# Patient Record
Sex: FEMALE | Race: WHITE | NOT HISPANIC OR LATINO | URBAN - METROPOLITAN AREA
[De-identification: names, ages, dates, MRNs, and addresses within clinical notes are randomized per-mention and may not be internally consistent; named-entity substitution may affect disease eponyms.]

---

## 2021-05-18 ENCOUNTER — OFFICE VISIT (OUTPATIENT)
Dept: FAMILY MEDICINE CLINIC | Facility: CLINIC | Age: 76
End: 2021-05-18

## 2021-05-18 ENCOUNTER — LAB (OUTPATIENT)
Dept: LAB | Facility: HOSPITAL | Age: 76
End: 2021-05-18

## 2021-05-18 VITALS
RESPIRATION RATE: 14 BRPM | SYSTOLIC BLOOD PRESSURE: 214 MMHG | HEIGHT: 63 IN | BODY MASS INDEX: 36.21 KG/M2 | WEIGHT: 204.4 LBS | DIASTOLIC BLOOD PRESSURE: 142 MMHG | OXYGEN SATURATION: 97 % | TEMPERATURE: 96.9 F | HEART RATE: 96 BPM

## 2021-05-18 DIAGNOSIS — I10 ESSENTIAL HYPERTENSION: ICD-10-CM

## 2021-05-18 DIAGNOSIS — Z00.00 MEDICARE ANNUAL WELLNESS VISIT, INITIAL: Primary | ICD-10-CM

## 2021-05-18 LAB
ALBUMIN SERPL-MCNC: 4.3 G/DL (ref 3.5–5.2)
ALBUMIN/GLOB SERPL: 1.4 G/DL
ALP SERPL-CCNC: 112 U/L (ref 39–117)
ALT SERPL W P-5'-P-CCNC: 33 U/L (ref 1–33)
ANION GAP SERPL CALCULATED.3IONS-SCNC: 13.2 MMOL/L (ref 5–15)
AST SERPL-CCNC: 39 U/L (ref 1–32)
BILIRUB SERPL-MCNC: 1.4 MG/DL (ref 0–1.2)
BUN SERPL-MCNC: 20 MG/DL (ref 8–23)
BUN/CREAT SERPL: 17.9 (ref 7–25)
CALCIUM SPEC-SCNC: 9.4 MG/DL (ref 8.6–10.5)
CHLORIDE SERPL-SCNC: 100 MMOL/L (ref 98–107)
CO2 SERPL-SCNC: 28.8 MMOL/L (ref 22–29)
CREAT SERPL-MCNC: 1.12 MG/DL (ref 0.57–1)
GFR SERPL CREATININE-BSD FRML MDRD: 47 ML/MIN/1.73
GLOBULIN UR ELPH-MCNC: 3 GM/DL
GLUCOSE SERPL-MCNC: 111 MG/DL (ref 65–99)
POTASSIUM SERPL-SCNC: 3.2 MMOL/L (ref 3.5–5.2)
PROT SERPL-MCNC: 7.3 G/DL (ref 6–8.5)
SODIUM SERPL-SCNC: 142 MMOL/L (ref 136–145)

## 2021-05-18 PROCEDURE — 80053 COMPREHEN METABOLIC PANEL: CPT

## 2021-05-18 PROCEDURE — G0438 PPPS, INITIAL VISIT: HCPCS | Performed by: PHYSICIAN ASSISTANT

## 2021-05-18 PROCEDURE — 36415 COLL VENOUS BLD VENIPUNCTURE: CPT

## 2021-05-18 RX ORDER — AMLODIPINE BESYLATE AND BENAZEPRIL HYDROCHLORIDE 5; 20 MG/1; MG/1
1 CAPSULE ORAL DAILY
Qty: 30 CAPSULE | Refills: 11 | Status: SHIPPED | OUTPATIENT
Start: 2021-05-18 | End: 2021-05-26

## 2021-05-18 NOTE — PROGRESS NOTES
"The ABCs of the Annual Wellness Visit  Initial Medicare Wellness Visit    Chief Complaint   Patient presents with   • Medicare Wellness-Initial Visit     Never had colonoscopy, [facility]. Hasn't had PCP in 20 years       Subjective   History of Present Illness:  Michael Cochran is a 75 y.o. female who presents for an Initial Medicare Wellness Visit.  Patient is 75-year-old white female who also presents for elevated blood pressure patient states she is not been to the doctor since 1991 has had no blood work performed she states that her blood pressures always been high but has not treated it she states she used [employer] in the past a long time ago and it caused her to have \"tic\".  She states she comes in wanting her blood pressure treated and start being followed regularly blood pressure today was 214/140.  Patient denies any symptoms of blurry vision headaches etc.  HEALTH RISK ASSESSMENT    Recent Hospitalizations:  No hospitalization(s) within the last year.    Current Medical Providers:  Patient Care Team:  Bo Rodriguez PA as PCP - General (Family Medicine)    Smoking Status:  Social History     Tobacco Use   Smoking Status Current Some Day Smoker   • Types: Cigarettes   Smokeless Tobacco Never Used   Tobacco Comment    Smokes rarely       Alcohol Consumption:  Social History     Substance and Sexual Activity   Alcohol Use Never       Depression Screen:   PHQ-2/PHQ-9 Depression Screening 5/18/2021   Little interest or pleasure in doing things 0   Feeling down, depressed, or hopeless 0   Total Score 0       Fall Risk Screen:  STEADI Fall Risk Assessment has not been completed.    Health Habits and Functional and Cognitive Screening:  Functional & Cognitive Status 5/18/2021   Do you have difficulty preparing food and eating? No   Do you have difficulty bathing yourself, getting dressed or grooming yourself? No   Do you have difficulty using the toilet? No   Do you have difficulty moving around from place to " place? No   Do you have trouble with steps or getting out of a bed or a chair? No   Current Diet Well Balanced Diet   Dental Exam Not up to date   Eye Exam Up to date   Exercise (times per week) 7 times per week   Current Exercise Activities Include Walking   Do you need help using the phone?  No   Are you deaf or do you have serious difficulty hearing?  No   Do you need help with transportation? No   Do you need help shopping? No   Do you need help preparing meals?  No   Do you need help with housework?  No   Do you need help with laundry? No   Do you need help taking your medications? No   Do you need help managing money? No   Do you ever drive or ride in a car without wearing a seat belt? No   Have you felt unusual stress, anger or loneliness in the last month? No   Who do you live with? Alone   If you need help, do you have trouble finding someone available to you? No   Have you been bothered in the last four weeks by sexual problems? No   Do you have difficulty concentrating, remembering or making decisions? No         Does the patient have evidence of cognitive impairment? No    Asprin use counseling:Start ASA 81 mg daily     Age-appropriate Screening Schedule:  Refer to the list below for future screening recommendations based on patient's age, sex and/or medical conditions. Orders for these recommended tests are listed in the plan section. The patient has been provided with a written plan.    Health Maintenance   Topic Date Due   • DXA SCAN  Never done   • TDAP/TD VACCINES (1 - Tdap) Never done   • ZOSTER VACCINE (1 of 2) Never done   • LIPID PANEL  Never done   • INFLUENZA VACCINE  08/01/2021          The following portions of the patient's history were reviewed and updated as appropriate: allergies, current medications, past family history, past medical history, past social history, past surgical history and problem list.    No outpatient medications prior to visit.     No facility-administered medications  "prior to visit.       There is no problem list on file for this patient.      Advanced Care Planning:  ACP discussion was held with the patient during this visit. Patient does not have an advance directive, information provided.    Review of Systems   Constitutional: Negative for fatigue and unexpected weight change.   Respiratory: Negative for cough, chest tightness and shortness of breath.    Cardiovascular: Negative for chest pain, palpitations and leg swelling.   Gastrointestinal: Negative for nausea.   Skin: Negative for color change and rash.   Neurological: Negative for dizziness, syncope, weakness and headaches.       Compared to one year ago, the patient feels her physical health is the same.  Compared to one year ago, the patient feels her mental health is the same.    Reviewed chart for potential of high risk medication in the elderly: yes  Reviewed chart for potential of harmful drug interactions in the elderly:yes    Objective         Vitals:    05/18/21 1118   BP: (!) 214/142   Pulse: 96   Resp: 14   Temp: 96.9 °F (36.1 °C)   TempSrc: Infrared   SpO2: 97%   Weight: 92.7 kg (204 lb 6.4 oz)   Height: 158.8 cm (62.5\")   PainSc: 0-No pain       Body mass index is 36.79 kg/m².  Discussed the patient's BMI with her. The BMI is above average; BMI management plan is completed.    Physical Exam  Vitals and nursing note reviewed.   Constitutional:       Appearance: She is well-developed.   Neck:      Vascular: No JVD.   Cardiovascular:      Rate and Rhythm: Normal rate and regular rhythm.      Pulses: Normal pulses.      Heart sounds: Normal heart sounds. No murmur heard.     Pulmonary:      Effort: Pulmonary effort is normal. No respiratory distress.      Breath sounds: Normal breath sounds.   Abdominal:      General: Bowel sounds are normal.      Palpations: Abdomen is soft.      Tenderness: There is no abdominal tenderness.   Skin:     General: Skin is warm and dry.               Assessment/Plan   Medicare " Risks and Personalized Health Plan  CMS Preventative Services Quick Reference  Cardiovascular risk    The above risks/problems have been discussed with the patient.  Pertinent information has been shared with the patient in the After Visit Summary.  Follow up plans and orders are seen below in the Assessment/Plan Section.    Diagnoses and all orders for this visit:    1. Medicare annual wellness visit, initial (Primary)    2. Essential hypertension  -     amLODIPine-benazepril (Lotrel) 5-20 MG per capsule; Take 1 capsule by mouth Daily.  Dispense: 30 capsule; Refill: 11  -     Comprehensive Metabolic Panel; Future  -     Lipid Panel; Future    Medicare wellness discussed, preventive medicine discussed, we will start by starting her on Lotrel 5/20 every day for blood pressure she is to return in 10 days we will check a CMP and lipid panel patient has not been a doctor many years I do not overwhelm her too much stuff to do so we will start by trying to lower her blood pressure get some basic blood work.  I will see her back in 10 days  Follow Up:  No follow-ups on file.     An After Visit Summary and PPPS were given to the patient.

## 2021-05-26 ENCOUNTER — OFFICE VISIT (OUTPATIENT)
Dept: FAMILY MEDICINE CLINIC | Facility: CLINIC | Age: 76
End: 2021-05-26

## 2021-05-26 VITALS
TEMPERATURE: 97.1 F | BODY MASS INDEX: 36.21 KG/M2 | HEIGHT: 63 IN | WEIGHT: 204.4 LBS | DIASTOLIC BLOOD PRESSURE: 122 MMHG | OXYGEN SATURATION: 98 % | RESPIRATION RATE: 16 BRPM | HEART RATE: 82 BPM | SYSTOLIC BLOOD PRESSURE: 172 MMHG

## 2021-05-26 DIAGNOSIS — I10 ESSENTIAL HYPERTENSION: Primary | ICD-10-CM

## 2021-05-26 PROCEDURE — 99213 OFFICE O/P EST LOW 20 MIN: CPT | Performed by: PHYSICIAN ASSISTANT

## 2021-05-26 RX ORDER — AMLODIPINE BESYLATE AND BENAZEPRIL HYDROCHLORIDE 10; 40 MG/1; MG/1
1 CAPSULE ORAL DAILY
Qty: 30 CAPSULE | Refills: 11 | Status: SHIPPED | OUTPATIENT
Start: 2021-05-26 | End: 2021-06-25

## 2021-05-26 NOTE — PROGRESS NOTES
Subjective   Michael Cochran is a 75 y.o. female  Hypertension (F/U since starting Lotrel May 18)      History of Present Illness  Patient is a pleasant 75-year-old white female who comes in for follow-up of hypertension blood pressure is better today but still elevated 172/122 but improved patient states she does overall feel better looking at her lab work had performed her CMP showed a slightly elevated creatinine at 1.12 potassium 3.2  The following portions of the patient's history were reviewed and updated as appropriate: allergies, current medications, past social history and problem list    Review of Systems   Constitutional: Negative for fatigue and unexpected weight change.   Respiratory: Negative for cough, chest tightness and shortness of breath.    Cardiovascular: Negative for chest pain, palpitations and leg swelling.   Gastrointestinal: Negative for nausea.   Skin: Negative for color change and rash.   Neurological: Negative for dizziness, syncope, weakness and headaches.       Objective     Vitals:    05/26/21 1134   BP: (!) 172/122   Pulse: 82   Resp: 16   Temp: 97.1 °F (36.2 °C)   SpO2: 98%       Physical Exam  Vitals and nursing note reviewed.   Constitutional:       Appearance: She is well-developed. She is not diaphoretic.   Neck:      Thyroid: No thyromegaly.      Vascular: No JVD.   Cardiovascular:      Rate and Rhythm: Normal rate and regular rhythm.      Pulses: Normal pulses.      Heart sounds: Normal heart sounds. No murmur heard.     Pulmonary:      Effort: Pulmonary effort is normal. No respiratory distress.      Breath sounds: Normal breath sounds.   Abdominal:      General: Bowel sounds are normal.      Palpations: Abdomen is soft.      Tenderness: There is no abdominal tenderness.   Musculoskeletal:      Cervical back: Neck supple.   Lymphadenopathy:      Cervical: No cervical adenopathy.   Skin:     General: Skin is warm and dry.   Neurological:      Sensory: No sensory deficit.          Assessment/Plan     Diagnoses and all orders for this visit:    1. Essential hypertension (Primary)  -     amLODIPine-benazepril (Lotrel) 10-40 MG per capsule; Take 1 capsule by mouth Daily.  Dispense: 30 capsule; Refill: 11    Increase Lotrel to 10/41 p.o. every day, recheck creatinine in 2 weeks, increase potassium rich food follow-up for blood pressure check in 2 weeks

## 2021-06-09 ENCOUNTER — OFFICE VISIT (OUTPATIENT)
Dept: FAMILY MEDICINE CLINIC | Facility: CLINIC | Age: 76
End: 2021-06-09

## 2021-06-09 ENCOUNTER — LAB (OUTPATIENT)
Dept: LAB | Facility: HOSPITAL | Age: 76
End: 2021-06-09

## 2021-06-09 VITALS
OXYGEN SATURATION: 97 % | TEMPERATURE: 96.9 F | BODY MASS INDEX: 36.36 KG/M2 | HEIGHT: 63 IN | RESPIRATION RATE: 16 BRPM | WEIGHT: 205.2 LBS | DIASTOLIC BLOOD PRESSURE: 112 MMHG | HEART RATE: 92 BPM | SYSTOLIC BLOOD PRESSURE: 172 MMHG

## 2021-06-09 DIAGNOSIS — R79.89 ELEVATED SERUM CREATININE: ICD-10-CM

## 2021-06-09 DIAGNOSIS — I10 ESSENTIAL HYPERTENSION: Primary | ICD-10-CM

## 2021-06-09 DIAGNOSIS — I10 ESSENTIAL HYPERTENSION: ICD-10-CM

## 2021-06-09 DIAGNOSIS — E87.6 LOW SERUM POTASSIUM: ICD-10-CM

## 2021-06-09 LAB
ANION GAP SERPL CALCULATED.3IONS-SCNC: 10 MMOL/L (ref 5–15)
BUN SERPL-MCNC: 23 MG/DL (ref 8–23)
BUN/CREAT SERPL: 26.1 (ref 7–25)
CALCIUM SPEC-SCNC: 9.7 MG/DL (ref 8.6–10.5)
CHLORIDE SERPL-SCNC: 104 MMOL/L (ref 98–107)
CHOLEST SERPL-MCNC: 314 MG/DL (ref 0–200)
CO2 SERPL-SCNC: 26 MMOL/L (ref 22–29)
CREAT SERPL-MCNC: 0.88 MG/DL (ref 0.57–1)
GFR SERPL CREATININE-BSD FRML MDRD: 62 ML/MIN/1.73
GLUCOSE SERPL-MCNC: 110 MG/DL (ref 65–99)
HDLC SERPL-MCNC: 43 MG/DL (ref 40–60)
LDLC SERPL CALC-MCNC: 216 MG/DL (ref 0–100)
LDLC/HDLC SERPL: 5.03 {RATIO}
POTASSIUM SERPL-SCNC: 4.4 MMOL/L (ref 3.5–5.2)
SODIUM SERPL-SCNC: 140 MMOL/L (ref 136–145)
TRIGL SERPL-MCNC: 273 MG/DL (ref 0–150)
VLDLC SERPL-MCNC: 55 MG/DL (ref 5–40)

## 2021-06-09 PROCEDURE — 99213 OFFICE O/P EST LOW 20 MIN: CPT | Performed by: PHYSICIAN ASSISTANT

## 2021-06-09 PROCEDURE — 36415 COLL VENOUS BLD VENIPUNCTURE: CPT

## 2021-06-09 PROCEDURE — 80061 LIPID PANEL: CPT

## 2021-06-09 PROCEDURE — 80048 BASIC METABOLIC PNL TOTAL CA: CPT

## 2021-06-09 NOTE — PROGRESS NOTES
Subjective   Michael Cochran is a 75 y.o. female  Hypertension (F/U )      History of Present Illness  Patient is a pleasant 75-year-old female who comes in for follow-up of hypertension on her last visit her Lotrel was increased to 10/40, her blood pressure did improve but still 172/112 during our conversation she states she has had renal ultrasound done in the past but it was a long time ago due to her uncontrolled hypertension.  On her last CMP showed slightly low potassium and elevated creatinine   The following portions of the patient's history were reviewed and updated as appropriate: allergies, current medications, past social history and problem list    Review of Systems   Constitutional: Negative for fatigue and unexpected weight change.   Respiratory: Negative for cough, chest tightness and shortness of breath.    Cardiovascular: Negative for chest pain, palpitations and leg swelling.   Gastrointestinal: Negative for nausea.   Skin: Negative for color change and rash.   Neurological: Negative for dizziness, syncope, weakness and headaches.       Objective     Vitals:    06/09/21 1102   BP: (!) 172/112   Pulse: 92   Resp: 16   Temp: 96.9 °F (36.1 °C)   SpO2: 97%       Physical Exam  Vitals and nursing note reviewed.   Constitutional:       Appearance: She is well-developed. She is not diaphoretic.   Neck:      Thyroid: No thyromegaly.      Vascular: No JVD.   Cardiovascular:      Rate and Rhythm: Normal rate and regular rhythm.      Pulses: Normal pulses.      Heart sounds: Normal heart sounds. No murmur heard.     Pulmonary:      Effort: Pulmonary effort is normal. No respiratory distress.      Breath sounds: Normal breath sounds.   Abdominal:      General: Bowel sounds are normal.      Palpations: Abdomen is soft.      Tenderness: There is no abdominal tenderness.   Musculoskeletal:      Cervical back: Neck supple.   Lymphadenopathy:      Cervical: No cervical adenopathy.   Skin:     General: Skin is warm  and dry.   Neurological:      Sensory: No sensory deficit.         Assessment/Plan     Diagnoses and all orders for this visit:    1. Essential hypertension (Primary)  -     Basic metabolic panel; Future    2. Elevated serum creatinine    3. Low serum potassium    #1 check BMP    Before I adjust medication I will get a BMP to check a potassium creatinine level she is to call for results tomorrow

## 2021-06-11 ENCOUNTER — TELEPHONE (OUTPATIENT)
Dept: FAMILY MEDICINE CLINIC | Facility: CLINIC | Age: 76
End: 2021-06-11

## 2021-06-11 NOTE — TELEPHONE ENCOUNTER
----- Message from Michael Cochran sent at 6/10/2021  7:15 PM EDT -----  Regarding: Non-Urgent Medical Question  Contact: [phone]  So when do you want me to return?  I didn't make a ret appointment. As sena Power I mnot pushing him he is a control freak you could handle him but he likes to go on and on and on just like his dad he is doctor,  and [place] chief. He wants everything now no patience just narcissistic attitude.  Thank you again for all you are doing to get me back on track.

## 2021-06-25 ENCOUNTER — OFFICE VISIT (OUTPATIENT)
Dept: FAMILY MEDICINE CLINIC | Facility: CLINIC | Age: 76
End: 2021-06-25

## 2021-06-25 VITALS
WEIGHT: 204 LBS | RESPIRATION RATE: 16 BRPM | TEMPERATURE: 96.9 F | BODY MASS INDEX: 36.14 KG/M2 | HEIGHT: 63 IN | OXYGEN SATURATION: 96 % | SYSTOLIC BLOOD PRESSURE: 170 MMHG | HEART RATE: 97 BPM | DIASTOLIC BLOOD PRESSURE: 110 MMHG

## 2021-06-25 DIAGNOSIS — I10 ESSENTIAL HYPERTENSION: Primary | ICD-10-CM

## 2021-06-25 PROCEDURE — 99213 OFFICE O/P EST LOW 20 MIN: CPT | Performed by: PHYSICIAN ASSISTANT

## 2021-06-25 RX ORDER — CLONIDINE HYDROCHLORIDE 0.1 MG/1
0.1 TABLET ORAL 2 TIMES DAILY
Qty: 60 TABLET | Refills: 6 | Status: SHIPPED | OUTPATIENT
Start: 2021-06-25 | End: 2022-01-03

## 2021-06-25 RX ORDER — AMLODIPINE BESYLATE 10 MG/1
10 TABLET ORAL DAILY
Qty: 30 TABLET | Refills: 11 | Status: SHIPPED | OUTPATIENT
Start: 2021-06-25

## 2021-06-25 NOTE — PROGRESS NOTES
Subjective   Michael Cochran is a 75 y.o. female  Hypertension (F/U) and Cough (Dry cough since starting Lotrel )      Hypertension  Pertinent negatives include no chest pain, headaches, palpitations, shortness of breath or sweats.   Cough  The current episode started 1 to 4 weeks ago. The problem has been gradually worsening. The problem occurs every few hours. The cough is non-productive. Associated symptoms include heartburn. Pertinent negatives include no chest pain, chills, ear congestion, ear pain, fever, headaches, hemoptysis, myalgias, postnasal drip, rash, rhinorrhea, sore throat, shortness of breath, sweats, weight loss or wheezing. The symptoms are aggravated by stress.     Patient states blood pressure still not improved cough is dry secondary to the blood pressure medication, patient states she has had a work-up before for uncontrolled hypertension for which he thinks included a renal ultrasound  The following portions of the patient's history were reviewed and updated as appropriate: allergies, current medications, past social history and problem list    Review of Systems   Constitutional: Negative for chills, fatigue, fever, unexpected weight change and weight loss.   HENT: Negative for ear pain, postnasal drip, rhinorrhea and sore throat.    Respiratory: Positive for cough. Negative for hemoptysis, chest tightness, shortness of breath and wheezing.    Cardiovascular: Negative for chest pain, palpitations and leg swelling.   Gastrointestinal: Positive for heartburn. Negative for nausea.   Musculoskeletal: Negative for myalgias.   Skin: Negative for color change and rash.   Neurological: Negative for dizziness, syncope, weakness and headaches.       Objective     Vitals:    06/25/21 1132   BP: (!) 170/110   Pulse: 97   Resp: 16   Temp: 96.9 °F (36.1 °C)   SpO2: 96%       Physical Exam  Vitals and nursing note reviewed.   Constitutional:       Appearance: She is well-developed.   Neck:      Vascular: No  JVD.   Cardiovascular:      Rate and Rhythm: Normal rate and regular rhythm.      Pulses: Normal pulses.      Heart sounds: Normal heart sounds. No murmur heard.     Pulmonary:      Effort: Pulmonary effort is normal. No respiratory distress.      Breath sounds: Normal breath sounds.   Abdominal:      General: Bowel sounds are normal.      Palpations: Abdomen is soft.      Tenderness: There is no abdominal tenderness.   Skin:     General: Skin is warm and dry.         Assessment/Plan     Diagnoses and all orders for this visit:    1. Essential hypertension (Primary)    Other orders  -     amLODIPine (Norvasc) 10 MG tablet; Take 1 tablet by mouth Daily.  Dispense: 30 tablet; Refill: 11  -     cloNIDine (Catapres) 0.1 MG tablet; Take 1 tablet by mouth 2 (Two) Times a Day.  Dispense: 60 tablet; Refill: 6    #1 DC Lotrel start amlodipine 10 mg 1 p.o. every day dispense 30 tablet refills    2.  Start clonidine 0.1 mg twice a day dispense 60x3 refills    Follow-up in 3 weeks to recheck blood pressure

## 2021-08-16 ENCOUNTER — OFFICE VISIT (OUTPATIENT)
Dept: FAMILY MEDICINE CLINIC | Facility: CLINIC | Age: 76
End: 2021-08-16

## 2021-08-16 VITALS
RESPIRATION RATE: 16 BRPM | SYSTOLIC BLOOD PRESSURE: 142 MMHG | OXYGEN SATURATION: 98 % | TEMPERATURE: 97.6 F | WEIGHT: 207 LBS | BODY MASS INDEX: 36.68 KG/M2 | DIASTOLIC BLOOD PRESSURE: 84 MMHG | HEART RATE: 97 BPM | HEIGHT: 63 IN

## 2021-08-16 DIAGNOSIS — I10 ESSENTIAL HYPERTENSION: Primary | ICD-10-CM

## 2021-08-16 PROCEDURE — 99213 OFFICE O/P EST LOW 20 MIN: CPT | Performed by: PHYSICIAN ASSISTANT

## 2021-08-16 RX ORDER — HYDROCHLOROTHIAZIDE 25 MG/1
25 TABLET ORAL DAILY
Qty: 90 TABLET | Refills: 3 | Status: SHIPPED | OUTPATIENT
Start: 2021-08-16

## 2021-08-16 NOTE — PROGRESS NOTES
Subjective   Michael Cochran is a 76 y.o. female  Hypertension      History of Present Illness  Patient is a pleasant 76-year-old white female who presents for hypertension meds working well no problems or complaints no shortness of breath no chest pain no SI/HI meds working well, no frontal headache concentration is good focus is good.  Blood pressure looks better on combination blood pressure is about 140/92 much improved does feel better more energy  The following portions of the patient's history were reviewed and updated as appropriate: allergies, current medications, past social history and problem list    Review of Systems   Constitutional: Negative for appetite change, diaphoresis, fatigue and unexpected weight change.   Eyes: Negative for visual disturbance.   Respiratory: Negative for cough, chest tightness and shortness of breath.    Cardiovascular: Negative for chest pain, palpitations and leg swelling.   Gastrointestinal: Negative for diarrhea, nausea and vomiting.   Endocrine: Negative for polydipsia, polyphagia and polyuria.   Skin: Negative for color change and rash.   Neurological: Negative for dizziness, syncope, weakness, light-headedness, numbness and headaches.       Objective     Vitals:    08/16/21 1310   BP: 142/84   Pulse: 97   Resp: 16   Temp: 97.6 °F (36.4 °C)   SpO2: 98%       Physical Exam  Vitals and nursing note reviewed.   Constitutional:       Appearance: She is well-developed. She is not diaphoretic.   Neck:      Thyroid: No thyromegaly.      Vascular: No JVD.   Cardiovascular:      Rate and Rhythm: Normal rate and regular rhythm.      Pulses: Normal pulses.      Heart sounds: Normal heart sounds. No murmur heard.     Pulmonary:      Effort: Pulmonary effort is normal. No respiratory distress.      Breath sounds: Normal breath sounds.   Abdominal:      General: Bowel sounds are normal.      Palpations: Abdomen is soft.      Tenderness: There is no abdominal tenderness.    Musculoskeletal:      Cervical back: Neck supple.   Lymphadenopathy:      Cervical: No cervical adenopathy.   Skin:     General: Skin is warm and dry.   Neurological:      Sensory: No sensory deficit.         Assessment/Plan     Diagnoses and all orders for this visit:    1. Essential hypertension (Primary)  -     hydroCHLOROthiazide (HYDRODIURIL) 25 MG tablet; Take 1 tablet by mouth Daily.  Dispense: 90 tablet; Refill: 3    Add HCTZ    I spent 15 minutes in patient care: Reviewing records prior to the visit, examining the patient, entering orders and documentation    Please note that portions of this document were completed with a voice recognition program. Efforts were made to edit the dictations, but occasionally words are mis-transcribed

## 2022-01-03 RX ORDER — CLONIDINE HYDROCHLORIDE 0.1 MG/1
TABLET ORAL
Qty: 60 TABLET | Refills: 1 | Status: SHIPPED | OUTPATIENT
Start: 2022-01-03

## 2023-01-01 ENCOUNTER — APPOINTMENT (OUTPATIENT)
Dept: GENERAL RADIOLOGY | Facility: HOSPITAL | Age: 78
DRG: 689 | End: 2023-01-01
Payer: MEDICARE

## 2023-01-01 ENCOUNTER — HOSPITAL ENCOUNTER (INPATIENT)
Facility: HOSPITAL | Age: 78
LOS: 1 days | End: 2023-12-01
Attending: INTERNAL MEDICINE | Admitting: INTERNAL MEDICINE
Payer: COMMERCIAL

## 2023-01-01 ENCOUNTER — HOSPITAL ENCOUNTER (INPATIENT)
Facility: HOSPITAL | Age: 78
LOS: 13 days | DRG: 689 | End: 2023-11-30
Attending: EMERGENCY MEDICINE | Admitting: INTERNAL MEDICINE
Payer: MEDICARE

## 2023-01-01 ENCOUNTER — APPOINTMENT (OUTPATIENT)
Dept: CT IMAGING | Facility: HOSPITAL | Age: 78
DRG: 689 | End: 2023-01-01
Payer: MEDICARE

## 2023-01-01 VITALS
SYSTOLIC BLOOD PRESSURE: 152 MMHG | OXYGEN SATURATION: 98 % | TEMPERATURE: 98.1 F | WEIGHT: 201.5 LBS | HEART RATE: 81 BPM | HEIGHT: 62 IN | DIASTOLIC BLOOD PRESSURE: 75 MMHG | BODY MASS INDEX: 37.08 KG/M2 | RESPIRATION RATE: 19 BRPM

## 2023-01-01 DIAGNOSIS — A41.9 SEPSIS WITH ACUTE HYPOXIC RESPIRATORY FAILURE WITHOUT SEPTIC SHOCK, DUE TO UNSPECIFIED ORGANISM: Primary | ICD-10-CM

## 2023-01-01 DIAGNOSIS — N39.0 ACUTE URINARY TRACT INFECTION: ICD-10-CM

## 2023-01-01 DIAGNOSIS — R79.89 ELEVATED TROPONIN: ICD-10-CM

## 2023-01-01 DIAGNOSIS — J96.01 SEPSIS WITH ACUTE HYPOXIC RESPIRATORY FAILURE WITHOUT SEPTIC SHOCK, DUE TO UNSPECIFIED ORGANISM: Primary | ICD-10-CM

## 2023-01-01 DIAGNOSIS — R09.02 HYPOXIA: ICD-10-CM

## 2023-01-01 DIAGNOSIS — R65.20 SEPSIS WITH ACUTE HYPOXIC RESPIRATORY FAILURE WITHOUT SEPTIC SHOCK, DUE TO UNSPECIFIED ORGANISM: Primary | ICD-10-CM

## 2023-01-01 LAB
ALBUMIN SERPL-MCNC: 2.6 G/DL (ref 3.5–5.2)
ALBUMIN SERPL-MCNC: 2.8 G/DL (ref 3.5–5.2)
ALBUMIN SERPL-MCNC: 2.8 G/DL (ref 3.5–5.2)
ALBUMIN SERPL-MCNC: 3.1 G/DL (ref 3.5–5.2)
ALBUMIN SERPL-MCNC: 3.2 G/DL (ref 3.5–5.2)
ALBUMIN SERPL-MCNC: 3.2 G/DL (ref 3.5–5.2)
ALBUMIN/GLOB SERPL: 0.8 G/DL
ALBUMIN/GLOB SERPL: 0.9 G/DL
ALBUMIN/GLOB SERPL: 1 G/DL
ALP SERPL-CCNC: 128 U/L (ref 39–117)
ALP SERPL-CCNC: 87 U/L (ref 39–117)
ALP SERPL-CCNC: 88 U/L (ref 39–117)
ALP SERPL-CCNC: 94 U/L (ref 39–117)
ALP SERPL-CCNC: 98 U/L (ref 39–117)
ALT SERPL W P-5'-P-CCNC: 15 U/L (ref 1–33)
ALT SERPL W P-5'-P-CCNC: 21 U/L (ref 1–33)
ALT SERPL W P-5'-P-CCNC: 28 U/L (ref 1–33)
ALT SERPL W P-5'-P-CCNC: 30 U/L (ref 1–33)
ALT SERPL W P-5'-P-CCNC: 33 U/L (ref 1–33)
ANION GAP SERPL CALCULATED.3IONS-SCNC: 10 MMOL/L (ref 5–15)
ANION GAP SERPL CALCULATED.3IONS-SCNC: 11 MMOL/L (ref 5–15)
ANION GAP SERPL CALCULATED.3IONS-SCNC: 8 MMOL/L (ref 5–15)
ANION GAP SERPL CALCULATED.3IONS-SCNC: 8 MMOL/L (ref 5–15)
ANION GAP SERPL CALCULATED.3IONS-SCNC: 9 MMOL/L (ref 5–15)
ANION GAP SERPL CALCULATED.3IONS-SCNC: 9 MMOL/L (ref 5–15)
AST SERPL-CCNC: 17 U/L (ref 1–32)
AST SERPL-CCNC: 18 U/L (ref 1–32)
AST SERPL-CCNC: 22 U/L (ref 1–32)
AST SERPL-CCNC: 35 U/L (ref 1–32)
AST SERPL-CCNC: 41 U/L (ref 1–32)
B PARAPERT DNA SPEC QL NAA+PROBE: NOT DETECTED
B PERT DNA SPEC QL NAA+PROBE: NOT DETECTED
BACTERIA BLD CULT: ABNORMAL
BACTERIA SPEC AEROBE CULT: ABNORMAL
BACTERIA SPEC AEROBE CULT: NORMAL
BACTERIA SPEC AEROBE CULT: NORMAL
BACTERIA UR QL AUTO: ABNORMAL /HPF
BACTERIA UR QL AUTO: ABNORMAL /HPF
BASOPHILS # BLD AUTO: 0.03 10*3/MM3 (ref 0–0.2)
BASOPHILS # BLD AUTO: 0.03 10*3/MM3 (ref 0–0.2)
BASOPHILS # BLD AUTO: 0.05 10*3/MM3 (ref 0–0.2)
BASOPHILS # BLD AUTO: 0.06 10*3/MM3 (ref 0–0.2)
BASOPHILS NFR BLD AUTO: 0.3 % (ref 0–1.5)
BASOPHILS NFR BLD AUTO: 0.3 % (ref 0–1.5)
BASOPHILS NFR BLD AUTO: 0.4 % (ref 0–1.5)
BASOPHILS NFR BLD AUTO: 0.5 % (ref 0–1.5)
BASOPHILS NFR BLD AUTO: 0.5 % (ref 0–1.5)
BASOPHILS NFR BLD AUTO: 0.6 % (ref 0–1.5)
BILIRUB SERPL-MCNC: 0.8 MG/DL (ref 0–1.2)
BILIRUB SERPL-MCNC: 0.9 MG/DL (ref 0–1.2)
BILIRUB SERPL-MCNC: 1 MG/DL (ref 0–1.2)
BILIRUB SERPL-MCNC: 1.1 MG/DL (ref 0–1.2)
BILIRUB SERPL-MCNC: 1.3 MG/DL (ref 0–1.2)
BILIRUB UR QL STRIP: NEGATIVE
BILIRUB UR QL STRIP: NEGATIVE
BOTTLE TYPE: ABNORMAL
BUN SERPL-MCNC: 21 MG/DL (ref 8–23)
BUN SERPL-MCNC: 24 MG/DL (ref 8–23)
BUN SERPL-MCNC: 26 MG/DL (ref 8–23)
BUN SERPL-MCNC: 29 MG/DL (ref 8–23)
BUN SERPL-MCNC: 30 MG/DL (ref 8–23)
BUN SERPL-MCNC: 32 MG/DL (ref 8–23)
BUN SERPL-MCNC: 37 MG/DL (ref 8–23)
BUN SERPL-MCNC: 42 MG/DL (ref 8–23)
BUN SERPL-MCNC: 51 MG/DL (ref 8–23)
BUN/CREAT SERPL: 33.8 (ref 7–25)
BUN/CREAT SERPL: 36.8 (ref 7–25)
BUN/CREAT SERPL: 41.2 (ref 7–25)
BUN/CREAT SERPL: 41.4 (ref 7–25)
BUN/CREAT SERPL: 42 (ref 7–25)
BUN/CREAT SERPL: 42.9 (ref 7–25)
BUN/CREAT SERPL: 44.1 (ref 7–25)
BUN/CREAT SERPL: 46.3 (ref 7–25)
BUN/CREAT SERPL: 49.4 (ref 7–25)
C PNEUM DNA NPH QL NAA+NON-PROBE: NOT DETECTED
CALCIUM SPEC-SCNC: 8.2 MG/DL (ref 8.6–10.5)
CALCIUM SPEC-SCNC: 8.4 MG/DL (ref 8.6–10.5)
CALCIUM SPEC-SCNC: 8.5 MG/DL (ref 8.6–10.5)
CALCIUM SPEC-SCNC: 8.7 MG/DL (ref 8.6–10.5)
CALCIUM SPEC-SCNC: 8.8 MG/DL (ref 8.6–10.5)
CALCIUM SPEC-SCNC: 8.8 MG/DL (ref 8.6–10.5)
CALCIUM SPEC-SCNC: 8.9 MG/DL (ref 8.6–10.5)
CALCIUM SPEC-SCNC: 9 MG/DL (ref 8.6–10.5)
CALCIUM SPEC-SCNC: 9.3 MG/DL (ref 8.6–10.5)
CHLORIDE SERPL-SCNC: 102 MMOL/L (ref 98–107)
CHLORIDE SERPL-SCNC: 106 MMOL/L (ref 98–107)
CHLORIDE SERPL-SCNC: 112 MMOL/L (ref 98–107)
CHLORIDE SERPL-SCNC: 112 MMOL/L (ref 98–107)
CHLORIDE SERPL-SCNC: 115 MMOL/L (ref 98–107)
CHLORIDE SERPL-SCNC: 119 MMOL/L (ref 98–107)
CHLORIDE SERPL-SCNC: 122 MMOL/L (ref 98–107)
CHLORIDE SERPL-SCNC: 123 MMOL/L (ref 98–107)
CHLORIDE SERPL-SCNC: 97 MMOL/L (ref 98–107)
CHOLEST SERPL-MCNC: 124 MG/DL (ref 0–200)
CHOLEST SERPL-MCNC: 125 MG/DL (ref 0–200)
CLARITY UR: ABNORMAL
CLARITY UR: ABNORMAL
CO2 SERPL-SCNC: 21 MMOL/L (ref 22–29)
CO2 SERPL-SCNC: 23 MMOL/L (ref 22–29)
CO2 SERPL-SCNC: 24 MMOL/L (ref 22–29)
CO2 SERPL-SCNC: 26 MMOL/L (ref 22–29)
CO2 SERPL-SCNC: 27 MMOL/L (ref 22–29)
CO2 SERPL-SCNC: 27 MMOL/L (ref 22–29)
CO2 SERPL-SCNC: 28 MMOL/L (ref 22–29)
COLOR UR: YELLOW
COLOR UR: YELLOW
CREAT SERPL-MCNC: 0.51 MG/DL (ref 0.57–1)
CREAT SERPL-MCNC: 0.58 MG/DL (ref 0.57–1)
CREAT SERPL-MCNC: 0.68 MG/DL (ref 0.57–1)
CREAT SERPL-MCNC: 0.69 MG/DL (ref 0.57–1)
CREAT SERPL-MCNC: 0.77 MG/DL (ref 0.57–1)
CREAT SERPL-MCNC: 0.8 MG/DL (ref 0.57–1)
CREAT SERPL-MCNC: 0.85 MG/DL (ref 0.57–1)
CREAT SERPL-MCNC: 0.87 MG/DL (ref 0.57–1)
CREAT SERPL-MCNC: 1.19 MG/DL (ref 0.57–1)
CRP SERPL-MCNC: 16.68 MG/DL (ref 0–0.5)
CRP SERPL-MCNC: 2.49 MG/DL (ref 0–0.5)
D-LACTATE SERPL-SCNC: 1.2 MMOL/L (ref 0.5–2)
D-LACTATE SERPL-SCNC: 1.5 MMOL/L (ref 0.5–2)
DEPRECATED RDW RBC AUTO: 50.3 FL (ref 37–54)
DEPRECATED RDW RBC AUTO: 50.4 FL (ref 37–54)
DEPRECATED RDW RBC AUTO: 51.8 FL (ref 37–54)
DEPRECATED RDW RBC AUTO: 52.6 FL (ref 37–54)
DEPRECATED RDW RBC AUTO: 53.7 FL (ref 37–54)
DEPRECATED RDW RBC AUTO: 55 FL (ref 37–54)
EGFRCR SERPLBLD CKD-EPI 2021: 46.6 ML/MIN/1.73
EGFRCR SERPLBLD CKD-EPI 2021: 67.9 ML/MIN/1.73
EGFRCR SERPLBLD CKD-EPI 2021: 69.8 ML/MIN/1.73
EGFRCR SERPLBLD CKD-EPI 2021: 75.1 ML/MIN/1.73
EGFRCR SERPLBLD CKD-EPI 2021: 78.6 ML/MIN/1.73
EGFRCR SERPLBLD CKD-EPI 2021: 88.4 ML/MIN/1.73
EGFRCR SERPLBLD CKD-EPI 2021: 88.7 ML/MIN/1.73
EGFRCR SERPLBLD CKD-EPI 2021: 92.2 ML/MIN/1.73
EGFRCR SERPLBLD CKD-EPI 2021: 95.1 ML/MIN/1.73
EOSINOPHIL # BLD AUTO: 0.09 10*3/MM3 (ref 0–0.4)
EOSINOPHIL # BLD AUTO: 0.28 10*3/MM3 (ref 0–0.4)
EOSINOPHIL # BLD AUTO: 0.36 10*3/MM3 (ref 0–0.4)
EOSINOPHIL # BLD AUTO: 0.39 10*3/MM3 (ref 0–0.4)
EOSINOPHIL # BLD AUTO: 0.41 10*3/MM3 (ref 0–0.4)
EOSINOPHIL # BLD AUTO: 0.44 10*3/MM3 (ref 0–0.4)
EOSINOPHIL NFR BLD AUTO: 0.7 % (ref 0.3–6.2)
EOSINOPHIL NFR BLD AUTO: 2.6 % (ref 0.3–6.2)
EOSINOPHIL NFR BLD AUTO: 4 % (ref 0.3–6.2)
EOSINOPHIL NFR BLD AUTO: 4 % (ref 0.3–6.2)
EOSINOPHIL NFR BLD AUTO: 4.4 % (ref 0.3–6.2)
EOSINOPHIL NFR BLD AUTO: 5 % (ref 0.3–6.2)
ERYTHROCYTE [DISTWIDTH] IN BLOOD BY AUTOMATED COUNT: 14.7 % (ref 12.3–15.4)
ERYTHROCYTE [DISTWIDTH] IN BLOOD BY AUTOMATED COUNT: 14.7 % (ref 12.3–15.4)
ERYTHROCYTE [DISTWIDTH] IN BLOOD BY AUTOMATED COUNT: 14.8 % (ref 12.3–15.4)
ERYTHROCYTE [DISTWIDTH] IN BLOOD BY AUTOMATED COUNT: 14.8 % (ref 12.3–15.4)
ERYTHROCYTE [DISTWIDTH] IN BLOOD BY AUTOMATED COUNT: 15.1 % (ref 12.3–15.4)
ERYTHROCYTE [DISTWIDTH] IN BLOOD BY AUTOMATED COUNT: 15.2 % (ref 12.3–15.4)
FLUAV SUBTYP SPEC NAA+PROBE: NOT DETECTED
FLUBV RNA ISLT QL NAA+PROBE: NOT DETECTED
GEN 5 2HR TROPONIN T REFLEX: 136 NG/L
GLOBULIN UR ELPH-MCNC: 2.8 GM/DL
GLOBULIN UR ELPH-MCNC: 2.9 GM/DL
GLOBULIN UR ELPH-MCNC: 3.2 GM/DL
GLOBULIN UR ELPH-MCNC: 3.3 GM/DL
GLOBULIN UR ELPH-MCNC: 3.3 GM/DL
GLUCOSE BLDC GLUCOMTR-MCNC: 138 MG/DL (ref 70–130)
GLUCOSE BLDC GLUCOMTR-MCNC: 140 MG/DL (ref 70–130)
GLUCOSE BLDC GLUCOMTR-MCNC: 150 MG/DL (ref 70–130)
GLUCOSE BLDC GLUCOMTR-MCNC: 154 MG/DL (ref 70–130)
GLUCOSE BLDC GLUCOMTR-MCNC: 162 MG/DL (ref 70–130)
GLUCOSE BLDC GLUCOMTR-MCNC: 163 MG/DL (ref 70–130)
GLUCOSE BLDC GLUCOMTR-MCNC: 165 MG/DL (ref 70–130)
GLUCOSE BLDC GLUCOMTR-MCNC: 168 MG/DL (ref 70–130)
GLUCOSE BLDC GLUCOMTR-MCNC: 171 MG/DL (ref 70–130)
GLUCOSE BLDC GLUCOMTR-MCNC: 171 MG/DL (ref 70–130)
GLUCOSE BLDC GLUCOMTR-MCNC: 175 MG/DL (ref 70–130)
GLUCOSE BLDC GLUCOMTR-MCNC: 179 MG/DL (ref 70–130)
GLUCOSE BLDC GLUCOMTR-MCNC: 180 MG/DL (ref 70–130)
GLUCOSE BLDC GLUCOMTR-MCNC: 184 MG/DL (ref 70–130)
GLUCOSE BLDC GLUCOMTR-MCNC: 184 MG/DL (ref 70–130)
GLUCOSE BLDC GLUCOMTR-MCNC: 187 MG/DL (ref 70–130)
GLUCOSE BLDC GLUCOMTR-MCNC: 188 MG/DL (ref 70–130)
GLUCOSE BLDC GLUCOMTR-MCNC: 190 MG/DL (ref 70–130)
GLUCOSE BLDC GLUCOMTR-MCNC: 191 MG/DL (ref 70–130)
GLUCOSE BLDC GLUCOMTR-MCNC: 192 MG/DL (ref 70–130)
GLUCOSE BLDC GLUCOMTR-MCNC: 194 MG/DL (ref 70–130)
GLUCOSE BLDC GLUCOMTR-MCNC: 197 MG/DL (ref 70–130)
GLUCOSE BLDC GLUCOMTR-MCNC: 199 MG/DL (ref 70–130)
GLUCOSE BLDC GLUCOMTR-MCNC: 203 MG/DL (ref 70–130)
GLUCOSE BLDC GLUCOMTR-MCNC: 204 MG/DL (ref 70–130)
GLUCOSE BLDC GLUCOMTR-MCNC: 206 MG/DL (ref 70–130)
GLUCOSE BLDC GLUCOMTR-MCNC: 209 MG/DL (ref 70–130)
GLUCOSE BLDC GLUCOMTR-MCNC: 209 MG/DL (ref 70–130)
GLUCOSE BLDC GLUCOMTR-MCNC: 211 MG/DL (ref 70–130)
GLUCOSE BLDC GLUCOMTR-MCNC: 212 MG/DL (ref 70–130)
GLUCOSE BLDC GLUCOMTR-MCNC: 214 MG/DL (ref 70–130)
GLUCOSE BLDC GLUCOMTR-MCNC: 216 MG/DL (ref 70–130)
GLUCOSE BLDC GLUCOMTR-MCNC: 220 MG/DL (ref 70–130)
GLUCOSE BLDC GLUCOMTR-MCNC: 221 MG/DL (ref 70–130)
GLUCOSE BLDC GLUCOMTR-MCNC: 221 MG/DL (ref 70–130)
GLUCOSE BLDC GLUCOMTR-MCNC: 222 MG/DL (ref 70–130)
GLUCOSE BLDC GLUCOMTR-MCNC: 222 MG/DL (ref 70–130)
GLUCOSE BLDC GLUCOMTR-MCNC: 223 MG/DL (ref 70–130)
GLUCOSE BLDC GLUCOMTR-MCNC: 227 MG/DL (ref 70–130)
GLUCOSE BLDC GLUCOMTR-MCNC: 229 MG/DL (ref 70–130)
GLUCOSE BLDC GLUCOMTR-MCNC: 236 MG/DL (ref 70–130)
GLUCOSE BLDC GLUCOMTR-MCNC: 240 MG/DL (ref 70–130)
GLUCOSE BLDC GLUCOMTR-MCNC: 242 MG/DL (ref 70–130)
GLUCOSE BLDC GLUCOMTR-MCNC: 244 MG/DL (ref 70–130)
GLUCOSE BLDC GLUCOMTR-MCNC: 245 MG/DL (ref 70–130)
GLUCOSE BLDC GLUCOMTR-MCNC: 246 MG/DL (ref 70–130)
GLUCOSE BLDC GLUCOMTR-MCNC: 248 MG/DL (ref 70–130)
GLUCOSE BLDC GLUCOMTR-MCNC: 251 MG/DL (ref 70–130)
GLUCOSE BLDC GLUCOMTR-MCNC: 254 MG/DL (ref 70–130)
GLUCOSE BLDC GLUCOMTR-MCNC: 278 MG/DL (ref 70–130)
GLUCOSE BLDC GLUCOMTR-MCNC: 288 MG/DL (ref 70–130)
GLUCOSE BLDC GLUCOMTR-MCNC: 292 MG/DL (ref 70–130)
GLUCOSE BLDC GLUCOMTR-MCNC: 300 MG/DL (ref 70–130)
GLUCOSE SERPL-MCNC: 139 MG/DL (ref 65–99)
GLUCOSE SERPL-MCNC: 150 MG/DL (ref 65–99)
GLUCOSE SERPL-MCNC: 193 MG/DL (ref 65–99)
GLUCOSE SERPL-MCNC: 202 MG/DL (ref 65–99)
GLUCOSE SERPL-MCNC: 210 MG/DL (ref 65–99)
GLUCOSE SERPL-MCNC: 221 MG/DL (ref 65–99)
GLUCOSE SERPL-MCNC: 223 MG/DL (ref 65–99)
GLUCOSE SERPL-MCNC: 229 MG/DL (ref 65–99)
GLUCOSE SERPL-MCNC: 307 MG/DL (ref 65–99)
GLUCOSE UR STRIP-MCNC: NEGATIVE MG/DL
GLUCOSE UR STRIP-MCNC: NEGATIVE MG/DL
GRAM STN SPEC: ABNORMAL
HADV DNA SPEC NAA+PROBE: NOT DETECTED
HCOV 229E RNA SPEC QL NAA+PROBE: NOT DETECTED
HCOV HKU1 RNA SPEC QL NAA+PROBE: NOT DETECTED
HCOV NL63 RNA SPEC QL NAA+PROBE: NOT DETECTED
HCOV OC43 RNA SPEC QL NAA+PROBE: NOT DETECTED
HCT VFR BLD AUTO: 29.2 % (ref 34–46.6)
HCT VFR BLD AUTO: 29.4 % (ref 34–46.6)
HCT VFR BLD AUTO: 29.5 % (ref 34–46.6)
HCT VFR BLD AUTO: 29.6 % (ref 34–46.6)
HCT VFR BLD AUTO: 30.5 % (ref 34–46.6)
HCT VFR BLD AUTO: 30.7 % (ref 34–46.6)
HGB BLD-MCNC: 8.3 G/DL (ref 12–15.9)
HGB BLD-MCNC: 8.4 G/DL (ref 12–15.9)
HGB BLD-MCNC: 8.7 G/DL (ref 12–15.9)
HGB BLD-MCNC: 8.9 G/DL (ref 12–15.9)
HGB BLD-MCNC: 9 G/DL (ref 12–15.9)
HGB BLD-MCNC: 9 G/DL (ref 12–15.9)
HGB UR QL STRIP.AUTO: ABNORMAL
HGB UR QL STRIP.AUTO: ABNORMAL
HMPV RNA NPH QL NAA+NON-PROBE: NOT DETECTED
HOLD SPECIMEN: NORMAL
HPIV1 RNA ISLT QL NAA+PROBE: NOT DETECTED
HPIV2 RNA SPEC QL NAA+PROBE: NOT DETECTED
HPIV3 RNA NPH QL NAA+PROBE: NOT DETECTED
HPIV4 P GENE NPH QL NAA+PROBE: NOT DETECTED
HYALINE CASTS UR QL AUTO: ABNORMAL /LPF
HYALINE CASTS UR QL AUTO: ABNORMAL /LPF
IMM GRANULOCYTES # BLD AUTO: 0.03 10*3/MM3 (ref 0–0.05)
IMM GRANULOCYTES # BLD AUTO: 0.04 10*3/MM3 (ref 0–0.05)
IMM GRANULOCYTES # BLD AUTO: 0.07 10*3/MM3 (ref 0–0.05)
IMM GRANULOCYTES # BLD AUTO: 0.13 10*3/MM3 (ref 0–0.05)
IMM GRANULOCYTES NFR BLD AUTO: 0.3 % (ref 0–0.5)
IMM GRANULOCYTES NFR BLD AUTO: 0.4 % (ref 0–0.5)
IMM GRANULOCYTES NFR BLD AUTO: 0.4 % (ref 0–0.5)
IMM GRANULOCYTES NFR BLD AUTO: 0.5 % (ref 0–0.5)
IMM GRANULOCYTES NFR BLD AUTO: 0.5 % (ref 0–0.5)
IMM GRANULOCYTES NFR BLD AUTO: 1.4 % (ref 0–0.5)
ISOLATED FROM: ABNORMAL
ISOLATED FROM: ABNORMAL
KETONES UR QL STRIP: NEGATIVE
KETONES UR QL STRIP: NEGATIVE
LEUKOCYTE ESTERASE UR QL STRIP.AUTO: ABNORMAL
LEUKOCYTE ESTERASE UR QL STRIP.AUTO: ABNORMAL
LYMPHOCYTES # BLD AUTO: 0.9 10*3/MM3 (ref 0.7–3.1)
LYMPHOCYTES # BLD AUTO: 0.92 10*3/MM3 (ref 0.7–3.1)
LYMPHOCYTES # BLD AUTO: 1.05 10*3/MM3 (ref 0.7–3.1)
LYMPHOCYTES # BLD AUTO: 1.07 10*3/MM3 (ref 0.7–3.1)
LYMPHOCYTES # BLD AUTO: 1.28 10*3/MM3 (ref 0.7–3.1)
LYMPHOCYTES # BLD AUTO: 1.36 10*3/MM3 (ref 0.7–3.1)
LYMPHOCYTES NFR BLD AUTO: 12.2 % (ref 19.6–45.3)
LYMPHOCYTES NFR BLD AUTO: 12.8 % (ref 19.6–45.3)
LYMPHOCYTES NFR BLD AUTO: 14.1 % (ref 19.6–45.3)
LYMPHOCYTES NFR BLD AUTO: 7.7 % (ref 19.6–45.3)
LYMPHOCYTES NFR BLD AUTO: 9.3 % (ref 19.6–45.3)
LYMPHOCYTES NFR BLD AUTO: 9.9 % (ref 19.6–45.3)
M PNEUMO IGG SER IA-ACNC: NOT DETECTED
MAGNESIUM SERPL-MCNC: 2 MG/DL (ref 1.6–2.4)
MAGNESIUM SERPL-MCNC: 2.2 MG/DL (ref 1.6–2.4)
MAGNESIUM SERPL-MCNC: 2.4 MG/DL (ref 1.6–2.4)
MCH RBC QN AUTO: 28.2 PG (ref 26.6–33)
MCH RBC QN AUTO: 28.4 PG (ref 26.6–33)
MCH RBC QN AUTO: 28.6 PG (ref 26.6–33)
MCH RBC QN AUTO: 28.6 PG (ref 26.6–33)
MCHC RBC AUTO-ENTMCNC: 28.4 G/DL (ref 31.5–35.7)
MCHC RBC AUTO-ENTMCNC: 28.4 G/DL (ref 31.5–35.7)
MCHC RBC AUTO-ENTMCNC: 29.3 G/DL (ref 31.5–35.7)
MCHC RBC AUTO-ENTMCNC: 29.5 G/DL (ref 31.5–35.7)
MCHC RBC AUTO-ENTMCNC: 29.5 G/DL (ref 31.5–35.7)
MCHC RBC AUTO-ENTMCNC: 30.3 G/DL (ref 31.5–35.7)
MCV RBC AUTO: 100.7 FL (ref 79–97)
MCV RBC AUTO: 94.5 FL (ref 79–97)
MCV RBC AUTO: 95.8 FL (ref 79–97)
MCV RBC AUTO: 96.2 FL (ref 79–97)
MCV RBC AUTO: 96.2 FL (ref 79–97)
MCV RBC AUTO: 99.3 FL (ref 79–97)
MONOCYTES # BLD AUTO: 0.42 10*3/MM3 (ref 0.1–0.9)
MONOCYTES # BLD AUTO: 0.43 10*3/MM3 (ref 0.1–0.9)
MONOCYTES # BLD AUTO: 0.47 10*3/MM3 (ref 0.1–0.9)
MONOCYTES # BLD AUTO: 0.52 10*3/MM3 (ref 0.1–0.9)
MONOCYTES # BLD AUTO: 0.73 10*3/MM3 (ref 0.1–0.9)
MONOCYTES # BLD AUTO: 0.93 10*3/MM3 (ref 0.1–0.9)
MONOCYTES NFR BLD AUTO: 4.4 % (ref 5–12)
MONOCYTES NFR BLD AUTO: 4.5 % (ref 5–12)
MONOCYTES NFR BLD AUTO: 5.4 % (ref 5–12)
MONOCYTES NFR BLD AUTO: 5.7 % (ref 5–12)
MONOCYTES NFR BLD AUTO: 6.8 % (ref 5–12)
MONOCYTES NFR BLD AUTO: 6.9 % (ref 5–12)
MRSA DNA SPEC QL NAA+PROBE: NEGATIVE
NEUTROPHILS NFR BLD AUTO: 11.44 10*3/MM3 (ref 1.7–7)
NEUTROPHILS NFR BLD AUTO: 6.72 10*3/MM3 (ref 1.7–7)
NEUTROPHILS NFR BLD AUTO: 6.77 10*3/MM3 (ref 1.7–7)
NEUTROPHILS NFR BLD AUTO: 7.49 10*3/MM3 (ref 1.7–7)
NEUTROPHILS NFR BLD AUTO: 7.88 10*3/MM3 (ref 1.7–7)
NEUTROPHILS NFR BLD AUTO: 74.3 % (ref 42.7–76)
NEUTROPHILS NFR BLD AUTO: 76.6 % (ref 42.7–76)
NEUTROPHILS NFR BLD AUTO: 76.7 % (ref 42.7–76)
NEUTROPHILS NFR BLD AUTO: 8.17 10*3/MM3 (ref 1.7–7)
NEUTROPHILS NFR BLD AUTO: 80.5 % (ref 42.7–76)
NEUTROPHILS NFR BLD AUTO: 81.5 % (ref 42.7–76)
NEUTROPHILS NFR BLD AUTO: 83.9 % (ref 42.7–76)
NITRITE UR QL STRIP: NEGATIVE
NITRITE UR QL STRIP: NEGATIVE
NRBC BLD AUTO-RTO: 0 /100 WBC (ref 0–0.2)
PH UR STRIP.AUTO: 5.5 [PH] (ref 5–8)
PH UR STRIP.AUTO: <=5 [PH] (ref 5–8)
PHOSPHATE SERPL-MCNC: 3.2 MG/DL (ref 2.5–4.5)
PHOSPHATE SERPL-MCNC: 3.9 MG/DL (ref 2.5–4.5)
PHOSPHATE SERPL-MCNC: 3.9 MG/DL (ref 2.5–4.5)
PHOSPHATE SERPL-MCNC: 4.7 MG/DL (ref 2.5–4.5)
PLATELET # BLD AUTO: 234 10*3/MM3 (ref 140–450)
PLATELET # BLD AUTO: 238 10*3/MM3 (ref 140–450)
PLATELET # BLD AUTO: 273 10*3/MM3 (ref 140–450)
PLATELET # BLD AUTO: 298 10*3/MM3 (ref 140–450)
PLATELET # BLD AUTO: 306 10*3/MM3 (ref 140–450)
PLATELET # BLD AUTO: 401 10*3/MM3 (ref 140–450)
PMV BLD AUTO: 11.5 FL (ref 6–12)
PMV BLD AUTO: 11.7 FL (ref 6–12)
PMV BLD AUTO: 11.9 FL (ref 6–12)
PMV BLD AUTO: 12.5 FL (ref 6–12)
PMV BLD AUTO: 12.6 FL (ref 6–12)
PMV BLD AUTO: 12.8 FL (ref 6–12)
POTASSIUM SERPL-SCNC: 3.6 MMOL/L (ref 3.5–5.2)
POTASSIUM SERPL-SCNC: 3.7 MMOL/L (ref 3.5–5.2)
POTASSIUM SERPL-SCNC: 3.7 MMOL/L (ref 3.5–5.2)
POTASSIUM SERPL-SCNC: 3.8 MMOL/L (ref 3.5–5.2)
POTASSIUM SERPL-SCNC: 3.8 MMOL/L (ref 3.5–5.2)
POTASSIUM SERPL-SCNC: 3.9 MMOL/L (ref 3.5–5.2)
POTASSIUM SERPL-SCNC: 4 MMOL/L (ref 3.5–5.2)
POTASSIUM SERPL-SCNC: 4.3 MMOL/L (ref 3.5–5.2)
POTASSIUM SERPL-SCNC: 5 MMOL/L (ref 3.5–5.2)
PREALB SERPL-MCNC: 12.6 MG/DL (ref 20–40)
PREALB SERPL-MCNC: 17 MG/DL (ref 20–40)
PROT SERPL-MCNC: 5.5 G/DL (ref 6–8.5)
PROT SERPL-MCNC: 5.6 G/DL (ref 6–8.5)
PROT SERPL-MCNC: 6.1 G/DL (ref 6–8.5)
PROT SERPL-MCNC: 6.3 G/DL (ref 6–8.5)
PROT SERPL-MCNC: 6.5 G/DL (ref 6–8.5)
PROT UR QL STRIP: ABNORMAL
PROT UR QL STRIP: NEGATIVE
QT INTERVAL: 324 MS
QTC INTERVAL: 426 MS
RBC # BLD AUTO: 2.9 10*6/MM3 (ref 3.77–5.28)
RBC # BLD AUTO: 2.98 10*6/MM3 (ref 3.77–5.28)
RBC # BLD AUTO: 3.08 10*6/MM3 (ref 3.77–5.28)
RBC # BLD AUTO: 3.11 10*6/MM3 (ref 3.77–5.28)
RBC # BLD AUTO: 3.17 10*6/MM3 (ref 3.77–5.28)
RBC # BLD AUTO: 3.19 10*6/MM3 (ref 3.77–5.28)
RBC # UR STRIP: ABNORMAL /HPF
RBC # UR STRIP: ABNORMAL /HPF
REF LAB TEST METHOD: ABNORMAL
REF LAB TEST METHOD: ABNORMAL
RHINOVIRUS RNA SPEC NAA+PROBE: NOT DETECTED
RSV RNA NPH QL NAA+NON-PROBE: NOT DETECTED
SARS-COV-2 RNA NPH QL NAA+NON-PROBE: NOT DETECTED
SODIUM SERPL-SCNC: 135 MMOL/L (ref 136–145)
SODIUM SERPL-SCNC: 140 MMOL/L (ref 136–145)
SODIUM SERPL-SCNC: 142 MMOL/L (ref 136–145)
SODIUM SERPL-SCNC: 146 MMOL/L (ref 136–145)
SODIUM SERPL-SCNC: 146 MMOL/L (ref 136–145)
SODIUM SERPL-SCNC: 150 MMOL/L (ref 136–145)
SODIUM SERPL-SCNC: 152 MMOL/L (ref 136–145)
SODIUM SERPL-SCNC: 153 MMOL/L (ref 136–145)
SODIUM SERPL-SCNC: 154 MMOL/L (ref 136–145)
SP GR UR STRIP: 1.01 (ref 1–1.03)
SP GR UR STRIP: 1.01 (ref 1–1.03)
SQUAMOUS #/AREA URNS HPF: ABNORMAL /HPF
SQUAMOUS #/AREA URNS HPF: ABNORMAL /HPF
TRIGL SERPL-MCNC: 114 MG/DL (ref 0–150)
TRIGL SERPL-MCNC: 148 MG/DL (ref 0–150)
TROPONIN T DELTA: -10 NG/L
TROPONIN T SERPL HS-MCNC: 113 NG/L
TROPONIN T SERPL HS-MCNC: 146 NG/L
UROBILINOGEN UR QL STRIP: ABNORMAL
UROBILINOGEN UR QL STRIP: ABNORMAL
WBC # UR STRIP: ABNORMAL /HPF
WBC # UR STRIP: ABNORMAL /HPF
WBC NRBC COR # BLD AUTO: 10.64 10*3/MM3 (ref 3.4–10.8)
WBC NRBC COR # BLD AUTO: 13.63 10*3/MM3 (ref 3.4–10.8)
WBC NRBC COR # BLD AUTO: 8.77 10*3/MM3 (ref 3.4–10.8)
WBC NRBC COR # BLD AUTO: 9.11 10*3/MM3 (ref 3.4–10.8)
WBC NRBC COR # BLD AUTO: 9.31 10*3/MM3 (ref 3.4–10.8)
WBC NRBC COR # BLD AUTO: 9.68 10*3/MM3 (ref 3.4–10.8)
WHOLE BLOOD HOLD COAG: NORMAL
WHOLE BLOOD HOLD SPECIMEN: NORMAL

## 2023-01-01 PROCEDURE — 63710000001 INSULIN REGULAR HUMAN PER 5 UNITS: Performed by: INTERNAL MEDICINE

## 2023-01-01 PROCEDURE — 36415 COLL VENOUS BLD VENIPUNCTURE: CPT

## 2023-01-01 PROCEDURE — 25010000002 HEPARIN (PORCINE) PER 1000 UNITS: Performed by: INTERNAL MEDICINE

## 2023-01-01 PROCEDURE — 83735 ASSAY OF MAGNESIUM: CPT

## 2023-01-01 PROCEDURE — 25010000002 HYDRALAZINE PER 20 MG: Performed by: INTERNAL MEDICINE

## 2023-01-01 PROCEDURE — 87186 SC STD MICRODIL/AGAR DIL: CPT | Performed by: INTERNAL MEDICINE

## 2023-01-01 PROCEDURE — 82948 REAGENT STRIP/BLOOD GLUCOSE: CPT

## 2023-01-01 PROCEDURE — 87147 CULTURE TYPE IMMUNOLOGIC: CPT | Performed by: INTERNAL MEDICINE

## 2023-01-01 PROCEDURE — 25010000002 MORPHINE PER 10 MG: Performed by: INTERNAL MEDICINE

## 2023-01-01 PROCEDURE — 82465 ASSAY BLD/SERUM CHOLESTEROL: CPT

## 2023-01-01 PROCEDURE — 80053 COMPREHEN METABOLIC PANEL: CPT | Performed by: INTERNAL MEDICINE

## 2023-01-01 PROCEDURE — 84478 ASSAY OF TRIGLYCERIDES: CPT

## 2023-01-01 PROCEDURE — 71045 X-RAY EXAM CHEST 1 VIEW: CPT

## 2023-01-01 PROCEDURE — 84484 ASSAY OF TROPONIN QUANT: CPT | Performed by: INTERNAL MEDICINE

## 2023-01-01 PROCEDURE — 87077 CULTURE AEROBIC IDENTIFY: CPT | Performed by: INTERNAL MEDICINE

## 2023-01-01 PROCEDURE — 25010000002 MORPHINE PER 10 MG: Performed by: PHYSICAL MEDICINE & REHABILITATION

## 2023-01-01 PROCEDURE — 87040 BLOOD CULTURE FOR BACTERIA: CPT | Performed by: EMERGENCY MEDICINE

## 2023-01-01 PROCEDURE — 84134 ASSAY OF PREALBUMIN: CPT

## 2023-01-01 PROCEDURE — 80053 COMPREHEN METABOLIC PANEL: CPT

## 2023-01-01 PROCEDURE — 81001 URINALYSIS AUTO W/SCOPE: CPT | Performed by: EMERGENCY MEDICINE

## 2023-01-01 PROCEDURE — 87641 MR-STAPH DNA AMP PROBE: CPT | Performed by: INTERNAL MEDICINE

## 2023-01-01 PROCEDURE — 25810000003 SODIUM CHLORIDE 0.9 % SOLUTION: Performed by: INTERNAL MEDICINE

## 2023-01-01 PROCEDURE — 25010000002 PIPERACILLIN SOD-TAZOBACTAM PER 1 G: Performed by: INTERNAL MEDICINE

## 2023-01-01 PROCEDURE — 85025 COMPLETE CBC W/AUTO DIFF WBC: CPT | Performed by: INTERNAL MEDICINE

## 2023-01-01 PROCEDURE — 99232 SBSQ HOSP IP/OBS MODERATE 35: CPT | Performed by: INTERNAL MEDICINE

## 2023-01-01 PROCEDURE — 25010000002 VANCOMYCIN 10 G RECONSTITUTED SOLUTION

## 2023-01-01 PROCEDURE — 25810000003 LACTATED RINGERS PER 1000 ML: Performed by: INTERNAL MEDICINE

## 2023-01-01 PROCEDURE — 83605 ASSAY OF LACTIC ACID: CPT | Performed by: EMERGENCY MEDICINE

## 2023-01-01 PROCEDURE — 86140 C-REACTIVE PROTEIN: CPT

## 2023-01-01 PROCEDURE — 99223 1ST HOSP IP/OBS HIGH 75: CPT | Performed by: INTERNAL MEDICINE

## 2023-01-01 PROCEDURE — 99233 SBSQ HOSP IP/OBS HIGH 50: CPT | Performed by: INTERNAL MEDICINE

## 2023-01-01 PROCEDURE — 99285 EMERGENCY DEPT VISIT HI MDM: CPT

## 2023-01-01 PROCEDURE — 25010000002 PIPERACILLIN SOD-TAZOBACTAM PER 1 G: Performed by: EMERGENCY MEDICINE

## 2023-01-01 PROCEDURE — 93005 ELECTROCARDIOGRAM TRACING: CPT | Performed by: EMERGENCY MEDICINE

## 2023-01-01 PROCEDURE — 85025 COMPLETE CBC W/AUTO DIFF WBC: CPT | Performed by: EMERGENCY MEDICINE

## 2023-01-01 PROCEDURE — 25810000003 SODIUM CHLORIDE 0.9 % SOLUTION: Performed by: EMERGENCY MEDICINE

## 2023-01-01 PROCEDURE — 80048 BASIC METABOLIC PNL TOTAL CA: CPT | Performed by: INTERNAL MEDICINE

## 2023-01-01 PROCEDURE — 25810000003 SEPSIS FLUID NS 0.9 % SOLUTION: Performed by: EMERGENCY MEDICINE

## 2023-01-01 PROCEDURE — 87086 URINE CULTURE/COLONY COUNT: CPT | Performed by: INTERNAL MEDICINE

## 2023-01-01 PROCEDURE — 25010000002 LORAZEPAM PER 2 MG: Performed by: INTERNAL MEDICINE

## 2023-01-01 PROCEDURE — 84100 ASSAY OF PHOSPHORUS: CPT

## 2023-01-01 PROCEDURE — 84484 ASSAY OF TROPONIN QUANT: CPT | Performed by: EMERGENCY MEDICINE

## 2023-01-01 PROCEDURE — 25010000002 VANCOMYCIN 10 G RECONSTITUTED SOLUTION: Performed by: INTERNAL MEDICINE

## 2023-01-01 PROCEDURE — 84100 ASSAY OF PHOSPHORUS: CPT | Performed by: INTERNAL MEDICINE

## 2023-01-01 PROCEDURE — 25810000003 SODIUM CHLORIDE 0.9 % SOLUTION

## 2023-01-01 PROCEDURE — 25010000002 VANCOMYCIN 10 G RECONSTITUTED SOLUTION: Performed by: EMERGENCY MEDICINE

## 2023-01-01 PROCEDURE — 25010000002 FUROSEMIDE PER 20 MG: Performed by: INTERNAL MEDICINE

## 2023-01-01 PROCEDURE — 80069 RENAL FUNCTION PANEL: CPT | Performed by: INTERNAL MEDICINE

## 2023-01-01 PROCEDURE — 80053 COMPREHEN METABOLIC PANEL: CPT | Performed by: EMERGENCY MEDICINE

## 2023-01-01 PROCEDURE — 83605 ASSAY OF LACTIC ACID: CPT | Performed by: INTERNAL MEDICINE

## 2023-01-01 PROCEDURE — 25010000002 DIAZEPAM PER 5 MG: Performed by: INTERNAL MEDICINE

## 2023-01-01 PROCEDURE — 25010000002 HYDRALAZINE PER 20 MG

## 2023-01-01 PROCEDURE — 70450 CT HEAD/BRAIN W/O DYE: CPT

## 2023-01-01 PROCEDURE — 0202U NFCT DS 22 TRGT SARS-COV-2: CPT | Performed by: EMERGENCY MEDICINE

## 2023-01-01 PROCEDURE — 87150 DNA/RNA AMPLIFIED PROBE: CPT | Performed by: INTERNAL MEDICINE

## 2023-01-01 PROCEDURE — 87040 BLOOD CULTURE FOR BACTERIA: CPT | Performed by: INTERNAL MEDICINE

## 2023-01-01 PROCEDURE — 83735 ASSAY OF MAGNESIUM: CPT | Performed by: EMERGENCY MEDICINE

## 2023-01-01 PROCEDURE — 81001 URINALYSIS AUTO W/SCOPE: CPT | Performed by: INTERNAL MEDICINE

## 2023-01-01 RX ORDER — NICOTINE POLACRILEX 4 MG
15 LOZENGE BUCCAL
Status: DISCONTINUED | OUTPATIENT
Start: 2023-01-01 | End: 2023-01-01

## 2023-01-01 RX ORDER — SODIUM CHLORIDE 0.9 % (FLUSH) 0.9 %
10 SYRINGE (ML) INJECTION AS NEEDED
Status: CANCELLED | OUTPATIENT
Start: 2023-01-01

## 2023-01-01 RX ORDER — ACETAMINOPHEN 160 MG/5ML
650 SOLUTION ORAL EVERY 4 HOURS PRN
Status: DISCONTINUED | OUTPATIENT
Start: 2023-01-01 | End: 2023-01-01 | Stop reason: HOSPADM

## 2023-01-01 RX ORDER — HEPARIN SODIUM 5000 [USP'U]/ML
5000 INJECTION, SOLUTION INTRAVENOUS; SUBCUTANEOUS EVERY 8 HOURS SCHEDULED
Status: DISCONTINUED | OUTPATIENT
Start: 2023-01-01 | End: 2023-01-01

## 2023-01-01 RX ORDER — MORPHINE SULFATE 2 MG/ML
2 INJECTION, SOLUTION INTRAMUSCULAR; INTRAVENOUS
Status: CANCELLED | OUTPATIENT
Start: 2023-01-01

## 2023-01-01 RX ORDER — ACETAMINOPHEN 160 MG/5ML
650 SOLUTION ORAL EVERY 4 HOURS PRN
Status: DISCONTINUED | OUTPATIENT
Start: 2023-01-01 | End: 2023-01-01

## 2023-01-01 RX ORDER — LORAZEPAM 2 MG/ML
1 INJECTION INTRAMUSCULAR EVERY 4 HOURS PRN
Status: CANCELLED | OUTPATIENT
Start: 2023-01-01 | End: 2023-12-05

## 2023-01-01 RX ORDER — POLYETHYLENE GLYCOL 3350 17 G/17G
17 POWDER, FOR SOLUTION ORAL DAILY PRN
Status: DISCONTINUED | OUTPATIENT
Start: 2023-01-01 | End: 2023-01-01

## 2023-01-01 RX ORDER — ACETAMINOPHEN 650 MG/1
650 SUPPOSITORY RECTAL EVERY 4 HOURS PRN
Status: DISCONTINUED | OUTPATIENT
Start: 2023-01-01 | End: 2023-01-01 | Stop reason: HOSPADM

## 2023-01-01 RX ORDER — ACETAMINOPHEN 325 MG/1
650 TABLET ORAL EVERY 4 HOURS PRN
Status: DISCONTINUED | OUTPATIENT
Start: 2023-01-01 | End: 2023-01-01 | Stop reason: HOSPADM

## 2023-01-01 RX ORDER — FUROSEMIDE 10 MG/ML
20 INJECTION INTRAMUSCULAR; INTRAVENOUS EVERY 6 HOURS PRN
Status: DISCONTINUED | OUTPATIENT
Start: 2023-01-01 | End: 2023-01-01 | Stop reason: HOSPADM

## 2023-01-01 RX ORDER — NALOXONE HCL 0.4 MG/ML
0.4 VIAL (ML) INJECTION
Status: DISCONTINUED | OUTPATIENT
Start: 2023-01-01 | End: 2023-01-01 | Stop reason: HOSPADM

## 2023-01-01 RX ORDER — ACETAMINOPHEN 325 MG/1
650 TABLET ORAL EVERY 4 HOURS PRN
Status: DISCONTINUED | OUTPATIENT
Start: 2023-01-01 | End: 2023-01-01

## 2023-01-01 RX ORDER — ONDANSETRON 4 MG/1
4 TABLET, FILM COATED ORAL EVERY 6 HOURS PRN
Status: DISCONTINUED | OUTPATIENT
Start: 2023-01-01 | End: 2023-01-01 | Stop reason: HOSPADM

## 2023-01-01 RX ORDER — CARVEDILOL 6.25 MG/1
6.25 TABLET ORAL 2 TIMES DAILY WITH MEALS
Status: DISCONTINUED | OUTPATIENT
Start: 2023-01-01 | End: 2023-01-01

## 2023-01-01 RX ORDER — LORAZEPAM 2 MG/ML
1 INJECTION INTRAMUSCULAR EVERY 4 HOURS PRN
Status: DISCONTINUED | OUTPATIENT
Start: 2023-01-01 | End: 2023-01-01 | Stop reason: HOSPADM

## 2023-01-01 RX ORDER — AMINO ACIDS/PROTEIN HYDROLYS 11G-40/45
30 LIQUID IN PACKET (ML) ORAL 3 TIMES DAILY
Status: DISCONTINUED | OUTPATIENT
Start: 2023-01-01 | End: 2023-01-01

## 2023-01-01 RX ORDER — HYDRALAZINE HYDROCHLORIDE 20 MG/ML
10 INJECTION INTRAMUSCULAR; INTRAVENOUS ONCE
Status: COMPLETED | OUTPATIENT
Start: 2023-01-01 | End: 2023-01-01

## 2023-01-01 RX ORDER — BISACODYL 5 MG/1
5 TABLET, DELAYED RELEASE ORAL DAILY PRN
Status: DISCONTINUED | OUTPATIENT
Start: 2023-01-01 | End: 2023-01-01

## 2023-01-01 RX ORDER — AMLODIPINE BESYLATE 10 MG/1
10 TABLET ORAL
Status: DISCONTINUED | OUTPATIENT
Start: 2023-01-01 | End: 2023-01-01

## 2023-01-01 RX ORDER — DIAZEPAM 5 MG/ML
5 INJECTION, SOLUTION INTRAMUSCULAR; INTRAVENOUS 3 TIMES DAILY
Qty: 42 ML | Refills: 0 | Status: DISCONTINUED | OUTPATIENT
Start: 2023-01-01 | End: 2023-01-01 | Stop reason: HOSPADM

## 2023-01-01 RX ORDER — ATORVASTATIN CALCIUM 40 MG/1
80 TABLET, FILM COATED ORAL NIGHTLY
Status: DISCONTINUED | OUTPATIENT
Start: 2023-01-01 | End: 2023-01-01

## 2023-01-01 RX ORDER — SCOLOPAMINE TRANSDERMAL SYSTEM 1 MG/1
1 PATCH, EXTENDED RELEASE TRANSDERMAL
Status: DISCONTINUED | OUTPATIENT
Start: 2023-01-01 | End: 2023-01-01 | Stop reason: HOSPADM

## 2023-01-01 RX ORDER — NICOTINE POLACRILEX 4 MG
15 LOZENGE BUCCAL
Status: DISCONTINUED | OUTPATIENT
Start: 2023-01-01 | End: 2023-01-01 | Stop reason: SDUPTHER

## 2023-01-01 RX ORDER — NALOXONE HCL 0.4 MG/ML
0.1 VIAL (ML) INJECTION
Status: DISCONTINUED | OUTPATIENT
Start: 2023-01-01 | End: 2023-01-01

## 2023-01-01 RX ORDER — ONDANSETRON 2 MG/ML
4 INJECTION INTRAMUSCULAR; INTRAVENOUS EVERY 6 HOURS PRN
Status: DISCONTINUED | OUTPATIENT
Start: 2023-01-01 | End: 2023-01-01 | Stop reason: HOSPADM

## 2023-01-01 RX ORDER — MORPHINE SULFATE 2 MG/ML
2 INJECTION, SOLUTION INTRAMUSCULAR; INTRAVENOUS
Status: DISCONTINUED | OUTPATIENT
Start: 2023-01-01 | End: 2023-01-01 | Stop reason: HOSPADM

## 2023-01-01 RX ORDER — AMOXICILLIN 250 MG
2 CAPSULE ORAL 2 TIMES DAILY
Status: DISCONTINUED | OUTPATIENT
Start: 2023-01-01 | End: 2023-01-01

## 2023-01-01 RX ORDER — ACETAMINOPHEN 325 MG/1
650 TABLET ORAL EVERY 4 HOURS PRN
Status: CANCELLED | OUTPATIENT
Start: 2023-01-01

## 2023-01-01 RX ORDER — POLYVINYL ALCOHOL 14 MG/ML
1 SOLUTION/ DROPS OPHTHALMIC
Status: DISCONTINUED | OUTPATIENT
Start: 2023-01-01 | End: 2023-01-01 | Stop reason: HOSPADM

## 2023-01-01 RX ORDER — INSULIN LISPRO 100 [IU]/ML
2-7 INJECTION, SOLUTION INTRAVENOUS; SUBCUTANEOUS
Status: DISCONTINUED | OUTPATIENT
Start: 2023-01-01 | End: 2023-01-01

## 2023-01-01 RX ORDER — BISACODYL 10 MG
10 SUPPOSITORY, RECTAL RECTAL DAILY PRN
Status: DISCONTINUED | OUTPATIENT
Start: 2023-01-01 | End: 2023-01-01

## 2023-01-01 RX ORDER — ACETAMINOPHEN 650 MG/1
650 SUPPOSITORY RECTAL ONCE
Status: COMPLETED | OUTPATIENT
Start: 2023-01-01 | End: 2023-01-01

## 2023-01-01 RX ORDER — AMLODIPINE BESYLATE 5 MG/1
5 TABLET ORAL
Status: DISCONTINUED | OUTPATIENT
Start: 2023-01-01 | End: 2023-01-01

## 2023-01-01 RX ORDER — DEXTROSE MONOHYDRATE 25 G/50ML
25 INJECTION, SOLUTION INTRAVENOUS
Status: DISCONTINUED | OUTPATIENT
Start: 2023-01-01 | End: 2023-01-01

## 2023-01-01 RX ORDER — MORPHINE SULFATE 1 MG/ML
INJECTION INTRAVENOUS CONTINUOUS
Status: DISCONTINUED | OUTPATIENT
Start: 2023-01-01 | End: 2023-01-01 | Stop reason: HOSPADM

## 2023-01-01 RX ORDER — GUAR GUM
1 PACKET (EA) ORAL 2 TIMES DAILY
Status: DISCONTINUED | OUTPATIENT
Start: 2023-01-01 | End: 2023-01-01

## 2023-01-01 RX ORDER — VANCOMYCIN 2 GRAM/500 ML IN 0.9 % SODIUM CHLORIDE INTRAVENOUS
2000 ONCE
Status: COMPLETED | OUTPATIENT
Start: 2023-01-01 | End: 2023-01-01

## 2023-01-01 RX ORDER — HALOPERIDOL 5 MG/ML
2 INJECTION INTRAMUSCULAR EVERY 4 HOURS PRN
Status: DISCONTINUED | OUTPATIENT
Start: 2023-01-01 | End: 2023-01-01 | Stop reason: HOSPADM

## 2023-01-01 RX ORDER — MORPHINE SULFATE 4 MG/ML
4 INJECTION, SOLUTION INTRAMUSCULAR; INTRAVENOUS
Status: DISCONTINUED | OUTPATIENT
Start: 2023-01-01 | End: 2023-01-01 | Stop reason: HOSPADM

## 2023-01-01 RX ORDER — DEXTROSE MONOHYDRATE 25 G/50ML
25 INJECTION, SOLUTION INTRAVENOUS
Status: DISCONTINUED | OUTPATIENT
Start: 2023-01-01 | End: 2023-01-01 | Stop reason: SDUPTHER

## 2023-01-01 RX ORDER — SODIUM CHLORIDE 9 MG/ML
40 INJECTION, SOLUTION INTRAVENOUS AS NEEDED
Status: CANCELLED | OUTPATIENT
Start: 2023-01-01

## 2023-01-01 RX ORDER — MORPHINE SULFATE 2 MG/ML
2 INJECTION, SOLUTION INTRAMUSCULAR; INTRAVENOUS EVERY 4 HOURS PRN
Status: DISCONTINUED | OUTPATIENT
Start: 2023-01-01 | End: 2023-01-01

## 2023-01-01 RX ORDER — FENTANYL 25 UG/1
1 PATCH TRANSDERMAL
Status: CANCELLED | OUTPATIENT
Start: 2023-12-03 | End: 2023-12-09

## 2023-01-01 RX ORDER — HYDRALAZINE HYDROCHLORIDE 20 MG/ML
20 INJECTION INTRAMUSCULAR; INTRAVENOUS EVERY 6 HOURS PRN
Status: DISCONTINUED | OUTPATIENT
Start: 2023-01-01 | End: 2023-01-01

## 2023-01-01 RX ORDER — ACETAMINOPHEN 650 MG/1
650 SUPPOSITORY RECTAL EVERY 4 HOURS PRN
Status: DISCONTINUED | OUTPATIENT
Start: 2023-01-01 | End: 2023-01-01

## 2023-01-01 RX ORDER — IBUPROFEN 600 MG/1
1 TABLET ORAL
Status: DISCONTINUED | OUTPATIENT
Start: 2023-01-01 | End: 2023-01-01

## 2023-01-01 RX ORDER — ACETAMINOPHEN 160 MG/5ML
650 SOLUTION ORAL EVERY 4 HOURS PRN
Status: CANCELLED | OUTPATIENT
Start: 2023-01-01

## 2023-01-01 RX ORDER — ONDANSETRON 4 MG/1
4 TABLET, FILM COATED ORAL EVERY 6 HOURS PRN
Status: CANCELLED | OUTPATIENT
Start: 2023-01-01

## 2023-01-01 RX ORDER — SODIUM CHLORIDE 0.9 % (FLUSH) 0.9 %
10 SYRINGE (ML) INJECTION AS NEEDED
Status: DISCONTINUED | OUTPATIENT
Start: 2023-01-01 | End: 2023-01-01 | Stop reason: HOSPADM

## 2023-01-01 RX ORDER — NALOXONE HCL 0.4 MG/ML
0.4 VIAL (ML) INJECTION
Status: CANCELLED | OUTPATIENT
Start: 2023-01-01

## 2023-01-01 RX ORDER — ACETAMINOPHEN 650 MG/1
650 SUPPOSITORY RECTAL EVERY 4 HOURS PRN
Status: CANCELLED | OUTPATIENT
Start: 2023-01-01

## 2023-01-01 RX ORDER — KETOROLAC TROMETHAMINE 15 MG/ML
15 INJECTION, SOLUTION INTRAMUSCULAR; INTRAVENOUS EVERY 6 HOURS PRN
Status: DISCONTINUED | OUTPATIENT
Start: 2023-01-01 | End: 2023-01-01 | Stop reason: HOSPADM

## 2023-01-01 RX ORDER — DIAZEPAM 5 MG/ML
5 INJECTION, SOLUTION INTRAMUSCULAR; INTRAVENOUS EVERY 4 HOURS PRN
Status: DISCONTINUED | OUTPATIENT
Start: 2023-01-01 | End: 2023-01-01 | Stop reason: HOSPADM

## 2023-01-01 RX ORDER — ONDANSETRON 2 MG/ML
4 INJECTION INTRAMUSCULAR; INTRAVENOUS EVERY 6 HOURS PRN
Status: CANCELLED | OUTPATIENT
Start: 2023-01-01

## 2023-01-01 RX ORDER — SODIUM CHLORIDE 9 MG/ML
10 INJECTION, SOLUTION INTRAVENOUS CONTINUOUS PRN
Status: DISCONTINUED | OUTPATIENT
Start: 2023-01-01 | End: 2023-01-01 | Stop reason: HOSPADM

## 2023-01-01 RX ORDER — GLYCOPYRROLATE 0.2 MG/ML
0.4 INJECTION INTRAMUSCULAR; INTRAVENOUS EVERY 4 HOURS PRN
Status: DISCONTINUED | OUTPATIENT
Start: 2023-01-01 | End: 2023-01-01 | Stop reason: HOSPADM

## 2023-01-01 RX ORDER — SODIUM CHLORIDE 9 MG/ML
100 INJECTION, SOLUTION INTRAVENOUS CONTINUOUS
Status: DISCONTINUED | OUTPATIENT
Start: 2023-01-01 | End: 2023-01-01

## 2023-01-01 RX ORDER — SODIUM CHLORIDE 0.9 % (FLUSH) 0.9 %
10 SYRINGE (ML) INJECTION EVERY 12 HOURS SCHEDULED
Status: DISCONTINUED | OUTPATIENT
Start: 2023-01-01 | End: 2023-01-01 | Stop reason: HOSPADM

## 2023-01-01 RX ORDER — GUAR GUM
1 PACKET (EA) ORAL 3 TIMES DAILY
Status: DISCONTINUED | OUTPATIENT
Start: 2023-01-01 | End: 2023-01-01

## 2023-01-01 RX ORDER — ONDANSETRON 2 MG/ML
4 INJECTION INTRAMUSCULAR; INTRAVENOUS EVERY 6 HOURS PRN
Status: DISCONTINUED | OUTPATIENT
Start: 2023-01-01 | End: 2023-01-01

## 2023-01-01 RX ORDER — BISACODYL 10 MG
10 SUPPOSITORY, RECTAL RECTAL DAILY PRN
Status: DISCONTINUED | OUTPATIENT
Start: 2023-01-01 | End: 2023-01-01 | Stop reason: HOSPADM

## 2023-01-01 RX ORDER — SODIUM CHLORIDE 0.9 % (FLUSH) 0.9 %
10 SYRINGE (ML) INJECTION EVERY 12 HOURS SCHEDULED
Status: CANCELLED | OUTPATIENT
Start: 2023-01-01

## 2023-01-01 RX ORDER — SODIUM CHLORIDE, SODIUM LACTATE, POTASSIUM CHLORIDE, CALCIUM CHLORIDE 600; 310; 30; 20 MG/100ML; MG/100ML; MG/100ML; MG/100ML
50 INJECTION, SOLUTION INTRAVENOUS CONTINUOUS
Status: DISCONTINUED | OUTPATIENT
Start: 2023-01-01 | End: 2023-01-01

## 2023-01-01 RX ORDER — ONDANSETRON 4 MG/1
4 TABLET, FILM COATED ORAL EVERY 6 HOURS PRN
Status: DISCONTINUED | OUTPATIENT
Start: 2023-01-01 | End: 2023-01-01

## 2023-01-01 RX ORDER — SODIUM CHLORIDE 9 MG/ML
40 INJECTION, SOLUTION INTRAVENOUS AS NEEDED
Status: DISCONTINUED | OUTPATIENT
Start: 2023-01-01 | End: 2023-01-01 | Stop reason: HOSPADM

## 2023-01-01 RX ORDER — LORAZEPAM 2 MG/ML
1 INJECTION INTRAMUSCULAR EVERY 4 HOURS PRN
Status: DISCONTINUED | OUTPATIENT
Start: 2023-01-01 | End: 2023-01-01

## 2023-01-01 RX ORDER — SODIUM CHLORIDE 9 MG/ML
50 INJECTION, SOLUTION INTRAVENOUS CONTINUOUS
Status: DISCONTINUED | OUTPATIENT
Start: 2023-01-01 | End: 2023-01-01

## 2023-01-01 RX ORDER — ASPIRIN 81 MG/1
81 TABLET, CHEWABLE ORAL DAILY
Status: DISCONTINUED | OUTPATIENT
Start: 2023-01-01 | End: 2023-01-01

## 2023-01-01 RX ORDER — FENTANYL 25 UG/1
1 PATCH TRANSDERMAL
Status: DISCONTINUED | OUTPATIENT
Start: 2023-01-01 | End: 2023-01-01 | Stop reason: HOSPADM

## 2023-01-01 RX ADMIN — ATORVASTATIN CALCIUM 80 MG: 40 TABLET, FILM COATED ORAL at 21:23

## 2023-01-01 RX ADMIN — CARVEDILOL 6.25 MG: 6.25 TABLET, FILM COATED ORAL at 18:08

## 2023-01-01 RX ADMIN — Medication 30 ML: at 15:30

## 2023-01-01 RX ADMIN — SODIUM CHLORIDE 100 ML/HR: 9 INJECTION, SOLUTION INTRAVENOUS at 22:14

## 2023-01-01 RX ADMIN — Medication 10 ML: at 08:22

## 2023-01-01 RX ADMIN — ASPIRIN 81 MG CHEWABLE TABLET 81 MG: 81 TABLET CHEWABLE at 09:55

## 2023-01-01 RX ADMIN — SODIUM CHLORIDE, POTASSIUM CHLORIDE, SODIUM LACTATE AND CALCIUM CHLORIDE 50 ML/HR: 600; 310; 30; 20 INJECTION, SOLUTION INTRAVENOUS at 13:14

## 2023-01-01 RX ADMIN — PIPERACILLIN SODIUM AND TAZOBACTAM SODIUM 3.38 G: 3; .375 INJECTION, SOLUTION INTRAVENOUS at 02:18

## 2023-01-01 RX ADMIN — PIPERACILLIN SODIUM AND TAZOBACTAM SODIUM 3.38 G: 3; .375 INJECTION, SOLUTION INTRAVENOUS at 12:48

## 2023-01-01 RX ADMIN — INSULIN HUMAN 3 UNITS: 100 INJECTION, SOLUTION PARENTERAL at 01:12

## 2023-01-01 RX ADMIN — INSULIN HUMAN 3 UNITS: 100 INJECTION, SOLUTION PARENTERAL at 06:27

## 2023-01-01 RX ADMIN — INSULIN HUMAN 4 UNITS: 100 INJECTION, SOLUTION PARENTERAL at 06:32

## 2023-01-01 RX ADMIN — INSULIN HUMAN 5 UNITS: 100 INJECTION, SOLUTION PARENTERAL at 06:07

## 2023-01-01 RX ADMIN — SODIUM CHLORIDE 2019 ML: 9 INJECTION, SOLUTION INTRAVENOUS at 10:30

## 2023-01-01 RX ADMIN — HEPARIN SODIUM 5000 UNITS: 5000 INJECTION INTRAVENOUS; SUBCUTANEOUS at 22:49

## 2023-01-01 RX ADMIN — HYDRALAZINE HYDROCHLORIDE 10 MG: 20 INJECTION INTRAMUSCULAR; INTRAVENOUS at 00:42

## 2023-01-01 RX ADMIN — Medication 30 ML: at 21:46

## 2023-01-01 RX ADMIN — INSULIN HUMAN 4 UNITS: 100 INJECTION, SOLUTION PARENTERAL at 00:16

## 2023-01-01 RX ADMIN — HEPARIN SODIUM 5000 UNITS: 5000 INJECTION INTRAVENOUS; SUBCUTANEOUS at 05:49

## 2023-01-01 RX ADMIN — Medication 1 PACKET: at 21:35

## 2023-01-01 RX ADMIN — HEPARIN SODIUM 5000 UNITS: 5000 INJECTION INTRAVENOUS; SUBCUTANEOUS at 05:26

## 2023-01-01 RX ADMIN — HEPARIN SODIUM 5000 UNITS: 5000 INJECTION INTRAVENOUS; SUBCUTANEOUS at 21:06

## 2023-01-01 RX ADMIN — PIPERACILLIN SODIUM AND TAZOBACTAM SODIUM 3.38 G: 3; .375 INJECTION, SOLUTION INTRAVENOUS at 03:15

## 2023-01-01 RX ADMIN — MINERAL OIL 5 ML: 1000 LIQUID ORAL at 02:22

## 2023-01-01 RX ADMIN — AMLODIPINE BESYLATE 5 MG: 5 TABLET ORAL at 08:31

## 2023-01-01 RX ADMIN — ATORVASTATIN CALCIUM 80 MG: 40 TABLET, FILM COATED ORAL at 20:56

## 2023-01-01 RX ADMIN — Medication 30 ML: at 16:49

## 2023-01-01 RX ADMIN — INSULIN HUMAN 2 UNITS: 100 INJECTION, SOLUTION PARENTERAL at 00:43

## 2023-01-01 RX ADMIN — HEPARIN SODIUM 5000 UNITS: 5000 INJECTION INTRAVENOUS; SUBCUTANEOUS at 13:27

## 2023-01-01 RX ADMIN — Medication 30 ML: at 09:15

## 2023-01-01 RX ADMIN — AMLODIPINE BESYLATE 5 MG: 5 TABLET ORAL at 09:09

## 2023-01-01 RX ADMIN — ASPIRIN 81 MG CHEWABLE TABLET 81 MG: 81 TABLET CHEWABLE at 08:31

## 2023-01-01 RX ADMIN — Medication 1 PACKET: at 21:19

## 2023-01-01 RX ADMIN — HEPARIN SODIUM 5000 UNITS: 5000 INJECTION INTRAVENOUS; SUBCUTANEOUS at 14:29

## 2023-01-01 RX ADMIN — CARVEDILOL 6.25 MG: 6.25 TABLET, FILM COATED ORAL at 09:55

## 2023-01-01 RX ADMIN — INSULIN HUMAN 2 UNITS: 100 INJECTION, SOLUTION PARENTERAL at 12:25

## 2023-01-01 RX ADMIN — HEPARIN SODIUM 5000 UNITS: 5000 INJECTION INTRAVENOUS; SUBCUTANEOUS at 15:45

## 2023-01-01 RX ADMIN — ACETAMINOPHEN 650 MG: 650 SOLUTION ORAL at 08:21

## 2023-01-01 RX ADMIN — INSULIN HUMAN 4 UNITS: 100 INJECTION, SOLUTION PARENTERAL at 18:04

## 2023-01-01 RX ADMIN — HEPARIN SODIUM 5000 UNITS: 5000 INJECTION INTRAVENOUS; SUBCUTANEOUS at 06:07

## 2023-01-01 RX ADMIN — MORPHINE SULFATE 4 MG: 4 INJECTION, SOLUTION INTRAMUSCULAR; INTRAVENOUS at 21:28

## 2023-01-01 RX ADMIN — MORPHINE SULFATE 4 MG: 4 INJECTION, SOLUTION INTRAMUSCULAR; INTRAVENOUS at 06:33

## 2023-01-01 RX ADMIN — INSULIN HUMAN 2 UNITS: 100 INJECTION, SOLUTION PARENTERAL at 00:41

## 2023-01-01 RX ADMIN — ATORVASTATIN CALCIUM 80 MG: 40 TABLET, FILM COATED ORAL at 21:22

## 2023-01-01 RX ADMIN — CARVEDILOL 6.25 MG: 6.25 TABLET, FILM COATED ORAL at 18:34

## 2023-01-01 RX ADMIN — MORPHINE SULFATE 2 MG: 2 INJECTION, SOLUTION INTRAMUSCULAR; INTRAVENOUS at 13:04

## 2023-01-01 RX ADMIN — INSULIN HUMAN 3 UNITS: 100 INJECTION, SOLUTION PARENTERAL at 13:08

## 2023-01-01 RX ADMIN — Medication 1 PACKET: at 09:51

## 2023-01-01 RX ADMIN — HEPARIN SODIUM 5000 UNITS: 5000 INJECTION INTRAVENOUS; SUBCUTANEOUS at 14:15

## 2023-01-01 RX ADMIN — INSULIN HUMAN 2 UNITS: 100 INJECTION, SOLUTION PARENTERAL at 12:00

## 2023-01-01 RX ADMIN — HEPARIN SODIUM 5000 UNITS: 5000 INJECTION INTRAVENOUS; SUBCUTANEOUS at 05:46

## 2023-01-01 RX ADMIN — Medication 30 ML: at 15:45

## 2023-01-01 RX ADMIN — CARVEDILOL 6.25 MG: 6.25 TABLET, FILM COATED ORAL at 08:59

## 2023-01-01 RX ADMIN — Medication 1 PACKET: at 09:10

## 2023-01-01 RX ADMIN — INSULIN HUMAN 2 UNITS: 100 INJECTION, SOLUTION PARENTERAL at 06:01

## 2023-01-01 RX ADMIN — INSULIN HUMAN 2 UNITS: 100 INJECTION, SOLUTION PARENTERAL at 17:19

## 2023-01-01 RX ADMIN — HEPARIN SODIUM 5000 UNITS: 5000 INJECTION INTRAVENOUS; SUBCUTANEOUS at 21:54

## 2023-01-01 RX ADMIN — INSULIN HUMAN 3 UNITS: 100 INJECTION, SOLUTION PARENTERAL at 17:39

## 2023-01-01 RX ADMIN — Medication 10 ML: at 09:10

## 2023-01-01 RX ADMIN — PIPERACILLIN SODIUM AND TAZOBACTAM SODIUM 3.38 G: 3; .375 INJECTION, SOLUTION INTRAVENOUS at 13:14

## 2023-01-01 RX ADMIN — DIAZEPAM 5 MG: 10 INJECTION, SOLUTION INTRAMUSCULAR; INTRAVENOUS at 18:08

## 2023-01-01 RX ADMIN — Medication 1 PACKET: at 09:21

## 2023-01-01 RX ADMIN — PIPERACILLIN SODIUM AND TAZOBACTAM SODIUM 3.38 G: 3; .375 INJECTION, SOLUTION INTRAVENOUS at 08:21

## 2023-01-01 RX ADMIN — HEPARIN SODIUM 5000 UNITS: 5000 INJECTION INTRAVENOUS; SUBCUTANEOUS at 17:00

## 2023-01-01 RX ADMIN — SENNOSIDES AND DOCUSATE SODIUM 2 TABLET: 8.6; 5 TABLET ORAL at 20:04

## 2023-01-01 RX ADMIN — INSULIN HUMAN 2 UNITS: 100 INJECTION, SOLUTION PARENTERAL at 18:34

## 2023-01-01 RX ADMIN — ASPIRIN 81 MG CHEWABLE TABLET 81 MG: 81 TABLET CHEWABLE at 09:10

## 2023-01-01 RX ADMIN — GLYCOPYRROLATE 0.4 MG: 0.2 INJECTION INTRAMUSCULAR; INTRAVENOUS at 23:52

## 2023-01-01 RX ADMIN — ASPIRIN 81 MG CHEWABLE TABLET 81 MG: 81 TABLET CHEWABLE at 08:21

## 2023-01-01 RX ADMIN — CARVEDILOL 6.25 MG: 6.25 TABLET, FILM COATED ORAL at 09:32

## 2023-01-01 RX ADMIN — LORAZEPAM 1 MG: 2 INJECTION, SOLUTION INTRAMUSCULAR; INTRAVENOUS at 03:04

## 2023-01-01 RX ADMIN — ASPIRIN 81 MG CHEWABLE TABLET 81 MG: 81 TABLET CHEWABLE at 07:57

## 2023-01-01 RX ADMIN — PIPERACILLIN SODIUM AND TAZOBACTAM SODIUM 3.38 G: 3; .375 INJECTION, SOLUTION INTRAVENOUS at 02:22

## 2023-01-01 RX ADMIN — Medication 1 PACKET: at 21:23

## 2023-01-01 RX ADMIN — ACETAMINOPHEN 650 MG: 325 TABLET ORAL at 13:28

## 2023-01-01 RX ADMIN — LORAZEPAM 1 MG: 2 INJECTION, SOLUTION INTRAMUSCULAR; INTRAVENOUS at 15:10

## 2023-01-01 RX ADMIN — AMLODIPINE BESYLATE 10 MG: 10 TABLET ORAL at 07:57

## 2023-01-01 RX ADMIN — HEPARIN SODIUM 5000 UNITS: 5000 INJECTION INTRAVENOUS; SUBCUTANEOUS at 05:53

## 2023-01-01 RX ADMIN — Medication 1 PACKET: at 07:58

## 2023-01-01 RX ADMIN — ASPIRIN 81 MG CHEWABLE TABLET 81 MG: 81 TABLET CHEWABLE at 09:20

## 2023-01-01 RX ADMIN — INSULIN HUMAN 2 UNITS: 100 INJECTION, SOLUTION PARENTERAL at 00:18

## 2023-01-01 RX ADMIN — CARVEDILOL 6.25 MG: 6.25 TABLET, FILM COATED ORAL at 10:07

## 2023-01-01 RX ADMIN — INSULIN HUMAN 3 UNITS: 100 INJECTION, SOLUTION PARENTERAL at 17:49

## 2023-01-01 RX ADMIN — Medication 10 ML: at 21:27

## 2023-01-01 RX ADMIN — PIPERACILLIN SODIUM AND TAZOBACTAM SODIUM 3.38 G: 3; .375 INJECTION, SOLUTION INTRAVENOUS at 03:05

## 2023-01-01 RX ADMIN — PIPERACILLIN SODIUM AND TAZOBACTAM SODIUM 3.38 G: 3; .375 INJECTION, SOLUTION INTRAVENOUS at 11:03

## 2023-01-01 RX ADMIN — HEPARIN SODIUM 5000 UNITS: 5000 INJECTION INTRAVENOUS; SUBCUTANEOUS at 06:18

## 2023-01-01 RX ADMIN — Medication 30 ML: at 21:55

## 2023-01-01 RX ADMIN — HEPARIN SODIUM 5000 UNITS: 5000 INJECTION INTRAVENOUS; SUBCUTANEOUS at 14:44

## 2023-01-01 RX ADMIN — Medication 10 ML: at 21:54

## 2023-01-01 RX ADMIN — HEPARIN SODIUM 5000 UNITS: 5000 INJECTION INTRAVENOUS; SUBCUTANEOUS at 05:31

## 2023-01-01 RX ADMIN — Medication 30 ML: at 21:19

## 2023-01-01 RX ADMIN — Medication 30 ML: at 08:59

## 2023-01-01 RX ADMIN — Medication 10 ML: at 08:32

## 2023-01-01 RX ADMIN — SODIUM CHLORIDE, POTASSIUM CHLORIDE, SODIUM LACTATE AND CALCIUM CHLORIDE 50 ML/HR: 600; 310; 30; 20 INJECTION, SOLUTION INTRAVENOUS at 04:13

## 2023-01-01 RX ADMIN — Medication 30 ML: at 09:34

## 2023-01-01 RX ADMIN — INSULIN HUMAN 4 UNITS: 100 INJECTION, SOLUTION PARENTERAL at 11:56

## 2023-01-01 RX ADMIN — SENNOSIDES AND DOCUSATE SODIUM 2 TABLET: 8.6; 5 TABLET ORAL at 21:34

## 2023-01-01 RX ADMIN — Medication 10 ML: at 09:01

## 2023-01-01 RX ADMIN — Medication 30 ML: at 09:10

## 2023-01-01 RX ADMIN — INSULIN HUMAN 3 UNITS: 100 INJECTION, SOLUTION PARENTERAL at 06:18

## 2023-01-01 RX ADMIN — INSULIN HUMAN 2 UNITS: 100 INJECTION, SOLUTION PARENTERAL at 00:00

## 2023-01-01 RX ADMIN — Medication 30 ML: at 20:58

## 2023-01-01 RX ADMIN — INSULIN HUMAN 3 UNITS: 100 INJECTION, SOLUTION PARENTERAL at 11:35

## 2023-01-01 RX ADMIN — AMLODIPINE BESYLATE 10 MG: 10 TABLET ORAL at 08:21

## 2023-01-01 RX ADMIN — PIPERACILLIN SODIUM AND TAZOBACTAM SODIUM 3.38 G: 3; .375 INJECTION, SOLUTION INTRAVENOUS at 11:21

## 2023-01-01 RX ADMIN — CARVEDILOL 6.25 MG: 6.25 TABLET, FILM COATED ORAL at 09:10

## 2023-01-01 RX ADMIN — PIPERACILLIN SODIUM AND TAZOBACTAM SODIUM 3.38 G: 3; .375 INJECTION, SOLUTION INTRAVENOUS at 03:25

## 2023-01-01 RX ADMIN — HEPARIN SODIUM 5000 UNITS: 5000 INJECTION INTRAVENOUS; SUBCUTANEOUS at 21:18

## 2023-01-01 RX ADMIN — VANCOMYCIN HYDROCHLORIDE 2000 MG: 10 INJECTION, POWDER, LYOPHILIZED, FOR SOLUTION INTRAVENOUS at 11:29

## 2023-01-01 RX ADMIN — MINERAL OIL: 1000 LIQUID ORAL at 06:33

## 2023-01-01 RX ADMIN — INSULIN HUMAN 3 UNITS: 100 INJECTION, SOLUTION PARENTERAL at 12:34

## 2023-01-01 RX ADMIN — INSULIN HUMAN 2 UNITS: 100 INJECTION, SOLUTION PARENTERAL at 18:35

## 2023-01-01 RX ADMIN — Medication 10 ML: at 10:07

## 2023-01-01 RX ADMIN — FUROSEMIDE 20 MG: 10 INJECTION, SOLUTION INTRAMUSCULAR; INTRAVENOUS at 20:18

## 2023-01-01 RX ADMIN — Medication 10 ML: at 07:58

## 2023-01-01 RX ADMIN — SENNOSIDES AND DOCUSATE SODIUM 2 TABLET: 8.6; 5 TABLET ORAL at 08:31

## 2023-01-01 RX ADMIN — Medication 10 ML: at 09:21

## 2023-01-01 RX ADMIN — GLYCOPYRROLATE 0.4 MG: 0.2 INJECTION INTRAMUSCULAR; INTRAVENOUS at 18:18

## 2023-01-01 RX ADMIN — Medication 30 ML: at 20:31

## 2023-01-01 RX ADMIN — INSULIN HUMAN 2 UNITS: 100 INJECTION, SOLUTION PARENTERAL at 23:56

## 2023-01-01 RX ADMIN — MINERAL OIL: 1000 LIQUID ORAL at 11:23

## 2023-01-01 RX ADMIN — PIPERACILLIN SODIUM AND TAZOBACTAM SODIUM 3.38 G: 3; .375 INJECTION, SOLUTION INTRAVENOUS at 17:29

## 2023-01-01 RX ADMIN — Medication 30 ML: at 15:47

## 2023-01-01 RX ADMIN — MORPHINE SULFATE 2 MG: 2 INJECTION, SOLUTION INTRAMUSCULAR; INTRAVENOUS at 05:55

## 2023-01-01 RX ADMIN — ACETAMINOPHEN 650 MG: 650 SOLUTION ORAL at 00:07

## 2023-01-01 RX ADMIN — INSULIN HUMAN 3 UNITS: 100 INJECTION, SOLUTION PARENTERAL at 13:28

## 2023-01-01 RX ADMIN — VANCOMYCIN HYDROCHLORIDE 1750 MG: 10 INJECTION, POWDER, LYOPHILIZED, FOR SOLUTION INTRAVENOUS at 11:09

## 2023-01-01 RX ADMIN — ASPIRIN 81 MG CHEWABLE TABLET 81 MG: 81 TABLET CHEWABLE at 09:32

## 2023-01-01 RX ADMIN — AMLODIPINE BESYLATE 5 MG: 5 TABLET ORAL at 17:00

## 2023-01-01 RX ADMIN — Medication 10 ML: at 20:18

## 2023-01-01 RX ADMIN — Medication 1 PACKET: at 10:04

## 2023-01-01 RX ADMIN — ATORVASTATIN CALCIUM 80 MG: 40 TABLET, FILM COATED ORAL at 20:23

## 2023-01-01 RX ADMIN — HYDRALAZINE HYDROCHLORIDE 20 MG: 20 INJECTION INTRAMUSCULAR; INTRAVENOUS at 21:19

## 2023-01-01 RX ADMIN — ACETAMINOPHEN 650 MG: 325 TABLET ORAL at 20:26

## 2023-01-01 RX ADMIN — Medication 1 PACKET: at 20:56

## 2023-01-01 RX ADMIN — INSULIN HUMAN 3 UNITS: 100 INJECTION, SOLUTION PARENTERAL at 00:57

## 2023-01-01 RX ADMIN — CARVEDILOL 6.25 MG: 6.25 TABLET, FILM COATED ORAL at 18:09

## 2023-01-01 RX ADMIN — PIPERACILLIN SODIUM AND TAZOBACTAM SODIUM 3.38 G: 3; .375 INJECTION, SOLUTION INTRAVENOUS at 20:18

## 2023-01-01 RX ADMIN — MINERAL OIL: 1000 LIQUID ORAL at 23:52

## 2023-01-01 RX ADMIN — Medication 30 ML: at 09:21

## 2023-01-01 RX ADMIN — INSULIN HUMAN 3 UNITS: 100 INJECTION, SOLUTION PARENTERAL at 18:36

## 2023-01-01 RX ADMIN — MORPHINE SULFATE 2 MG: 2 INJECTION, SOLUTION INTRAMUSCULAR; INTRAVENOUS at 01:34

## 2023-01-01 RX ADMIN — Medication 30 ML: at 17:29

## 2023-01-01 RX ADMIN — ATORVASTATIN CALCIUM 80 MG: 40 TABLET, FILM COATED ORAL at 21:34

## 2023-01-01 RX ADMIN — PIPERACILLIN SODIUM AND TAZOBACTAM SODIUM 3.38 G: 3; .375 INJECTION, SOLUTION INTRAVENOUS at 16:42

## 2023-01-01 RX ADMIN — Medication 30 ML: at 08:22

## 2023-01-01 RX ADMIN — HEPARIN SODIUM 5000 UNITS: 5000 INJECTION INTRAVENOUS; SUBCUTANEOUS at 15:30

## 2023-01-01 RX ADMIN — HEPARIN SODIUM 5000 UNITS: 5000 INJECTION INTRAVENOUS; SUBCUTANEOUS at 21:36

## 2023-01-01 RX ADMIN — INSULIN HUMAN 4 UNITS: 100 INJECTION, SOLUTION PARENTERAL at 12:17

## 2023-01-01 RX ADMIN — HEPARIN SODIUM 5000 UNITS: 5000 INJECTION INTRAVENOUS; SUBCUTANEOUS at 14:48

## 2023-01-01 RX ADMIN — AMLODIPINE BESYLATE 5 MG: 5 TABLET ORAL at 09:10

## 2023-01-01 RX ADMIN — Medication 10 ML: at 20:31

## 2023-01-01 RX ADMIN — SODIUM CHLORIDE 100 ML/HR: 9 INJECTION, SOLUTION INTRAVENOUS at 02:29

## 2023-01-01 RX ADMIN — MORPHINE SULFATE 2 MG: 2 INJECTION, SOLUTION INTRAMUSCULAR; INTRAVENOUS at 15:22

## 2023-01-01 RX ADMIN — Medication 1 PACKET: at 09:01

## 2023-01-01 RX ADMIN — MINERAL OIL: 1000 LIQUID ORAL at 20:20

## 2023-01-01 RX ADMIN — LORAZEPAM 1 MG: 2 INJECTION, SOLUTION INTRAMUSCULAR; INTRAVENOUS at 11:52

## 2023-01-01 RX ADMIN — ASPIRIN 81 MG CHEWABLE TABLET 81 MG: 81 TABLET CHEWABLE at 17:00

## 2023-01-01 RX ADMIN — MORPHINE SULFATE 2 MG: 2 INJECTION, SOLUTION INTRAMUSCULAR; INTRAVENOUS at 23:52

## 2023-01-01 RX ADMIN — MORPHINE SULFATE 4 MG: 4 INJECTION, SOLUTION INTRAMUSCULAR; INTRAVENOUS at 04:14

## 2023-01-01 RX ADMIN — INSULIN HUMAN 3 UNITS: 100 INJECTION, SOLUTION PARENTERAL at 05:49

## 2023-01-01 RX ADMIN — ACETAMINOPHEN 650 MG: 650 SUPPOSITORY RECTAL at 11:03

## 2023-01-01 RX ADMIN — Medication 10 ML: at 09:32

## 2023-01-01 RX ADMIN — ATORVASTATIN CALCIUM 80 MG: 40 TABLET, FILM COATED ORAL at 20:04

## 2023-01-01 RX ADMIN — PIPERACILLIN SODIUM AND TAZOBACTAM SODIUM 3.38 G: 3; .375 INJECTION, SOLUTION INTRAVENOUS at 20:25

## 2023-01-01 RX ADMIN — ASPIRIN 81 MG CHEWABLE TABLET 81 MG: 81 TABLET CHEWABLE at 08:59

## 2023-01-01 RX ADMIN — Medication 30 ML: at 18:36

## 2023-01-01 RX ADMIN — Medication 10 ML: at 09:22

## 2023-01-01 RX ADMIN — Medication 10 ML: at 21:22

## 2023-01-01 RX ADMIN — MORPHINE SULFATE 2 MG: 2 INJECTION, SOLUTION INTRAMUSCULAR; INTRAVENOUS at 18:02

## 2023-01-01 RX ADMIN — Medication 30 ML: at 16:42

## 2023-01-01 RX ADMIN — ATORVASTATIN CALCIUM 80 MG: 40 TABLET, FILM COATED ORAL at 20:26

## 2023-01-01 RX ADMIN — INSULIN HUMAN 2 UNITS: 100 INJECTION, SOLUTION PARENTERAL at 14:07

## 2023-01-01 RX ADMIN — INSULIN HUMAN 3 UNITS: 100 INJECTION, SOLUTION PARENTERAL at 13:00

## 2023-01-01 RX ADMIN — HEPARIN SODIUM 5000 UNITS: 5000 INJECTION INTRAVENOUS; SUBCUTANEOUS at 21:23

## 2023-01-01 RX ADMIN — AMLODIPINE BESYLATE 5 MG: 5 TABLET ORAL at 09:20

## 2023-01-01 RX ADMIN — Medication 1 PACKET: at 21:46

## 2023-01-01 RX ADMIN — Medication 30 ML: at 21:35

## 2023-01-01 RX ADMIN — HEPARIN SODIUM 5000 UNITS: 5000 INJECTION INTRAVENOUS; SUBCUTANEOUS at 14:07

## 2023-01-01 RX ADMIN — Medication 30 ML: at 20:23

## 2023-01-01 RX ADMIN — FENTANYL 1 PATCH: 25 PATCH TRANSDERMAL at 09:24

## 2023-01-01 RX ADMIN — PIPERACILLIN SODIUM AND TAZOBACTAM SODIUM 3.38 G: 3; .375 INJECTION, SOLUTION INTRAVENOUS at 20:04

## 2023-01-01 RX ADMIN — CARVEDILOL 6.25 MG: 6.25 TABLET, FILM COATED ORAL at 08:21

## 2023-01-01 RX ADMIN — ACETAMINOPHEN 650 MG: 325 TABLET ORAL at 20:22

## 2023-01-01 RX ADMIN — HEPARIN SODIUM 5000 UNITS: 5000 INJECTION INTRAVENOUS; SUBCUTANEOUS at 21:08

## 2023-01-01 RX ADMIN — AMLODIPINE BESYLATE 10 MG: 10 TABLET ORAL at 09:20

## 2023-01-01 RX ADMIN — ATORVASTATIN CALCIUM 80 MG: 40 TABLET, FILM COATED ORAL at 21:18

## 2023-01-01 RX ADMIN — CARVEDILOL 6.25 MG: 6.25 TABLET, FILM COATED ORAL at 09:20

## 2023-01-01 RX ADMIN — SODIUM CHLORIDE 100 ML/HR: 9 INJECTION, SOLUTION INTRAVENOUS at 00:47

## 2023-01-01 RX ADMIN — PIPERACILLIN SODIUM AND TAZOBACTAM SODIUM 3.38 G: 3; .375 INJECTION, SOLUTION INTRAVENOUS at 01:15

## 2023-01-01 RX ADMIN — Medication 10 ML: at 20:22

## 2023-01-01 RX ADMIN — MORPHINE SULFATE: 1 INJECTION INTRAVENOUS at 16:36

## 2023-01-01 RX ADMIN — SODIUM CHLORIDE 100 ML/HR: 9 INJECTION, SOLUTION INTRAVENOUS at 16:30

## 2023-01-01 RX ADMIN — GLYCOPYRROLATE 0.4 MG: 0.2 INJECTION INTRAMUSCULAR; INTRAVENOUS at 04:19

## 2023-01-01 RX ADMIN — CARVEDILOL 6.25 MG: 6.25 TABLET, FILM COATED ORAL at 09:09

## 2023-01-01 RX ADMIN — PIPERACILLIN SODIUM AND TAZOBACTAM SODIUM 3.38 G: 3; .375 INJECTION, SOLUTION INTRAVENOUS at 18:34

## 2023-01-01 RX ADMIN — AMLODIPINE BESYLATE 10 MG: 10 TABLET ORAL at 08:59

## 2023-01-01 RX ADMIN — HEPARIN SODIUM 5000 UNITS: 5000 INJECTION INTRAVENOUS; SUBCUTANEOUS at 14:11

## 2023-01-01 RX ADMIN — INSULIN HUMAN 3 UNITS: 100 INJECTION, SOLUTION PARENTERAL at 12:31

## 2023-01-01 RX ADMIN — Medication 30 ML: at 21:26

## 2023-01-01 RX ADMIN — CARVEDILOL 6.25 MG: 6.25 TABLET, FILM COATED ORAL at 18:49

## 2023-01-01 RX ADMIN — INSULIN HUMAN 3 UNITS: 100 INJECTION, SOLUTION PARENTERAL at 18:08

## 2023-01-01 RX ADMIN — ATORVASTATIN CALCIUM 80 MG: 40 TABLET, FILM COATED ORAL at 20:30

## 2023-01-01 RX ADMIN — MORPHINE SULFATE 4 MG: 4 INJECTION, SOLUTION INTRAMUSCULAR; INTRAVENOUS at 03:12

## 2023-01-01 RX ADMIN — CARVEDILOL 6.25 MG: 6.25 TABLET, FILM COATED ORAL at 17:19

## 2023-01-01 RX ADMIN — HEPARIN SODIUM 5000 UNITS: 5000 INJECTION INTRAVENOUS; SUBCUTANEOUS at 21:34

## 2023-01-01 RX ADMIN — INSULIN HUMAN 2 UNITS: 100 INJECTION, SOLUTION PARENTERAL at 05:30

## 2023-01-01 RX ADMIN — Medication 10 ML: at 21:23

## 2023-01-01 RX ADMIN — Medication 10 ML: at 09:55

## 2023-01-01 RX ADMIN — INSULIN HUMAN 3 UNITS: 100 INJECTION, SOLUTION PARENTERAL at 11:23

## 2023-01-01 RX ADMIN — INSULIN HUMAN 3 UNITS: 100 INJECTION, SOLUTION PARENTERAL at 06:03

## 2023-01-01 RX ADMIN — MINERAL OIL: 1000 LIQUID ORAL at 18:09

## 2023-01-01 RX ADMIN — PIPERACILLIN SODIUM AND TAZOBACTAM SODIUM 3.38 G: 3; .375 INJECTION, SOLUTION INTRAVENOUS at 20:50

## 2023-01-01 RX ADMIN — Medication 10 ML: at 20:51

## 2023-01-01 RX ADMIN — Medication 30 ML: at 09:51

## 2023-01-01 RX ADMIN — HEPARIN SODIUM 5000 UNITS: 5000 INJECTION INTRAVENOUS; SUBCUTANEOUS at 06:27

## 2023-01-01 RX ADMIN — Medication 1 PACKET: at 21:34

## 2023-01-01 RX ADMIN — INSULIN HUMAN 3 UNITS: 100 INJECTION, SOLUTION PARENTERAL at 18:06

## 2023-01-01 RX ADMIN — SENNOSIDES AND DOCUSATE SODIUM 2 TABLET: 8.6; 5 TABLET ORAL at 09:55

## 2023-01-01 RX ADMIN — AMLODIPINE BESYLATE 5 MG: 5 TABLET ORAL at 09:32

## 2023-01-01 RX ADMIN — Medication 1 PACKET: at 21:27

## 2023-01-01 RX ADMIN — AMLODIPINE BESYLATE 5 MG: 5 TABLET ORAL at 10:07

## 2023-01-01 RX ADMIN — MORPHINE SULFATE 2 MG: 2 INJECTION, SOLUTION INTRAMUSCULAR; INTRAVENOUS at 10:14

## 2023-01-01 RX ADMIN — INSULIN HUMAN 4 UNITS: 100 INJECTION, SOLUTION PARENTERAL at 01:15

## 2023-01-01 RX ADMIN — Medication 30 ML: at 21:34

## 2023-01-01 RX ADMIN — HEPARIN SODIUM 5000 UNITS: 5000 INJECTION INTRAVENOUS; SUBCUTANEOUS at 21:57

## 2023-01-01 RX ADMIN — Medication 30 ML: at 16:36

## 2023-01-01 RX ADMIN — MORPHINE SULFATE 2 MG: 2 INJECTION, SOLUTION INTRAMUSCULAR; INTRAVENOUS at 03:04

## 2023-01-01 RX ADMIN — AMLODIPINE BESYLATE 5 MG: 5 TABLET ORAL at 09:55

## 2023-01-01 RX ADMIN — MORPHINE SULFATE 2 MG: 2 INJECTION, SOLUTION INTRAMUSCULAR; INTRAVENOUS at 20:18

## 2023-01-01 RX ADMIN — INSULIN HUMAN 2 UNITS: 100 INJECTION, SOLUTION PARENTERAL at 18:08

## 2023-01-01 RX ADMIN — PIPERACILLIN SODIUM AND TAZOBACTAM SODIUM 3.38 G: 3; .375 INJECTION, SOLUTION INTRAVENOUS at 15:41

## 2023-01-01 RX ADMIN — CARVEDILOL 6.25 MG: 6.25 TABLET, FILM COATED ORAL at 17:49

## 2023-01-01 RX ADMIN — CARVEDILOL 6.25 MG: 6.25 TABLET, FILM COATED ORAL at 18:12

## 2023-01-01 RX ADMIN — PIPERACILLIN SODIUM AND TAZOBACTAM SODIUM 3.38 G: 3; .375 INJECTION, SOLUTION INTRAVENOUS at 00:57

## 2023-01-01 RX ADMIN — SCOPOLAMINE 1 PATCH: 1.5 PATCH, EXTENDED RELEASE TRANSDERMAL at 21:28

## 2023-01-01 RX ADMIN — ATORVASTATIN CALCIUM 80 MG: 40 TABLET, FILM COATED ORAL at 21:26

## 2023-01-01 RX ADMIN — HEPARIN SODIUM 5000 UNITS: 5000 INJECTION INTRAVENOUS; SUBCUTANEOUS at 05:42

## 2023-01-01 RX ADMIN — HEPARIN SODIUM 5000 UNITS: 5000 INJECTION INTRAVENOUS; SUBCUTANEOUS at 05:56

## 2023-01-01 RX ADMIN — Medication 10 ML: at 09:51

## 2023-01-01 RX ADMIN — MORPHINE SULFATE 2 MG: 2 INJECTION, SOLUTION INTRAMUSCULAR; INTRAVENOUS at 13:09

## 2023-01-01 RX ADMIN — INSULIN HUMAN 2 UNITS: 100 INJECTION, SOLUTION PARENTERAL at 18:49

## 2023-01-01 RX ADMIN — CARVEDILOL 6.25 MG: 6.25 TABLET, FILM COATED ORAL at 17:29

## 2023-01-01 RX ADMIN — ATORVASTATIN CALCIUM 80 MG: 40 TABLET, FILM COATED ORAL at 20:52

## 2023-01-01 RX ADMIN — CARVEDILOL 6.25 MG: 6.25 TABLET, FILM COATED ORAL at 18:05

## 2023-01-01 RX ADMIN — HEPARIN SODIUM 5000 UNITS: 5000 INJECTION INTRAVENOUS; SUBCUTANEOUS at 05:38

## 2023-01-01 RX ADMIN — MORPHINE SULFATE 2 MG: 2 INJECTION, SOLUTION INTRAMUSCULAR; INTRAVENOUS at 20:23

## 2023-01-01 RX ADMIN — FUROSEMIDE 20 MG: 10 INJECTION, SOLUTION INTRAMUSCULAR; INTRAVENOUS at 06:33

## 2023-01-01 RX ADMIN — Medication 1 PACKET: at 20:31

## 2023-01-01 RX ADMIN — CARVEDILOL 6.25 MG: 6.25 TABLET, FILM COATED ORAL at 17:00

## 2023-01-01 RX ADMIN — ASPIRIN 81 MG CHEWABLE TABLET 81 MG: 81 TABLET CHEWABLE at 10:07

## 2023-01-01 RX ADMIN — Medication 10 ML: at 20:56

## 2023-01-01 RX ADMIN — ACETAMINOPHEN 650 MG: 650 SOLUTION ORAL at 20:30

## 2023-01-01 RX ADMIN — CARVEDILOL 6.25 MG: 6.25 TABLET, FILM COATED ORAL at 08:31

## 2023-01-01 RX ADMIN — SODIUM CHLORIDE 50 ML/HR: 9 INJECTION, SOLUTION INTRAVENOUS at 15:23

## 2023-01-01 RX ADMIN — Medication 10 ML: at 09:24

## 2023-01-01 RX ADMIN — CARVEDILOL 6.25 MG: 6.25 TABLET, FILM COATED ORAL at 18:23

## 2023-01-01 RX ADMIN — Medication 30 ML: at 15:23

## 2023-01-01 RX ADMIN — SENNOSIDES AND DOCUSATE SODIUM 2 TABLET: 8.6; 5 TABLET ORAL at 09:20

## 2023-01-01 RX ADMIN — Medication 10 ML: at 21:36

## 2023-01-01 RX ADMIN — Medication 30 ML: at 07:59

## 2023-01-01 RX ADMIN — INSULIN HUMAN 3 UNITS: 100 INJECTION, SOLUTION PARENTERAL at 00:22

## 2023-01-01 RX ADMIN — INSULIN HUMAN 2 UNITS: 100 INJECTION, SOLUTION PARENTERAL at 05:56

## 2023-01-01 RX ADMIN — CARVEDILOL 6.25 MG: 6.25 TABLET, FILM COATED ORAL at 07:58

## 2023-01-01 RX ADMIN — MORPHINE SULFATE 2 MG: 2 INJECTION, SOLUTION INTRAMUSCULAR; INTRAVENOUS at 22:45

## 2023-01-01 RX ADMIN — HEPARIN SODIUM 5000 UNITS: 5000 INJECTION INTRAVENOUS; SUBCUTANEOUS at 15:22

## 2023-01-01 RX ADMIN — INSULIN HUMAN 3 UNITS: 100 INJECTION, SOLUTION PARENTERAL at 05:42

## 2023-01-01 RX ADMIN — INSULIN HUMAN 2 UNITS: 100 INJECTION, SOLUTION PARENTERAL at 00:58

## 2023-01-01 RX ADMIN — INSULIN HUMAN 3 UNITS: 100 INJECTION, SOLUTION PARENTERAL at 18:18

## 2023-01-01 RX ADMIN — Medication 10 ML: at 21:34

## 2023-01-01 RX ADMIN — Medication 10 ML: at 20:23

## 2023-01-01 RX ADMIN — HEPARIN SODIUM 5000 UNITS: 5000 INJECTION INTRAVENOUS; SUBCUTANEOUS at 21:26

## 2023-01-01 RX ADMIN — Medication 1 PACKET: at 08:22

## 2023-01-01 RX ADMIN — HEPARIN SODIUM 5000 UNITS: 5000 INJECTION INTRAVENOUS; SUBCUTANEOUS at 14:35

## 2023-01-01 RX ADMIN — SODIUM CHLORIDE, POTASSIUM CHLORIDE, SODIUM LACTATE AND CALCIUM CHLORIDE 50 ML/HR: 600; 310; 30; 20 INJECTION, SOLUTION INTRAVENOUS at 22:17

## 2023-01-01 RX ADMIN — VANCOMYCIN HYDROCHLORIDE 1250 MG: 10 INJECTION, POWDER, LYOPHILIZED, FOR SOLUTION INTRAVENOUS at 11:21

## 2023-01-01 RX ADMIN — DIAZEPAM 5 MG: 10 INJECTION, SOLUTION INTRAMUSCULAR; INTRAVENOUS at 20:18

## 2023-01-01 RX ADMIN — Medication 10 ML: at 09:15

## 2023-01-01 RX ADMIN — Medication 10 ML: at 21:20

## 2023-10-24 ENCOUNTER — APPOINTMENT (OUTPATIENT)
Dept: GENERAL RADIOLOGY | Facility: HOSPITAL | Age: 78
End: 2023-10-24
Payer: MEDICARE

## 2023-10-24 ENCOUNTER — HOSPITAL ENCOUNTER (INPATIENT)
Facility: HOSPITAL | Age: 78
LOS: 22 days | Discharge: NURSING, REHAB OR LONG-TERM CARE | End: 2023-11-15
Attending: EMERGENCY MEDICINE | Admitting: INTERNAL MEDICINE
Payer: MEDICARE

## 2023-10-24 ENCOUNTER — APPOINTMENT (OUTPATIENT)
Dept: CARDIOLOGY | Facility: HOSPITAL | Age: 78
End: 2023-10-24
Payer: MEDICARE

## 2023-10-24 DIAGNOSIS — I21.4 NON-STEMI (NON-ST ELEVATED MYOCARDIAL INFARCTION): ICD-10-CM

## 2023-10-24 DIAGNOSIS — R79.89 ELEVATED TROPONIN: Primary | ICD-10-CM

## 2023-10-24 DIAGNOSIS — I50.9 ACUTE ON CHRONIC CONGESTIVE HEART FAILURE, UNSPECIFIED HEART FAILURE TYPE: ICD-10-CM

## 2023-10-24 DIAGNOSIS — J90 PLEURAL EFFUSION, LEFT: ICD-10-CM

## 2023-10-24 DIAGNOSIS — I63.40 CEREBROVASCULAR ACCIDENT (CVA) DUE TO EMBOLISM OF CEREBRAL ARTERY: ICD-10-CM

## 2023-10-24 DIAGNOSIS — I16.1 HYPERTENSIVE EMERGENCY: ICD-10-CM

## 2023-10-24 DIAGNOSIS — I25.110 CORONARY ARTERY DISEASE INVOLVING NATIVE CORONARY ARTERY OF NATIVE HEART WITH UNSTABLE ANGINA PECTORIS: ICD-10-CM

## 2023-10-24 DIAGNOSIS — J96.01 ACUTE RESPIRATORY FAILURE WITH HYPOXIA: ICD-10-CM

## 2023-10-24 DIAGNOSIS — E87.6 HYPOKALEMIA: ICD-10-CM

## 2023-10-24 PROBLEM — I50.23 ACUTE ON CHRONIC SYSTOLIC CONGESTIVE HEART FAILURE: Status: ACTIVE | Noted: 2023-10-24

## 2023-10-24 PROBLEM — E78.5 HLD (HYPERLIPIDEMIA): Status: ACTIVE | Noted: 2023-10-24

## 2023-10-24 PROBLEM — I10 ESSENTIAL HYPERTENSION: Status: ACTIVE | Noted: 2023-10-24

## 2023-10-24 LAB
ALBUMIN SERPL-MCNC: 3.7 G/DL (ref 3.5–5.2)
ALBUMIN/GLOB SERPL: 1 G/DL
ALP SERPL-CCNC: 110 U/L (ref 39–117)
ALT SERPL W P-5'-P-CCNC: 19 U/L (ref 1–33)
ANION GAP SERPL CALCULATED.3IONS-SCNC: 16 MMOL/L (ref 5–15)
ASCENDING AORTA: 2.7 CM
AST SERPL-CCNC: 25 U/L (ref 1–32)
BASOPHILS # BLD AUTO: 0.04 10*3/MM3 (ref 0–0.2)
BASOPHILS NFR BLD AUTO: 0.3 % (ref 0–1.5)
BH CV ECHO MEAS - AO MAX PG: 6.2 MMHG
BH CV ECHO MEAS - AO MEAN PG: 3 MMHG
BH CV ECHO MEAS - AO ROOT DIAM: 2.8 CM
BH CV ECHO MEAS - AO V2 MAX: 124 CM/SEC
BH CV ECHO MEAS - AO V2 VTI: 20.8 CM
BH CV ECHO MEAS - AVA(I,D): 2.27 CM2
BH CV ECHO MEAS - EDV(CUBED): 156.6 ML
BH CV ECHO MEAS - EDV(MOD-SP2): 78.5 ML
BH CV ECHO MEAS - EDV(MOD-SP4): 93.1 ML
BH CV ECHO MEAS - EF(MOD-BP): 31.4 %
BH CV ECHO MEAS - EF(MOD-SP2): 24.1 %
BH CV ECHO MEAS - EF(MOD-SP4): 36.5 %
BH CV ECHO MEAS - ESV(CUBED): 55.8 ML
BH CV ECHO MEAS - ESV(MOD-SP2): 59.6 ML
BH CV ECHO MEAS - ESV(MOD-SP4): 59.1 ML
BH CV ECHO MEAS - FS: 29.1 %
BH CV ECHO MEAS - IVS/LVPW: 0.49 CM
BH CV ECHO MEAS - IVSD: 0.84 CM
BH CV ECHO MEAS - LA DIMENSION: 3.7 CM
BH CV ECHO MEAS - LAT PEAK E' VEL: 5.7 CM/SEC
BH CV ECHO MEAS - LV MASS(C)D: 287.3 GRAMS
BH CV ECHO MEAS - LV MAX PG: 3.7 MMHG
BH CV ECHO MEAS - LV MEAN PG: 2 MMHG
BH CV ECHO MEAS - LV V1 MAX: 95.6 CM/SEC
BH CV ECHO MEAS - LV V1 VTI: 15.5 CM
BH CV ECHO MEAS - LVIDD: 5.4 CM
BH CV ECHO MEAS - LVIDS: 3.8 CM
BH CV ECHO MEAS - LVOT AREA: 3 CM2
BH CV ECHO MEAS - LVOT DIAM: 1.97 CM
BH CV ECHO MEAS - LVPWD: 1.71 CM
BH CV ECHO MEAS - MED PEAK E' VEL: 4 CM/SEC
BH CV ECHO MEAS - MR MAX PG: 107.4 MMHG
BH CV ECHO MEAS - MR MAX VEL: 518.3 CM/SEC
BH CV ECHO MEAS - MR MEAN PG: 75.7 MMHG
BH CV ECHO MEAS - MR MEAN VEL: 410.7 CM/SEC
BH CV ECHO MEAS - MR VTI: 169.2 CM
BH CV ECHO MEAS - MV A MAX VEL: 109.6 CM/SEC
BH CV ECHO MEAS - MV DEC TIME: 0.13 SEC
BH CV ECHO MEAS - MV E MAX VEL: 115.7 CM/SEC
BH CV ECHO MEAS - MV E/A: 1.06
BH CV ECHO MEAS - MV MAX PG: 5.4 MMHG
BH CV ECHO MEAS - MV MEAN PG: 2.8 MMHG
BH CV ECHO MEAS - MV V2 VTI: 24.1 CM
BH CV ECHO MEAS - MVA(VTI): 1.96 CM2
BH CV ECHO MEAS - PA ACC TIME: 0.11 SEC
BH CV ECHO MEAS - PA V2 MAX: 54.9 CM/SEC
BH CV ECHO MEAS - RAP SYSTOLE: 8 MMHG
BH CV ECHO MEAS - RVSP: 20 MMHG
BH CV ECHO MEAS - SV(LVOT): 47.1 ML
BH CV ECHO MEAS - SV(MOD-SP2): 18.9 ML
BH CV ECHO MEAS - SV(MOD-SP4): 34 ML
BH CV ECHO MEAS - TAPSE (>1.6): 2.8 CM
BH CV ECHO MEAS - TR MAX PG: 12.2 MMHG
BH CV ECHO MEAS - TR MAX VEL: 158.1 CM/SEC
BH CV ECHO MEASUREMENTS AVERAGE E/E' RATIO: 23.86
BH CV XLRA - RV BASE: 3 CM
BH CV XLRA - RV LENGTH: 6 CM
BH CV XLRA - RV MID: 2.11 CM
BH CV XLRA - TDI S': 13.2 CM/SEC
BILIRUB SERPL-MCNC: 2.1 MG/DL (ref 0–1.2)
BUN SERPL-MCNC: 15 MG/DL (ref 8–23)
BUN/CREAT SERPL: 14.7 (ref 7–25)
CALCIUM SPEC-SCNC: 9.2 MG/DL (ref 8.6–10.5)
CHLORIDE SERPL-SCNC: 104 MMOL/L (ref 98–107)
CO2 SERPL-SCNC: 22 MMOL/L (ref 22–29)
CREAT SERPL-MCNC: 1.02 MG/DL (ref 0.57–1)
D DIMER PPP FEU-MCNC: 0.64 MCGFEU/ML (ref 0–0.79)
DEPRECATED RDW RBC AUTO: 44.8 FL (ref 37–54)
EGFRCR SERPLBLD CKD-EPI 2021: 56.1 ML/MIN/1.73
EOSINOPHIL # BLD AUTO: 0.04 10*3/MM3 (ref 0–0.4)
EOSINOPHIL NFR BLD AUTO: 0.3 % (ref 0.3–6.2)
ERYTHROCYTE [DISTWIDTH] IN BLOOD BY AUTOMATED COUNT: 13.3 % (ref 12.3–15.4)
FLUAV SUBTYP SPEC NAA+PROBE: NOT DETECTED
FLUBV RNA ISLT QL NAA+PROBE: NOT DETECTED
GEN 5 2HR TROPONIN T REFLEX: 499 NG/L
GLOBULIN UR ELPH-MCNC: 3.8 GM/DL
GLUCOSE SERPL-MCNC: 288 MG/DL (ref 65–99)
HCT VFR BLD AUTO: 46.5 % (ref 34–46.6)
HGB BLD-MCNC: 14.5 G/DL (ref 12–15.9)
HOLD SPECIMEN: NORMAL
IMM GRANULOCYTES # BLD AUTO: 0.05 10*3/MM3 (ref 0–0.05)
IMM GRANULOCYTES NFR BLD AUTO: 0.4 % (ref 0–0.5)
LEFT ATRIUM VOLUME INDEX: 15.5 ML/M2
LYMPHOCYTES # BLD AUTO: 1.11 10*3/MM3 (ref 0.7–3.1)
LYMPHOCYTES NFR BLD AUTO: 9.2 % (ref 19.6–45.3)
MAGNESIUM SERPL-MCNC: 1.9 MG/DL (ref 1.6–2.4)
MCH RBC QN AUTO: 28.5 PG (ref 26.6–33)
MCHC RBC AUTO-ENTMCNC: 31.2 G/DL (ref 31.5–35.7)
MCV RBC AUTO: 91.5 FL (ref 79–97)
MONOCYTES # BLD AUTO: 0.63 10*3/MM3 (ref 0.1–0.9)
MONOCYTES NFR BLD AUTO: 5.2 % (ref 5–12)
NEUTROPHILS NFR BLD AUTO: 10.21 10*3/MM3 (ref 1.7–7)
NEUTROPHILS NFR BLD AUTO: 84.6 % (ref 42.7–76)
NRBC BLD AUTO-RTO: 0 /100 WBC (ref 0–0.2)
NT-PROBNP SERPL-MCNC: ABNORMAL PG/ML (ref 0–1800)
PLATELET # BLD AUTO: 227 10*3/MM3 (ref 140–450)
PMV BLD AUTO: 12.5 FL (ref 6–12)
POTASSIUM SERPL-SCNC: 3.2 MMOL/L (ref 3.5–5.2)
POTASSIUM SERPL-SCNC: 3.5 MMOL/L (ref 3.5–5.2)
PROT SERPL-MCNC: 7.5 G/DL (ref 6–8.5)
QT INTERVAL: 388 MS
QTC INTERVAL: 510 MS
RBC # BLD AUTO: 5.08 10*6/MM3 (ref 3.77–5.28)
SARS-COV-2 RNA RESP QL NAA+PROBE: NOT DETECTED
SODIUM SERPL-SCNC: 142 MMOL/L (ref 136–145)
TROPONIN T DELTA: 143 NG/L
TROPONIN T SERPL HS-MCNC: 356 NG/L
WBC NRBC COR # BLD: 12.08 10*3/MM3 (ref 3.4–10.8)
WHOLE BLOOD HOLD COAG: NORMAL
WHOLE BLOOD HOLD SPECIMEN: NORMAL

## 2023-10-24 PROCEDURE — 93005 ELECTROCARDIOGRAM TRACING: CPT | Performed by: EMERGENCY MEDICINE

## 2023-10-24 PROCEDURE — 25010000002 ENOXAPARIN PER 10 MG: Performed by: INTERNAL MEDICINE

## 2023-10-24 PROCEDURE — 80053 COMPREHEN METABOLIC PANEL: CPT | Performed by: EMERGENCY MEDICINE

## 2023-10-24 PROCEDURE — 71045 X-RAY EXAM CHEST 1 VIEW: CPT

## 2023-10-24 PROCEDURE — 87636 SARSCOV2 & INF A&B AMP PRB: CPT | Performed by: EMERGENCY MEDICINE

## 2023-10-24 PROCEDURE — 93306 TTE W/DOPPLER COMPLETE: CPT

## 2023-10-24 PROCEDURE — 85025 COMPLETE CBC W/AUTO DIFF WBC: CPT | Performed by: EMERGENCY MEDICINE

## 2023-10-24 PROCEDURE — 84132 ASSAY OF SERUM POTASSIUM: CPT | Performed by: HOSPITALIST

## 2023-10-24 PROCEDURE — 99222 1ST HOSP IP/OBS MODERATE 55: CPT | Performed by: INTERNAL MEDICINE

## 2023-10-24 PROCEDURE — 84484 ASSAY OF TROPONIN QUANT: CPT | Performed by: EMERGENCY MEDICINE

## 2023-10-24 PROCEDURE — 93306 TTE W/DOPPLER COMPLETE: CPT | Performed by: INTERNAL MEDICINE

## 2023-10-24 PROCEDURE — 25010000002 SULFUR HEXAFLUORIDE MICROSPH 60.7-25 MG RECONSTITUTED SUSPENSION: Performed by: HOSPITALIST

## 2023-10-24 PROCEDURE — 25010000002 NITROGLYCERIN 200 MCG/ML SOLUTION: Performed by: EMERGENCY MEDICINE

## 2023-10-24 PROCEDURE — 99285 EMERGENCY DEPT VISIT HI MDM: CPT

## 2023-10-24 PROCEDURE — 83735 ASSAY OF MAGNESIUM: CPT | Performed by: EMERGENCY MEDICINE

## 2023-10-24 PROCEDURE — 85379 FIBRIN DEGRADATION QUANT: CPT | Performed by: EMERGENCY MEDICINE

## 2023-10-24 PROCEDURE — 83880 ASSAY OF NATRIURETIC PEPTIDE: CPT | Performed by: EMERGENCY MEDICINE

## 2023-10-24 PROCEDURE — 99223 1ST HOSP IP/OBS HIGH 75: CPT | Performed by: HOSPITALIST

## 2023-10-24 RX ORDER — BISACODYL 5 MG/1
5 TABLET, DELAYED RELEASE ORAL DAILY PRN
Status: DISCONTINUED | OUTPATIENT
Start: 2023-10-24 | End: 2023-11-01 | Stop reason: ALTCHOICE

## 2023-10-24 RX ORDER — BISACODYL 10 MG
10 SUPPOSITORY, RECTAL RECTAL DAILY PRN
Status: DISCONTINUED | OUTPATIENT
Start: 2023-10-24 | End: 2023-11-01 | Stop reason: ALTCHOICE

## 2023-10-24 RX ORDER — NITROGLYCERIN 20 MG/100ML
5-200 INJECTION INTRAVENOUS
Status: DISCONTINUED | OUTPATIENT
Start: 2023-10-24 | End: 2023-10-28

## 2023-10-24 RX ORDER — SODIUM CHLORIDE 0.9 % (FLUSH) 0.9 %
10 SYRINGE (ML) INJECTION AS NEEDED
Status: DISCONTINUED | OUTPATIENT
Start: 2023-10-24 | End: 2023-11-08

## 2023-10-24 RX ORDER — ACETAMINOPHEN 325 MG/1
650 TABLET ORAL EVERY 4 HOURS PRN
Status: DISCONTINUED | OUTPATIENT
Start: 2023-10-24 | End: 2023-10-26 | Stop reason: SDUPTHER

## 2023-10-24 RX ORDER — POLYETHYLENE GLYCOL 3350 17 G/17G
17 POWDER, FOR SOLUTION ORAL DAILY PRN
Status: DISCONTINUED | OUTPATIENT
Start: 2023-10-24 | End: 2023-11-01 | Stop reason: ALTCHOICE

## 2023-10-24 RX ORDER — AMOXICILLIN 250 MG
2 CAPSULE ORAL 2 TIMES DAILY
Status: DISCONTINUED | OUTPATIENT
Start: 2023-10-24 | End: 2023-11-01 | Stop reason: ALTCHOICE

## 2023-10-24 RX ORDER — HEPARIN SODIUM 5000 [USP'U]/ML
5000 INJECTION, SOLUTION INTRAVENOUS; SUBCUTANEOUS EVERY 8 HOURS SCHEDULED
Status: DISCONTINUED | OUTPATIENT
Start: 2023-10-24 | End: 2023-10-24

## 2023-10-24 RX ORDER — ENOXAPARIN SODIUM 100 MG/ML
1 INJECTION SUBCUTANEOUS EVERY 12 HOURS
Status: DISCONTINUED | OUTPATIENT
Start: 2023-10-24 | End: 2023-10-25

## 2023-10-24 RX ORDER — POTASSIUM CHLORIDE 20 MEQ/1
40 TABLET, EXTENDED RELEASE ORAL EVERY 4 HOURS
Status: COMPLETED | OUTPATIENT
Start: 2023-10-24 | End: 2023-10-24

## 2023-10-24 RX ORDER — BUMETANIDE 0.25 MG/ML
1 INJECTION INTRAMUSCULAR; INTRAVENOUS EVERY 12 HOURS
Status: DISCONTINUED | OUTPATIENT
Start: 2023-10-24 | End: 2023-10-25

## 2023-10-24 RX ORDER — ACETAMINOPHEN 160 MG/5ML
650 SOLUTION ORAL EVERY 4 HOURS PRN
Status: DISCONTINUED | OUTPATIENT
Start: 2023-10-24 | End: 2023-11-01

## 2023-10-24 RX ORDER — NITROGLYCERIN 0.4 MG/1
0.4 TABLET SUBLINGUAL
Status: DISCONTINUED | OUTPATIENT
Start: 2023-10-24 | End: 2023-10-26 | Stop reason: SDUPTHER

## 2023-10-24 RX ORDER — SODIUM CHLORIDE 0.9 % (FLUSH) 0.9 %
10 SYRINGE (ML) INJECTION AS NEEDED
Status: DISCONTINUED | OUTPATIENT
Start: 2023-10-24 | End: 2023-11-02

## 2023-10-24 RX ORDER — BUMETANIDE 0.25 MG/ML
2 INJECTION INTRAMUSCULAR; INTRAVENOUS ONCE
Status: COMPLETED | OUTPATIENT
Start: 2023-10-24 | End: 2023-10-24

## 2023-10-24 RX ORDER — SODIUM CHLORIDE 9 MG/ML
40 INJECTION, SOLUTION INTRAVENOUS AS NEEDED
Status: DISCONTINUED | OUTPATIENT
Start: 2023-10-24 | End: 2023-11-15 | Stop reason: HOSPADM

## 2023-10-24 RX ORDER — ONDANSETRON 2 MG/ML
4 INJECTION INTRAMUSCULAR; INTRAVENOUS EVERY 6 HOURS PRN
Status: DISCONTINUED | OUTPATIENT
Start: 2023-10-24 | End: 2023-11-01 | Stop reason: ALTCHOICE

## 2023-10-24 RX ORDER — AMLODIPINE BESYLATE 10 MG/1
10 TABLET ORAL DAILY
Status: DISCONTINUED | OUTPATIENT
Start: 2023-10-24 | End: 2023-10-25

## 2023-10-24 RX ORDER — ONDANSETRON 4 MG/1
4 TABLET, FILM COATED ORAL EVERY 6 HOURS PRN
Status: DISCONTINUED | OUTPATIENT
Start: 2023-10-24 | End: 2023-11-01 | Stop reason: ALTCHOICE

## 2023-10-24 RX ORDER — CARVEDILOL 12.5 MG/1
12.5 TABLET ORAL 2 TIMES DAILY WITH MEALS
Status: DISCONTINUED | OUTPATIENT
Start: 2023-10-24 | End: 2023-10-28

## 2023-10-24 RX ORDER — ATORVASTATIN CALCIUM 40 MG/1
40 TABLET, FILM COATED ORAL NIGHTLY
Status: DISCONTINUED | OUTPATIENT
Start: 2023-10-24 | End: 2023-10-25

## 2023-10-24 RX ORDER — SODIUM CHLORIDE 0.9 % (FLUSH) 0.9 %
10 SYRINGE (ML) INJECTION EVERY 12 HOURS SCHEDULED
Status: DISCONTINUED | OUTPATIENT
Start: 2023-10-24 | End: 2023-11-08

## 2023-10-24 RX ORDER — ACETAMINOPHEN 650 MG/1
650 SUPPOSITORY RECTAL EVERY 4 HOURS PRN
Status: DISCONTINUED | OUTPATIENT
Start: 2023-10-24 | End: 2023-11-01

## 2023-10-24 RX ORDER — HYDRALAZINE HYDROCHLORIDE 20 MG/ML
10 INJECTION INTRAMUSCULAR; INTRAVENOUS EVERY 6 HOURS PRN
Status: DISCONTINUED | OUTPATIENT
Start: 2023-10-24 | End: 2023-11-02

## 2023-10-24 RX ADMIN — POTASSIUM CHLORIDE 40 MEQ: 1500 TABLET, EXTENDED RELEASE ORAL at 15:51

## 2023-10-24 RX ADMIN — SULFUR HEXAFLUORIDE 5 ML: KIT at 15:30

## 2023-10-24 RX ADMIN — ATORVASTATIN CALCIUM 40 MG: 40 TABLET, FILM COATED ORAL at 20:02

## 2023-10-24 RX ADMIN — Medication 10 ML: at 20:02

## 2023-10-24 RX ADMIN — BUMETANIDE 1 MG: 0.25 INJECTION, SOLUTION INTRAMUSCULAR; INTRAVENOUS at 15:51

## 2023-10-24 RX ADMIN — CARVEDILOL 12.5 MG: 12.5 TABLET, FILM COATED ORAL at 17:43

## 2023-10-24 RX ADMIN — ENOXAPARIN SODIUM 90 MG: 100 INJECTION SUBCUTANEOUS at 17:43

## 2023-10-24 RX ADMIN — SENNOSIDES AND DOCUSATE SODIUM 2 TABLET: 8.6; 5 TABLET ORAL at 20:02

## 2023-10-24 RX ADMIN — POTASSIUM CHLORIDE 40 MEQ: 1500 TABLET, EXTENDED RELEASE ORAL at 20:02

## 2023-10-24 RX ADMIN — BUMETANIDE 2 MG: 0.25 INJECTION, SOLUTION INTRAMUSCULAR; INTRAVENOUS at 12:27

## 2023-10-24 RX ADMIN — NITROGLYCERIN 5 MCG/MIN: 20 INJECTION INTRAVENOUS at 12:29

## 2023-10-24 RX ADMIN — AMLODIPINE BESYLATE 10 MG: 10 TABLET ORAL at 15:51

## 2023-10-24 RX ADMIN — Medication 10 ML: at 15:52

## 2023-10-24 NOTE — CONSULTS
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    Chief Complaint:  SOB / CHEST PAIN    Problem List:  Moderate (type II) obesity (BMI 37.9)  Chronic hypertension-probably essential with hypertensive urgency, October 2023  Acute congestive heart failure of uncertain etiology with elevated initial troponin/abnormal electrocardiogram-possible ischemic heart disease, October 2023  4.  Intermittent tobacco use (approximately 1 pack/week)  5.  Severe dyslipidemia without current treatment  (LDL cholesterol 216 mg/DL June 2021)  6.  Remote breast fibroadenoma lumpectomy-data deficit  7.  Elevated serum glucose-probable type 2 diabetes mellitus, October 2023  8.  Noncompliance with no medication x 6 months, 2023    Allergies   Allergen Reactions    Dynacirc [Isradipine] Other (See Comments)     Causes tic    Codeine Rash     Medications Prior to Admission   Medication Sig Dispense Refill Last Dose    amLODIPine (Norvasc) 10 MG tablet Take 1 tablet by mouth Daily. 30 tablet 11 More than a month    cloNIDine (CATAPRES) 0.1 MG tablet TAKE 1 TABLET BY MOUTH TWICE A DAY 60 tablet 1 More than a month    hydroCHLOROthiazide (HYDRODIURIL) 25 MG tablet Take 1 tablet by mouth Daily. 90 tablet 3 More than a month     Scheduled Meds:amLODIPine, 10 mg, Oral, Daily  bumetanide, 1 mg, Intravenous, Q12H  heparin (porcine), 5,000 Units, Subcutaneous, Q8H  potassium chloride ER, 40 mEq, Oral, Q4H  senna-docusate sodium, 2 tablet, Oral, BID  sodium chloride, 10 mL, Intravenous, Q12H      Continuous Infusions:nitroglycerin, 5-200 mcg/min, Last Rate: 5 mcg/min (10/24/23 1229)      PRN Meds:.  acetaminophen **OR** acetaminophen **OR** acetaminophen    senna-docusate sodium **AND** polyethylene glycol **AND** bisacodyl **AND** bisacodyl    Calcium Replacement  "- Follow Nurse / BPA Driven Protocol    hydrALAZINE    Magnesium Standard Dose Replacement - Follow Nurse / BPA Driven Protocol    nitroglycerin    ondansetron **OR** ondansetron    Phosphorus Replacement - Follow Nurse / BPA Driven Protocol    Potassium Replacement - Follow Nurse / BPA Driven Protocol    sodium chloride    sodium chloride    sodium chloride       History of Present Illness:      This older middle-aged female denies prior knowledge of cardiopulmonary illness.  She has had hypertension for \"several years\" and had difficulty adjusting to medication for adequate control and subsequently discontinued her medication 6 months ago because of \"it was just too much to try to get my medicines filled.\"  She presents with a 6-week history of intermittent \"sharp severe hurting\" chest pain that occurs randomly and is rated \"grade 10/10\" in severity but generally relieved by Advil.  The patient otherwise cannot distinctly describe her chest pain syndrome but it normally is without diaphoresis, flushing, or pain radiation although on occasion she has noted \"jaw hurting.\"  She additionally has had increasing exertional dyspnea, orthopnea, and now peripheral edema and presented to [facility] ED and was evaluated and admitted.  Cardiology consultation is requested.  Currently the patient is hungry and denies anginal type chest discomfort and has had less tachypnea and dyspnea on supplemental oxygen therapy (oximetry 96% on 2 L/min nasal cannula).  She denies recent cough, sputum production, pleurisy, hemoptysis, weight loss, or GI/ difficulty.  No prior history of myocardial infarction, CHF, TIA, or claudication.  Her son has hypertension.    Cardiac risk factors: advanced age (older than 55 for men, 65 for women), diabetes mellitus, dyslipidemia, hypertension, obesity (BMI >= 30 kg/m2), sedentary lifestyle, and smoking/ tobacco exposure.    Social History     Socioeconomic History    Marital status:    Tobacco Use " "   Smoking status: Some Days     Types: Cigarettes    Smokeless tobacco: Never    Tobacco comments:     Smokes rarely   Substance and Sexual Activity    Alcohol use: Never    Drug use: Never     No family history on file.    Review of Systems  10 point review of systems was completed, positives outlined in the HPI, and otherwise all other systems are negative.      Objective:       Physical Exam  BP (!) 195/111   Pulse 93   Temp 98.3 °F (36.8 °C) (Oral)   Resp 18   Ht 157.5 cm (62\")   Wt 93.9 kg (207 lb)   SpO2 94%   BMI 37.86 kg/m²       10/24/23  1040   Weight: 93.9 kg (207 lb)     Body mass index is 37.86 kg/m².  No intake or output data in the 24 hours ending 10/24/23 1400    General Appearance:  Alert, cooperative, no distress, appears stated age   Head:  Normocephalic, without obvious abnormality, atraumatic   Eyes:  PERRL, conjunctivae/corneas clear, EOM's intact, fundi benign, both eyes   Throat: Lips, mucosa, and tongue normal; teeth and gums normal   Neck: Supple, symmetrical, trachea midline, no adenopathy, thyroid: not enlarged, symmetric, no tenderness/mass/nodules, no carotid bruit or JVD   Lungs:   Basilar crackles bilaterally, respirations unlabored   Heart:  Regular rate and rhythm, S1, S2 normal, grade 1/6 systolic murmur, no rub or gallop   Abdomen:   Soft, nontender, no masses, no organomegaly, bowel sounds audible x4   Extremities: 1+ bipedal edema, normal range of motion   Pulses: 1+ and symmetric   Skin: Skin color, texture, turgor normal, no rashes or lesions   Neurologic: Normal;  gait not tested     Cardiographics:    EKG:    Sinus tachycardia with occasional premature ventricular complexes  Possible Left atrial enlargement  Left axis deviation  Left ventricular hypertrophy with repolarization abnormality  Septal infarct , age undetermined  Possible Lateral infarct , age undetermined  Abnormal ECG  No previous ECGs available    Imaging:    Chest x-ray:    Findings:    No prior exams. " There is moderate cardiomegaly. There is a moderate sized right pleural effusion which obscures detail of the underlying right lung base. Suggestion of mild atelectasis of both lower lobes. Exam is somewhat limited by portable technique   and obesity. Pulmonary vessels appear within normal limits for technique.     IMPRESSION: Cardiomegaly. Moderate right effusion. Mild bibasilar atelectasis.    Lab Review   Results from last 7 days   Lab Units 10/24/23  1106   SODIUM mmol/L 142   POTASSIUM mmol/L 3.2*   CHLORIDE mmol/L 104   CO2 mmol/L 22.0   BUN mg/dL 15   CREATININE mg/dL 1.02*   GLUCOSE mg/dL 288*   CALCIUM mg/dL 9.2     Results from last 7 days   Lab Units 10/24/23  1106   WBC 10*3/mm3 12.08*   HEMOGLOBIN g/dL 14.5   HEMATOCRIT % 46.5   PLATELETS 10*3/mm3 227     Results from last 7 days   Lab Units 10/24/23  1106   HSTROP T ng/L 356*     NO URINALYSIS  proBNP: 13,276.0  ALBUMIN: 3.7  LFTs: WNL except total bilirubin 2.1  D-DIMER: 0.64  DRIP = 5 mcg/minute continuous infusion.     Assessment:      Older middle-aged female with multiple cardiovascular disease risk factors and noncompliance presenting with hypertensive urgency and biventricular congestive heart failure with abnormal electrocardiogram and elevated initial HS troponin.  Acute coronary artery syndrome cannot be excluded.  Addition the patient has untreated severe dyslipidemia.      Plan:     1.  Defer MPS/LHC at this time  2.  We will add carvedilol and continue IV nitroglycerin.  3.  Empiric statin drug therapy  4.  Aspirin 81 mg daily  5.  Discontinue subcu heparin initiate full dose Lovenox 1 mg/kilogram subcu every 12 hours until repeat serial troponins obtain  6.  Obtain and review echocardiogram  7.  Iron studies/FLP/urinalysis/hemoglobin A1c  8.  Pending hospital course and echocardiogram will probably need at some point LHC plus or minus intervention  9.  IV Diamox 500 mg x 1 if recurrent hypoxemia; potential eventual use of SGLT2 inhibitor  drug therapy as well as possible thoracentesis

## 2023-10-24 NOTE — ED PROVIDER NOTES
Subjective   History of Present Illness  78-year-old female presents for evaluation of shortness of breath.  Of note, the patient has a history of reported hypertension but tells me that she has not been compliant with her medications.  For the past 6 weeks or so she has been experiencing intermittent episodes of shortness of breath.  Her symptoms worsened acutely this morning after bending over and she came to the emergency department to be evaluated.  She tells me that she is not typically oxygen dependent.  She denies any known history of COPD or asthma.  She denies any cough or fever.  She endorses some mild chest discomfort intermittently over the past several weeks.  No episodes of diaphoresis.  No vomiting.      Review of Systems   Respiratory:  Positive for chest tightness and shortness of breath.    Cardiovascular:  Positive for chest pain.   All other systems reviewed and are negative.      Past Medical History:   Diagnosis Date    Hyperlipidemia     Hypertension        Allergies   Allergen Reactions    Dynacirc [Isradipine] Other (See Comments)     Causes tic    Codeine Rash       Past Surgical History:   Procedure Laterality Date    BREAST FIBROADENOMA SURGERY      9 y/o-was benign       History reviewed. No pertinent family history.    Social History     Socioeconomic History    Marital status:    Tobacco Use    Smoking status: Some Days     Types: Cigarettes    Smokeless tobacco: Never    Tobacco comments:     Smokes rarely   Vaping Use    Vaping Use: Never used   Substance and Sexual Activity    Alcohol use: Never    Drug use: Never           Objective   Physical Exam  Vitals and nursing note reviewed.   Constitutional:       Appearance: She is well-developed. She is not diaphoretic.      Comments: Nontoxic-appearing female   HENT:      Head: Normocephalic and atraumatic.   Cardiovascular:      Rate and Rhythm: Normal rate and regular rhythm.      Heart sounds: Normal heart sounds. No murmur  heard.     No friction rub. No gallop.   Pulmonary:      Breath sounds: Rales present. No wheezing.      Comments: Audible rales noted throughout posterior lung fields bilaterally  Abdominal:      General: Bowel sounds are normal. There is no distension.      Palpations: Abdomen is soft. There is no mass.      Tenderness: There is no abdominal tenderness. There is no guarding or rebound.   Musculoskeletal:         General: Normal range of motion.      Cervical back: Neck supple.      Comments: Trace symmetrical edema noted to both lower legs, symmetrical   Skin:     General: Skin is warm and dry.      Findings: No erythema or rash.   Neurological:      Mental Status: She is alert and oriented to person, place, and time.   Psychiatric:         Mood and Affect: Mood normal.         Thought Content: Thought content normal.         Judgment: Judgment normal.         Procedures           ED Course  ED Course as of 10/24/23 1522   Tue Oct 24, 2023   1232 78-year-old female presents for evaluation of shortness of breath.  Of note, the patient has a history of reported hypertension but tells me that she has not been compliant with her medications.  For the past 6 weeks or so she has been experiencing intermittent episodes of shortness of breath.  Her symptoms worsened acutely this morning so she came to the ED to be evaluated.  She is not typically oxygen dependent.  On arrival, the patient is markedly hypertensive and is hypoxic on room air with oxygen saturations of 89%.  Supplemental oxygen given via nasal cannula.  On exam, the patient has audible rales noted throughout posterior lung fields.  Labs remarkable for significantly elevated BNP and elevated troponin.  Low risk Well's and D-dimer is negative. [DD]   1233 No audible wheezes noted on exam. [DD]   1234 I personally and independently viewed the patient's x-ray images myself, and I am in agreement with the radiologist's reading for final  interpretation--particularly regarding cardiomegaly and pleural effusion. [DD]   1234 After reviewing the patient's labs, electrolyte replacement protocol initiated.  Bumex given for diuresis.  Nitroglycerin drip initiated.  Goal systolic blood pressure of 180-200.  The patient clearly warrants admission to the hospital at this point.  I discussed her case with our hospitalist, Dr. Suarez, and the patient will be admitted under her care for further evaluation and treatment.  The patient is hemodynamically stable at this time and is aware/agreeable with the plan. [DD]      ED Course User Index  [DD] Shankar Perez MD                                      Recent Results (from the past 24 hour(s))   ECG 12 Lead ED Triage Standing Order; SOA    Collection Time: 10/24/23 10:49 AM   Result Value Ref Range    QT Interval 388 ms    QTC Interval 510 ms   Comprehensive Metabolic Panel    Collection Time: 10/24/23 11:06 AM    Specimen: Blood   Result Value Ref Range    Glucose 288 (H) 65 - 99 mg/dL    BUN 15 8 - 23 mg/dL    Creatinine 1.02 (H) 0.57 - 1.00 mg/dL    Sodium 142 136 - 145 mmol/L    Potassium 3.2 (L) 3.5 - 5.2 mmol/L    Chloride 104 98 - 107 mmol/L    CO2 22.0 22.0 - 29.0 mmol/L    Calcium 9.2 8.6 - 10.5 mg/dL    Total Protein 7.5 6.0 - 8.5 g/dL    Albumin 3.7 3.5 - 5.2 g/dL    ALT (SGPT) 19 1 - 33 U/L    AST (SGOT) 25 1 - 32 U/L    Alkaline Phosphatase 110 39 - 117 U/L    Total Bilirubin 2.1 (H) 0.0 - 1.2 mg/dL    Globulin 3.8 gm/dL    A/G Ratio 1.0 g/dL    BUN/Creatinine Ratio 14.7 7.0 - 25.0    Anion Gap 16.0 (H) 5.0 - 15.0 mmol/L    eGFR 56.1 (L) >60.0 mL/min/1.73   BNP    Collection Time: 10/24/23 11:06 AM    Specimen: Blood   Result Value Ref Range    proBNP 13,276.0 (H) 0.0 - 1,800.0 pg/mL   Single High Sensitivity Troponin T    Collection Time: 10/24/23 11:06 AM    Specimen: Blood   Result Value Ref Range    HS Troponin T 356 (C) <10 ng/L   Green Top (Gel)    Collection Time: 10/24/23 11:06 AM   Result  Value Ref Range    Extra Tube Hold for add-ons.    Lavender Top    Collection Time: 10/24/23 11:06 AM   Result Value Ref Range    Extra Tube hold for add-on    Gold Top - SST    Collection Time: 10/24/23 11:06 AM   Result Value Ref Range    Extra Tube Hold for add-ons.    Gray Top    Collection Time: 10/24/23 11:06 AM   Result Value Ref Range    Extra Tube Hold for add-ons.    Light Blue Top    Collection Time: 10/24/23 11:06 AM   Result Value Ref Range    Extra Tube Hold for add-ons.    CBC Auto Differential    Collection Time: 10/24/23 11:06 AM    Specimen: Blood   Result Value Ref Range    WBC 12.08 (H) 3.40 - 10.80 10*3/mm3    RBC 5.08 3.77 - 5.28 10*6/mm3    Hemoglobin 14.5 12.0 - 15.9 g/dL    Hematocrit 46.5 34.0 - 46.6 %    MCV 91.5 79.0 - 97.0 fL    MCH 28.5 26.6 - 33.0 pg    MCHC 31.2 (L) 31.5 - 35.7 g/dL    RDW 13.3 12.3 - 15.4 %    RDW-SD 44.8 37.0 - 54.0 fl    MPV 12.5 (H) 6.0 - 12.0 fL    Platelets 227 140 - 450 10*3/mm3    Neutrophil % 84.6 (H) 42.7 - 76.0 %    Lymphocyte % 9.2 (L) 19.6 - 45.3 %    Monocyte % 5.2 5.0 - 12.0 %    Eosinophil % 0.3 0.3 - 6.2 %    Basophil % 0.3 0.0 - 1.5 %    Immature Grans % 0.4 0.0 - 0.5 %    Neutrophils, Absolute 10.21 (H) 1.70 - 7.00 10*3/mm3    Lymphocytes, Absolute 1.11 0.70 - 3.10 10*3/mm3    Monocytes, Absolute 0.63 0.10 - 0.90 10*3/mm3    Eosinophils, Absolute 0.04 0.00 - 0.40 10*3/mm3    Basophils, Absolute 0.04 0.00 - 0.20 10*3/mm3    Immature Grans, Absolute 0.05 0.00 - 0.05 10*3/mm3    nRBC 0.0 0.0 - 0.2 /100 WBC   D-dimer, Quantitative    Collection Time: 10/24/23 11:06 AM    Specimen: Blood   Result Value Ref Range    D-Dimer, Quantitative 0.64 0.00 - 0.79 MCGFEU/mL   Magnesium    Collection Time: 10/24/23 11:06 AM    Specimen: Blood   Result Value Ref Range    Magnesium 1.9 1.6 - 2.4 mg/dL   COVID-19 and FLU A/B PCR - Swab, Nasopharynx    Collection Time: 10/24/23 11:09 AM    Specimen: Nasopharynx; Swab   Result Value Ref Range    COVID19 Not Detected Not  Detected - Ref. Range    Influenza A PCR Not Detected Not Detected    Influenza B PCR Not Detected Not Detected   High Sensitivity Troponin T 2Hr    Collection Time: 10/24/23  2:35 PM    Specimen: Blood   Result Value Ref Range    HS Troponin T 499 (C) <10 ng/L    Troponin T Delta 143 (C) >=-4 - <+4 ng/L     Note: In addition to lab results from this visit, the labs listed above may include labs taken at another facility or during a different encounter within the last 24 hours. Please correlate lab times with ED admission and discharge times for further clarification of the services performed during this visit.    XR Chest 1 View   Final Result   Impression:   Cardiomegaly. Moderate right effusion. Mild bibasilar atelectasis.         Electronically Signed: Marielos Camilo MD     10/24/2023 11:21 AM EDT     Workstation ID: [id number]        Vitals:    10/24/23 1301 10/24/23 1331 10/24/23 1336 10/24/23 1400   BP: (!) 194/121 (!) 209/124 (!) 195/111 (!) 172/117   BP Location:  Left arm     Patient Position:  Sitting     Pulse: 85 93  80   Resp:  18     Temp:  98.3 °F (36.8 °C)     TempSrc:  Oral     SpO2: 96% 94%  96%   Weight:       Height:         Medications   sodium chloride 0.9 % flush 10 mL (has no administration in time range)   nitroglycerin (TRIDIL) 200 mcg/ml infusion (10 mcg/min Intravenous Rate/Dose Change 10/24/23 1400)   Potassium Replacement - Follow Nurse / BPA Driven Protocol (has no administration in time range)   Magnesium Standard Dose Replacement - Follow Nurse / BPA Driven Protocol (has no administration in time range)   Phosphorus Replacement - Follow Nurse / BPA Driven Protocol (has no administration in time range)   Calcium Replacement - Follow Nurse / BPA Driven Protocol (has no administration in time range)   potassium chloride (K-DUR,KLOR-CON) CR tablet 40 mEq (has no administration in time range)   amLODIPine (NORVASC) tablet 10 mg (has no administration in time range)   sodium chloride 0.9 %  flush 10 mL (has no administration in time range)   sodium chloride 0.9 % flush 10 mL (has no administration in time range)   sodium chloride 0.9 % infusion 40 mL (has no administration in time range)   sennosides-docusate (PERICOLACE) 8.6-50 MG per tablet 2 tablet (has no administration in time range)     And   polyethylene glycol (MIRALAX) packet 17 g (has no administration in time range)     And   bisacodyl (DULCOLAX) EC tablet 5 mg (has no administration in time range)     And   bisacodyl (DULCOLAX) suppository 10 mg (has no administration in time range)   nitroglycerin (NITROSTAT) SL tablet 0.4 mg (has no administration in time range)   acetaminophen (TYLENOL) tablet 650 mg (has no administration in time range)     Or   acetaminophen (TYLENOL) 160 MG/5ML oral solution 650 mg (has no administration in time range)     Or   acetaminophen (TYLENOL) suppository 650 mg (has no administration in time range)   ondansetron (ZOFRAN) tablet 4 mg (has no administration in time range)     Or   ondansetron (ZOFRAN) injection 4 mg (has no administration in time range)   bumetanide (BUMEX) injection 1 mg (has no administration in time range)   hydrALAZINE (APRESOLINE) injection 10 mg (has no administration in time range)   carvedilol (COREG) tablet 12.5 mg (has no administration in time range)   Enoxaparin Sodium (LOVENOX) syringe 90 mg (has no administration in time range)   atorvastatin (LIPITOR) tablet 40 mg (has no administration in time range)   bumetanide (BUMEX) injection 2 mg (2 mg Intravenous Given 10/24/23 1227)     ECG/EMG Results (last 24 hours)       Procedure Component Value Units Date/Time    ECG 12 Lead ED Triage Standing Order; SOA [[id number]] Collected: 10/24/23 1049     Updated: 10/24/23 1421     QT Interval 388 ms      QTC Interval 510 ms     Narrative:      Test Reason : SOA  Blood Pressure :   */*   mmHG  Vent. Rate : 104 BPM     Atrial Rate : 104 BPM     P-R Int : 148 ms          QRS Dur :  90 ms      QT  Int : 388 ms       P-R-T Axes :  69 -44 114 degrees     QTc Int : 510 ms    ** Poor data quality, interpretation may be adversely affected  Sinus tachycardia with occasional premature ventricular complexes  Possible Left atrial enlargement  Left axis deviation  Left ventricular hypertrophy with repolarization abnormality  Septal infarct , age undetermined  Possible Lateral infarct , age undetermined  Abnormal ECG  No previous ECGs available  Confirmed by MD Perez Michael ([id number]) on 10/24/2023 2:21:49 PM    Referred By: KRUNAL           Confirmed By: Ash Perez MD          ECG 12 Lead ED Triage Standing Order; SOA   Final Result   Test Reason : SOA   Blood Pressure :   */*   mmHG   Vent. Rate : 104 BPM     Atrial Rate : 104 BPM      P-R Int : 148 ms          QRS Dur :  90 ms       QT Int : 388 ms       P-R-T Axes :  69 -44 114 degrees      QTc Int : 510 ms      ** Poor data quality, interpretation may be adversely affected   Sinus tachycardia with occasional premature ventricular complexes   Possible Left atrial enlargement   Left axis deviation   Left ventricular hypertrophy with repolarization abnormality   Septal infarct , age undetermined   Possible Lateral infarct , age undetermined   Abnormal ECG   No previous ECGs available   Confirmed by MD Perez Michael ([id number]) on 10/24/2023 2:21:49 PM      Referred By: KRUNAL           Confirmed By: Ash Perez MD                 Medical Decision Making  Amount and/or Complexity of Data Reviewed  Labs: ordered.  Radiology: ordered.  ECG/medicine tests: ordered.    Risk  OTC drugs.  Prescription drug management.  Decision regarding hospitalization.        Final diagnoses:   Acute on chronic congestive heart failure, unspecified heart failure type   Hypertensive emergency   Acute respiratory failure with hypoxia   Pleural effusion, left   Elevated troponin   Hypokalemia       ED Disposition  ED Disposition       ED Disposition   Decision to Admit    Condition   --    Comment    Level of Care: Telemetry [5]   Diagnosis: Acute exacerbation of congestive heart failure [287348]   Admitting Physician: JANNA JACOBO [[id number]]   Certification: I Certify That Inpatient Hospital Services Are Medically Necessary For Greater Than 2 Midnights                 No follow-up provider specified.       Medication List      No changes were made to your prescriptions during this visit.            Shankar Perez MD  10/24/23 1529

## 2023-10-24 NOTE — Clinical Note
Level of Care: Telemetry [5]   Diagnosis: Acute exacerbation of congestive heart failure [928074]   Admitting Physician: JANNA JACOBO [[id number]]

## 2023-10-24 NOTE — LETTER
EMS Transport Request  For use at [facility], [place], [place], and [place] only   Patient Name: Michael Cochran : 1945   Weight:93.2 kg (205 lb 7.5 oz) Pick-up Location: [facility] BLS/ALS: BLS/ALS: BLS   Insurance: [employer] MEDICARE REPLACEMENT Auth End Date:    Pre-Cert #: D/C Summary complete:    Destination: Other [facility]   Contact Precautions: None   Equipment (O2, Fluids, etc.): None   Arrive By Date/Time:  Stretcher/WC: Stretcher   CM Requesting: Kalyan Garcia RN Ext: [phone]   Notes/Medical Necessity: Cabg post op stroke, has PEG tube.     ______________________________________________________________________    *Only 2 patient bags OR 1 carry-on size bag are permitted.  Wheelchairs and walkers CANNOT transported with the patient. Acknowledge: Yes

## 2023-10-24 NOTE — CASE MANAGEMENT/SOCIAL WORK
Discharge Planning Assessment  [facility]     Patient Name: Michael Cochran  MRN: [id number]  Today's Date: 10/24/2023    Admit Date: 10/24/2023    Plan: IDP   Discharge Needs Assessment       Row Name 10/24/23 1312       Living Environment    People in Home child(magaly), adult    Name(s) of People in Home Katharina Barragan    Current Living Arrangements home    Potentially Unsafe Housing Conditions unable to assess    Primary Care Provided by self    Provides Primary Care For no one    Family Caregiver if Needed child(magaly), adult    Family Caregiver Names Katharina Barragan    Quality of Family Relationships involved;helpful;supportive       Resource/Environmental Concerns    Transportation Concerns none       Transition Planning    Patient/Family Anticipates Transition to home with family    Transportation Anticipated family or friend will provide       Discharge Needs Assessment    Readmission Within the Last 30 Days no previous admission in last 30 days    Equipment Currently Used at Home none                   Discharge Plan       Row Name 10/24/23 1313       Plan    Plan IDP    Plan Comments MSW met with pt. and her daughter Katharina Barragan [phone] at bedside. Pt. lives with her daughter in [facility]. Pt.'s PCP is Bo Rodriguez. [facility] Pt[facility]s [facility] is [facility]. Pt. reports that she is independent at baseline. Pt. denies DME, O2, and HH currently. Pt. has transportation back home when she's medically ready to d/c. Pt. doesn't have an advanced directive or ACP documentation on file. CM will continue to follow pt. throughout her stay.    Final Discharge Disposition Code 30 - still a patient                  Continued Care and Services - Admitted Since 10/24/2023    Coordination has not been started for this encounter.          Demographic Summary       Row Name 10/24/23 1311       General Information    Admission Type inpatient    Arrived From home    Referral Source  admission list;emergency department    Reason for Consult discharge planning    Preferred Language English                   Functional Status       Row Name 10/24/23 1311       Functional Status, IADL    Medications independent    Meal Preparation independent    Housekeeping independent    Laundry independent    Shopping independent       Mental Status Summary    Recent Changes in Mental Status/Cognitive Functioning unable to assess                   Psychosocial    No documentation.                  Abuse/Neglect    No documentation.                  Legal    No documentation.                  Substance Abuse    No documentation.                  Patient Forms    No documentation.                     TUNG Hitchcock

## 2023-10-24 NOTE — PLAN OF CARE
Goal Outcome Evaluation:      -Pt BP remains elevated, titrating nitro gtt to keep -200.  -Weaning O2, currently on 3LNC  -Home meds resumed  -No s/s of chest pain, repeat troponin in AM  -Potassium replaced  -Echo done

## 2023-10-24 NOTE — H&P
[facility] Medicine Services  HISTORY AND PHYSICAL    Patient Name: Michael Cochran  : 1945  MRN: [id number]  Primary Care Physician: Bo Rodriguez PA  Date of admission: 10/24/2023      Subjective   Subjective     Chief Complaint:  Chest pain and shortness of air    HPI:  Michael Cochran is a 78 y.o. female with a past medical history of hypertension and HLD that presented to the ED complaining of shortness of air that's worse with exertion and chest pain. The patient also states she noticed some swelling in her legs. She states the shortness of air has been worsening over the last few days. She has no history of heart failure and states she takes medications for blood pressure and elevated cholesterol. The patient states she also noticed some chest pain/tightness. The patient is requiring 4L NC to keep her oxygen saturations greater than 90% in the ED. BNP is elevated and her chest xray shows cardiomegaly with a moderate pleural effusion. The patient will be admitted to the hospitalist service for further evaluation and treatment.      Personal History     Past Medical History:   Diagnosis Date    Hyperlipidemia     Hypertension              Past Surgical History:   Procedure Laterality Date    BREAST FIBROADENOMA SURGERY      9 y/o-was benign       Family History: her family history is not on file.     Social History:  reports that she has been smoking cigarettes. She has never used smokeless tobacco. She reports that she does not drink alcohol and does not use drugs.  Social History     Social History Narrative    Not on file       Medications:  Available home medication information reviewed.  Medications Prior to Admission   Medication Sig Dispense Refill Last Dose    amLODIPine (Norvasc) 10 MG tablet Take 1 tablet by mouth Daily. 30 tablet 11 More than a month    cloNIDine (CATAPRES) 0.1 MG tablet TAKE 1 TABLET BY MOUTH TWICE A DAY 60 tablet 1 More than a month     hydroCHLOROthiazide (HYDRODIURIL) 25 MG tablet Take 1 tablet by mouth Daily. 90 tablet 3 More than a month       Allergies   Allergen Reactions    Dynacirc [Isradipine] Other (See Comments)     Causes tic    Codeine Rash       Objective   Objective     Vital Signs:   Temp:  [97.6 °F (36.4 °C)-98.3 °F (36.8 °C)] 98.3 °F (36.8 °C)  Heart Rate:  [] 93  Resp:  [18-22] 18  BP: (194-250)/(111-158) 195/111  Flow (L/min):  [4] 4       Physical Exam   Constitutional: Awake, alert  Eyes: PERRLA, sclerae anicteric, no conjunctival injection  HENT: NCAT, mucous membranes moist  Neck: Supple, no thyromegaly, no lymphadenopathy, trachea midline  Respiratory: decreased and course with rhonchi to auscultation bilaterally, nonlabored respirations on 4L NC  Cardiovascular: RRR, no murmurs, rubs, or gallops, palpable pedal pulses bilaterally  Gastrointestinal: Positive bowel sounds, soft, nontender, nondistended  Musculoskeletal: 1+ bilateral ankle edema, no clubbing or cyanosis to extremities  Psychiatric: Appropriate affect, cooperative  Neurologic: Oriented x 3, strength symmetric in all extremities, Cranial Nerves grossly intact to confrontation, speech clear  Skin: No rashes    Result Review:  I have personally reviewed the results from the time of this admission to 10/24/2023 13:47 EDT and agree with these findings:  [x]  Laboratory list / accordion  []  Microbiology  [x]  Radiology  []  EKG/Telemetry   []  Cardiology/Vascular   []  Pathology  []  Old records  []  Other:    LAB RESULTS:      Lab 10/24/23  1106   WBC 12.08*   HEMOGLOBIN 14.5   HEMATOCRIT 46.5   PLATELETS 227   NEUTROS ABS 10.21*   IMMATURE GRANS (ABS) 0.05   LYMPHS ABS 1.11   MONOS ABS 0.63   EOS ABS 0.04   MCV 91.5   D DIMER QUANT 0.64         Lab 10/24/23  1106   SODIUM 142   POTASSIUM 3.2*   CHLORIDE 104   CO2 22.0   ANION GAP 16.0*   BUN 15   CREATININE 1.02*   EGFR 56.1*   GLUCOSE 288*   CALCIUM 9.2   MAGNESIUM 1.9         Lab 10/24/23  1106   TOTAL  PROTEIN 7.5   ALBUMIN 3.7   GLOBULIN 3.8   ALT (SGPT) 19   AST (SGOT) 25   BILIRUBIN 2.1*   ALK PHOS 110         Lab 10/24/23  1106   PROBNP 13,276.0*   HSTROP T 356*                     Microbiology Results (last 10 days)       Procedure Component Value - Date/Time    COVID-19 and FLU A/B PCR - Swab, Nasopharynx [[id number]]  (Normal) Collected: 10/24/23 1109    Lab Status: Final result Specimen: Swab from Nasopharynx Updated: 10/24/23 1145     COVID19 Not Detected     Influenza A PCR Not Detected     Influenza B PCR Not Detected    Narrative:      Fact sheet for providers: https://www.fda.gov/media/894508/download    Fact sheet for patients: https://www.fda.gov/media/787581/download    Test performed by PCR.            XR Chest 1 View    Result Date: 10/24/2023  XR CHEST 1 VW Date of Exam: 10/24/2023 11:08 AM EDT Indication: SOA triage protocol Comparison: None available. Findings: No prior exams. There is moderate cardiomegaly. There is a moderate sized right pleural effusion which obscures detail of the underlying right lung base. Suggestion of mild atelectasis of both lower lobes. Exam is somewhat limited by portable technique and obesity. Pulmonary vessels appear within normal limits for technique.     Impression: Impression: Cardiomegaly. Moderate right effusion. Mild bibasilar atelectasis. Electronically Signed: Marielos Camilo MD  10/24/2023 11:21 AM EDT  Workstation ID: [id number]         Assessment & Plan   Assessment & Plan     Active Hospital Problems    Diagnosis  POA    **Acute exacerbation of congestive heart failure [I50.9]  Yes    HLD (hyperlipidemia) [E78.5]  Yes    Essential hypertension [I10]  Yes    Acute on chronic systolic congestive heart failure [I50.23]  Yes    Acute respiratory failure with hypoxia [J96.01]  Yes    Elevated troponin [R79.89]  Yes     Michael Cochran is a 78 y.o. female with a past medical history of hypertension and HLD that presented to the ED complaining of shortness of air  that's worse with exertion and chest pain.    Acute exacerbation of congestive heart failure- new diagnosis  Respiratory failure secondary to above with hypoxemia  Pleural effusion related to above  - patient with BNP of 13,000 on admission  - CXR showing cardiomegaly and moderate pleural effusion  - patient requiring 4L NC (no O2 at baseline  - ECHO pending  - continue home Amlodipine; hold HCTZ  - hold Clonidine- likely stop  - Bumex 2mg IV given in ED- continue Bumex 1mg IV BID  - strict I/Os and daily weights  - telemetry with pulse oximetry  - fluid restriction- 1500ml/day  - consult cardiology    Elevated troponin  - likely type 2 troponin leak secondary to new heart failure  - trend  - EKG reviewed    Hypertensive Urgency  - continue home Amlodipine; hold HCTZ  - hold Clonidine- likely stop indefinitely and may be contributing to rebound hypertension  - Hydralazine 10mg IV Q6H prn for SBP> 175  - consult cardiology    HLD  - continue statin  - lipid panel    DVT prophylaxis:  Heparin SQ      CODE STATUS:  Full/CPR  Code Status and Medical Interventions:   Ordered at: 10/24/23 1339     Level Of Support Discussed With:    Patient     Code Status (Patient has no pulse and is not breathing):    CPR (Attempt to Resuscitate)     Medical Interventions (Patient has pulse or is breathing):    Full Support       Expected Discharge   Expected Discharge Date: 10/27/2023; Expected Discharge Time:      Kera Suarez DO  10/24/23

## 2023-10-25 LAB
ALBUMIN SERPL-MCNC: 3.3 G/DL (ref 3.5–5.2)
ALBUMIN/GLOB SERPL: 1 G/DL
ALP SERPL-CCNC: 94 U/L (ref 39–117)
ALT SERPL W P-5'-P-CCNC: 16 U/L (ref 1–33)
ANION GAP SERPL CALCULATED.3IONS-SCNC: 12 MMOL/L (ref 5–15)
AST SERPL-CCNC: 26 U/L (ref 1–32)
BILIRUB SERPL-MCNC: 1.7 MG/DL (ref 0–1.2)
BUN SERPL-MCNC: 20 MG/DL (ref 8–23)
BUN/CREAT SERPL: 14.8 (ref 7–25)
CALCIUM SPEC-SCNC: 9 MG/DL (ref 8.6–10.5)
CHLORIDE SERPL-SCNC: 105 MMOL/L (ref 98–107)
CHOLEST SERPL-MCNC: 255 MG/DL (ref 0–200)
CO2 SERPL-SCNC: 26 MMOL/L (ref 22–29)
CREAT SERPL-MCNC: 1.35 MG/DL (ref 0.57–1)
DEPRECATED RDW RBC AUTO: 43.4 FL (ref 37–54)
EGFRCR SERPLBLD CKD-EPI 2021: 40.1 ML/MIN/1.73
ERYTHROCYTE [DISTWIDTH] IN BLOOD BY AUTOMATED COUNT: 13.3 % (ref 12.3–15.4)
GLOBULIN UR ELPH-MCNC: 3.2 GM/DL
GLUCOSE BLDC GLUCOMTR-MCNC: 190 MG/DL (ref 70–130)
GLUCOSE BLDC GLUCOMTR-MCNC: 192 MG/DL (ref 70–130)
GLUCOSE BLDC GLUCOMTR-MCNC: 233 MG/DL (ref 70–130)
GLUCOSE SERPL-MCNC: 226 MG/DL (ref 65–99)
HBA1C MFR BLD: 9.8 % (ref 4.8–5.6)
HCT VFR BLD AUTO: 40.3 % (ref 34–46.6)
HDLC SERPL-MCNC: 42 MG/DL (ref 40–60)
HGB BLD-MCNC: 12.8 G/DL (ref 12–15.9)
IRON 24H UR-MRATE: 57 MCG/DL (ref 37–145)
IRON SATN MFR SERPL: 21 % (ref 20–50)
LDLC SERPL CALC-MCNC: 170 MG/DL (ref 0–100)
LDLC/HDLC SERPL: 3.98 {RATIO}
MAGNESIUM SERPL-MCNC: 1.9 MG/DL (ref 1.6–2.4)
MCH RBC QN AUTO: 28.6 PG (ref 26.6–33)
MCHC RBC AUTO-ENTMCNC: 31.8 G/DL (ref 31.5–35.7)
MCV RBC AUTO: 90 FL (ref 79–97)
PHOSPHATE SERPL-MCNC: 4.4 MG/DL (ref 2.5–4.5)
PLATELET # BLD AUTO: 225 10*3/MM3 (ref 140–450)
PMV BLD AUTO: 12.4 FL (ref 6–12)
POTASSIUM SERPL-SCNC: 3.8 MMOL/L (ref 3.5–5.2)
POTASSIUM SERPL-SCNC: 4.2 MMOL/L (ref 3.5–5.2)
PROT SERPL-MCNC: 6.5 G/DL (ref 6–8.5)
QT INTERVAL: 454 MS
QTC INTERVAL: 445 MS
RBC # BLD AUTO: 4.48 10*6/MM3 (ref 3.77–5.28)
SODIUM SERPL-SCNC: 143 MMOL/L (ref 136–145)
T4 FREE SERPL-MCNC: 0.81 NG/DL (ref 0.93–1.7)
TIBC SERPL-MCNC: 276 MCG/DL (ref 298–536)
TRANSFERRIN SERPL-MCNC: 185 MG/DL (ref 200–360)
TRIGL SERPL-MCNC: 230 MG/DL (ref 0–150)
TROPONIN T SERPL HS-MCNC: 631 NG/L
TSH SERPL DL<=0.05 MIU/L-ACNC: 8.79 UIU/ML (ref 0.27–4.2)
VLDLC SERPL-MCNC: 43 MG/DL (ref 5–40)
WBC NRBC COR # BLD: 10.54 10*3/MM3 (ref 3.4–10.8)

## 2023-10-25 PROCEDURE — 82948 REAGENT STRIP/BLOOD GLUCOSE: CPT

## 2023-10-25 PROCEDURE — 93005 ELECTROCARDIOGRAM TRACING: CPT | Performed by: INTERNAL MEDICINE

## 2023-10-25 PROCEDURE — 84132 ASSAY OF SERUM POTASSIUM: CPT | Performed by: HOSPITALIST

## 2023-10-25 PROCEDURE — 99232 SBSQ HOSP IP/OBS MODERATE 35: CPT | Performed by: INTERNAL MEDICINE

## 2023-10-25 PROCEDURE — 84466 ASSAY OF TRANSFERRIN: CPT | Performed by: INTERNAL MEDICINE

## 2023-10-25 PROCEDURE — 80053 COMPREHEN METABOLIC PANEL: CPT | Performed by: HOSPITALIST

## 2023-10-25 PROCEDURE — 83540 ASSAY OF IRON: CPT | Performed by: INTERNAL MEDICINE

## 2023-10-25 PROCEDURE — 97116 GAIT TRAINING THERAPY: CPT

## 2023-10-25 PROCEDURE — 99232 SBSQ HOSP IP/OBS MODERATE 35: CPT | Performed by: HOSPITALIST

## 2023-10-25 PROCEDURE — 84443 ASSAY THYROID STIM HORMONE: CPT | Performed by: HOSPITALIST

## 2023-10-25 PROCEDURE — 84439 ASSAY OF FREE THYROXINE: CPT | Performed by: HOSPITALIST

## 2023-10-25 PROCEDURE — 97161 PT EVAL LOW COMPLEX 20 MIN: CPT

## 2023-10-25 PROCEDURE — 83036 HEMOGLOBIN GLYCOSYLATED A1C: CPT | Performed by: HOSPITALIST

## 2023-10-25 PROCEDURE — 25010000002 ENOXAPARIN PER 10 MG: Performed by: INTERNAL MEDICINE

## 2023-10-25 PROCEDURE — 94761 N-INVAS EAR/PLS OXIMETRY MLT: CPT

## 2023-10-25 PROCEDURE — 84484 ASSAY OF TROPONIN QUANT: CPT | Performed by: INTERNAL MEDICINE

## 2023-10-25 PROCEDURE — 80061 LIPID PANEL: CPT | Performed by: HOSPITALIST

## 2023-10-25 PROCEDURE — 84100 ASSAY OF PHOSPHORUS: CPT | Performed by: INTERNAL MEDICINE

## 2023-10-25 PROCEDURE — 63710000001 INSULIN LISPRO (HUMAN) PER 5 UNITS: Performed by: HOSPITALIST

## 2023-10-25 PROCEDURE — 97165 OT EVAL LOW COMPLEX 30 MIN: CPT

## 2023-10-25 PROCEDURE — 83735 ASSAY OF MAGNESIUM: CPT | Performed by: INTERNAL MEDICINE

## 2023-10-25 PROCEDURE — 85027 COMPLETE CBC AUTOMATED: CPT | Performed by: HOSPITALIST

## 2023-10-25 RX ORDER — ENOXAPARIN SODIUM 100 MG/ML
0.7 INJECTION SUBCUTANEOUS EVERY 12 HOURS
Status: DISCONTINUED | OUTPATIENT
Start: 2023-10-25 | End: 2023-10-30

## 2023-10-25 RX ORDER — BUMETANIDE 0.25 MG/ML
0.5 INJECTION INTRAMUSCULAR; INTRAVENOUS EVERY 12 HOURS
Status: DISCONTINUED | OUTPATIENT
Start: 2023-10-25 | End: 2023-10-29

## 2023-10-25 RX ORDER — POTASSIUM CHLORIDE 20 MEQ/1
40 TABLET, EXTENDED RELEASE ORAL EVERY 4 HOURS
Status: DISPENSED | OUTPATIENT
Start: 2023-10-25 | End: 2023-10-25

## 2023-10-25 RX ORDER — DEXTROSE MONOHYDRATE 25 G/50ML
25 INJECTION, SOLUTION INTRAVENOUS
Status: DISCONTINUED | OUTPATIENT
Start: 2023-10-25 | End: 2023-11-01 | Stop reason: ALTCHOICE

## 2023-10-25 RX ORDER — IBUPROFEN 600 MG/1
1 TABLET ORAL
Status: DISCONTINUED | OUTPATIENT
Start: 2023-10-25 | End: 2023-11-01 | Stop reason: ALTCHOICE

## 2023-10-25 RX ORDER — INSULIN LISPRO 100 [IU]/ML
2-7 INJECTION, SOLUTION INTRAVENOUS; SUBCUTANEOUS
Status: DISCONTINUED | OUTPATIENT
Start: 2023-10-25 | End: 2023-11-01 | Stop reason: ALTCHOICE

## 2023-10-25 RX ORDER — NICOTINE POLACRILEX 4 MG
15 LOZENGE BUCCAL
Status: DISCONTINUED | OUTPATIENT
Start: 2023-10-25 | End: 2023-11-01 | Stop reason: ALTCHOICE

## 2023-10-25 RX ORDER — ATORVASTATIN CALCIUM 40 MG/1
80 TABLET, FILM COATED ORAL NIGHTLY
Status: DISCONTINUED | OUTPATIENT
Start: 2023-10-25 | End: 2023-11-04

## 2023-10-25 RX ORDER — AMLODIPINE BESYLATE 5 MG/1
5 TABLET ORAL DAILY
Status: DISCONTINUED | OUTPATIENT
Start: 2023-10-25 | End: 2023-10-30

## 2023-10-25 RX ADMIN — BUMETANIDE 0.5 MG: 0.25 INJECTION, SOLUTION INTRAMUSCULAR; INTRAVENOUS at 17:13

## 2023-10-25 RX ADMIN — ENOXAPARIN SODIUM 70 MG: 80 INJECTION SUBCUTANEOUS at 17:13

## 2023-10-25 RX ADMIN — CARVEDILOL 12.5 MG: 12.5 TABLET, FILM COATED ORAL at 08:22

## 2023-10-25 RX ADMIN — CARVEDILOL 12.5 MG: 12.5 TABLET, FILM COATED ORAL at 17:13

## 2023-10-25 RX ADMIN — BUMETANIDE 1 MG: 0.25 INJECTION, SOLUTION INTRAMUSCULAR; INTRAVENOUS at 03:36

## 2023-10-25 RX ADMIN — ATORVASTATIN CALCIUM 80 MG: 40 TABLET, FILM COATED ORAL at 21:21

## 2023-10-25 RX ADMIN — ENOXAPARIN SODIUM 90 MG: 100 INJECTION SUBCUTANEOUS at 05:20

## 2023-10-25 RX ADMIN — POTASSIUM CHLORIDE 40 MEQ: 1500 TABLET, EXTENDED RELEASE ORAL at 08:22

## 2023-10-25 RX ADMIN — AMLODIPINE BESYLATE 5 MG: 5 TABLET ORAL at 08:22

## 2023-10-25 RX ADMIN — INSULIN LISPRO 3 UNITS: 100 INJECTION, SOLUTION INTRAVENOUS; SUBCUTANEOUS at 17:13

## 2023-10-25 RX ADMIN — INSULIN LISPRO 2 UNITS: 100 INJECTION, SOLUTION INTRAVENOUS; SUBCUTANEOUS at 21:21

## 2023-10-25 RX ADMIN — INSULIN LISPRO 2 UNITS: 100 INJECTION, SOLUTION INTRAVENOUS; SUBCUTANEOUS at 13:34

## 2023-10-25 RX ADMIN — Medication 10 ML: at 08:23

## 2023-10-25 RX ADMIN — SENNOSIDES AND DOCUSATE SODIUM 2 TABLET: 8.6; 5 TABLET ORAL at 21:21

## 2023-10-25 NOTE — PROGRESS NOTES
[facility] Medicine Services  PROGRESS NOTE    Patient Name: Michael Cochran  : 1945  MRN: [id number]    Date of Admission: 10/24/2023  Primary Care Physician: Bo Rodriguez PA    Subjective   Subjective     CC: Dyspnea    HPI: Dyspnea and edema better. No f/c. Upset about her condition. No n/v.       Objective   Objective     Vital Signs:   Temp:  [97.6 °F (36.4 °C)-98.7 °F (37.1 °C)] 98.1 °F (36.7 °C)  Heart Rate:  [] 61  Resp:  [18-22] 18  BP: (111-250)/() 136/81  Flow (L/min):  [2-6] 3     Physical Exam:  NAD, alert and oriented  OP clear, dry MM  Neck supple  No LAD  RRR  CTAB  +BS, ND, NT, soft  Tr LE edema   No rashes    Results Reviewed:  LAB RESULTS:      Lab 10/25/23  0357 10/24/23  1106   WBC 10.54 12.08*   HEMOGLOBIN 12.8 14.5   HEMATOCRIT 40.3 46.5   PLATELETS 225 227   NEUTROS ABS  --  10.21*   IMMATURE GRANS (ABS)  --  0.05   LYMPHS ABS  --  1.11   MONOS ABS  --  0.63   EOS ABS  --  0.04   MCV 90.0 91.5   D DIMER QUANT  --  0.64         Lab 10/25/23  0357 10/24/23  2153 10/24/23  1106   SODIUM 143  --  142   POTASSIUM 3.8 3.5 3.2*   CHLORIDE 105  --  104   CO2 26.0  --  22.0   ANION GAP 12.0  --  16.0*   BUN 20  --  15   CREATININE 1.35*  --  1.02*   EGFR 40.1*  --  56.1*   GLUCOSE 226*  --  288*   CALCIUM 9.0  --  9.2   MAGNESIUM 1.9  --  1.9   PHOSPHORUS 4.4  --   --    HEMOGLOBIN A1C 9.80*  --   --    TSH 8.790*  --   --          Lab 10/25/23  0357 10/24/23  1106   TOTAL PROTEIN 6.5 7.5   ALBUMIN 3.3* 3.7   GLOBULIN 3.2 3.8   ALT (SGPT) 16 19   AST (SGOT) 26 25   BILIRUBIN 1.7* 2.1*   ALK PHOS 94 110         Lab 10/25/23  0357 10/24/23  1435 10/24/23  1106   PROBNP  --   --  13,276.0*   HSTROP T 631* 499* 356*         Lab 10/25/23  0357   CHOLESTEROL 255*   LDL CHOL 170*   HDL CHOL 42   TRIGLYCERIDES 230*         Lab 10/25/23  0357   IRON 57   IRON SATURATION (TSAT) 21   TIBC 276*   TRANSFERRIN 185*         Brief Urine Lab Results       None             Microbiology Results Abnormal       Procedure Component Value - Date/Time    COVID-19 and FLU A/B PCR - Swab, Nasopharynx [[id number]]  (Normal) Collected: 10/24/23 1109    Lab Status: Final result Specimen: Swab from Nasopharynx Updated: 10/24/23 1145     COVID19 Not Detected     Influenza A PCR Not Detected     Influenza B PCR Not Detected    Narrative:      Fact sheet for providers: https://www.fda.gov/media/290237/download    Fact sheet for patients: https://www.fda.gov/media/064499/download    Test performed by PCR.            XR Chest 1 View    Result Date: 10/25/2023  XR CHEST 1 VW Date of Exam: 10/24/2023 11:42 PM EDT Indication: increased o2 Comparison: Chest radiograph 10/24/2023 Findings: The heart is enlarged. There is bilateral basilar discoid atelectasis with small bilateral pleural effusions. Mild increase in the interstitial markings may indicate pulmonary edema.     Impression: Impression: Cardiomegaly with mild pulmonary edema with small bilateral pleural effusions and basilar atelectasis. Electronically Signed: Madhu Rees MD  10/25/2023 12:06 AM EDT  Workstation ID: [id number]    Adult Transthoracic Echo Complete W/ Cont if Necessary Per Protocol    Result Date: 10/24/2023    Left ventricular systolic function is moderate-severely decreased. Calculated left ventricular EF = 31.4%. The apex is akinetic with severe hypokinesis of the anterior, anterolateral, and septal walls.   The left ventricular cavity is mildly dilated.   Left ventricular diastolic function is consistent with (grade II w/high LAP) pseudonormalization.   The aortic valve is structurally normal with no stenosis present. No significant aortic valve regurgitation is present.   The mitral valve is structurally normal with no significant stenosis present. Mild mitral valve regurgitation is present.     XR Chest 1 View    Result Date: 10/24/2023  XR CHEST 1 VW Date of Exam: 10/24/2023 11:08 AM EDT Indication: SOA triage  protocol Comparison: None available. Findings: No prior exams. There is moderate cardiomegaly. There is a moderate sized right pleural effusion which obscures detail of the underlying right lung base. Suggestion of mild atelectasis of both lower lobes. Exam is somewhat limited by portable technique and obesity. Pulmonary vessels appear within normal limits for technique.     Impression: Impression: Cardiomegaly. Moderate right effusion. Mild bibasilar atelectasis. Electronically Signed: Marielos Camilo MD  10/24/2023 11:21 AM EDT  Workstation ID: [id number]     Results for orders placed during the hospital encounter of 10/24/23    Adult Transthoracic Echo Complete W/ Cont if Necessary Per Protocol    Interpretation Summary    Left ventricular systolic function is moderate-severely decreased. Calculated left ventricular EF = 31.4%. The apex is akinetic with severe hypokinesis of the anterior, anterolateral, and septal walls.    The left ventricular cavity is mildly dilated.    Left ventricular diastolic function is consistent with (grade II w/high LAP) pseudonormalization.    The aortic valve is structurally normal with no stenosis present. No significant aortic valve regurgitation is present.    The mitral valve is structurally normal with no significant stenosis present. Mild mitral valve regurgitation is present.      Current medications:  Scheduled Meds:amLODIPine, 5 mg, Oral, Daily  atorvastatin, 80 mg, Oral, Nightly  bumetanide, 0.5 mg, Intravenous, Q12H  carvedilol, 12.5 mg, Oral, BID With Meals  enoxaparin, 0.7 mg/kg, Subcutaneous, Q12H  potassium chloride ER, 40 mEq, Oral, Q4H  senna-docusate sodium, 2 tablet, Oral, BID  sodium chloride, 10 mL, Intravenous, Q12H      Continuous Infusions:nitroglycerin, 5-200 mcg/min, Last Rate: Stopped (10/24/23 1940)      PRN Meds:.  acetaminophen **OR** acetaminophen **OR** acetaminophen    senna-docusate sodium **AND** polyethylene glycol **AND** bisacodyl **AND**  bisacodyl    Calcium Replacement - Follow Nurse / BPA Driven Protocol    hydrALAZINE    Magnesium Standard Dose Replacement - Follow Nurse / BPA Driven Protocol    nitroglycerin    ondansetron **OR** ondansetron    Phosphorus Replacement - Follow Nurse / BPA Driven Protocol    Potassium Replacement - Follow Nurse / BPA Driven Protocol    sodium chloride    sodium chloride    sodium chloride    Assessment & Plan   Assessment & Plan     Active Hospital Problems    Diagnosis  POA    **Acute exacerbation of congestive heart failure [I50.9]  Yes    HLD (hyperlipidemia) [E78.5]  Yes    Essential hypertension [I10]  Yes    Acute on chronic systolic congestive heart failure [I50.23]  Yes    Acute respiratory failure with hypoxia [J96.01]  Yes    Elevated troponin [R79.89]  Yes      Resolved Hospital Problems   No resolved problems to display.        Brief Hospital Course to date:  Acute systolic heart fialure  Respiratory failure with hypoxia  NSTEMI  -diurese as tolerated as per cardiology  -on BB/statin/lovenox  -C 10/26 tentatively  -ECHO reviewed    HTN urgency  -trending better, adjustments per cardiology    Renal insufficiency  -monitor on diuretics    HL  -statin    Expected Discharge Location and Transportation: Home  Expected Discharge   Expected Discharge Date: 10/27/2023; Expected Discharge Time:      DVT prophylaxis:  Medical DVT prophylaxis orders are present.     AM-PAC 6 Clicks Score (PT): 22 (10/24/23 2000)    CODE STATUS:   Code Status and Medical Interventions:   Ordered at: 10/24/23 1334     Level Of Support Discussed With:    Patient     Code Status (Patient has no pulse and is not breathing):    CPR (Attempt to Resuscitate)     Medical Interventions (Patient has pulse or is breathing):    Full Support       Kory Rea MD  10/25/23

## 2023-10-25 NOTE — CASE MANAGEMENT/SOCIAL WORK
"Continued Stay Note  [facility]     Patient Name: Michael Cochran  MRN: [id number]  Today's Date: 10/25/2023    Admit Date: 10/24/2023    Plan: CM update   Discharge Plan       Row Name 10/25/23 1220       Plan    Plan CM update    Plan Comments Per Cardiology: \" We will tentatively plan [facility] plus or minus intervention in a.m.\"  PT eval is still pending and patient is currently on 3L of oxygen - Plan is to return home upon discharge - CM will continue to follow for discharge planning - corry [phone]    Final Discharge Disposition Code 01 - home or self-care                   Discharge Codes    No documentation.                 Expected Discharge Date and Time       Expected Discharge Date Expected Discharge Time    Oct 27, 2023               Corry Rose RN    "

## 2023-10-25 NOTE — PROGRESS NOTES
Michael Cochran  [id number]  1945   LOS: 1 day   Patient Care Team:  Bo Rodriguez PA as PCP - General (Family Medicine)    Chief Complaint:  NSTEMI / HTN / UNCONTROLLED T2 DM / OBESITY / AT RISK FOR GELY / HFrEF    Subjective     Comfortable sitting up in bed eating breakfast and in good spirits without cardiopulmonary complaints.  Acceptable oxygenation with oximetry 96% on 2 L/min nasal cannula.  No anginal type chest discomfort, cough, sputum production, pleurisy, or increased tachypnea/dyspnea.  No nausea or emesis.  The patient asked when she can be allowed home.    Objective     Vital Sign Min/Max for last 24 hours  Temp  Min: 97.6 °F (36.4 °C)  Max: 98.7 °F (37.1 °C)   BP  Min: 111/71  Max: 250/158   Pulse  Min: 52  Max: 108   Resp  Min: 18  Max: 22   SpO2  Min: 89 %  Max: 97 %      Weight  Min: 93.9 kg (207 lb)  Max: 95.3 kg (210 lb 3.2 oz)         10/24/23  1040 10/25/23  0515   Weight: 93.9 kg (207 lb) 95.3 kg (210 lb 3.2 oz)         Intake/Output Summary (Last 24 hours) at 10/25/2023 0713  Last data filed at 10/25/2023 0400  Gross per 24 hour   Intake 540 ml   Output 1000 ml   Net -460 ml       Physical Exam:     General Appearance:    Alert, cooperative, in no acute distress   Lungs:   Overall diminished breath sounds with bibasilar crackles,respirations regular, even and unlabored    Heart:    Regular and normal rate, normal S1 and S2, grade 1/6 systolic murmur, no gallop, no rub, no click   Abdomen:  Extremities:   Soft, nontender, bowel sounds audible x4  Trace bipedal edema, normal range of motion   Pulses:   Pulses palpable and equal bilaterally      Results Review:   Results from last 7 days   Lab Units 10/25/23  0357 10/24/23  2153 10/24/23  1106   SODIUM mmol/L 143  --  142   POTASSIUM mmol/L 3.8 3.5 3.2*   CHLORIDE mmol/L 105  --  104   CO2 mmol/L 26.0  --  22.0   BUN mg/dL 20  --  15   CREATININE mg/dL 1.35*  --  1.02*   GLUCOSE mg/dL 226*  --  288*   CALCIUM mg/dL 9.0  --  9.2      Results from last 7 days   Lab Units 10/25/23  0357 10/24/23  1106   WBC 10*3/mm3 10.54 12.08*   HEMOGLOBIN g/dL 12.8 14.5   HEMATOCRIT % 40.3 46.5   PLATELETS 10*3/mm3 225 227     Results from last 7 days   Lab Units 10/25/23  0357   CHOLESTEROL mg/dL 255*   TRIGLYCERIDES mg/dL 230*   HDL CHOL mg/dL 42   LDL CHOL mg/dL 170*     Results from last 7 days   Lab Units 10/25/23  0357   HEMOGLOBIN A1C % 9.80*     Results from last 7 days   Lab Units 10/25/23  0357 10/24/23  1435 10/24/23  1106   HSTROP T ng/L 631* 499* 356*     ALBUMIN: 3.3  LFTs: WNL  TSH: 8.790  IRON SATURATION: 21%  ECHO:      Left ventricular systolic function is moderate-severely decreased. Calculated left ventricular EF = 31.4%. The apex is akinetic with severe hypokinesis of the anterior, anterolateral, and septal walls.    The left ventricular cavity is mildly dilated.    Left ventricular diastolic function is consistent with (grade II w/high LAP) pseudonormalization.    The aortic valve is structurally normal with no stenosis present. No significant aortic valve regurgitation is present.    The mitral valve is structurally normal with no significant stenosis present. Mild mitral valve regurgitation is present.    EKG:    Sinus bradycardia  Left ventricular hypertrophy with repolarization abnormality  Cannot rule out Septal infarct (cited on or before 24-OCT-2023)  Possible Lateral infarct (cited on or before 24-OCT-2023)  Abnormal ECG  When compared with ECG of 24-OCT-2023 10:49,  premature ventricular complexes are no longer present  Vent. rate has decreased BY  46 BPM  Serial changes of Septal infarct present    CXR:    Findings:    The heart is enlarged. There is bilateral basilar discoid atelectasis with small bilateral pleural effusions. Mild increase in the interstitial markings may indicate pulmonary edema.     IMPRESSION:    Cardiomegaly with mild pulmonary edema with small bilateral pleural effusions and basilar atelectasis.        Medication Review: REVIEWED; NO DRIPS.    Assessment & Plan     Marked echocardiographic regional wall motion abnormalities.  I suspect in retrospect the patient had myocardial infarction 6 weeks ago when she had initial severe pain and has had continuing progressive myocardial ischemia/left ventricular remodeling and now CHF with uncontrolled hypertension and diabetes mellitus.  We will defer treatment of diabetes mellitus to hospitalist service and increase atorvastatin to 80 mg daily.  Eventually the patient will need to consider SGLT2 inhibitor drug therapy as well as Entresto if renal function stabilizes.  We will tentatively plan LHC plus or minus intervention in a.m. and hold Lovenox in a.m.  Procedure, risks, and potential complications discussed with patient and she is in agreement to proceed.  We will reduce dose of IV Bumex as well as amlodipine at this time and adjust Lovenox for renal dysfunction.      Acute exacerbation of congestive heart failure    HLD (hyperlipidemia)    Essential hypertension    Acute on chronic systolic congestive heart failure    Acute respiratory failure with hypoxia    Elevated troponin    10/25/23  07:13 EDT

## 2023-10-25 NOTE — PLAN OF CARE
Goal Outcome Evaluation:  Plan of Care Reviewed With: patient        Progress: improving  Outcome Evaluation: Patient ambulated 200 feet with CGA, forward flexed posture, limited by fatigue and SOA. Occasional mild unsteadiness, but patient able to self correct. O2 sats 94% on 4 L O2/NC after ambulation. Patient currently below functional baseline, demonstrating decreased functional mobility status, decreased cardiovascular endurance, impaired balance, and decreased strength. Will address these deficits to promote return to PLOF. Recommend d/c home with assist from family.      Anticipated Discharge Disposition (PT): home with assist

## 2023-10-25 NOTE — PLAN OF CARE
Goal Outcome Evaluation:  Plan of Care Reviewed With: patient        Progress: no change  Outcome Evaluation: Pt presents near her functional baseline with all ADLs and mobility at this time. No skilled OT services warranted. OT rec home at [place].      Anticipated Discharge Disposition (OT): home

## 2023-10-25 NOTE — PLAN OF CARE
Goal Outcome Evaluation:  Plan of Care Reviewed With: patient        Progress: no change  Outcome Evaluation: Nitro gtt stopped at 1940. BP dropped siginificantly. Cardiology paged. Pt was slightly lethargic. Tirated o2 from 2-6L NC throughout shift. FRACISCO Ramirez paged. Repeat x-ray completed. Patient currently on 3L NC. Patient voiding adequately. 1500mL fluid restriction in place. No other concerns at this time. Will continue with the plan of care.

## 2023-10-25 NOTE — PAYOR COMM NOTE
"Felipa Campbell RN  Utilization Management  P:[phone]  F:[phone]    Ref # [id number]     Critical access hospital ID# [id number]     Jony Murphy (78 y.o. Female)       Date of Birth   1945    Social Security Number       Address   [address] [place] [address]    Home Phone   [phone]    MRN   [id number]       Taoist   None    Marital Status                               Admission Date   10/24/23    Admission Type   Emergency    Admitting Provider   Kory Rea MD    Attending Provider   Kory Rea MD    Department, Room/Bed   [address], [address]/       Discharge Date       Discharge Disposition       Discharge Destination                                 Attending Provider: Kory Rea MD    Allergies: Dynacirc [Isradipine], Codeine    Isolation: None   Infection: None   Code Status: CPR    Ht: 157.5 cm (62\")   Wt: 95.3 kg (210 lb 3.2 oz)    Admission Cmt: None   Principal Problem: Acute exacerbation of congestive heart failure [I50.9]                   Active Insurance as of 10/24/2023       Primary Coverage       Payor Plan Insurance Group Employer/Plan Group    ANTHEM MEDICARE REPLACEMENT ANTHEM MEDICARE ADVANTAGE [id number]       Payor Plan Address Payor Plan Phone Number Payor Plan Fax Number Effective Dates    [address] [phone]  2023 - None Entered    [facility] [address]         Subscriber Name Subscriber Birth Date Member ID       JONY MURPHY 1945 [id number]                     Emergency Contacts        (Rel.) Home Phone Work Phone Mobile Phone    MICHELLE GAVIRIA (Daughter) [phone] -- [phone]                 History & Physical        Kera Suarez DO at 10/24/23 [facility]9              [facility] Medicine Services  HISTORY AND PHYSICAL    Patient Name: Jony Murphy  : 1945  MRN: [id number]  Primary Care Physician: Bo Rodriguez PA  Date of admission: " 10/24/2023      Subjective  Subjective     Chief Complaint:  Chest pain and shortness of air    HPI:  Michael Cochran is a 78 y.o. female with a past medical history of hypertension and HLD that presented to the ED complaining of shortness of air that's worse with exertion and chest pain. The patient also states she noticed some swelling in her legs. She states the shortness of air has been worsening over the last few days. She has no history of heart failure and states she takes medications for blood pressure and elevated cholesterol. The patient states she also noticed some chest pain/tightness. The patient is requiring 4L NC to keep her oxygen saturations greater than 90% in the ED. BNP is elevated and her chest xray shows cardiomegaly with a moderate pleural effusion. The patient will be admitted to the hospitalist service for further evaluation and treatment.      Personal History     Past Medical History:   Diagnosis Date    Hyperlipidemia     Hypertension              Past Surgical History:   Procedure Laterality Date    BREAST FIBROADENOMA SURGERY      9 y/o-was benign       Family History: her family history is not on file.     Social History:  reports that she has been smoking cigarettes. She has never used smokeless tobacco. She reports that she does not drink alcohol and does not use drugs.  Social History     Social History Narrative    Not on file       Medications:  Available home medication information reviewed.  Medications Prior to Admission   Medication Sig Dispense Refill Last Dose    amLODIPine (Norvasc) 10 MG tablet Take 1 tablet by mouth Daily. 30 tablet 11 More than a month    cloNIDine (CATAPRES) 0.1 MG tablet TAKE 1 TABLET BY MOUTH TWICE A DAY 60 tablet 1 More than a month    hydroCHLOROthiazide (HYDRODIURIL) 25 MG tablet Take 1 tablet by mouth Daily. 90 tablet 3 More than a month       Allergies   Allergen Reactions    Dynacirc [Isradipine] Other (See Comments)     Causes tic    Codeine  Rash       Objective  Objective     Vital Signs:   Temp:  [97.6 °F (36.4 °C)-98.3 °F (36.8 °C)] 98.3 °F (36.8 °C)  Heart Rate:  [] 93  Resp:  [18-22] 18  BP: (194-250)/(111-158) 195/111  Flow (L/min):  [4] 4       Physical Exam   Constitutional: Awake, alert  Eyes: PERRLA, sclerae anicteric, no conjunctival injection  HENT: NCAT, mucous membranes moist  Neck: Supple, no thyromegaly, no lymphadenopathy, trachea midline  Respiratory: decreased and course with rhonchi to auscultation bilaterally, nonlabored respirations on 4L NC  Cardiovascular: RRR, no murmurs, rubs, or gallops, palpable pedal pulses bilaterally  Gastrointestinal: Positive bowel sounds, soft, nontender, nondistended  Musculoskeletal: 1+ bilateral ankle edema, no clubbing or cyanosis to extremities  Psychiatric: Appropriate affect, cooperative  Neurologic: Oriented x 3, strength symmetric in all extremities, Cranial Nerves grossly intact to confrontation, speech clear  Skin: No rashes    Result Review:  I have personally reviewed the results from the time of this admission to 10/24/2023 13:47 EDT and agree with these findings:  [x]  Laboratory list / accordion  []  Microbiology  [x]  Radiology  []  EKG/Telemetry   []  Cardiology/Vascular   []  Pathology  []  Old records  []  Other:    LAB RESULTS:      Lab 10/24/23  1106   WBC 12.08*   HEMOGLOBIN 14.5   HEMATOCRIT 46.5   PLATELETS 227   NEUTROS ABS 10.21*   IMMATURE GRANS (ABS) 0.05   LYMPHS ABS 1.11   MONOS ABS 0.63   EOS ABS 0.04   MCV 91.5   D DIMER QUANT 0.64         Lab 10/24/23  1106   SODIUM 142   POTASSIUM 3.2*   CHLORIDE 104   CO2 22.0   ANION GAP 16.0*   BUN 15   CREATININE 1.02*   EGFR 56.1*   GLUCOSE 288*   CALCIUM 9.2   MAGNESIUM 1.9         Lab 10/24/23  1106   TOTAL PROTEIN 7.5   ALBUMIN 3.7   GLOBULIN 3.8   ALT (SGPT) 19   AST (SGOT) 25   BILIRUBIN 2.1*   ALK PHOS 110         Lab 10/24/23  1106   PROBNP 13,276.0*   HSTROP T 356*                     Microbiology Results (last 10  days)       Procedure Component Value - Date/Time    COVID-19 and FLU A/B PCR - Swab, Nasopharynx [[id number]]  (Normal) Collected: 10/24/23 1109    Lab Status: Final result Specimen: Swab from Nasopharynx Updated: 10/24/23 1145     COVID19 Not Detected     Influenza A PCR Not Detected     Influenza B PCR Not Detected    Narrative:      Fact sheet for providers: https://www.fda.gov/media/672433/download    Fact sheet for patients: https://www.fda.gov/media/908211/download    Test performed by PCR.            XR Chest 1 View    Result Date: 10/24/2023  XR CHEST 1 VW Date of Exam: 10/24/2023 11:08 AM EDT Indication: SOA triage protocol Comparison: None available. Findings: No prior exams. There is moderate cardiomegaly. There is a moderate sized right pleural effusion which obscures detail of the underlying right lung base. Suggestion of mild atelectasis of both lower lobes. Exam is somewhat limited by portable technique and obesity. Pulmonary vessels appear within normal limits for technique.     Impression: Impression: Cardiomegaly. Moderate right effusion. Mild bibasilar atelectasis. Electronically Signed: Marielos Camilo MD  10/24/2023 11:21 AM EDT  Workstation ID: [id number]         Assessment & Plan  Assessment & Plan     Active Hospital Problems    Diagnosis  POA    **Acute exacerbation of congestive heart failure [I50.9]  Yes    HLD (hyperlipidemia) [E78.5]  Yes    Essential hypertension [I10]  Yes    Acute on chronic systolic congestive heart failure [I50.23]  Yes    Acute respiratory failure with hypoxia [J96.01]  Yes    Elevated troponin [R79.89]  Yes     Michael Cochran is a 78 y.o. female with a past medical history of hypertension and HLD that presented to the ED complaining of shortness of air that's worse with exertion and chest pain.    Acute exacerbation of congestive heart failure- new diagnosis  Respiratory failure secondary to above with hypoxemia  Pleural effusion related to above  - patient with BNP  of 13,000 on admission  - CXR showing cardiomegaly and moderate pleural effusion  - patient requiring 4L NC (no O2 at baseline  - ECHO pending  - continue home Amlodipine; hold HCTZ  - hold Clonidine- likely stop  - Bumex 2mg IV given in ED- continue Bumex 1mg IV BID  - strict I/Os and daily weights  - telemetry with pulse oximetry  - fluid restriction- 1500ml/day  - consult cardiology    Elevated troponin  - likely type 2 troponin leak secondary to new heart failure  - trend  - EKG reviewed    Hypertensive Urgency  - continue home Amlodipine; hold HCTZ  - hold Clonidine- likely stop indefinitely and may be contributing to rebound hypertension  - Hydralazine 10mg IV Q6H prn for SBP> 175  - consult cardiology    HLD  - continue statin  - lipid panel    DVT prophylaxis:  Heparin SQ      CODE STATUS:  Full/CPR  Code Status and Medical Interventions:   Ordered at: 10/24/23 1339     Level Of Support Discussed With:    Patient     Code Status (Patient has no pulse and is not breathing):    CPR (Attempt to Resuscitate)     Medical Interventions (Patient has pulse or is breathing):    Full Support       Expected Discharge   Expected Discharge Date: 10/27/2023; Expected Discharge Time:      Kera Suarez DO  10/24/23      Electronically signed by Kera Suarez DO at 10/24/23 1347          Emergency Department Notes        Shankar Perez MD at 10/24/23 1522          Subjective   History of Present Illness  78-year-old female presents for evaluation of shortness of breath.  Of note, the patient has a history of reported hypertension but tells me that she has not been compliant with her medications.  For the past 6 weeks or so she has been experiencing intermittent episodes of shortness of breath.  Her symptoms worsened acutely this morning after bending over and she came to the emergency department to be evaluated.  She tells me that she is not typically oxygen dependent.  She denies any known history of COPD or  asthma.  She denies any cough or fever.  She endorses some mild chest discomfort intermittently over the past several weeks.  No episodes of diaphoresis.  No vomiting.      Review of Systems   Respiratory:  Positive for chest tightness and shortness of breath.    Cardiovascular:  Positive for chest pain.   All other systems reviewed and are negative.      Past Medical History:   Diagnosis Date    Hyperlipidemia     Hypertension        Allergies   Allergen Reactions    Dynacirc [Isradipine] Other (See Comments)     Causes tic    Codeine Rash       Past Surgical History:   Procedure Laterality Date    BREAST FIBROADENOMA SURGERY      9 y/o-was benign       History reviewed. No pertinent family history.    Social History     Socioeconomic History    Marital status:    Tobacco Use    Smoking status: Some Days     Types: Cigarettes    Smokeless tobacco: Never    Tobacco comments:     Smokes rarely   Vaping Use    Vaping Use: Never used   Substance and Sexual Activity    Alcohol use: Never    Drug use: Never           Objective   Physical Exam  Vitals and nursing note reviewed.   Constitutional:       Appearance: She is well-developed. She is not diaphoretic.      Comments: Nontoxic-appearing female   HENT:      Head: Normocephalic and atraumatic.   Cardiovascular:      Rate and Rhythm: Normal rate and regular rhythm.      Heart sounds: Normal heart sounds. No murmur heard.     No friction rub. No gallop.   Pulmonary:      Breath sounds: Rales present. No wheezing.      Comments: Audible rales noted throughout posterior lung fields bilaterally  Abdominal:      General: Bowel sounds are normal. There is no distension.      Palpations: Abdomen is soft. There is no mass.      Tenderness: There is no abdominal tenderness. There is no guarding or rebound.   Musculoskeletal:         General: Normal range of motion.      Cervical back: Neck supple.      Comments: Trace symmetrical edema noted to both lower legs,  symmetrical   Skin:     General: Skin is warm and dry.      Findings: No erythema or rash.   Neurological:      Mental Status: She is alert and oriented to person, place, and time.   Psychiatric:         Mood and Affect: Mood normal.         Thought Content: Thought content normal.         Judgment: Judgment normal.         Procedures          ED Course  ED Course as of 10/24/23 1522   Tue Oct 24, 2023   1232 78-year-old female presents for evaluation of shortness of breath.  Of note, the patient has a history of reported hypertension but tells me that she has not been compliant with her medications.  For the past 6 weeks or so she has been experiencing intermittent episodes of shortness of breath.  Her symptoms worsened acutely this morning so she came to the ED to be evaluated.  She is not typically oxygen dependent.  On arrival, the patient is markedly hypertensive and is hypoxic on room air with oxygen saturations of 89%.  Supplemental oxygen given via nasal cannula.  On exam, the patient has audible rales noted throughout posterior lung fields.  Labs remarkable for significantly elevated BNP and elevated troponin.  Low risk Well's and D-dimer is negative. [DD]   1233 No audible wheezes noted on exam. [DD]   1234 I personally and independently viewed the patient's x-ray images myself, and I am in agreement with the radiologist's reading for final interpretation--particularly regarding cardiomegaly and pleural effusion. [DD]   1234 After reviewing the patient's labs, electrolyte replacement protocol initiated.  Bumex given for diuresis.  Nitroglycerin drip initiated.  Goal systolic blood pressure of 180-200.  The patient clearly warrants admission to the hospital at this point.  I discussed her case with our hospitalist, Dr. Suarez, and the patient will be admitted under her care for further evaluation and treatment.  The patient is hemodynamically stable at this time and is aware/agreeable with the plan. [DD]       ED Course User Index  [DD] Shankar Perez MD                                      Recent Results (from the past 24 hour(s))   ECG 12 Lead ED Triage Standing Order; SOA    Collection Time: 10/24/23 10:49 AM   Result Value Ref Range    QT Interval 388 ms    QTC Interval 510 ms   Comprehensive Metabolic Panel    Collection Time: 10/24/23 11:06 AM    Specimen: Blood   Result Value Ref Range    Glucose 288 (H) 65 - 99 mg/dL    BUN 15 8 - 23 mg/dL    Creatinine 1.02 (H) 0.57 - 1.00 mg/dL    Sodium 142 136 - 145 mmol/L    Potassium 3.2 (L) 3.5 - 5.2 mmol/L    Chloride 104 98 - 107 mmol/L    CO2 22.0 22.0 - 29.0 mmol/L    Calcium 9.2 8.6 - 10.5 mg/dL    Total Protein 7.5 6.0 - 8.5 g/dL    Albumin 3.7 3.5 - 5.2 g/dL    ALT (SGPT) 19 1 - 33 U/L    AST (SGOT) 25 1 - 32 U/L    Alkaline Phosphatase 110 39 - 117 U/L    Total Bilirubin 2.1 (H) 0.0 - 1.2 mg/dL    Globulin 3.8 gm/dL    A/G Ratio 1.0 g/dL    BUN/Creatinine Ratio 14.7 7.0 - 25.0    Anion Gap 16.0 (H) 5.0 - 15.0 mmol/L    eGFR 56.1 (L) >60.0 mL/min/1.73   BNP    Collection Time: 10/24/23 11:06 AM    Specimen: Blood   Result Value Ref Range    proBNP 13,276.0 (H) 0.0 - 1,800.0 pg/mL   Single High Sensitivity Troponin T    Collection Time: 10/24/23 11:06 AM    Specimen: Blood   Result Value Ref Range    HS Troponin T 356 (C) <10 ng/L   Green Top (Gel)    Collection Time: 10/24/23 11:06 AM   Result Value Ref Range    Extra Tube Hold for add-ons.    Lavender Top    Collection Time: 10/24/23 11:06 AM   Result Value Ref Range    Extra Tube hold for add-on    Gold Top - SST    Collection Time: 10/24/23 11:06 AM   Result Value Ref Range    Extra Tube Hold for add-ons.    Gray Top    Collection Time: 10/24/23 11:06 AM   Result Value Ref Range    Extra Tube Hold for add-ons.    Light Blue Top    Collection Time: 10/24/23 11:06 AM   Result Value Ref Range    Extra Tube Hold for add-ons.    CBC Auto Differential    Collection Time: 10/24/23 11:06 AM    Specimen: Blood    Result Value Ref Range    WBC 12.08 (H) 3.40 - 10.80 10*3/mm3    RBC 5.08 3.77 - 5.28 10*6/mm3    Hemoglobin 14.5 12.0 - 15.9 g/dL    Hematocrit 46.5 34.0 - 46.6 %    MCV 91.5 79.0 - 97.0 fL    MCH 28.5 26.6 - 33.0 pg    MCHC 31.2 (L) 31.5 - 35.7 g/dL    RDW 13.3 12.3 - 15.4 %    RDW-SD 44.8 37.0 - 54.0 fl    MPV 12.5 (H) 6.0 - 12.0 fL    Platelets 227 140 - 450 10*3/mm3    Neutrophil % 84.6 (H) 42.7 - 76.0 %    Lymphocyte % 9.2 (L) 19.6 - 45.3 %    Monocyte % 5.2 5.0 - 12.0 %    Eosinophil % 0.3 0.3 - 6.2 %    Basophil % 0.3 0.0 - 1.5 %    Immature Grans % 0.4 0.0 - 0.5 %    Neutrophils, Absolute 10.21 (H) 1.70 - 7.00 10*3/mm3    Lymphocytes, Absolute 1.11 0.70 - 3.10 10*3/mm3    Monocytes, Absolute 0.63 0.10 - 0.90 10*3/mm3    Eosinophils, Absolute 0.04 0.00 - 0.40 10*3/mm3    Basophils, Absolute 0.04 0.00 - 0.20 10*3/mm3    Immature Grans, Absolute 0.05 0.00 - 0.05 10*3/mm3    nRBC 0.0 0.0 - 0.2 /100 WBC   D-dimer, Quantitative    Collection Time: 10/24/23 11:06 AM    Specimen: Blood   Result Value Ref Range    D-Dimer, Quantitative 0.64 0.00 - 0.79 MCGFEU/mL   Magnesium    Collection Time: 10/24/23 11:06 AM    Specimen: Blood   Result Value Ref Range    Magnesium 1.9 1.6 - 2.4 mg/dL   COVID-19 and FLU A/B PCR - Swab, Nasopharynx    Collection Time: 10/24/23 11:09 AM    Specimen: Nasopharynx; Swab   Result Value Ref Range    COVID19 Not Detected Not Detected - Ref. Range    Influenza A PCR Not Detected Not Detected    Influenza B PCR Not Detected Not Detected   High Sensitivity Troponin T 2Hr    Collection Time: 10/24/23  2:35 PM    Specimen: Blood   Result Value Ref Range    HS Troponin T 499 (C) <10 ng/L    Troponin T Delta 143 (C) >=-4 - <+4 ng/L     Note: In addition to lab results from this visit, the labs listed above may include labs taken at another facility or during a different encounter within the last 24 hours. Please correlate lab times with ED admission and discharge times for further  clarification of the services performed during this visit.    XR Chest 1 View   Final Result   Impression:   Cardiomegaly. Moderate right effusion. Mild bibasilar atelectasis.         Electronically Signed: Marielos Camilo MD     10/24/2023 11:21 AM EDT     Workstation ID: [id number]        Vitals:    10/24/23 1301 10/24/23 1331 10/24/23 1336 10/24/23 1400   BP: (!) 194/121 (!) 209/124 (!) 195/111 (!) 172/117   BP Location:  Left arm     Patient Position:  Sitting     Pulse: 85 93  80   Resp:  18     Temp:  98.3 °F (36.8 °C)     TempSrc:  Oral     SpO2: 96% 94%  96%   Weight:       Height:         Medications   sodium chloride 0.9 % flush 10 mL (has no administration in time range)   nitroglycerin (TRIDIL) 200 mcg/ml infusion (10 mcg/min Intravenous Rate/Dose Change 10/24/23 1400)   Potassium Replacement - Follow Nurse / BPA Driven Protocol (has no administration in time range)   Magnesium Standard Dose Replacement - Follow Nurse / BPA Driven Protocol (has no administration in time range)   Phosphorus Replacement - Follow Nurse / BPA Driven Protocol (has no administration in time range)   Calcium Replacement - Follow Nurse / BPA Driven Protocol (has no administration in time range)   potassium chloride (K-DUR,KLOR-CON) CR tablet 40 mEq (has no administration in time range)   amLODIPine (NORVASC) tablet 10 mg (has no administration in time range)   sodium chloride 0.9 % flush 10 mL (has no administration in time range)   sodium chloride 0.9 % flush 10 mL (has no administration in time range)   sodium chloride 0.9 % infusion 40 mL (has no administration in time range)   sennosides-docusate (PERICOLACE) 8.6-50 MG per tablet 2 tablet (has no administration in time range)     And   polyethylene glycol (MIRALAX) packet 17 g (has no administration in time range)     And   bisacodyl (DULCOLAX) EC tablet 5 mg (has no administration in time range)     And   bisacodyl (DULCOLAX) suppository 10 mg (has no administration in time  range)   nitroglycerin (NITROSTAT) SL tablet 0.4 mg (has no administration in time range)   acetaminophen (TYLENOL) tablet 650 mg (has no administration in time range)     Or   acetaminophen (TYLENOL) 160 MG/5ML oral solution 650 mg (has no administration in time range)     Or   acetaminophen (TYLENOL) suppository 650 mg (has no administration in time range)   ondansetron (ZOFRAN) tablet 4 mg (has no administration in time range)     Or   ondansetron (ZOFRAN) injection 4 mg (has no administration in time range)   bumetanide (BUMEX) injection 1 mg (has no administration in time range)   hydrALAZINE (APRESOLINE) injection 10 mg (has no administration in time range)   carvedilol (COREG) tablet 12.5 mg (has no administration in time range)   Enoxaparin Sodium (LOVENOX) syringe 90 mg (has no administration in time range)   atorvastatin (LIPITOR) tablet 40 mg (has no administration in time range)   bumetanide (BUMEX) injection 2 mg (2 mg Intravenous Given 10/24/23 1227)     ECG/EMG Results (last 24 hours)       Procedure Component Value Units Date/Time    ECG 12 Lead ED Triage Standing Order; SOA [[id number]] Collected: 10/24/23 1049     Updated: 10/24/23 1421     QT Interval 388 ms      QTC Interval 510 ms     Narrative:      Test Reason : SOA  Blood Pressure :   */*   mmHG  Vent. Rate : 104 BPM     Atrial Rate : 104 BPM     P-R Int : 148 ms          QRS Dur :  90 ms      QT Int : 388 ms       P-R-T Axes :  69 -44 114 degrees     QTc Int : 510 ms    ** Poor data quality, interpretation may be adversely affected  Sinus tachycardia with occasional premature ventricular complexes  Possible Left atrial enlargement  Left axis deviation  Left ventricular hypertrophy with repolarization abnormality  Septal infarct , age undetermined  Possible Lateral infarct , age undetermined  Abnormal ECG  No previous ECGs available  Confirmed by MD Perez Michael ([id number]) on 10/24/2023 2:21:49 PM    Referred By: KRUNAL           Confirmed By:  Ash Perez MD          ECG 12 Lead ED Triage Standing Order; SOA   Final Result   Test Reason : SOA   Blood Pressure :   */*   mmHG   Vent. Rate : 104 BPM     Atrial Rate : 104 BPM      P-R Int : 148 ms          QRS Dur :  90 ms       QT Int : 388 ms       P-R-T Axes :  69 -44 114 degrees      QTc Int : 510 ms      ** Poor data quality, interpretation may be adversely affected   Sinus tachycardia with occasional premature ventricular complexes   Possible Left atrial enlargement   Left axis deviation   Left ventricular hypertrophy with repolarization abnormality   Septal infarct , age undetermined   Possible Lateral infarct , age undetermined   Abnormal ECG   No previous ECGs available   Confirmed by MD Perez Michael ([id number]) on 10/24/2023 2:21:49 PM      Referred By: EDMD           Confirmed By: Ash Perez MD                 Medical Decision Making  Amount and/or Complexity of Data Reviewed  Labs: ordered.  Radiology: ordered.  ECG/medicine tests: ordered.    Risk  OTC drugs.  Prescription drug management.  Decision regarding hospitalization.        Final diagnoses:   Acute on chronic congestive heart failure, unspecified heart failure type   Hypertensive emergency   Acute respiratory failure with hypoxia   Pleural effusion, left   Elevated troponin   Hypokalemia       ED Disposition  ED Disposition       ED Disposition   Decision to Admit    Condition   --    Comment   Level of Care: Telemetry [5]   Diagnosis: Acute exacerbation of congestive heart failure [396172]   Admitting Physician: JANNA JACOBO [[id number]]   Certification: I Certify That Inpatient Hospital Services Are Medically Necessary For Greater Than 2 Midnights                 No follow-up provider specified.       Medication List      No changes were made to your prescriptions during this visit.            Shankra Perez MD  10/24/23 1528      Electronically signed by Shankar Perez MD at 10/24/23 8618       Current  Facility-Administered Medications   Medication Dose Route Frequency Provider Last Rate Last Admin    acetaminophen (TYLENOL) tablet 650 mg  650 mg Oral Q4H PRN Kera Suarez DO        Or    acetaminophen (TYLENOL) 160 MG/5ML oral solution 650 mg  650 mg Oral Q4H PRN Kera Suarez DO        Or    acetaminophen (TYLENOL) suppository 650 mg  650 mg Rectal Q4H PRN Kera Suarez DO        amLODIPine (NORVASC) tablet 5 mg  5 mg Oral Daily Madhu Sánchez MD   5 mg at 10/25/23 0822    atorvastatin (LIPITOR) tablet 80 mg  80 mg Oral Nightly Madhu Sánchez MD        sennosides-docusate (PERICOLACE) 8.6-50 MG per tablet 2 tablet  2 tablet Oral BID Kera Suarez DO   2 tablet at 10/24/23 2002    And    polyethylene glycol (MIRALAX) packet 17 g  17 g Oral Daily PRN Kera Suarez DO        And    bisacodyl (DULCOLAX) EC tablet 5 mg  5 mg Oral Daily PRN Kera Suarez DO        And    bisacodyl (DULCOLAX) suppository 10 mg  10 mg Rectal Daily PRN Kera Suarez DO        bumetanide (BUMEX) injection 0.5 mg  0.5 mg Intravenous Q12H Madhu Sánchez MD        Calcium Replacement - Follow Nurse / BPA Driven Protocol   Does not apply PRN Shankar Perez MD        carvedilol (COREG) tablet 12.5 mg  12.5 mg Oral BID With Meals Madhu Sánchez MD   12.5 mg at 10/25/23 0822    Enoxaparin Sodium (LOVENOX) syringe 70 mg  0.7 mg/kg Subcutaneous Q12H Madhu Sánchez MD        hydrALAZINE (APRESOLINE) injection 10 mg  10 mg Intravenous Q6H PRN Kera Suarez DO        Magnesium Standard Dose Replacement - Follow Nurse / BPA Driven Protocol   Does not apply PRN Shankar Perez MD        nitroglycerin (NITROSTAT) SL tablet 0.4 mg  0.4 mg Sublingual Q5 Min PRN Kera Suarez DO        nitroglycerin (TRIDIL) 200 mcg/ml infusion  5-200 mcg/min Intravenous Titrated Shankar Perez MD   Stopped at 10/24/23 1940    ondansetron (ZOFRAN) tablet 4 mg  4 mg Oral Q6H PRN Kera Suarez DO        Or     ondansetron (ZOFRAN) injection 4 mg  4 mg Intravenous Q6H PRN Mina Suareztlin, DO        Phosphorus Replacement - Follow Nurse / BPA Driven Protocol   Does not apply PRN Shankar Perez MD        potassium chloride (K-DUR,KLOR-CON) CR tablet 40 mEq  40 mEq Oral Q4H Kera Suarez, DO   40 mEq at 10/25/23 0822    Potassium Replacement - Follow Nurse / BPA Driven Protocol   Does not apply PRN Shankar Perez MD        sodium chloride 0.9 % flush 10 mL  10 mL Intravenous PRN Shankar Perez MD        sodium chloride 0.9 % flush 10 mL  10 mL Intravenous Q12H Kera Suarez, DO   10 mL at 10/25/23 0823    sodium chloride 0.9 % flush 10 mL  10 mL Intravenous PRN Kera Suarez, DO        sodium chloride 0.9 % infusion 40 mL  40 mL Intravenous PRN Kera Suarez, DO         Lab Results (all)       Procedure Component Value Units Date/Time    Hemoglobin A1c [[id number]]  (Abnormal) Collected: 10/25/23 0357    Specimen: Blood Updated: 10/25/23 0550     Hemoglobin A1C 9.80 %     Narrative:      Hemoglobin A1C Ranges:    Increased Risk for Diabetes  5.7% to 6.4%  Diabetes                     >= 6.5%  Diabetic Goal                < 7.0%    T4, Free [[id number]]  (Abnormal) Collected: 10/25/23 0357    Specimen: Blood Updated: 10/25/23 0521     Free T4 0.81 ng/dL     Narrative:      Results may be falsely increased if patient taking Biotin.      High Sensitivity Troponin T [[id number]]  (Abnormal) Collected: 10/25/23 0357    Specimen: Blood Updated: 10/25/23 0502     HS Troponin T 631 ng/L     Narrative:      High Sensitive Troponin T Reference Range:  <10.0 ng/L- Negative Female for AMI  <15.0 ng/L- Negative Male for AMI  >=10 - Abnormal Female indicating possible myocardial injury.  >=15 - Abnormal Male indicating possible myocardial injury.   Clinicians would have to utilize clinical acumen, EKG, Troponin, and serial changes to determine if it is an Acute Myocardial Infarction or myocardial injury due to an  underlying chronic condition.         TSH Rfx On Abnormal To Free T4 [[id number]]  (Abnormal) Collected: 10/25/23 0357    Specimen: Blood Updated: 10/25/23 0457     TSH 8.790 uIU/mL     Comprehensive Metabolic Panel [[id number]]  (Abnormal) Collected: 10/25/23 0357    Specimen: Blood Updated: 10/25/23 0457     Glucose 226 mg/dL      BUN 20 mg/dL      Creatinine 1.35 mg/dL      Sodium 143 mmol/L      Potassium 3.8 mmol/L      Comment: Slight hemolysis detected by analyzer. Results may be affected.        Chloride 105 mmol/L      CO2 26.0 mmol/L      Calcium 9.0 mg/dL      Total Protein 6.5 g/dL      Albumin 3.3 g/dL      ALT (SGPT) 16 U/L      AST (SGOT) 26 U/L      Alkaline Phosphatase 94 U/L      Total Bilirubin 1.7 mg/dL      Globulin 3.2 gm/dL      Comment: Calculated Result        A/G Ratio 1.0 g/dL      BUN/Creatinine Ratio 14.8     Anion Gap 12.0 mmol/L      eGFR 40.1 mL/min/1.73     Narrative:      GFR Normal >60  Chronic Kidney Disease <60  Kidney Failure <15    The GFR formula is only valid for adults with stable renal function between ages 18 and 70.    Lipid Panel [[id number]]  (Abnormal) Collected: 10/25/23 0357    Specimen: Blood Updated: 10/25/23 0457     Total Cholesterol 255 mg/dL      Triglycerides 230 mg/dL      HDL Cholesterol 42 mg/dL      LDL Cholesterol  170 mg/dL      VLDL Cholesterol 43 mg/dL      LDL/HDL Ratio 3.98    Narrative:      Cholesterol Reference Ranges  (U.S. Department of Health and Human Services ATP III Classifications)    Desirable          <200 mg/dL  Borderline High    200-239 mg/dL  High Risk          >240 mg/dL      Triglyceride Reference Ranges  (U.S. Department of Health and Human Services ATP III Classifications)    Normal           <150 mg/dL  Borderline High  150-199 mg/dL  High             200-499 mg/dL  Very High        >500 mg/dL    HDL Reference Ranges  (U.S. Department of Health and Human Services ATP III Classifications)    Low     <40 mg/dl (major risk factor for  CHD)  High    >60 mg/dl ('negative' risk factor for CHD)        LDL Reference Ranges  (U.S. Department of Health and Human Services ATP III Classifications)    Optimal          <100 mg/dL  Near Optimal     100-129 mg/dL  Borderline High  130-159 mg/dL  High             160-189 mg/dL  Very High        >189 mg/dL    Magnesium [[id number]]  (Normal) Collected: 10/25/23 0357    Specimen: Blood Updated: 10/25/23 0457     Magnesium 1.9 mg/dL     Iron Profile [[id number]]  (Abnormal) Collected: 10/25/23 0357    Specimen: Blood Updated: 10/25/23 0457     Iron 57 mcg/dL      Iron Saturation (TSAT) 21 %      Transferrin 185 mg/dL      TIBC 276 mcg/dL     Phosphorus [[id number]]  (Normal) Collected: 10/25/23 0357    Specimen: Blood Updated: 10/25/23 0457     Phosphorus 4.4 mg/dL     CBC (No Diff) [[id number]]  (Abnormal) Collected: 10/25/23 0357    Specimen: Blood Updated: 10/25/23 0436     WBC 10.54 10*3/mm3      RBC 4.48 10*6/mm3      Hemoglobin 12.8 g/dL      Hematocrit 40.3 %      MCV 90.0 fL      MCH 28.6 pg      MCHC 31.8 g/dL      RDW 13.3 %      RDW-SD 43.4 fl      MPV 12.4 fL      Platelets 225 10*3/mm3     Potassium [[id number]]  (Normal) Collected: 10/24/23 2153    Specimen: Blood Updated: 10/24/23 2209     Potassium 3.5 mmol/L      Comment: Slight hemolysis detected by analyzer. Results may be affected.       [place] Draw [[id number]] Collected: 10/24/23 1106    Specimen: Blood Updated: 10/24/23 1515    Narrative:      The following orders were created for panel order [place] Draw.  Procedure                               Abnormality         Status                     ---------                               -----------         ------                     Green Top (Gel)[[id number]]                                  Final result               Lavender Top[[id number]]                                     Final result               Gold Top - SST[[id number]]                                   Final result               Jurado  Top[[id number]]                                         Final result               Light Blue Top[[id number]]                                   Final result                 Please view results for these tests on the individual orders.    Jurado Top [[id number]] Collected: 10/24/23 1106    Specimen: Blood Updated: 10/24/23 1515     Extra Tube Hold for add-ons.     Comment: Auto resulted.       High Sensitivity Troponin T 2Hr [[id number]]  (Abnormal) Collected: 10/24/23 1435    Specimen: Blood Updated: 10/24/23 1512     HS Troponin T 499 ng/L      Troponin T Delta 143 ng/L     Narrative:      High Sensitive Troponin T Reference Range:  <10.0 ng/L- Negative Female for AMI  <15.0 ng/L- Negative Male for AMI  >=10 - Abnormal Female indicating possible myocardial injury.  >=15 - Abnormal Male indicating possible myocardial injury.   Clinicians would have to utilize clinical acumen, EKG, Troponin, and serial changes to determine if it is an Acute Myocardial Infarction or myocardial injury due to an underlying chronic condition.         Magnesium [[id number]]  (Normal) Collected: 10/24/23 1106    Specimen: Blood Updated: 10/24/23 1241     Magnesium 1.9 mg/dL     Green Top (Gel) [[id number]] Collected: 10/24/23 1106    Specimen: Blood Updated: 10/24/23 1217     Extra Tube Hold for add-ons.     Comment: Auto resulted.       Lavender Top [[id number]] Collected: 10/24/23 1106    Specimen: Blood Updated: 10/24/23 1217     Extra Tube hold for add-on     Comment: Auto resulted       Gold Top - SST [[id number]] Collected: 10/24/23 1106    Specimen: Blood Updated: 10/24/23 1217     Extra Tube Hold for add-ons.     Comment: Auto resulted.       Light Blue Top [[id number]] Collected: 10/24/23 1106    Specimen: Blood Updated: 10/24/23 1217     Extra Tube Hold for add-ons.     Comment: Auto resulted       Comprehensive Metabolic Panel [[id number]]  (Abnormal) Collected: 10/24/23 1106    Specimen: Blood Updated: 10/24/23 1208     Glucose 288 mg/dL   "    BUN 15 mg/dL      Creatinine 1.02 mg/dL      Sodium 142 mmol/L      Potassium 3.2 mmol/L      Chloride 104 mmol/L      CO2 22.0 mmol/L      Calcium 9.2 mg/dL      Total Protein 7.5 g/dL      Albumin 3.7 g/dL      ALT (SGPT) 19 U/L      AST (SGOT) 25 U/L      Alkaline Phosphatase 110 U/L      Total Bilirubin 2.1 mg/dL      Globulin 3.8 gm/dL      Comment: Calculated Result        A/G Ratio 1.0 g/dL      BUN/Creatinine Ratio 14.7     Anion Gap 16.0 mmol/L      eGFR 56.1 mL/min/1.73     Narrative:      GFR Normal >60  Chronic Kidney Disease <60  Kidney Failure <15    The GFR formula is only valid for adults with stable renal function between ages 18 and 70.    D-dimer, Quantitative [[id number]]  (Normal) Collected: 10/24/23 1106    Specimen: Blood Updated: 10/24/23 1201     D-Dimer, Quantitative 0.64 MCGFEU/mL     Narrative:      According to the assay 's published package insert, a normal (<0.50 MCGFEU/mL) D-dimer result in conjunction with a non-high clinical probability assessment, excludes deep vein thrombosis (DVT) and pulmonary embolism (PE) with high sensitivity.    D-dimer values increase with age and this can make VTE exclusion of an older population difficult. To address this, the [employer], based on best available evidence and recent guidelines, recommends that clinicians use age-adjusted D-dimer thresholds in patients greater than 50 years of age with: a) a low probability of PE who do not meet all Pulmonary Embolism Rule Out Criteria, or b) in those with intermediate probability of PE.   The formula for an age-adjusted D-dimer cut-off is \"age/100\".  For example, a 60 year old patient would have an age-adjusted cut-off of 0.60 MCGFEU/mL and an 80 year old 0.80 MCGFEU/mL.    COVID-19 and FLU A/B PCR - Swab, Nasopharynx [[id number]]  (Normal) Collected: 10/24/23 1109    Specimen: Swab from Nasopharynx Updated: 10/24/23 1145     COVID19 Not Detected     Influenza A PCR Not " Detected     Influenza B PCR Not Detected    Narrative:      Fact sheet for providers: https://www.fda.gov/media/390625/download    Fact sheet for patients: https://www.fda.gov/media/179886/download    Test performed by PCR.    BNP [[id number]]  (Abnormal) Collected: 10/24/23 1106    Specimen: Blood Updated: 10/24/23 1144     proBNP 13,276.0 pg/mL     Narrative:      This assay is used as an aid in the diagnosis of individuals suspected of having heart failure. It can be used as an aid in the diagnosis of acute decompensated heart failure (ADHF) in patients presenting with signs and symptoms of ADHF to the emergency department (ED). In addition, NT-proBNP of <300 pg/mL indicates ADHF is not likely.    Age Range Result Interpretation  NT-proBNP Concentration (pg/mL:      <50             Positive            >450                   Gray                 300-450                    Negative             <300    50-75           Positive            >900                  Gray                300-900                  Negative            <300      >75             Positive            >1800                  Gray                300-1800                  Negative            <300    Single High Sensitivity Troponin T [[id number]]  (Abnormal) Collected: 10/24/23 1106    Specimen: Blood Updated: 10/24/23 1144     HS Troponin T 356 ng/L     Narrative:      High Sensitive Troponin T Reference Range:  <10.0 ng/L- Negative Female for AMI  <15.0 ng/L- Negative Male for AMI  >=10 - Abnormal Female indicating possible myocardial injury.  >=15 - Abnormal Male indicating possible myocardial injury.   Clinicians would have to utilize clinical acumen, EKG, Troponin, and serial changes to determine if it is an Acute Myocardial Infarction or myocardial injury due to an underlying chronic condition.         CBC & Differential [[id number]]  (Abnormal) Collected: 10/24/23 1106    Specimen: Blood Updated: 10/24/23 1135    Narrative:      The following  orders were created for panel order CBC & Differential.  Procedure                               Abnormality         Status                     ---------                               -----------         ------                     CBC Auto Differential[[id number]]        Abnormal            Final result                 Please view results for these tests on the individual orders.    CBC Auto Differential [[id number]]  (Abnormal) Collected: 10/24/23 1106    Specimen: Blood Updated: 10/24/23 1135     WBC 12.08 10*3/mm3      RBC 5.08 10*6/mm3      Hemoglobin 14.5 g/dL      Hematocrit 46.5 %      MCV 91.5 fL      MCH 28.5 pg      MCHC 31.2 g/dL      RDW 13.3 %      RDW-SD 44.8 fl      MPV 12.5 fL      Platelets 227 10*3/mm3      Neutrophil % 84.6 %      Lymphocyte % 9.2 %      Monocyte % 5.2 %      Eosinophil % 0.3 %      Basophil % 0.3 %      Immature Grans % 0.4 %      Neutrophils, Absolute 10.21 10*3/mm3      Lymphocytes, Absolute 1.11 10*3/mm3      Monocytes, Absolute 0.63 10*3/mm3      Eosinophils, Absolute 0.04 10*3/mm3      Basophils, Absolute 0.04 10*3/mm3      Immature Grans, Absolute 0.05 10*3/mm3      nRBC 0.0 /100 WBC           Imaging Results (All)       Procedure Component Value Units Date/Time    XR Chest 1 View [[id number]] Collected: 10/25/23 0006     Updated: 10/25/23 0009    Narrative:      XR CHEST 1 VW    Date of Exam: 10/24/2023 11:42 PM EDT    Indication: increased o2    Comparison: Chest radiograph 10/24/2023    Findings:  The heart is enlarged. There is bilateral basilar discoid atelectasis with small bilateral pleural effusions. Mild increase in the interstitial markings may indicate pulmonary edema.      Impression:      Impression:  Cardiomegaly with mild pulmonary edema with small bilateral pleural effusions and basilar atelectasis.      Electronically Signed: Madhu Rees MD    10/25/2023 12:06 AM EDT    Workstation ID: [id number]    XR Chest 1 View [[id number]] Collected: 10/24/23 1120      Updated: 10/24/23 1124    Narrative:      XR CHEST 1 VW    Date of Exam: 10/24/2023 11:08 AM EDT    Indication: SOA triage protocol    Comparison: None available.    Findings:  No prior exams. There is moderate cardiomegaly. There is a moderate sized right pleural effusion which obscures detail of the underlying right lung base. Suggestion of mild atelectasis of both lower lobes. Exam is somewhat limited by portable technique   and obesity. Pulmonary vessels appear within normal limits for technique.      Impression:      Impression:  Cardiomegaly. Moderate right effusion. Mild bibasilar atelectasis.      Electronically Signed: Marielos Camilo MD    10/24/2023 11:21 AM EDT    Workstation ID: [id number]          ECG/EMG Results (all)       Procedure Component Value Units Date/Time    ECG 12 Lead ED Triage Standing Order; SOA [[id number]] Collected: 10/24/23 1049     Updated: 10/24/23 1421     QT Interval 388 ms      QTC Interval 510 ms     Narrative:      Test Reason : SOA  Blood Pressure :   */*   mmHG  Vent. Rate : 104 BPM     Atrial Rate : 104 BPM     P-R Int : 148 ms          QRS Dur :  90 ms      QT Int : 388 ms       P-R-T Axes :  69 -44 114 degrees     QTc Int : 510 ms    ** Poor data quality, interpretation may be adversely affected  Sinus tachycardia with occasional premature ventricular complexes  Possible Left atrial enlargement  Left axis deviation  Left ventricular hypertrophy with repolarization abnormality  Septal infarct , age undetermined  Possible Lateral infarct , age undetermined  Abnormal ECG  No previous ECGs available  Confirmed by MD Perez Michael ([id number]) on 10/24/2023 2:21:49 PM    Referred By: EDMD           Confirmed By: Ash Perez MD    Adult Transthoracic Echo Complete W/ Cont if Necessary Per Protocol [[id number]] Resulted: 10/24/23 1630     Updated: 10/24/23 1703     EF(MOD-bp) 31.4 %      LVIDd 5.4 cm      LVIDs 3.8 cm      IVSd 0.84 cm      LVPWd 1.71 cm      FS 29.1 %      IVS/LVPW  0.49 cm      ESV(cubed) 55.8 ml      EDV(cubed) 156.6 ml      LV mass(C)d 287.3 grams      LVOT area 3.0 cm2      LVOT diam 1.97 cm      EDV(MOD-sp2) 78.5 ml      EDV(MOD-sp4) 93.1 ml      ESV(MOD-sp2) 59.6 ml      ESV(MOD-sp4) 59.1 ml      SV(MOD-sp2) 18.9 ml      SV(MOD-sp4) 34.0 ml      EF(MOD-sp2) 24.1 %      EF(MOD-sp4) 36.5 %      MV E max davion 115.7 cm/sec      MV A max davion 109.6 cm/sec      MV dec time 0.13 sec      MV E/A 1.06     LA ESV Index (BP) 15.5 ml/m2      Med Peak E' Davion 4.0 cm/sec      Lat Peak E' Davion 5.7 cm/sec      Avg E/e' ratio 23.86     SV(LVOT) 47.1 ml      RV Base 3.0 cm      RV Mid 2.11 cm      RV Length 6.0 cm      TAPSE (>1.6) 2.8 cm      RV S' 13.2 cm/sec      LA dimension (2D)  3.7 cm      LV V1 max 95.6 cm/sec      LV V1 max PG 3.7 mmHg      LV V1 mean PG 2.00 mmHg      LV V1 VTI 15.5 cm      Ao pk davion 124.0 cm/sec      Ao max PG 6.2 mmHg      Ao mean PG 3.0 mmHg      Ao V2 VTI 20.8 cm      KWESI(I,D) 2.27 cm2      MV max PG 5.4 mmHg      MV mean PG 2.8 mmHg      MV V2 VTI 24.1 cm      MVA(VTI) 1.96 cm2      MR max davion 518.3 cm/sec      MR max .4 mmHg      MR mean davion 410.7 cm/sec      MR mean PG 75.7 mmHg      MR .2 cm      TR max davion 158.1 cm/sec      TR max PG 12.2 mmHg      PA V2 max 54.9 cm/sec      PA acc time 0.11 sec      Ao root diam 2.8 cm      RVSP(TR) 20 mmHg      RAP systole 8 mmHg      Ascending aorta 2.7 cm     Narrative:        Left ventricular systolic function is moderate-severely decreased.   Calculated left ventricular EF = 31.4%. The apex is akinetic with severe   hypokinesis of the anterior, anterolateral, and septal walls.    The left ventricular cavity is mildly dilated.    Left ventricular diastolic function is consistent with (grade II w/high   LAP) pseudonormalization.    The aortic valve is structurally normal with no stenosis present. No   significant aortic valve regurgitation is present.    The mitral valve is structurally normal with no  significant stenosis   present. Mild mitral valve regurgitation is present.      SCANNED - TELEMETRY   [[id number]] Resulted: 10/24/23     Updated: 10/25/23 0938    ECG 12 Lead Chest Pain [[id number]] Collected: 10/25/23 0454     Updated: 10/25/23 0943     QT Interval 454 ms      QTC Interval 445 ms     Narrative:      Test Reason : Chest Pain  Blood Pressure :   */*   mmHG  Vent. Rate :  58 BPM     Atrial Rate :  58 BPM     P-R Int : 146 ms          QRS Dur :  94 ms      QT Int : 454 ms       P-R-T Axes :   1 -27 153 degrees     QTc Int : 445 ms    Sinus bradycardia  Left ventricular hypertrophy with repolarization abnormality  Cannot rule out Septal infarct (cited on or before 24-OCT-2023)  Possible Lateral infarct (cited on or before 24-OCT-2023)  Abnormal ECG  When compared with ECG of 24-OCT-2023 10:49,  premature ventricular complexes are no longer present  Vent. rate has decreased BY  46 BPM  Serial changes of Septal infarct present  Confirmed by TIM ZAMBRANO MD ([id number]) on 10/25/2023 9:43:46 AM    Referred By:            Confirmed By: TIM ZAMBRANO MD            Physician Progress Notes (all)        Kory Rea MD at 10/25/23 0835              [facility] Medicine Services  PROGRESS NOTE    Patient Name: Michael Cochran  : 1945  MRN: [id number]    Date of Admission: 10/24/2023  Primary Care Physician: Bo Rodriguez PA    Subjective   Subjective     CC: Dyspnea    HPI: Dyspnea and edema better. No f/c. Upset about her condition. No n/v.       Objective   Objective     Vital Signs:   Temp:  [97.6 °F (36.4 °C)-98.7 °F (37.1 °C)] 98.1 °F (36.7 °C)  Heart Rate:  [] 61  Resp:  [18-22] 18  BP: (111-250)/() 136/81  Flow (L/min):  [2-6] 3     Physical Exam:  NAD, alert and oriented  OP clear, dry MM  Neck supple  No LAD  RRR  CTAB  +BS, ND, NT, soft  Tr LE edema   No rashes    Results Reviewed:  LAB RESULTS:      Lab 10/25/23  0357 10/24/23  1106   WBC 10.54 12.08*    HEMOGLOBIN 12.8 14.5   HEMATOCRIT 40.3 46.5   PLATELETS 225 227   NEUTROS ABS  --  10.21*   IMMATURE GRANS (ABS)  --  0.05   LYMPHS ABS  --  1.11   MONOS ABS  --  0.63   EOS ABS  --  0.04   MCV 90.0 91.5   D DIMER QUANT  --  0.64         Lab 10/25/23  0357 10/24/23  2153 10/24/23  1106   SODIUM 143  --  142   POTASSIUM 3.8 3.5 3.2*   CHLORIDE 105  --  104   CO2 26.0  --  22.0   ANION GAP 12.0  --  16.0*   BUN 20  --  15   CREATININE 1.35*  --  1.02*   EGFR 40.1*  --  56.1*   GLUCOSE 226*  --  288*   CALCIUM 9.0  --  9.2   MAGNESIUM 1.9  --  1.9   PHOSPHORUS 4.4  --   --    HEMOGLOBIN A1C 9.80*  --   --    TSH 8.790*  --   --          Lab 10/25/23  0357 10/24/23  1106   TOTAL PROTEIN 6.5 7.5   ALBUMIN 3.3* 3.7   GLOBULIN 3.2 3.8   ALT (SGPT) 16 19   AST (SGOT) 26 25   BILIRUBIN 1.7* 2.1*   ALK PHOS 94 110         Lab 10/25/23  0357 10/24/23  1435 10/24/23  1106   PROBNP  --   --  13,276.0*   HSTROP T 631* 499* 356*         Lab 10/25/23  0357   CHOLESTEROL 255*   LDL CHOL 170*   HDL CHOL 42   TRIGLYCERIDES 230*         Lab 10/25/23  0357   IRON 57   IRON SATURATION (TSAT) 21   TIBC 276*   TRANSFERRIN 185*         Brief Urine Lab Results       None            Microbiology Results Abnormal       Procedure Component Value - Date/Time    COVID-19 and FLU A/B PCR - Swab, Nasopharynx [[id number]]  (Normal) Collected: 10/24/23 1109    Lab Status: Final result Specimen: Swab from Nasopharynx Updated: 10/24/23 1145     COVID19 Not Detected     Influenza A PCR Not Detected     Influenza B PCR Not Detected    Narrative:      Fact sheet for providers: https://www.fda.gov/media/830450/download    Fact sheet for patients: https://www.fda.gov/media/533268/download    Test performed by PCR.            XR Chest 1 View    Result Date: 10/25/2023  XR CHEST 1 VW Date of Exam: 10/24/2023 11:42 PM EDT Indication: increased o2 Comparison: Chest radiograph 10/24/2023 Findings: The heart is enlarged. There is bilateral basilar discoid  atelectasis with small bilateral pleural effusions. Mild increase in the interstitial markings may indicate pulmonary edema.     Impression: Impression: Cardiomegaly with mild pulmonary edema with small bilateral pleural effusions and basilar atelectasis. Electronically Signed: Madhu Rees MD  10/25/2023 12:06 AM EDT  Workstation ID: [id number]    Adult Transthoracic Echo Complete W/ Cont if Necessary Per Protocol    Result Date: 10/24/2023    Left ventricular systolic function is moderate-severely decreased. Calculated left ventricular EF = 31.4%. The apex is akinetic with severe hypokinesis of the anterior, anterolateral, and septal walls.   The left ventricular cavity is mildly dilated.   Left ventricular diastolic function is consistent with (grade II w/high LAP) pseudonormalization.   The aortic valve is structurally normal with no stenosis present. No significant aortic valve regurgitation is present.   The mitral valve is structurally normal with no significant stenosis present. Mild mitral valve regurgitation is present.     XR Chest 1 View    Result Date: 10/24/2023  XR CHEST 1 VW Date of Exam: 10/24/2023 11:08 AM EDT Indication: SOA triage protocol Comparison: None available. Findings: No prior exams. There is moderate cardiomegaly. There is a moderate sized right pleural effusion which obscures detail of the underlying right lung base. Suggestion of mild atelectasis of both lower lobes. Exam is somewhat limited by portable technique and obesity. Pulmonary vessels appear within normal limits for technique.     Impression: Impression: Cardiomegaly. Moderate right effusion. Mild bibasilar atelectasis. Electronically Signed: Marielos Camilo MD  10/24/2023 11:21 AM EDT  Workstation ID: [id number]     Results for orders placed during the hospital encounter of 10/24/23    Adult Transthoracic Echo Complete W/ Cont if Necessary Per Protocol    Interpretation Summary    Left ventricular systolic function is  moderate-severely decreased. Calculated left ventricular EF = 31.4%. The apex is akinetic with severe hypokinesis of the anterior, anterolateral, and septal walls.    The left ventricular cavity is mildly dilated.    Left ventricular diastolic function is consistent with (grade II w/high LAP) pseudonormalization.    The aortic valve is structurally normal with no stenosis present. No significant aortic valve regurgitation is present.    The mitral valve is structurally normal with no significant stenosis present. Mild mitral valve regurgitation is present.      Current medications:  Scheduled Meds:amLODIPine, 5 mg, Oral, Daily  atorvastatin, 80 mg, Oral, Nightly  bumetanide, 0.5 mg, Intravenous, Q12H  carvedilol, 12.5 mg, Oral, BID With Meals  enoxaparin, 0.7 mg/kg, Subcutaneous, Q12H  potassium chloride ER, 40 mEq, Oral, Q4H  senna-docusate sodium, 2 tablet, Oral, BID  sodium chloride, 10 mL, Intravenous, Q12H      Continuous Infusions:nitroglycerin, 5-200 mcg/min, Last Rate: Stopped (10/24/23 1940)      PRN Meds:.  acetaminophen **OR** acetaminophen **OR** acetaminophen    senna-docusate sodium **AND** polyethylene glycol **AND** bisacodyl **AND** bisacodyl    Calcium Replacement - Follow Nurse / BPA Driven Protocol    hydrALAZINE    Magnesium Standard Dose Replacement - Follow Nurse / BPA Driven Protocol    nitroglycerin    ondansetron **OR** ondansetron    Phosphorus Replacement - Follow Nurse / BPA Driven Protocol    Potassium Replacement - Follow Nurse / BPA Driven Protocol    sodium chloride    sodium chloride    sodium chloride    Assessment & Plan   Assessment & Plan     Active Hospital Problems    Diagnosis  POA    **Acute exacerbation of congestive heart failure [I50.9]  Yes    HLD (hyperlipidemia) [E78.5]  Yes    Essential hypertension [I10]  Yes    Acute on chronic systolic congestive heart failure [I50.23]  Yes    Acute respiratory failure with hypoxia [J96.01]  Yes    Elevated troponin [R79.89]  Yes       Resolved Hospital Problems   No resolved problems to display.        Brief Hospital Course to date:  Acute systolic heart fialure  Respiratory failure with hypoxia  NSTEMI  -diurese as tolerated as per cardiology  -on BB/statin/lovenox  -[facility] 10/26 tentatively  -ECHO reviewed    HTN urgency  -trending better, adjustments per cardiology    Renal insufficiency  -monitor on diuretics    HL  -statin    Expected Discharge Location and Transportation: Home  Expected Discharge   Expected Discharge Date: 10/27/2023; Expected Discharge Time:      DVT prophylaxis:  Medical DVT prophylaxis orders are present.     AM-PAC 6 Clicks Score (PT): 22 (10/24/23 2000)    CODE STATUS:   Code Status and Medical Interventions:   Ordered at: 10/24/23 1339     Level Of Support Discussed With:    Patient     Code Status (Patient has no pulse and is not breathing):    CPR (Attempt to Resuscitate)     Medical Interventions (Patient has pulse or is breathing):    Full Support       Kory Rea MD  10/25/23        Electronically signed by Kory Rea MD at 10/25/23 0838       Madhu Sánchez MD at 10/25/23 0713          Michael Cochran  [id number]  1945   LOS: 1 day   Patient Care Team:  Bo Rodriguez PA as PCP - General (Family Medicine)    Chief Complaint:  NSTEMI / HTN / UNCONTROLLED T2 DM / OBESITY / AT RISK FOR GELY / HFrEF    Subjective     Comfortable sitting up in bed eating breakfast and in good spirits without cardiopulmonary complaints.  Acceptable oxygenation with oximetry 96% on 2 L/min nasal cannula.  No anginal type chest discomfort, cough, sputum production, pleurisy, or increased tachypnea/dyspnea.  No nausea or emesis.  The patient asked when she can be allowed home.    Objective     Vital Sign Min/Max for last 24 hours  Temp  Min: 97.6 °F (36.4 °C)  Max: 98.7 °F (37.1 °C)   BP  Min: 111/71  Max: 250/158   Pulse  Min: 52  Max: 108   Resp  Min: 18  Max: 22   SpO2  Min: 89 %  Max: 97 %      Weight   Min: 93.9 kg (207 lb)  Max: 95.3 kg (210 lb 3.2 oz)         10/24/23  1040 10/25/23  0515   Weight: 93.9 kg (207 lb) 95.3 kg (210 lb 3.2 oz)         Intake/Output Summary (Last 24 hours) at 10/25/2023 0713  Last data filed at 10/25/2023 0400  Gross per 24 hour   Intake 540 ml   Output 1000 ml   Net -460 ml       Physical Exam:     General Appearance:    Alert, cooperative, in no acute distress   Lungs:   Overall diminished breath sounds with bibasilar crackles,respirations regular, even and unlabored    Heart:    Regular and normal rate, normal S1 and S2, grade 1/6 systolic murmur, no gallop, no rub, no click   Abdomen:  Extremities:   Soft, nontender, bowel sounds audible x4  Trace bipedal edema, normal range of motion   Pulses:   Pulses palpable and equal bilaterally      Results Review:   Results from last 7 days   Lab Units 10/25/23  0357 10/24/23  2153 10/24/23  1106   SODIUM mmol/L 143  --  142   POTASSIUM mmol/L 3.8 3.5 3.2*   CHLORIDE mmol/L 105  --  104   CO2 mmol/L 26.0  --  22.0   BUN mg/dL 20  --  15   CREATININE mg/dL 1.35*  --  1.02*   GLUCOSE mg/dL 226*  --  288*   CALCIUM mg/dL 9.0  --  9.2     Results from last 7 days   Lab Units 10/25/23  0357 10/24/23  1106   WBC 10*3/mm3 10.54 12.08*   HEMOGLOBIN g/dL 12.8 14.5   HEMATOCRIT % 40.3 46.5   PLATELETS 10*3/mm3 225 227     Results from last 7 days   Lab Units 10/25/23  0357   CHOLESTEROL mg/dL 255*   TRIGLYCERIDES mg/dL 230*   HDL CHOL mg/dL 42   LDL CHOL mg/dL 170*     Results from last 7 days   Lab Units 10/25/23  0357   HEMOGLOBIN A1C % 9.80*     Results from last 7 days   Lab Units 10/25/23  0357 10/24/23  1435 10/24/23  1106   HSTROP T ng/L 631* 499* 356*     ALBUMIN: 3.3  LFTs: WNL  TSH: 8.790  IRON SATURATION: 21%  ECHO:      Left ventricular systolic function is moderate-severely decreased. Calculated left ventricular EF = 31.4%. The apex is akinetic with severe hypokinesis of the anterior, anterolateral, and septal walls.    The left  ventricular cavity is mildly dilated.    Left ventricular diastolic function is consistent with (grade II w/high LAP) pseudonormalization.    The aortic valve is structurally normal with no stenosis present. No significant aortic valve regurgitation is present.    The mitral valve is structurally normal with no significant stenosis present. Mild mitral valve regurgitation is present.    EKG:    Sinus bradycardia  Left ventricular hypertrophy with repolarization abnormality  Cannot rule out Septal infarct (cited on or before 24-OCT-2023)  Possible Lateral infarct (cited on or before 24-OCT-2023)  Abnormal ECG  When compared with ECG of 24-OCT-2023 10:49,  premature ventricular complexes are no longer present  Vent. rate has decreased BY  46 BPM  Serial changes of Septal infarct present    CXR:    Findings:    The heart is enlarged. There is bilateral basilar discoid atelectasis with small bilateral pleural effusions. Mild increase in the interstitial markings may indicate pulmonary edema.     IMPRESSION:    Cardiomegaly with mild pulmonary edema with small bilateral pleural effusions and basilar atelectasis.       Medication Review: REVIEWED; NO DRIPS.    Assessment & Plan     Marked echocardiographic regional wall motion abnormalities.  I suspect in retrospect the patient had myocardial infarction 6 weeks ago when she had initial severe pain and has had continuing progressive myocardial ischemia/left ventricular remodeling and now CHF with uncontrolled hypertension and diabetes mellitus.  We will defer treatment of diabetes mellitus to hospitalist service and increase atorvastatin to 80 mg daily.  Eventually the patient will need to consider SGLT2 inhibitor drug therapy as well as Entresto if renal function stabilizes.  We will tentatively plan LHC plus or minus intervention in a.m. and hold Lovenox in a.m.  Procedure, risks, and potential complications discussed with patient and she is in agreement to proceed.  We  will reduce dose of IV Bumex as well as amlodipine at this time and adjust Lovenox for renal dysfunction.      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    Chief Complaint:  SOB / CHEST PAIN    Problem List:  Moderate (type II) obesity (BMI 37.9)  Chronic hypertension-probably essential with hypertensive urgency, October 2023  Acute congestive heart failure of uncertain etiology with elevated initial troponin/abnormal electrocardiogram-possible ischemic heart disease, October 2023  4.  Intermittent tobacco use (approximately 1 pack/week)  5.  Severe dyslipidemia without current treatment  (LDL cholesterol 216 mg/DL June 2021)  6.  Remote breast fibroadenoma lumpectomy-data deficit  7.  Elevated serum glucose-probable type 2 diabetes mellitus, October 2023  8.  Noncompliance with no medication x 6 months, 2023    Allergies   Allergen Reactions    Dynacirc [Isradipine] Other (See Comments)     Causes tic    Codeine Rash     Medications Prior to Admission   Medication Sig Dispense Refill Last Dose    amLODIPine (Norvasc) 10 MG tablet Take 1 tablet by mouth Daily. 30 tablet 11 More than a month    cloNIDine (CATAPRES) 0.1 MG tablet TAKE 1 TABLET BY MOUTH TWICE A DAY 60 tablet 1 More than a month    hydroCHLOROthiazide (HYDRODIURIL) 25 MG tablet Take 1 tablet by mouth Daily. 90 tablet 3 More than a month     Scheduled Meds:amLODIPine, 10 mg, Oral,  "Daily  bumetanide, 1 mg, Intravenous, Q12H  heparin (porcine), 5,000 Units, Subcutaneous, Q8H  potassium chloride ER, 40 mEq, Oral, Q4H  senna-docusate sodium, 2 tablet, Oral, BID  sodium chloride, 10 mL, Intravenous, Q12H      Continuous Infusions:nitroglycerin, 5-200 mcg/min, Last Rate: 5 mcg/min (10/24/23 1229)      PRN Meds:.  acetaminophen **OR** acetaminophen **OR** acetaminophen    senna-docusate sodium **AND** polyethylene glycol **AND** bisacodyl **AND** bisacodyl    Calcium Replacement - Follow Nurse / BPA Driven Protocol    hydrALAZINE    Magnesium Standard Dose Replacement - Follow Nurse / BPA Driven Protocol    nitroglycerin    ondansetron **OR** ondansetron    Phosphorus Replacement - Follow Nurse / BPA Driven Protocol    Potassium Replacement - Follow Nurse / BPA Driven Protocol    sodium chloride    sodium chloride    sodium chloride       History of Present Illness:      This older middle-aged female denies prior knowledge of cardiopulmonary illness.  She has had hypertension for \"several years\" and had difficulty adjusting to medication for adequate control and subsequently discontinued her medication 6 months ago because of \"it was just too much to try to get my medicines filled.\"  She presents with a 6-week history of intermittent \"sharp severe hurting\" chest pain that occurs randomly and is rated \"grade 10/10\" in severity but generally relieved by Advil.  The patient otherwise cannot distinctly describe her chest pain syndrome but it normally is without diaphoresis, flushing, or pain radiation although on occasion she has noted \"jaw hurting.\"  She additionally has had increasing exertional dyspnea, orthopnea, and now peripheral edema and presented to [facility] ED and was evaluated and admitted.  Cardiology consultation is requested.  Currently the patient is hungry and denies anginal type chest discomfort and has had less tachypnea and dyspnea on supplemental oxygen therapy (oximetry 96% on 2 L/min " "nasal cannula).  She denies recent cough, sputum production, pleurisy, hemoptysis, weight loss, or GI/ difficulty.  No prior history of myocardial infarction, CHF, TIA, or claudication.  Her son has hypertension.    Cardiac risk factors: advanced age (older than 55 for men, 65 for women), diabetes mellitus, dyslipidemia, hypertension, obesity (BMI >= 30 kg/m2), sedentary lifestyle, and smoking/ tobacco exposure.    Social History     Socioeconomic History    Marital status:    Tobacco Use    Smoking status: Some Days     Types: Cigarettes    Smokeless tobacco: Never    Tobacco comments:     Smokes rarely   Substance and Sexual Activity    Alcohol use: Never    Drug use: Never     No family history on file.    Review of Systems  10 point review of systems was completed, positives outlined in the HPI, and otherwise all other systems are negative.      Objective:      Objective Physical Exam  BP (!) 195/111   Pulse 93   Temp 98.3 °F (36.8 °C) (Oral)   Resp 18   Ht 157.5 cm (62\")   Wt 93.9 kg (207 lb)   SpO2 94%   BMI 37.86 kg/m²       10/24/23  1040   Weight: 93.9 kg (207 lb)     Body mass index is 37.86 kg/m².  No intake or output data in the 24 hours ending 10/24/23 1400    General Appearance:  Alert, cooperative, no distress, appears stated age   Head:  Normocephalic, without obvious abnormality, atraumatic   Eyes:  PERRL, conjunctivae/corneas clear, EOM's intact, fundi benign, both eyes   Throat: Lips, mucosa, and tongue normal; teeth and gums normal   Neck: Supple, symmetrical, trachea midline, no adenopathy, thyroid: not enlarged, symmetric, no tenderness/mass/nodules, no carotid bruit or JVD   Lungs:   Basilar crackles bilaterally, respirations unlabored   Heart:  Regular rate and rhythm, S1, S2 normal, grade 1/6 systolic murmur, no rub or gallop   Abdomen:   Soft, nontender, no masses, no organomegaly, bowel sounds audible x4   Extremities: 1+ bipedal edema, normal range of motion   Pulses: 1+ " and symmetric   Skin: Skin color, texture, turgor normal, no rashes or lesions   Neurologic: Normal;  gait not tested     Cardiographics:    EKG:    Sinus tachycardia with occasional premature ventricular complexes  Possible Left atrial enlargement  Left axis deviation  Left ventricular hypertrophy with repolarization abnormality  Septal infarct , age undetermined  Possible Lateral infarct , age undetermined  Abnormal ECG  No previous ECGs available    Imaging:    Chest x-ray:    Findings:    No prior exams. There is moderate cardiomegaly. There is a moderate sized right pleural effusion which obscures detail of the underlying right lung base. Suggestion of mild atelectasis of both lower lobes. Exam is somewhat limited by portable technique   and obesity. Pulmonary vessels appear within normal limits for technique.     IMPRESSION: Cardiomegaly. Moderate right effusion. Mild bibasilar atelectasis.    Lab Review   Results from last 7 days   Lab Units 10/24/23  1106   SODIUM mmol/L 142   POTASSIUM mmol/L 3.2*   CHLORIDE mmol/L 104   CO2 mmol/L 22.0   BUN mg/dL 15   CREATININE mg/dL 1.02*   GLUCOSE mg/dL 288*   CALCIUM mg/dL 9.2     Results from last 7 days   Lab Units 10/24/23  1106   WBC 10*3/mm3 12.08*   HEMOGLOBIN g/dL 14.5   HEMATOCRIT % 46.5   PLATELETS 10*3/mm3 227     Results from last 7 days   Lab Units 10/24/23  1106   HSTROP T ng/L 356*     NO URINALYSIS  proBNP: 13,276.0  ALBUMIN: 3.7  LFTs: WNL except total bilirubin 2.1  D-DIMER: 0.64  DRIP = 5 mcg/minute continuous infusion.     Assessment:      Older middle-aged female with multiple cardiovascular disease risk factors and noncompliance presenting with hypertensive urgency and biventricular congestive heart failure with abnormal electrocardiogram and elevated initial HS troponin.  Acute coronary artery syndrome cannot be excluded.  Addition the patient has untreated severe dyslipidemia.      Plan:     1.  Defer MPS/LHC at this time  2.  We will add  carvedilol and continue IV nitroglycerin.  3.  Empiric statin drug therapy  4.  Aspirin 81 mg daily  5.  Discontinue subcu heparin initiate full dose Lovenox 1 mg/kilogram subcu every 12 hours until repeat serial troponins obtain  6.  Obtain and review echocardiogram  7.  Iron studies/FLP/urinalysis/hemoglobin A1c  8.  Pending hospital course and echocardiogram will probably need at some point [facility] plus or minus intervention  9.  IV Diamox 500 mg x 1 if recurrent hypoxemia; potential eventual use of SGLT2 inhibitor drug therapy as well as possible thoracentesis    Electronically signed by Madhu Sánchez MD at 10/24/23 3143

## 2023-10-25 NOTE — THERAPY EVALUATION
Patient Name: Michael Cochran  : 1945    MRN: [id number]                              Today's Date: 10/25/2023       Admit Date: 10/24/2023    Visit Dx:     ICD-10-CM ICD-9-CM   1. Elevated troponin  R79.89 790.6   2. Acute on chronic congestive heart failure, unspecified heart failure type  I50.9 428.0   3. Hypertensive emergency  I16.1 401.9   4. Acute respiratory failure with hypoxia  J96.01 518.81   5. Pleural effusion, left  J90 511.9   6. Hypokalemia  E87.6 276.8   7. Non-STEMI (non-ST elevated myocardial infarction)  I21.4 410.70     Patient Active Problem List   Diagnosis    HLD (hyperlipidemia)    Essential hypertension    Acute on chronic systolic congestive heart failure    Acute respiratory failure with hypoxia    Elevated troponin    Acute exacerbation of congestive heart failure     Past Medical History:   Diagnosis Date    Hyperlipidemia     Hypertension      Past Surgical History:   Procedure Laterality Date    BREAST FIBROADENOMA SURGERY      9 y/o-was benign      General Information       Row Name 10/25/23 1327          Physical Therapy Time and Intention    Document Type evaluation  -LR     Mode of Treatment physical therapy;individual therapy  -LR       Row Name 10/25/23 1327          General Information    Patient Profile Reviewed yes  -LR     Prior Level of Function independent:;all household mobility;community mobility;gait;transfer;bed mobility;ADL's  used SPC for long distances  -LR     Existing Precautions/Restrictions oxygen therapy device and L/min;fall  -LR     Barriers to Rehab medically complex  -LR       Row Name 10/25/23 1327          Living Environment    People in Home child(magaly), adult;grandchild(magaly)  -LR       Row Name 10/25/23 1327          Home Main Entrance    Number of Stairs, Main Entrance five  -LR     Stair Railings, Main Entrance railing on right side (ascending)  -LR       Row Name 10/25/23 1327          Stairs Within Home, Primary    Number of Stairs, Within  Home, Primary none  -LR       Row Name 10/25/23 1327          Cognition    Orientation Status (Cognition) oriented x 4  -LR       Row Name 10/25/23 1327          Safety Issues, Functional Mobility    Safety Issues Affecting Function (Mobility) safety precautions follow-through/compliance;safety precaution awareness  -LR     Impairments Affecting Function (Mobility) balance;endurance/activity tolerance;strength;shortness of breath  -LR               User Key  (r) = Recorded By, (t) = Taken By, (c) = Cosigned By      Initials Name Provider Type    LR Alissa Pierre, PT Physical Therapist                   Mobility       Row Name 10/25/23 1327          Bed Mobility    Supine-Sit-Supine [place] (Bed Mobility) not tested  -LR     Comment, (Bed Mobility) Sitting EOB on arrival and UIC at end of eval.  -LR       Row Name 10/25/23 1327          Transfers    Comment, (Transfers) Verbal cues for correct hand placement with t/f. Stood x3, SBA for safety.  -LR       Row Name 10/25/23 1327          Bed-Chair Transfer    Bed-Chair [place] (Transfers) not tested  -LR       Row Name 10/25/23 132          Sit-Stand Transfer    Sit-Stand [place] (Transfers) verbal cues;standby assist  -LR     Assistive Device (Sit-Stand Transfers) other (see comments)  no AD  -LR       Row Name 10/25/23 1327          Gait/Stairs (Locomotion)    [place] Level (Gait) verbal cues;contact guard  -LR     Assistive Device (Gait) walker, front-wheeled  -LR     Distance in Feet (Gait) 200  -LR     Deviations/Abnormal Patterns (Gait) bilateral deviations;peng decreased;gait speed decreased;stride length decreased  -LR     Bilateral Gait Deviations forward flexed posture;heel strike decreased  -LR     [place] Level (Stairs) not tested  -LR     Comment, (Gait/Stairs) Patient ambulated with step through gait pattern at slow pace with forward flexed posture. Verbal cues for upright posture, to shift hips forward and shoulders  back. Slight improvement with cues for correction. Occasional mild LOB, patient able to self correct. Gait limited by fatigue.  -LR               User Key  (r) = Recorded By, (t) = Taken By, (c) = Cosigned By      Initials Name Provider Type    Alissa Hill, PT Physical Therapist                   Obj/Interventions       Row Name 10/25/23 1327          Range of Motion Comprehensive    General Range of Motion bilateral lower extremity ROM WFL  -LR       Row Name 10/25/23 1327          Strength Comprehensive (MMT)    General Manual Muscle Testing (MMT) Assessment lower extremity strength deficits identified  -LR       Row Name 10/25/23 1327          Balance    Balance Assessment sitting static balance;sitting dynamic balance;standing static balance;standing dynamic balance  -LR     Static Sitting Balance independent  -LR     Dynamic Sitting Balance independent  -LR     Position, Sitting Balance unsupported;sitting edge of bed  -LR     Static Standing Balance standby assist  -LR     Dynamic Standing Balance standby assist  -LR     Position/Device Used, Standing Balance unsupported  -LR       Row Name 10/25/23 1327          Sensory Assessment (Somatosensory)    Sensory Assessment (Somatosensory) LE sensation intact;other (see comments)  denies numbness/tingling; reports light touch equal and intact upon assessment  -LR       Row Name 10/25/23 1327          Lower Extremity (Manual Muscle Testing)    Comment, MMT: Lower Extremity B hip flexors: 4/5, B knee flexors/extensors: 5/5, B ankle DFs: 5/5  -LR               User Key  (r) = Recorded By, (t) = Taken By, (c) = Cosigned By      Initials Name Provider Type    Alissa Hill, PT Physical Therapist                   Goals/Plan       Row Name 10/25/23 1327          Bed Mobility Goal 1 (PT)    Activity/Assistive Device (Bed Mobility Goal 1, PT) sit to supine/supine to sit  -LR     [place] Level/Cues Needed (Bed Mobility Goal 1, PT) independent   -LR     Time Frame (Bed Mobility Goal 1, PT) long term goal (LTG);5 days  -LR     Progress/Outcomes (Bed Mobility Goal 1, PT) goal ongoing  -LR       Row Name 10/25/23 1327          Transfer Goal 1 (PT)    Activity/Assistive Device (Transfer Goal 1, PT) sit-to-stand/stand-to-sit;walker, rolling  -LR     [place] Level/Cues Needed (Transfer Goal 1, PT) independent  -LR     Time Frame (Transfer Goal 1, PT) long term goal (LTG);5 days  -LR     Progress/Outcome (Transfer Goal 1, PT) goal ongoing  -LR       Row Name 10/25/23 1327          Gait Training Goal 1 (PT)    Activity/Assistive Device (Gait Training Goal 1, PT) gait (walking locomotion);cane, straight  -LR     [place] Level (Gait Training Goal 1, PT) modified independence  -LR     Distance (Gait Training Goal 1, PT) 500 feet  -LR     Time Frame (Gait Training Goal 1, PT) long term goal (LTG);5 days  -LR     Progress/Outcome (Gait Training Goal 1, PT) goal ongoing  -LR       Row Name 10/25/23 1327          Stairs Goal 1 (PT)    Activity/Assistive Device (Stairs Goal 1, PT) ascending stairs;descending stairs;step-to-step;using handrail, right  -LR     [place] Level/Cues Needed (Stairs Goal 1, PT) contact guard required  -LR     Number of Stairs (Stairs Goal 1, PT) 5  -LR     Time Frame (Stairs Goal 1, PT) long term goal (LTG);5 days  -LR     Progress/Outcome (Stairs Goal 1, PT) goal ongoing  -LR       Row Name 10/25/23 1327          Therapy Assessment/Plan (PT)    Planned Therapy Interventions (PT) balance training;bed mobility training;gait training;home exercise program;patient/family education;ROM (range of motion);stair training;strengthening;transfer training  -LR               User Key  (r) = Recorded By, (t) = Taken By, (c) = Cosigned By      Initials Name Provider Type    LR Alissa Pierre, PT Physical Therapist                   Clinical Impression       Row Name 10/25/23 1327          Pain    Pretreatment Pain Rating 0/10 - no pain   -LR     Posttreatment Pain Rating 0/10 - no pain  -LR     Pain Intervention(s) Ambulation/increased activity;Repositioned  -LR       Row Name 10/25/23 1327          Plan of Care Review    Plan of Care Reviewed With patient  -LR     Progress improving  -LR     Outcome Evaluation Patient ambulated 200 feet with CGA, forward flexed posture, limited by fatigue and SOA. Occasional mild unsteadiness, but patient able to self correct. O2 sats 94% on 4 L O2/NC after ambulation. Patient currently below functional baseline, demonstrating decreased functional mobility status, decreased cardiovascular endurance, impaired balance, and decreased strength. Will address these deficits to promote return to PLOF. Recommend d/c home with assist from family.  -LR       Row Name 10/25/23 1327          Therapy Assessment/Plan (PT)    Patient/Family Therapy Goals Statement (PT) get better, go home  -LR     Rehab Potential (PT) good, to achieve stated therapy goals  -LR     Criteria for Skilled Interventions Met (PT) yes;meets criteria;skilled treatment is necessary  -LR     Therapy Frequency (PT) daily  -LR       Row Name 10/25/23 1327          Vital Signs    Pre SpO2 (%) 95  -LR     O2 Delivery Pre Treatment supplemental O2  -LR     Intra SpO2 (%) 94  -LR     O2 Delivery Intra Treatment supplemental O2  -LR     Pre Patient Position Sitting  -LR     Intra Patient Position Sitting  -LR       Row Name 10/25/23 1327          Positioning and Restraints    Pre-Treatment Position in bed  -LR     Post Treatment Position chair  -LR     In Chair notified nsg;reclined;sitting;call light within reach;encouraged to call for assist;exit alarm on;legs elevated  -LR               User Key  (r) = Recorded By, (t) = Taken By, (c) = Cosigned By      Initials Name Provider Type    LR Alissa Pierre, PT Physical Therapist                   Outcome Measures       Row Name 10/25/23 1327 10/25/23 0800       How much help from another person do you  currently need...    Turning from your back to your side while in flat bed without using bedrails? 4  -LR 4  -MS    Moving from lying on back to sitting on the side of a flat bed without bedrails? 4  -LR 4  -MS    Moving to and from a bed to a chair (including a wheelchair)? 3  -LR 4  -MS    Standing up from a chair using your arms (e.g., wheelchair, bedside chair)? 3  -LR 4  -MS    Climbing 3-5 steps with a railing? 3  -LR 3  -MS    To walk in hospital room? 3  -LR 3  -MS    AM-PAC 6 Clicks Score (PT) 20  -LR 22  -MS    Highest level of mobility 6 --> Walked 10 steps or more  -LR 7 --> Walked 25 feet or more  -MS      Row Name 10/25/23 1327 10/25/23 1034       Functional Assessment    Outcome Measure Options AM-PAC 6 Clicks Basic Mobility (PT)  -LR AM-PAC 6 Clicks Daily Activity (OT)  -AN              User Key  (r) = Recorded By, (t) = Taken By, (c) = Cosigned By      Initials Name Provider Type    Alissa Hill, PT Physical Therapist    Ruba Hoyos, RN Registered Nurse    Aruna Muñiz, OT Occupational Therapist                                 Physical Therapy Education       Title: PT OT SLP Therapies (In Progress)       Topic: Physical Therapy (In Progress)       Point: Mobility training (Done)       Learning Progress Summary             Patient Acceptance, E,D, VU,NR by LR at 10/25/2023 1327    Comment: Educated on benefits of mobility, correct sit<->stand t/f technique, correct gait mechanics, PLB, energy conservation, and progression of POC.                         Point: Home exercise program (Not Started)       Learner Progress:  Not documented in this visit.              Point: Body mechanics (Done)       Learning Progress Summary             Patient Acceptance, E,D, VU,NR by LR at 10/25/2023 1327    Comment: Educated on benefits of mobility, correct sit<->stand t/f technique, correct gait mechanics, PLB, energy conservation, and progression of POC.                          Point: Precautions (Done)       Learning Progress Summary             Patient Acceptance, E,D, VU,NR by LR at 10/25/2023 1327    Comment: Educated on benefits of mobility, correct sit<->stand t/f technique, correct gait mechanics, PLB, energy conservation, and progression of POC.                                         User Key       Initials Effective Dates Name Provider Type Discipline    LR 02/03/23 -  Alissa Pierre, PT Physical Therapist PT                  PT Recommendation and Plan  Planned Therapy Interventions (PT): balance training, bed mobility training, gait training, home exercise program, patient/family education, ROM (range of motion), stair training, strengthening, transfer training  Plan of Care Reviewed With: patient  Progress: improving  Outcome Evaluation: Patient ambulated 200 feet with CGA, forward flexed posture, limited by fatigue and SOA. Occasional mild unsteadiness, but patient able to self correct. O2 sats 94% on 4 L O2/NC after ambulation. Patient currently below functional baseline, demonstrating decreased functional mobility status, decreased cardiovascular endurance, impaired balance, and decreased strength. Will address these deficits to promote return to PLOF. Recommend d/c home with assist from family.     Time Calculation:   PT Evaluation Complexity  History, PT Evaluation Complexity: 1-2 personal factors and/or comorbidities  Examination of Body Systems (PT Eval Complexity): 1-2 elements  Clinical Presentation (PT Evaluation Complexity): stable  Clinical Decision Making (PT Evaluation Complexity): low complexity  Overall Complexity (PT Evaluation Complexity): low complexity     PT Charges       Row Name 10/25/23 1327             Time Calculation    Start Time 1327  -LR      PT Received On 10/25/23  -LR      PT Goal Re-Cert Due Date 11/04/23  -LR         Timed Charges    81884 - Gait Training Minutes  15  -LR         Untimed Charges    PT Eval/Re-eval Minutes 35  -LR          Total Minutes    Timed Charges Total Minutes 15  -LR      Untimed Charges Total Minutes 35  -LR       Total Minutes 50  -LR                User Key  (r) = Recorded By, (t) = Taken By, (c) = Cosigned By      Initials Name Provider Type    LR Alissa Pierre, PT Physical Therapist                  Therapy Charges for Today       Code Description Service Date Service Provider Modifiers Qty    [id number]  GAIT TRAINING EA 15 MIN 10/25/2023 Alissa Pierre, PT GP 1    58995002147 HC PT EVAL LOW COMPLEXITY 3 10/25/2023 Alissa Pierre, PT GP 1            PT G-Codes  Outcome Measure Options: AM-PAC 6 Clicks Basic Mobility (PT)  AM-PAC 6 Clicks Score (PT): 20  AM-PAC 6 Clicks Score (OT): 21  PT Discharge Summary  Anticipated Discharge Disposition (PT): home with assist    Alissa Pierre, PT  10/25/2023

## 2023-10-25 NOTE — THERAPY DISCHARGE NOTE
Acute Care - Occupational Therapy Discharge  [facility]    Patient Name: Michael Cochran  : 1945    MRN: [id number]                              Today's Date: 10/25/2023       Admit Date: 10/24/2023    Visit Dx:     ICD-10-CM ICD-9-CM   1. Elevated troponin  R79.89 790.6   2. Acute on chronic congestive heart failure, unspecified heart failure type  I50.9 428.0   3. Hypertensive emergency  I16.1 401.9   4. Acute respiratory failure with hypoxia  J96.01 518.81   5. Pleural effusion, left  J90 511.9   6. Hypokalemia  E87.6 276.8   7. Non-STEMI (non-ST elevated myocardial infarction)  I21.4 410.70     Patient Active Problem List   Diagnosis    HLD (hyperlipidemia)    Essential hypertension    Acute on chronic systolic congestive heart failure    Acute respiratory failure with hypoxia    Elevated troponin    Acute exacerbation of congestive heart failure     Past Medical History:   Diagnosis Date    Hyperlipidemia     Hypertension      Past Surgical History:   Procedure Laterality Date    BREAST FIBROADENOMA SURGERY      9 y/o-was benign      General Information       Row Name 10/25/23 1026          OT Time and Intention    Document Type discharge evaluation/summary  -AN     Mode of Treatment occupational therapy  -AN       Row Name 10/25/23 1026          General Information    Patient Profile Reviewed yes  -AN     Prior Level of Function independent:;all household mobility;community mobility;gait;ADL's  Pt reports being very indepenent at baseline, but recently having trouble with walking long distances due to SOA. No recent falls  -AN     Existing Precautions/Restrictions oxygen therapy device and L/min  -AN     Barriers to Rehab medically complex  -AN       Row Name 10/25/23 1026          Occupational Profile    Environmental Supports and Barriers (Occupational Profile) tub shower  -AN       Row Name 10/25/23 1026          Living Environment    People in Home child(magaly), adult;grandchild(magaly)  -AN       Row  Name 10/25/23 1026          Home Main Entrance    Number of Stairs, Main Entrance five  -AN     Stair Railings, Main Entrance none  -AN       Row Name 10/25/23 1026          Stairs Within Home, Primary    Number of Stairs, Within Home, Primary none  -AN       Row Name 10/25/23 1026          Cognition    Orientation Status (Cognition) oriented x 3  -AN       Row Name 10/25/23 1026          Safety Issues, Functional Mobility    Safety Issues Affecting Function (Mobility) safety precaution awareness;safety precautions follow-through/compliance;awareness of need for assistance;insight into deficits/self-awareness  -AN     Impairments Affecting Function (Mobility) endurance/activity tolerance;strength  -AN               User Key  (r) = Recorded By, (t) = Taken By, (c) = Cosigned By      Initials Name Provider Type    Aruna Muñiz OT Occupational Therapist                   Mobility/ADL's       Row Name 10/25/23 1029          Bed Mobility    Bed Mobility supine-sit-supine  -AN     Supine-Sit-Supine Dennis (Bed Mobility) supervision  -AN     Assistive Device (Bed Mobility) head of bed elevated  -AN       Row Name 10/25/23 1029          Transfers    Transfers sit-stand transfer;stand-sit transfer  -AN       Row Name 10/25/23 1029          Sit-Stand Transfer    Sit-Stand Dennis (Transfers) standby assist  -AN       Row Name 10/25/23 1029          Stand-Sit Transfer    Stand-Sit Dennis (Transfers) standby assist  -AN       Row Name 10/25/23 1029          Functional Mobility    Functional Mobility- Ind. Level standby assist  -AN     Functional Mobility-Distance (Feet) --  household distances  -AN     Functional Mobility- Safety Issues supplemental O2;step length decreased  -AN     Functional Mobility- Comment Pt ambulated household distances with stand by assist for safety. No LOB noted. SpO2 maintained >90% on 4L.  -AN       Row Name 10/25/23 1029          Activities of Daily Living    BADL  Assessment/Intervention lower body dressing  -AN       Row Name 10/25/23 1029          Lower Body Dressing Assessment/Training    [place] Level (Lower Body Dressing) don;doff;socks;supervision  -AN     Position (Lower Body Dressing) edge of bed sitting  -AN               User Key  (r) = Recorded By, (t) = Taken By, (c) = Cosigned By      Initials Name Provider Type    Aruna Muñiz OT Occupational Therapist                   Obj/Interventions       Row Name 10/25/23 1031          Sensory Assessment (Somatosensory)    Sensory Assessment (Somatosensory) sensation intact  -AN       Row Name 10/25/23 1031          Vision Assessment/Intervention    Visual Impairment/Limitations WFL  -AN       Row Name 10/25/23 1031          Range of Motion Comprehensive    General Range of Motion no range of motion deficits identified  -AN       Row Name 10/25/23 1031          Strength Comprehensive (MMT)    General Manual Muscle Testing (MMT) Assessment upper extremity strength deficits identified;lower extremity strength deficits identified  -AN     Comment, General Manual Muscle Testing (MMT) Assessment BUE grossly 4/5, BLE grossly 4/5  -AN       Row Name 10/25/23 1031          Balance    Balance Assessment sitting static balance;sitting dynamic balance;sit to stand dynamic balance;standing static balance;standing dynamic balance  -AN     Static Sitting Balance independent  -AN     Dynamic Sitting Balance independent  -AN     Position, Sitting Balance unsupported;sitting edge of bed  -AN     Sit to Stand Dynamic Balance standby assist  -AN     Static Standing Balance standby assist  -AN     Dynamic Standing Balance standby assist  -AN     Position/Device Used, Standing Balance unsupported  -AN     Comment, Balance no LOB  -AN               User Key  (r) = Recorded By, (t) = Taken By, (c) = Cosigned By      Initials Name Provider Type    Aruna Muñiz OT Occupational Therapist                   Goals/Plan    No  documentation.                  Clinical Impression       Row Name 10/25/23 1032          Pain Assessment    Pretreatment Pain Rating 0/10 - no pain  -AN     Posttreatment Pain Rating 0/10 - no pain  -AN     Pre/Posttreatment Pain Comment asymptomatic  -AN     Pain Intervention(s) Repositioned;Ambulation/increased activity  -AN       Row Name 10/25/23 1032          Plan of Care Review    Plan of Care Reviewed With patient  -AN     Progress no change  -AN     Outcome Evaluation Pt presents near her functional baseline with all ADLs and mobility at this time. No skilled OT services warranted. OT rec home at [place].  -AN       Row Name 10/25/23 1032          Therapy Assessment/Plan (OT)    Criteria for Skilled Therapeutic Interventions Met (OT) no problems identified which require skilled intervention  -AN     Therapy Frequency (OT) evaluation only  -AN       Row Name 10/25/23 1032          Therapy Plan Review/Discharge Plan (OT)    Anticipated Discharge Disposition (OT) home  -AN       Row Name 10/25/23 1032          Vital Signs    Pre SpO2 (%) 91  -AN     O2 Delivery Pre Treatment nasal cannula  -AN     Intra SpO2 (%) 90  -AN     O2 Delivery Intra Treatment nasal cannula  -AN     Post SpO2 (%) 92  -AN     O2 Delivery Post Treatment nasal cannula  -AN     Pre Patient Position Supine  -AN     Intra Patient Position Standing  -AN     Post Patient Position Supine  -AN       Row Name 10/25/23 1032          Positioning and Restraints    Pre-Treatment Position in bed  -AN     Post Treatment Position bed  -AN     In Bed notified nsg;supine;call light within reach;encouraged to call for assist;side rails up x2;exit alarm on  -AN               User Key  (r) = Recorded By, (t) = Taken By, (c) = Cosigned By      Initials Name Provider Type    Aruna Muñiz OT Occupational Therapist                   Outcome Measures       Row Name 10/25/23 1034          How much help from another is currently needed...    Putting on and  taking off regular lower body clothing? 3  -AN     Bathing (including washing, rinsing, and drying) 3  -AN     Toileting (which includes using toilet bed pan or urinal) 3  -AN     Putting on and taking off regular upper body clothing 4  -AN     Taking care of personal grooming (such as brushing teeth) 4  -AN     Eating meals 4  -AN     AM-PAC 6 Clicks Score (OT) 21  -AN       Row Name 10/25/23 0800          How much help from another person do you currently need...    Turning from your back to your side while in flat bed without using bedrails? 4  -MS     Moving from lying on back to sitting on the side of a flat bed without bedrails? 4  -MS     Moving to and from a bed to a chair (including a wheelchair)? 4  -MS     Standing up from a chair using your arms (e.g., wheelchair, bedside chair)? 4  -MS     Climbing 3-5 steps with a railing? 3  -MS     To walk in hospital room? 3  -MS     AM-PAC 6 Clicks Score (PT) 22  -MS     Highest level of mobility 7 --> Walked 25 feet or more  -MS       Row Name 10/25/23 1034          Functional Assessment    Outcome Measure Options AM-PAC 6 Clicks Daily Activity (OT)  -AN               User Key  (r) = Recorded By, (t) = Taken By, (c) = Cosigned By      Initials Name Provider Type    Ruba Hoyos, RN Registered Nurse    Aruna Muñiz OT Occupational Therapist                  Occupational Therapy Education       Title: PT OT SLP Therapies (In Progress)       Topic: Occupational Therapy (In Progress)       Point: ADL training (Done)       Description:   Instruct learner(s) on proper safety adaptation and remediation techniques during self care or transfers.   Instruct in proper use of assistive devices.                  Learning Progress Summary             Patient Acceptance, E, VU by DAPHNIE at 10/25/2023 1034                         Point: Home exercise program (Not Started)       Description:   Instruct learner(s) on appropriate technique for monitoring, assisting  and/or progressing therapeutic exercises/activities.                  Learner Progress:  Not documented in this visit.              Point: Precautions (Done)       Description:   Instruct learner(s) on prescribed precautions during self-care and functional transfers.                  Learning Progress Summary             Patient Acceptance, E, VU by AN at 10/25/2023 1034                         Point: Body mechanics (Done)       Description:   Instruct learner(s) on proper positioning and spine alignment during self-care, functional mobility activities and/or exercises.                  Learning Progress Summary             Patient Acceptance, E, VU by AN at 10/25/2023 1034                                         User Key       Initials Effective Dates Name Provider Type Discipline    AN 09/21/21 -  Aruna Sarah OT Occupational Therapist OT                  OT Recommendation and Plan  Therapy Frequency (OT): evaluation only  Plan of Care Review  Plan of Care Reviewed With: patient  Progress: no change  Outcome Evaluation: Pt presents near her functional baseline with all ADLs and mobility at this time. No skilled OT services warranted. OT rec home at [place].  Plan of Care Reviewed With: patient  Outcome Evaluation: Pt presents near her functional baseline with all ADLs and mobility at this time. No skilled OT services warranted. OT rec home at [place].     Time Calculation:   Evaluation Complexity (OT)  Review Occupational Profile/Medical/Therapy History Complexity: brief/low complexity  Assessment, Occupational Performance/Identification of Deficit Complexity: 1-3 performance deficits  Clinical Decision Making Complexity (OT): problem focused assessment/low complexity  Overall Complexity of Evaluation (OT): low complexity     Time Calculation- OT       Row Name 10/25/23 1034             Time Calculation- OT    OT Start Time 1010  -AN      OT Received On 10/25/23  -AN      OT Goal Re-Cert Due Date 11/04/23  -AN          Untimed Charges    OT Eval/Re-eval Minutes 46  -AN         Total Minutes    Untimed Charges Total Minutes 46  -AN       Total Minutes 46  -AN                User Key  (r) = Recorded By, (t) = Taken By, (c) = Cosigned By      Initials Name Provider Type    Aruna Muñiz OT Occupational Therapist                  Therapy Charges for Today       Code Description Service Date Service Provider Modifiers Qty    [id number]  OT EVAL LOW COMPLEXITY 4 10/25/2023 Aruna Sarah OT GO 1               OT Discharge Summary  Anticipated Discharge Disposition (OT): home  Reason for Discharge: At baseline function  Discharge Destination: Home    Aruna Sarah OT  10/25/2023

## 2023-10-26 ENCOUNTER — APPOINTMENT (OUTPATIENT)
Dept: GENERAL RADIOLOGY | Facility: HOSPITAL | Age: 78
End: 2023-10-26
Payer: MEDICARE

## 2023-10-26 PROBLEM — I21.4 NON-STEMI (NON-ST ELEVATED MYOCARDIAL INFARCTION): Status: ACTIVE | Noted: 2023-10-24

## 2023-10-26 LAB
ALBUMIN SERPL-MCNC: 3.6 G/DL (ref 3.5–5.2)
ANION GAP SERPL CALCULATED.3IONS-SCNC: 16 MMOL/L (ref 5–15)
BUN SERPL-MCNC: 34 MG/DL (ref 8–23)
BUN/CREAT SERPL: 23.4 (ref 7–25)
CALCIUM SPEC-SCNC: 9.3 MG/DL (ref 8.6–10.5)
CATH EF ESTIMATED: 45 %
CHLORIDE SERPL-SCNC: 105 MMOL/L (ref 98–107)
CO2 SERPL-SCNC: 23 MMOL/L (ref 22–29)
CREAT SERPL-MCNC: 1.45 MG/DL (ref 0.57–1)
DEPRECATED RDW RBC AUTO: 46.2 FL (ref 37–54)
EGFRCR SERPLBLD CKD-EPI 2021: 36.8 ML/MIN/1.73
ERYTHROCYTE [DISTWIDTH] IN BLOOD BY AUTOMATED COUNT: 13.7 % (ref 12.3–15.4)
GLUCOSE BLDC GLUCOMTR-MCNC: 143 MG/DL (ref 70–130)
GLUCOSE BLDC GLUCOMTR-MCNC: 169 MG/DL (ref 70–130)
GLUCOSE BLDC GLUCOMTR-MCNC: 177 MG/DL (ref 70–130)
GLUCOSE SERPL-MCNC: 177 MG/DL (ref 65–99)
HCT VFR BLD AUTO: 42.9 % (ref 34–46.6)
HGB BLD-MCNC: 13.3 G/DL (ref 12–15.9)
MAGNESIUM SERPL-MCNC: 1.8 MG/DL (ref 1.6–2.4)
MCH RBC QN AUTO: 28.7 PG (ref 26.6–33)
MCHC RBC AUTO-ENTMCNC: 31 G/DL (ref 31.5–35.7)
MCV RBC AUTO: 92.7 FL (ref 79–97)
PHOSPHATE SERPL-MCNC: 5.1 MG/DL (ref 2.5–4.5)
PLATELET # BLD AUTO: 181 10*3/MM3 (ref 140–450)
PMV BLD AUTO: 12.8 FL (ref 6–12)
POTASSIUM SERPL-SCNC: 4 MMOL/L (ref 3.5–5.2)
RBC # BLD AUTO: 4.63 10*6/MM3 (ref 3.77–5.28)
SODIUM SERPL-SCNC: 144 MMOL/L (ref 136–145)
WBC NRBC COR # BLD: 9.26 10*3/MM3 (ref 3.4–10.8)

## 2023-10-26 PROCEDURE — C1894 INTRO/SHEATH, NON-LASER: HCPCS | Performed by: INTERNAL MEDICINE

## 2023-10-26 PROCEDURE — 4A023N7 MEASUREMENT OF CARDIAC SAMPLING AND PRESSURE, LEFT HEART, PERCUTANEOUS APPROACH: ICD-10-PCS | Performed by: INTERNAL MEDICINE

## 2023-10-26 PROCEDURE — 25010000002 FENTANYL CITRATE (PF) 50 MCG/ML SOLUTION: Performed by: INTERNAL MEDICINE

## 2023-10-26 PROCEDURE — 25010000002 MIDAZOLAM PER 1 MG: Performed by: INTERNAL MEDICINE

## 2023-10-26 PROCEDURE — 99232 SBSQ HOSP IP/OBS MODERATE 35: CPT | Performed by: INTERNAL MEDICINE

## 2023-10-26 PROCEDURE — 63710000001 INSULIN LISPRO (HUMAN) PER 5 UNITS: Performed by: HOSPITALIST

## 2023-10-26 PROCEDURE — 85027 COMPLETE CBC AUTOMATED: CPT | Performed by: HOSPITALIST

## 2023-10-26 PROCEDURE — 71045 X-RAY EXAM CHEST 1 VIEW: CPT

## 2023-10-26 PROCEDURE — 93458 L HRT ARTERY/VENTRICLE ANGIO: CPT | Performed by: INTERNAL MEDICINE

## 2023-10-26 PROCEDURE — 99222 1ST HOSP IP/OBS MODERATE 55: CPT | Performed by: THORACIC SURGERY (CARDIOTHORACIC VASCULAR SURGERY)

## 2023-10-26 PROCEDURE — 25010000002 HEPARIN (PORCINE) PER 1000 UNITS: Performed by: INTERNAL MEDICINE

## 2023-10-26 PROCEDURE — 83735 ASSAY OF MAGNESIUM: CPT | Performed by: INTERNAL MEDICINE

## 2023-10-26 PROCEDURE — C1769 GUIDE WIRE: HCPCS | Performed by: INTERNAL MEDICINE

## 2023-10-26 PROCEDURE — 82948 REAGENT STRIP/BLOOD GLUCOSE: CPT

## 2023-10-26 PROCEDURE — 25510000001 IOPAMIDOL PER 1 ML: Performed by: INTERNAL MEDICINE

## 2023-10-26 PROCEDURE — 25810000003 SODIUM CHLORIDE 0.9 % SOLUTION: Performed by: INTERNAL MEDICINE

## 2023-10-26 PROCEDURE — 80069 RENAL FUNCTION PANEL: CPT | Performed by: INTERNAL MEDICINE

## 2023-10-26 PROCEDURE — B2151ZZ FLUOROSCOPY OF LEFT HEART USING LOW OSMOLAR CONTRAST: ICD-10-PCS | Performed by: INTERNAL MEDICINE

## 2023-10-26 PROCEDURE — 25010000002 ENOXAPARIN PER 10 MG: Performed by: INTERNAL MEDICINE

## 2023-10-26 PROCEDURE — B2111ZZ FLUOROSCOPY OF MULTIPLE CORONARY ARTERIES USING LOW OSMOLAR CONTRAST: ICD-10-PCS | Performed by: INTERNAL MEDICINE

## 2023-10-26 RX ORDER — MIDAZOLAM HYDROCHLORIDE 1 MG/ML
INJECTION INTRAMUSCULAR; INTRAVENOUS
Status: DISCONTINUED | OUTPATIENT
Start: 2023-10-26 | End: 2023-10-26 | Stop reason: HOSPADM

## 2023-10-26 RX ORDER — ALPRAZOLAM 0.25 MG/1
0.25 TABLET ORAL 3 TIMES DAILY PRN
Status: DISCONTINUED | OUTPATIENT
Start: 2023-10-26 | End: 2023-11-04

## 2023-10-26 RX ORDER — NICARDIPINE HCL-0.9% SOD CHLOR 1 MG/10 ML
SYRINGE (ML) INTRAVENOUS
Status: DISCONTINUED | OUTPATIENT
Start: 2023-10-26 | End: 2023-10-26 | Stop reason: HOSPADM

## 2023-10-26 RX ORDER — HEPARIN SODIUM 1000 [USP'U]/ML
INJECTION, SOLUTION INTRAVENOUS; SUBCUTANEOUS
Status: DISCONTINUED | OUTPATIENT
Start: 2023-10-26 | End: 2023-10-26 | Stop reason: HOSPADM

## 2023-10-26 RX ORDER — ACETAMINOPHEN 325 MG/1
650 TABLET ORAL EVERY 4 HOURS PRN
Status: DISCONTINUED | OUTPATIENT
Start: 2023-10-26 | End: 2023-11-01

## 2023-10-26 RX ORDER — DIPHENHYDRAMINE HYDROCHLORIDE 50 MG/ML
25 INJECTION INTRAMUSCULAR; INTRAVENOUS EVERY 6 HOURS PRN
Status: DISCONTINUED | OUTPATIENT
Start: 2023-10-26 | End: 2023-11-02

## 2023-10-26 RX ORDER — NITROGLYCERIN 0.4 MG/1
0.4 TABLET SUBLINGUAL
Status: DISCONTINUED | OUTPATIENT
Start: 2023-10-26 | End: 2023-11-01 | Stop reason: SDUPTHER

## 2023-10-26 RX ORDER — LIDOCAINE HYDROCHLORIDE 10 MG/ML
INJECTION, SOLUTION EPIDURAL; INFILTRATION; INTRACAUDAL; PERINEURAL
Status: DISCONTINUED | OUTPATIENT
Start: 2023-10-26 | End: 2023-10-26 | Stop reason: HOSPADM

## 2023-10-26 RX ORDER — FENTANYL CITRATE 50 UG/ML
INJECTION, SOLUTION INTRAMUSCULAR; INTRAVENOUS
Status: DISCONTINUED | OUTPATIENT
Start: 2023-10-26 | End: 2023-10-26 | Stop reason: HOSPADM

## 2023-10-26 RX ADMIN — INSULIN LISPRO 2 UNITS: 100 INJECTION, SOLUTION INTRAVENOUS; SUBCUTANEOUS at 09:03

## 2023-10-26 RX ADMIN — ATORVASTATIN CALCIUM 80 MG: 40 TABLET, FILM COATED ORAL at 20:23

## 2023-10-26 RX ADMIN — CARVEDILOL 12.5 MG: 12.5 TABLET, FILM COATED ORAL at 09:02

## 2023-10-26 RX ADMIN — BUMETANIDE 0.5 MG: 0.25 INJECTION, SOLUTION INTRAMUSCULAR; INTRAVENOUS at 18:30

## 2023-10-26 RX ADMIN — INSULIN LISPRO 2 UNITS: 100 INJECTION, SOLUTION INTRAVENOUS; SUBCUTANEOUS at 13:18

## 2023-10-26 RX ADMIN — Medication 10 ML: at 09:03

## 2023-10-26 RX ADMIN — CARVEDILOL 12.5 MG: 12.5 TABLET, FILM COATED ORAL at 18:30

## 2023-10-26 RX ADMIN — Medication 10 ML: at 20:24

## 2023-10-26 RX ADMIN — ENOXAPARIN SODIUM 70 MG: 80 INJECTION SUBCUTANEOUS at 18:30

## 2023-10-26 RX ADMIN — AMLODIPINE BESYLATE 5 MG: 5 TABLET ORAL at 09:02

## 2023-10-26 RX ADMIN — BUMETANIDE 0.5 MG: 0.25 INJECTION, SOLUTION INTRAMUSCULAR; INTRAVENOUS at 05:10

## 2023-10-26 NOTE — PLAN OF CARE
Goal Outcome Evaluation:  Plan of Care Reviewed With: patient        Progress: no change  Outcome Evaluation: NPO since midnight. VSS on 4L NC. A&Ox4. No complaints of pain or nausea. No other concerns at this time. Will continue with the plan of care.

## 2023-10-26 NOTE — CASE MANAGEMENT/SOCIAL WORK
Continued Stay Note   [facility]     Patient Name: Michael Cochran  MRN: [id number]  Today's Date: 10/26/2023    Admit Date: 10/24/2023    Plan: CM update   Discharge Plan       Row Name 10/26/23 1429       Plan    Plan CM update    Plan Comments Patient is not yet ready for discharge - Plans for [facility] today as well as plans to continue diuresis with Bumex. She remains on 02, currently at 4L. [facility] with [facility] is following for possible home 02 needs. Patient ambulated 200 ft with PT and goal remains to return home with family assistance. CM will follow for possible home 02 needs- corry [phone]    Final Discharge Disposition Code 01 - home or self-care                   Discharge Codes    No documentation.                 Expected Discharge Date and Time       Expected Discharge Date Expected Discharge Time    Oct 27, 2023               Corry Rose RN

## 2023-10-26 NOTE — CONSULTS
Cardiothoracic Surgery History & Physical           Chief complaint: Chest pain    HPI: Birgit Cochran is a 78-year-old female with past medical history of hypertension (although she reports that she has not been taking any home medications), hyperlipidemia who presented to the emergency department on 10/24 with complaint of shortness of breath and chest pain.  This has been apparently going on for the past 6 weeks intermittently but worsened acutely on 10/24 which prompted her visit to the emergency department.  In the emergency department, she was hypoxic requiring 4L supplemental O2.  She also reports new onset lower extremity edema on the day she came to the emergency department.  Her BNP was also elevated, chest x-ray showed concern for cardiomegaly and pleural effusion.  Cardiology was consulted.  An echo revealed wall motion abnormalities and an EF of roughly 31%.  She then underwent cardiac cath which showed LAD, circumflex, RCA, and PDA disease.  Our service was consulted for possible CABG.  She is currently chest pain-free.  However, she is on 4 L nasal cannula.  Patient denies any history of taking any blood thinners or lower extremity venous stripping.      Past Medical History:   Diagnosis Date    Hyperlipidemia     Hypertension      Past Surgical History:   Procedure Laterality Date    BREAST FIBROADENOMA SURGERY      9 y/o-was benign     History reviewed. No pertinent family history.  Social History     Tobacco Use    Smoking status: Some Days     Types: Cigarettes    Smokeless tobacco: Never    Tobacco comments:     Smokes rarely   Vaping Use    Vaping Use: Never used   Substance Use Topics    Alcohol use: Never    Drug use: Never       No medications prior to admission.     Allergies:  Dynacirc [isradipine] and Codeine    Review of Systems:  A comprehensive review of systems was negative except for:     Respiratory: Shortness of breath as described in HPI  Cardiovascular: Chest pain as described in  "HPI, lower extremity edema    All other systems were reviewed and were negative.    Vital Signs:  Temp:  [97.7 °F (36.5 °C)-98.7 °F (37.1 °C)] 98.7 °F (37.1 °C)  Heart Rate:  [50-62] 57  Resp:  [16-20] 20  BP: (111-181)/() 111/71    Physical Exam:  Physical Exam  Constitutional:       Appearance: Normal appearance.   HENT:      Head: Normocephalic and atraumatic.   Eyes:      Extraocular Movements: Extraocular movements intact.   Cardiovascular:      Rate and Rhythm: Normal rate and regular rhythm.      Pulses: Normal pulses.           Dorsalis pedis pulses are 2+ on the right side and 2+ on the left side.        Posterior tibial pulses are 2+ on the right side and 2+ on the left side.      Heart sounds: Normal heart sounds. No murmur heard.  Pulmonary:      Effort: Pulmonary effort is normal. No respiratory distress.      Breath sounds: No wheezing or rhonchi.   Abdominal:      General: There is no distension.      Palpations: Abdomen is soft.      Tenderness: There is no abdominal tenderness.   Musculoskeletal:      Right lower le+ Pitting Edema present.      Left lower le+ Pitting Edema present.   Neurological:      Mental Status: She is alert and oriented to person, place, and time.   Psychiatric:         Behavior: Behavior normal.         Labs:  Results from last 7 days   Lab Units 10/26/23  0406   WBC 10*3/mm3 9.26   HEMOGLOBIN g/dL 13.3   HEMATOCRIT % 42.9   PLATELETS 10*3/mm3 181     Results from last 7 days   Lab Units 10/26/23  0406   SODIUM mmol/L 144   POTASSIUM mmol/L 4.0   CHLORIDE mmol/L 105   CO2 mmol/L 23.0   BUN mg/dL 34*   CREATININE mg/dL 1.45*   GLUCOSE mg/dL 177*   CALCIUM mg/dL 9.3         Coagulation: No results found for: \"INR\", \"APTT\"  Cardiac markers:   Results from last 7 days   Lab Units 10/25/23  0357   HSTROP T ng/L 631*     Imaging:   Cardiac cath 10/26:  Left Main   Distal 20-30% plaque involving the origin of the LAD as well.      Left Anterior Descending   Large " vessel, has a high early large first diagonal branch. Small second diagonal branch before terminating wrapping around the LV apex. The ostial LAD has a 90% stenosis. This is part of the same plaque in the distal left main. Involves the origin of the first diagonal as well. Distal vessel is free of disease. PETROS grade II flow in this vessel.      Left Circumflex   Moderate size nondominant vessel consisting of a single branching obtuse marginal. There is diffuse 70% plaque in the mid portion of this vessel.      Right Coronary Artery   Large dominant vessel. Tortuous distal segment that has diffuse 50% plaque. There is a focal 95% stenosis just at the bifurcation into a large trifurcating posterolateral system and a moderate size right PDA. There is diffuse 70% plaque seen in the proximal PDA as well.        Echo 10/24:    Left ventricular systolic function is moderate-severely decreased. Calculated left ventricular EF = 31.4%. The apex is akinetic with severe hypokinesis of the anterior, anterolateral, and septal walls.    The left ventricular cavity is mildly dilated.    Left ventricular diastolic function is consistent with (grade II w/high LAP) pseudonormalization.    The aortic valve is structurally normal with no stenosis present. No significant aortic valve regurgitation is present.    The mitral valve is structurally normal with no significant stenosis present. Mild mitral valve regurgitation is present.    Assessment:     Acute exacerbation of congestive heart failure    HLD (hyperlipidemia)    Essential hypertension    Acute on chronic systolic congestive heart failure    Acute respiratory failure with hypoxia    Elevated troponin    Non-STEMI (non-ST elevated myocardial infarction)         Plan:   Continue preoperative work-up  Scheduling of CABG to be determined based on preoperative work-up and or schedule availability        Robyn Newton PA-C  10/26/23  17:04 EDT

## 2023-10-26 NOTE — PROGRESS NOTES
[facility] Medicine Services  PROGRESS NOTE    Patient Name: Michael Cochran  : 1945  MRN: [id number]    Date of Admission: 10/24/2023  Primary Care Physician: Bo Rodriguez PA    Subjective   Subjective     CC: Follow-up SOA    HPI: No acute events overnight, patient rested okay, feels better, anxious about procedure today      Objective   Objective     Vital Signs:   Temp:  [98.1 °F (36.7 °C)-98.7 °F (37.1 °C)] 98.2 °F (36.8 °C)  Heart Rate:  [50-62] 54  Resp:  [18] 18  BP: (131-153)/(78-91) 153/90  Flow (L/min):  [3-4] 4     Physical Exam:  Constitutional: No acute distress, awake, alert  HENT: NCAT, mucous membranes moist  Respiratory: Moderately labored respirations  Cardiovascular: RRR, no murmurs, rubs, or gallops  Gastrointestinal: Positive bowel sounds, soft, nontender, nondistended  Musculoskeletal: No bilateral ankle edema  Psychiatric: Appropriate affect, cooperative  Neurologic: Focal  Skin: No rashes      Results Reviewed:  LAB RESULTS:      Lab 10/26/23  0406 10/25/23  0357 10/24/23  1106   WBC 9.26 10.54 12.08*   HEMOGLOBIN 13.3 12.8 14.5   HEMATOCRIT 42.9 40.3 46.5   PLATELETS 181 225 227   NEUTROS ABS  --   --  10.21*   IMMATURE GRANS (ABS)  --   --  0.05   LYMPHS ABS  --   --  1.11   MONOS ABS  --   --  0.63   EOS ABS  --   --  0.04   MCV 92.7 90.0 91.5   D DIMER QUANT  --   --  0.64         Lab 10/26/23  0406 10/25/23  1312 10/25/23  0357 10/24/23  2153 10/24/23  1106   SODIUM 144  --  143  --  142   POTASSIUM 4.0 4.2 3.8 3.5 3.2*   CHLORIDE 105  --  105  --  104   CO2 23.0  --  26.0  --  22.0   ANION GAP 16.0*  --  12.0  --  16.0*   BUN 34*  --  20  --  15   CREATININE 1.45*  --  1.35*  --  1.02*   EGFR 36.8*  --  40.1*  --  56.1*   GLUCOSE 177*  --  226*  --  288*   CALCIUM 9.3  --  9.0  --  9.2   MAGNESIUM 1.8  --  1.9  --  1.9   PHOSPHORUS 5.1*  --  4.4  --   --    HEMOGLOBIN A1C  --   --  9.80*  --   --    TSH  --   --  8.790*  --   --          Lab  10/26/23  0406 10/25/23  0357 10/24/23  1106   TOTAL PROTEIN  --  6.5 7.5   ALBUMIN 3.6 3.3* 3.7   GLOBULIN  --  3.2 3.8   ALT (SGPT)  --  16 19   AST (SGOT)  --  26 25   BILIRUBIN  --  1.7* 2.1*   ALK PHOS  --  94 110         Lab 10/25/23  0357 10/24/23  1435 10/24/23  1106   PROBNP  --   --  13,276.0*   HSTROP T 631* 499* 356*         Lab 10/25/23  0357   CHOLESTEROL 255*   LDL CHOL 170*   HDL CHOL 42   TRIGLYCERIDES 230*         Lab 10/25/23  0357   IRON 57   IRON SATURATION (TSAT) 21   TIBC 276*   TRANSFERRIN 185*         Brief Urine Lab Results       None            Microbiology Results Abnormal       Procedure Component Value - Date/Time    COVID-19 and FLU A/B PCR - Swab, Nasopharynx [[id number]]  (Normal) Collected: 10/24/23 1109    Lab Status: Final result Specimen: Swab from Nasopharynx Updated: 10/24/23 1145     COVID19 Not Detected     Influenza A PCR Not Detected     Influenza B PCR Not Detected    Narrative:      Fact sheet for providers: https://www.fda.gov/media/512759/download    Fact sheet for patients: https://www.fda.gov/media/683722/download    Test performed by PCR.            XR Chest 1 View    Result Date: 10/25/2023  XR CHEST 1 VW Date of Exam: 10/24/2023 11:42 PM EDT Indication: increased o2 Comparison: Chest radiograph 10/24/2023 Findings: The heart is enlarged. There is bilateral basilar discoid atelectasis with small bilateral pleural effusions. Mild increase in the interstitial markings may indicate pulmonary edema.     Impression: Impression: Cardiomegaly with mild pulmonary edema with small bilateral pleural effusions and basilar atelectasis. Electronically Signed: Madhu Rees MD  10/25/2023 12:06 AM EDT  Workstation ID: [id number]    Adult Transthoracic Echo Complete W/ Cont if Necessary Per Protocol    Result Date: 10/24/2023    Left ventricular systolic function is moderate-severely decreased. Calculated left ventricular EF = 31.4%. The apex is akinetic with severe hypokinesis of the  anterior, anterolateral, and septal walls.   The left ventricular cavity is mildly dilated.   Left ventricular diastolic function is consistent with (grade II w/high LAP) pseudonormalization.   The aortic valve is structurally normal with no stenosis present. No significant aortic valve regurgitation is present.   The mitral valve is structurally normal with no significant stenosis present. Mild mitral valve regurgitation is present.     XR Chest 1 View    Result Date: 10/24/2023  XR CHEST 1 VW Date of Exam: 10/24/2023 11:08 AM EDT Indication: SOA triage protocol Comparison: None available. Findings: No prior exams. There is moderate cardiomegaly. There is a moderate sized right pleural effusion which obscures detail of the underlying right lung base. Suggestion of mild atelectasis of both lower lobes. Exam is somewhat limited by portable technique and obesity. Pulmonary vessels appear within normal limits for technique.     Impression: Impression: Cardiomegaly. Moderate right effusion. Mild bibasilar atelectasis. Electronically Signed: Marielos Camilo MD  10/24/2023 11:21 AM EDT  Workstation ID: [id number]     Results for orders placed during the hospital encounter of 10/24/23    Adult Transthoracic Echo Complete W/ Cont if Necessary Per Protocol    Interpretation Summary    Left ventricular systolic function is moderate-severely decreased. Calculated left ventricular EF = 31.4%. The apex is akinetic with severe hypokinesis of the anterior, anterolateral, and septal walls.    The left ventricular cavity is mildly dilated.    Left ventricular diastolic function is consistent with (grade II w/high LAP) pseudonormalization.    The aortic valve is structurally normal with no stenosis present. No significant aortic valve regurgitation is present.    The mitral valve is structurally normal with no significant stenosis present. Mild mitral valve regurgitation is present.      Current medications:  Scheduled Meds:amLODIPine,  5 mg, Oral, Daily  atorvastatin, 80 mg, Oral, Nightly  bumetanide, 0.5 mg, Intravenous, Q12H  carvedilol, 12.5 mg, Oral, BID With Meals  enoxaparin, 0.7 mg/kg, Subcutaneous, Q12H  insulin lispro, 2-7 Units, Subcutaneous, 4x Daily AC & at Bedtime  pharmacy consult - [place], , Does not apply, Daily  senna-docusate sodium, 2 tablet, Oral, BID  sodium chloride, 10 mL, Intravenous, Q12H      Continuous Infusions:nitroglycerin, 5-200 mcg/min, Last Rate: Stopped (10/24/23 1940)      PRN Meds:.  acetaminophen **OR** acetaminophen **OR** acetaminophen    senna-docusate sodium **AND** polyethylene glycol **AND** bisacodyl **AND** bisacodyl    Calcium Replacement - Follow Nurse / BPA Driven Protocol    dextrose    dextrose    glucagon (human recombinant)    hydrALAZINE    Magnesium Standard Dose Replacement - Follow Nurse / BPA Driven Protocol    nitroglycerin    ondansetron **OR** ondansetron    Phosphorus Replacement - Follow Nurse / BPA Driven Protocol    Potassium Replacement - Follow Nurse / BPA Driven Protocol    sodium chloride    sodium chloride    sodium chloride    Assessment & Plan   Assessment & Plan     Active Hospital Problems    Diagnosis  POA    **Acute exacerbation of congestive heart failure [I50.9]  Yes    HLD (hyperlipidemia) [E78.5]  Yes    Essential hypertension [I10]  Yes    Acute on chronic systolic congestive heart failure [I50.23]  Yes    Acute respiratory failure with hypoxia [J96.01]  Yes    Elevated troponin [R79.89]  Yes      Resolved Hospital Problems   No resolved problems to display.        Brief Hospital Course to date:  Michael Cochran is a 78 y.o. female with history of hypertension and hyperlipidemia who presented to the ED with SOA and chest pain, admitted with NSTEMI and new onset acute systolic failure    Acute systolic heart failure (new onset)  Acute respiratory failure with hypoxia  NSTEMI  -Cardiology following, echo shows EF of 31.4%, plan for [facility] today  -Continue diuresis with Bumex,  beta-blocker, statin, Lovenox    Hypertensive urgency   History of hypertension  -BP currently better controlled  -Continue Coreg, amlodipine    Poorly controlled type 2 diabetes A1c 9.8% (new diagnosis)  -FSBG's reviewed and currently appropriate  -Continue SSI  -Diabetes educator consulted    Hyperlipidemia    Expected Discharge Location and Transportation: home  Expected Discharge   Expected Discharge Date: 10/27/2023; Expected Discharge Time:      DVT prophylaxis:  Medical DVT prophylaxis orders are present.     AM-PAC 6 Clicks Score (PT): 20 (10/25/23 2000)    CODE STATUS:   Code Status and Medical Interventions:   Ordered at: 10/24/23 1339     Level Of Support Discussed With:    Patient     Code Status (Patient has no pulse and is not breathing):    CPR (Attempt to Resuscitate)     Medical Interventions (Patient has pulse or is breathing):    Full Support       Francesca Antonio MD  10/26/23

## 2023-10-27 ENCOUNTER — APPOINTMENT (OUTPATIENT)
Dept: CARDIOLOGY | Facility: HOSPITAL | Age: 78
End: 2023-10-27
Payer: MEDICARE

## 2023-10-27 PROBLEM — I25.110 CORONARY ARTERY DISEASE INVOLVING NATIVE CORONARY ARTERY OF NATIVE HEART WITH UNSTABLE ANGINA PECTORIS: Status: ACTIVE | Noted: 2023-10-27

## 2023-10-27 LAB
ANION GAP SERPL CALCULATED.3IONS-SCNC: 12 MMOL/L (ref 5–15)
BH CV XLRA MEAS LEFT DIST CCA EDV: 14.7 CM/SEC
BH CV XLRA MEAS LEFT DIST CCA PSV: 136 CM/SEC
BH CV XLRA MEAS LEFT DIST ICA EDV: 26.9 CM/SEC
BH CV XLRA MEAS LEFT DIST ICA PSV: 74.5 CM/SEC
BH CV XLRA MEAS LEFT ICA/CCA RATIO: 0.5
BH CV XLRA MEAS LEFT MID CCA EDV: 24.1 CM/SEC
BH CV XLRA MEAS LEFT MID CCA PSV: 164 CM/SEC
BH CV XLRA MEAS LEFT MID ICA EDV: 19.6 CM/SEC
BH CV XLRA MEAS LEFT MID ICA PSV: 76.8 CM/SEC
BH CV XLRA MEAS LEFT PROX CCA EDV: 29.6 CM/SEC
BH CV XLRA MEAS LEFT PROX CCA PSV: 135 CM/SEC
BH CV XLRA MEAS LEFT PROX ECA EDV: 18.3 CM/SEC
BH CV XLRA MEAS LEFT PROX ECA PSV: 134 CM/SEC
BH CV XLRA MEAS LEFT PROX ICA EDV: 26.7 CM/SEC
BH CV XLRA MEAS LEFT PROX ICA PSV: 90.2 CM/SEC
BH CV XLRA MEAS LEFT PROX SCLA PSV: 211 CM/SEC
BH CV XLRA MEAS LEFT VERTEBRAL A EDV: 14.9 CM/SEC
BH CV XLRA MEAS LEFT VERTEBRAL A PSV: 62.1 CM/SEC
BH CV XLRA MEAS RIGHT DIST CCA EDV: 18.4 CM/SEC
BH CV XLRA MEAS RIGHT DIST CCA PSV: 101 CM/SEC
BH CV XLRA MEAS RIGHT DIST ICA EDV: 22.4 CM/SEC
BH CV XLRA MEAS RIGHT DIST ICA PSV: 65.9 CM/SEC
BH CV XLRA MEAS RIGHT ICA/CCA RATIO: 0.9
BH CV XLRA MEAS RIGHT MID CCA EDV: 23.2 CM/SEC
BH CV XLRA MEAS RIGHT MID CCA PSV: 130 CM/SEC
BH CV XLRA MEAS RIGHT MID ICA EDV: 20.3 CM/SEC
BH CV XLRA MEAS RIGHT MID ICA PSV: 54.6 CM/SEC
BH CV XLRA MEAS RIGHT PROX CCA EDV: 16.5 CM/SEC
BH CV XLRA MEAS RIGHT PROX CCA PSV: 115 CM/SEC
BH CV XLRA MEAS RIGHT PROX ECA PSV: 212 CM/SEC
BH CV XLRA MEAS RIGHT PROX ICA EDV: 37.8 CM/SEC
BH CV XLRA MEAS RIGHT PROX ICA PSV: 113 CM/SEC
BH CV XLRA MEAS RIGHT PROX SCLA PSV: 201 CM/SEC
BH CV XLRA MEAS RIGHT VERTEBRAL A EDV: 29.5 CM/SEC
BH CV XLRA MEAS RIGHT VERTEBRAL A PSV: 82.3 CM/SEC
BUN SERPL-MCNC: 37 MG/DL (ref 8–23)
BUN/CREAT SERPL: 27.8 (ref 7–25)
CALCIUM SPEC-SCNC: 9 MG/DL (ref 8.6–10.5)
CHLORIDE SERPL-SCNC: 104 MMOL/L (ref 98–107)
CHOLEST SERPL-MCNC: 242 MG/DL (ref 0–200)
CO2 SERPL-SCNC: 27 MMOL/L (ref 22–29)
CREAT SERPL-MCNC: 1.33 MG/DL (ref 0.57–1)
DEPRECATED RDW RBC AUTO: 46.3 FL (ref 37–54)
EGFRCR SERPLBLD CKD-EPI 2021: 40.8 ML/MIN/1.73
ERYTHROCYTE [DISTWIDTH] IN BLOOD BY AUTOMATED COUNT: 13.7 % (ref 12.3–15.4)
GLUCOSE BLDC GLUCOMTR-MCNC: 170 MG/DL (ref 70–130)
GLUCOSE BLDC GLUCOMTR-MCNC: 178 MG/DL (ref 70–130)
GLUCOSE BLDC GLUCOMTR-MCNC: 279 MG/DL (ref 70–130)
GLUCOSE BLDC GLUCOMTR-MCNC: 288 MG/DL (ref 70–130)
GLUCOSE SERPL-MCNC: 162 MG/DL (ref 65–99)
HCT VFR BLD AUTO: 38.9 % (ref 34–46.6)
HDLC SERPL-MCNC: 34 MG/DL (ref 40–60)
HGB BLD-MCNC: 12.1 G/DL (ref 12–15.9)
LDLC SERPL CALC-MCNC: 151 MG/DL (ref 0–100)
LDLC/HDLC SERPL: 4.32 {RATIO}
LEFT ARM BP: NORMAL MMHG
MCH RBC QN AUTO: 28.7 PG (ref 26.6–33)
MCHC RBC AUTO-ENTMCNC: 31.1 G/DL (ref 31.5–35.7)
MCV RBC AUTO: 92.2 FL (ref 79–97)
PA ADP PRP-ACNC: 204 PRU
PLATELET # BLD AUTO: 222 10*3/MM3 (ref 140–450)
PMV BLD AUTO: 12.5 FL (ref 6–12)
POTASSIUM SERPL-SCNC: 3.4 MMOL/L (ref 3.5–5.2)
RBC # BLD AUTO: 4.22 10*6/MM3 (ref 3.77–5.28)
RIGHT ARM BP: NORMAL MMHG
SODIUM SERPL-SCNC: 143 MMOL/L (ref 136–145)
TRIGL SERPL-MCNC: 305 MG/DL (ref 0–150)
VLDLC SERPL-MCNC: 57 MG/DL (ref 5–40)
WBC NRBC COR # BLD: 9.82 10*3/MM3 (ref 3.4–10.8)

## 2023-10-27 PROCEDURE — 93880 EXTRACRANIAL BILAT STUDY: CPT

## 2023-10-27 PROCEDURE — 85027 COMPLETE CBC AUTOMATED: CPT | Performed by: INTERNAL MEDICINE

## 2023-10-27 PROCEDURE — 80061 LIPID PANEL: CPT | Performed by: INTERNAL MEDICINE

## 2023-10-27 PROCEDURE — 97530 THERAPEUTIC ACTIVITIES: CPT

## 2023-10-27 PROCEDURE — 85576 BLOOD PLATELET AGGREGATION: CPT | Performed by: STUDENT IN AN ORGANIZED HEALTH CARE EDUCATION/TRAINING PROGRAM

## 2023-10-27 PROCEDURE — 99232 SBSQ HOSP IP/OBS MODERATE 35: CPT | Performed by: INTERNAL MEDICINE

## 2023-10-27 PROCEDURE — 99232 SBSQ HOSP IP/OBS MODERATE 35: CPT

## 2023-10-27 PROCEDURE — 82948 REAGENT STRIP/BLOOD GLUCOSE: CPT

## 2023-10-27 PROCEDURE — 25010000002 ENOXAPARIN PER 10 MG: Performed by: INTERNAL MEDICINE

## 2023-10-27 PROCEDURE — 93880 EXTRACRANIAL BILAT STUDY: CPT | Performed by: INTERNAL MEDICINE

## 2023-10-27 PROCEDURE — 80048 BASIC METABOLIC PNL TOTAL CA: CPT | Performed by: INTERNAL MEDICINE

## 2023-10-27 PROCEDURE — 63710000001 INSULIN LISPRO (HUMAN) PER 5 UNITS: Performed by: INTERNAL MEDICINE

## 2023-10-27 RX ORDER — POTASSIUM CHLORIDE 20 MEQ/1
40 TABLET, EXTENDED RELEASE ORAL EVERY 4 HOURS
Status: COMPLETED | OUTPATIENT
Start: 2023-10-27 | End: 2023-10-27

## 2023-10-27 RX ADMIN — Medication 10 ML: at 20:10

## 2023-10-27 RX ADMIN — INSULIN LISPRO 4 UNITS: 100 INJECTION, SOLUTION INTRAVENOUS; SUBCUTANEOUS at 20:13

## 2023-10-27 RX ADMIN — BUMETANIDE 0.5 MG: 0.25 INJECTION, SOLUTION INTRAMUSCULAR; INTRAVENOUS at 18:22

## 2023-10-27 RX ADMIN — CARVEDILOL 12.5 MG: 12.5 TABLET, FILM COATED ORAL at 08:41

## 2023-10-27 RX ADMIN — POTASSIUM CHLORIDE 40 MEQ: 1500 TABLET, EXTENDED RELEASE ORAL at 11:35

## 2023-10-27 RX ADMIN — ENOXAPARIN SODIUM 70 MG: 80 INJECTION SUBCUTANEOUS at 06:56

## 2023-10-27 RX ADMIN — INSULIN LISPRO 2 UNITS: 100 INJECTION, SOLUTION INTRAVENOUS; SUBCUTANEOUS at 08:41

## 2023-10-27 RX ADMIN — INSULIN LISPRO 2 UNITS: 100 INJECTION, SOLUTION INTRAVENOUS; SUBCUTANEOUS at 11:36

## 2023-10-27 RX ADMIN — Medication 10 ML: at 08:44

## 2023-10-27 RX ADMIN — ATORVASTATIN CALCIUM 80 MG: 40 TABLET, FILM COATED ORAL at 20:12

## 2023-10-27 RX ADMIN — CARVEDILOL 12.5 MG: 12.5 TABLET, FILM COATED ORAL at 18:22

## 2023-10-27 RX ADMIN — POTASSIUM CHLORIDE 40 MEQ: 1500 TABLET, EXTENDED RELEASE ORAL at 06:56

## 2023-10-27 RX ADMIN — BUMETANIDE 0.5 MG: 0.25 INJECTION, SOLUTION INTRAMUSCULAR; INTRAVENOUS at 06:56

## 2023-10-27 RX ADMIN — AMLODIPINE BESYLATE 5 MG: 5 TABLET ORAL at 08:41

## 2023-10-27 RX ADMIN — ENOXAPARIN SODIUM 70 MG: 80 INJECTION SUBCUTANEOUS at 18:21

## 2023-10-27 NOTE — THERAPY TREATMENT NOTE
Patient Name: Michael Cochran  : 1945    MRN: [id number]                              Today's Date: 10/27/2023       Admit Date: 10/24/2023    Visit Dx:     ICD-10-CM ICD-9-CM   1. Elevated troponin  R79.89 790.6   2. Acute on chronic congestive heart failure, unspecified heart failure type  I50.9 428.0   3. Hypertensive emergency  I16.1 401.9   4. Acute respiratory failure with hypoxia  J96.01 518.81   5. Pleural effusion, left  J90 511.9   6. Hypokalemia  E87.6 276.8   7. Non-STEMI (non-ST elevated myocardial infarction)  I21.4 410.70   8. Coronary artery disease involving native coronary artery of native heart with unstable angina pectoris  I25.110 414.01     411.1     Patient Active Problem List   Diagnosis    HLD (hyperlipidemia)    Essential hypertension    Acute on chronic systolic congestive heart failure    Acute respiratory failure with hypoxia    Elevated troponin    Acute exacerbation of congestive heart failure    Non-STEMI (non-ST elevated myocardial infarction)    Coronary artery disease involving native coronary artery of native heart with unstable angina pectoris     Past Medical History:   Diagnosis Date    Hyperlipidemia     Hypertension      Past Surgical History:   Procedure Laterality Date    BREAST FIBROADENOMA SURGERY      9 y/o-was benign    CARDIAC CATHETERIZATION N/A 10/26/2023    Procedure: Left Heart Cath;  Surgeon: Jordi Hodge MD;  Location: [facility] CATH INVASIVE LOC[facility];  Service: Cardiovascular;  Laterality: N/A;      General Information       Row Name 10/27/23 1538          Physical Therapy Time and Intention    Document Type therapy note (daily note)  -ML     Mode of Treatment physical therapy  -ML       Row Name 10/27/23 1538          General Information    Patient Profile Reviewed yes  -ML     Existing Precautions/Restrictions oxygen therapy device and L/min;fall  -ML     Barriers to Rehab medically complex  -ML       Row Name 10/27/23 1538          Cognition     Orientation Status (Cognition) oriented x 4  -ML       Row Name 10/27/23 1538          Safety Issues, Functional Mobility    Safety Issues Affecting Function (Mobility) safety precaution awareness;insight into deficits/self-awareness  -ML     Impairments Affecting Function (Mobility) balance;endurance/activity tolerance;strength  -ML               User Key  (r) = Recorded By, (t) = Taken By, (c) = Cosigned By      Initials Name Provider Type    Alisa Joe Physical Therapist                   Mobility       Row Name 10/27/23 1538          Bed Mobility    Bed Mobility supine-sit  -ML     Supine-Sit Tyler (Bed Mobility) verbal cues;minimum assist (75% patient effort);1 person assist  -ML     Assistive Device (Bed Mobility) head of bed elevated  -ML       Row Name 10/27/23 1538          Sit-Stand Transfer    Sit-Stand Tyler (Transfers) verbal cues;standby assist  -ML     Assistive Device (Sit-Stand Transfers) other (see comments)  no AD  -ML       Row Name 10/27/23 1538          Gait/Stairs (Locomotion)    Tyler Level (Gait) verbal cues;supervision  -ML     Assistive Device (Gait) other (see comments)  no AD  -ML     Distance in Feet (Gait) 180  -ML     Deviations/Abnormal Patterns (Gait) bilateral deviations;peng decreased;gait speed decreased  -ML     Bilateral Gait Deviations forward flexed posture  -ML     Comment, (Gait/Stairs) Patient ambulates with step through gait pattern and slight forward flexed posture. Patient ambulates without rest breaks and denies dyspnea. The patient remained on 2 LPM during ambulation.  -ML               User Key  (r) = Recorded By, (t) = Taken By, (c) = Cosigned By      Initials Name Provider Type    Alisa Joe Physical Therapist                   Obj/Interventions       Row Name 10/27/23 1541          Balance    Balance Assessment sitting static balance;sitting dynamic balance;sit to stand dynamic balance;standing static balance;standing dynamic  balance  -ML     Static Sitting Balance independent  -ML     Dynamic Sitting Balance independent  -ML     Position, Sitting Balance unsupported;sitting edge of bed  -ML     Sit to Stand Dynamic Balance standby assist  -ML     Static Standing Balance standby assist  -ML     Dynamic Standing Balance supervision  -ML     Position/Device Used, Standing Balance unsupported  -ML     Balance Interventions sitting;standing;sit to stand;occupation based/functional task  -ML               User Key  (r) = Recorded By, (t) = Taken By, (c) = Cosigned By      Initials Name Provider Type    ML Alisa Salguero Physical Therapist                   Goals/Plan    No documentation.                  Clinical Impression       Row Name 10/27/23 1541          Pain    Pretreatment Pain Rating 0/10 - no pain  -ML     Posttreatment Pain Rating 0/10 - no pain  -ML       Row Name 10/27/23 1541          Plan of Care Review    Plan of Care Reviewed With patient  -ML     Progress no change  -ML     Outcome Evaluation Physical therapy treatment complete. The patient ambulated similar distance to last treatment session, without increased work of breathing and patient denies dyspnea. The patient encouraged to ambulate with nursing staff while awaiting CABG. The patient would continue to benefit from skilled PT to address strength, balance and activity tolerance deficits. Continue current PT POC.  -ML       Row Name 10/27/23 1541          Vital Signs    Pre Systolic BP Rehab 138  -ML     Pre Treatment Diastolic BP 73  -ML     Pretreatment Heart Rate (beats/min) 63  -ML     Posttreatment Heart Rate (beats/min) 61  -ML     Pre SpO2 (%) 91  -ML     O2 Delivery Pre Treatment nasal cannula  -ML     Post SpO2 (%) 92  -ML     O2 Delivery Post Treatment nasal cannula  -ML     Pre Patient Position Supine  -ML     Intra Patient Position Standing  -ML     Post Patient Position Sitting  -ML       Row Name 10/27/23 1541          Positioning and Restraints     Pre-Treatment Position in bed  -ML     Post Treatment Position chair  -ML     In Chair notified nsg;reclined;call light within reach;encouraged to call for assist;legs elevated  -ML               User Key  (r) = Recorded By, (t) = Taken By, (c) = Cosigned By      Initials Name Provider Type    Alisa Joe Physical Therapist                   Outcome Measures       Row Name 10/27/23 1543 10/27/23 1400       How much help from another person do you currently need...    Turning from your back to your side while in flat bed without using bedrails? 4  -ML 4 (P)   -LL    Moving from lying on back to sitting on the side of a flat bed without bedrails? 3  -ML 4 (P)   -LL    Moving to and from a bed to a chair (including a wheelchair)? 4  -ML 3 (P)   -LL    Standing up from a chair using your arms (e.g., wheelchair, bedside chair)? 4  -ML 4 (P)   -LL    Climbing 3-5 steps with a railing? 3  -ML 3 (P)   -LL    To walk in hospital room? 3  -ML 3 (P)   -LL    AM-PAC 6 Clicks Score (PT) 21  -ML 21 (P)   -LL    Highest level of mobility 6 --> Walked 10 steps or more  -ML 6 --> Walked 10 steps or more (P)   -LL      Row Name 10/27/23 1100 10/27/23 0800       How much help from another person do you currently need...    Turning from your back to your side while in flat bed without using bedrails? 4 (P)   -LL 4 (P)   -LL    Moving from lying on back to sitting on the side of a flat bed without bedrails? 4 (P)   -LL 4 (P)   -LL    Moving to and from a bed to a chair (including a wheelchair)? 3 (P)   -LL 3 (P)   -LL    Standing up from a chair using your arms (e.g., wheelchair, bedside chair)? 4 (P)   -LL 4 (P)   -LL    Climbing 3-5 steps with a railing? 3 (P)   -LL 3 (P)   -LL    To walk in hospital room? 3 (P)   -LL 3 (P)   -LL    AM-PAC 6 Clicks Score (PT) 21 (P)   -LL 21 (P)   -LL    Highest level of mobility 6 --> Walked 10 steps or more (P)   -LL 6 --> Walked 10 steps or more (P)   -LL      Row Name 10/27/23 1549           Functional Assessment    Outcome Measure Options AM-PAC 6 Clicks Basic Mobility (PT)  -ML               User Key  (r) = Recorded By, (t) = Taken By, (c) = Cosigned By      Initials Name Provider Type    LL Jacki Demarco, Nursing Student Nursing Student    Alisa Joe Physical Therapist                                 Physical Therapy Education       Title: PT OT SLP Therapies (In Progress)       Topic: Physical Therapy (In Progress)       Point: Mobility training (Done)       Learning Progress Summary             Patient Acceptance, E, VU,NR by  at 10/27/2023 1544    Comment: continued mobility while awaiting CABG, sternal precautions following CABG    Acceptance, E,D, VU,NR by LR at 10/25/2023 1327    Comment: Educated on benefits of mobility, correct sit<->stand t/f technique, correct gait mechanics, PLB, energy conservation, and progression of POC.                         Point: Home exercise program (Not Started)       Learner Progress:  Not documented in this visit.              Point: Body mechanics (Done)       Learning Progress Summary             Patient Acceptance, E,D, VU,NR by LR at 10/25/2023 1327    Comment: Educated on benefits of mobility, correct sit<->stand t/f technique, correct gait mechanics, PLB, energy conservation, and progression of POC.                         Point: Precautions (Done)       Learning Progress Summary             Patient Acceptance, E, VU,NR by  at 10/27/2023 1544    Comment: continued mobility while awaiting CABG, sternal precautions following CABG    Acceptance, E,D, VU,NR by LR at 10/25/2023 1327    Comment: Educated on benefits of mobility, correct sit<->stand t/f technique, correct gait mechanics, PLB, energy conservation, and progression of POC.                                         User Key       Initials Effective Dates Name Provider Type Discipline    LR 02/03/23 -  Alissa Pierre, PT Physical Therapist PT     04/22/21 -  Alisa Salguero Physical  Therapist PT                  PT Recommendation and Plan     Plan of Care Reviewed With: patient  Progress: no change  Outcome Evaluation: Physical therapy treatment complete. The patient ambulated similar distance to last treatment session, without increased work of breathing and patient denies dyspnea. The patient encouraged to ambulate with nursing staff while awaiting CABG. The patient would continue to benefit from skilled PT to address strength, balance and activity tolerance deficits. Continue current PT POC.     Time Calculation:         PT Charges       Row Name 10/27/23 1545             Time Calculation    Start Time 1455  -ML      PT Received On 10/27/23  -ML         Timed Charges    49184 - PT Therapeutic Activity Minutes 25  -ML         Total Minutes    Timed Charges Total Minutes 25  -ML       Total Minutes 25  -ML                User Key  (r) = Recorded By, (t) = Taken By, (c) = Cosigned By      Initials Name Provider Type    Alisa Joe Physical Therapist                  Therapy Charges for Today       Code Description Service Date Service Provider Modifiers Qty    [id number] HC PT THERAPEUTIC ACT EA 15 MIN 10/27/2023 Alisa Salguero GP 2            PT G-Codes  Outcome Measure Options: AM-PAC 6 Clicks Basic Mobility (PT)  AM-PAC 6 Clicks Score (PT): 21  AM-PAC 6 Clicks Score (OT): 21  PT Discharge Summary  Anticipated Discharge Disposition (PT): home with assist    Alisa Salguero  10/27/2023

## 2023-10-27 NOTE — CASE MANAGEMENT/SOCIAL WORK
Continued Stay Note  [facility]     Patient Name: Michael Cochran  MRN: [id number]  Today's Date: 10/27/2023    Admit Date: 10/24/2023    Plan: discharge plan   Discharge Plan       Row Name 10/27/23 1545       Plan    Plan discharge plan    Plan Comments I met with pt at bedside regarding discharge plan. Discharge plan, pending hospital course. Marcin with [employer] is following for possible O2 needs. Pt does want a rollator at discharge.  Per provider note, pt having a CABG on 11/4.CM will cont to follow    Final Discharge Disposition Code 01 - home or self-care                   Discharge Codes    No documentation.                 Expected Discharge Date and Time       Expected Discharge Date Expected Discharge Time    Nov 4, 2023               Clari Camacho RN

## 2023-10-27 NOTE — PROGRESS NOTES
Pt. Qualifies for Phase II Cardiac Rehab. Staff discussed benefits of exercise, program protocol, and educational material provided. Teach back verified.  Due to patients future CABG, staff did not schedule her orientation date. Staff will follow up with patient after the procedure. Patient was left with [facility] CR brochure.

## 2023-10-27 NOTE — PLAN OF CARE
Goal Outcome Evaluation:  Plan of Care Reviewed With: patient        Progress: no change  Outcome Evaluation: Physical therapy treatment complete. The patient ambulated similar distance to last treatment session, without increased work of breathing and patient denies dyspnea. The patient encouraged to ambulate with nursing staff while awaiting CABG. The patient would continue to benefit from skilled PT to address strength, balance and activity tolerance deficits. Continue current PT POC.      Anticipated Discharge Disposition (PT): home with assist

## 2023-10-27 NOTE — PROGRESS NOTES
[facility] Cardiology  Progress       Reason for visit:    NSTEMI      Active Hospital Problems    Diagnosis  POA    **Acute exacerbation of congestive heart failure [I50.9]  Yes    HLD (hyperlipidemia) [E78.5]  Yes    Essential hypertension [I10]  Yes    Acute on chronic systolic congestive heart failure [I50.23]  Yes    Acute respiratory failure with hypoxia [J96.01]  Yes    Elevated troponin [R79.89]  Yes    Non-STEMI (non-ST elevated myocardial infarction) [I21.4]  Unknown   Problem List:  Moderate (type II) obesity (BMI 37.9)  Chronic hypertension-probably essential with hypertensive urgency, October 2023  Acute congestive heart failure of uncertain etiology with elevated initial troponin/abnormal electrocardiogram-possible ischemic heart disease, October 2023  4.  Intermittent tobacco use (approximately 1 pack/week)  5.  Severe dyslipidemia without current treatment  (LDL cholesterol 216 mg/DL June 2021)  6.  Remote breast fibroadenoma lumpectomy-data deficit  7.  Elevated serum glucose-probable type 2 diabetes mellitus, October 2023  8.  Noncompliance with no medication x 6 months, 2023            Interval history:   Patient laying in bed.  Reports she is tired but feeling well.  Denies any chest pain or shortness of breath.  She is frustrated because she thought that she was going home today.           Vital Sign Min/Max for last 24 hours  Temp  Min: 96.4 °F (35.8 °C)  Max: 98.9 °F (37.2 °C)   BP  Min: 111/71  Max: 196/87   Pulse  Min: 45  Max: 65   Resp  Min: 16  Max: 20   SpO2  Min: 90 %  Max: 95 %   Flow (L/min)  Min: 4  Max: 5      Intake/Output Summary (Last 24 hours) at 10/27/2023 0907  Last data filed at 10/27/2023 0835  Gross per 24 hour   Intake 223 ml   Output --   Net 223 ml           Physical Exam  Vitals and nursing note reviewed.   Constitutional:       General: She is awake.   Cardiovascular:      Rate and Rhythm: Bradycardia present.      Pulses: Normal pulses.       Heart sounds:      S3 sounds present.   Pulmonary:      Effort: Pulmonary effort is normal.      Breath sounds: Normal breath sounds.   Musculoskeletal:      Right lower le+ Pitting Edema present.      Left lower le+ Pitting Edema present.   Skin:     General: Skin is warm and dry.      Capillary Refill: Capillary refill takes less than 2 seconds.   Neurological:      Mental Status: She is alert.         Tele: Sinus bradycardia    Results Review (reviewed the patient's recent labs in the electronic medical record):     Left heart catheterization (10/26/2023):    Ostial 95% LAD stenosis involve the origin of a large first diagonal as well as some retrograde mild to moderate plaque in the left main.    Bifurcation right coronary artery 95% stenosis.    70% LCx stenosis    LVEF 40-45% with anterior apical hypokinesis       Results from last 7 days   Lab Units 10/27/23  0429 10/26/23  0406 10/25/23  1312 10/25/23  0357 10/24/23  2153 10/24/23  1106   SODIUM mmol/L 143 144  --  143  --  142   POTASSIUM mmol/L 3.4* 4.0 4.2 3.8   < > 3.2*   CHLORIDE mmol/L 104 105  --  105  --  104   BUN mg/dL 37* 34*  --  20  --  15   CREATININE mg/dL 1.33* 1.45*  --  1.35*  --  1.02*   MAGNESIUM mg/dL  --  1.8  --  1.9  --  1.9    < > = values in this interval not displayed.     Results from last 7 days   Lab Units 10/25/23  0357 10/24/23  1435 10/24/23  1106   HSTROP T ng/L 631* 499* 356*     Results from last 7 days   Lab Units 10/27/23  0433 10/26/23  0406 10/25/23  0357   WBC 10*3/mm3 9.82 9.26 10.54   HEMOGLOBIN g/dL 12.1 13.3 12.8   HEMATOCRIT % 38.9 42.9 40.3   PLATELETS 10*3/mm3 222 181 225       Lab Results   Component Value Date    HGBA1C 9.80 (H) 10/25/2023       Lab Results   Component Value Date    CHOL 242 (H) 10/27/2023    TRIG 305 (H) 10/27/2023    HDL 34 (L) 10/27/2023     (H) 10/27/2023              NSTEMI  Marked RWMA's on echocardiogram.  [facility] 10/26/2023: Multivessel disease in the LAD, circumflex,  RCA, and PDA.   CT surgery consulted for consideration of possible CABG.  Patient currently undergoing preoperative work-up.  Essential hypertension/hypertensive urgency  Overall, better controlled on Coreg and amlodipine  Acute on chronic HFrEF  Echocardiogram 10/24/2023: LVEF 31% with marked RWMA's.  Continue Bumex.  Uncontrolled type 2 diabetes mellitus  Hemoglobin A1c 9.8  Hyperlipidemia,   Obesity  Risk for GELY  ANURADHA, improving             CT surgery evaluating for CABG.  Patient is currently undergoing preoperative work-up.  Continue current cardiac medications.  Consider addition of SGLT2 inhibitor once renal function stabilizes.  Continue full dose Lovenox.  Potassium replacement per protocol  We will continue to follow.      Electronically signed by FRACISCO John, 10/27/23, 9:07 AM EDT.    FRACISCO John

## 2023-10-27 NOTE — PROGRESS NOTES
[facility] Medicine Services  PROGRESS NOTE    Patient Name: Michael Cochran  : 1945  MRN: [id number]    Date of Admission: 10/24/2023  Primary Care Physician: Bo Rodriguez PA    Subjective   Subjective     CC: Follow-up SOA    HPI:   Resting in bed in no acute distress and does not have any specific complaints.  No fever or chills.  No chest pain or palpitation or shortness of breath.  No nausea vomiting or diarrhea or abdominal pain.      Objective   Objective     Vital Signs:   Temp:  [96.4 °F (35.8 °C)-98.9 °F (37.2 °C)] 98.6 °F (37 °C)  Heart Rate:  [45-65] 47  Resp:  [18-20] 18  BP: (111-196)/(61-98) 138/73  Flow (L/min):  [2-5] (P) 2     Physical Exam:  Constitutional: No acute distress, awake, alert  HENT: NCAT, mucous membranes moist  Respiratory: Clear to auscultation bilaterally, breathing with no labor  Cardiovascular: RRR, no murmurs, rubs, or gallops  Gastrointestinal: Abdomen is obese.  Positive bowel sounds, soft, nontender, nondistended  Musculoskeletal: bilateral ankle edema, severe obesity  Psychiatric: Appropriate affect, cooperative  Neurologic: Awake, alert, oriented x3, no focality appreciated  Skin: No rashes      Results Reviewed:  LAB RESULTS:      Lab 10/27/23  0433 10/26/23  0406 10/25/23  0357 10/24/23  1106   WBC 9.82 9.26 10.54 12.08*   HEMOGLOBIN 12.1 13.3 12.8 14.5   HEMATOCRIT 38.9 42.9 40.3 46.5   PLATELETS 222 181 225 227   NEUTROS ABS  --   --   --  10.21*   IMMATURE GRANS (ABS)  --   --   --  0.05   LYMPHS ABS  --   --   --  1.11   MONOS ABS  --   --   --  0.63   EOS ABS  --   --   --  0.04   MCV 92.2 92.7 90.0 91.5   D DIMER QUANT  --   --   --  0.64         Lab 10/27/23  0429 10/26/23  0406 10/25/23  1312 10/25/23  0357 10/24/23  2153 10/24/23  1106   SODIUM 143 144  --  143  --  142   POTASSIUM 3.4* 4.0 4.2 3.8 3.5 3.2*   CHLORIDE 104 105  --  105  --  104   CO2 27.0 23.0  --  26.0  --  22.0   ANION GAP 12.0 16.0*  --  12.0  --   16.0*   BUN 37* 34*  --  20  --  15   CREATININE 1.33* 1.45*  --  1.35*  --  1.02*   EGFR 40.8* 36.8*  --  40.1*  --  56.1*   GLUCOSE 162* 177*  --  226*  --  288*   CALCIUM 9.0 9.3  --  9.0  --  9.2   MAGNESIUM  --  1.8  --  1.9  --  1.9   PHOSPHORUS  --  5.1*  --  4.4  --   --    HEMOGLOBIN A1C  --   --   --  9.80*  --   --    TSH  --   --   --  8.790*  --   --          Lab 10/26/23  0406 10/25/23  0357 10/24/23  1106   TOTAL PROTEIN  --  6.5 7.5   ALBUMIN 3.6 3.3* 3.7   GLOBULIN  --  3.2 3.8   ALT (SGPT)  --  16 19   AST (SGOT)  --  26 25   BILIRUBIN  --  1.7* 2.1*   ALK PHOS  --  94 110         Lab 10/25/23  0357 10/24/23  1435 10/24/23  1106   PROBNP  --   --  13,276.0*   HSTROP T 631* 499* 356*         Lab 10/27/23  0429 10/25/23  0357   CHOLESTEROL 242* 255*   LDL CHOL 151* 170*   HDL CHOL 34* 42   TRIGLYCERIDES 305* 230*         Lab 10/25/23  0357   IRON 57   IRON SATURATION (TSAT) 21   TIBC 276*   TRANSFERRIN 185*         Brief Urine Lab Results       None            Microbiology Results Abnormal       Procedure Component Value - Date/Time    COVID-19 and FLU A/B PCR - Swab, Nasopharynx [[id number]]  (Normal) Collected: 10/24/23 1109    Lab Status: Final result Specimen: Swab from Nasopharynx Updated: 10/24/23 1145     COVID19 Not Detected     Influenza A PCR Not Detected     Influenza B PCR Not Detected    Narrative:      Fact sheet for providers: https://www.fda.gov/media/313126/download    Fact sheet for patients: https://www.fda.gov/media/704122/download    Test performed by PCR.            Duplex Carotid Ultrasound CAR    Result Date: 10/27/2023    Right internal carotid artery demonstrates a less than 50% stenosis.   Left internal carotid artery demonstrates a less than 50% stenosis.     Cardiac Catheterization/Vascular Study    Result Date: 10/26/2023    Ostial 95% LAD stenosis involve the origin of a large first diagonal as well as some retrograde mild to moderate plaque in the left main.    Bifurcation right coronary artery 95% stenosis.   70% LCx stenosis   LVEF 40-45% with anterior apical hypokinesis     XR Chest 1 View    Result Date: 10/26/2023  XR CHEST 1 VW Date of Exam: 10/26/2023 2:35 AM EDT Indication: DYSPNEA, ON EXERTION dyspnea Comparison: 10/24/2023. Findings: Exam is limited by portable technique and obesity. There is stable moderate cardiomegaly. Suggestion of small effusions with mild bibasilar atelectasis. No definite congestive failure identified.     Impression: Impression: Limited study. Findings suggest mild bibasilar atelectasis with possible small effusions. Electronically Signed: Marielos Camilo MD  10/26/2023 7:49 AM EDT  Workstation ID: [id number]     Results for orders placed during the hospital encounter of 10/24/23    Adult Transthoracic Echo Complete W/ Cont if Necessary Per Protocol    Interpretation Summary    Left ventricular systolic function is moderate-severely decreased. Calculated left ventricular EF = 31.4%. The apex is akinetic with severe hypokinesis of the anterior, anterolateral, and septal walls.    The left ventricular cavity is mildly dilated.    Left ventricular diastolic function is consistent with (grade II w/high LAP) pseudonormalization.    The aortic valve is structurally normal with no stenosis present. No significant aortic valve regurgitation is present.    The mitral valve is structurally normal with no significant stenosis present. Mild mitral valve regurgitation is present.      Current medications:  Scheduled Meds:amLODIPine, 5 mg, Oral, Daily  atorvastatin, 80 mg, Oral, Nightly  bumetanide, 0.5 mg, Intravenous, Q12H  carvedilol, 12.5 mg, Oral, BID With Meals  enoxaparin, 0.7 mg/kg, Subcutaneous, Q12H  insulin lispro, 2-7 Units, Subcutaneous, 4x Daily AC & at Bedtime  pharmacy consult - MTM, , Does not apply, Daily  senna-docusate sodium, 2 tablet, Oral, BID  sodium chloride, 10 mL, Intravenous, Q12H      Continuous Infusions:nitroglycerin, 5-200  mcg/min, Last Rate: Stopped (10/24/23 1940)      PRN Meds:.  [DISCONTINUED] acetaminophen **OR** acetaminophen **OR** acetaminophen    acetaminophen    ALPRAZolam    senna-docusate sodium **AND** polyethylene glycol **AND** bisacodyl **AND** bisacodyl    Calcium Replacement - Follow Nurse / BPA Driven Protocol    dextrose    dextrose    diphenhydrAMINE    glucagon (human recombinant)    hydrALAZINE    Magnesium Standard Dose Replacement - Follow Nurse / BPA Driven Protocol    nitroglycerin    ondansetron **OR** ondansetron    Phosphorus Replacement - Follow Nurse / BPA Driven Protocol    Potassium Replacement - Follow Nurse / BPA Driven Protocol    sodium chloride    sodium chloride    sodium chloride    Assessment & Plan   Assessment & Plan     Active Hospital Problems    Diagnosis  POA    **Acute exacerbation of congestive heart failure [I50.9]  Yes    Coronary artery disease involving native coronary artery of native heart with unstable angina pectoris [I25.110]  Unknown    HLD (hyperlipidemia) [E78.5]  Yes    Essential hypertension [I10]  Yes    Acute on chronic systolic congestive heart failure [I50.23]  Yes    Acute respiratory failure with hypoxia [J96.01]  Yes    Elevated troponin [R79.89]  Yes    Non-STEMI (non-ST elevated myocardial infarction) [I21.4]  Unknown      Resolved Hospital Problems   No resolved problems to display.        Brief Hospital Course to date:  Michael Cochran is a 78 y.o. female with history of hypertension and hyperlipidemia who presented to the ED with SOA and chest pain, admitted with NSTEMI and new onset acute systolic failure    Acute systolic heart failure (new onset)  Acute respiratory failure with hypoxia  NSTEMI  -Cardiology following, echo shows EF of 31.4%  -Cardiology did cardiac catheterization yesterday which showed multivessel disease in LAD, circumflex and RCA and PDA.  Cardiology recommended CABG and cardiothoracic surgery has seen and evaluated patient.  Tentatively  surgery is for next week on Wednesday.    Hypertensive urgency   History of hypertension  -BP currently better controlled  -Continue Coreg, amlodipine    Poorly controlled type 2 diabetes A1c 9.8% (new diagnosis)  -FSBG's reviewed and currently appropriate  -Continue SSI  -Diabetes educator consulted    Hyperlipidemia    Expected Discharge Location and Transportation: home  Expected Discharge   Expected Discharge Date: 11/4/2023; Expected Discharge Time:      DVT prophylaxis:  Medical DVT prophylaxis orders are present.     AM-PAC 6 Clicks Score (PT): (P) 21 (10/27/23 1400)    CODE STATUS:   Code Status and Medical Interventions:   Ordered at: 10/24/23 1339     Level Of Support Discussed With:    Patient     Code Status (Patient has no pulse and is not breathing):    CPR (Attempt to Resuscitate)     Medical Interventions (Patient has pulse or is breathing):    Full Support       Madhav Martinez MD  10/27/23

## 2023-10-28 ENCOUNTER — APPOINTMENT (OUTPATIENT)
Dept: PULMONOLOGY | Facility: HOSPITAL | Age: 78
End: 2023-10-28
Payer: MEDICARE

## 2023-10-28 LAB
ANION GAP SERPL CALCULATED.3IONS-SCNC: 12 MMOL/L (ref 5–15)
BUN SERPL-MCNC: 37 MG/DL (ref 8–23)
BUN/CREAT SERPL: 31.1 (ref 7–25)
CALCIUM SPEC-SCNC: 9.2 MG/DL (ref 8.6–10.5)
CHLORIDE SERPL-SCNC: 105 MMOL/L (ref 98–107)
CO2 SERPL-SCNC: 25 MMOL/L (ref 22–29)
CREAT SERPL-MCNC: 1.19 MG/DL (ref 0.57–1)
EGFRCR SERPLBLD CKD-EPI 2021: 46.6 ML/MIN/1.73
GLUCOSE BLDC GLUCOMTR-MCNC: 143 MG/DL (ref 70–130)
GLUCOSE BLDC GLUCOMTR-MCNC: 158 MG/DL (ref 70–130)
GLUCOSE BLDC GLUCOMTR-MCNC: 170 MG/DL (ref 70–130)
GLUCOSE BLDC GLUCOMTR-MCNC: 97 MG/DL (ref 70–130)
GLUCOSE SERPL-MCNC: 164 MG/DL (ref 65–99)
MAGNESIUM SERPL-MCNC: 1.7 MG/DL (ref 1.6–2.4)
PHOSPHATE SERPL-MCNC: 3.8 MG/DL (ref 2.5–4.5)
POTASSIUM SERPL-SCNC: 4 MMOL/L (ref 3.5–5.2)
SODIUM SERPL-SCNC: 142 MMOL/L (ref 136–145)

## 2023-10-28 PROCEDURE — 94729 DIFFUSING CAPACITY: CPT

## 2023-10-28 PROCEDURE — 25010000002 ENOXAPARIN PER 10 MG: Performed by: INTERNAL MEDICINE

## 2023-10-28 PROCEDURE — 94010 BREATHING CAPACITY TEST: CPT

## 2023-10-28 PROCEDURE — 82948 REAGENT STRIP/BLOOD GLUCOSE: CPT

## 2023-10-28 PROCEDURE — 99231 SBSQ HOSP IP/OBS SF/LOW 25: CPT | Performed by: PHYSICIAN ASSISTANT

## 2023-10-28 PROCEDURE — 80048 BASIC METABOLIC PNL TOTAL CA: CPT | Performed by: INTERNAL MEDICINE

## 2023-10-28 PROCEDURE — 63710000001 INSULIN LISPRO (HUMAN) PER 5 UNITS: Performed by: INTERNAL MEDICINE

## 2023-10-28 PROCEDURE — 84100 ASSAY OF PHOSPHORUS: CPT | Performed by: INTERNAL MEDICINE

## 2023-10-28 PROCEDURE — 94729 DIFFUSING CAPACITY: CPT | Performed by: INTERNAL MEDICINE

## 2023-10-28 PROCEDURE — 99232 SBSQ HOSP IP/OBS MODERATE 35: CPT | Performed by: INTERNAL MEDICINE

## 2023-10-28 PROCEDURE — 83735 ASSAY OF MAGNESIUM: CPT | Performed by: INTERNAL MEDICINE

## 2023-10-28 PROCEDURE — 94010 BREATHING CAPACITY TEST: CPT | Performed by: INTERNAL MEDICINE

## 2023-10-28 RX ORDER — CARVEDILOL 6.25 MG/1
6.25 TABLET ORAL 2 TIMES DAILY WITH MEALS
Status: DISCONTINUED | OUTPATIENT
Start: 2023-10-28 | End: 2023-11-01

## 2023-10-28 RX ORDER — GLIPIZIDE 5 MG/1
2.5 TABLET ORAL
Status: DISCONTINUED | OUTPATIENT
Start: 2023-10-28 | End: 2023-11-01

## 2023-10-28 RX ADMIN — GLIPIZIDE 2.5 MG: 5 TABLET ORAL at 08:34

## 2023-10-28 RX ADMIN — Medication 10 ML: at 20:00

## 2023-10-28 RX ADMIN — INSULIN LISPRO 2 UNITS: 100 INJECTION, SOLUTION INTRAVENOUS; SUBCUTANEOUS at 20:00

## 2023-10-28 RX ADMIN — GLIPIZIDE 2.5 MG: 5 TABLET ORAL at 17:01

## 2023-10-28 RX ADMIN — EMPAGLIFLOZIN 10 MG: 10 TABLET, FILM COATED ORAL at 08:35

## 2023-10-28 RX ADMIN — BUMETANIDE 0.5 MG: 0.25 INJECTION, SOLUTION INTRAMUSCULAR; INTRAVENOUS at 17:01

## 2023-10-28 RX ADMIN — BUMETANIDE 0.5 MG: 0.25 INJECTION, SOLUTION INTRAMUSCULAR; INTRAVENOUS at 05:36

## 2023-10-28 RX ADMIN — ENOXAPARIN SODIUM 70 MG: 80 INJECTION SUBCUTANEOUS at 17:02

## 2023-10-28 RX ADMIN — Medication 10 ML: at 08:36

## 2023-10-28 RX ADMIN — AMLODIPINE BESYLATE 5 MG: 5 TABLET ORAL at 08:34

## 2023-10-28 RX ADMIN — ATORVASTATIN CALCIUM 80 MG: 40 TABLET, FILM COATED ORAL at 20:00

## 2023-10-28 RX ADMIN — ENOXAPARIN SODIUM 70 MG: 80 INJECTION SUBCUTANEOUS at 05:35

## 2023-10-28 RX ADMIN — INSULIN LISPRO 2 UNITS: 100 INJECTION, SOLUTION INTRAVENOUS; SUBCUTANEOUS at 11:32

## 2023-10-28 RX ADMIN — CARVEDILOL 6.25 MG: 6.25 TABLET, FILM COATED ORAL at 17:02

## 2023-10-28 NOTE — CONSULTS
Reviewed chart for diabetes education consult.  Ms. Cochran has a new diagnosis of diabetes this hospital admission.  She states that she was short of breath, so she came into hospital and now she has to have heart surgery.  I was contact on-call this morning to come to teach Ms. Cochran diabetes basics and how to check her blood sugar because she was going home and then planned to come back for her surgery next Wednesday.  When I arrived, they had decided not to discharge today.  She was very upset about not getting to go home, but she agreed to talk with me for a bit.  I reviewed the What is Diabetes handout.  Reviewed physiology of diabetes briefly and the difference between type 1 and type 2 diabetes.  Reviewed hemoglobin A1c and how it used to diagnose diabetes.  We reviewed the American Diabetes Association goal for A1c of less than 7%.  We reviewed importance of monitoring blood sugar.  She was prescribed an Acu-Check Guide ME meter and it was delivered to her room.  We discussed importance of checking blood sugar at least 2 times a day fasting, before breakfast, and 2 hours after the largest meal of the day.  Recommended ranges for blood sugar of  before meals and  2 hours after meals were given to her.  Demonstrated meter use to her, and she returned demonstration.  She checked her blood sugar and it was 118 at about 12:15 pm.  She was getting ready to eat her lunch.  Explained that the reading was good.  Reviewed low blood sugar less than 70 and how to treat with rule of 15 .  Reviewed high blood sugar symptoms to watch for and importance of drinking plenty of water and getting regular exercise to help lower blood sugar.  She was given ADA Where Do I Begin handout, Nutrition Basics by [facility], and our A1c handout.  Reviewed importance of good nutrition and plate method for meal management.  Explained consistent carbohydrate.  She states she eats 3 meals daily usually, and she  states that she drinks about 28 oz of water a day on average.  She states she typically drinks tea unsweetened or lemonade, minute maid with meals or water.  Encouraged her to watch some of the educational videos on the TV in her room and encouraged her that we would follow up with her throughout her stay to be sure she did not have any other questions.  Thank you for this referral.

## 2023-10-28 NOTE — PROGRESS NOTES
[facility] Medicine Services  PROGRESS NOTE    Patient Name: Michael Cochran  : 1945  MRN: [id number]    Date of Admission: 10/24/2023  Primary Care Physician: Bo Rodriguez PA    Subjective   Subjective     CC: Follow-up SOA    HPI:   Resting in bed in no acute distress and does not have any specific complaints.  No fever or chills.  No chest pain or palpitation or shortness of breath.  No nausea vomiting or diarrhea or abdominal pain.  Patient is frustrated that she is not being discharged.      Objective   Objective     Vital Signs:   Temp:  [97.6 °F (36.4 °C)-98.4 °F (36.9 °C)] 98.4 °F (36.9 °C)  Heart Rate:  [57-64] 64  Resp:  [18] 18  BP: (141-186)/(71-92) 161/85  Flow (L/min):  [0-2] 2     Physical Exam:  Constitutional: No acute distress, awake, alert  HENT: NCAT, mucous membranes moist  Respiratory: Clear to auscultation bilaterally, breathing with no labor  Cardiovascular: RRR, no murmurs, rubs, or gallops  Gastrointestinal: Abdomen is obese.  Positive bowel sounds, soft, nontender, nondistended  Musculoskeletal: bilateral ankle edema, severe obesity  Psychiatric: Appropriate affect, cooperative  Neurologic: Awake, alert, oriented x3, no focality appreciated  Skin: No rashes      Results Reviewed:  LAB RESULTS:      Lab 10/27/23  0433 10/26/23  0406 10/25/23  0357 10/24/23  1106   WBC 9.82 9.26 10.54 12.08*   HEMOGLOBIN 12.1 13.3 12.8 14.5   HEMATOCRIT 38.9 42.9 40.3 46.5   PLATELETS 222 181 225 227   NEUTROS ABS  --   --   --  10.21*   IMMATURE GRANS (ABS)  --   --   --  0.05   LYMPHS ABS  --   --   --  1.11   MONOS ABS  --   --   --  0.63   EOS ABS  --   --   --  0.04   MCV 92.2 92.7 90.0 91.5   D DIMER QUANT  --   --   --  0.64         Lab 10/28/23  0816 10/27/23  0429 10/26/23  0406 10/25/23  1312 10/25/23  0357 10/24/23  2153 10/24/23  1106   SODIUM 142 143 144  --  143  --  142   POTASSIUM 4.0 3.4* 4.0 4.2 3.8   < > 3.2*   CHLORIDE 105 104 105  --  105  --   104   CO2 25.0 27.0 23.0  --  26.0  --  22.0   ANION GAP 12.0 12.0 16.0*  --  12.0  --  16.0*   BUN 37* 37* 34*  --  20  --  15   CREATININE 1.19* 1.33* 1.45*  --  1.35*  --  1.02*   EGFR 46.6* 40.8* 36.8*  --  40.1*  --  56.1*   GLUCOSE 164* 162* 177*  --  226*  --  288*   CALCIUM 9.2 9.0 9.3  --  9.0  --  9.2   MAGNESIUM 1.7  --  1.8  --  1.9  --  1.9   PHOSPHORUS 3.8  --  5.1*  --  4.4  --   --    HEMOGLOBIN A1C  --   --   --   --  9.80*  --   --    TSH  --   --   --   --  8.790*  --   --     < > = values in this interval not displayed.         Lab 10/26/23  0406 10/25/23  0357 10/24/23  1106   TOTAL PROTEIN  --  6.5 7.5   ALBUMIN 3.6 3.3* 3.7   GLOBULIN  --  3.2 3.8   ALT (SGPT)  --  16 19   AST (SGOT)  --  26 25   BILIRUBIN  --  1.7* 2.1*   ALK PHOS  --  94 110         Lab 10/25/23  0357 10/24/23  1435 10/24/23  1106   PROBNP  --   --  13,276.0*   HSTROP T 631* 499* 356*         Lab 10/27/23  0429 10/25/23  0357   CHOLESTEROL 242* 255*   LDL CHOL 151* 170*   HDL CHOL 34* 42   TRIGLYCERIDES 305* 230*         Lab 10/25/23  0357   IRON 57   IRON SATURATION (TSAT) 21   TIBC 276*   TRANSFERRIN 185*         Brief Urine Lab Results       None            Microbiology Results Abnormal       Procedure Component Value - Date/Time    COVID-19 and FLU A/B PCR - Swab, Nasopharynx [[id number]]  (Normal) Collected: 10/24/23 1109    Lab Status: Final result Specimen: Swab from Nasopharynx Updated: 10/24/23 1145     COVID19 Not Detected     Influenza A PCR Not Detected     Influenza B PCR Not Detected    Narrative:      Fact sheet for providers: https://www.fda.gov/media/280665/download    Fact sheet for patients: https://www.fda.gov/media/004044/download    Test performed by PCR.            Duplex Carotid Ultrasound CAR    Result Date: 10/27/2023    Right internal carotid artery demonstrates a less than 50% stenosis.   Left internal carotid artery demonstrates a less than 50% stenosis.     Cardiac Catheterization/Vascular  Study    Result Date: 10/26/2023    Ostial 95% LAD stenosis involve the origin of a large first diagonal as well as some retrograde mild to moderate plaque in the left main.   Bifurcation right coronary artery 95% stenosis.   70% LCx stenosis   LVEF 40-45% with anterior apical hypokinesis      Results for orders placed during the hospital encounter of 10/24/23    Adult Transthoracic Echo Complete W/ Cont if Necessary Per Protocol    Interpretation Summary    Left ventricular systolic function is moderate-severely decreased. Calculated left ventricular EF = 31.4%. The apex is akinetic with severe hypokinesis of the anterior, anterolateral, and septal walls.    The left ventricular cavity is mildly dilated.    Left ventricular diastolic function is consistent with (grade II w/high LAP) pseudonormalization.    The aortic valve is structurally normal with no stenosis present. No significant aortic valve regurgitation is present.    The mitral valve is structurally normal with no significant stenosis present. Mild mitral valve regurgitation is present.      Current medications:  Scheduled Meds:amLODIPine, 5 mg, Oral, Daily  atorvastatin, 80 mg, Oral, Nightly  bumetanide, 0.5 mg, Intravenous, Q12H  carvedilol, 6.25 mg, Oral, BID With Meals  empagliflozin, 10 mg, Oral, Daily  enoxaparin, 0.7 mg/kg, Subcutaneous, Q12H  glipizide, 2.5 mg, Oral, BID AC  insulin lispro, 2-7 Units, Subcutaneous, 4x Daily AC & at Bedtime  pharmacy consult - [place], , Does not apply, Daily  senna-docusate sodium, 2 tablet, Oral, BID  sodium chloride, 10 mL, Intravenous, Q12H      Continuous Infusions:     PRN Meds:.  [DISCONTINUED] acetaminophen **OR** acetaminophen **OR** acetaminophen    acetaminophen    ALPRAZolam    senna-docusate sodium **AND** polyethylene glycol **AND** bisacodyl **AND** bisacodyl    Calcium Replacement - Follow Nurse / BPA Driven Protocol    dextrose    dextrose    diphenhydrAMINE    glucagon (human recombinant)     hydrALAZINE    Magnesium Standard Dose Replacement - Follow Nurse / BPA Driven Protocol    nitroglycerin    ondansetron **OR** ondansetron    Phosphorus Replacement - Follow Nurse / BPA Driven Protocol    Potassium Replacement - Follow Nurse / BPA Driven Protocol    sodium chloride    sodium chloride    sodium chloride    Assessment & Plan   Assessment & Plan     Active Hospital Problems    Diagnosis  POA    **Acute exacerbation of congestive heart failure [I50.9]  Yes    Coronary artery disease involving native coronary artery of native heart with unstable angina pectoris [I25.110]  Unknown    HLD (hyperlipidemia) [E78.5]  Yes    Essential hypertension [I10]  Yes    Acute on chronic systolic congestive heart failure [I50.23]  Yes    Acute respiratory failure with hypoxia [J96.01]  Yes    Elevated troponin [R79.89]  Yes    Non-STEMI (non-ST elevated myocardial infarction) [I21.4]  Unknown      Resolved Hospital Problems   No resolved problems to display.        Brief Hospital Course to date:  Michael Cochran is a 78 y.o. female with history of hypertension and hyperlipidemia who presented to the ED with SOA and chest pain, admitted with NSTEMI and new onset acute systolic failure    Acute systolic heart failure (new onset)  Acute respiratory failure with hypoxia  NSTEMI  -Cardiology following, echo shows EF of 31.4%  -Cardiology did cardiac catheterization yesterday which showed multivessel disease in LAD, circumflex and RCA and PDA.  Cardiology recommended CABG and cardiothoracic surgery has seen and evaluated patient.  Tentatively surgery is for next week on Wednesday.    Hypertensive urgency   History of hypertension  -BP currently better controlled  -Continue Coreg, amlodipine    Poorly controlled type 2 diabetes A1c 9.8% (new diagnosis)  -Continue SSI  -We will start the patient on Glucotrol  -Diabetes educator has seen the patient.    Hyperlipidemia    Expected Discharge Location and Transportation:  home  Expected Discharge   Expected Discharge Date: 11/4/2023; Expected Discharge Time:      DVT prophylaxis:  Medical DVT prophylaxis orders are present.     AM-PAC 6 Clicks Score (PT): 21 (10/27/23 2010)    CODE STATUS:   Code Status and Medical Interventions:   Ordered at: 10/24/23 1332     Level Of Support Discussed With:    Patient     Code Status (Patient has no pulse and is not breathing):    CPR (Attempt to Resuscitate)     Medical Interventions (Patient has pulse or is breathing):    Full Support       Madhav Martinez MD  10/28/23

## 2023-10-28 NOTE — PLAN OF CARE
Goal Outcome Evaluation:  Plan of Care Reviewed With: patient, daughter, family        Progress: no change  Outcome Evaluation: VSS, SB via the monitor.  Patient A+Ox4, denies pain or SOA on room air.  Patient seen by Diabetic educator, see note.  Patient upset with not going home today, pt and family educated on the reasons why she is not being discharge.  Family supportive of the pt staying here until her CABG.  Patient up with stand-by assisstance.  Will continue with current POC.

## 2023-10-28 NOTE — PROGRESS NOTES
2 Days Post-Op       LOS: 4 days   Patient Care Team:  Bo Rodriguez PA as PCP - General (Family Medicine)    Chief complaint: Coronary artery disease    Subjective   Denies chest pain, denies shortness of breath    Objective    Vital Signs  Temp:  [97.6 °F (36.4 °C)-98.6 °F (37 °C)] 98.4 °F (36.9 °C)  Heart Rate:  [57-64] 64  Resp:  [18] 18  BP: (138-186)/(71-92) 186/92    Physical Exam:   General Appearance: alert, appears stated age and cooperative   Lungs: clear bilaterally   Heart: Regular rate and rhythm        Results     Results from last 7 days   Lab Units 10/27/23  0433   WBC 10*3/mm3 9.82   HEMOGLOBIN g/dL 12.1   HEMATOCRIT % 38.9   PLATELETS 10*3/mm3 222     Results from last 7 days   Lab Units 10/27/23  0429   SODIUM mmol/L 143   POTASSIUM mmol/L 3.4*   CHLORIDE mmol/L 104   CO2 mmol/L 27.0   BUN mg/dL 37*   CREATININE mg/dL 1.33*   GLUCOSE mg/dL 162*   CALCIUM mg/dL 9.0               Assessment      Acute exacerbation of congestive heart failure    HLD (hyperlipidemia)    Essential hypertension    Acute on chronic systolic congestive heart failure    Acute respiratory failure with hypoxia    Elevated troponin    Non-STEMI (non-ST elevated myocardial infarction)    Coronary artery disease involving native coronary artery of native heart with unstable angina pectoris        Plan   Work-up in progress  Cardiology following and adjusting hypertensive medications      Bo Garber PA-C  10/28/23  08:40 EDT

## 2023-10-28 NOTE — PROGRESS NOTES
[facility] at [facility]  IP Progress Note      Chief Complaint/Reason for service: #1 status post non-STEMI #2 ischemic cardiomyopathy with multivessel CAD #3 hypertension    Subjective   Subjective: Patient is awake and alert.  She denies tightness in her chest and no jaw pain.  She has no shortness of breath.  She states she is scheduled for surgery on Wednesday    Past medical, surgical, social and family history reviewed in the patient's electronic medical record.    Objective     Vital Sign Min/Max for last 24 hours  Temp  Min: 97.6 °F (36.4 °C)  Max: 98.6 °F (37 °C)   BP  Min: 138/73  Max: 186/92   Pulse  Min: 57  Max: 64   Resp  Min: 18  Max: 18   SpO2  Min: 91 %  Max: 92 %   Flow (L/min)  Min: 0  Max: 2      Intake/Output Summary (Last 24 hours) at 10/28/2023 0802  Last data filed at 10/27/2023 2010  Gross per 24 hour   Intake 686 ml   Output --   Net 686 ml             Current Facility-Administered Medications:     [DISCONTINUED] acetaminophen (TYLENOL) tablet 650 mg, 650 mg, Oral, Q4H PRN **OR** acetaminophen (TYLENOL) 160 MG/5ML oral solution 650 mg, 650 mg, Oral, Q4H PRN **OR** acetaminophen (TYLENOL) suppository 650 mg, 650 mg, Rectal, Q4H PRN, Jordi Hodge MD    acetaminophen (TYLENOL) tablet 650 mg, 650 mg, Oral, Q4H PRN, Jordi Hodge MD    ALPRAZolam (XANAX) tablet 0.25 mg, 0.25 mg, Oral, TID PRN, Jordi Hodge MD    amLODIPine (NORVASC) tablet 5 mg, 5 mg, Oral, Daily, Jordi Hodge MD, 5 mg at 10/27/23 0841    atorvastatin (LIPITOR) tablet 80 mg, 80 mg, Oral, Nightly, Jordi Hodge MD, 80 mg at 10/27/23 2012    sennosides-docusate (PERICOLACE) 8.6-50 MG per tablet 2 tablet, 2 tablet, Oral, BID, 2 tablet at 10/25/23 2121 **AND** polyethylene glycol (MIRALAX) packet 17 g, 17 g, Oral, Daily PRN **AND** bisacodyl (DULCOLAX) EC tablet 5 mg, 5 mg, Oral, Daily PRN **AND** bisacodyl (DULCOLAX) suppository 10 mg, 10 mg, Rectal, Daily PRN, Jordi Hodge MD     bumetanide (BUMEX) injection 0.5 mg, 0.5 mg, Intravenous, Q12H, Jordi Hodge MD, 0.5 mg at 10/28/23 0536    Calcium Replacement - Follow Nurse / BPA Driven Protocol, , Does not apply, PRNNaveen Anthony, MD    carvedilol (COREG) tablet 12.5 mg, 12.5 mg, Oral, BID With Meals, Jordi Hodge MD, 12.5 mg at 10/27/23 1822    dextrose (D50W) (25 g/50 mL) IV injection 25 g, 25 g, Intravenous, Q15 Min PRN, Jordi Hodge MD    dextrose (GLUTOSE) oral gel 15 g, 15 g, Oral, Q15 Min PRN, Jordi Hodge MD    diphenhydrAMINE (BENADRYL) injection 25 mg, 25 mg, Intravenous, Q6H PRN, Jordi Hodge MD    Enoxaparin Sodium (LOVENOX) syringe 70 mg, 0.7 mg/kg, Subcutaneous, Q12H, Jordi Hodge MD, 70 mg at 10/28/23 0535    glipizide (GLUCOTROL) tablet 2.5 mg, 2.5 mg, Oral, BID AC, Madhav Martinez MD    glucagon (GLUCAGEN) injection 1 mg, 1 mg, Intramuscular, Q15 Min PRN, Jordi Hodge MD    hydrALAZINE (APRESOLINE) injection 10 mg, 10 mg, Intravenous, Q6H PRN, Jordi Hodge MD    Insulin Lispro (humaLOG) injection 2-7 Units, 2-7 Units, Subcutaneous, 4x Daily AC & at Bedtime, Jordi Hodge MD, 4 Units at 10/27/23 2013    Magnesium Standard Dose Replacement - Follow Nurse / BPA Driven Protocol, , Does not apply, PRNaveen UGALDE Anthony, MD    nitroglycerin (NITROSTAT) SL tablet 0.4 mg, 0.4 mg, Sublingual, Q5 Min PRN, Jordi Hodge MD    nitroglycerin (TRIDIL) 200 mcg/ml infusion, 5-200 mcg/min, Intravenous, Titrated, Jordi Hodge MD, Stopped at 10/24/23 1940    ondansetron (ZOFRAN) tablet 4 mg, 4 mg, Oral, Q6H PRN **OR** ondansetron (ZOFRAN) injection 4 mg, 4 mg, Intravenous, Q6H PRN, Jordi Hodge MD    Pharmacy Consult - [place], , Does not apply, Daily, Jordi Hodge MD, Given at 10/27/23 1025    Phosphorus Replacement - Follow Nurse / BPA Driven Protocol, , Does not apply, PRTRAM, Jordi Hodge MD    Potassium Replacement - Follow Nurse / BPA Driven Protocol, , Does not apply, PRN, Naveen,  MD Jordi    sodium chloride 0.9 % flush 10 mL, 10 mL, Intravenous, PRN, Jordi Hodge MD    sodium chloride 0.9 % flush 10 mL, 10 mL, Intravenous, Q12H, Jordi Hodge MD, 10 mL at 10/27/23 2010    sodium chloride 0.9 % flush 10 mL, 10 mL, Intravenous, PRN, Jordi Hodge MD    sodium chloride 0.9 % infusion 40 mL, 40 mL, Intravenous, PRN, Jordi Hodge MD    Physical Exam: General pleasant overweight female in bed 75 degree angle watching TV not dyspneic not tachypneic current heart rate 62        HEENT: Minimal JVP is present.  No icterus       Respiratory: Equal bilateral symmetrical expansion with bilateral crackles       Cardiovascular: Regular rate and rhythm no murmur or gallop.  There is some edema       GI: Obese and soft       Lower Extremities: No lesions       Neuro: Facial expressions are symmetrical moves all 4 extremities       Skin: Warm and dry with trace edema palpation       Psych: Pleasant affect    Results Review: Take and output are essentially even since admission.  GFR is 40 heart rate 47-64 blood pressure 1 38-1 86    Radiology Results:  Imaging Results (Last 72 Hours)       Procedure Component Value Units Date/Time    XR Chest 1 View [[id number]] Collected: 10/26/23 0747     Updated: 10/26/23 0752    Narrative:      XR CHEST 1 VW    Date of Exam: 10/26/2023 2:35 AM EDT    Indication: DYSPNEA, ON EXERTION  dyspnea    Comparison: 10/24/2023.    Findings:  Exam is limited by portable technique and obesity. There is stable moderate cardiomegaly. Suggestion of small effusions with mild bibasilar atelectasis. No definite congestive failure identified.      Impression:      Impression:  Limited study. Findings suggest mild bibasilar atelectasis with possible small effusions.      Electronically Signed: Marielos Camilo MD    10/26/2023 7:49 AM EDT    Workstation ID: [id number]            EKG: Sinus rhythm poor R progression anteriorly nonspecific ST abnormalities    ECHO: EF 31% with wall  motion abnormalities    Tele: Sinus rhythm    Assessment   Assessment/Plan: Acute heart failure with reduced EF secondary to a non-STEMI.  Would continue IV diuresis.  I will lower her dose of Coreg because she is having heart rates in the high 40s.  We will start Jardiance  2 non-STEMI with multivessel disease-has been seen by CT surgery for CABG  3 severe hyperlipidemia-high intensity statin    Gurmeet Colón MD  10/28/23  08:02 EDT

## 2023-10-29 LAB
ALBUMIN SERPL-MCNC: 3.6 G/DL (ref 3.5–5.2)
ALBUMIN/GLOB SERPL: 1.2 G/DL
ALP SERPL-CCNC: 91 U/L (ref 39–117)
ALT SERPL W P-5'-P-CCNC: 24 U/L (ref 1–33)
ANION GAP SERPL CALCULATED.3IONS-SCNC: 10 MMOL/L (ref 5–15)
AST SERPL-CCNC: 28 U/L (ref 1–32)
BILIRUB SERPL-MCNC: 1.3 MG/DL (ref 0–1.2)
BUN SERPL-MCNC: 32 MG/DL (ref 8–23)
BUN/CREAT SERPL: 28.3 (ref 7–25)
CALCIUM SPEC-SCNC: 9.2 MG/DL (ref 8.6–10.5)
CHLORIDE SERPL-SCNC: 104 MMOL/L (ref 98–107)
CO2 SERPL-SCNC: 28 MMOL/L (ref 22–29)
CREAT SERPL-MCNC: 1.13 MG/DL (ref 0.57–1)
EGFRCR SERPLBLD CKD-EPI 2021: 49.6 ML/MIN/1.73
FOLATE SERPL-MCNC: 11.8 NG/ML (ref 4.78–24.2)
GLOBULIN UR ELPH-MCNC: 3 GM/DL
GLUCOSE BLDC GLUCOMTR-MCNC: 106 MG/DL (ref 70–130)
GLUCOSE BLDC GLUCOMTR-MCNC: 106 MG/DL (ref 70–130)
GLUCOSE BLDC GLUCOMTR-MCNC: 145 MG/DL (ref 70–130)
GLUCOSE BLDC GLUCOMTR-MCNC: 154 MG/DL (ref 70–130)
GLUCOSE SERPL-MCNC: 169 MG/DL (ref 65–99)
NT-PROBNP SERPL-MCNC: 4242 PG/ML (ref 0–1800)
POTASSIUM SERPL-SCNC: 3.7 MMOL/L (ref 3.5–5.2)
PROT SERPL-MCNC: 6.6 G/DL (ref 6–8.5)
SODIUM SERPL-SCNC: 142 MMOL/L (ref 136–145)
VIT B12 BLD-MCNC: 437 PG/ML (ref 211–946)

## 2023-10-29 PROCEDURE — 25010000002 ENOXAPARIN PER 10 MG: Performed by: INTERNAL MEDICINE

## 2023-10-29 PROCEDURE — 99232 SBSQ HOSP IP/OBS MODERATE 35: CPT | Performed by: INTERNAL MEDICINE

## 2023-10-29 PROCEDURE — 83880 ASSAY OF NATRIURETIC PEPTIDE: CPT | Performed by: INTERNAL MEDICINE

## 2023-10-29 PROCEDURE — 99231 SBSQ HOSP IP/OBS SF/LOW 25: CPT | Performed by: PHYSICIAN ASSISTANT

## 2023-10-29 PROCEDURE — 82948 REAGENT STRIP/BLOOD GLUCOSE: CPT

## 2023-10-29 PROCEDURE — 82607 VITAMIN B-12: CPT | Performed by: INTERNAL MEDICINE

## 2023-10-29 PROCEDURE — 82746 ASSAY OF FOLIC ACID SERUM: CPT | Performed by: INTERNAL MEDICINE

## 2023-10-29 PROCEDURE — 80053 COMPREHEN METABOLIC PANEL: CPT | Performed by: INTERNAL MEDICINE

## 2023-10-29 PROCEDURE — 63710000001 INSULIN LISPRO (HUMAN) PER 5 UNITS: Performed by: INTERNAL MEDICINE

## 2023-10-29 RX ORDER — TORSEMIDE 20 MG/1
20 TABLET ORAL DAILY
Status: DISCONTINUED | OUTPATIENT
Start: 2023-10-29 | End: 2023-11-01

## 2023-10-29 RX ORDER — HYDRALAZINE HYDROCHLORIDE 25 MG/1
25 TABLET, FILM COATED ORAL EVERY 8 HOURS SCHEDULED
Status: DISCONTINUED | OUTPATIENT
Start: 2023-10-29 | End: 2023-11-01

## 2023-10-29 RX ADMIN — BUMETANIDE 0.5 MG: 0.25 INJECTION, SOLUTION INTRAMUSCULAR; INTRAVENOUS at 06:09

## 2023-10-29 RX ADMIN — ATORVASTATIN CALCIUM 80 MG: 40 TABLET, FILM COATED ORAL at 21:16

## 2023-10-29 RX ADMIN — HYDRALAZINE HYDROCHLORIDE 25 MG: 25 TABLET, FILM COATED ORAL at 21:15

## 2023-10-29 RX ADMIN — CARVEDILOL 6.25 MG: 6.25 TABLET, FILM COATED ORAL at 17:38

## 2023-10-29 RX ADMIN — Medication 10 ML: at 21:12

## 2023-10-29 RX ADMIN — HYDRALAZINE HYDROCHLORIDE 25 MG: 25 TABLET, FILM COATED ORAL at 15:44

## 2023-10-29 RX ADMIN — CARVEDILOL 6.25 MG: 6.25 TABLET, FILM COATED ORAL at 08:28

## 2023-10-29 RX ADMIN — GLIPIZIDE 2.5 MG: 5 TABLET ORAL at 08:27

## 2023-10-29 RX ADMIN — ENOXAPARIN SODIUM 70 MG: 80 INJECTION SUBCUTANEOUS at 17:39

## 2023-10-29 RX ADMIN — INSULIN LISPRO 2 UNITS: 100 INJECTION, SOLUTION INTRAVENOUS; SUBCUTANEOUS at 12:26

## 2023-10-29 RX ADMIN — GLIPIZIDE 2.5 MG: 5 TABLET ORAL at 17:42

## 2023-10-29 RX ADMIN — AMLODIPINE BESYLATE 5 MG: 5 TABLET ORAL at 08:27

## 2023-10-29 RX ADMIN — TORSEMIDE 20 MG: 20 TABLET ORAL at 12:26

## 2023-10-29 RX ADMIN — ENOXAPARIN SODIUM 70 MG: 80 INJECTION SUBCUTANEOUS at 06:08

## 2023-10-29 RX ADMIN — EMPAGLIFLOZIN 10 MG: 10 TABLET, FILM COATED ORAL at 08:27

## 2023-10-29 RX ADMIN — Medication 10 ML: at 08:29

## 2023-10-29 NOTE — PROGRESS NOTES
[facility] at [facility]  IP Progress Note      Chief Complaint/Reason for service: Non-STEMI secondary to severe multivessel CAD #2 acute heart failure with reduced EF #3 hypertension    Subjective   Subjective: The patient states she is breathing fine.  She is having no anginal type pain.  She would like to ambulate up and down the hallways.    Past medical, surgical, social and family history reviewed in the patient's electronic medical record.    Objective     Vital Sign Min/Max for last 24 hours  Temp  Min: 97.5 °F (36.4 °C)  Max: 98.6 °F (37 °C)   BP  Min: 161/85  Max: 171/86   Pulse  Min: 51  Max: 61   Resp  Min: 12  Max: 18   SpO2  Min: 90 %  Max: 93 %   Flow (L/min)  Min: 2  Max: 3      Intake/Output Summary (Last 24 hours) at 10/29/2023 0849  Last data filed at 10/29/2023 0827  Gross per 24 hour   Intake 360 ml   Output --   Net 360 ml             Current Facility-Administered Medications:     [DISCONTINUED] acetaminophen (TYLENOL) tablet 650 mg, 650 mg, Oral, Q4H PRN **OR** acetaminophen (TYLENOL) 160 MG/5ML oral solution 650 mg, 650 mg, Oral, Q4H PRN **OR** acetaminophen (TYLENOL) suppository 650 mg, 650 mg, Rectal, Q4H PRN, Jordi Hodge MD    acetaminophen (TYLENOL) tablet 650 mg, 650 mg, Oral, Q4H PRN, Jordi Hodge MD    ALPRAZolam (XANAX) tablet 0.25 mg, 0.25 mg, Oral, TID PRN, Jordi Hodge MD    amLODIPine (NORVASC) tablet 5 mg, 5 mg, Oral, Daily, Jordi Hodge MD, 5 mg at 10/29/23 0827    atorvastatin (LIPITOR) tablet 80 mg, 80 mg, Oral, Nightly, Jordi Hodge MD, 80 mg at 10/28/23 2000    sennosides-docusate (PERICOLACE) 8.6-50 MG per tablet 2 tablet, 2 tablet, Oral, BID, 2 tablet at 10/25/23 2121 **AND** polyethylene glycol (MIRALAX) packet 17 g, 17 g, Oral, Daily PRN **AND** bisacodyl (DULCOLAX) EC tablet 5 mg, 5 mg, Oral, Daily PRN **AND** bisacodyl (DULCOLAX) suppository 10 mg, 10 mg, Rectal, Daily PRN, Jordi Hodge MD    bumetanide (BUMEX)  injection 0.5 mg, 0.5 mg, Intravenous, Q12H, Jordi Hodge MD, 0.5 mg at 10/29/23 0609    Calcium Replacement - Follow Nurse / BPA Driven Protocol, , Does not apply, PRNNaveen Anthony, MD    carvedilol (COREG) tablet 6.25 mg, 6.25 mg, Oral, BID With Meals, Gurmeet Colón MD, 6.25 mg at 10/29/23 0828    dextrose (D50W) (25 g/50 mL) IV injection 25 g, 25 g, Intravenous, Q15 Min PRN, Jordi Hodge MD    dextrose (GLUTOSE) oral gel 15 g, 15 g, Oral, Q15 Min PRN, Jordi Hodge MD    diphenhydrAMINE (BENADRYL) injection 25 mg, 25 mg, Intravenous, Q6H PRN, Jordi Hodge MD    empagliflozin (JARDIANCE) tablet 10 mg, 10 mg, Oral, Daily, Gurmeet Colón MD, 10 mg at 10/29/23 0827    Enoxaparin Sodium (LOVENOX) syringe 70 mg, 0.7 mg/kg, Subcutaneous, Q12H, Jordi Hodge MD, 70 mg at 10/29/23 0608    glipizide (GLUCOTROL) tablet 2.5 mg, 2.5 mg, Oral, BID AC, Madhav Martinez MD, 2.5 mg at 10/29/23 0827    glucagon (GLUCAGEN) injection 1 mg, 1 mg, Intramuscular, Q15 Min PRN, Jordi Hodge MD    hydrALAZINE (APRESOLINE) injection 10 mg, 10 mg, Intravenous, Q6H PRN, Jordi Hodge MD    Insulin Lispro (humaLOG) injection 2-7 Units, 2-7 Units, Subcutaneous, 4x Daily AC & at Bedtime, Jordi Hodge MD, 2 Units at 10/28/23 2000    Magnesium Standard Dose Replacement - Follow Nurse / BPA Driven Protocol, , Does not apply, Naveen BLACK Anthony, MD    nitroglycerin (NITROSTAT) SL tablet 0.4 mg, 0.4 mg, Sublingual, Q5 Min PRN, Jordi Hodge MD    ondansetron (ZOFRAN) tablet 4 mg, 4 mg, Oral, Q6H PRN **OR** ondansetron (ZOFRAN) injection 4 mg, 4 mg, Intravenous, Q6H PRN, Jordi Hodge MD    Pharmacy Consult - [facility], , Does not apply, Daily, Jordi Hodge MD, Given at 10/27/23 1025    Phosphorus Replacement - Follow Nurse / BPA Driven Protocol, , Does not apply, PRNaveen UGALDE Anthony, MD    Potassium Replacement - Follow Nurse / BPA Driven Protocol, , Does not apply, Naveen BLACK Anthony, MD     sodium chloride 0.9 % flush 10 mL, 10 mL, Intravenous, PRN, Jordi Hodge MD    sodium chloride 0.9 % flush 10 mL, 10 mL, Intravenous, Q12H, Jordi Hodge MD, 10 mL at 10/29/23 0829    sodium chloride 0.9 % flush 10 mL, 10 mL, Intravenous, PRN, Jordi Hodge MD    sodium chloride 0.9 % infusion 40 mL, 40 mL, Intravenous, Naveen BLACK Anthony, MD    Physical Exam: General pleasant overweight female sitting on the side of the bed not dyspneic not tachypneic current systolic blood pressure elevated in the 160s        HEENT: No JVP no icterus nasal O2 present       Respiratory: Equal bilateral symmetrical expansion with crackles at the bases       Cardiovascular: Regular rate and rhythm without murmur gallop or click and no edema palpation       GI: Soft and flat       Lower Extremities: No lesions       Neuro: Facial expressions are symmetrical moves all 4 extremities       Skin: Warm and dry       Psych: Pleasant affect    Results Review: Intake and output are essentially even.  Heart rates 47 to the low 60s.  Blood pressures 1 38-1 86.  GFR was 40.1 yesterday    Radiology Results:  Imaging Results (Last 72 Hours)       ** No results found for the last 72 hours. **            EKG: Sinus rhythm    ECHO: EF 30%    Tele: Sinus bradycardia heart rate in the 50s.  No pauses no ventricular arrhythmia    Assessment   Assessment/Plan: Status post non-STEMI with severe multivessel disease.  The patient is awaiting CABG which is scheduled on Wednesday.  Continue aspirin statin beta-blocker  2 ischemic cardiomyopathy with heart failure-patient still has a few basilar crackles.  We will recheck BNP  3 hypertension we will titrate medications today    Gurmeet Colón MD  10/29/23  08:49 EDT

## 2023-10-29 NOTE — PROGRESS NOTES
3 Days Post-Op cardiac catheterization       LOS: 5 days   Patient Care Team:  Bo Rodriguez PA as PCP - General (Family Medicine)    Chief complaint: Coronary disease    Subjective   Denies chest pain, denies shortness of breath    Objective    Vital Signs  Temp:  [97.5 °F (36.4 °C)-98.6 °F (37 °C)] 97.7 °F (36.5 °C)  Heart Rate:  [51-61] 56  Resp:  [12-18] 16  BP: (159-171)/(83-97) 159/86    Physical Exam:   General Appearance: alert, appears stated age and cooperative   Lungs: clear bilaterally   Heart: Regular rate and rhythm       Results     Results from last 7 days   Lab Units 10/27/23  0433   WBC 10*3/mm3 9.82   HEMOGLOBIN g/dL 12.1   HEMATOCRIT % 38.9   PLATELETS 10*3/mm3 222     Results from last 7 days   Lab Units 10/29/23  1028   SODIUM mmol/L 142   POTASSIUM mmol/L 3.7   CHLORIDE mmol/L 104   CO2 mmol/L 28.0   BUN mg/dL 32*   CREATININE mg/dL 1.13*   GLUCOSE mg/dL 169*   CALCIUM mg/dL 9.2               Assessment      Acute exacerbation of congestive heart failure    HLD (hyperlipidemia)    Essential hypertension    Acute on chronic systolic congestive heart failure    Acute respiratory failure with hypoxia    Elevated troponin    Non-STEMI (non-ST elevated myocardial infarction)    Coronary artery disease involving native coronary artery of native heart with unstable angina pectoris        Plan   Plan surgery this week with Dr. Megha Garber PA-C  10/29/23  11:33 EDT

## 2023-10-29 NOTE — PROGRESS NOTES
[facility] Medicine Services  PROGRESS NOTE    Patient Name: Michael Cochran  : 1945  MRN: [id number]    Date of Admission: 10/24/2023  Primary Care Physician: Bo Rodriguez PA    Subjective   Subjective     CC: Follow-up SOA    HPI:   Resting in bed in no acute distress and does not have any specific complaints.  No fever or chills.  No chest pain or palpitation or shortness of breath.  No nausea vomiting or diarrhea or abdominal pain.      Objective   Objective     Vital Signs:   Temp:  [97.5 °F (36.4 °C)-98.6 °F (37 °C)] 97.7 °F (36.5 °C)  Heart Rate:  [51-61] 56  Resp:  [12-18] 16  BP: (159-171)/(83-97) 159/86  Flow (L/min):  [2-3] 2     Physical Exam:  Constitutional: No acute distress, awake, alert  HENT: NCAT, mucous membranes moist  Respiratory: Clear to auscultation bilaterally, breathing with no labor  Cardiovascular: RRR, no murmurs, rubs, or gallops  Gastrointestinal: Abdomen is obese.  Positive bowel sounds, soft, nontender, nondistended  Musculoskeletal: bilateral ankle edema, severe obesity  Psychiatric: Appropriate affect, cooperative  Neurologic: Awake, alert, oriented x3, no focality appreciated  Skin: No rashes      Results Reviewed:  LAB RESULTS:      Lab 10/27/23  0433 10/26/23  0406 10/25/23  0357 10/24/23  1106   WBC 9.82 9.26 10.54 12.08*   HEMOGLOBIN 12.1 13.3 12.8 14.5   HEMATOCRIT 38.9 42.9 40.3 46.5   PLATELETS 222 181 225 227   NEUTROS ABS  --   --   --  10.21*   IMMATURE GRANS (ABS)  --   --   --  0.05   LYMPHS ABS  --   --   --  1.11   MONOS ABS  --   --   --  0.63   EOS ABS  --   --   --  0.04   MCV 92.2 92.7 90.0 91.5   D DIMER QUANT  --   --   --  0.64         Lab 10/29/23  1028 10/28/23  0816 10/27/23  0429 10/26/23  0406 10/25/23  1312 10/25/23  0357 10/24/23  2153 10/24/23  1106   SODIUM 142 142 143 144  --  143  --  142   POTASSIUM 3.7 4.0 3.4* 4.0 4.2 3.8   < > 3.2*   CHLORIDE 104 105 104 105  --  105  --  104   CO2 28.0 25.0 27.0  23.0  --  26.0  --  22.0   ANION GAP 10.0 12.0 12.0 16.0*  --  12.0  --  16.0*   BUN 32* 37* 37* 34*  --  20  --  15   CREATININE 1.13* 1.19* 1.33* 1.45*  --  1.35*  --  1.02*   EGFR 49.6* 46.6* 40.8* 36.8*  --  40.1*  --  56.1*   GLUCOSE 169* 164* 162* 177*  --  226*  --  288*   CALCIUM 9.2 9.2 9.0 9.3  --  9.0  --  9.2   MAGNESIUM  --  1.7  --  1.8  --  1.9  --  1.9   PHOSPHORUS  --  3.8  --  5.1*  --  4.4  --   --    HEMOGLOBIN A1C  --   --   --   --   --  9.80*  --   --    TSH  --   --   --   --   --  8.790*  --   --     < > = values in this interval not displayed.         Lab 10/29/23  1028 10/26/23  0406 10/25/23  0357 10/24/23  1106   TOTAL PROTEIN 6.6  --  6.5 7.5   ALBUMIN 3.6 3.6 3.3* 3.7   GLOBULIN 3.0  --  3.2 3.8   ALT (SGPT) 24  --  16 19   AST (SGOT) 28  --  26 25   BILIRUBIN 1.3*  --  1.7* 2.1*   ALK PHOS 91  --  94 110         Lab 10/29/23  1028 10/25/23  0357 10/24/23  1435 10/24/23  1106   PROBNP 4,242.0*  --   --  13,276.0*   HSTROP T  --  631* 499* 356*         Lab 10/27/23  0429 10/25/23  0357   CHOLESTEROL 242* 255*   LDL CHOL 151* 170*   HDL CHOL 34* 42   TRIGLYCERIDES 305* 230*         Lab 10/25/23  0357   IRON 57   IRON SATURATION (TSAT) 21   TIBC 276*   TRANSFERRIN 185*         Brief Urine Lab Results       None            Microbiology Results Abnormal       Procedure Component Value - Date/Time    COVID-19 and FLU A/B PCR - Swab, Nasopharynx [[id number]]  (Normal) Collected: 10/24/23 1109    Lab Status: Final result Specimen: Swab from Nasopharynx Updated: 10/24/23 1145     COVID19 Not Detected     Influenza A PCR Not Detected     Influenza B PCR Not Detected    Narrative:      Fact sheet for providers: https://www.fda.gov/media/508162/download    Fact sheet for patients: https://www.fda.gov/media/776325/download    Test performed by PCR.            No radiology results from the last 24 hrs    Results for orders placed during the hospital encounter of 10/24/23    Adult Transthoracic Echo  Complete W/ Cont if Necessary Per Protocol    Interpretation Summary    Left ventricular systolic function is moderate-severely decreased. Calculated left ventricular EF = 31.4%. The apex is akinetic with severe hypokinesis of the anterior, anterolateral, and septal walls.    The left ventricular cavity is mildly dilated.    Left ventricular diastolic function is consistent with (grade II w/high LAP) pseudonormalization.    The aortic valve is structurally normal with no stenosis present. No significant aortic valve regurgitation is present.    The mitral valve is structurally normal with no significant stenosis present. Mild mitral valve regurgitation is present.      Current medications:  Scheduled Meds:amLODIPine, 5 mg, Oral, Daily  atorvastatin, 80 mg, Oral, Nightly  carvedilol, 6.25 mg, Oral, BID With Meals  empagliflozin, 10 mg, Oral, Daily  enoxaparin, 0.7 mg/kg, Subcutaneous, Q12H  glipizide, 2.5 mg, Oral, BID AC  hydrALAZINE, 25 mg, Oral, Q8H  insulin lispro, 2-7 Units, Subcutaneous, 4x Daily AC & at Bedtime  pharmacy consult - [facility], , Does not apply, Daily  senna-docusate sodium, 2 tablet, Oral, BID  sodium chloride, 10 mL, Intravenous, Q12H  torsemide, 20 mg, Oral, Daily      Continuous Infusions:     PRN Meds:.  [DISCONTINUED] acetaminophen **OR** acetaminophen **OR** acetaminophen    acetaminophen    ALPRAZolam    senna-docusate sodium **AND** polyethylene glycol **AND** bisacodyl **AND** bisacodyl    Calcium Replacement - Follow Nurse / BPA Driven Protocol    dextrose    dextrose    diphenhydrAMINE    glucagon (human recombinant)    hydrALAZINE    Magnesium Standard Dose Replacement - Follow Nurse / BPA Driven Protocol    nitroglycerin    ondansetron **OR** ondansetron    Phosphorus Replacement - Follow Nurse / BPA Driven Protocol    Potassium Replacement - Follow Nurse / BPA Driven Protocol    sodium chloride    sodium chloride    sodium chloride    Assessment & Plan   Assessment & Plan     Active  Hospital Problems    Diagnosis  POA    **Acute exacerbation of congestive heart failure [I50.9]  Yes    Coronary artery disease involving native coronary artery of native heart with unstable angina pectoris [I25.110]  Unknown    HLD (hyperlipidemia) [E78.5]  Yes    Essential hypertension [I10]  Yes    Acute on chronic systolic congestive heart failure [I50.23]  Yes    Acute respiratory failure with hypoxia [J96.01]  Yes    Elevated troponin [R79.89]  Yes    Non-STEMI (non-ST elevated myocardial infarction) [I21.4]  Unknown      Resolved Hospital Problems   No resolved problems to display.        Brief Hospital Course to date:  Michael Cochran is a 78 y.o. female with history of hypertension and hyperlipidemia who presented to the ED with SOA and chest pain, admitted with NSTEMI and new onset acute systolic failure    Acute systolic heart failure (new onset)  Acute respiratory failure with hypoxia  NSTEMI  -Cardiology following, echo shows EF of 31.4%  -Cardiology did cardiac catheterization 0n 10/ 27 which showed multivessel disease in LAD, circumflex and RCA and PDA.  Cardiology recommended CABG and cardiothoracic surgery has seen and evaluated patient.  Tentatively surgery is for next week on Wednesday.  -Currently patient is on Jardiance and also on torsemide.  We will monitor renal function.    Hypertensive urgency   History of hypertension  -BP currently better controlled  -Continue Coreg, amlodipine    Poorly controlled type 2 diabetes A1c 9.8% (new diagnosis)  -Continue SSI  -Was started on Glucotrol and serum glucose is much better controlled.  -Diabetes educator has seen the patient.    Hyperlipidemia    Morbid obesity which complicates everything.    Expected Discharge Location and Transportation: home  Expected Discharge   Expected Discharge Date: 11/4/2023; Expected Discharge Time:      DVT prophylaxis:  Medical DVT prophylaxis orders are present.     AM-PAC 6 Clicks Score (PT): 20 (10/28/23 6695)    CODE  STATUS:   Code Status and Medical Interventions:   Ordered at: 10/24/23 1339     Level Of Support Discussed With:    Patient     Code Status (Patient has no pulse and is not breathing):    CPR (Attempt to Resuscitate)     Medical Interventions (Patient has pulse or is breathing):    Full Support       Madhav Martinez MD  10/29/23

## 2023-10-30 LAB
GLUCOSE BLDC GLUCOMTR-MCNC: 122 MG/DL (ref 70–130)
GLUCOSE BLDC GLUCOMTR-MCNC: 130 MG/DL (ref 70–130)
GLUCOSE BLDC GLUCOMTR-MCNC: 149 MG/DL (ref 70–130)
GLUCOSE BLDC GLUCOMTR-MCNC: 99 MG/DL (ref 70–130)

## 2023-10-30 PROCEDURE — 99232 SBSQ HOSP IP/OBS MODERATE 35: CPT | Performed by: INTERNAL MEDICINE

## 2023-10-30 PROCEDURE — 25010000002 HYDRALAZINE PER 20 MG: Performed by: INTERNAL MEDICINE

## 2023-10-30 PROCEDURE — 99231 SBSQ HOSP IP/OBS SF/LOW 25: CPT | Performed by: PHYSICIAN ASSISTANT

## 2023-10-30 PROCEDURE — 82948 REAGENT STRIP/BLOOD GLUCOSE: CPT

## 2023-10-30 PROCEDURE — 25010000002 ENOXAPARIN PER 10 MG: Performed by: INTERNAL MEDICINE

## 2023-10-30 RX ORDER — ENOXAPARIN SODIUM 100 MG/ML
1 INJECTION SUBCUTANEOUS EVERY 12 HOURS
Status: DISCONTINUED | OUTPATIENT
Start: 2023-10-30 | End: 2023-10-31

## 2023-10-30 RX ORDER — AMLODIPINE BESYLATE 10 MG/1
10 TABLET ORAL DAILY
Status: DISCONTINUED | OUTPATIENT
Start: 2023-10-30 | End: 2023-11-01

## 2023-10-30 RX ADMIN — ENOXAPARIN SODIUM 90 MG: 100 INJECTION SUBCUTANEOUS at 17:14

## 2023-10-30 RX ADMIN — CARVEDILOL 6.25 MG: 6.25 TABLET, FILM COATED ORAL at 08:14

## 2023-10-30 RX ADMIN — ENOXAPARIN SODIUM 70 MG: 80 INJECTION SUBCUTANEOUS at 05:25

## 2023-10-30 RX ADMIN — ATORVASTATIN CALCIUM 80 MG: 40 TABLET, FILM COATED ORAL at 20:15

## 2023-10-30 RX ADMIN — HYDRALAZINE HYDROCHLORIDE 25 MG: 25 TABLET, FILM COATED ORAL at 20:15

## 2023-10-30 RX ADMIN — Medication 10 ML: at 20:19

## 2023-10-30 RX ADMIN — HYDRALAZINE HYDROCHLORIDE 25 MG: 25 TABLET, FILM COATED ORAL at 14:26

## 2023-10-30 RX ADMIN — HYDRALAZINE HYDROCHLORIDE 25 MG: 25 TABLET, FILM COATED ORAL at 05:25

## 2023-10-30 RX ADMIN — CARVEDILOL 6.25 MG: 6.25 TABLET, FILM COATED ORAL at 17:14

## 2023-10-30 RX ADMIN — AMLODIPINE BESYLATE 10 MG: 10 TABLET ORAL at 08:12

## 2023-10-30 RX ADMIN — GLIPIZIDE 2.5 MG: 5 TABLET ORAL at 17:18

## 2023-10-30 RX ADMIN — ACETAMINOPHEN 650 MG: 325 TABLET ORAL at 23:45

## 2023-10-30 RX ADMIN — TORSEMIDE 20 MG: 20 TABLET ORAL at 08:13

## 2023-10-30 RX ADMIN — GLIPIZIDE 2.5 MG: 5 TABLET ORAL at 08:13

## 2023-10-30 RX ADMIN — HYDRALAZINE HYDROCHLORIDE 10 MG: 20 INJECTION INTRAMUSCULAR; INTRAVENOUS at 00:11

## 2023-10-30 RX ADMIN — Medication 10 ML: at 08:14

## 2023-10-30 RX ADMIN — EMPAGLIFLOZIN 10 MG: 10 TABLET, FILM COATED ORAL at 08:14

## 2023-10-30 NOTE — PROGRESS NOTES
Michael Cochran  [id number]  1945   LOS: 6 days   Patient Care Team:  Bo Rodriguez PA as PCP - General (Family Medicine)    Chief Complaint:  NSTEMI / HTN / UNCONTROLLED T2 DM / OBESITY / AT RISK FOR GELY / HFrEF     Subjective     Comfortable and in good spirits on 2 L/min nasal cannula (oximetry 96 %) without cardiopulmonary complaints.  Acceptable appetite.  Limited activity in room.  No cough, sputum production, pleurisy, mopped assist, fever, or chills.    Objective     Vital Sign Min/Max for last 24 hours  Temp  Min: 97.4 °F (36.3 °C)  Max: 98.6 °F (37 °C)   BP  Min: 159/86  Max: 189/100   Pulse  Min: 51  Max: 66   Resp  Min: 16  Max: 18   SpO2  Min: 91 %  Max: 95 %      Weight  Min: 93.8 kg (206 lb 14.4 oz)  Max: 93.8 kg (206 lb 14.4 oz)         10/28/23  0300 10/30/23  0400   Weight: 93.4 kg (206 lb) 93.8 kg (206 lb 14.4 oz)         Intake/Output Summary (Last 24 hours) at 10/30/2023 0718  Last data filed at 10/29/2023 1300  Gross per 24 hour   Intake 600 ml   Output --   Net 600 ml       Physical Exam:     General Appearance:    Alert, cooperative, in no acute distress   Lungs:     Clear to auscultation,respirations regular, even and                unlabored    Heart:    Regular and normal rate, normal S1 and S2, grade 1/6 systolic murmur, no gallop, no rub, no click   Abdomen:  Extremities:   Soft, nontender, bowel sounds audible x4    No edema, normal range of motion   Pulses:   Pulses palpable and equal bilaterally      Results Review:   Results from last 7 days   Lab Units 10/29/23  1028 10/28/23  0816 10/27/23  0429   SODIUM mmol/L 142 142 143   POTASSIUM mmol/L 3.7 4.0 3.4*   CHLORIDE mmol/L 104 105 104   CO2 mmol/L 28.0 25.0 27.0   BUN mg/dL 32* 37* 37*   CREATININE mg/dL 1.13* 1.19* 1.33*   GLUCOSE mg/dL 169* 164* 162*   CALCIUM mg/dL 9.2 9.2 9.0     Results from last 7 days   Lab Units 10/27/23  0433 10/26/23  0406 10/25/23  0357   WBC 10*3/mm3 9.82 9.26 10.54   HEMOGLOBIN g/dL 12.1  13.3 12.8   HEMATOCRIT % 38.9 42.9 40.3   PLATELETS 10*3/mm3 222 181 225     Results from last 7 days   Lab Units 10/27/23  0429   CHOLESTEROL mg/dL 242*   TRIGLYCERIDES mg/dL 305*   HDL CHOL mg/dL 34*   LDL CHOL mg/dL 151*     Results from last 7 days   Lab Units 10/25/23  0357   HEMOGLOBIN A1C % 9.80*     Results from last 7 days   Lab Units 10/25/23  0357 10/24/23  1435 10/24/23  1106   HSTROP T ng/L 631* 499* 356*     LHC:      Ostial 95% LAD stenosis involve the origin of a large first diagonal as well as some retrograde mild to moderate plaque in the left main.    Bifurcation right coronary artery 95% stenosis.    70% LCx stenosis    LVEF 40-45% with anterior apical hypokinesis    PFT: PENDING (probable mild obstruction and mild reduction in DLCO with no lung volumes)    ACCU-CHEKS: 106 - 169 - 154 - 106 -145 MG/DL.    NO NEW CXR / EKG / LAB RESULTS.    Medication Review: REVIEWED; NO DRIPS.    Assessment & Plan     Labile hypertension and will increase amlodipine to 10 mg daily.  We will continue additional cardiac medication and defer introduction of Entresto at this time.  Await CABG.      Acute exacerbation of congestive heart failure    Non-STEMI (non-ST elevated myocardial infarction)    HLD (hyperlipidemia)    Essential hypertension    Acute on chronic systolic congestive heart failure    Acute respiratory failure with hypoxia    Elevated troponin    Coronary artery disease involving native coronary artery of native heart with unstable angina pectoris    10/30/23  07:18 EDT

## 2023-10-30 NOTE — PROGRESS NOTES
[facility] Medicine Services  PROGRESS NOTE    Patient Name: Michael Cochran  : 1945  MRN: [id number]    Date of Admission: 10/24/2023  Primary Care Physician: Bo Rodriguez PA    Subjective   Subjective     CC: Follow-up SOA    HPI:   Absent the patient multiple times today and talk to patient and her daughter at the bedside.  Resting in bed in no acute distress and does not have any specific complaints.  No fever or chills.  No chest pain or palpitation or shortness of breath.  No nausea vomiting or diarrhea or abdominal pain.      Objective   Objective     Vital Signs:   Temp:  [97.3 °F (36.3 °C)-98.6 °F (37 °C)] 97.7 °F (36.5 °C)  Heart Rate:  [51-66] 59  Resp:  [16-18] 16  BP: (159-189)/() 159/98  Flow (L/min):  [2] 2     Physical Exam:  Constitutional: No acute distress, awake, alert  HENT: NCAT, mucous membranes moist  Respiratory: Clear to auscultation bilaterally, breathing with no labor  Cardiovascular: RRR, no murmurs, rubs, or gallops  Gastrointestinal: Abdomen is obese.  Positive bowel sounds, soft, nontender, nondistended  Musculoskeletal: bilateral ankle edema, severe obesity  Psychiatric: Appropriate affect, cooperative  Neurologic: Awake, alert, oriented x3, no focality appreciated  Skin: No rashes      Results Reviewed:  LAB RESULTS:      Lab 10/27/23  0433 10/26/23  0406 10/25/23  0357 10/24/23  1106   WBC 9.82 9.26 10.54 12.08*   HEMOGLOBIN 12.1 13.3 12.8 14.5   HEMATOCRIT 38.9 42.9 40.3 46.5   PLATELETS 222 181 225 227   NEUTROS ABS  --   --   --  10.21*   IMMATURE GRANS (ABS)  --   --   --  0.05   LYMPHS ABS  --   --   --  1.11   MONOS ABS  --   --   --  0.63   EOS ABS  --   --   --  0.04   MCV 92.2 92.7 90.0 91.5   D DIMER QUANT  --   --   --  0.64         Lab 10/29/23  1028 10/28/23  0816 10/27/23  0429 10/26/23  0406 10/25/23  1312 10/25/23  0357 10/24/23  2153 10/24/23  1106   SODIUM 142 142 143 144  --  143  --  142   POTASSIUM 3.7 4.0 3.4*  4.0 4.2 3.8   < > 3.2*   CHLORIDE 104 105 104 105  --  105  --  104   CO2 28.0 25.0 27.0 23.0  --  26.0  --  22.0   ANION GAP 10.0 12.0 12.0 16.0*  --  12.0  --  16.0*   BUN 32* 37* 37* 34*  --  20  --  15   CREATININE 1.13* 1.19* 1.33* 1.45*  --  1.35*  --  1.02*   EGFR 49.6* 46.6* 40.8* 36.8*  --  40.1*  --  56.1*   GLUCOSE 169* 164* 162* 177*  --  226*  --  288*   CALCIUM 9.2 9.2 9.0 9.3  --  9.0  --  9.2   MAGNESIUM  --  1.7  --  1.8  --  1.9  --  1.9   PHOSPHORUS  --  3.8  --  5.1*  --  4.4  --   --    HEMOGLOBIN A1C  --   --   --   --   --  9.80*  --   --    TSH  --   --   --   --   --  8.790*  --   --     < > = values in this interval not displayed.         Lab 10/29/23  1028 10/26/23  0406 10/25/23  0357 10/24/23  1106   TOTAL PROTEIN 6.6  --  6.5 7.5   ALBUMIN 3.6 3.6 3.3* 3.7   GLOBULIN 3.0  --  3.2 3.8   ALT (SGPT) 24  --  16 19   AST (SGOT) 28  --  26 25   BILIRUBIN 1.3*  --  1.7* 2.1*   ALK PHOS 91  --  94 110         Lab 10/29/23  1028 10/25/23  0357 10/24/23  1435 10/24/23  1106   PROBNP 4,242.0*  --   --  13,276.0*   HSTROP T  --  631* 499* 356*         Lab 10/27/23  0429 10/25/23  0357   CHOLESTEROL 242* 255*   LDL CHOL 151* 170*   HDL CHOL 34* 42   TRIGLYCERIDES 305* 230*         Lab 10/29/23  1027 10/25/23  0357   IRON  --  57   IRON SATURATION (TSAT)  --  21   TIBC  --  276*   TRANSFERRIN  --  185*   FOLATE 11.80  --    VITAMIN B 12 437  --          Brief Urine Lab Results       None            Microbiology Results Abnormal       Procedure Component Value - Date/Time    COVID-19 and FLU A/B PCR - Swab, Nasopharynx [[id number]]  (Normal) Collected: 10/24/23 1109    Lab Status: Final result Specimen: Swab from Nasopharynx Updated: 10/24/23 1145     COVID19 Not Detected     Influenza A PCR Not Detected     Influenza B PCR Not Detected    Narrative:      Fact sheet for providers: https://www.fda.gov/media/104215/download    Fact sheet for patients: https://www.fda.gov/media/899256/download    Test  performed by [facility].            No radiology results from the last 24 hrs    Results for orders placed during the hospital encounter of 10/24/23    Adult Transthoracic Echo Complete W/ Cont if Necessary Per Protocol    Interpretation Summary    Left ventricular systolic function is moderate-severely decreased. Calculated left ventricular EF = 31.4%. The apex is akinetic with severe hypokinesis of the anterior, anterolateral, and septal walls.    The left ventricular cavity is mildly dilated.    Left ventricular diastolic function is consistent with (grade II w/high LAP) pseudonormalization.    The aortic valve is structurally normal with no stenosis present. No significant aortic valve regurgitation is present.    The mitral valve is structurally normal with no significant stenosis present. Mild mitral valve regurgitation is present.      Current medications:  Scheduled Meds:amLODIPine, 10 mg, Oral, Daily  atorvastatin, 80 mg, Oral, Nightly  carvedilol, 6.25 mg, Oral, BID With Meals  empagliflozin, 10 mg, Oral, Daily  enoxaparin, 1 mg/kg, Subcutaneous, Q12H  glipizide, 2.5 mg, Oral, BID AC  hydrALAZINE, 25 mg, Oral, Q8H  insulin lispro, 2-7 Units, Subcutaneous, 4x Daily AC & at Bedtime  pharmacy consult - [place], , Does not apply, Daily  senna-docusate sodium, 2 tablet, Oral, BID  sodium chloride, 10 mL, Intravenous, Q12H  torsemide, 20 mg, Oral, Daily      Continuous Infusions:     PRN Meds:.  [DISCONTINUED] acetaminophen **OR** acetaminophen **OR** acetaminophen    acetaminophen    ALPRAZolam    senna-docusate sodium **AND** polyethylene glycol **AND** bisacodyl **AND** bisacodyl    Calcium Replacement - Follow Nurse / BPA Driven Protocol    dextrose    dextrose    diphenhydrAMINE    glucagon (human recombinant)    hydrALAZINE    Magnesium Standard Dose Replacement - Follow Nurse / BPA Driven Protocol    nitroglycerin    ondansetron **OR** ondansetron    Phosphorus Replacement - Follow Nurse / BPA Driven Protocol     Potassium Replacement - Follow Nurse / BPA Driven Protocol    sodium chloride    sodium chloride    sodium chloride    Assessment & Plan   Assessment & Plan     Active Hospital Problems    Diagnosis  POA    **Acute exacerbation of congestive heart failure [I50.9]  Yes    Coronary artery disease involving native coronary artery of native heart with unstable angina pectoris [I25.110]  Unknown    HLD (hyperlipidemia) [E78.5]  Yes    Essential hypertension [I10]  Yes    Acute on chronic systolic congestive heart failure [I50.23]  Yes    Acute respiratory failure with hypoxia [J96.01]  Yes    Elevated troponin [R79.89]  Yes    Non-STEMI (non-ST elevated myocardial infarction) [I21.4]  Unknown      Resolved Hospital Problems   No resolved problems to display.        Brief Hospital Course to date:  Michael Cochran is a 78 y.o. female with history of hypertension and hyperlipidemia who presented to the ED with SOA and chest pain, admitted with NSTEMI and new onset acute systolic failure    Acute systolic heart failure (new onset)  Acute respiratory failure with hypoxia  NSTEMI  -Cardiology following, echo shows EF of 31.4%  -Cardiology did cardiac catheterization 0n 10/ 27 which showed multivessel disease in LAD, circumflex and RCA and PDA.  Cardiology recommended CABG and cardiothoracic surgery has seen and evaluated patient.  Tentatively surgery is for next week on Wednesday.  -Currently patient is on Jardiance and also on torsemide.  We will monitor renal function.    Hypertensive urgency   History of hypertension  -BP currently better controlled  -Continue Coreg, amlodipine and still not well controlled    Poorly controlled type 2 diabetes A1c 9.8% (new diagnosis)  -Continue SSI  -Was started on Glucotrol and serum glucose is much better controlled.  -Diabetes educator has seen the patient.    Hyperlipidemia    Morbid obesity which complicates everything.    Total time spent was 45 minutes  Expected Discharge Location  and Transportation: home or rehab after CABG  Expected Discharge   Expected Discharge Date: 11/4/2023; Expected Discharge Time:      DVT prophylaxis:  Medical DVT prophylaxis orders are present.     AM-PAC 6 Clicks Score (PT): 20 (10/28/23 9830)    CODE STATUS:   Code Status and Medical Interventions:   Ordered at: 10/24/23 1339     Level Of Support Discussed With:    Patient     Code Status (Patient has no pulse and is not breathing):    CPR (Attempt to Resuscitate)     Medical Interventions (Patient has pulse or is breathing):    Full Support       Madhav Martinez MD  10/30/23

## 2023-10-30 NOTE — CASE MANAGEMENT/SOCIAL WORK
"Continued Stay Note  [facility]     Patient Name: Michael Cochran  MRN: [id number]  Today's Date: 10/30/2023    Admit Date: 10/24/2023    Plan: Home with Family   Discharge Plan       Row Name 10/30/23 1354       Plan    Plan Home with Family    Plan Comments Per MDR, Ms. Cochran is not medically ready for discharge at this time. Per Cardiology note, \"Labile hypertension and will increase amlodipine to 10 mg daily.  We will continue additional cardiac medication and defer introduction of Entresto at this time.  Await CABG.\"  CT Surgery cont to plan of or CABG on 11/4.  PT/OT has previously recommended home with assist.  CM will cont to follow the evolving plan of care and assist with discharge planning as recommendations become available.    Final Discharge Disposition Code 01 - home or self-care                   Discharge Codes    No documentation.                 Expected Discharge Date and Time       Expected Discharge Date Expected Discharge Time    Nov 6, 2023               Clari Burns RN    "

## 2023-10-30 NOTE — PROGRESS NOTES
CTS Progress Note    Preop      Chief Complaint: Severe multivessel coronary artery disease    Subjective  No acute events overnight.  Denies complaints of chest pain or dyspnea.      Objective    Physical Exam:   Vital Signs   Temp:  [97.4 °F (36.3 °C)-98.6 °F (37 °C)] 97.4 °F (36.3 °C)  Heart Rate:  [51-66] 57  Resp:  [16-18] 16  BP: (159-189)/() 163/91   GEN: NAD   RESP: Respirations even and unlabored and clear to auscultation bilaterally no wheezes, rales or rhonchi    CV: Regular rate and rhythm no murmurs, rubs or gallops   ABD: Soft, nontender/nondistended with normoactive bowel sounds    EXT: Warm with good color perfused with no bilateral lower extremity edema   INT: No clubbing or cyanosis with no lesions or rashes appreciated      Intake/Output Summary (Last 24 hours) at 10/30/2023 0726  Last data filed at 10/29/2023 1300  Gross per 24 hour   Intake 600 ml   Output --   Net 600 ml     Results     Results from last 7 days   Lab Units 10/27/23  0433   WBC 10*3/mm3 9.82   HEMOGLOBIN g/dL 12.1   HEMATOCRIT % 38.9   PLATELETS 10*3/mm3 222     Results from last 7 days   Lab Units 10/29/23  1028   SODIUM mmol/L 142   POTASSIUM mmol/L 3.7   CHLORIDE mmol/L 104   CO2 mmol/L 28.0   BUN mg/dL 32*   CREATININE mg/dL 1.13*   GLUCOSE mg/dL 169*   CALCIUM mg/dL 9.2             Assessment & Plan       Acute exacerbation of congestive heart failure    HLD (hyperlipidemia)    Essential hypertension    Acute on chronic systolic congestive heart failure    Acute respiratory failure with hypoxia    Elevated troponin    Non-STEMI (non-ST elevated myocardial infarction)    Coronary artery disease involving native coronary artery of native heart with unstable angina pectoris    Severe multivessel coronary artery disease    Plan   Continue with preoperative work-up and medical optimization  Continue to monitor platelet function with P2Y12 inhibition assay  Patient is cleared for discharge from a surgical perspective, per  primary service, with plans to return for coronary artery bypass grafting on Wednesday, 11/1/2023.    VIKKI Montoya  10/30/23  07:26 EDT

## 2023-10-31 ENCOUNTER — ANESTHESIA EVENT (OUTPATIENT)
Dept: PERIOP | Facility: HOSPITAL | Age: 78
End: 2023-10-31
Payer: MEDICARE

## 2023-10-31 LAB
ABO GROUP BLD: NORMAL
ABO GROUP BLD: NORMAL
ANION GAP SERPL CALCULATED.3IONS-SCNC: 13 MMOL/L (ref 5–15)
BACTERIA UR QL AUTO: ABNORMAL /HPF
BILIRUB UR QL STRIP: NEGATIVE
BLD GP AB SCN SERPL QL: NEGATIVE
BUN SERPL-MCNC: 34 MG/DL (ref 8–23)
BUN/CREAT SERPL: 29.6 (ref 7–25)
CALCIUM SPEC-SCNC: 9 MG/DL (ref 8.6–10.5)
CHLORIDE SERPL-SCNC: 100 MMOL/L (ref 98–107)
CLARITY UR: ABNORMAL
CO2 SERPL-SCNC: 29 MMOL/L (ref 22–29)
COLOR UR: YELLOW
CREAT SERPL-MCNC: 1.15 MG/DL (ref 0.57–1)
EGFRCR SERPLBLD CKD-EPI 2021: 48.6 ML/MIN/1.73
GLUCOSE BLDC GLUCOMTR-MCNC: 107 MG/DL (ref 70–130)
GLUCOSE BLDC GLUCOMTR-MCNC: 115 MG/DL (ref 70–130)
GLUCOSE BLDC GLUCOMTR-MCNC: 177 MG/DL (ref 70–130)
GLUCOSE BLDC GLUCOMTR-MCNC: 97 MG/DL (ref 70–130)
GLUCOSE SERPL-MCNC: 108 MG/DL (ref 65–99)
GLUCOSE UR STRIP-MCNC: ABNORMAL MG/DL
HGB UR QL STRIP.AUTO: ABNORMAL
HYALINE CASTS UR QL AUTO: ABNORMAL /LPF
KETONES UR QL STRIP: NEGATIVE
LEUKOCYTE ESTERASE UR QL STRIP.AUTO: ABNORMAL
MAGNESIUM SERPL-MCNC: 1.9 MG/DL (ref 1.6–2.4)
NITRITE UR QL STRIP: NEGATIVE
PA ADP PRP-ACNC: 198 PRU
PH UR STRIP.AUTO: <=5 [PH] (ref 5–8)
POTASSIUM SERPL-SCNC: 3.5 MMOL/L (ref 3.5–5.2)
PROT UR QL STRIP: NEGATIVE
RBC # UR STRIP: ABNORMAL /HPF
REF LAB TEST METHOD: ABNORMAL
RH BLD: POSITIVE
RH BLD: POSITIVE
SODIUM SERPL-SCNC: 142 MMOL/L (ref 136–145)
SP GR UR STRIP: 1.01 (ref 1–1.03)
SQUAMOUS #/AREA URNS HPF: ABNORMAL /HPF
T&S EXPIRATION DATE: NORMAL
UROBILINOGEN UR QL STRIP: ABNORMAL
WBC # UR STRIP: ABNORMAL /HPF

## 2023-10-31 PROCEDURE — 82948 REAGENT STRIP/BLOOD GLUCOSE: CPT

## 2023-10-31 PROCEDURE — 63710000001 INSULIN LISPRO (HUMAN) PER 5 UNITS: Performed by: INTERNAL MEDICINE

## 2023-10-31 PROCEDURE — 99024 POSTOP FOLLOW-UP VISIT: CPT

## 2023-10-31 PROCEDURE — 86901 BLOOD TYPING SEROLOGIC RH(D): CPT

## 2023-10-31 PROCEDURE — 86850 RBC ANTIBODY SCREEN: CPT

## 2023-10-31 PROCEDURE — 86900 BLOOD TYPING SEROLOGIC ABO: CPT

## 2023-10-31 PROCEDURE — 83735 ASSAY OF MAGNESIUM: CPT | Performed by: INTERNAL MEDICINE

## 2023-10-31 PROCEDURE — 25010000002 ENOXAPARIN PER 10 MG: Performed by: INTERNAL MEDICINE

## 2023-10-31 PROCEDURE — 86923 COMPATIBILITY TEST ELECTRIC: CPT

## 2023-10-31 PROCEDURE — 99232 SBSQ HOSP IP/OBS MODERATE 35: CPT | Performed by: INTERNAL MEDICINE

## 2023-10-31 PROCEDURE — 85576 BLOOD PLATELET AGGREGATION: CPT

## 2023-10-31 PROCEDURE — 80048 BASIC METABOLIC PNL TOTAL CA: CPT | Performed by: INTERNAL MEDICINE

## 2023-10-31 PROCEDURE — 25010000002 HYDRALAZINE PER 20 MG: Performed by: INTERNAL MEDICINE

## 2023-10-31 PROCEDURE — 81001 URINALYSIS AUTO W/SCOPE: CPT

## 2023-10-31 PROCEDURE — 99231 SBSQ HOSP IP/OBS SF/LOW 25: CPT | Performed by: INTERNAL MEDICINE

## 2023-10-31 RX ORDER — VALSARTAN 80 MG/1
80 TABLET ORAL
Status: DISCONTINUED | OUTPATIENT
Start: 2023-10-31 | End: 2023-11-02

## 2023-10-31 RX ORDER — CHLORHEXIDINE GLUCONATE 500 MG/1
CLOTH TOPICAL EVERY 12 HOURS PRN
Status: DISCONTINUED | OUTPATIENT
Start: 2023-10-31 | End: 2023-11-02

## 2023-10-31 RX ORDER — CHLORHEXIDINE GLUCONATE ORAL RINSE 1.2 MG/ML
15 SOLUTION DENTAL EVERY 12 HOURS
Status: COMPLETED | OUTPATIENT
Start: 2023-10-31 | End: 2023-11-01

## 2023-10-31 RX ORDER — FAMOTIDINE 10 MG/ML
20 INJECTION, SOLUTION INTRAVENOUS ONCE
Status: CANCELLED | OUTPATIENT
Start: 2023-10-31 | End: 2023-10-31

## 2023-10-31 RX ORDER — LIDOCAINE HYDROCHLORIDE 10 MG/ML
0.5 INJECTION, SOLUTION EPIDURAL; INFILTRATION; INTRACAUDAL; PERINEURAL ONCE AS NEEDED
Status: CANCELLED | OUTPATIENT
Start: 2023-10-31

## 2023-10-31 RX ORDER — IPRATROPIUM BROMIDE AND ALBUTEROL SULFATE 2.5; .5 MG/3ML; MG/3ML
3 SOLUTION RESPIRATORY (INHALATION) EVERY 4 HOURS PRN
Status: DISCONTINUED | OUTPATIENT
Start: 2023-10-31 | End: 2023-11-15 | Stop reason: HOSPADM

## 2023-10-31 RX ORDER — ASPIRIN 81 MG/1
81 TABLET ORAL ONCE
Status: COMPLETED | OUTPATIENT
Start: 2023-10-31 | End: 2023-10-31

## 2023-10-31 RX ADMIN — GLIPIZIDE 2.5 MG: 5 TABLET ORAL at 11:20

## 2023-10-31 RX ADMIN — ATORVASTATIN CALCIUM 80 MG: 40 TABLET, FILM COATED ORAL at 20:08

## 2023-10-31 RX ADMIN — Medication 10 ML: at 08:16

## 2023-10-31 RX ADMIN — HYDRALAZINE HYDROCHLORIDE 10 MG: 20 INJECTION INTRAMUSCULAR; INTRAVENOUS at 17:37

## 2023-10-31 RX ADMIN — CHLORHEXIDINE GLUCONATE 15 ML: 1.2 SOLUTION ORAL at 17:16

## 2023-10-31 RX ADMIN — HYDRALAZINE HYDROCHLORIDE 25 MG: 25 TABLET, FILM COATED ORAL at 23:41

## 2023-10-31 RX ADMIN — CARVEDILOL 6.25 MG: 6.25 TABLET, FILM COATED ORAL at 08:16

## 2023-10-31 RX ADMIN — INSULIN LISPRO 2 UNITS: 100 INJECTION, SOLUTION INTRAVENOUS; SUBCUTANEOUS at 12:35

## 2023-10-31 RX ADMIN — ENOXAPARIN SODIUM 90 MG: 100 INJECTION SUBCUTANEOUS at 05:41

## 2023-10-31 RX ADMIN — VALSARTAN 80 MG: 80 TABLET, FILM COATED ORAL at 08:16

## 2023-10-31 RX ADMIN — AMLODIPINE BESYLATE 10 MG: 10 TABLET ORAL at 08:16

## 2023-10-31 RX ADMIN — EMPAGLIFLOZIN 10 MG: 10 TABLET, FILM COATED ORAL at 08:16

## 2023-10-31 RX ADMIN — MUPIROCIN 1 APPLICATION: 20 OINTMENT TOPICAL at 17:17

## 2023-10-31 RX ADMIN — CARVEDILOL 6.25 MG: 6.25 TABLET, FILM COATED ORAL at 17:16

## 2023-10-31 RX ADMIN — HYDRALAZINE HYDROCHLORIDE 25 MG: 25 TABLET, FILM COATED ORAL at 05:41

## 2023-10-31 RX ADMIN — GLIPIZIDE 2.5 MG: 5 TABLET ORAL at 16:30

## 2023-10-31 RX ADMIN — HYDRALAZINE HYDROCHLORIDE 25 MG: 25 TABLET, FILM COATED ORAL at 16:26

## 2023-10-31 RX ADMIN — ASPIRIN 81 MG: 81 TABLET, COATED ORAL at 11:20

## 2023-10-31 RX ADMIN — TORSEMIDE 20 MG: 20 TABLET ORAL at 08:16

## 2023-10-31 RX ADMIN — Medication 10 ML: at 20:11

## 2023-10-31 NOTE — STS RISK SCORE
Procedure Type: Isolated CABG  Perioperative Outcome Estimate %  Operative Mortality 5.9%  Morbidity & Mortality 18.6%  Stroke 1.95%  Renal Failure 4.03%  Reoperation 2.81%  Prolonged Ventilation 13.3%  Deep Sternal Wound Infection 0.525%  Long Hospital Stay (>14 days) 15.9%  Short Hospital Stay (<6 days)* 13.8%

## 2023-10-31 NOTE — PROGRESS NOTES
Michael Cochran  [id number]  1945   LOS: 7 days   Patient Care Team:  Bo Rodriguez PA as PCP - General (Family Medicine)    Chief Complaint:  NSTEMI / HTN / UNCONTROLLED T2 DM / OBESITY / AT RISK FOR GELY / HFrEF      Subjective     Sleeping soundly and did not awaken to mild verbal as well as tactile stimuli.  No difficulties through the night.  Await CABG in AM.  Oximetry 94% on 2 L/min nasal cannula.    Objective     Vital Sign Min/Max for last 24 hours  Temp  Min: 97.6 °F (36.4 °C)  Max: 98.5 °F (36.9 °C)   BP  Min: 150/88  Max: 179/105   Pulse  Min: 53  Max: 68   Resp  Min: 12  Max: 16   SpO2  Min: 90 %  Max: 96 %      Weight  Min: 93.6 kg (206 lb 4.8 oz)  Max: 93.6 kg (206 lb 4.8 oz)         10/30/23  0400 10/31/23  0400   Weight: 93.8 kg (206 lb 14.4 oz) 93.6 kg (206 lb 4.8 oz)         Intake/Output Summary (Last 24 hours) at 10/31/2023 0718  Last data filed at 10/30/2023 1200  Gross per 24 hour   Intake 450 ml   Output --   Net 450 ml       Physical Exam:     General Appearance:  Asleep cooperative, in no acute distress   Lungs:     Clear to auscultation,respirations regular, even and                unlabored    Heart:    Regular and normal rate, normal S1 and S2, grade 1/6 systolic murmur, no gallop, no rub, no click   Abdomen:  Extremities:   Soft, nontender, bowel sounds audible x4    No edema, normal range of motion   Pulses:   Pulses palpable and equal bilaterally      Results Review:   Results from last 7 days   Lab Units 10/29/23  1028 10/28/23  0816 10/27/23  0429   SODIUM mmol/L 142 142 143   POTASSIUM mmol/L 3.7 4.0 3.4*   CHLORIDE mmol/L 104 105 104   CO2 mmol/L 28.0 25.0 27.0   BUN mg/dL 32* 37* 37*   CREATININE mg/dL 1.13* 1.19* 1.33*   GLUCOSE mg/dL 169* 164* 162*   CALCIUM mg/dL 9.2 9.2 9.0     Results from last 7 days   Lab Units 10/27/23  8623 10/26/23  0406 10/25/23  0357   WBC 10*3/mm3 9.82 9.26 10.54   HEMOGLOBIN g/dL 12.1 13.3 12.8   HEMATOCRIT % 38.9 42.9 40.3   PLATELETS  10*3/mm3 222 181 225     Results from last 7 days   Lab Units 10/27/23  0429   CHOLESTEROL mg/dL 242*   TRIGLYCERIDES mg/dL 305*   HDL CHOL mg/dL 34*   LDL CHOL mg/dL 151*     Results from last 7 days   Lab Units 10/25/23  0357   HEMOGLOBIN A1C % 9.80*     Results from last 7 days   Lab Units 10/25/23  0357 10/24/23  1435 10/24/23  1106   HSTROP T ng/L 631* 499* 356*     NO NEW CXR / EKG / LAB RESULTS.    ACCU-CKEKS: 169 - 154 - 106 - 145 - 122 - 149 - 99 - 130 MG/DL.    Medication Review: REVIEWED; NO DRIPS.    Assessment & Plan     Labile hypertension but overall compensated.  Await CABG in AM.  Stable GFR assessment yesterday.      Acute exacerbation of congestive heart failure    Non-STEMI (non-ST elevated myocardial infarction)    HLD (hyperlipidemia)    Essential hypertension    Acute on chronic systolic congestive heart failure    Acute respiratory failure with hypoxia    Elevated troponin    Coronary artery disease involving native coronary artery of native heart with unstable angina pectoris    10/31/23  07:18 EDT

## 2023-10-31 NOTE — PROGRESS NOTES
[facility] Medicine Services  PROGRESS NOTE    Patient Name: Michael Cochran  : 1945  MRN: [id number]    Date of Admission: 10/24/2023  Primary Care Physician: Bo Rodriguez PA    Subjective   Subjective     CC: Follow-up SOA    HPI:     Resting in bed in no acute distress and does not have any specific complaints.  No fever or chills.  No chest pain or palpitation or shortness of breath.  No nausea vomiting or diarrhea or abdominal pain.      Objective   Objective     Vital Signs:   Temp:  [97.6 °F (36.4 °C)-98.5 °F (36.9 °C)] 98.2 °F (36.8 °C)  Heart Rate:  [53-68] 55  Resp:  [12-18] 18  BP: (145-179)/() 145/82  Flow (L/min):  [2] 2     Physical Exam:  Constitutional: No acute distress, awake, alert  HENT: NCAT, mucous membranes moist  Respiratory: Clear to auscultation bilaterally, breathing with no labor  Cardiovascular: RRR, no murmurs, rubs, or gallops  Gastrointestinal: Abdomen is obese.  Positive bowel sounds, soft, nontender, nondistended  Musculoskeletal: bilateral ankle edema, severe obesity  Psychiatric: Appropriate affect, cooperative  Neurologic: Awake, alert, oriented x3, no focality appreciated  Skin: No rashes      Results Reviewed:  LAB RESULTS:      Lab 10/27/23  0433 10/26/23  0406 10/25/23  0357   WBC 9.82 9.26 10.54   HEMOGLOBIN 12.1 13.3 12.8   HEMATOCRIT 38.9 42.9 40.3   PLATELETS 222 181 225   MCV 92.2 92.7 90.0         Lab 10/31/23  0822 10/29/23  1028 10/28/23  0816 10/27/23  0429 10/26/23  0406 10/25/23  1312 10/25/23  0357   SODIUM 142 142 142 143 144  --  143   POTASSIUM 3.5 3.7 4.0 3.4* 4.0   < > 3.8   CHLORIDE 100 104 105 104 105  --  105   CO2 29.0 28.0 25.0 27.0 23.0  --  26.0   ANION GAP 13.0 10.0 12.0 12.0 16.0*  --  12.0   BUN 34* 32* 37* 37* 34*  --  20   CREATININE 1.15* 1.13* 1.19* 1.33* 1.45*  --  1.35*   EGFR 48.6* 49.6* 46.6* 40.8* 36.8*  --  40.1*   GLUCOSE 108* 169* 164* 162* 177*  --  226*   CALCIUM 9.0 9.2 9.2 9.0 9.3   --  9.0   MAGNESIUM 1.9  --  1.7  --  1.8  --  1.9   PHOSPHORUS  --   --  3.8  --  5.1*  --  4.4   HEMOGLOBIN A1C  --   --   --   --   --   --  9.80*   TSH  --   --   --   --   --   --  8.790*    < > = values in this interval not displayed.         Lab 10/29/23  1028 10/26/23  0406 10/25/23  0357   TOTAL PROTEIN 6.6  --  6.5   ALBUMIN 3.6 3.6 3.3*   GLOBULIN 3.0  --  3.2   ALT (SGPT) 24  --  16   AST (SGOT) 28  --  26   BILIRUBIN 1.3*  --  1.7*   ALK PHOS 91  --  94         Lab 10/29/23  1028 10/25/23  0357 10/24/23  1435   PROBNP 4,242.0*  --   --    HSTROP T  --  631* 499*         Lab 10/27/23  0429 10/25/23  0357   CHOLESTEROL 242* 255*   LDL CHOL 151* 170*   HDL CHOL 34* 42   TRIGLYCERIDES 305* 230*         Lab 10/31/23  1022 10/31/23  0859 10/31/23  0859 10/29/23  1027 10/25/23  0357   IRON  --   --   --   --  57   IRON SATURATION (TSAT)  --   --   --   --  21   TIBC  --   --   --   --  276*   TRANSFERRIN  --   --   --   --  185*   FOLATE  --   --   --  11.80  --    VITAMIN B 12  --   --   --  437  --    ABO TYPING A   < > A  --   --    RH TYPING Positive   < > Positive  --   --    ANTIBODY SCREEN  --   --  Negative  --   --     < > = values in this interval not displayed.         Brief Urine Lab Results       None            Microbiology Results Abnormal       Procedure Component Value - Date/Time    COVID-19 and FLU A/B PCR - Swab, Nasopharynx [[id number]]  (Normal) Collected: 10/24/23 1109    Lab Status: Final result Specimen: Swab from Nasopharynx Updated: 10/24/23 1145     COVID19 Not Detected     Influenza A PCR Not Detected     Influenza B PCR Not Detected    Narrative:      Fact sheet for providers: https://www.fda.gov/media/414676/download    Fact sheet for patients: https://www.fda.gov/media/368791/download    Test performed by PCR.            No radiology results from the last 24 hrs    Results for orders placed during the hospital encounter of 10/24/23    Adult Transthoracic Echo Complete W/ Cont if  Necessary Per Protocol    Interpretation Summary    Left ventricular systolic function is moderate-severely decreased. Calculated left ventricular EF = 31.4%. The apex is akinetic with severe hypokinesis of the anterior, anterolateral, and septal walls.    The left ventricular cavity is mildly dilated.    Left ventricular diastolic function is consistent with (grade II w/high LAP) pseudonormalization.    The aortic valve is structurally normal with no stenosis present. No significant aortic valve regurgitation is present.    The mitral valve is structurally normal with no significant stenosis present. Mild mitral valve regurgitation is present.      Current medications:  Scheduled Meds:amLODIPine, 10 mg, Oral, Daily  atorvastatin, 80 mg, Oral, Nightly  carvedilol, 6.25 mg, Oral, BID With Meals  [START ON 11/1/2023] ceFAZolin, 2,000 mg, Intravenous, Once  chlorhexidine, 15 mL, Mouth/Throat, Q12H  empagliflozin, 10 mg, Oral, Daily  enoxaparin, 1 mg/kg, Subcutaneous, Q12H  glipizide, 2.5 mg, Oral, BID AC  hydrALAZINE, 25 mg, Oral, Q8H  insulin lispro, 2-7 Units, Subcutaneous, 4x Daily AC & at Bedtime  mupirocin, 1 application , Each Nare, Q12H  pharmacy consult - MTM, , Does not apply, Daily  senna-docusate sodium, 2 tablet, Oral, BID  sodium chloride, 10 mL, Intravenous, Q12H  torsemide, 20 mg, Oral, Daily  valsartan, 80 mg, Oral, Q24H      Continuous Infusions:     PRN Meds:.  [DISCONTINUED] acetaminophen **OR** acetaminophen **OR** acetaminophen    acetaminophen    ALPRAZolam    senna-docusate sodium **AND** polyethylene glycol **AND** bisacodyl **AND** bisacodyl    Calcium Replacement - Follow Nurse / BPA Driven Protocol    Chlorhexidine Gluconate Cloth    dextrose    dextrose    diphenhydrAMINE    glucagon (human recombinant)    hydrALAZINE    ipratropium-albuterol    Magnesium Standard Dose Replacement - Follow Nurse / BPA Driven Protocol    nitroglycerin    ondansetron **OR** ondansetron    Phosphorus  Replacement - Follow Nurse / BPA Driven Protocol    Potassium Replacement - Follow Nurse / BPA Driven Protocol    sodium chloride    sodium chloride    sodium chloride    Assessment & Plan   Assessment & Plan     Active Hospital Problems    Diagnosis  POA    **Acute exacerbation of congestive heart failure [I50.9]  Yes    Coronary artery disease involving native coronary artery of native heart with unstable angina pectoris [I25.110]  Unknown    HLD (hyperlipidemia) [E78.5]  Yes    Essential hypertension [I10]  Yes    Acute on chronic systolic congestive heart failure [I50.23]  Yes    Acute respiratory failure with hypoxia [J96.01]  Yes    Elevated troponin [R79.89]  Yes    Non-STEMI (non-ST elevated myocardial infarction) [I21.4]  Unknown      Resolved Hospital Problems   No resolved problems to display.        Brief Hospital Course to date:  Michael Cochran is a 78 y.o. female with history of hypertension and hyperlipidemia who presented to the ED with SOA and chest pain, admitted with NSTEMI and new onset acute systolic failure    Acute systolic heart failure (new onset)  Acute respiratory failure with hypoxia  NSTEMI  -Cardiology following, echo shows EF of 31.4%  -Cardiology did cardiac catheterization 0n 10/ 27 which showed multivessel disease in LAD, circumflex and RCA and PDA.  Cardiology recommended CABG and cardiothoracic surgery has seen and evaluated patient.  Tentatively surgery is for tomorrow.  -Currently patient is on Jardiance and also on torsemide.  We will monitor renal function.    Hypertensive urgency   History of hypertension  -BP currently better controlled  -Continue Coreg, amlodipine and still not well controlled    Poorly controlled type 2 diabetes A1c 9.8% (new diagnosis)  -Continue SSI  -Was started on Glucotrol and serum glucose is much better controlled.  -Diabetes educator has seen the patient.    Hyperlipidemia    Morbid obesity which complicates everything.      Expected Discharge  Location and Transportation: home or rehab after CABG  Expected Discharge   Expected Discharge Date: 11/7/2023; Expected Discharge Time:      DVT prophylaxis:  Medical DVT prophylaxis orders are present.     AM-PAC 6 Clicks Score (PT): 20 (10/28/23 3630)    CODE STATUS:   Code Status and Medical Interventions:   Ordered at: 10/24/23 1339     Level Of Support Discussed With:    Patient     Code Status (Patient has no pulse and is not breathing):    CPR (Attempt to Resuscitate)     Medical Interventions (Patient has pulse or is breathing):    Full Support       Madhav Martinez MD  10/31/23

## 2023-10-31 NOTE — PROGRESS NOTES
CTS Progress Note      Chief Complaint: Severe multivessel coronary artery disease    Subjective  Denies chest pain or shortness of breath.    Objective    Physical Exam:   Vital Signs   Temp:  [97.6 °F (36.4 °C)-98.5 °F (36.9 °C)] 98.2 °F (36.8 °C)  Heart Rate:  [53-68] 55  Resp:  [12-18] 18  BP: (150-179)/() 172/101   GEN: NAD   RESP: Respirations even and unlabored   CV: Regular rate and rhythm no murmurs, rubs or gallops   ABD: Soft, nontender/nondistended with normoactive bowel sounds    EXT: Warm with good color perfused with no bilateral lower extremity edema   INT: No clubbing or cyanosis with no lesions or rashes appreciated      Intake/Output Summary (Last 24 hours) at 10/31/2023 0830  Last data filed at 10/30/2023 1200  Gross per 24 hour   Intake 250 ml   Output --   Net 250 ml     Results     Results from last 7 days   Lab Units 10/27/23  0433   WBC 10*3/mm3 9.82   HEMOGLOBIN g/dL 12.1   HEMATOCRIT % 38.9   PLATELETS 10*3/mm3 222     Results from last 7 days   Lab Units 10/29/23  1028   SODIUM mmol/L 142   POTASSIUM mmol/L 3.7   CHLORIDE mmol/L 104   CO2 mmol/L 28.0   BUN mg/dL 32*   CREATININE mg/dL 1.13*   GLUCOSE mg/dL 169*   CALCIUM mg/dL 9.2             Assessment & Plan       Acute exacerbation of congestive heart failure    HLD (hyperlipidemia)    Essential hypertension    Acute on chronic systolic congestive heart failure    Acute respiratory failure with hypoxia    Elevated troponin    Non-STEMI (non-ST elevated myocardial infarction)    Coronary artery disease involving native coronary artery of native heart with unstable angina pectoris    Severe multivessel coronary artery disease    Plan   Continue with preoperative work-up   P2Y12 pending  N.p.o. after midnight     FRACISCO Mesa  10/31/23  08:30 EDT

## 2023-10-31 NOTE — ANESTHESIA PREPROCEDURE EVALUATION
Anesthesia Evaluation     Patient summary reviewed and Nursing notes reviewed   no history of anesthetic complications:   NPO Solid Status: > 8 hours  NPO Liquid Status: > 2 hours           Airway   Mallampati: III  TM distance: >3 FB  Neck ROM: full  Possible difficult intubation  Dental - normal exam     Pulmonary - normal exam   (+) a smoker,  Cardiovascular - normal exam    ECG reviewed    (+) hypertension, past MI , CAD, angina, CHF , hyperlipidemia    ROS comment:  Cath       ·  Ostial 95% LAD stenosis involve the origin of a large first diagonal as well as some retrograde mild to moderate plaque in the left main.  ·  Bifurcation right coronary artery 95% stenosis.  ·  70% LCx stenosis  ·  LVEF 40-45% with anterior apical hypokinesis       Echo  Interpretation Summary       ·  Left ventricular systolic function is moderate-severely decreased. Calculated left ventricular EF = 31.4%. The apex is akinetic with severe hypokinesis of the anterior, anterolateral, and septal walls.  ·  The left ventricular cavity is mildly dilated.  ·  Left ventricular diastolic function is consistent with (grade II w/high LAP) pseudonormalization.  ·  The aortic valve is structurally normal with no stenosis present. No significant aortic valve regurgitation is present.  ·  The mitral valve is structurally normal with no significant stenosis present. Mild mitral valve regurgitation is present.         Neuro/Psych- negative ROS  GI/Hepatic/Renal/Endo    (+) diabetes mellitus  (-)  obesity    Musculoskeletal     Abdominal  - normal exam    Bowel sounds: normal.   Substance History      OB/GYN          Other                    Anesthesia Plan    ASA 4     general, [place], CVL and ERAS Protocol     (Margarita, CVC, PRIMITIVO post op ICU vent)  intravenous induction   Postoperative Plan: Expected vent after surgery  Anesthetic plan, risks, benefits, and alternatives have been provided, discussed and informed consent has been obtained with:  patient.    Use of blood products discussed with patient .      CODE STATUS:    Level Of Support Discussed With: Patient  Code Status (Patient has no pulse and is not breathing): CPR (Attempt to Resuscitate)  Medical Interventions (Patient has pulse or is breathing): Full Support

## 2023-11-01 ENCOUNTER — APPOINTMENT (OUTPATIENT)
Dept: GENERAL RADIOLOGY | Facility: HOSPITAL | Age: 78
End: 2023-11-01
Payer: MEDICARE

## 2023-11-01 ENCOUNTER — ANCILLARY PROCEDURE (OUTPATIENT)
Dept: PERIOP | Facility: HOSPITAL | Age: 78
End: 2023-11-01
Payer: MEDICARE

## 2023-11-01 ENCOUNTER — ANESTHESIA EVENT CONVERTED (OUTPATIENT)
Dept: ANESTHESIOLOGY | Facility: HOSPITAL | Age: 78
End: 2023-11-01
Payer: MEDICARE

## 2023-11-01 ENCOUNTER — ANESTHESIA (OUTPATIENT)
Dept: PERIOP | Facility: HOSPITAL | Age: 78
End: 2023-11-01
Payer: MEDICARE

## 2023-11-01 PROBLEM — I25.10 CAD (CORONARY ARTERY DISEASE): Status: ACTIVE | Noted: 2023-11-01

## 2023-11-01 LAB
ACT BLD: 143 SECONDS (ref 82–152)
ACT BLD: 413 SECONDS (ref 82–152)
ACT BLD: 438 SECONDS (ref 82–152)
ACT BLD: 456 SECONDS (ref 82–152)
ACT BLD: 467 SECONDS (ref 82–152)
ACT BLD: 492 SECONDS (ref 82–152)
ACT BLD: 660 SECONDS (ref 82–152)
ALBUMIN SERPL-MCNC: 3.2 G/DL (ref 3.5–5.2)
ALBUMIN SERPL-MCNC: 3.8 G/DL (ref 3.5–5.2)
AMPHET+METHAMPHET UR QL: NEGATIVE
AMPHETAMINES UR QL: NEGATIVE
ANION GAP SERPL CALCULATED.3IONS-SCNC: 11 MMOL/L (ref 5–15)
ANION GAP SERPL CALCULATED.3IONS-SCNC: 13 MMOL/L (ref 5–15)
ANION GAP SERPL CALCULATED.3IONS-SCNC: 14 MMOL/L (ref 5–15)
APTT PPP: 36.6 SECONDS (ref 22–39)
ARTERIAL PATENCY WRIST A: ABNORMAL
ATMOSPHERIC PRESS: ABNORMAL MM[HG]
BARBITURATES UR QL SCN: NEGATIVE
BASE EXCESS BLDA CALC-SCNC: -2 MMOL/L (ref 0–2)
BASE EXCESS BLDA CALC-SCNC: -4.6 MMOL/L (ref 0–2)
BASE EXCESS BLDA CALC-SCNC: 0 MMOL/L (ref 0–2)
BDY SITE: ABNORMAL
BENZODIAZ UR QL SCN: NEGATIVE
BODY TEMPERATURE: 37 C
BUN SERPL-MCNC: 31 MG/DL (ref 8–23)
BUN SERPL-MCNC: 34 MG/DL (ref 8–23)
BUN SERPL-MCNC: 38 MG/DL (ref 8–23)
BUN/CREAT SERPL: 26.2 (ref 7–25)
BUN/CREAT SERPL: 29 (ref 7–25)
BUN/CREAT SERPL: 36.2 (ref 7–25)
BUPRENORPHINE SERPL-MCNC: NEGATIVE NG/ML
CA-I SERPL ISE-MCNC: 1.28 MMOL/L (ref 1.12–1.32)
CALCIUM SPEC-SCNC: 8.8 MG/DL (ref 8.6–10.5)
CALCIUM SPEC-SCNC: 9 MG/DL (ref 8.6–10.5)
CALCIUM SPEC-SCNC: 9 MG/DL (ref 8.6–10.5)
CANNABINOIDS SERPL QL: NEGATIVE
CHLORIDE SERPL-SCNC: 100 MMOL/L (ref 98–107)
CHLORIDE SERPL-SCNC: 106 MMOL/L (ref 98–107)
CHLORIDE SERPL-SCNC: 107 MMOL/L (ref 98–107)
CO2 BLDA-SCNC: 23 MMOL/L (ref 22–33)
CO2 BLDA-SCNC: 24.7 MMOL/L (ref 22–33)
CO2 BLDA-SCNC: 26.4 MMOL/L (ref 22–33)
CO2 SERPL-SCNC: 21 MMOL/L (ref 22–29)
CO2 SERPL-SCNC: 23 MMOL/L (ref 22–29)
CO2 SERPL-SCNC: 27 MMOL/L (ref 22–29)
COCAINE UR QL: NEGATIVE
COHGB MFR BLD: 1.5 % (ref 0–2)
COHGB MFR BLD: 1.8 % (ref 0–2)
COHGB MFR BLD: 1.9 % (ref 0–2)
CREAT SERPL-MCNC: 1.05 MG/DL (ref 0.57–1)
CREAT SERPL-MCNC: 1.07 MG/DL (ref 0.57–1)
CREAT SERPL-MCNC: 1.3 MG/DL (ref 0.57–1)
DEPRECATED RDW RBC AUTO: 44 FL (ref 37–54)
DEPRECATED RDW RBC AUTO: 45.1 FL (ref 37–54)
DEPRECATED RDW RBC AUTO: 46.5 FL (ref 37–54)
EGFRCR SERPLBLD CKD-EPI 2021: 41.9 ML/MIN/1.73
EGFRCR SERPLBLD CKD-EPI 2021: 52.9 ML/MIN/1.73
EGFRCR SERPLBLD CKD-EPI 2021: 54.2 ML/MIN/1.73
EPAP: 0
ERYTHROCYTE [DISTWIDTH] IN BLOOD BY AUTOMATED COUNT: 13.3 % (ref 12.3–15.4)
ERYTHROCYTE [DISTWIDTH] IN BLOOD BY AUTOMATED COUNT: 13.3 % (ref 12.3–15.4)
ERYTHROCYTE [DISTWIDTH] IN BLOOD BY AUTOMATED COUNT: 13.6 % (ref 12.3–15.4)
FENTANYL UR-MCNC: NEGATIVE NG/ML
GLUCOSE BLDC GLUCOMTR-MCNC: 100 MG/DL (ref 70–130)
GLUCOSE BLDC GLUCOMTR-MCNC: 103 MG/DL (ref 70–130)
GLUCOSE BLDC GLUCOMTR-MCNC: 119 MG/DL (ref 70–130)
GLUCOSE BLDC GLUCOMTR-MCNC: 132 MG/DL (ref 70–130)
GLUCOSE BLDC GLUCOMTR-MCNC: 163 MG/DL (ref 70–130)
GLUCOSE BLDC GLUCOMTR-MCNC: 171 MG/DL (ref 70–130)
GLUCOSE BLDC GLUCOMTR-MCNC: 216 MG/DL (ref 70–130)
GLUCOSE BLDC GLUCOMTR-MCNC: 234 MG/DL (ref 70–130)
GLUCOSE BLDC GLUCOMTR-MCNC: 260 MG/DL (ref 70–130)
GLUCOSE BLDC GLUCOMTR-MCNC: 265 MG/DL (ref 70–130)
GLUCOSE SERPL-MCNC: 100 MG/DL (ref 65–99)
GLUCOSE SERPL-MCNC: 104 MG/DL (ref 65–99)
GLUCOSE SERPL-MCNC: 220 MG/DL (ref 65–99)
HCO3 BLDA-SCNC: 21.6 MMOL/L (ref 20–26)
HCO3 BLDA-SCNC: 23.4 MMOL/L (ref 20–26)
HCO3 BLDA-SCNC: 25.1 MMOL/L (ref 20–26)
HCT VFR BLD AUTO: 32.2 % (ref 34–46.6)
HCT VFR BLD AUTO: 34.6 % (ref 34–46.6)
HCT VFR BLD AUTO: 40.9 % (ref 34–46.6)
HCT VFR BLD CALC: 27 % (ref 38–51)
HCT VFR BLD CALC: 28.7 % (ref 38–51)
HCT VFR BLD CALC: 34.3 % (ref 38–51)
HGB BLD-MCNC: 10.7 G/DL (ref 12–15.9)
HGB BLD-MCNC: 12.7 G/DL (ref 12–15.9)
HGB BLD-MCNC: 9.9 G/DL (ref 12–15.9)
HGB BLDA-MCNC: 11.2 G/DL (ref 14–18)
HGB BLDA-MCNC: 8.8 G/DL (ref 14–18)
HGB BLDA-MCNC: 9.4 G/DL (ref 14–18)
INHALED O2 CONCENTRATION: 100 %
INHALED O2 CONCENTRATION: 40 %
INHALED O2 CONCENTRATION: 40 %
INR PPP: 1.49 (ref 0.89–1.12)
IPAP: 0
MAGNESIUM SERPL-MCNC: 2.1 MG/DL (ref 1.6–2.4)
MAGNESIUM SERPL-MCNC: 2.1 MG/DL (ref 1.6–2.4)
MCH RBC QN AUTO: 28 PG (ref 26.6–33)
MCH RBC QN AUTO: 28.5 PG (ref 26.6–33)
MCH RBC QN AUTO: 28.9 PG (ref 26.6–33)
MCHC RBC AUTO-ENTMCNC: 30.7 G/DL (ref 31.5–35.7)
MCHC RBC AUTO-ENTMCNC: 30.9 G/DL (ref 31.5–35.7)
MCHC RBC AUTO-ENTMCNC: 31.1 G/DL (ref 31.5–35.7)
MCV RBC AUTO: 90.3 FL (ref 79–97)
MCV RBC AUTO: 92.3 FL (ref 79–97)
MCV RBC AUTO: 94.2 FL (ref 79–97)
METHADONE UR QL SCN: NEGATIVE
METHGB BLD QL: 0.4 % (ref 0–1.5)
METHGB BLD QL: 0.4 % (ref 0–1.5)
METHGB BLD QL: 0.5 % (ref 0–1.5)
MODALITY: ABNORMAL
OPIATES UR QL: NEGATIVE
OXYCODONE UR QL SCN: NEGATIVE
OXYHGB MFR BLDV: 92.3 % (ref 94–99)
OXYHGB MFR BLDV: 92.7 % (ref 94–99)
OXYHGB MFR BLDV: 96.2 % (ref 94–99)
PA ADP PRP-ACNC: 223 PRU
PAW @ PEAK INSP FLOW SETTING VENT: 0 CMH2O
PCO2 BLDA: 41.9 MM HG (ref 35–45)
PCO2 BLDA: 42.2 MM HG (ref 35–45)
PCO2 BLDA: 44.3 MM HG (ref 35–45)
PCO2 TEMP ADJ BLD: 41.9 MM HG (ref 35–45)
PCO2 TEMP ADJ BLD: 42.2 MM HG (ref 35–45)
PCO2 TEMP ADJ BLD: 44.3 MM HG (ref 35–45)
PCP UR QL SCN: NEGATIVE
PEEP RESPIRATORY: 5 CM[H2O]
PH BLDA: 7.3 PH UNITS (ref 7.35–7.45)
PH BLDA: 7.36 PH UNITS (ref 7.35–7.45)
PH BLDA: 7.38 PH UNITS (ref 7.35–7.45)
PH, TEMP CORRECTED: 7.3 PH UNITS
PH, TEMP CORRECTED: 7.36 PH UNITS
PH, TEMP CORRECTED: 7.38 PH UNITS
PHOSPHATE SERPL-MCNC: 4.5 MG/DL (ref 2.5–4.5)
PHOSPHATE SERPL-MCNC: 5.1 MG/DL (ref 2.5–4.5)
PLATELET # BLD AUTO: 147 10*3/MM3 (ref 140–450)
PLATELET # BLD AUTO: 172 10*3/MM3 (ref 140–450)
PLATELET # BLD AUTO: 216 10*3/MM3 (ref 140–450)
PMV BLD AUTO: 12.7 FL (ref 6–12)
PMV BLD AUTO: 12.8 FL (ref 6–12)
PMV BLD AUTO: 13 FL (ref 6–12)
PO2 BLDA: 71.5 MM HG (ref 83–108)
PO2 BLDA: 78.5 MM HG (ref 83–108)
PO2 BLDA: 96 MM HG (ref 83–108)
PO2 TEMP ADJ BLD: 71.5 MM HG (ref 83–108)
PO2 TEMP ADJ BLD: 78.5 MM HG (ref 83–108)
PO2 TEMP ADJ BLD: 96 MM HG (ref 83–108)
POTASSIUM SERPL-SCNC: 3.3 MMOL/L (ref 3.5–5.2)
POTASSIUM SERPL-SCNC: 4.1 MMOL/L (ref 3.5–5.2)
POTASSIUM SERPL-SCNC: 4.3 MMOL/L (ref 3.5–5.2)
PROPOXYPH UR QL: NEGATIVE
PROTHROMBIN TIME: 18.2 SECONDS (ref 12.2–14.5)
PSV: 10 CMH2O
PSV: 10 CMH2O
QT INTERVAL: 532 MS
QTC INTERVAL: 508 MS
RBC # BLD AUTO: 3.42 10*6/MM3 (ref 3.77–5.28)
RBC # BLD AUTO: 3.75 10*6/MM3 (ref 3.77–5.28)
RBC # BLD AUTO: 4.53 10*6/MM3 (ref 3.77–5.28)
SODIUM SERPL-SCNC: 138 MMOL/L (ref 136–145)
SODIUM SERPL-SCNC: 141 MMOL/L (ref 136–145)
SODIUM SERPL-SCNC: 143 MMOL/L (ref 136–145)
TOTAL RATE: 0 BREATHS/MINUTE
TOTAL RATE: 23 BREATHS/MINUTE
TOTAL RATE: 26 BREATHS/MINUTE
TRICYCLICS UR QL SCN: NEGATIVE
VENTILATOR MODE: ABNORMAL
VENTILATOR MODE: ABNORMAL
VT ON VENT VENT: 0.4 ML
VT ON VENT VENT: 0.47 ML
WBC NRBC COR # BLD: 10 10*3/MM3 (ref 3.4–10.8)
WBC NRBC COR # BLD: 16.25 10*3/MM3 (ref 3.4–10.8)
WBC NRBC COR # BLD: 20.2 10*3/MM3 (ref 3.4–10.8)

## 2023-11-01 PROCEDURE — C1751 CATH, INF, PER/CENT/MIDLINE: HCPCS | Performed by: ANESTHESIOLOGY

## 2023-11-01 PROCEDURE — 25010000002 DOBUTAMINE PER 250 MG: Performed by: STUDENT IN AN ORGANIZED HEALTH CARE EDUCATION/TRAINING PROGRAM

## 2023-11-01 PROCEDURE — 93010 ELECTROCARDIOGRAM REPORT: CPT | Performed by: INTERNAL MEDICINE

## 2023-11-01 PROCEDURE — P9041 ALBUMIN (HUMAN),5%, 50ML: HCPCS | Performed by: STUDENT IN AN ORGANIZED HEALTH CARE EDUCATION/TRAINING PROGRAM

## 2023-11-01 PROCEDURE — 25010000002 PROPOFOL 10 MG/ML EMULSION: Performed by: ANESTHESIOLOGY

## 2023-11-01 PROCEDURE — 25010000002 ACETAMINOPHEN 10 MG/ML SOLUTION: Performed by: ANESTHESIOLOGY

## 2023-11-01 PROCEDURE — 25010000002 HEPARIN (PORCINE) PER 1000 UNITS: Performed by: THORACIC SURGERY (CARDIOTHORACIC VASCULAR SURGERY)

## 2023-11-01 PROCEDURE — 82947 ASSAY GLUCOSE BLOOD QUANT: CPT

## 2023-11-01 PROCEDURE — 82375 ASSAY CARBOXYHB QUANT: CPT

## 2023-11-01 PROCEDURE — 33508 ENDOSCOPIC VEIN HARVEST: CPT | Performed by: THORACIC SURGERY (CARDIOTHORACIC VASCULAR SURGERY)

## 2023-11-01 PROCEDURE — 85347 COAGULATION TIME ACTIVATED: CPT

## 2023-11-01 PROCEDURE — 80069 RENAL FUNCTION PANEL: CPT | Performed by: STUDENT IN AN ORGANIZED HEALTH CARE EDUCATION/TRAINING PROGRAM

## 2023-11-01 PROCEDURE — 93005 ELECTROCARDIOGRAM TRACING: CPT | Performed by: STUDENT IN AN ORGANIZED HEALTH CARE EDUCATION/TRAINING PROGRAM

## 2023-11-01 PROCEDURE — 25810000003 SODIUM CHLORIDE 0.9 % SOLUTION: Performed by: ANESTHESIOLOGY

## 2023-11-01 PROCEDURE — 06BQ4ZZ EXCISION OF LEFT SAPHENOUS VEIN, PERCUTANEOUS ENDOSCOPIC APPROACH: ICD-10-PCS | Performed by: THORACIC SURGERY (CARDIOTHORACIC VASCULAR SURGERY)

## 2023-11-01 PROCEDURE — 25010000002 PHENYLEPHRINE 10 MG/ML SOLUTION: Performed by: ANESTHESIOLOGY

## 2023-11-01 PROCEDURE — 25010000002 MIDAZOLAM PER 1 MG: Performed by: ANESTHESIOLOGY

## 2023-11-01 PROCEDURE — 83735 ASSAY OF MAGNESIUM: CPT | Performed by: STUDENT IN AN ORGANIZED HEALTH CARE EDUCATION/TRAINING PROGRAM

## 2023-11-01 PROCEDURE — 82948 REAGENT STRIP/BLOOD GLUCOSE: CPT

## 2023-11-01 PROCEDURE — 25010000002 ESMOLOL 100 MG/10ML SOLUTION: Performed by: ANESTHESIOLOGY

## 2023-11-01 PROCEDURE — 25810000003 DEXTROSE 5 % WITH KCL 20 MEQ 20 MEQ/L SOLUTION: Performed by: STUDENT IN AN ORGANIZED HEALTH CARE EDUCATION/TRAINING PROGRAM

## 2023-11-01 PROCEDURE — 25010000002 PAPAVERINE PER 60 MG: Performed by: THORACIC SURGERY (CARDIOTHORACIC VASCULAR SURGERY)

## 2023-11-01 PROCEDURE — 5A1221Z PERFORMANCE OF CARDIAC OUTPUT, CONTINUOUS: ICD-10-PCS | Performed by: THORACIC SURGERY (CARDIOTHORACIC VASCULAR SURGERY)

## 2023-11-01 PROCEDURE — 25810000003 SODIUM CHLORIDE 0.9 % SOLUTION 250 ML FLEX CONT: Performed by: ANESTHESIOLOGY

## 2023-11-01 PROCEDURE — 99232 SBSQ HOSP IP/OBS MODERATE 35: CPT | Performed by: INTERNAL MEDICINE

## 2023-11-01 PROCEDURE — 82805 BLOOD GASES W/O2 SATURATION: CPT

## 2023-11-01 PROCEDURE — 94799 UNLISTED PULMONARY SVC/PX: CPT

## 2023-11-01 PROCEDURE — 80048 BASIC METABOLIC PNL TOTAL CA: CPT

## 2023-11-01 PROCEDURE — 25010000002 ACETAMINOPHEN 10 MG/ML SOLUTION: Performed by: STUDENT IN AN ORGANIZED HEALTH CARE EDUCATION/TRAINING PROGRAM

## 2023-11-01 PROCEDURE — 85576 BLOOD PLATELET AGGREGATION: CPT

## 2023-11-01 PROCEDURE — 85730 THROMBOPLASTIN TIME PARTIAL: CPT | Performed by: STUDENT IN AN ORGANIZED HEALTH CARE EDUCATION/TRAINING PROGRAM

## 2023-11-01 PROCEDURE — 02100Z9 BYPASS CORONARY ARTERY, ONE ARTERY FROM LEFT INTERNAL MAMMARY, OPEN APPROACH: ICD-10-PCS | Performed by: THORACIC SURGERY (CARDIOTHORACIC VASCULAR SURGERY)

## 2023-11-01 PROCEDURE — 25010000002 CEFAZOLIN PER 500 MG

## 2023-11-01 PROCEDURE — 25010000002 HEPARIN (PORCINE) PER 1000 UNITS: Performed by: ANESTHESIOLOGY

## 2023-11-01 PROCEDURE — 82330 ASSAY OF CALCIUM: CPT | Performed by: STUDENT IN AN ORGANIZED HEALTH CARE EDUCATION/TRAINING PROGRAM

## 2023-11-01 PROCEDURE — 25010000002 ALBUMIN HUMAN 5% PER 50 ML: Performed by: STUDENT IN AN ORGANIZED HEALTH CARE EDUCATION/TRAINING PROGRAM

## 2023-11-01 PROCEDURE — 25010000002 CEFAZOLIN PER 500 MG: Performed by: STUDENT IN AN ORGANIZED HEALTH CARE EDUCATION/TRAINING PROGRAM

## 2023-11-01 PROCEDURE — 99222 1ST HOSP IP/OBS MODERATE 55: CPT | Performed by: INTERNAL MEDICINE

## 2023-11-01 PROCEDURE — 25010000002 EPINEPHRINE PER 0.1 MG: Performed by: ANESTHESIOLOGY

## 2023-11-01 PROCEDURE — 33519 CABG ARTERY-VEIN THREE: CPT | Performed by: THORACIC SURGERY (CARDIOTHORACIC VASCULAR SURGERY)

## 2023-11-01 PROCEDURE — 25010000002 POTASSIUM CHLORIDE PER 2 MEQ: Performed by: ANESTHESIOLOGY

## 2023-11-01 PROCEDURE — 85027 COMPLETE CBC AUTOMATED: CPT

## 2023-11-01 PROCEDURE — 80307 DRUG TEST PRSMV CHEM ANLYZR: CPT

## 2023-11-01 PROCEDURE — 25810000003 LACTATED RINGERS PER 1000 ML: Performed by: ANESTHESIOLOGY

## 2023-11-01 PROCEDURE — B24BZZ4 ULTRASONOGRAPHY OF HEART WITH AORTA, TRANSESOPHAGEAL: ICD-10-PCS | Performed by: THORACIC SURGERY (CARDIOTHORACIC VASCULAR SURGERY)

## 2023-11-01 PROCEDURE — A4648 IMPLANTABLE TISSUE MARKER: HCPCS | Performed by: THORACIC SURGERY (CARDIOTHORACIC VASCULAR SURGERY)

## 2023-11-01 PROCEDURE — 82330 ASSAY OF CALCIUM: CPT

## 2023-11-01 PROCEDURE — 85027 COMPLETE CBC AUTOMATED: CPT | Performed by: STUDENT IN AN ORGANIZED HEALTH CARE EDUCATION/TRAINING PROGRAM

## 2023-11-01 PROCEDURE — 25010000002 PROTAMINE SULFATE PER 10 MG: Performed by: ANESTHESIOLOGY

## 2023-11-01 PROCEDURE — 84132 ASSAY OF SERUM POTASSIUM: CPT

## 2023-11-01 PROCEDURE — P9041 ALBUMIN (HUMAN),5%, 50ML: HCPCS

## 2023-11-01 PROCEDURE — 93005 ELECTROCARDIOGRAM TRACING: CPT | Performed by: INTERNAL MEDICINE

## 2023-11-01 PROCEDURE — 33533 CABG ARTERIAL SINGLE: CPT | Performed by: THORACIC SURGERY (CARDIOTHORACIC VASCULAR SURGERY)

## 2023-11-01 PROCEDURE — 84295 ASSAY OF SERUM SODIUM: CPT

## 2023-11-01 PROCEDURE — 85014 HEMATOCRIT: CPT

## 2023-11-01 PROCEDURE — 82803 BLOOD GASES ANY COMBINATION: CPT

## 2023-11-01 PROCEDURE — 0212093 BYPASS CORONARY ARTERY, THREE ARTERIES FROM CORONARY ARTERY WITH AUTOLOGOUS VENOUS TISSUE, OPEN APPROACH: ICD-10-PCS | Performed by: THORACIC SURGERY (CARDIOTHORACIC VASCULAR SURGERY)

## 2023-11-01 PROCEDURE — 25810000003 SODIUM CHLORIDE PER 500 ML: Performed by: THORACIC SURGERY (CARDIOTHORACIC VASCULAR SURGERY)

## 2023-11-01 PROCEDURE — 25010000002 FENTANYL CITRATE (PF) 1000 MCG/20ML SOLUTION: Performed by: ANESTHESIOLOGY

## 2023-11-01 PROCEDURE — 93318 ECHO TRANSESOPHAGEAL INTRAOP: CPT | Performed by: ANESTHESIOLOGY

## 2023-11-01 PROCEDURE — 85610 PROTHROMBIN TIME: CPT | Performed by: STUDENT IN AN ORGANIZED HEALTH CARE EDUCATION/TRAINING PROGRAM

## 2023-11-01 PROCEDURE — 83050 HGB METHEMOGLOBIN QUAN: CPT

## 2023-11-01 PROCEDURE — 71045 X-RAY EXAM CHEST 1 VIEW: CPT

## 2023-11-01 PROCEDURE — 25010000002 ALBUMIN HUMAN 5% PER 50 ML

## 2023-11-01 PROCEDURE — 94002 VENT MGMT INPAT INIT DAY: CPT

## 2023-11-01 DEVICE — PLEDGET INCISIONLINE REINF TFE SFT PTFE 1.5X4.8X9.5MM WHT: Type: IMPLANTABLE DEVICE | Site: CHEST | Status: FUNCTIONAL

## 2023-11-01 DEVICE — SUT STL 2/0 CV SH 24IN TPW32: Type: IMPLANTABLE DEVICE | Site: CHEST | Status: FUNCTIONAL

## 2023-11-01 DEVICE — DISK-SHAPED STYLE, SILICONE (1 PER STERILE PKG)
Type: IMPLANTABLE DEVICE | Site: CHEST | Status: FUNCTIONAL
Brand: SCANLAN® RADIOMARK® GRAFT MARKERS

## 2023-11-01 RX ORDER — ALBUMIN, HUMAN INJ 5% 5 %
250 SOLUTION INTRAVENOUS AS NEEDED
Status: DISPENSED | OUTPATIENT
Start: 2023-11-01 | End: 2023-11-03

## 2023-11-01 RX ORDER — MIDAZOLAM HYDROCHLORIDE 1 MG/ML
INJECTION INTRAMUSCULAR; INTRAVENOUS AS NEEDED
Status: DISCONTINUED | OUTPATIENT
Start: 2023-11-01 | End: 2023-11-01 | Stop reason: SURG

## 2023-11-01 RX ORDER — POTASSIUM CHLORIDE 29.8 MG/ML
20 INJECTION INTRAVENOUS ONCE
Qty: 50 ML | Refills: 0 | Status: COMPLETED | OUTPATIENT
Start: 2023-11-01 | End: 2023-11-01

## 2023-11-01 RX ORDER — IBUPROFEN 600 MG/1
1 TABLET ORAL
Status: DISCONTINUED | OUTPATIENT
Start: 2023-11-01 | End: 2023-11-03

## 2023-11-01 RX ORDER — ASPIRIN 325 MG
325 TABLET, DELAYED RELEASE (ENTERIC COATED) ORAL DAILY
Status: DISCONTINUED | OUTPATIENT
Start: 2023-11-02 | End: 2023-11-02

## 2023-11-01 RX ORDER — ACETAMINOPHEN 10 MG/ML
INJECTION, SOLUTION INTRAVENOUS AS NEEDED
Status: DISCONTINUED | OUTPATIENT
Start: 2023-11-01 | End: 2023-11-01 | Stop reason: SURG

## 2023-11-01 RX ORDER — PROTAMINE SULFATE 10 MG/ML
50 INJECTION, SOLUTION INTRAVENOUS ONCE
Status: DISCONTINUED | OUTPATIENT
Start: 2023-11-01 | End: 2023-11-02

## 2023-11-01 RX ORDER — GLYCOPYRROLATE 0.2 MG/ML
INJECTION INTRAMUSCULAR; INTRAVENOUS AS NEEDED
Status: DISCONTINUED | OUTPATIENT
Start: 2023-11-01 | End: 2023-11-01 | Stop reason: SURG

## 2023-11-01 RX ORDER — DEXMEDETOMIDINE HYDROCHLORIDE 4 UG/ML
.2-1.5 INJECTION, SOLUTION INTRAVENOUS CONTINUOUS PRN
Status: DISCONTINUED | OUTPATIENT
Start: 2023-11-01 | End: 2023-11-02

## 2023-11-01 RX ORDER — ALBUMIN, HUMAN INJ 5% 5 %
SOLUTION INTRAVENOUS
Status: COMPLETED
Start: 2023-11-01 | End: 2023-11-01

## 2023-11-01 RX ORDER — DEXMEDETOMIDINE HYDROCHLORIDE 4 UG/ML
INJECTION, SOLUTION INTRAVENOUS CONTINUOUS PRN
Status: DISCONTINUED | OUTPATIENT
Start: 2023-11-01 | End: 2023-11-01 | Stop reason: SURG

## 2023-11-01 RX ORDER — NOREPINEPHRINE BITARTRATE 0.03 MG/ML
.02-.3 INJECTION, SOLUTION INTRAVENOUS
Status: DISCONTINUED | OUTPATIENT
Start: 2023-11-01 | End: 2023-11-01

## 2023-11-01 RX ORDER — METOPROLOL TARTRATE 5 MG/5ML
2.5 INJECTION INTRAVENOUS EVERY 6 HOURS SCHEDULED
Status: DISCONTINUED | OUTPATIENT
Start: 2023-11-01 | End: 2023-11-02

## 2023-11-01 RX ORDER — MAGNESIUM HYDROXIDE 1200 MG/15ML
LIQUID ORAL AS NEEDED
Status: DISCONTINUED | OUTPATIENT
Start: 2023-11-01 | End: 2023-11-01 | Stop reason: HOSPADM

## 2023-11-01 RX ORDER — PROPOFOL 10 MG/ML
VIAL (ML) INTRAVENOUS AS NEEDED
Status: DISCONTINUED | OUTPATIENT
Start: 2023-11-01 | End: 2023-11-01 | Stop reason: SURG

## 2023-11-01 RX ORDER — ACETAMINOPHEN 325 MG/1
650 TABLET ORAL EVERY 8 HOURS
Qty: 42 TABLET | Refills: 0 | Status: DISCONTINUED | OUTPATIENT
Start: 2023-11-02 | End: 2023-11-02

## 2023-11-01 RX ORDER — ASPIRIN 325 MG
325 TABLET ORAL ONCE
Status: COMPLETED | OUTPATIENT
Start: 2023-11-01 | End: 2023-11-01

## 2023-11-01 RX ORDER — NITROGLYCERIN 0.4 MG/1
0.4 TABLET SUBLINGUAL
Status: DISCONTINUED | OUTPATIENT
Start: 2023-11-01 | End: 2023-11-15 | Stop reason: HOSPADM

## 2023-11-01 RX ORDER — PROTAMINE SULFATE 10 MG/ML
INJECTION, SOLUTION INTRAVENOUS AS NEEDED
Status: DISCONTINUED | OUTPATIENT
Start: 2023-11-01 | End: 2023-11-01 | Stop reason: SURG

## 2023-11-01 RX ORDER — MORPHINE SULFATE 2 MG/ML
2 INJECTION, SOLUTION INTRAMUSCULAR; INTRAVENOUS
Status: DISCONTINUED | OUTPATIENT
Start: 2023-11-01 | End: 2023-11-04

## 2023-11-01 RX ORDER — SODIUM CHLORIDE 0.9 % (FLUSH) 0.9 %
10 SYRINGE (ML) INJECTION AS NEEDED
Status: DISCONTINUED | OUTPATIENT
Start: 2023-11-01 | End: 2023-11-01 | Stop reason: HOSPADM

## 2023-11-01 RX ORDER — HYDROCODONE BITARTRATE AND ACETAMINOPHEN 7.5; 325 MG/1; MG/1
1 TABLET ORAL EVERY 4 HOURS PRN
Status: DISCONTINUED | OUTPATIENT
Start: 2023-11-01 | End: 2023-11-04

## 2023-11-01 RX ORDER — SODIUM CHLORIDE, SODIUM LACTATE, POTASSIUM CHLORIDE, CALCIUM CHLORIDE 600; 310; 30; 20 MG/100ML; MG/100ML; MG/100ML; MG/100ML
9 INJECTION, SOLUTION INTRAVENOUS CONTINUOUS
Status: DISCONTINUED | OUTPATIENT
Start: 2023-11-01 | End: 2023-11-02

## 2023-11-01 RX ORDER — OXYCODONE HYDROCHLORIDE 10 MG/1
10 TABLET ORAL EVERY 4 HOURS PRN
Status: DISCONTINUED | OUTPATIENT
Start: 2023-11-01 | End: 2023-11-04

## 2023-11-01 RX ORDER — ETOMIDATE 2 MG/ML
INJECTION INTRAVENOUS AS NEEDED
Status: DISCONTINUED | OUTPATIENT
Start: 2023-11-01 | End: 2023-11-01 | Stop reason: SURG

## 2023-11-01 RX ORDER — ALBUTEROL SULFATE 2.5 MG/3ML
2.5 SOLUTION RESPIRATORY (INHALATION) EVERY 4 HOURS PRN
Status: ACTIVE | OUTPATIENT
Start: 2023-11-01 | End: 2023-11-02

## 2023-11-01 RX ORDER — DEXTROSE MONOHYDRATE 25 G/50ML
10-50 INJECTION, SOLUTION INTRAVENOUS
Status: DISCONTINUED | OUTPATIENT
Start: 2023-11-01 | End: 2023-11-03

## 2023-11-01 RX ORDER — MIDAZOLAM HYDROCHLORIDE 1 MG/ML
0.5 INJECTION INTRAMUSCULAR; INTRAVENOUS
Status: DISCONTINUED | OUTPATIENT
Start: 2023-11-01 | End: 2023-11-01 | Stop reason: HOSPADM

## 2023-11-01 RX ORDER — CHLORHEXIDINE GLUCONATE ORAL RINSE 1.2 MG/ML
15 SOLUTION DENTAL EVERY 12 HOURS SCHEDULED
Status: DISCONTINUED | OUTPATIENT
Start: 2023-11-01 | End: 2023-11-02

## 2023-11-01 RX ORDER — AMOXICILLIN 250 MG
2 CAPSULE ORAL 2 TIMES DAILY
Status: DISCONTINUED | OUTPATIENT
Start: 2023-11-01 | End: 2023-11-04

## 2023-11-01 RX ORDER — ACETAMINOPHEN 10 MG/ML
1000 INJECTION, SOLUTION INTRAVENOUS ONCE
Qty: 100 ML | Refills: 0 | Status: COMPLETED | OUTPATIENT
Start: 2023-11-01 | End: 2023-11-01

## 2023-11-01 RX ORDER — CALCIUM CHLORIDE 100 MG/ML
INJECTION INTRAVENOUS; INTRAVENTRICULAR AS NEEDED
Status: DISCONTINUED | OUTPATIENT
Start: 2023-11-01 | End: 2023-11-01 | Stop reason: SURG

## 2023-11-01 RX ORDER — ROCURONIUM BROMIDE 10 MG/ML
INJECTION, SOLUTION INTRAVENOUS AS NEEDED
Status: DISCONTINUED | OUTPATIENT
Start: 2023-11-01 | End: 2023-11-01 | Stop reason: SURG

## 2023-11-01 RX ORDER — SODIUM CHLORIDE 0.9 % (FLUSH) 0.9 %
10 SYRINGE (ML) INJECTION EVERY 12 HOURS SCHEDULED
Status: DISCONTINUED | OUTPATIENT
Start: 2023-11-01 | End: 2023-11-01 | Stop reason: HOSPADM

## 2023-11-01 RX ORDER — EPHEDRINE SULFATE 50 MG/ML
INJECTION INTRAVENOUS AS NEEDED
Status: DISCONTINUED | OUTPATIENT
Start: 2023-11-01 | End: 2023-11-01 | Stop reason: SURG

## 2023-11-01 RX ORDER — FENTANYL CITRATE 0.05 MG/ML
INJECTION, SOLUTION INTRAMUSCULAR; INTRAVENOUS AS NEEDED
Status: DISCONTINUED | OUTPATIENT
Start: 2023-11-01 | End: 2023-11-01 | Stop reason: SURG

## 2023-11-01 RX ORDER — ESMOLOL HYDROCHLORIDE 10 MG/ML
INJECTION INTRAVENOUS AS NEEDED
Status: DISCONTINUED | OUTPATIENT
Start: 2023-11-01 | End: 2023-11-01 | Stop reason: SURG

## 2023-11-01 RX ORDER — NICOTINE POLACRILEX 4 MG
15 LOZENGE BUCCAL
Status: DISCONTINUED | OUTPATIENT
Start: 2023-11-01 | End: 2023-11-03

## 2023-11-01 RX ORDER — ATORVASTATIN CALCIUM 40 MG/1
40 TABLET, FILM COATED ORAL NIGHTLY
Status: DISCONTINUED | OUTPATIENT
Start: 2023-11-01 | End: 2023-11-01

## 2023-11-01 RX ORDER — AMINOCAPROIC ACID 250 MG/ML
INJECTION, SOLUTION INTRAVENOUS AS NEEDED
Status: DISCONTINUED | OUTPATIENT
Start: 2023-11-01 | End: 2023-11-01 | Stop reason: SURG

## 2023-11-01 RX ORDER — POTASSIUM CHLORIDE, DEXTROSE MONOHYDRATE 150; 5 MG/100ML; G/100ML
30 INJECTION, SOLUTION INTRAVENOUS CONTINUOUS
Status: DISCONTINUED | OUTPATIENT
Start: 2023-11-01 | End: 2023-11-02

## 2023-11-01 RX ORDER — SODIUM CHLORIDE 9 MG/ML
INJECTION, SOLUTION INTRAVENOUS AS NEEDED
Status: DISCONTINUED | OUTPATIENT
Start: 2023-11-01 | End: 2023-11-01 | Stop reason: HOSPADM

## 2023-11-01 RX ORDER — PROTAMINE SULFATE 10 MG/ML
INJECTION, SOLUTION INTRAVENOUS
Status: ACTIVE
Start: 2023-11-01 | End: 2023-11-02

## 2023-11-01 RX ORDER — NOREPINEPHRINE BITARTRATE 0.03 MG/ML
.02-.3 INJECTION, SOLUTION INTRAVENOUS CONTINUOUS PRN
Status: DISCONTINUED | OUTPATIENT
Start: 2023-11-01 | End: 2023-11-03

## 2023-11-01 RX ORDER — BISACODYL 10 MG
10 SUPPOSITORY, RECTAL RECTAL DAILY PRN
Status: DISCONTINUED | OUTPATIENT
Start: 2023-11-01 | End: 2023-11-15 | Stop reason: HOSPADM

## 2023-11-01 RX ORDER — DOPAMINE HYDROCHLORIDE 160 MG/100ML
2-20 INJECTION, SOLUTION INTRAVENOUS CONTINUOUS PRN
Status: DISCONTINUED | OUTPATIENT
Start: 2023-11-01 | End: 2023-11-03

## 2023-11-01 RX ORDER — SODIUM CHLORIDE 9 MG/ML
40 INJECTION, SOLUTION INTRAVENOUS AS NEEDED
Status: DISCONTINUED | OUTPATIENT
Start: 2023-11-01 | End: 2023-11-01 | Stop reason: HOSPADM

## 2023-11-01 RX ORDER — DOBUTAMINE HYDROCHLORIDE 100 MG/100ML
2-20 INJECTION INTRAVENOUS CONTINUOUS PRN
Status: DISCONTINUED | OUTPATIENT
Start: 2023-11-01 | End: 2023-11-04

## 2023-11-01 RX ORDER — ONDANSETRON 2 MG/ML
4 INJECTION INTRAMUSCULAR; INTRAVENOUS EVERY 6 HOURS PRN
Status: DISCONTINUED | OUTPATIENT
Start: 2023-11-01 | End: 2023-11-15 | Stop reason: HOSPADM

## 2023-11-01 RX ORDER — MEPERIDINE HYDROCHLORIDE 25 MG/ML
25 INJECTION INTRAMUSCULAR; INTRAVENOUS; SUBCUTANEOUS EVERY 4 HOURS PRN
Status: DISCONTINUED | OUTPATIENT
Start: 2023-11-01 | End: 2023-11-02

## 2023-11-01 RX ORDER — FAMOTIDINE 20 MG/1
20 TABLET, FILM COATED ORAL ONCE
Status: COMPLETED | OUTPATIENT
Start: 2023-11-01 | End: 2023-11-01

## 2023-11-01 RX ORDER — PHENYLEPHRINE HYDROCHLORIDE 10 MG/ML
INJECTION INTRAVENOUS AS NEEDED
Status: DISCONTINUED | OUTPATIENT
Start: 2023-11-01 | End: 2023-11-01 | Stop reason: SURG

## 2023-11-01 RX ORDER — SODIUM CHLORIDE 9 MG/ML
INJECTION, SOLUTION INTRAVENOUS CONTINUOUS PRN
Status: DISCONTINUED | OUTPATIENT
Start: 2023-11-01 | End: 2023-11-01 | Stop reason: SURG

## 2023-11-01 RX ORDER — PAPAVERINE HYDROCHLORIDE 30 MG/ML
INJECTION INTRAMUSCULAR; INTRAVENOUS AS NEEDED
Status: DISCONTINUED | OUTPATIENT
Start: 2023-11-01 | End: 2023-11-01 | Stop reason: HOSPADM

## 2023-11-01 RX ORDER — FENTANYL CITRATE 50 UG/ML
25 INJECTION, SOLUTION INTRAMUSCULAR; INTRAVENOUS
Status: DISCONTINUED | OUTPATIENT
Start: 2023-11-01 | End: 2023-11-02

## 2023-11-01 RX ORDER — HEPARIN SODIUM 1000 [USP'U]/ML
INJECTION, SOLUTION INTRAVENOUS; SUBCUTANEOUS AS NEEDED
Status: DISCONTINUED | OUTPATIENT
Start: 2023-11-01 | End: 2023-11-01 | Stop reason: SURG

## 2023-11-01 RX ADMIN — FENTANYL CITRATE 250 MCG: 0.05 INJECTION, SOLUTION INTRAMUSCULAR; INTRAVENOUS at 14:36

## 2023-11-01 RX ADMIN — PROPOFOL 50 MG: 10 INJECTION, EMULSION INTRAVENOUS at 10:01

## 2023-11-01 RX ADMIN — ACETAMINOPHEN 1000 MG: 10 INJECTION INTRAVENOUS at 23:08

## 2023-11-01 RX ADMIN — ASPIRIN 325 MG: 325 TABLET ORAL at 18:28

## 2023-11-01 RX ADMIN — ROCURONIUM BROMIDE 100 MG: 10 SOLUTION INTRAVENOUS at 10:01

## 2023-11-01 RX ADMIN — DEXMEDETOMIDINE HYDROCHLORIDE 0.2 MCG/KG/HR: 4 INJECTION, SOLUTION INTRAVENOUS at 11:13

## 2023-11-01 RX ADMIN — FAMOTIDINE 20 MG: 20 TABLET, FILM COATED ORAL at 08:19

## 2023-11-01 RX ADMIN — ETOMIDATE 20 MG: 20 INJECTION, SOLUTION INTRAVENOUS at 10:01

## 2023-11-01 RX ADMIN — HYDRALAZINE HYDROCHLORIDE 25 MG: 25 TABLET, FILM COATED ORAL at 05:08

## 2023-11-01 RX ADMIN — MIDAZOLAM 1 MG: 1 INJECTION INTRAMUSCULAR; INTRAVENOUS at 14:37

## 2023-11-01 RX ADMIN — SODIUM CHLORIDE 2 G: 900 INJECTION INTRAVENOUS at 21:58

## 2023-11-01 RX ADMIN — ALBUMIN (HUMAN) 250 ML: 12.5 INJECTION, SOLUTION INTRAVENOUS at 18:27

## 2023-11-01 RX ADMIN — PROTAMINE SULFATE 450 MG: 10 INJECTION, SOLUTION INTRAVENOUS at 14:40

## 2023-11-01 RX ADMIN — SENNOSIDES AND DOCUSATE SODIUM 2 TABLET: 8.6; 5 TABLET ORAL at 21:58

## 2023-11-01 RX ADMIN — ALBUMIN (HUMAN) 250 ML: 12.5 INJECTION, SOLUTION INTRAVENOUS at 17:16

## 2023-11-01 RX ADMIN — MIDAZOLAM 2 MG: 1 INJECTION INTRAMUSCULAR; INTRAVENOUS at 09:58

## 2023-11-01 RX ADMIN — Medication 10 ML: at 08:19

## 2023-11-01 RX ADMIN — SODIUM CHLORIDE: 9 INJECTION, SOLUTION INTRAVENOUS at 10:16

## 2023-11-01 RX ADMIN — SODIUM BICARBONATE 50 MEQ: 84 INJECTION INTRAVENOUS at 22:41

## 2023-11-01 RX ADMIN — MUPIROCIN 1 APPLICATION: 20 OINTMENT TOPICAL at 05:08

## 2023-11-01 RX ADMIN — ROCURONIUM BROMIDE 50 MG: 10 SOLUTION INTRAVENOUS at 11:52

## 2023-11-01 RX ADMIN — ATORVASTATIN CALCIUM 80 MG: 40 TABLET, FILM COATED ORAL at 21:58

## 2023-11-01 RX ADMIN — CALCIUM CHLORIDE 0.5 G: 100 INJECTION INTRAVENOUS; INTRAVENTRICULAR at 14:44

## 2023-11-01 RX ADMIN — PHENYLEPHRINE HYDROCHLORIDE 160 MCG: 10 INJECTION INTRAVENOUS at 14:47

## 2023-11-01 RX ADMIN — PHENYLEPHRINE HYDROCHLORIDE 160 MCG: 10 INJECTION INTRAVENOUS at 14:52

## 2023-11-01 RX ADMIN — FENTANYL CITRATE 250 MCG: 0.05 INJECTION, SOLUTION INTRAMUSCULAR; INTRAVENOUS at 10:56

## 2023-11-01 RX ADMIN — AMINOCAPROIC ACID 10 G: 250 INJECTION, SOLUTION INTRAVENOUS at 11:11

## 2023-11-01 RX ADMIN — ALBUMIN (HUMAN) 250 ML: 12.5 INJECTION, SOLUTION INTRAVENOUS at 19:41

## 2023-11-01 RX ADMIN — HEPARIN SODIUM 36000 UNITS: 1000 INJECTION INTRAVENOUS; SUBCUTANEOUS at 11:49

## 2023-11-01 RX ADMIN — OXYCODONE HYDROCHLORIDE 10 MG: 10 TABLET ORAL at 18:28

## 2023-11-01 RX ADMIN — SODIUM CHLORIDE 2000 MG: 900 INJECTION INTRAVENOUS at 14:38

## 2023-11-01 RX ADMIN — EPHEDRINE SULFATE 5 MG: 50 INJECTION INTRAVENOUS at 14:28

## 2023-11-01 RX ADMIN — AMINOCAPROIC ACID 10 G: 250 INJECTION, SOLUTION INTRAVENOUS at 15:04

## 2023-11-01 RX ADMIN — POTASSIUM CHLORIDE AND DEXTROSE MONOHYDRATE 30 ML/HR: 150; 5 INJECTION, SOLUTION INTRAVENOUS at 16:11

## 2023-11-01 RX ADMIN — ROCURONIUM BROMIDE 30 MG: 10 SOLUTION INTRAVENOUS at 13:28

## 2023-11-01 RX ADMIN — ESMOLOL HYDROCHLORIDE 10 MG: 100 INJECTION, SOLUTION INTRAVENOUS at 10:01

## 2023-11-01 RX ADMIN — SODIUM CHLORIDE, POTASSIUM CHLORIDE, SODIUM LACTATE AND CALCIUM CHLORIDE 9 ML/HR: 600; 310; 30; 20 INJECTION, SOLUTION INTRAVENOUS at 08:19

## 2023-11-01 RX ADMIN — POTASSIUM CHLORIDE: 29.8 INJECTION INTRAVENOUS at 14:23

## 2023-11-01 RX ADMIN — EPINEPHRINE 0.03 MCG/KG/MIN: 1 INJECTION, SOLUTION, CONCENTRATE INTRAVENOUS at 14:32

## 2023-11-01 RX ADMIN — ACETAMINOPHEN 1000 MG: 10 INJECTION INTRAVENOUS at 15:13

## 2023-11-01 RX ADMIN — FENTANYL CITRATE 250 MCG: 0.05 INJECTION, SOLUTION INTRAMUSCULAR; INTRAVENOUS at 10:01

## 2023-11-01 RX ADMIN — INSULIN HUMAN 1.3 UNITS/HR: 1 INJECTION, SOLUTION INTRAVENOUS at 10:35

## 2023-11-01 RX ADMIN — DEXMEDETOMIDINE HYDROCHLORIDE 1.2 MCG/KG/HR: 4 INJECTION, SOLUTION INTRAVENOUS at 17:16

## 2023-11-01 RX ADMIN — LIDOCAINE HYDROCHLORIDE 80 MG: 20 INJECTION INTRAVENOUS at 10:01

## 2023-11-01 RX ADMIN — CHLORHEXIDINE GLUCONATE 15 ML: 1.2 SOLUTION ORAL at 05:08

## 2023-11-01 RX ADMIN — GLYCOPYRROLATE 0.2 MG: 0.4 INJECTION INTRAMUSCULAR; INTRAVENOUS at 14:25

## 2023-11-01 RX ADMIN — SODIUM CHLORIDE 5 MG/HR: 9 INJECTION, SOLUTION INTRAVENOUS at 11:45

## 2023-11-01 RX ADMIN — PHENYLEPHRINE HYDROCHLORIDE 80 MCG: 10 INJECTION INTRAVENOUS at 12:03

## 2023-11-01 RX ADMIN — GLYCOPYRROLATE 0.2 MG: 0.4 INJECTION INTRAMUSCULAR; INTRAVENOUS at 14:28

## 2023-11-01 RX ADMIN — HEPARIN SODIUM 10000 UNITS: 1000 INJECTION INTRAVENOUS; SUBCUTANEOUS at 12:14

## 2023-11-01 RX ADMIN — SODIUM CHLORIDE 2000 MG: 900 INJECTION INTRAVENOUS at 10:18

## 2023-11-01 RX ADMIN — MIDAZOLAM 2 MG: 1 INJECTION INTRAMUSCULAR; INTRAVENOUS at 11:52

## 2023-11-01 RX ADMIN — CHLORHEXIDINE GLUCONATE 15 ML: 1.2 SOLUTION ORAL at 21:59

## 2023-11-01 RX ADMIN — EPHEDRINE SULFATE 10 MG: 50 INJECTION INTRAVENOUS at 14:19

## 2023-11-01 RX ADMIN — DOBUTAMINE IN DEXTROSE 2 MCG/KG/MIN: 100 INJECTION, SOLUTION INTRAVENOUS at 20:00

## 2023-11-01 RX ADMIN — FENTANYL CITRATE 250 MCG: 0.05 INJECTION, SOLUTION INTRAMUSCULAR; INTRAVENOUS at 11:52

## 2023-11-01 NOTE — CASE MANAGEMENT/SOCIAL WORK
Continued Stay Note  [facility]     Patient Name: Michael Cochran  MRN: [id number]  Today's Date: 11/1/2023    Admit Date: 10/24/2023    Plan: discharge plan   Discharge Plan       Row Name 11/01/23 0827       Plan    Plan discharge plan    Plan Comments Pt is off the floor for CABG. Pt's goal is home with daughter, pending hospitl course. Pt may need O2 arranged at discharge as she is on 2 L and does not wear home O2. Marcin with [employer] is following for O2 needs. CM will cont to follow    Final Discharge Disposition Code 30 - still a patient                   Discharge Codes    No documentation.                 Expected Discharge Date and Time       Expected Discharge Date Expected Discharge Time    Nov 7, 2023               Clari Camacho RN

## 2023-11-01 NOTE — ANESTHESIA PROCEDURE NOTES
Arterial Line      Patient reassessed immediately prior to procedure    Patient location during procedure: pre-op   Line placed for hemodynamic monitoring.  Preanesthetic Checklist  Completed: patient identified, IV checked, site marked, risks and benefits discussed, surgical consent, monitors and equipment checked, pre-op evaluation and timeout performed  Arterial Line Prep    Sterile Tech: cap, gloves and sterile barriers  Prep: ChloraPrep  Patient monitoring: blood pressure monitoring, continuous pulse oximetry and EKG  Arterial Line Procedure   Laterality:right  Location:  radial artery  Catheter size: 20 G   Guidance: ultrasound guided  Number of attempts: 1  Successful placement: yes   Post Assessment   Dressing Type: line sutured, occlusive dressing applied, secured with tape and wrist guard applied.   Complications no  Circ/Move/Sens Assessment: normal and unchanged.   Patient Tolerance: patient tolerated the procedure well with no apparent complications

## 2023-11-01 NOTE — PLAN OF CARE
Neuro:    Pt intubated and sedated. Weaning sedation.     Respiratory:    Ventilator settings continued as ordered.     CT output totals: 1/2 - 200 ml.  3 - 80 ml.      Cardiac:  S1,S2.   Cardiac Index 1.4-2.2.  500ml Albumin given.   Epi continued and titrated as ordered.     Palpable pedal pulses.      GI/:  No BS, no BM.  UOP - 320 ml.   Creatinine 1.07.     Vaginal bleeding noted. Provider notified. Gynecology consult for tomorrow.     Insulin gtt continued.     Other:  Family updated at bedside. Pain managed w/ PRN medication.    Goal Outcome Evaluation:  Plan of Care Reviewed With: patient

## 2023-11-01 NOTE — OP NOTE
DATE OF PROCEDURE: 11/1/2023     PREOPERATIVE DIAGNOSES:  1. Multivessel coronary artery disease  2. NSTEMI  3. Hypertension  4. Hyperlipidemia  5. Diabetes mellitus  6. Previous tobacco abuse     POSTOPERATIVE DIAGNOSES:    1. Multivessel coronary artery disease  2. NSTEMI  3. Hypertension  4. Hyperlipidemia  5. Diabetes mellitus  6. Previous tobacco abuse     PROCEDURES PERFORMED:    1. Coronary artery bypass graft x 4  2. Endoscopic vein harvesting (Left greater saphenous vein).    3. Exploration of right greater saphenous vein    SURGEON: Dirk Cleveland MD       ASSISTANTS:    Tony Mendoza MD was responsible for performing the following activities: Retraction and Suction and their skilled assistance was necessary for the success of this case.  Jose Manuel Henry PA was responsible for performing the following activities: Harvesting of Vessels and their skilled assistance was necessary for the success of this case.  Robyn Newton PA-C was responsible for performing the following activities: Retraction, Suction, Closing, and Placing Dressing and their skilled assistance was necessary for the success of this case.    Circulator: Haase, Sherri L, RN; Ermelinda Bhatt RN; Willa Cesar RN; Anette Robins RN  Perfusionist: Carito Mishra  Scrub Person: Jessica Ibrahim; Liban Elizabeth  Nursing Assistant: Fanny Mtz PCT; Aisha Aguilera; Jordi Hanks PCT  Assistant: Jose Manuel Henry PA; Robyn Newton PA-C     ANESTHESIA: General endotracheal anesthesia with Dr. Marlyn Mims MD     ESTIMATED BLOOD LOSS: 600 mL     CROSSCLAMP TIME: 113 minutes       TOTAL CARDIOPULMONARY BYPASS TIME: 138 minutes      DISTAL BYPASS TARGETS:    1. LIMA to LAD  2. Greater saphenous vein to PDA  3. Greater saphenous vein to OM2  4. Greater saphenous vein to D1    INDICATIONS:  78-year-old  female with a history of hypertension, hyperlipidemia, diabetes mellitus and previous tobacco abuse  who presented with shortness of breath.  She was found to have multivessel coronary artery disease and was felt to warrant surgical revascularization. The risks and benefits of surgery were discussed with the patient including pain, bleeding, infection, renal failure, stroke and death along with the STS risk score. We specifically discussed her risk of mortality at 5.9% according to the STS database.  The patient understood these risks and wished to proceed with surgery.      DESCRIPTION OF PROCEDURE: The patient was taken to the operating room and placed under general endotracheal anesthesia. A central line, [place]-Gume catheter, radial arterial line, and Nolasco catheter were placed. The patient was prepped and draped in the usual sterile fashion and a timeout was performed, including the patient's name, procedure, consent, beta blockade administration and antibiotics were verified.    The right leg was explored through an incision below the knee and no acceptable vein was identified.  The left greater saphenous vein was harvested from the groin to the ankle using EVH technique. Subcutaneous tissues were closed with a running 3-0 Vicryl suture and 4-0 Monocryl subcuticular stitch. Simultaneously, a median sternotomy incision was made and electrocautery was utilized to gain access to the sternum. A midline sternotomy was performed after lung desufflation and hemostasis was achieved with electrocautery.    Attention was turned to the left internal mammary artery, which was taken down using electrocautery and small clips. Dissection was performed proximally to the subclavian vein and distally to the bifurcation. The bifurcation was ligated with 2 large clips to each branch and sharply divided. This revealed excellent flow within the mammary artery which was ligated distally using small clips and irrigated with papaverine solution and placed in a soaked Ray-Mala sponge in the left pleural space. The pericardium was opened  and stay sutures were placed to create a pericardial well. Next, 3-0 Prolene sutures were placed in the ascending aorta and systemic heparin was administered. Additional cannulation sutures were placed in the right atrial appendage, ascending aorta, and right atrium. After verification of satisfactory activated clotting time, the arterial cannula was placed and connected to the cardiopulmonary bypass circuit after being de-aired. The line was tested and a wrap was performed. The venous cannula was inserted followed by antegrade and retrograde cardioplegia lines. The vein was inspected and found to be of appropriate caliber and quality for bypass grafting. A slit within the pericardial well along the cephalad portion was created for appropriate transition of the mammary pedicle into the mediastinum. Cardiopulmonary bypass was initiated and the patient was allowed to drift in temperature and distal bypass targets were verified.  It should be noted that diffuse coronary artery disease was present in all distributions on palpation that was more extensive than appreciated on cardiac catheterization.  The epicardial surface had multiple calcific white plaques present that obscured her coronary anatomy in the right and LAD distributions.  Bypass flow was dropped and the aortic crossclamp was applied. Cardioplegia was administered in an antegrade fashion with immediate cessation of cardiac activity. There was an acceptable septal temperature response. The root vent suction was turned on high and additional cardioplegia was given via retrograde.  The right coronary artery distribution was thoroughly inspected.  The PDA was deep in the epicardial adipose tissue and could not be palpation.  The two posterolateral branches were smaller than 1 mm and were felt to be too small for grafting.  The distal RCA was identified deep in the epicardial adipose tissue with palpation of the firm vessel.  This was tracked distally to the  bifurcation and the PDA traced further distally where it was free of disease.  The proximal posterolateral branch was diseased on gross inspection.    The mid PDA was 1.5 mm in diameter and an arteriotomy was made on the mid portion of this vessel and extended proximally and distally. A posterior plaque was present in the vessel.  The vessel was probed distally and was patent.  An end-to-side anastomosis was performed with running 7-0 Prolene suture and tied down. Cardioplegia was given down the anastomosis via hand injection with no evidence of leak and good blood flow. Verification of vein length was obtained by filling the heart and the vein graft was trimmed to the appropriate length. The second obtuse marginal branch was 1.5 mm in diameter and an arteriotomy was made on the mid portion of this vessel beyond palpable proximal disease and extended proximally and distally. An end-to-side anastomosis was performed with running 7-0 Prolene suture and tied down. Cardioplegia was given down the anastomosis via hand injection with no evidence of leak and good blood flow. Verification of vein length was obtained by filling the heart and the vein graft was trimmed to the appropriate length. The first obtuse marginal branch had diffuse disease on palpation.  The first diagonal branch was 1 mm in diameter and an arteriotomy was made on the distal portion of this vessel beyond palpable proximal to mid vessel disease and before the distal bifurcation.  An end-to-side anastomosis was performed with running 7-0 Prolene suture and tied down. Cardioplegia was given down the anastomosis via hand injection with no evidence of leak and good blood flow. Verification of vein length was obtained by filling the heart and the vein graft was trimmed to the appropriate length. The LAD was inspected and found to have diffuse disease on palpation and visual inspection. An arteriotomy was made on the mid to distal LAD and extended proximally  and distally on this 1.75 mm diameter vessel. The internal mammary artery was then prepared for grafting by ligating the 2 venous branches along the pedicle using small clips. The mammary artery was trimmed proximal to the bifurcation and beveled for grafting. An end-to-side anastomosis with an 8-0 Prolene suture was constructed and tied down. The bulldog clamp was removed and there was excellent flow within the vessel and adequate filling of the LAD. The underlying mammary fascia was secured to the epicardium using two 6-0 Prolene sutures. Three aortotomies were then made on the proximal ascending aorta and end-to-side proximal anastomoses were carried out using 6-0 Prolene suture and labeled with a radiopaque washers. A hot shot was given down the ascending aorta after bulldog clamps were applied to the vein grafts. The veins were de-aired and the patient was placed in steep Trendelenburg position. The root vent was turned on high suction and cardiopulmonary bypass flow was turned down with crossclamp subsequently removed. Bypass flows were returned to normal and the bulldog clamps were removed. The heart returned to spontaneous sinus rhythm without fibrillation. The proximal and distal anastomoses were then inspected and found to be hemostatic.   The patient was subsequently weaned from cardiopulmonary bypass and decannulation was successfully carried out. All cannulation sites were reinforced with additional 4-0 Prolene suture and inspected for hemostasis. Doppler examination of bypass grafts revealed excellent flow within the mammary and vein grafts.  Ventricular pacing wires were placed and secured using 0 silk suture. Three chest tubes were then placed within the left and right pleural spaces and mediastinum. These were secured using a 0 Ethibond suture. The sternum was reapproximated with #7 stainless steel wire and the linea alba was closed with a running 0 Vicryl suture. Subcutaneous tissues were closed  with a 2-0 Vicryl suture and the inferior aspect of the incision was closed with additional interrupted 2-0 Vicryl sutures in the dermal layer. The skin was reapproximated with a 4-0 Monocryl subcuticular stitch and overlying skin staples were applied to the incision. Gauze and tape were applied to the chest tube sites and the patient was subsequently transported to the cardiac ICU in stable condition, intubated.

## 2023-11-01 NOTE — BRIEF OP NOTE
CORONARY ARTERY BYPASS GRAFTING  Progress Note    Michael Cochran  11/1/2023    Pre-op Diagnosis:   Coronary artery disease involving native coronary artery of native heart with unstable angina pectoris [I25.110]       Post-Op Diagnosis Codes:     * Coronary artery disease involving native coronary artery of native heart with unstable angina pectoris [I25.110]    Procedure/CPT® Codes:    Procedure(s):  MEDIAN STERNTOMY, CORONARY ARTERY BYPASS GRAFTING X 4, UTILIZING THE LEFT INTERANL MAMMARY ARTERY, EVH OF THE LEFT GREATER SAPHENOUS VEIN, EXPLORATION OF THE RIGHT LEG, PRIMITIVO PER ANESTHESIA    Surgeon(s):  Dirk Cleveland MD    Anesthesia: General    Staff:   Circulator: Haase, Sherri L, RN; Ermelinda Bhatt RN; Willa Cesar RN; Anette Robins RN  Perfusionist: Carito Mishra  Scrub Person: Jessica Ibrahim; Liban Elizabeth  Nursing Assistant: Fanny Mtz PCT; Aisha Aguilera; Jordi Hanks PCT  Assistant: Jose Manuel Henry PA; Robyn Newton PA-C  Assistant: Jose Manuel Henry PA; Robyn Newton PA-C      Estimated Blood Loss:  600 mL    Urine Voided: 650 mL    Specimens:                None          Drains:   Chest Tube 3 Mediastinal (Active)   Function -20 cm H2O 11/01/23 1700   Air Leak/Fluctuation air leak not present;connections tightened;dependent drainage cleared;fluctuation present 11/01/23 1700   Patency Intervention Tip/tilt 11/01/23 1700   Drainage Description Sanguineous 11/01/23 1700   Dressing Type Gauze;Border Dressing 11/01/23 1700   Dressing Status Clean;Dry;Intact 11/01/23 1700   Site Assessment Clean;Dry;Intact;Soft 11/01/23 1700   Surrounding Skin Dry;Intact 11/01/23 1700   Securement tubing anchored to body distal to insertion site with sutures 11/01/23 1700   Left Subcutaneous Emphysema none present 11/01/23 1700   Right Subcutaneous Emphysema none present 11/01/23 1700   Safety all connections secured;suction checked 11/01/23 1700   Output (mL) 15 mL 11/01/23  1700       NG/OG Tube Nasogastric 16 Fr Left nostril (Active)   Placement Verification X-ray 11/01/23 1700   Site Assessment Clean;Dry;Intact 11/01/23 1700   Securement anchored to nostril center with adhesive device 11/01/23 1700   Secured at (cm) 64 11/01/23 1700   NG/OG Site Interventions Pressure offloaded;Site assessed 11/01/23 1700   Dressing Intervention New dressing 11/01/23 1611   Status Clamped 11/01/23 1700   Surrounding Skin Dry;Intact 11/01/23 1700       Urethral Catheter Temperature probe;Silicone 16 Fr. (Active)   Daily Indications Hourly Output in Critical Unstable Patient requiring Frequent Intervention (hemodynamics, titration or life supportive therapy) 11/01/23 1700   Site Assessment Clean;Skin intact 11/01/23 1700   Collection Container Standard drainage bag 11/01/23 1700   Securement Method Securing device 11/01/23 1700   Catheter care complete Yes 11/01/23 1611   Output (mL) 125 mL 11/01/23 1611       Y Chest Tube 1 and 2 Mediastinal 28 Fr. Mediastinal 28 Fr. (Active)   Function -20 cm H2O 11/01/23 1700   Patency Intervention Tip/tilt 11/01/23 1700   Left Subcutaneous Emphysema none present 11/01/23 1700   Right Subcutaneous Emphysema none present 11/01/23 1700   Safety all connections secured;suction checked 11/01/23 1700   Drainage Description 1 Sanguineous 11/01/23 1700   Air Leak/Fluctuation 1 air leak not present;connections tightened;dependent drainage cleared;fluctuation not present 11/01/23 1700   Dressing Type 1 Gauze;Border Dressing 11/01/23 1700   Dressing Status 1 Clean;Dry;Intact 11/01/23 1700   Site Assessment 1 Dry;Intact 11/01/23 1700   Surrounding Skin 1 Dry;Intact 11/01/23 1700   Securement 1 tubing anchored to body distal to insertion site with sutures 11/01/23 1700   Drainage Description 2 Sanguineous 11/01/23 1700   Air Leak/Fluctuation 2 air leak not present;connections tightened;dependent drainage cleared;fluctuation not present 11/01/23 1700   Dressing Type 2  Gauze;Border Dressing 11/01/23 1700   Dressing Status 2 Clean;Dry;Intact 11/01/23 1700   Site Assessment 2 Dry;Intact 11/01/23 1700   Surrounding Skin 2 Dry;Intact 11/01/23 1700   Securement 2 tubing anchored to body distal to insertion site with sutures 11/01/23 1700   Output (mL) 20 mL 11/01/23 1700       [REMOVED] Y Chest Tube 1, 2, and 3 Mediastinal 28 Fr. Mediastinal 28 Fr. Mediastinal 28 Fr. (Removed)       Findings: LIMA--LAD, GSV-->PDA, OM2, D1        Complications: None    Assistant: Jose Manuel Henry PA; Robyn Newton PA-C  was responsible for performing the following activities: Retraction, Suction, Closing, Placing Dressing, and Harvesting of Vessels and their skilled assistance was necessary for the success of this case.    Dirk Cleveland MD     Date: 11/1/2023  Time: 18:01 EDT

## 2023-11-01 NOTE — PROGRESS NOTES
"[facility] Cardiology Daily Note  11/01/2023     LOS: 8 days   Patient Care Team:  Bo Rodriguez PA as PCP - General (Family Medicine)    Chief Complaint:  Follow up hypertension, systolic heart failure, NSTEMI    Subjective     Subjective: Intubated postop.  95% on 40% FiO2.  Blood pressures have been labile.  Heart rates in the 60s.    Drips: Epinephrine 0.2 mcg/kg/min    Review of Systems:   N/A    Medications:  acetaminophen, 1,000 mg, Intravenous, Once  [START ON 11/2/2023] acetaminophen, 650 mg, Oral, Q8H  albumin human, , ,   [START ON 11/2/2023] aspirin, 325 mg, Oral, Daily  aspirin, 325 mg, Oral, Once  atorvastatin, 80 mg, Oral, Nightly  ceFAZolin, 2 g, Intravenous, Q8H  chlorhexidine, 15 mL, Mouth/Throat, Q12H  [START ON 11/2/2023] metoprolol tartrate, 12.5 mg, Oral, Q12H  metoprolol tartrate, 2.5 mg, Intravenous, Q6H  pharmacy consult - MTM, , Does not apply, Daily  protamine, , ,   protamine, 50 mg, Intravenous, Once  senna-docusate sodium, 2 tablet, Oral, BID  sodium chloride, 10 mL, Intravenous, Q12H  valsartan, 80 mg, Oral, Q24H        Objective     Vital Sign Min/Max for last 24 hours  Temp  Min: 97.5 °F (36.4 °C)  Max: 98.5 °F (36.9 °C)   BP  Min: 134/86  Max: 175/106   Pulse  Min: 52  Max: 69   Resp  Min: 18  Max: 20   SpO2  Min: 93 %  Max: 98 %   Flow (L/min)  Min: 2  Max: 2   Weight  Min: 92.4 kg (203 lb 12.8 oz)  Max: 92.4 kg (203 lb 12.8 oz)      Intake/Output Summary (Last 24 hours) at 11/1/2023 1653  Last data filed at 11/1/2023 1554  Gross per 24 hour   Intake 1550 ml   Output 650 ml   Net 900 ml        Flowsheet Rows      Flowsheet Row First Filed Value   Admission Height 157.5 cm (62\") Documented at 10/24/2023 1040   Admission Weight 93.9 kg (207 lb) Documented at 10/24/2023 1040          Physical Exam:    General: Intubated and sedated  Cardiovascular: Heart has a nondisplaced focal PMI. Regular rate and rhythm without murmur, gallop or rub.  Lungs: Clear " without rales or wheezes. Equal expansion is noted on vent.  Abdomen: Soft, quiet  Extremities: Show no edema.  Skin: warm and dry.  Neurologic: Intubated and sedated       Results Review:    I reviewed the patient's new clinical results.    EKG:NSR, Q waves in V2 and V3, LAD, T wave inversions anterolaterally rate of 66    Tele: NSR @ 65 bpm    Labs:    Results from last 7 days   Lab Units 11/01/23  0520 10/31/23  0822 10/29/23  1028   SODIUM mmol/L 138 142 142   POTASSIUM mmol/L 3.3* 3.5 3.7   CHLORIDE mmol/L 100 100 104   CO2 mmol/L 27.0 29.0 28.0   BUN mg/dL 38* 34* 32*   CREATININE mg/dL 1.05* 1.15* 1.13*   CALCIUM mg/dL 9.0 9.0 9.2   BILIRUBIN mg/dL  --   --  1.3*   ALK PHOS U/L  --   --  91   ALT (SGPT) U/L  --   --  24   AST (SGOT) U/L  --   --  28   GLUCOSE mg/dL 104* 108* 169*     Results from last 7 days   Lab Units 11/01/23  0520 10/27/23  0433 10/26/23  0406   WBC 10*3/mm3 10.00 9.82 9.26   HEMOGLOBIN g/dL 12.7 12.1 13.3   HEMATOCRIT % 40.9 38.9 42.9   PLATELETS 10*3/mm3 216 222 181     Lab Results   Component Value Date    TROPONINT 631 (C) 10/25/2023    TROPONINT 499 (C) 10/24/2023    TROPONINT 356 (C) 10/24/2023     Lab Results   Component Value Date    CHOL 242 (H) 10/27/2023    CHOL 255 (H) 10/25/2023    CHOL 314 (H) 06/09/2021     Lab Results   Component Value Date    TRIG 305 (H) 10/27/2023    TRIG 230 (H) 10/25/2023    TRIG 273 (H) 06/09/2021     Lab Results   Component Value Date    HDL 34 (L) 10/27/2023    HDL 42 10/25/2023    HDL 43 06/09/2021       Ejection Fraction:pre-op Echo 31.4 %    Assessment   Assessment:  Systolic heart failure, LVEF 31 % pre-op with apical AK, and severe HK of the anterior, anterolateral, and septal walls.  NSTEMI 10/24 (troponin 631)  Hypertension- metoprolol and Losartan ordered.  Hyperlipidemia- Atorvastatin 80 daily.  Diabetes Mellitus Type 2- A1C 9.8  DOS - CABG X4  Morbid Obesity  Medical Non-Compliance  Tobacco Abuse  Vaginal bleeding in the OR- GYN  consulted.      Plan:  -Continue aspirin and statin therapy for CAD and recent MI  Resume beta-blocker and ARB when heart rate and blood pressure allow  Continue to monitor for arrhythmias  Wean epi as tolerated  Ventilator management per intensivist.  Continue ASA and statin therapy    Winnie Bahena, RN, BSN  11/01/23  16:53 EDT    I have seen and examined the patient, case was discussed with the physician extender, reviewed the above note, necessary changes were made and I agree with the final note.  Koki Barber MD, FACC

## 2023-11-01 NOTE — ANESTHESIA PROCEDURE NOTES
Central Line      Patient reassessed immediately prior to procedure    Patient location during procedure: OR  Start time: 11/1/2023 10:15 AM  Indications: vascular access, central pressure monitoring and MD/Surgeon request  Staff  Anesthesiologist: Marlyn Mims MD  Preanesthetic Checklist  Completed: patient identified, IV checked, site marked, risks and benefits discussed, surgical consent, monitors and equipment checked, pre-op evaluation and timeout performed  Central Line Prep  Sterile Tech:cap, gloves, gown, mask and sterile barriers  Prep: chloraprep  Patient monitoring: blood pressure monitoring, continuous pulse oximetry and EKG  Central Line Procedure  Laterality:right  Location:internal jugular  Catheter Type:MAC and [place]-Gume  Catheter Size:9 Fr  Guidance:ultrasound guided  PROCEDURE NOTE/ULTRASOUND INTERPRETATION.  Using ultrasound guidance the potential vascular sites for insertion of the catheter were visualized to determine the patency of the vessel to be used for vascular access.  After selecting the appropriate site for insertion, the needle was visualized under ultrasound being inserted into the internal jugular vein, followed by ultrasound confirmation of wire and catheter placement. There were no abnormalities seen on ultrasound; an image was taken; and the patient tolerated the procedure with no complications. Images: still images not obtained (printer failure)  Assessment  Post procedure:biopatch applied, line sutured, occlusive dressing applied and secured with tape  Assessement:blood return through all ports, free fluid flow, chest x-ray ordered and Xavier Test  Complications:no  Patient Tolerance:patient tolerated the procedure well with no apparent complications

## 2023-11-01 NOTE — ANESTHESIA PROCEDURE NOTES
Airway  Date/Time: 11/1/2023 10:03 AM  Airway not difficult    General Information and Staff    Patient location during procedure: OR  Anesthesiologist: Marlyn Mims MD    Indications and Patient Condition  Indications for airway management: airway protection    Preoxygenated: yes  Mask difficulty assessment: 1 - vent by mask    Final Airway Details  Final airway type: endotracheal airway      Successful airway: ETT  Cuffed: yes   Successful intubation technique: video laryngoscopy  Facilitating devices/methods: intubating stylet  Endotracheal tube insertion site: oral  Blade: Beasley  Blade size: 3  ETT size (mm): 7.0  Cormack-Lehane Classification: grade I - full view of glottis  Placement verified by: chest auscultation and capnometry   Cuff volume (mL): 8  Measured from: teeth  ETT/EBT  to teeth (cm): 22  Number of attempts at approach: 1  Assessment: lips, teeth, and gum same as pre-op and atraumatic intubation

## 2023-11-01 NOTE — ANESTHESIA PROCEDURE NOTES
Intra-Op Anesthesia PRIMITIVO    Procedure Performed: Intra-Op Anesthesia PRIMITIVO       Start Time:  11/1/2023 10:19 AM       End Time:      Preanesthesia Checklist:  Patient identified, IV assessed, risks and benefits discussed, monitors and equipment assessed, procedure being performed at surgeon's request and anesthesia consent obtained.    General Procedure Information  Physician Requesting Echo: Dirk Cleveland MD  Location performed:  OR  Intubated  Bite block placed  Heart visualized  Probe Insertion:  Easy  Probe Type:  Multiplane  Modalities:  2D only, color flow mapping, continuous wave Doppler and pulse wave Doppler    Echocardiographic and Doppler Measurements    Ventricles    Right Ventricle:  Cavity size dilated.  Hypertrophy not present.  Thrombus not present.  Global function normal.    Left Ventricle:  Cavity size dilated.  Thrombus not present.  Global Function normal.  Ejection Fraction 40%.          Valves    Aortic Valve:  Annulus normal.  Stenosis not present.  Regurgitation absent.  Leaflets normal.  Leaflet motions normal.      Mitral Valve:  Annulus normal.  Stenosis not present.  Regurgitation trace.  Leaflets normal.  Leaflet motions normal.      Tricuspid Valve:  Annulus normal.  Stenosis not present.  Regurgitation mild.  Leaflets normal.  Leaflet motions normal.    Pulmonic Valve:  Annulus normal.  Stenosis not present.  Regurgitation trace.        Aorta    Ascending Aorta:  Size normal.  Dissection not present.  Plaque thickness less than 3 mm.  Mobile plaque not present.    Aortic Arch:  Size normal.  Dissection not present.  Plaque thickness less than 3 mm.  Mobile plaque not present.    Descending Aorta:  Size normal.  Dissection not present.  Plaque thickness less than 3 mm.  Mobile plaque not present.        Atria    Right Atrium:  Size normal.  Spontaneous echo contrast not present.  Thrombus not present.  Tumor not present.  Device not present.      Left Atrium:  Size normal.  Spontaneous  echo contrast not present.  Thrombus not present.  Tumor not present.  Device not present.        Septa        Ventricular Septum:  Intra-ventricular septum morphology normal.          Other Findings  Pericardium:  normal  Pleural Effusion:  left      Anesthesia Information  Performed Personally  Anesthesiologist:  Marlyn Mims MD      Echocardiogram Comments:       Findings discussed with Dr. Cleveland     Prebypass  Normal RV fxn, slightly decreased LV fxn, EF 40-45%, mild TR, mild MR, no AI, no AS, small left pleural effusion.      Post CABG x 5  RV and LV systolic fxn preserved.  No changes in valve fxn. Aorta intact

## 2023-11-01 NOTE — PROGRESS NOTES
"INTENSIVIST NOTE     Hospital Day: 8    Ms. Michael Cochran, 78 y.o. female is followed for:   Perioperative management of comorbid medical conditions including hypertension diabetes mellitus       SUBJECTIVE     Interval history:    Seen in ICU immediately postoperatively from CABG x4.  Remains on mechanical ventilation.  Current settings are a rate of 14, tidal volume 500, FiO2 60%, and PEEP 5.  Drips currently consist of insulin and Precedex.  Mediastinal tube output acceptable.  Postoperative chest x-ray without significant pneumothorax and tubes appear well-positioned.    The patient's relevant past medical, surgical and social history were reviewed and updated in Epic as appropriate.       OBJECTIVE     Vital Sign Min/Max for last 24 hours  Temp  Min: 97.5 °F (36.4 °C)  Max: 98.5 °F (36.9 °C)   BP  Min: 134/86  Max: 175/106   Pulse  Min: 52  Max: 69   Resp  Min: 18  Max: 20   SpO2  Min: 93 %  Max: 98 %   No data recorded   Weight  Min: 92.4 kg (203 lb 12.8 oz)  Max: 92.4 kg (203 lb 12.8 oz)      Intake/Output Summary (Last 24 hours) at 11/1/2023 1713  Last data filed at 11/1/2023 1611  Gross per 24 hour   Intake 1550 ml   Output 945 ml   Net 605 ml      Flowsheet Rows      Flowsheet Row First Filed Value   Admission Height 157.5 cm (62\") Documented at 10/24/2023 1040   Admission Weight 93.9 kg (207 lb) Documented at 10/24/2023 1040               10/30/23  0400 10/31/23  0400 11/01/23  0438   Weight: 93.8 kg (206 lb 14.4 oz) 93.6 kg (206 lb 4.8 oz) 92.4 kg (203 lb 12.8 oz)            Objective:  General Appearance:  In no acute distress.    Vital signs: (most recent): Blood pressure 123/85, pulse 67, temperature (P) 96.7 °F (35.9 °C), temperature source (P) Axillary, resp. rate (P) 14, height 157.5 cm (62\"), weight 92.4 kg (203 lb 12.8 oz), SpO2 94%.    HEENT: (Oral ET tube  Right internal jugular [place]-Gume catheter)    Lungs:  Normal effort and normal respiratory rate.  Breath sounds clear to auscultation.  She " is not in respiratory distress.  No rales, wheezes or rhonchi.    Heart: Normal rate.  Regular rhythm.  S1 normal and S2 normal.  No murmur or gallop.   Chest: Symmetric chest wall expansion. (Median sternotomy.  Mediastinal tubes in place)  Abdomen: Abdomen is soft and non-distended.  Hypoactive bowel sounds.   There is no mass. There is no splenomegaly. There is no hepatomegaly.   Extremities: There is no deformity or dependent edema.  (Left radial arterial line)  Neurological: (Sedated).    Pupils:  Pupils are equal, round, and reactive to light.    Skin:  Warm and dry.                I reviewed the patient's new clinical results.  I reviewed the patient's new imaging results/reports including actual images and agree with reports.    XR Chest 1 View    Result Date: 11/1/2023  Impression: Support devices appear in standard position. No pneumothorax or convincing active airspace process. Electronically Signed: Gonzalo Zamora MD  11/1/2023 4:45 PM EDT  Workstation ID: [id number]      INFUSIONS  dexmedetomidine, 0.2-1.5 mcg/kg/hr  dextrose 5 % with KCl 20 mEq, 30 mL/hr  DOBUTamine, 2-20 mcg/kg/min  DOPamine, 2-20 mcg/kg/min  EPINEPHrine, 0.02-0.3 mcg/kg/min  EPINEPHrine, 0.02-0.3 mcg/kg/min  insulin, 0-100 Units/hr  lactated ringers, 9 mL/hr, Last Rate: 9 mL/hr (11/01/23 0819)  niCARdipine, 5-15 mg/hr  niCARdipine, 5-15 mg/hr  norepinephrine, 0.02-0.3 mcg/kg/min  phenylephrine, 0.5-3 mcg/kg/min  propofol, 5-50 mcg/kg/min        Results from last 7 days   Lab Units 11/01/23  1634 11/01/23  0520 10/27/23  0433   WBC 10*3/mm3 16.25* 10.00 9.82   HEMOGLOBIN g/dL 10.7* 12.7 12.1   HEMATOCRIT % 34.6 40.9 38.9   PLATELETS 10*3/mm3 147 216 222     Results from last 7 days   Lab Units 11/01/23  1634 11/01/23  0520 10/31/23  0822 10/29/23  1028 10/28/23  0816 10/27/23  0429 10/26/23  0406   SODIUM mmol/L 143 138 142 142 142   < > 144   POTASSIUM mmol/L 4.1 3.3* 3.5 3.7 4.0   < > 4.0   CHLORIDE mmol/L 107 100 100 104 105   < >  105   CO2 mmol/L 23.0 27.0 29.0 28.0 25.0   < > 23.0   BUN mg/dL 31* 38* 34* 32* 37*   < > 34*   GLUCOSE mg/dL 100* 104* 108* 169* 164*   < > 177*   CREATININE mg/dL 1.07* 1.05* 1.15* 1.13* 1.19*   < > 1.45*   MAGNESIUM mg/dL 2.1  --  1.9  --  1.7  --  1.8   CALCIUM mg/dL 9.0 9.0 9.0 9.2 9.2   < > 9.3   ALBUMIN g/dL 3.2*  --   --  3.6  --   --  3.6    < > = values in this interval not displayed.         Results from last 7 days   Lab Units 11/01/23 1615   PH, ARTERIAL pH units 7.384   PCO2, ARTERIAL mm Hg 42.2   PO2 ART mm Hg 96.0   FIO2 % 100       Patient isn't on Tube Feeding   /h  Patient doesn't have any tube feeding modular orders    Mechanical Ventilator:   Settings: Observed:   Mode: SIMV/VC+ (11/01/23 1642)    Vt (Set, mL): 500 mL (11/01/23 1642) Vt Mandatory Ins (observed, mL): 491 mL (11/01/23 1642)   Resp Rate (Set): 14 (11/01/23 1642) Resp Rate (Observed) Vent: 14 (11/01/23 1642)   Pressure Support (cm H2O): 10 cm H20 (11/01/23 1642) Minute Ventilation (L/min) (Obs): 7.15 L/min (11/01/23 1642)       FiO2 (%): (S) 60 % (11/01/23 1642)     PEEP/CPAP (cm H2O): 5 cm H20 (11/01/23 1642) I:E Ratio (Obs): 1:3.8 (11/01/23 1642)         I reviewed the patient's medications.    Assessment & Plan   ASSESSMENT/PLAN     Active Hospital Problems    Diagnosis     **Acute exacerbation of congestive heart failure     Coronary artery disease involving native coronary artery of native heart with unstable angina pectoris     Non-STEMI (non-ST elevated myocardial infarction)     HLD (hyperlipidemia)     Essential hypertension     Acute on chronic systolic congestive heart failure     Acute respiratory failure with hypoxia     Elevated troponin        78-year-old female with a past medical history significant for hypertension and dyslipidemia and no prior cardiac history who presented to the hospital with a hypertensive urgency, elevation cardiac enzymes, and biventricular heart failure.  A new diagnosis of type 2 diabetes  mellitus was made.  Cardiac catheterization revealed multivessel disease and echocardiogram revealed an EF of 31%.    Today she underwent CABG x4 performed by Dr. Cleveland.  Seen in ICU postoperatively.  Remains on mechanical ventilation with FiO2 60%.  Drips currently are insulin and Precedex.  Off vasoactive agents currently.  Mediastinal tube output acceptable.    Plan:    Adjust drips to bedside hemodynamics   Ventilator wean per post heart protocol   Aspirin/statin/beta blocker when appropriate  Monitor mediastinal tube output  Initial analgesia with IV narcotics  Insulin drip for now as needed for strict blood sugar control   Will assist with postoperative ICU management      I discussed the patient's findings and my recommendations with nursing staff     Plan of care and goals reviewed with multidisciplinary team at daily rounds.    High level of risk due to:  parenteral controlled substances.    Ranjan Cheema MD  Pulmonary and Critical Care Medicine  11/01/23 17:13 EDT

## 2023-11-01 NOTE — ANESTHESIA POSTPROCEDURE EVALUATION
Patient: Michael Cochran    Procedure Summary       Date: 11/01/23 Room / Location:  [facility]  [facility]    Anesthesia Start: 0955 Anesthesia Stop: 1609    Procedure: MEDIAN STERNTOMY, CORONARY ARTERY BYPASS GRAFTING X 4, UTILIZING THE LEFT INTERANL MAMMARY ARTERY, EVH OF THE LEFT GREATER SAPHENOUS VEIN, EXPLORATION OF THE RIGHT LEG, PRIMITIVO PER ANETHESIA (Chest) Diagnosis:       Coronary artery disease involving native coronary artery of native heart with unstable angina pectoris      (Coronary artery disease involving native coronary artery of native heart with unstable angina pectoris [I25.110])    Surgeons: Dirk Cleveland MD Provider: Marlyn Mims MD    Anesthesia Type: general, [place], CVL, ERAS Protocol ASA Status: 4            Anesthesia Type: general, [place], CVL, ERAS Protocol    Vitals  Vitals Value Taken Time   BP     Temp     Pulse 65 11/01/23 1611   Resp     SpO2 97 % 11/01/23 1611   Vitals shown include unfiled device data.        Post Anesthesia Care and Evaluation    Patient location during evaluation: ICU  Patient participation: complete - patient cannot participate (intubated and sedated)  Post-procedure mental status: sedated.  Pain score: 0  Pain management: adequate    Airway patency: patent  Anesthetic complications: No anesthetic complications  PONV Status: none  Cardiovascular status: acceptable and hemodynamically stable  Respiratory status: acceptable, ETT, intubated and ventilator  Hydration status: acceptable    Comments: No apparent anesthesia complications noted

## 2023-11-01 NOTE — PROGRESS NOTES
"Enter Query Response Below      Query Response: Type I non-STEMI secondary to coronary atherosclerosis in setting of uncontrolled hypertension, dyslipidemia, and diabetes mellitus.             If applicable, pl coronary atherosclerosis in setting of uncontrolled hypertension, dyslipidemia, diabetes mellitusease update the problem list.   Patient: Michael Cochran        : 1945  Account: [id number]           Admit Date:         How to Respond to this query:       a. Click New Note     b. Answer query within the yellow box.                c. Update the Problem List, if applicable.      If you have any questions about this query contact me at: [email or web address]     :    Patient presented to ED on 10/24 with SOB and chest discomfort.  Treated for acute systolic heart failure with diuresis.  HS Troponin T 356, 499, 631, Troponin T Delta 143.  10/24 EKG \"Poor data quality, interpretation may be adversely affected, Sinus tachycardia with occasional, premature ventricular complexes, Possible Left atrial enlargement, Left axis deviation, Left ventricular hypertrophy with repolarization abnormality, Septal infarct, age undetermined, Possible Lateral infarct , age undetermined, Abnormal ECG, No previous ECGs available\".  10/26 Per heart cath report \"Ostial 95% LAD stenosis involve the origin of a large first diagonal as well as some retrograde mild to moderate plaque in the left main, Bifurcation right coronary artery 95% stenosis, 70% LCx stenosis\".  \"NSTEMI\" documented in Hospitalist progress notes 10/25 thru .   Patient for Cabg today.    After study, can the patient's condition be further specified as;    - Type 1 MI due to ______(please specify- plaque erosion, rupture, fissure or thrombus).  - Type 2 MI due to fixed CAD and acute systolic heart failure.  - Other________________.  - Unable to determine.    By submitting this query, we are merely seeking further clarification of documentation to " accurately reflect all conditions that you are monitoring, evaluating, treating or that extend the hospitalization or utilize additional resources of care. Please utilize your independent clinical judgment when addressing the question(s) above.     This query and your response, once completed, will be entered into the legal medical record.    Sincerely,  Cheryl Stone RN  Clinical Documentation Integrity Program

## 2023-11-02 ENCOUNTER — APPOINTMENT (OUTPATIENT)
Dept: GENERAL RADIOLOGY | Facility: HOSPITAL | Age: 78
End: 2023-11-02
Payer: MEDICARE

## 2023-11-02 ENCOUNTER — TELEPHONE (OUTPATIENT)
Dept: OBSTETRICS AND GYNECOLOGY | Facility: CLINIC | Age: 78
End: 2023-11-02
Payer: MEDICARE

## 2023-11-02 LAB
ALBUMIN SERPL-MCNC: 4.3 G/DL (ref 3.5–5.2)
ALBUMIN/GLOB SERPL: 4.8 G/DL
ALP SERPL-CCNC: 54 U/L (ref 39–117)
ALT SERPL W P-5'-P-CCNC: 12 U/L (ref 1–33)
ANION GAP SERPL CALCULATED.3IONS-SCNC: 15 MMOL/L (ref 5–15)
ARTERIAL PATENCY WRIST A: ABNORMAL
AST SERPL-CCNC: 38 U/L (ref 1–32)
ATMOSPHERIC PRESS: ABNORMAL MM[HG]
BASE EXCESS BLDA CALC-SCNC: -1.3 MMOL/L (ref 0–2)
BASE EXCESS BLDA CALC-SCNC: -1.6 MMOL/L (ref 0–2)
BASE EXCESS BLDA CALC-SCNC: -1.7 MMOL/L (ref 0–2)
BASE EXCESS BLDV CALC-SCNC: -0.2 MMOL/L (ref -2–2)
BASOPHILS # BLD AUTO: 0.04 10*3/MM3 (ref 0–0.2)
BASOPHILS NFR BLD AUTO: 0.3 % (ref 0–1.5)
BDY SITE: ABNORMAL
BILIRUB SERPL-MCNC: 1.7 MG/DL (ref 0–1.2)
BODY TEMPERATURE: 37 C
BUN SERPL-MCNC: 32 MG/DL (ref 8–23)
BUN/CREAT SERPL: 20.6 (ref 7–25)
CALCIUM SPEC-SCNC: 8.5 MG/DL (ref 8.6–10.5)
CHLORIDE SERPL-SCNC: 104 MMOL/L (ref 98–107)
CO2 BLDA-SCNC: 24.7 MMOL/L (ref 22–33)
CO2 BLDA-SCNC: 25.1 MMOL/L (ref 22–33)
CO2 BLDA-SCNC: 25.6 MMOL/L (ref 22–33)
CO2 BLDA-SCNC: 25.7 MMOL/L (ref 22–33)
CO2 BLDA-SCNC: 25.9 MMOL/L (ref 22–33)
CO2 BLDA-SCNC: 27.7 MMOL/L (ref 22–33)
CO2 SERPL-SCNC: 23 MMOL/L (ref 22–29)
COHGB MFR BLD: 1.3 % (ref 0–2)
COHGB MFR BLD: 1.3 % (ref 0–2)
COHGB MFR BLD: 1.6 % (ref 0–2)
COHGB MFR BLD: 1.7 %
COHGB MFR BLD: 1.8 % (ref 0–2)
COHGB MFR BLD: 1.8 % (ref 0–2)
CREAT SERPL-MCNC: 1.55 MG/DL (ref 0.57–1)
DEPRECATED RDW RBC AUTO: 46.5 FL (ref 37–54)
EGFRCR SERPLBLD CKD-EPI 2021: 33.9 ML/MIN/1.73
EOSINOPHIL # BLD AUTO: 0 10*3/MM3 (ref 0–0.4)
EOSINOPHIL NFR BLD AUTO: 0 % (ref 0.3–6.2)
EPAP: 0
EPAP: 0
EPAP: 5
EPAP: 6
ERYTHROCYTE [DISTWIDTH] IN BLOOD BY AUTOMATED COUNT: 13.6 % (ref 12.3–15.4)
GLOBULIN UR ELPH-MCNC: 0.9 GM/DL
GLUCOSE BLDC GLUCOMTR-MCNC: 104 MG/DL (ref 70–130)
GLUCOSE BLDC GLUCOMTR-MCNC: 120 MG/DL (ref 70–130)
GLUCOSE BLDC GLUCOMTR-MCNC: 121 MG/DL (ref 70–130)
GLUCOSE BLDC GLUCOMTR-MCNC: 122 MG/DL (ref 70–130)
GLUCOSE BLDC GLUCOMTR-MCNC: 122 MG/DL (ref 70–130)
GLUCOSE BLDC GLUCOMTR-MCNC: 124 MG/DL (ref 70–130)
GLUCOSE BLDC GLUCOMTR-MCNC: 130 MG/DL (ref 70–130)
GLUCOSE BLDC GLUCOMTR-MCNC: 140 MG/DL (ref 70–130)
GLUCOSE BLDC GLUCOMTR-MCNC: 142 MG/DL (ref 70–130)
GLUCOSE BLDC GLUCOMTR-MCNC: 143 MG/DL (ref 70–130)
GLUCOSE BLDC GLUCOMTR-MCNC: 150 MG/DL (ref 70–130)
GLUCOSE BLDC GLUCOMTR-MCNC: 151 MG/DL (ref 70–130)
GLUCOSE BLDC GLUCOMTR-MCNC: 152 MG/DL (ref 70–130)
GLUCOSE BLDC GLUCOMTR-MCNC: 160 MG/DL (ref 70–130)
GLUCOSE BLDC GLUCOMTR-MCNC: 168 MG/DL (ref 70–130)
GLUCOSE BLDC GLUCOMTR-MCNC: 189 MG/DL (ref 70–130)
GLUCOSE BLDC GLUCOMTR-MCNC: 210 MG/DL (ref 70–130)
GLUCOSE BLDC GLUCOMTR-MCNC: 223 MG/DL (ref 70–130)
GLUCOSE BLDC GLUCOMTR-MCNC: 246 MG/DL (ref 70–130)
GLUCOSE BLDC GLUCOMTR-MCNC: 249 MG/DL (ref 70–130)
GLUCOSE BLDC GLUCOMTR-MCNC: 254 MG/DL (ref 70–130)
GLUCOSE BLDC GLUCOMTR-MCNC: 256 MG/DL (ref 70–130)
GLUCOSE BLDC GLUCOMTR-MCNC: 257 MG/DL (ref 70–130)
GLUCOSE BLDC GLUCOMTR-MCNC: 267 MG/DL (ref 70–130)
GLUCOSE SERPL-MCNC: 262 MG/DL (ref 65–99)
HCO3 BLDA-SCNC: 23.5 MMOL/L (ref 20–26)
HCO3 BLDA-SCNC: 23.8 MMOL/L (ref 20–26)
HCO3 BLDA-SCNC: 24.3 MMOL/L (ref 20–26)
HCO3 BLDA-SCNC: 24.3 MMOL/L (ref 20–26)
HCO3 BLDA-SCNC: 24.4 MMOL/L (ref 20–26)
HCO3 BLDV-SCNC: 26.1 MMOL/L (ref 22–28)
HCT VFR BLD AUTO: 27.5 % (ref 34–46.6)
HCT VFR BLD CALC: 25.2 % (ref 38–51)
HCT VFR BLD CALC: 26.1 % (ref 38–51)
HCT VFR BLD CALC: 26.7 % (ref 38–51)
HCT VFR BLD CALC: 27.2 % (ref 38–51)
HCT VFR BLD CALC: 27.9 % (ref 38–51)
HGB BLD-MCNC: 8.6 G/DL (ref 12–15.9)
HGB BLDA-MCNC: 8.2 G/DL (ref 14–18)
HGB BLDA-MCNC: 8.5 G/DL (ref 14–18)
HGB BLDA-MCNC: 8.7 G/DL (ref 14–18)
HGB BLDA-MCNC: 8.9 G/DL (ref 14–18)
HGB BLDA-MCNC: 8.9 G/DL (ref 14–18)
HGB BLDA-MCNC: 9.1 G/DL (ref 14–18)
IMM GRANULOCYTES # BLD AUTO: 0.07 10*3/MM3 (ref 0–0.05)
IMM GRANULOCYTES NFR BLD AUTO: 0.4 % (ref 0–0.5)
INHALED O2 CONCENTRATION: 36 %
INHALED O2 CONCENTRATION: 40 %
INHALED O2 CONCENTRATION: 50 %
INR PPP: 1.33 (ref 0.89–1.12)
IPAP: 0
IPAP: 0
IPAP: 16
IPAP: 16
IPAP: 18
IPAP: 18
LYMPHOCYTES # BLD AUTO: 0.6 10*3/MM3 (ref 0.7–3.1)
LYMPHOCYTES NFR BLD AUTO: 3.8 % (ref 19.6–45.3)
MCH RBC QN AUTO: 29.5 PG (ref 26.6–33)
MCHC RBC AUTO-ENTMCNC: 31.3 G/DL (ref 31.5–35.7)
MCV RBC AUTO: 94.2 FL (ref 79–97)
METHGB BLD QL: 0.3 % (ref 0–1.5)
METHGB BLD QL: 0.4 % (ref 0–1.5)
METHGB BLD QL: 0.4 % (ref 0–1.5)
METHGB BLD QL: 0.5 % (ref 0–1.5)
METHGB BLD QL: 0.6 %
METHGB BLD QL: 0.7 % (ref 0–1.5)
MODALITY: ABNORMAL
MONOCYTES # BLD AUTO: 0.55 10*3/MM3 (ref 0.1–0.9)
MONOCYTES NFR BLD AUTO: 3.5 % (ref 5–12)
NEUTROPHILS NFR BLD AUTO: 14.5 10*3/MM3 (ref 1.7–7)
NEUTROPHILS NFR BLD AUTO: 92 % (ref 42.7–76)
NRBC BLD AUTO-RTO: 0 /100 WBC (ref 0–0.2)
OXYHGB MFR BLDV: 51 %
OXYHGB MFR BLDV: 91.6 % (ref 94–99)
OXYHGB MFR BLDV: 91.9 % (ref 94–99)
OXYHGB MFR BLDV: 94.4 % (ref 94–99)
OXYHGB MFR BLDV: 95.3 % (ref 94–99)
OXYHGB MFR BLDV: 95.5 % (ref 94–99)
PAW @ PEAK INSP FLOW SETTING VENT: 0 CMH2O
PCO2 BLDA: 39 MM HG (ref 35–45)
PCO2 BLDA: 40.7 MM HG (ref 35–45)
PCO2 BLDA: 43.6 MM HG (ref 35–45)
PCO2 BLDA: 46.3 MM HG (ref 35–45)
PCO2 BLDA: 46.5 MM HG (ref 35–45)
PCO2 BLDV: 50.5 MM HG (ref 41–51)
PCO2 TEMP ADJ BLD: 39 MM HG (ref 35–45)
PCO2 TEMP ADJ BLD: 40.7 MM HG (ref 35–45)
PCO2 TEMP ADJ BLD: 43.6 MM HG (ref 35–45)
PCO2 TEMP ADJ BLD: 46.3 MM HG (ref 35–45)
PCO2 TEMP ADJ BLD: 46.5 MM HG (ref 35–45)
PH BLDA: 7.33 PH UNITS (ref 7.35–7.45)
PH BLDA: 7.33 PH UNITS (ref 7.35–7.45)
PH BLDA: 7.35 PH UNITS (ref 7.35–7.45)
PH BLDA: 7.38 PH UNITS (ref 7.35–7.45)
PH BLDA: 7.39 PH UNITS (ref 7.35–7.45)
PH BLDV: 7.32 PH UNITS (ref 7.31–7.41)
PH, TEMP CORRECTED: 7.33 PH UNITS
PH, TEMP CORRECTED: 7.33 PH UNITS
PH, TEMP CORRECTED: 7.35 PH UNITS
PH, TEMP CORRECTED: 7.38 PH UNITS
PH, TEMP CORRECTED: 7.39 PH UNITS
PLATELET # BLD AUTO: 161 10*3/MM3 (ref 140–450)
PMV BLD AUTO: 13.6 FL (ref 6–12)
PO2 BLDA: 69.9 MM HG (ref 83–108)
PO2 BLDA: 70.5 MM HG (ref 83–108)
PO2 BLDA: 77.7 MM HG (ref 83–108)
PO2 BLDA: 86 MM HG (ref 83–108)
PO2 BLDA: 88.2 MM HG (ref 83–108)
PO2 BLDV: 30.6 MM HG (ref 27–53)
PO2 TEMP ADJ BLD: 69.9 MM HG (ref 83–108)
PO2 TEMP ADJ BLD: 70.5 MM HG (ref 83–108)
PO2 TEMP ADJ BLD: 77.7 MM HG (ref 83–108)
PO2 TEMP ADJ BLD: 86 MM HG (ref 83–108)
PO2 TEMP ADJ BLD: 88.2 MM HG (ref 83–108)
POTASSIUM SERPL-SCNC: 3.7 MMOL/L (ref 3.5–5.2)
PROT SERPL-MCNC: 5.2 G/DL (ref 6–8.5)
PROTHROMBIN TIME: 16.6 SECONDS (ref 12.2–14.5)
PSV: 12 CMH2O
QT INTERVAL: 364 MS
QT INTERVAL: 494 MS
QTC INTERVAL: 404 MS
QTC INTERVAL: 517 MS
RBC # BLD AUTO: 2.92 10*6/MM3 (ref 3.77–5.28)
SODIUM SERPL-SCNC: 142 MMOL/L (ref 136–145)
TOTAL RATE: 0 BREATHS/MINUTE
TOTAL RATE: 18 BREATHS/MINUTE
TOTAL RATE: 18 BREATHS/MINUTE
VENTILATOR MODE: ABNORMAL
VT ON VENT VENT: 0.34 ML
VT ON VENT VENT: 0.34 ML
VT ON VENT VENT: 0.51 ML
WBC NRBC COR # BLD: 15.76 10*3/MM3 (ref 3.4–10.8)

## 2023-11-02 PROCEDURE — 83050 HGB METHEMOGLOBIN QUAN: CPT

## 2023-11-02 PROCEDURE — 82948 REAGENT STRIP/BLOOD GLUCOSE: CPT

## 2023-11-02 PROCEDURE — 71045 X-RAY EXAM CHEST 1 VIEW: CPT

## 2023-11-02 PROCEDURE — 99232 SBSQ HOSP IP/OBS MODERATE 35: CPT | Performed by: INTERNAL MEDICINE

## 2023-11-02 PROCEDURE — 85025 COMPLETE CBC W/AUTO DIFF WBC: CPT | Performed by: STUDENT IN AN ORGANIZED HEALTH CARE EDUCATION/TRAINING PROGRAM

## 2023-11-02 PROCEDURE — 94660 CPAP INITIATION&MGMT: CPT

## 2023-11-02 PROCEDURE — 94799 UNLISTED PULMONARY SVC/PX: CPT

## 2023-11-02 PROCEDURE — 93005 ELECTROCARDIOGRAM TRACING: CPT | Performed by: STUDENT IN AN ORGANIZED HEALTH CARE EDUCATION/TRAINING PROGRAM

## 2023-11-02 PROCEDURE — 93010 ELECTROCARDIOGRAM REPORT: CPT | Performed by: INTERNAL MEDICINE

## 2023-11-02 PROCEDURE — 25010000002 CEFAZOLIN PER 500 MG: Performed by: STUDENT IN AN ORGANIZED HEALTH CARE EDUCATION/TRAINING PROGRAM

## 2023-11-02 PROCEDURE — 25010000002 NALOXONE PER 1 MG

## 2023-11-02 PROCEDURE — 82805 BLOOD GASES W/O2 SATURATION: CPT

## 2023-11-02 PROCEDURE — 82375 ASSAY CARBOXYHB QUANT: CPT

## 2023-11-02 PROCEDURE — 94761 N-INVAS EAR/PLS OXIMETRY MLT: CPT

## 2023-11-02 PROCEDURE — 80053 COMPREHEN METABOLIC PANEL: CPT | Performed by: STUDENT IN AN ORGANIZED HEALTH CARE EDUCATION/TRAINING PROGRAM

## 2023-11-02 PROCEDURE — 99233 SBSQ HOSP IP/OBS HIGH 50: CPT | Performed by: INTERNAL MEDICINE

## 2023-11-02 PROCEDURE — 85610 PROTHROMBIN TIME: CPT | Performed by: STUDENT IN AN ORGANIZED HEALTH CARE EDUCATION/TRAINING PROGRAM

## 2023-11-02 PROCEDURE — 25010000002 POTASSIUM CHLORIDE PER 2 MEQ: Performed by: THORACIC SURGERY (CARDIOTHORACIC VASCULAR SURGERY)

## 2023-11-02 RX ORDER — POTASSIUM CHLORIDE 29.8 MG/ML
20 INJECTION INTRAVENOUS ONCE
Status: COMPLETED | OUTPATIENT
Start: 2023-11-02 | End: 2023-11-02

## 2023-11-02 RX ORDER — NALOXONE HYDROCHLORIDE 0.4 MG/ML
INJECTION, SOLUTION INTRAMUSCULAR; INTRAVENOUS; SUBCUTANEOUS
Status: COMPLETED
Start: 2023-11-02 | End: 2023-11-02

## 2023-11-02 RX ORDER — ASPIRIN 325 MG
325 TABLET ORAL DAILY
Status: DISCONTINUED | OUTPATIENT
Start: 2023-11-02 | End: 2023-11-04

## 2023-11-02 RX ORDER — ACETAMINOPHEN 325 MG/1
650 TABLET ORAL EVERY 8 HOURS
Status: DISCONTINUED | OUTPATIENT
Start: 2023-11-02 | End: 2023-11-04

## 2023-11-02 RX ADMIN — Medication 10 ML: at 09:49

## 2023-11-02 RX ADMIN — CHLORHEXIDINE GLUCONATE 15 ML: 1.2 SOLUTION ORAL at 09:48

## 2023-11-02 RX ADMIN — SODIUM CHLORIDE 2 G: 900 INJECTION INTRAVENOUS at 23:29

## 2023-11-02 RX ADMIN — SODIUM CHLORIDE 2 G: 900 INJECTION INTRAVENOUS at 13:55

## 2023-11-02 RX ADMIN — NALXONE HYDROCHLORIDE 0.4 MG: 0.4 INJECTION INTRAMUSCULAR; INTRAVENOUS; SUBCUTANEOUS at 05:05

## 2023-11-02 RX ADMIN — ACETAMINOPHEN 650 MG: 325 TABLET, FILM COATED ORAL at 09:48

## 2023-11-02 RX ADMIN — ATORVASTATIN CALCIUM 80 MG: 40 TABLET, FILM COATED ORAL at 20:20

## 2023-11-02 RX ADMIN — ASPIRIN 325 MG: 325 TABLET ORAL at 09:48

## 2023-11-02 RX ADMIN — Medication 10 ML: at 20:21

## 2023-11-02 RX ADMIN — ACETAMINOPHEN 650 MG: 325 TABLET, FILM COATED ORAL at 17:19

## 2023-11-02 RX ADMIN — POTASSIUM CHLORIDE 20 MEQ: 29.8 INJECTION, SOLUTION INTRAVENOUS at 04:56

## 2023-11-02 RX ADMIN — SODIUM CHLORIDE 2 G: 900 INJECTION INTRAVENOUS at 06:12

## 2023-11-02 RX ADMIN — SENNOSIDES AND DOCUSATE SODIUM 2 TABLET: 8.6; 5 TABLET ORAL at 20:20

## 2023-11-02 RX ADMIN — INSULIN HUMAN 6.9 UNITS/HR: 1 INJECTION, SOLUTION INTRAVENOUS at 10:35

## 2023-11-02 NOTE — PROGRESS NOTES
[facility]  IP Progress Note      Chief Complaint/Reason for service: #1 ischemic cardiomyopathy status post four-vessel CABG  #2 hyperlipidemia    Subjective   Subjective: Patient was extubated last evening but due to sedation she had evidence of elevated PCO2.  She was placed on BiPAP therapy.  Nurse reports she is making urine.    Past medical, surgical, social and family history reviewed in the patient's electronic medical record.    Objective     Vital Sign Min/Max for last 24 hours  Temp  Min: 96.3 °F (35.7 °C)  Max: 98.1 °F (36.7 °C)   BP  Min: 90/64  Max: 175/106   Pulse  Min: 58  Max: 75   Resp  Min: 13  Max: 40   SpO2  Min: 91 %  Max: 98 %   Flow (L/min)  Min: 4  Max: 4      Intake/Output Summary (Last 24 hours) at 11/2/2023 0701  Last data filed at 11/2/2023 0612  Gross per 24 hour   Intake 4269 ml   Output 2275 ml   Net 1994 ml             Current Facility-Administered Medications:     acetaminophen (TYLENOL) tablet 650 mg, 650 mg, Oral, Q8H, Dirk Cleveland MD    albumin human 5 % solution 250 mL, 250 mL, Intravenous, PRN, Robyn Newton PA-CROW, 250 mL at 11/01/23 1941    albuterol (PROVENTIL) nebulizer solution 0.083% 2.5 mg/3mL, 2.5 mg, Nebulization, Q4H PRN, Robyn Newton PA-C    ALPRAZolam (XANAX) tablet 0.25 mg, 0.25 mg, Oral, TID PRN, Robyn Newton PA-C    aspirin EC tablet 325 mg, 325 mg, Oral, Daily, Robyn Newton PA-C    atorvastatin (LIPITOR) tablet 80 mg, 80 mg, Oral, Nightly, Robyn Newton PA-C, 80 mg at 11/01/23 6208    bisacodyl (DULCOLAX) suppository 10 mg, 10 mg, Rectal, Daily PRN, Dirk Cleveland MD    Calcium Replacement - Follow Nurse / BPA Driven Protocol, , Does not apply, PRN, Robyn Newton PA-C    ceFAZolin 2000 mg IVPB in 100 mL NS (MBP), 2 g, Intravenous, Q8H, Orange, Robyn, PA-C, 2 g at 11/02/23 0612    chlorhexidine (PERIDEX) 0.12 % solution 15 mL, 15 mL, Mouth/Throat, Q12H, Orange, Robyn, PA-C,  15 mL at 11/01/23 2159    Chlorhexidine Gluconate Cloth 2 % pads, , Topical, Q12H PRN, Robyn Newton PA-C    dexmedetomidine (PRECEDEX) 400 mcg in 100 mL NS infusion, 0.2-1.5 mcg/kg/hr, Intravenous, Continuous PRN, Robyn Newton PA-C, Stopped at 11/01/23 1830    dextrose (D50W) (25 g/50 mL) IV injection 10-50 mL, 10-50 mL, Intravenous, Q15 Min PRN, Robyn Newton PA-C    dextrose (GLUTOSE) oral gel 15 g, 15 g, Oral, Q15 Min PRN, Robyn Newton PA-C    dextrose 5 % with KCl 20 mEq/L infusion, 30 mL/hr, Intravenous, Continuous, Robyn Newton PA-C, Last Rate: 30 mL/hr at 11/01/23 1611, 30 mL/hr at 11/01/23 1611    diphenhydrAMINE (BENADRYL) injection 25 mg, 25 mg, Intravenous, Q6H PRN, Robyn Newton PA-C    DOBUTamine (DOBUTREX) 1 mg/mL infusion, 2-20 mcg/kg/min, Intravenous, Continuous PRN, Robyn Newton PA-C, Stopped at 11/02/23 0457    DOPamine 400 mg in 250 mL D5W infusion, 2-20 mcg/kg/min, Intravenous, Continuous PRN, Robyn Newton PA-C    EPINEPHrine 10 mg in 250 mL infusion, 0.02-0.3 mcg/kg/min, Intravenous, Titrated, Robyn Newton PA-C, Last Rate: 19.4 mL/hr at 11/02/23 0457, 0.14 mcg/kg/min at 11/02/23 0457    EPINEPHrine 10 mg in 250 mL infusion, 0.02-0.3 mcg/kg/min, Intravenous, Continuous PRN, Robyn Newton PA-C, Last Rate: 13.86 mL/hr at 11/02/23 0131, 0.1 mcg/kg/min at 11/02/23 0131    fentaNYL citrate (PF) (SUBLIMAZE) injection 25 mcg, 25 mcg, Intravenous, Q1H PRN, Dirk Cleveland MD    glucagon (GLUCAGEN) injection 1 mg, 1 mg, Intramuscular, Q15 Min PRN, Robyn Newton PA-C    hydrALAZINE (APRESOLINE) injection 10 mg, 10 mg, Intravenous, Q6H PRN, Robyn Newton PA-C, 10 mg at 10/31/23 1737    HYDROcodone-acetaminophen (NORCO) 7.5-325 MG per tablet 1 tablet, 1 tablet, Oral, Q4H PRN, Dirk Cleveland MD    insulin regular 1 unit/mL in 0.9% sodium chloride (Glucommander), 0-100 Units/hr, Intravenous, Titrated, Robyn Newton PA-C, Last  Rate: 8 mL/hr at 11/02/23 0610, 8 Units/hr at 11/02/23 0610    ipratropium-albuterol (DUO-NEB) nebulizer solution 3 mL, 3 mL, Nebulization, Q4H PRN, Robyn Newton PA-C    lactated ringers infusion, 9 mL/hr, Intravenous, Continuous, Robyn Newton PA-C, Last Rate: 9 mL/hr at 11/01/23 0819, Restarted at 11/01/23 0955    Magnesium Cardiology Dose Replacement - Follow Nurse / BPA Driven Protocol, , Does not apply, PRN, Robyn Newton PA-C    metoprolol tartrate (LOPRESSOR) half tablet 12.5 mg, 12.5 mg, Oral, Q12H, Robyn Newton PA-C    metoprolol tartrate (LOPRESSOR) injection 2.5 mg, 2.5 mg, Intravenous, Q6H, Robyn Newton PA-C    morphine injection 2 mg, 2 mg, Intravenous, Q30 Min PRN, Dirk Cleveland MD    niCARdipine (CARDENE) 25mg in 250mL NS infusion, 5-15 mg/hr, Intravenous, Continuous PRN, Robyn Newton PA-C    niCARdipine (CARDENE) 25mg in 250mL NS infusion, 5-15 mg/hr, Intravenous, Titrated, Robyn Newton PA-C, Stopped at 11/1944    nitroglycerin (NITROSTAT) SL tablet 0.4 mg, 0.4 mg, Sublingual, Q5 Min PRN, Robyn Newton PA-C    norepinephrine (LEVOPHED) 8 mg in 250 mL NS infusion (premix), 0.02-0.3 mcg/kg/min, Intravenous, Continuous PRN, Robyn Newton PA-C    ondansetron (ZOFRAN) injection 4 mg, 4 mg, Intravenous, Q6H PRN, Robyn Newton PA-C    oxyCODONE (ROXICODONE) immediate release tablet 10 mg, 10 mg, Oral, Q4H PRN, Dirk Cleveland MD, 10 mg at 11/01/23 1828    Pharmacy Consult - MTM, , Does not apply, Daily, Robyn Newton PA-C, Given at 10/27/23 1025    phenylephrine (MJ-SYNEPHRINE) 50 mg in sodium chloride 0.9 % 250 mL infusion, 0.5-3 mcg/kg/min, Intravenous, Continuous PRN, Robyn Newton PA-C    Phosphorus Replacement - Follow Nurse / BPA Driven Protocol, , Does not apply, PRN, Robyn Newton PA-C    Potassium Replacement - Follow Nurse / BPA Driven Protocol, , Does not apply, PRN, Robyn Newton PA-C    propofol  (DIPRIVAN) infusion 10 mg/mL 100 mL, 5-50 mcg/kg/min, Intravenous, Continuous PRN, Robyn Newton PA-C    protamine injection 50 mg, 50 mg, Intravenous, Once, Robyn Newton PA-C    racemic epinephrine (RACEPINEPHRINE) nebulizer solution 0.5 mL, 0.5 mL, Nebulization, Once PRN, Robyn Newton PA-C    sennosides-docusate (PERICOLACE) 8.6-50 MG per tablet 2 tablet, 2 tablet, Oral, BID, Robyn Newton PA-C, 2 tablet at 11/01/23 2158    sodium chloride 0.9 % flush 10 mL, 10 mL, Intravenous, PRN, Vaishali Newtonrea, PA-C    sodium chloride 0.9 % flush 10 mL, 10 mL, Intravenous, Q12H, Vaishali Newtonrea, PA-C, 10 mL at 10/31/23 2011    sodium chloride 0.9 % flush 10 mL, 10 mL, Intravenous, PRN, Robyn Newton, PA-C    sodium chloride 0.9 % infusion 40 mL, 40 mL, Intravenous, PRN, Vaishali Newtonrea, PA-C    valsartan (DIOVAN) tablet 80 mg, 80 mg, Oral, Q24H, Robyn Newton, PA-C, 80 mg at 10/31/23 0816    Physical Exam: General elderly female in bed with BiPAP in place [place]-Gume present        HEENT: Central line present BiPAP present.  Feeding tube present       Respiratory: Equal bilateral symmetrical expansion with reduced breath sounds and basilar crackles       Cardiovascular: Distant but regular no obvious murmur and no edema palpation       GI: Obese and soft       Lower Extremities: Venous harvest site appears clean       Neuro: Cannot assess due to patient lethargy from sedation       Skin: Warm and dry with no edema palpation       Psych: Cannot assess due to sedation and on BiPAP therapy    Results Review: Preop EKG showed sinus rhythm diffuse ST abnormalities with T wave inversion.  Postop EKG shows sinus rhythm poor R progression no ischemia  GFR is down to 33.9.  Hemoglobin 10.6.  [place]-Gume shows a cardiac output 4.7 cardiac index 2.5.  Radiology Results:  Imaging Results (Last 72 Hours)       Procedure Component Value Units Date/Time    XR Chest 1 View [[id number]] Resulted:  11/02/23 0254     Updated: 11/02/23 0256    XR Chest 1 View [[id number]] Collected: 11/01/23 1643     Updated: 11/01/23 1648    Narrative:      XR CHEST 1 VW    Date of Exam: 11/1/2023 4:19 PM EDT    Indication: Post-Op Check Line & Tube Placement    Comparison: Chest radiograph 10/26/2023.    Findings:  Median sternotomy and CABG. ET tube within the mid thoracic trachea terminates 5.2 cm above marcelo. Right IJ approach [place]-Gume catheter terminates over the main pulmonary artery. Gastric tube oriented antegrade below diaphragm. Thoracotomy tubes noted.   Cardiomediastinal silhouette enlarged similar to preprocedural imaging. Lung volumes are low with streaky retrocardiac opacities favoring atelectasis. No overt edema, lobar infiltrate, large effusion or pneumothorax.      Impression:      Impression:  Support devices appear in standard position. No pneumothorax or convincing active airspace process.      Electronically Signed: Gonzalo Zamora MD    11/1/2023 4:45 PM EDT    Workstation ID: [id number]            EKG: As above    ECHO: EF 31%    Tele: Stable rhythm    Assessment   Assessment/Plan: #1 ischemic cardiomyopathy status post four-vessel CABG-patient is hemodynamically stable.  Her right heart filling pressures are 27/17..  Continue aspirin beta-blocker and statin  2 hyperlipidemia-continue statin    Guremet Colón MD  11/02/23  07:01 EDT

## 2023-11-02 NOTE — TELEPHONE ENCOUNTER
Called and spoke to the nursing units.  Patient is currently intubated.  Bleeding apparently was found prior to her recent coronary artery bypass graft.  The nurses can a check to see if provider thinks this would be appropriate to work this up as an outpatient.  She is also going to find if the patient has a gynecologist already prior to her admission.  Should this be the case it would be my suggestion that follow-up be scheduled with the gynecologist in the outpatient setting.

## 2023-11-02 NOTE — TELEPHONE ENCOUNTER
MIMI (on-call provider)    Received a call from Za at [facility] ([facility]), whom stated patient needs a consult. Per Za, patient has unexplained vaginal bleeding and is post-menopausal. Requesting on-call provider as soon as possible.     Thank you kindly!

## 2023-11-02 NOTE — PLAN OF CARE
Goal Outcome Evaluation:    POD 1 CABGx4:    Patient extubated at approx 2334. Patient transitioned to bipap around 0030 after repeat ABG showed slight respiratory acidosis. Patient drowsy, but able to woken up by voice. Follows commands equally. Epi and insulin drip continues.    CT Output: 20mL/hr AM H/H 8.6/27.5    Urine Output: 60mL/hr AM Cr 1.55    Labs appear acceptable this AM POD 1 CABGx4. Will keep CI and BP up per CT surgery for increased kidney perfusion.

## 2023-11-02 NOTE — PROGRESS NOTES
"INTENSIVIST NOTE     Hospital Day: 9    Ms. Michael Cochran, 78 y.o. female is followed for:   Perioperative management of comorbid medical conditions including hypertension diabetes mellitus       SUBJECTIVE     Interval history:    Extubated yesterday evening approximately 2330.  Mental status remains depressed.  Will respond to noxious stimuli and will occasionally voice some answers to questions per nursing.  Moves all 4 extremities equally.  On a low-dose of epinephrine and insulin.  On BiPAP at 40% FiO2.  Creatinine increased slightly.  Chest x-ray just shows some basilar atelectasis.    The patient's relevant past medical, surgical and social history were reviewed and updated in Epic as appropriate.       OBJECTIVE     Vital Sign Min/Max for last 24 hours  Temp  Min: 96.3 °F (35.7 °C)  Max: 98.2 °F (36.8 °C)   BP  Min: 90/64  Max: 164/96   Pulse  Min: 57  Max: 75   Resp  Min: 13  Max: 40   SpO2  Min: 90 %  Max: 98 %   No data recorded   Weight  Min: 93.2 kg (205 lb 7.5 oz)  Max: 93.2 kg (205 lb 7.5 oz)      Intake/Output Summary (Last 24 hours) at 11/2/2023 1259  Last data filed at 11/2/2023 1200  Gross per 24 hour   Intake 4269 ml   Output 2380 ml   Net 1889 ml      Flowsheet Rows      Flowsheet Row First Filed Value   Admission Height 157.5 cm (62\") Documented at 10/24/2023 1040   Admission Weight 93.9 kg (207 lb) Documented at 10/24/2023 1040               10/31/23  0400 11/01/23  0438 11/02/23  0500   Weight: 93.6 kg (206 lb 4.8 oz) 92.4 kg (203 lb 12.8 oz) 93.2 kg (205 lb 7.5 oz)            Objective:  General Appearance:  In no acute distress and ill-appearing.    Vital signs: (most recent): Blood pressure 144/75, pulse 58, temperature 98.2 °F (36.8 °C), temperature source Core, resp. rate 28, height 157.5 cm (62\"), weight 93.2 kg (205 lb 7.5 oz), SpO2 92%.    HEENT: (BiPAP mask in place  Right internal jugular Elkins-Gume catheter)    Lungs:  She is not in respiratory distress.  There are decreased breath " sounds and rhonchi.  No rales or wheezes.    Heart: Normal rate.  Regular rhythm.  S1 normal and S2 normal.  No murmur or gallop.   Chest: Symmetric chest wall expansion. (Median sternotomy.  Mediastinal tubes in place)  Abdomen: Abdomen is soft and non-distended.  Hypoactive bowel sounds.   There is no mass. There is no splenomegaly. There is no hepatomegaly.   Extremities: There is no deformity or dependent edema.  (Left radial arterial line)  Neurological: (Difficult to arouse but moves all 4 extremities readily to noxious stimuli.).    Pupils:  Pupils are equal, round, and reactive to light.    Skin:  Warm and dry.                I reviewed the patient's new clinical results.  I reviewed the patient's new imaging results/reports including actual images and agree with reports.    XR Chest 1 View    Result Date: 11/2/2023  Impression: Interval removal of endotracheal tube Increased left lower lobe opacity compatible atelectasis and small effusion Electronically Signed: John العراقي MD  11/2/2023 7:12 AM EDT  Workstation ID: [id number]    XR Chest 1 View    Result Date: 11/1/2023  Impression: Support devices appear in standard position. No pneumothorax or convincing active airspace process. Electronically Signed: Gonzalo Zamora MD  11/1/2023 4:45 PM EDT  Workstation ID: [id number]      INFUSIONS  DOBUTamine, 2-20 mcg/kg/min, Last Rate: Stopped (11/02/23 0457)  DOPamine, 2-20 mcg/kg/min  EPINEPHrine, 0.02-0.3 mcg/kg/min, Last Rate: 0.1 mcg/kg/min (11/02/23 0800)  EPINEPHrine, 0.02-0.3 mcg/kg/min, Last Rate: 0.08 mcg/kg/min (11/02/23 1200)  insulin, 0-100 Units/hr, Last Rate: 2.9 Units/hr (11/02/23 1200)  niCARdipine, 5-15 mg/hr  norepinephrine, 0.02-0.3 mcg/kg/min  phenylephrine, 0.5-3 mcg/kg/min        Results from last 7 days   Lab Units 11/02/23  0321 11/01/23 2012 11/01/23  1634   WBC 10*3/mm3 15.76* 20.20* 16.25*   HEMOGLOBIN g/dL 8.6* 9.9* 10.7*   HEMATOCRIT % 27.5* 32.2* 34.6   PLATELETS 10*3/mm3 161 172 147      Results from last 7 days   Lab Units 11/02/23  0321 11/01/23  2012 11/01/23  1634 11/01/23  0520 10/31/23  0822   SODIUM mmol/L 142 141 143   < > 142   POTASSIUM mmol/L 3.7 4.3 4.1   < > 3.5   CHLORIDE mmol/L 104 106 107   < > 100   CO2 mmol/L 23.0 21.0* 23.0   < > 29.0   BUN mg/dL 32* 34* 31*   < > 34*   GLUCOSE mg/dL 262* 220* 100*   < > 108*   CREATININE mg/dL 1.55* 1.30* 1.07*   < > 1.15*   MAGNESIUM mg/dL  --  2.1 2.1  --  1.9   CALCIUM mg/dL 8.5* 8.8 9.0   < > 9.0   ALBUMIN g/dL 4.3 3.8 3.2*  --   --     < > = values in this interval not displayed.         Results from last 7 days   Lab Units 11/02/23  1142 11/02/23  0457 11/02/23  0137 11/02/23  0132 11/02/23  0014 11/01/23 2330   PH, ARTERIAL pH units 7.376 7.328*  --  7.354 7.329* 7.357   PCO2, ARTERIAL mm Hg 40.7 46.3*  --  43.6 46.5* 41.9   PO2 ART mm Hg 86.0 88.2  --  69.9* 70.5* 71.5*   FIO2 % 40 50 40 40 36 40       Patient isn't on Tube Feeding   /h  Patient doesn't have any tube feeding modular orders    Mechanical Ventilator:   Settings: Observed:   Mode: (S) PS (11/01/23 2158)    Vt (Set, mL): 500 mL (11/01/23 2158) Vt Mandatory Ins (observed, mL): 161 mL (11/01/23 2336)   Resp Rate (Set): 14 (11/01/23 2158) Resp Rate (Observed) Vent: 36 (11/01/23 2336)   Pressure Support (cm H2O): 10 cm H20 (11/01/23 2336) Minute Ventilation (L/min) (Obs): 10.3 L/min (11/01/23 2336)       FiO2 (%): 40 % (11/01/23 2336) Plateau Pressure (cm H2O): (S) 19 cm H2O (11/01/23 1845)   PEEP/CPAP (cm H2O): 5 cm H20 (11/01/23 2336) I:E Ratio (Obs): 1:4.4 (11/01/23 2336)         I reviewed the patient's medications.    Assessment & Plan   ASSESSMENT/PLAN     Active Hospital Problems    Diagnosis     **Acute exacerbation of congestive heart failure     Coronary artery disease involving native coronary artery of native heart with unstable angina pectoris     Non-STEMI (non-ST elevated myocardial infarction)     HLD (hyperlipidemia)     Essential hypertension     Acute on  chronic systolic congestive heart failure     Acute respiratory failure with hypoxia     Elevated troponin        78-year-old female with a past medical history significant for hypertension and dyslipidemia and no prior cardiac history who presented to the hospital with a hypertensive urgency, elevation cardiac enzymes, and biventricular heart failure.  A new diagnosis of type 2 diabetes mellitus was made.  Cardiac catheterization revealed multivessel disease and echocardiogram revealed an EF of 31%.    She underwent CABG x4 performed by Dr. Cleveland on 11/2.  Extubated on the first night.    She has had persistent depression of her mental status since extubation.  She received oxycodone at 6 PM prior to extubation but has not received any sedatives since then for well over 12 hours.  ABG acceptable.  Slight increase in creatinine.    Plan:    Avoid sedatives  Monitor mental status and if no improvement or any focality emerges will perform CT imaging  Wean pressors  Continue insulin drip for now  Wean FiO2 and BiPAP as tolerated  Push pulmonary toilet though that would be an issue as long as her mental status is depressed  Aspirin/statin.  Beta-blocker when tolerates.     I discussed the patient's findings and my recommendations with nursing staff     Plan of care and goals reviewed with multidisciplinary team at daily rounds.    High level of risk due to:  drug(s) requiring intensive monitoring for toxicity.    Ranjan Cheema MD  Pulmonary and Critical Care Medicine  11/02/23 12:59 EDT

## 2023-11-02 NOTE — PROGRESS NOTES
Cardiothoracic Surgery Progress Note      POD # 1 s/p CABG x 4     LOS: 9 days      Subjective:  Extubated overnight.  On bipap for mild respiratory acidosis, hypoxia.      Objective:  Vital Signs  Temp:  [96.3 °F (35.7 °C)-98.1 °F (36.7 °C)] 97.6 °F (36.4 °C)  Heart Rate:  [58-75] 67  Resp:  [13-40] 35  BP: ()/() 125/65  FiO2 (%):  [40 %-100 %] 40 %    Physical Exam:   General Appearance: asleep in bed on BIPAP, awakes for nursing and follows commands   Lungs: clear to auscultation, respirations regular, respirations even, and respirations unlabored   Heart: regular rhythm & normal rate, normal S1, S2, and no murmur, no gallop, no rub   Skin: Incision c/d/i     Results:    Results from last 7 days   Lab Units 11/02/23  0321   WBC 10*3/mm3 15.76*   HEMOGLOBIN g/dL 8.6*   HEMATOCRIT % 27.5*   PLATELETS 10*3/mm3 161     Results from last 7 days   Lab Units 11/02/23  0321   SODIUM mmol/L 142   POTASSIUM mmol/L 3.7   CHLORIDE mmol/L 104   CO2 mmol/L 23.0   BUN mg/dL 32*   CREATININE mg/dL 1.55*   GLUCOSE mg/dL 262*   CALCIUM mg/dL 8.5*       Assessment:  POD # 1 s/p CABG x 4    Plan:  Minimize sedation  Continue epi gtt to optimize perfusion and renal protection  BIPAP as needed  Pulmonary toilet  Continue ghazal duong today  Continue chest tubes, wires    Dirk Cleveland MD  11/02/23  06:48 EDT

## 2023-11-02 NOTE — CASE MANAGEMENT/SOCIAL WORK
Continued Stay Note  [facility]     Patient Name: Michael Cochran  MRN: [id number]  Today's Date: 11/2/2023    Admit Date: 10/24/2023    Plan: Home   Discharge Plan       Row Name 11/02/23 1206       Plan    Plan Home    Plan Comments Discussed pt in MDR. Pt currently using BiPAP and minimally responsive. Not medically ready for dischare. No discharge needs at this time. CM will cont to follow.    Final Discharge Disposition Code 01 - home or self-care                   Discharge Codes    No documentation.                 Expected Discharge Date and Time       Expected Discharge Date Expected Discharge Time    Nov 7, 2023               Gricel Flowers RN

## 2023-11-02 NOTE — PLAN OF CARE
Goal Outcome Evaluation:        Patient is minimally responsive. Withdraws all four extremities to painful stimuli, moves spontaneously at times. Occasional moaning. Bipap.  NSR. Low dose epi to maintain SBP around 140 per Dr. Cleveland. Insulin gtt continues. Mt output 160ml.       GYN consult for vaginal bleeding on hold pending clarification from Dr. Cleveland as to following Dr. Ruby recommendation to have patient seen as an outpatient.  Vaginal bleeding continues.

## 2023-11-02 NOTE — PROGRESS NOTES
Pt. Referred for Phase II Cardiac Rehab. Upon arrival, the bedside nurse stated that this was not the best time to consult patient.  Staff will follow up

## 2023-11-03 ENCOUNTER — APPOINTMENT (OUTPATIENT)
Dept: GENERAL RADIOLOGY | Facility: HOSPITAL | Age: 78
End: 2023-11-03
Payer: MEDICARE

## 2023-11-03 LAB
ACT BLD: 185 SECONDS (ref 82–152)
ANION GAP SERPL CALCULATED.3IONS-SCNC: 11 MMOL/L (ref 5–15)
ANION GAP SERPL CALCULATED.3IONS-SCNC: 15 MMOL/L (ref 5–15)
ARTERIAL PATENCY WRIST A: ABNORMAL
ATMOSPHERIC PRESS: ABNORMAL MM[HG]
BASE EXCESS BLDA CALC-SCNC: -0.5 MMOL/L (ref 0–2)
BDY SITE: ABNORMAL
BH BB BLOOD EXPIRATION DATE: NORMAL
BH BB BLOOD EXPIRATION DATE: NORMAL
BH BB BLOOD TYPE BARCODE: 6200
BH BB BLOOD TYPE BARCODE: 6200
BH BB DISPENSE STATUS: NORMAL
BH BB DISPENSE STATUS: NORMAL
BH BB PRODUCT CODE: NORMAL
BH BB PRODUCT CODE: NORMAL
BH BB UNIT NUMBER: NORMAL
BH BB UNIT NUMBER: NORMAL
BODY TEMPERATURE: 37 C
BUN SERPL-MCNC: 35 MG/DL (ref 8–23)
BUN SERPL-MCNC: 35 MG/DL (ref 8–23)
BUN/CREAT SERPL: 21.6 (ref 7–25)
BUN/CREAT SERPL: 26.5 (ref 7–25)
CALCIUM SPEC-SCNC: 8.7 MG/DL (ref 8.6–10.5)
CALCIUM SPEC-SCNC: 8.8 MG/DL (ref 8.6–10.5)
CHLORIDE SERPL-SCNC: 109 MMOL/L (ref 98–107)
CHLORIDE SERPL-SCNC: 110 MMOL/L (ref 98–107)
CO2 BLDA-SCNC: 25.1 MMOL/L (ref 22–33)
CO2 SERPL-SCNC: 21 MMOL/L (ref 22–29)
CO2 SERPL-SCNC: 24 MMOL/L (ref 22–29)
COHGB MFR BLD: 1.2 % (ref 0–2)
CREAT SERPL-MCNC: 1.32 MG/DL (ref 0.57–1)
CREAT SERPL-MCNC: 1.62 MG/DL (ref 0.57–1)
CROSSMATCH INTERPRETATION: NORMAL
CROSSMATCH INTERPRETATION: NORMAL
DEPRECATED RDW RBC AUTO: 48.1 FL (ref 37–54)
DEPRECATED RDW RBC AUTO: 49.8 FL (ref 37–54)
EGFRCR SERPLBLD CKD-EPI 2021: 32.2 ML/MIN/1.73
EGFRCR SERPLBLD CKD-EPI 2021: 41.2 ML/MIN/1.73
EPAP: 0
ERYTHROCYTE [DISTWIDTH] IN BLOOD BY AUTOMATED COUNT: 14 % (ref 12.3–15.4)
ERYTHROCYTE [DISTWIDTH] IN BLOOD BY AUTOMATED COUNT: 14.1 % (ref 12.3–15.4)
GLUCOSE BLDC GLUCOMTR-MCNC: 125 MG/DL (ref 70–130)
GLUCOSE BLDC GLUCOMTR-MCNC: 128 MG/DL (ref 70–130)
GLUCOSE BLDC GLUCOMTR-MCNC: 131 MG/DL (ref 70–130)
GLUCOSE BLDC GLUCOMTR-MCNC: 135 MG/DL (ref 70–130)
GLUCOSE BLDC GLUCOMTR-MCNC: 140 MG/DL (ref 70–130)
GLUCOSE BLDC GLUCOMTR-MCNC: 141 MG/DL (ref 70–130)
GLUCOSE BLDC GLUCOMTR-MCNC: 142 MG/DL (ref 70–130)
GLUCOSE BLDC GLUCOMTR-MCNC: 143 MG/DL (ref 70–130)
GLUCOSE BLDC GLUCOMTR-MCNC: 146 MG/DL (ref 70–130)
GLUCOSE BLDC GLUCOMTR-MCNC: 148 MG/DL (ref 70–130)
GLUCOSE BLDC GLUCOMTR-MCNC: 155 MG/DL (ref 70–130)
GLUCOSE BLDC GLUCOMTR-MCNC: 165 MG/DL (ref 70–130)
GLUCOSE BLDC GLUCOMTR-MCNC: 168 MG/DL (ref 70–130)
GLUCOSE BLDC GLUCOMTR-MCNC: 188 MG/DL (ref 70–130)
GLUCOSE SERPL-MCNC: 133 MG/DL (ref 65–99)
GLUCOSE SERPL-MCNC: 139 MG/DL (ref 65–99)
HCO3 BLDA-SCNC: 24 MMOL/L (ref 20–26)
HCT VFR BLD AUTO: 25.1 % (ref 34–46.6)
HCT VFR BLD AUTO: 26 % (ref 34–46.6)
HCT VFR BLD CALC: 24.7 % (ref 38–51)
HGB BLD-MCNC: 7.6 G/DL (ref 12–15.9)
HGB BLD-MCNC: 7.7 G/DL (ref 12–15.9)
HGB BLDA-MCNC: 8.1 G/DL (ref 14–18)
INHALED O2 CONCENTRATION: 36 %
IPAP: 0
MCH RBC QN AUTO: 28.4 PG (ref 26.6–33)
MCH RBC QN AUTO: 28.8 PG (ref 26.6–33)
MCHC RBC AUTO-ENTMCNC: 29.6 G/DL (ref 31.5–35.7)
MCHC RBC AUTO-ENTMCNC: 30.3 G/DL (ref 31.5–35.7)
MCV RBC AUTO: 95.1 FL (ref 79–97)
MCV RBC AUTO: 95.9 FL (ref 79–97)
METHGB BLD QL: 0.2 % (ref 0–1.5)
MODALITY: ABNORMAL
OXYHGB MFR BLDV: 93.7 % (ref 94–99)
PAW @ PEAK INSP FLOW SETTING VENT: 0 CMH2O
PCO2 BLDA: 37.5 MM HG (ref 35–45)
PCO2 TEMP ADJ BLD: 37.5 MM HG (ref 35–45)
PH BLDA: 7.41 PH UNITS (ref 7.35–7.45)
PH, TEMP CORRECTED: 7.41 PH UNITS
PLATELET # BLD AUTO: 107 10*3/MM3 (ref 140–450)
PLATELET # BLD AUTO: 118 10*3/MM3 (ref 140–450)
PMV BLD AUTO: 13.4 FL (ref 6–12)
PMV BLD AUTO: 13.7 FL (ref 6–12)
PO2 BLDA: 67.7 MM HG (ref 83–108)
PO2 TEMP ADJ BLD: 67.7 MM HG (ref 83–108)
POTASSIUM SERPL-SCNC: 3.3 MMOL/L (ref 3.5–5.2)
POTASSIUM SERPL-SCNC: 3.3 MMOL/L (ref 3.5–5.2)
POTASSIUM SERPL-SCNC: 3.9 MMOL/L (ref 3.5–5.2)
QT INTERVAL: 400 MS
QTC INTERVAL: 456 MS
RBC # BLD AUTO: 2.64 10*6/MM3 (ref 3.77–5.28)
RBC # BLD AUTO: 2.71 10*6/MM3 (ref 3.77–5.28)
SODIUM SERPL-SCNC: 145 MMOL/L (ref 136–145)
SODIUM SERPL-SCNC: 145 MMOL/L (ref 136–145)
TOTAL RATE: 0 BREATHS/MINUTE
UNIT  ABO: NORMAL
UNIT  ABO: NORMAL
UNIT  RH: NORMAL
UNIT  RH: NORMAL
WBC NRBC COR # BLD: 11.77 10*3/MM3 (ref 3.4–10.8)
WBC NRBC COR # BLD: 14.51 10*3/MM3 (ref 3.4–10.8)

## 2023-11-03 PROCEDURE — 85027 COMPLETE CBC AUTOMATED: CPT | Performed by: INTERNAL MEDICINE

## 2023-11-03 PROCEDURE — 25810000003 SODIUM CHLORIDE 0.9 % SOLUTION 250 ML FLEX CONT: Performed by: THORACIC SURGERY (CARDIOTHORACIC VASCULAR SURGERY)

## 2023-11-03 PROCEDURE — 71045 X-RAY EXAM CHEST 1 VIEW: CPT

## 2023-11-03 PROCEDURE — 25010000002 POTASSIUM CHLORIDE PER 2 MEQ: Performed by: INTERNAL MEDICINE

## 2023-11-03 PROCEDURE — 80048 BASIC METABOLIC PNL TOTAL CA: CPT | Performed by: THORACIC SURGERY (CARDIOTHORACIC VASCULAR SURGERY)

## 2023-11-03 PROCEDURE — 82948 REAGENT STRIP/BLOOD GLUCOSE: CPT

## 2023-11-03 PROCEDURE — 25010000002 CEFAZOLIN PER 500 MG: Performed by: STUDENT IN AN ORGANIZED HEALTH CARE EDUCATION/TRAINING PROGRAM

## 2023-11-03 PROCEDURE — 85027 COMPLETE CBC AUTOMATED: CPT | Performed by: STUDENT IN AN ORGANIZED HEALTH CARE EDUCATION/TRAINING PROGRAM

## 2023-11-03 PROCEDURE — 74018 RADEX ABDOMEN 1 VIEW: CPT

## 2023-11-03 PROCEDURE — 93005 ELECTROCARDIOGRAM TRACING: CPT | Performed by: STUDENT IN AN ORGANIZED HEALTH CARE EDUCATION/TRAINING PROGRAM

## 2023-11-03 PROCEDURE — 99233 SBSQ HOSP IP/OBS HIGH 50: CPT | Performed by: INTERNAL MEDICINE

## 2023-11-03 PROCEDURE — 93010 ELECTROCARDIOGRAM REPORT: CPT | Performed by: INTERNAL MEDICINE

## 2023-11-03 PROCEDURE — 83050 HGB METHEMOGLOBIN QUAN: CPT

## 2023-11-03 PROCEDURE — 82375 ASSAY CARBOXYHB QUANT: CPT

## 2023-11-03 PROCEDURE — 82805 BLOOD GASES W/O2 SATURATION: CPT

## 2023-11-03 PROCEDURE — 63710000001 INSULIN REGULAR HUMAN PER 5 UNITS: Performed by: INTERNAL MEDICINE

## 2023-11-03 PROCEDURE — 25010000002 POTASSIUM CHLORIDE PER 2 MEQ: Performed by: THORACIC SURGERY (CARDIOTHORACIC VASCULAR SURGERY)

## 2023-11-03 PROCEDURE — 94799 UNLISTED PULMONARY SVC/PX: CPT

## 2023-11-03 PROCEDURE — 80048 BASIC METABOLIC PNL TOTAL CA: CPT | Performed by: STUDENT IN AN ORGANIZED HEALTH CARE EDUCATION/TRAINING PROGRAM

## 2023-11-03 PROCEDURE — 25010000002 MORPHINE PER 10 MG: Performed by: THORACIC SURGERY (CARDIOTHORACIC VASCULAR SURGERY)

## 2023-11-03 PROCEDURE — 99232 SBSQ HOSP IP/OBS MODERATE 35: CPT

## 2023-11-03 PROCEDURE — 84132 ASSAY OF SERUM POTASSIUM: CPT | Performed by: THORACIC SURGERY (CARDIOTHORACIC VASCULAR SURGERY)

## 2023-11-03 RX ORDER — POTASSIUM CHLORIDE 29.8 MG/ML
20 INJECTION INTRAVENOUS ONCE
Status: COMPLETED | OUTPATIENT
Start: 2023-11-03 | End: 2023-11-03

## 2023-11-03 RX ORDER — POTASSIUM CHLORIDE 1.5 G/1.58G
20 POWDER, FOR SOLUTION ORAL ONCE
Status: COMPLETED | OUTPATIENT
Start: 2023-11-03 | End: 2023-11-03

## 2023-11-03 RX ORDER — DEXTROSE MONOHYDRATE 25 G/50ML
25 INJECTION, SOLUTION INTRAVENOUS
Status: DISCONTINUED | OUTPATIENT
Start: 2023-11-03 | End: 2023-11-04

## 2023-11-03 RX ORDER — NICOTINE POLACRILEX 4 MG
15 LOZENGE BUCCAL
Status: DISCONTINUED | OUTPATIENT
Start: 2023-11-03 | End: 2023-11-04

## 2023-11-03 RX ORDER — AMINO ACIDS/PROTEIN HYDROLYS 11G-40/45
30 LIQUID IN PACKET (ML) ORAL 3 TIMES DAILY
Status: DISCONTINUED | OUTPATIENT
Start: 2023-11-03 | End: 2023-11-06

## 2023-11-03 RX ORDER — IBUPROFEN 600 MG/1
1 TABLET ORAL
Status: DISCONTINUED | OUTPATIENT
Start: 2023-11-03 | End: 2023-11-04

## 2023-11-03 RX ADMIN — NICARDIPINE HYDROCHLORIDE 1 MG/HR: 25 INJECTION, SOLUTION INTRAVENOUS at 09:05

## 2023-11-03 RX ADMIN — ACETAMINOPHEN 650 MG: 325 TABLET, FILM COATED ORAL at 21:58

## 2023-11-03 RX ADMIN — Medication 30 ML: at 13:41

## 2023-11-03 RX ADMIN — NICARDIPINE HYDROCHLORIDE 10 MG/HR: 25 INJECTION, SOLUTION INTRAVENOUS at 23:23

## 2023-11-03 RX ADMIN — Medication 30 ML: at 21:59

## 2023-11-03 RX ADMIN — Medication 12.5 MG: at 21:58

## 2023-11-03 RX ADMIN — POTASSIUM CHLORIDE 20 MEQ: 29.8 INJECTION, SOLUTION INTRAVENOUS at 08:35

## 2023-11-03 RX ADMIN — INSULIN HUMAN 2 UNITS: 100 INJECTION, SOLUTION PARENTERAL at 17:25

## 2023-11-03 RX ADMIN — POTASSIUM CHLORIDE 20 MEQ: 29.8 INJECTION, SOLUTION INTRAVENOUS at 11:18

## 2023-11-03 RX ADMIN — ACETAMINOPHEN 650 MG: 325 TABLET, FILM COATED ORAL at 00:00

## 2023-11-03 RX ADMIN — SENNOSIDES AND DOCUSATE SODIUM 2 TABLET: 8.6; 5 TABLET ORAL at 08:35

## 2023-11-03 RX ADMIN — MORPHINE SULFATE 2 MG: 2 INJECTION, SOLUTION INTRAMUSCULAR; INTRAVENOUS at 03:23

## 2023-11-03 RX ADMIN — NICARDIPINE HYDROCHLORIDE 7 MG/HR: 25 INJECTION, SOLUTION INTRAVENOUS at 20:35

## 2023-11-03 RX ADMIN — ACETAMINOPHEN 650 MG: 325 TABLET, FILM COATED ORAL at 17:11

## 2023-11-03 RX ADMIN — SENNOSIDES AND DOCUSATE SODIUM 2 TABLET: 8.6; 5 TABLET ORAL at 21:59

## 2023-11-03 RX ADMIN — ASPIRIN 325 MG: 325 TABLET ORAL at 08:36

## 2023-11-03 RX ADMIN — ATORVASTATIN CALCIUM 80 MG: 40 TABLET, FILM COATED ORAL at 21:59

## 2023-11-03 RX ADMIN — Medication 10 ML: at 20:36

## 2023-11-03 RX ADMIN — ACETAMINOPHEN 650 MG: 325 TABLET, FILM COATED ORAL at 08:36

## 2023-11-03 RX ADMIN — POTASSIUM CHLORIDE 20 MEQ: 1.5 FOR SOLUTION ORAL at 17:11

## 2023-11-03 RX ADMIN — Medication 12.5 MG: at 08:47

## 2023-11-03 RX ADMIN — Medication 10 ML: at 08:36

## 2023-11-03 RX ADMIN — SODIUM CHLORIDE 2 G: 900 INJECTION INTRAVENOUS at 06:45

## 2023-11-03 NOTE — PROGRESS NOTES
Gynecologic consultation    Have been asked to see and evaluate this patient with new onset vaginal spotting  Have spoken to nursing staff and reported vaginal bleeding is only staining  Given patient's condition I think this would be most appropriate to defer work-up until such time as she has been discharged from the hospital and appropriate could be worked up in the clinic  We will plan on having my office call patient's family to arrange follow-up including pelvic ultrasound after discharge  We will sign off case at current time but are immediately available in the event that more brisk bleeding ensues.      Aleksandar Ruby M.D.  November 3, 2023  15:56 EDT

## 2023-11-03 NOTE — CONSULTS
Clinical Nutrition     Nutrition Support Assessment  Reason for Visit: Nurse practioner/physician assistant consult, EN      Patient Name: Michael Cochran  YOB: 1945  MRN: [id number]  Date of Encounter: 11/03/23 08:48 EDT  Admission date: 10/24/2023    Comments:    Recommend placing small bore post-pyloric feeding tube.      Ordered the following EN regimen:  Initiate Novasource Renal @ 15 ml/hr and advance by 10 ml/hr Q 8 hours to goal rate 35 ml/hr. Water flush @ 30 ml Q 2 hours. Prosource No Carb packet 3x daily.    At goal rate this regimen provides 700 ml formula, 1580 calories (99% est needs), 109 g protein (99% est needs), 0 g fiber, 504 ml free water from formula, 804 ml total water from formula/flushes, 18 mEq potassium, 720 mg phosphorus.      Recommend adding daily MVI as EN goal volume will not meet RDI.      Nutrition Assessment   Admission Diagnosis:  Acute exacerbation of congestive heart failure [I50.9]  CAD (coronary artery disease) [I25.10]      Problem List:    Acute exacerbation of congestive heart failure    HLD (hyperlipidemia)    Essential hypertension    Acute on chronic systolic congestive heart failure    Acute respiratory failure with hypoxia    Elevated troponin    Non-STEMI (non-ST elevated myocardial infarction)    Coronary artery disease involving native coronary artery of native heart with unstable angina pectoris    Vaginal bleeding    T2DM (new dx)    PMH:   She  has a past medical history of Hyperlipidemia and Hypertension.    PSH:  She  has a past surgical history that includes Breast fibroadenoma surgery; Cardiac catheterization (N/A, 10/26/2023); and Coronary artery bypass graft (N/A, 11/1/2023).      Applicable Nutrition Concerns:       Applicable Interval History:   (11/1) s/p CABG, intubated, large bore NG tube;  extubated      Reported/Observed/Food/Nutrition Related History:   Patient remains extubated and not requiring any pressor support;  "however, she remains minimally responsive with intermittent need for Bipap. CTS team would like to begin EN today. RD spoke with RN and recommended placing a small bore post-pyloric feeding tube due to intermittent use of Bipap (EN would need to be held when Bipap on if using NG tube). RN to discuss with MD during MDR. NKFA.    Labs    Labs Reviewed: Yes     Renal function trending down    Results from last 7 days   Lab Units 11/03/23  0447 11/02/23  0321 11/01/23 2012 11/01/23  1634 11/01/23  0520 10/31/23  0822 10/29/23  1028 10/28/23  0816   GLUCOSE mg/dL 139* 262* 220* 100*   < > 108* 169* 164*   BUN mg/dL 35* 32* 34* 31*   < > 34* 32* 37*   CREATININE mg/dL 1.62* 1.55* 1.30* 1.07*   < > 1.15* 1.13* 1.19*   SODIUM mmol/L 145 142 141 143   < > 142 142 142   CHLORIDE mmol/L 109* 104 106 107   < > 100 104 105   POTASSIUM mmol/L 3.3*  3.3* 3.7 4.3 4.1   < > 3.5 3.7 4.0   PHOSPHORUS mg/dL  --   --  5.1* 4.5  --   --   --  3.8   MAGNESIUM mg/dL  --   --  2.1 2.1  --  1.9  --  1.7   ALT (SGPT) U/L  --  12  --   --   --   --  24  --     < > = values in this interval not displayed.       Results from last 7 days   Lab Units 11/02/23  0321 11/01/23 2012 11/01/23  1634   ALBUMIN g/dL 4.3 3.8 3.2*   IONIZED CALCIUM mmol/L  --   --  1.28       Results from last 7 days   Lab Units 11/03/23  0801 11/03/23  0704 11/03/23  0620 11/03/23  0516 11/03/23  0316 11/03/23  0235   GLUCOSE mg/dL 146* 155* 165* 135* 143* 128     Lab Results   Lab Value Date/Time    HGBA1C 9.80 (H) 10/25/2023 0357         Results from last 7 days   Lab Units 10/29/23  1028   PROBNP pg/mL 4,242.0*       Medications    Medications Reviewed: Yes  Pertinent  Scheduled: Kcl, pericolace  Infusion: insulin  PRN:     Intake/Ouptut 24 hrs (0701 - 0700)   I&O's Reviewed: Yes     Last BM (10/31)    Anthropometrics     Flowsheet Rows      Flowsheet Row First Filed Value   Admission Height 157.5 cm (62\") Documented at 10/24/2023 1040   Admission Weight 93.9 kg (207 " "lb) Documented at 10/24/2023 1040       Height: Height: 157.5 cm (62\")  Last Filed Weight: Weight: 93.2 kg (205 lb 7.5 oz) (11/02/23 0500)  Method: Weight Method: Standing scale  BMI: BMI (Calculated): 37.6  BMI classification: Obese Class II: 35-39.9kg/m2  IBW:  110 lbs    UBW:   Weight       Weight (kg) Weight (lbs) Weight Method Visit Report   5/18/2021 92.715 kg  204 lb 6.4 oz   --    5/26/2021 92.715 kg  204 lb 6.4 oz   --    6/9/2021 93.078 kg  205 lb 3.2 oz   --    6/25/2021 92.534 kg  204 lb   --    8/16/2021 93.895 kg  207 lb   --    10/24/2023 93.895 kg  207 lb  Stated     10/25/2023 95.346 kg  210 lb 3.2 oz      10/26/2023 95.255 kg  210 lb      10/27/2023 92.987 kg  205 lb  Standing scale     10/28/2023 93.441 kg  206 lb  Standing scale     10/30/2023 93.849 kg  206 lb 14.4 oz      10/31/2023 93.577 kg  206 lb 4.8 oz  Standing scale     11/1/2023 92.443 kg  203 lb 12.8 oz  Standing scale     11/2/2023 93.2 kg  205 lb 7.5 oz          Nutrition Focused Physical Exam     Date: 11/3  Unable to perform exam due to: Defer pending indication    Needs Assessment   Date: 11/3    Height used:Height: 157.5 cm (62\")  Weights used: 203 lbs/92.3 kg (actual wt)     110 lbs/50 kg (IBW)      Estimated Calorie needs: ~1600 calories daily  Method: 16-18  Kcals/KG actual wt = 0895-1606    Estimated Protein needs: ~110 g protein daily  Method: 1.2 g/Kg actual wt = 111  Method: 2.0 g/kg IBW = 100    Estimated Fluid needs: Per clinical status      Current Nutrition Prescription     PO: NPO Diet NPO Type: Strict NPO  Oral Nutrition Supplement: N/A  Intake: 70% x 10 meals prior to surgery (11/1)      Nutrition Diagnosis     Date:  Updated:   Problem Inadequate oral intake   Etiology AMS   Signs/Symptoms NPO   Status:     Goal:   General: Nutrition support treatment  PO: Advace diet as medically feasible/appropriate  EN/PN: Initiate EN, Establish EN tolerance, Tolerate EN at goal    Nutrition Intervention      Follow treatment " progress, Care plan reviewed, Await begin PO, Nutrition support order placed    Recommend placing small bore post-pyloric feeding tube.      Ordered the following EN regimen:  Initiate Novasource Renal @ 15 ml/hr and advance by 10 ml/hr Q 8 hours to goal rate 35 ml/hr. Water flush @ 30 ml Q 2 hours. Prosource No Carb packet 3x daily.    At goal rate this regimen provides 700 ml formula, 1580 calories (99% est needs), 109 g protein (99% est needs), 0 g fiber, 504 ml free water from formula, 804 ml total water from formula/flushes, 18 mEq potassium, 720 mg phosphorus.      Recommend adding daily MVI as EN goal volume will not meet RDI.      Monitoring/Evaluation:   Per protocol, I&O, Pertinent labs, EN delivery/tolerance, Weight, Skin status, GI status, Symptoms, POC/GOC, Swallow function, Hemodynamic stability      Ioana Zuniga, RD  Time Spent: 45 min

## 2023-11-03 NOTE — CASE MANAGEMENT/SOCIAL WORK
Continued Stay Note  [facility]     Patient Name: Michael Cochran  MRN: [id number]  Today's Date: 11/3/2023    Admit Date: 10/24/2023    Plan: tbd   Discharge Plan       Row Name 11/03/23 1413       Plan    Plan tbd    Plan Comments Discussed in ICU MDR. Pt remiains on 4L NC not home use. Not verbally responding to CM at this time. Keofeed to be placed to initiate TF. No discharge needs at this time. CM will cont to follow.    Final Discharge Disposition Code 30 - still a patient                   Discharge Codes    No documentation.                 Expected Discharge Date and Time       Expected Discharge Date Expected Discharge Time    Nov 8, 2023               Gricel Flowers RN

## 2023-11-03 NOTE — PROGRESS NOTES
[facility]  PROGRESS NOTE    Michael Cochran   [id number]   1945    LOS: 10 days .  Date of Admission: 10/24/2023  Date of Service: 11/03/23    Primary Care Physician: Bo Rodriguez PA    Chief Complaint: f/u ICM, hypertension, hyperlipidemia, and other cardiac risk factors    Subjective      Patient lying in bed.  Follows commands intermittently.  Patient currently hypertensive metoprolol about to be given.  No family at bedside.     ROS  All systems have been reviewed and are negative with the exception of those mentioned in the HPI and problem list above.     Objective   Vital Sign Min/Max for last 24 hours  Temp  Min: 97.3 °F (36.3 °C)  Max: 98.4 °F (36.9 °C)   BP  Min: 115/51  Max: 154/86   Pulse  Min: 56  Max: 80   Resp  Min: 20  Max: 32   SpO2  Min: 88 %  Max: 99 %   No data recorded   No data recorded     Physical Exam:  GENERAL: will not follow commands, in no acute distress.   HEENT: Normocephalic, no jugular venous distention  HEART: Regular rhythm, normal rate, and no murmurs, gallops, or rubs.   LUNGS: Clear to auscultation bilaterally. No wheezing, rales or rhonchi. On 4 L NC  NEUROLOGIC: No focal abnormalities involving strength or sensation are noted.   EXTREMITIES: No clubbing, cyanosis. mild edema.     Results:  Results from last 7 days   Lab Units 11/03/23 0447 11/02/23 0321 11/01/23 2012   WBC 10*3/mm3 11.77* 15.76* 20.20*   HEMOGLOBIN g/dL 7.6* 8.6* 9.9*   HEMATOCRIT % 25.1* 27.5* 32.2*   PLATELETS 10*3/mm3 107* 161 172     Results from last 7 days   Lab Units 11/03/23 0447 11/02/23 0321 11/01/23 2012   SODIUM mmol/L 145 142 141   POTASSIUM mmol/L 3.3*  3.3* 3.7 4.3   CHLORIDE mmol/L 109* 104 106   CO2 mmol/L 21.0* 23.0 21.0*   BUN mg/dL 35* 32* 34*   CREATININE mg/dL 1.62* 1.55* 1.30*   GLUCOSE mg/dL 139* 262* 220*      Lab Results   Component Value Date    CHOL 242 (H) 10/27/2023    TRIG 305 (H) 10/27/2023    HDL 34 (L) 10/27/2023      (H) 10/27/2023    AST 38 (H) 11/02/2023    ALT 12 11/02/2023                     Results from last 7 days   Lab Units 11/02/23  0321 11/01/23  1634   PROTIME Seconds 16.6* 18.2*   INR  1.33* 1.49*   APTT seconds  --  36.6         Results from last 7 days   Lab Units 10/29/23  1028   PROBNP pg/mL 4,242.0*       Intake/Output Summary (Last 24 hours) at 11/3/2023 0823  Last data filed at 11/3/2023 0600  Gross per 24 hour   Intake 1444 ml   Output 1275 ml   Net 169 ml       EKG/TELE: NSR    Radiology Data:   XR Chest 1 View    Result Date: 11/3/2023  Impression: No significant change Electronically Signed: Marc Castillo MD  11/3/2023 6:20 AM CDT  Workstation ID: [id number]    XR Chest 1 View    Result Date: 11/2/2023  Impression: Interval removal of endotracheal tube Increased left lower lobe opacity compatible atelectasis and small effusion Electronically Signed: John العراقي MD  11/2/2023 7:12 AM EDT  Workstation ID: [id number]    XR Chest 1 View    Result Date: 11/1/2023  Impression: Support devices appear in standard position. No pneumothorax or convincing active airspace process. Electronically Signed: Gonzalo Zamora MD  11/1/2023 4:45 PM EDT  Workstation ID: [id number]    Results for orders placed during the hospital encounter of 10/24/23    Adult Transthoracic Echo Complete W/ Cont if Necessary Per Protocol    Interpretation Summary    Left ventricular systolic function is moderate-severely decreased. Calculated left ventricular EF = 31.4%. The apex is akinetic with severe hypokinesis of the anterior, anterolateral, and septal walls.    The left ventricular cavity is mildly dilated.    Left ventricular diastolic function is consistent with (grade II w/high LAP) pseudonormalization.    The aortic valve is structurally normal with no stenosis present. No significant aortic valve regurgitation is present.    The mitral valve is structurally normal with no significant stenosis present. Mild mitral valve  regurgitation is present.     Current Medications:  acetaminophen, 650 mg, Oral, Q8H  aspirin, 325 mg, Oral, Daily  atorvastatin, 80 mg, Oral, Nightly  metoprolol tartrate, 12.5 mg, Oral, Q12H  pharmacy consult - [place], , Does not apply, Daily  potassium chloride, 20 mEq, Intravenous, Once  senna-docusate sodium, 2 tablet, Oral, BID  sodium chloride, 10 mL, Intravenous, Q12H      DOBUTamine, 2-20 mcg/kg/min, Last Rate: Stopped (11/02/23 4027)  DOPamine, 2-20 mcg/kg/min  EPINEPHrine, 0.02-0.3 mcg/kg/min, Last Rate: 0.02 mcg/kg/min (11/02/23 5305)  EPINEPHrine, 0.02-0.3 mcg/kg/min, Last Rate: 0.02 mcg/kg/min (11/02/23 1500)  insulin, 0-100 Units/hr, Last Rate: 1 Units/hr (11/03/23 2345)  niCARdipine, 5-15 mg/hr  norepinephrine, 0.02-0.3 mcg/kg/min  phenylephrine, 0.5-3 mcg/kg/min      Assessment and Plan:   Ischemic cardiomyopathy s/p CABG x4  NSTEMI  Hypertension  Hyperlipidemia-atorvastatin 80  [id number] 9.8  Morbid obesity-obligates all aspects of care  Medical noncompliance  Tobacco abuse  Hypokalemia    -Continue aspirin and statin  -Metoprolol 12.5 ordered by CTS  -Consider addition of GDMT when blood pressure and renal function allows  -Replace potassium per protocol  -We will continue to follow    Electronically signed by FRACISCO Richard, 11/03/23, 8:23 AM EDT.     Please note that portions of this note were dictated utilizing Dragon dictation.

## 2023-11-03 NOTE — PROGRESS NOTES
Cardiothoracic Surgery Progress Note      POD # 2 s/p CABG x 4     LOS: 10 days      Subjective:  Opens eyes to voice, still not answering questions  Off vasoactive gtts      Objective:  Vital Signs  Temp:  [97.3 °F (36.3 °C)-98.4 °F (36.9 °C)] 98.3 °F (36.8 °C)  Heart Rate:  [56-80] 70  Resp:  [18-32] 28  BP: (114-154)/(51-99) 134/68    Physical Exam:   General Appearance:  awakes for nursing, grabbing their shirt.  Minimal activity on my exam other than eye opening to voice   Lungs: clear to auscultation, respirations regular, respirations even, and respirations unlabored   Heart: regular rhythm & normal rate, normal S1, S2, and no murmur, no gallop, no rub   Skin: Incision c/d/i     Results:    Results from last 7 days   Lab Units 11/03/23  0447   WBC 10*3/mm3 11.77*   HEMOGLOBIN g/dL 7.6*   HEMATOCRIT % 25.1*   PLATELETS 10*3/mm3 107*     Results from last 7 days   Lab Units 11/03/23  0447   SODIUM mmol/L 145   POTASSIUM mmol/L 3.3*  3.3*   CHLORIDE mmol/L 109*   CO2 mmol/L 21.0*   BUN mg/dL 35*   CREATININE mg/dL 1.62*   GLUCOSE mg/dL 139*   CALCIUM mg/dL 8.7       Assessment:  POD # 2 s/p CABG x 4    Plan:  Epi gtt as needed for target blood pressure around 140   Minimize sedation/narcotics  D/C swan  BIPAP as needed  Pulmonary toilet  Continue chest tubes, wires  Initiate tube feeds, nutrition consult  Will continue to follow neurologic exam, may need imaging studies    Dirk Cleveland MD  11/03/23  07:18 EDT

## 2023-11-04 ENCOUNTER — APPOINTMENT (OUTPATIENT)
Dept: CT IMAGING | Facility: HOSPITAL | Age: 78
End: 2023-11-04
Payer: MEDICARE

## 2023-11-04 ENCOUNTER — APPOINTMENT (OUTPATIENT)
Dept: GENERAL RADIOLOGY | Facility: HOSPITAL | Age: 78
End: 2023-11-04
Payer: MEDICARE

## 2023-11-04 LAB
ALBUMIN SERPL-MCNC: 3.5 G/DL (ref 3.5–5.2)
ALBUMIN/GLOB SERPL: 1.7 G/DL
ALP SERPL-CCNC: 93 U/L (ref 39–117)
ALT SERPL W P-5'-P-CCNC: <5 U/L (ref 1–33)
ANION GAP SERPL CALCULATED.3IONS-SCNC: 12 MMOL/L (ref 5–15)
AST SERPL-CCNC: 25 U/L (ref 1–32)
BACTERIA UR QL AUTO: ABNORMAL /HPF
BASOPHILS # BLD AUTO: 0.02 10*3/MM3 (ref 0–0.2)
BASOPHILS NFR BLD AUTO: 0.2 % (ref 0–1.5)
BILIRUB SERPL-MCNC: 0.6 MG/DL (ref 0–1.2)
BILIRUB UR QL STRIP: NEGATIVE
BUN SERPL-MCNC: 37 MG/DL (ref 8–23)
BUN/CREAT SERPL: 32.2 (ref 7–25)
CALCIUM SPEC-SCNC: 8.4 MG/DL (ref 8.6–10.5)
CHLORIDE SERPL-SCNC: 113 MMOL/L (ref 98–107)
CHOLEST SERPL-MCNC: 98 MG/DL (ref 0–200)
CLARITY UR: ABNORMAL
CO2 SERPL-SCNC: 21 MMOL/L (ref 22–29)
COLOR UR: YELLOW
CREAT SERPL-MCNC: 1.15 MG/DL (ref 0.57–1)
DEPRECATED RDW RBC AUTO: 49.9 FL (ref 37–54)
DEPRECATED RDW RBC AUTO: 51.3 FL (ref 37–54)
EGFRCR SERPLBLD CKD-EPI 2021: 48.6 ML/MIN/1.73
EOSINOPHIL # BLD AUTO: 0.09 10*3/MM3 (ref 0–0.4)
EOSINOPHIL NFR BLD AUTO: 0.7 % (ref 0.3–6.2)
ERYTHROCYTE [DISTWIDTH] IN BLOOD BY AUTOMATED COUNT: 14.2 % (ref 12.3–15.4)
ERYTHROCYTE [DISTWIDTH] IN BLOOD BY AUTOMATED COUNT: 14.5 % (ref 12.3–15.4)
GLOBULIN UR ELPH-MCNC: 2.1 GM/DL
GLUCOSE BLDC GLUCOMTR-MCNC: 202 MG/DL (ref 70–130)
GLUCOSE BLDC GLUCOMTR-MCNC: 206 MG/DL (ref 70–130)
GLUCOSE BLDC GLUCOMTR-MCNC: 213 MG/DL (ref 70–130)
GLUCOSE BLDC GLUCOMTR-MCNC: 224 MG/DL (ref 70–130)
GLUCOSE SERPL-MCNC: 222 MG/DL (ref 65–99)
GLUCOSE UR STRIP-MCNC: ABNORMAL MG/DL
HCT VFR BLD AUTO: 25 % (ref 34–46.6)
HCT VFR BLD AUTO: 25.6 % (ref 34–46.6)
HGB BLD-MCNC: 7.4 G/DL (ref 12–15.9)
HGB BLD-MCNC: 7.6 G/DL (ref 12–15.9)
HGB UR QL STRIP.AUTO: ABNORMAL
HYALINE CASTS UR QL AUTO: ABNORMAL /LPF
IMM GRANULOCYTES # BLD AUTO: 0.07 10*3/MM3 (ref 0–0.05)
IMM GRANULOCYTES NFR BLD AUTO: 0.6 % (ref 0–0.5)
KETONES UR QL STRIP: NEGATIVE
LEUKOCYTE ESTERASE UR QL STRIP.AUTO: ABNORMAL
LYMPHOCYTES # BLD AUTO: 0.95 10*3/MM3 (ref 0.7–3.1)
LYMPHOCYTES NFR BLD AUTO: 7.5 % (ref 19.6–45.3)
MAGNESIUM SERPL-MCNC: 2.2 MG/DL (ref 1.6–2.4)
MCH RBC QN AUTO: 28.7 PG (ref 26.6–33)
MCH RBC QN AUTO: 28.8 PG (ref 26.6–33)
MCHC RBC AUTO-ENTMCNC: 29.6 G/DL (ref 31.5–35.7)
MCHC RBC AUTO-ENTMCNC: 29.7 G/DL (ref 31.5–35.7)
MCV RBC AUTO: 96.6 FL (ref 79–97)
MCV RBC AUTO: 97.3 FL (ref 79–97)
MONOCYTES # BLD AUTO: 0.84 10*3/MM3 (ref 0.1–0.9)
MONOCYTES NFR BLD AUTO: 6.6 % (ref 5–12)
NEUTROPHILS NFR BLD AUTO: 10.73 10*3/MM3 (ref 1.7–7)
NEUTROPHILS NFR BLD AUTO: 84.4 % (ref 42.7–76)
NITRITE UR QL STRIP: NEGATIVE
NRBC BLD AUTO-RTO: 0 /100 WBC (ref 0–0.2)
PH UR STRIP.AUTO: 5.5 [PH] (ref 5–8)
PHOSPHATE SERPL-MCNC: 2.5 MG/DL (ref 2.5–4.5)
PLATELET # BLD AUTO: 132 10*3/MM3 (ref 140–450)
PLATELET # BLD AUTO: 145 10*3/MM3 (ref 140–450)
PMV BLD AUTO: 13.2 FL (ref 6–12)
PMV BLD AUTO: 13.9 FL (ref 6–12)
POTASSIUM SERPL-SCNC: 3.6 MMOL/L (ref 3.5–5.2)
POTASSIUM SERPL-SCNC: 4.5 MMOL/L (ref 3.5–5.2)
PROT SERPL-MCNC: 5.6 G/DL (ref 6–8.5)
PROT UR QL STRIP: ABNORMAL
RBC # BLD AUTO: 2.57 10*6/MM3 (ref 3.77–5.28)
RBC # BLD AUTO: 2.65 10*6/MM3 (ref 3.77–5.28)
RBC # UR STRIP: ABNORMAL /HPF
REF LAB TEST METHOD: ABNORMAL
RENAL EPI CELLS #/AREA URNS HPF: ABNORMAL /HPF
SODIUM SERPL-SCNC: 146 MMOL/L (ref 136–145)
SP GR UR STRIP: 1.02 (ref 1–1.03)
SQUAMOUS #/AREA URNS HPF: ABNORMAL /HPF
TRANS CELLS #/AREA URNS HPF: ABNORMAL /HPF
TRIGL SERPL-MCNC: 123 MG/DL (ref 0–150)
UROBILINOGEN UR QL STRIP: ABNORMAL
WBC # UR STRIP: ABNORMAL /HPF
WBC NRBC COR # BLD: 12.7 10*3/MM3 (ref 3.4–10.8)
WBC NRBC COR # BLD: 12.74 10*3/MM3 (ref 3.4–10.8)

## 2023-11-04 PROCEDURE — 70450 CT HEAD/BRAIN W/O DYE: CPT

## 2023-11-04 PROCEDURE — 84100 ASSAY OF PHOSPHORUS: CPT

## 2023-11-04 PROCEDURE — 99233 SBSQ HOSP IP/OBS HIGH 50: CPT | Performed by: INTERNAL MEDICINE

## 2023-11-04 PROCEDURE — 80053 COMPREHEN METABOLIC PANEL: CPT

## 2023-11-04 PROCEDURE — 25810000003 SODIUM CHLORIDE 0.9 % SOLUTION 250 ML FLEX CONT: Performed by: THORACIC SURGERY (CARDIOTHORACIC VASCULAR SURGERY)

## 2023-11-04 PROCEDURE — 81001 URINALYSIS AUTO W/SCOPE: CPT | Performed by: PSYCHIATRY & NEUROLOGY

## 2023-11-04 PROCEDURE — 84478 ASSAY OF TRIGLYCERIDES: CPT

## 2023-11-04 PROCEDURE — 82465 ASSAY BLD/SERUM CHOLESTEROL: CPT

## 2023-11-04 PROCEDURE — 63710000001 INSULIN REGULAR HUMAN PER 5 UNITS: Performed by: INTERNAL MEDICINE

## 2023-11-04 PROCEDURE — 71045 X-RAY EXAM CHEST 1 VIEW: CPT

## 2023-11-04 PROCEDURE — 74176 CT ABD & PELVIS W/O CONTRAST: CPT

## 2023-11-04 PROCEDURE — 82948 REAGENT STRIP/BLOOD GLUCOSE: CPT

## 2023-11-04 PROCEDURE — 85025 COMPLETE CBC W/AUTO DIFF WBC: CPT | Performed by: INTERNAL MEDICINE

## 2023-11-04 PROCEDURE — 87086 URINE CULTURE/COLONY COUNT: CPT | Performed by: PSYCHIATRY & NEUROLOGY

## 2023-11-04 PROCEDURE — 99232 SBSQ HOSP IP/OBS MODERATE 35: CPT | Performed by: INTERNAL MEDICINE

## 2023-11-04 PROCEDURE — 94660 CPAP INITIATION&MGMT: CPT

## 2023-11-04 PROCEDURE — 84132 ASSAY OF SERUM POTASSIUM: CPT | Performed by: THORACIC SURGERY (CARDIOTHORACIC VASCULAR SURGERY)

## 2023-11-04 PROCEDURE — 85027 COMPLETE CBC AUTOMATED: CPT | Performed by: THORACIC SURGERY (CARDIOTHORACIC VASCULAR SURGERY)

## 2023-11-04 PROCEDURE — 94799 UNLISTED PULMONARY SVC/PX: CPT

## 2023-11-04 PROCEDURE — 63710000001 INSULIN DETEMIR PER 5 UNITS: Performed by: INTERNAL MEDICINE

## 2023-11-04 PROCEDURE — 99222 1ST HOSP IP/OBS MODERATE 55: CPT | Performed by: PSYCHIATRY & NEUROLOGY

## 2023-11-04 PROCEDURE — 83735 ASSAY OF MAGNESIUM: CPT

## 2023-11-04 RX ORDER — AMLODIPINE BESYLATE 5 MG/1
5 TABLET ORAL
Status: DISCONTINUED | OUTPATIENT
Start: 2023-11-05 | End: 2023-11-05

## 2023-11-04 RX ORDER — POTASSIUM CHLORIDE 1.5 G/1.58G
40 POWDER, FOR SOLUTION ORAL EVERY 4 HOURS
Status: COMPLETED | OUTPATIENT
Start: 2023-11-04 | End: 2023-11-04

## 2023-11-04 RX ORDER — OXYCODONE HYDROCHLORIDE 10 MG/1
10 TABLET ORAL EVERY 4 HOURS PRN
Status: DISCONTINUED | OUTPATIENT
Start: 2023-11-04 | End: 2023-11-05

## 2023-11-04 RX ORDER — ACETAMINOPHEN 325 MG/1
650 TABLET ORAL EVERY 8 HOURS
Status: COMPLETED | OUTPATIENT
Start: 2023-11-04 | End: 2023-11-09

## 2023-11-04 RX ORDER — NICOTINE POLACRILEX 4 MG
15 LOZENGE BUCCAL
Status: DISCONTINUED | OUTPATIENT
Start: 2023-11-04 | End: 2023-11-08

## 2023-11-04 RX ORDER — ASPIRIN 325 MG
325 TABLET ORAL DAILY
Status: DISCONTINUED | OUTPATIENT
Start: 2023-11-05 | End: 2023-11-08

## 2023-11-04 RX ORDER — MORPHINE SULFATE 2 MG/ML
2 INJECTION, SOLUTION INTRAMUSCULAR; INTRAVENOUS
Status: DISCONTINUED | OUTPATIENT
Start: 2023-11-04 | End: 2023-11-05

## 2023-11-04 RX ORDER — CARVEDILOL 12.5 MG/1
12.5 TABLET ORAL 2 TIMES DAILY WITH MEALS
Status: DISCONTINUED | OUTPATIENT
Start: 2023-11-04 | End: 2023-11-04

## 2023-11-04 RX ORDER — IBUPROFEN 600 MG/1
1 TABLET ORAL
Status: DISCONTINUED | OUTPATIENT
Start: 2023-11-04 | End: 2023-11-15 | Stop reason: HOSPADM

## 2023-11-04 RX ORDER — DEXTROSE MONOHYDRATE 25 G/50ML
25 INJECTION, SOLUTION INTRAVENOUS
Status: DISCONTINUED | OUTPATIENT
Start: 2023-11-04 | End: 2023-11-15 | Stop reason: HOSPADM

## 2023-11-04 RX ORDER — DOCUSATE SODIUM 50 MG/5 ML
100 LIQUID (ML) ORAL 2 TIMES DAILY
Status: DISCONTINUED | OUTPATIENT
Start: 2023-11-04 | End: 2023-11-13

## 2023-11-04 RX ORDER — ALPRAZOLAM 0.25 MG/1
0.25 TABLET ORAL 3 TIMES DAILY PRN
Status: ACTIVE | OUTPATIENT
Start: 2023-11-04 | End: 2023-11-05

## 2023-11-04 RX ORDER — ATORVASTATIN CALCIUM 40 MG/1
80 TABLET, FILM COATED ORAL NIGHTLY
Status: DISCONTINUED | OUTPATIENT
Start: 2023-11-04 | End: 2023-11-15 | Stop reason: HOSPADM

## 2023-11-04 RX ORDER — CARVEDILOL 12.5 MG/1
12.5 TABLET ORAL 2 TIMES DAILY WITH MEALS
Status: DISCONTINUED | OUTPATIENT
Start: 2023-11-04 | End: 2023-11-06

## 2023-11-04 RX ORDER — AMLODIPINE BESYLATE 5 MG/1
5 TABLET ORAL
Status: DISCONTINUED | OUTPATIENT
Start: 2023-11-04 | End: 2023-11-04

## 2023-11-04 RX ORDER — HYDROCODONE BITARTRATE AND ACETAMINOPHEN 7.5; 325 MG/1; MG/1
1 TABLET ORAL EVERY 4 HOURS PRN
Status: DISCONTINUED | OUTPATIENT
Start: 2023-11-04 | End: 2023-11-05

## 2023-11-04 RX ADMIN — INSULIN HUMAN 2 UNITS: 100 INJECTION, SOLUTION PARENTERAL at 03:25

## 2023-11-04 RX ADMIN — ATORVASTATIN CALCIUM 80 MG: 40 TABLET, FILM COATED ORAL at 22:12

## 2023-11-04 RX ADMIN — POTASSIUM CHLORIDE 40 MEQ: 1.5 FOR SOLUTION ORAL at 08:30

## 2023-11-04 RX ADMIN — INSULIN DETEMIR 8 UNITS: 100 INJECTION, SOLUTION SUBCUTANEOUS at 22:12

## 2023-11-04 RX ADMIN — Medication 12.5 MG: at 08:20

## 2023-11-04 RX ADMIN — INSULIN HUMAN 2 UNITS: 100 INJECTION, SOLUTION PARENTERAL at 23:59

## 2023-11-04 RX ADMIN — Medication 30 ML: at 22:13

## 2023-11-04 RX ADMIN — AMLODIPINE BESYLATE 5 MG: 5 TABLET ORAL at 10:19

## 2023-11-04 RX ADMIN — Medication 30 ML: at 17:00

## 2023-11-04 RX ADMIN — INSULIN HUMAN 2 UNITS: 100 INJECTION, SOLUTION PARENTERAL at 17:10

## 2023-11-04 RX ADMIN — ACETAMINOPHEN 650 MG: 325 TABLET ORAL at 22:11

## 2023-11-04 RX ADMIN — INSULIN HUMAN 2 UNITS: 100 INJECTION, SOLUTION PARENTERAL at 12:11

## 2023-11-04 RX ADMIN — SENNOSIDES AND DOCUSATE SODIUM 2 TABLET: 8.6; 5 TABLET ORAL at 08:20

## 2023-11-04 RX ADMIN — CARVEDILOL 12.5 MG: 12.5 TABLET, FILM COATED ORAL at 17:03

## 2023-11-04 RX ADMIN — Medication 10 ML: at 08:24

## 2023-11-04 RX ADMIN — POTASSIUM CHLORIDE 40 MEQ: 1.5 FOR SOLUTION ORAL at 13:47

## 2023-11-04 RX ADMIN — INSULIN HUMAN 4 UNITS: 100 INJECTION, SOLUTION PARENTERAL at 12:11

## 2023-11-04 RX ADMIN — ACETAMINOPHEN 650 MG: 325 TABLET ORAL at 15:19

## 2023-11-04 RX ADMIN — NICARDIPINE HYDROCHLORIDE 15 MG/HR: 25 INJECTION, SOLUTION INTRAVENOUS at 08:20

## 2023-11-04 RX ADMIN — INSULIN HUMAN 4 UNITS: 100 INJECTION, SOLUTION PARENTERAL at 08:20

## 2023-11-04 RX ADMIN — INSULIN DETEMIR 8 UNITS: 100 INJECTION, SOLUTION SUBCUTANEOUS at 12:10

## 2023-11-04 RX ADMIN — NICARDIPINE HYDROCHLORIDE 5 MG/HR: 25 INJECTION, SOLUTION INTRAVENOUS at 23:58

## 2023-11-04 RX ADMIN — ACETAMINOPHEN 650 MG: 325 TABLET, FILM COATED ORAL at 06:21

## 2023-11-04 RX ADMIN — Medication 30 ML: at 08:23

## 2023-11-04 RX ADMIN — Medication 10 ML: at 22:15

## 2023-11-04 RX ADMIN — NICARDIPINE HYDROCHLORIDE 10 MG/HR: 25 INJECTION, SOLUTION INTRAVENOUS at 06:21

## 2023-11-04 RX ADMIN — NICARDIPINE HYDROCHLORIDE 10 MG/HR: 25 INJECTION, SOLUTION INTRAVENOUS at 10:19

## 2023-11-04 RX ADMIN — NICARDIPINE HYDROCHLORIDE 8 MG/HR: 25 INJECTION, SOLUTION INTRAVENOUS at 03:20

## 2023-11-04 RX ADMIN — CARVEDILOL 12.5 MG: 12.5 TABLET, FILM COATED ORAL at 10:19

## 2023-11-04 RX ADMIN — ASPIRIN 325 MG: 325 TABLET ORAL at 08:20

## 2023-11-04 RX ADMIN — INSULIN HUMAN 4 UNITS: 100 INJECTION, SOLUTION PARENTERAL at 17:10

## 2023-11-04 NOTE — CONSULTS
Neurology Note    Patient:  Michael Cochran    YOB: 1945    REFERRING PHYSICIAN:  Dr. Cleveland    CHIEF COMPLAINT:    AMS    HISTORY OF PRESENT ILLNESS:   The patient is a 78 y.o. female with HTN, HLP, admitted on 10/24 with CHF, NSEMI, found to have severe mulivessel disease, s/p CABG on 11/22. She has been poorly responsive postop, moving spontaneously but not following verbal commands, weaker on the left side, no seizures reported.    Past Medical History:  Past Medical History:   Diagnosis Date    Hyperlipidemia     Hypertension        Past Surgical History:  Past Surgical History:   Procedure Laterality Date    BREAST FIBROADENOMA SURGERY      9 y/o-was benign    CARDIAC CATHETERIZATION N/A 10/26/2023    Procedure: Left Heart Cath;  Surgeon: Jordi Hodge MD;  Location:  [facility];  Service: Cardiovascular;  Laterality: N/A;    CORONARY ARTERY BYPASS GRAFT N/A 11/1/2023    Procedure: MEDIAN STERNOTOMY, CORONARY ARTERY BYPASS GRAFTING X4, UTILIZING THE LEFT INTERANL MAMMARY ARTERY, EVH OF THE LEFT GREATER SAPHENOUS VEIN, EXPLORATION OF THE RIGHT LEG, PRIMITIVO PER ANETHESIA;  Surgeon: Dirk Cleveland MD;  Location:  [facility];  Service: Cardiothoracic;  Laterality: N/A;       Social History:   Social History     Socioeconomic History    Marital status:    Tobacco Use    Smoking status: Some Days     Types: Cigarettes    Smokeless tobacco: Never    Tobacco comments:     Smokes rarely   Vaping Use    Vaping Use: Never used   Substance and Sexual Activity    Alcohol use: Never    Drug use: Never        Family History:   History reviewed. No pertinent family history.    Medications Prior to Admission:    Prior to Admission medications    Medication Sig Start Date End Date Taking? Authorizing Provider   Accu-Chek Softclix Lancets lancets Use to test blood glucose twice daily 10/28/23   Madhav Martinez MD   Blood Glucose Monitoring Suppl (Blood Glucose Monitor System) w/Device  kit Use to test blood glucose twice daily 10/28/23   Madhav Martinez MD   glucose blood test strip Use one strip to test blood glucose twice daily 10/28/23   Madhav Martinez MD       Allergies:  Dynacirc [isradipine] and Codeine      Review of system  Review of Systems   Unable to perform ROS: Mental status change       Vitals:    11/04/23 1300   BP: 127/76   Pulse: 75   Resp:    Temp:    SpO2: 92%       Physical exam  Physical Exam  Eyes:      Pupils: Pupils are equal, round, and reactive to light.   Cardiovascular:      Rate and Rhythm: Normal rate and regular rhythm.   Pulmonary:      Effort: Pulmonary effort is normal.   Musculoskeletal:      Cervical back: Neck supple.   Neurological:      Comments: Eyes closed, no response to voice, slight tonic rightward gaze deviation, no definite facial droop, moves RUE spontaneously, withdraws all limbs briskly, question of bilateral extensor plantar responses.           Lab Results   Component Value Date    WBC 12.70 (H) 11/04/2023    HGB 7.6 (L) 11/04/2023    HCT 25.6 (L) 11/04/2023    MCV 96.6 11/04/2023     (L) 11/04/2023     Lab Results   Component Value Date    GLUCOSE 222 (H) 11/04/2023    BUN 37 (H) 11/04/2023    CREATININE 1.15 (H) 11/04/2023    EGFRIFNONA 62 06/09/2021    BCR 32.2 (H) 11/04/2023    CO2 21.0 (L) 11/04/2023    CALCIUM 8.4 (L) 11/04/2023    ALBUMIN 3.5 11/04/2023    AST 25 11/04/2023    ALT <5 11/04/2023     Vitamin B-12  211 - 946 pg/mL 437     Free T4  0.93 - 1.70 ng/dL 0.81 Low      Folate  4.78 - 24.20 ng/mL 11.80     TSH  0.270 - 4.200 uIU/mL 8.790 High      Duplex scan of extracranial arteries using B-mode, color flow, and/or spectral Doppler; bilateral    Accession Number: [id number]   Date of Study: 10/27/23   Ordering Provider: Robyn Newton PA-C   Clinical Indications: preop surgery        Interpreting Physicians  Performing Staff   Jordi Hodge MD Tech: Neisha Dean RVS        Clinical Indication    preop surgery      Admission Information    Admission Date/Time Discharge Date/Time Room/Bed   10/24/23  1044  S260/1     Interpretation Summary         Right internal carotid artery demonstrates a less than 50% stenosis.    Left internal carotid artery demonstrates a less than 50% stenosis.       Radiological Studies:   XR Chest 1 View    Result Date: 11/4/2023  XR CHEST 1 VW Date of Exam: 11/4/2023 3:25 AM EDT Indication: Hypoxia Comparison: 1 day prior. Findings: Interval removal of pulmonary artery catheter with right IJ sheath in place. Enteric tube noted with nasogastric tube removed. Remaining support hardware projects unchanged. Lung volumes are mildly diminished with some scattered atelectasis. There is no enlarging pleural effusion or distinct pneumothorax. Unchanged heart and mediastinal contours.     Impression: Interval removal of pulmonary artery catheter with right IJ sheath in place. Enteric tube noted with nasogastric tube removed. Remaining support hardware projects unchanged. Lung volumes are mildly diminished with some scattered atelectasis. There is no enlarging pleural effusion or distinct pneumothorax. Unchanged heart and mediastinal contours. Electronically Signed: Dm Stanley MD  11/4/2023 8:52 AM EDT  Workstation ID: [id number]    XR Abdomen KUB    Result Date: 11/3/2023  XR ABDOMEN KUB Date of Exam: 11/3/2023 9:20 AM CDT Indication: Keofeed placement Comparison: None available. Findings: There is an enteric tube with tip in the peripyloric region.     Impression: Enteric tube tip in the peripyloric region. Electronically Signed: Marc Castillo MD  11/3/2023 9:31 AM CDT  Workstation ID: [id number]    XR Chest 1 View    Result Date: 11/3/2023  XR CHEST 1 VW Date of Exam: 11/3/2023 2:00 AM CDT Indication: Post-Op Heart Surgery Comparison: 11/2/2023 Findings: Cardiomediastinal silhouette and support lines and tubes appear unchanged. There is persistent pulmonary vascular congestion with minimal left basilar  airspace disease and small left effusion. No acute osseous abnormality identified.     Impression: No significant change Electronically Signed: Marc Castillo MD  11/3/2023 6:20 AM CDT  Workstation ID: [id number]    Spirometry with Diffusion Capacity    Result Date: 11/3/2023  Spirometry with DLCO was done on 10/28/2023.  Please see scanned PFT report for details. Interpretation: No obstruction.  Decrease in both FEV1 and FVC which may suggest restriction.  Suggest full pulmonary function testing if clinically indicated.  Low DLCO which corrects when adjusted for alveolar ventilated volumes.  No prior study available for immediate comparison.     XR Chest 1 View    Result Date: 11/2/2023  XR CHEST 1 VW Date of Exam: 11/2/2023 2:25 AM EDT Indication: Post-Op Heart Surgery Comparison: 11/1/2023 Findings: Interval removal of endotracheal tube. No change in position of an NG tube with tip in the stomach. Stable cardiomegaly and surgical change post CABG. There is left lower lobe opacity likely atelectasis and a small pleural effusion. The right lung is grossly clear.     Impression: Interval removal of endotracheal tube Increased left lower lobe opacity compatible atelectasis and small effusion Electronically Signed: John العراقي MD  11/2/2023 7:12 AM EDT  Workstation ID: [id number]    XR Chest 1 View    Result Date: 11/1/2023  XR CHEST 1 VW Date of Exam: 11/1/2023 4:19 PM EDT Indication: Post-Op Check Line & Tube Placement Comparison: Chest radiograph 10/26/2023. Findings: Median sternotomy and CABG. ET tube within the mid thoracic trachea terminates 5.2 cm above marcelo. Right IJ approach [place]-Gume catheter terminates over the main pulmonary artery. Gastric tube oriented antegrade below diaphragm. Thoracotomy tubes noted. Cardiomediastinal silhouette enlarged similar to preprocedural imaging. Lung volumes are low with streaky retrocardiac opacities favoring atelectasis. No overt edema, lobar infiltrate, large effusion or  pneumothorax.     Impression: Support devices appear in standard position. No pneumothorax or convincing active airspace process. Electronically Signed: Gonzalo Zamora MD  11/1/2023 4:45 PM EDT  Workstation ID: [id number]    Central Line    Result Date: 11/1/2023  Marlyn Mims MD     11/1/2023 11:44 AM Central Line Patient reassessed immediately prior to procedure Patient location during procedure: OR Start time: 11/1/2023 10:15 AM Indications: vascular access, central pressure monitoring and MD/Surgeon request Staff Anesthesiologist: Marlyn Mims MD Preanesthetic Checklist Completed: patient identified, IV checked, site marked, risks and benefits discussed, surgical consent, monitors and equipment checked, pre-op evaluation and timeout performed Central Line Prep Sterile Tech:cap, gloves, gown, mask and sterile barriers Prep: chloraprep Patient monitoring: blood pressure monitoring, continuous pulse oximetry and EKG Central Line Procedure Laterality:right Location:internal jugular Catheter Type:MAC and [place]-Gume Catheter Size:9 Fr Guidance:ultrasound guided PROCEDURE NOTE/ULTRASOUND INTERPRETATION.  Using ultrasound guidance the potential vascular sites for insertion of the catheter were visualized to determine the patency of the vessel to be used for vascular access.  After selecting the appropriate site for insertion, the needle was visualized under ultrasound being inserted into the internal jugular vein, followed by ultrasound confirmation of wire and catheter placement. There were no abnormalities seen on ultrasound; an image was taken; and the patient tolerated the procedure with no complications. Images: still images not obtained (printer failure) Assessment Post procedure:biopatch applied, line sutured, occlusive dressing applied and secured with tape Assessement:blood return through all ports, free fluid flow, chest x-ray ordered and Xavier Test Complications:no Patient Tolerance:patient  tolerated the procedure well with no apparent complications     Intra-Op Anesthesia PRIMITIVO    Result Date: 11/1/2023  Marlyn Mims MD     11/1/2023  3:17 PM Intra-Op Anesthesia PRIMITIVO Procedure Performed: Intra-Op Anesthesia PRIMITIVO      Start Time:  11/1/2023 10:19 AM      End Time:  Preanesthesia Checklist: Patient identified, IV assessed, risks and benefits discussed, monitors and equipment assessed, procedure being performed at surgeon's request and anesthesia consent obtained. General Procedure Information Physician Requesting Echo: Dirk Cleveland MD Location performed:  OR Intubated Bite block placed Heart visualized Probe Insertion:  Easy Probe Type:  Multiplane Modalities:  2D only, color flow mapping, continuous wave Doppler and pulse wave Doppler Echocardiographic and Doppler Measurements Ventricles Right Ventricle: Cavity size dilated.  Hypertrophy not present.  Thrombus not present.  Global function normal.  Left Ventricle: Cavity size dilated.  Thrombus not present.  Global Function normal.  Ejection Fraction 40%.  Valves Aortic Valve: Annulus normal.  Stenosis not present.  Regurgitation absent.  Leaflets normal.  Leaflet motions normal.  Mitral Valve: Annulus normal.  Stenosis not present.  Regurgitation trace.  Leaflets normal.  Leaflet motions normal.  Tricuspid Valve: Annulus normal.  Stenosis not present.  Regurgitation mild.  Leaflets normal.  Leaflet motions normal.  Pulmonic Valve: Annulus normal.  Stenosis not present.  Regurgitation trace.  Aorta Ascending Aorta: Size normal.  Dissection not present.  Plaque thickness less than 3 mm.  Mobile plaque not present.  Aortic Arch: Size normal.  Dissection not present.  Plaque thickness less than 3 mm.  Mobile plaque not present.  Descending Aorta: Size normal.  Dissection not present.  Plaque thickness less than 3 mm.  Mobile plaque not present.  Atria Right Atrium: Size normal.  Spontaneous echo contrast not present.  Thrombus not present.  Tumor  not present.  Device not present.  Left Atrium: Size normal.  Spontaneous echo contrast not present.  Thrombus not present.  Tumor not present.  Device not present.  Septa Ventricular Septum: Intra-ventricular septum morphology normal.  Other Findings Pericardium:  normal Pleural Effusion:  left Anesthesia Information Performed Personally Anesthesiologist:  Marlyn Mims MD Echocardiogram Comments:      Findings discussed with Dr. Cleveland  Prebypass Normal RV fxn, slightly decreased LV fxn, EF 40-45%, mild TR, mild MR, no AI, no AS, small left pleural effusion.  Post CABG x 5 RV and LV systolic fxn preserved.  No changes in valve fxn. Aorta intact    Arterial Line    Result Date: 11/1/2023  Rich Chambers MD     11/1/2023  8:37 AM Arterial Line Patient reassessed immediately prior to procedure Patient location during procedure: pre-op Line placed for hemodynamic monitoring. Preanesthetic Checklist Completed: patient identified, IV checked, site marked, risks and benefits discussed, surgical consent, monitors and equipment checked, pre-op evaluation and timeout performed Arterial Line Prep  Sterile Tech: cap, gloves and sterile barriers Prep: ChloraPrep Patient monitoring: blood pressure monitoring, continuous pulse oximetry and EKG Arterial Line Procedure Laterality:right Location:  radial artery Catheter size: 20 G Guidance: ultrasound guided Number of attempts: 1 Successful placement: yes Post Assessment Dressing Type: line sutured, occlusive dressing applied, secured with tape and wrist guard applied. Complications no Circ/Move/Sens Assessment: normal and unchanged. Patient Tolerance: patient tolerated the procedure well with no apparent complications     Duplex Carotid Ultrasound CAR    Result Date: 10/27/2023    Right internal carotid artery demonstrates a less than 50% stenosis.   Left internal carotid artery demonstrates a less than 50% stenosis.     Cardiac Catheterization/Vascular Study    Result  Date: 10/26/2023    Ostial 95% LAD stenosis involve the origin of a large first diagonal as well as some retrograde mild to moderate plaque in the left main.   Bifurcation right coronary artery 95% stenosis.   70% LCx stenosis   LVEF 40-45% with anterior apical hypokinesis     XR Chest 1 View    Result Date: 10/26/2023  XR CHEST 1 VW Date of Exam: 10/26/2023 2:35 AM EDT Indication: DYSPNEA, ON EXERTION dyspnea Comparison: 10/24/2023. Findings: Exam is limited by portable technique and obesity. There is stable moderate cardiomegaly. Suggestion of small effusions with mild bibasilar atelectasis. No definite congestive failure identified.     Impression: Limited study. Findings suggest mild bibasilar atelectasis with possible small effusions. Electronically Signed: Marielos Camilo MD  10/26/2023 7:49 AM EDT  Workstation ID: [id number]    XR Chest 1 View    Result Date: 10/25/2023  XR CHEST 1 VW Date of Exam: 10/24/2023 11:42 PM EDT Indication: increased o2 Comparison: Chest radiograph 10/24/2023 Findings: The heart is enlarged. There is bilateral basilar discoid atelectasis with small bilateral pleural effusions. Mild increase in the interstitial markings may indicate pulmonary edema.     Impression: Cardiomegaly with mild pulmonary edema with small bilateral pleural effusions and basilar atelectasis. Electronically Signed: Madhu Rees MD  10/25/2023 12:06 AM EDT  Workstation ID: [id number]    Adult Transthoracic Echo Complete W/ Cont if Necessary Per Protocol    Result Date: 10/24/2023    Left ventricular systolic function is moderate-severely decreased. Calculated left ventricular EF = 31.4%. The apex is akinetic with severe hypokinesis of the anterior, anterolateral, and septal walls.   The left ventricular cavity is mildly dilated.   Left ventricular diastolic function is consistent with (grade II w/high LAP) pseudonormalization.   The aortic valve is structurally normal with no stenosis present. No significant aortic  valve regurgitation is present.   The mitral valve is structurally normal with no significant stenosis present. Mild mitral valve regurgitation is present.     XR Chest 1 View    Result Date: 10/24/2023  XR CHEST 1 VW Date of Exam: 10/24/2023 11:08 AM EDT Indication: SOA triage protocol Comparison: None available. Findings: No prior exams. There is moderate cardiomegaly. There is a moderate sized right pleural effusion which obscures detail of the underlying right lung base. Suggestion of mild atelectasis of both lower lobes. Exam is somewhat limited by portable technique and obesity. Pulmonary vessels appear within normal limits for technique.     Impression: Cardiomegaly. Moderate right effusion. Mild bibasilar atelectasis. Electronically Signed: Marielos Camilo MD  10/24/2023 11:21 AM EDT  Workstation ID: [id number]       During this visit the following were done:  Labs Reviewed [x]    Labs Ordered [x]    Radiology Reports Reviewed [x]    Radiology Ordered [x]    EKG, echo, and/or stress test reviewed []    EEG results reviewed  []    EEG reviewed and interpreted per myself   []    Discussed case with neurointerventionalist or neuroradiologist []    Referring Provider Records Reviewed []    ER Records Reviewed []    Hospital Records Reviewed []    History Obtained From Family []    Radiological images view and Interpreted per myself []    Case Discussed with referring provider []     Decision to obtain and request outside records  []        Assessment and Plan     Post-op encephalopathy, question of left-sided weakness, cause unclear. S/P CABG 11/2.   - CT head.   - EEG.   - UA c/s.   - ST, PT, OT as tolerated.    Thanks.              Electronically signed by Ranjan Woods MD on 11/4/2023 at 14:02 EDT

## 2023-11-04 NOTE — PROGRESS NOTES
"INTENSIVIST   PROGRESS NOTE     Hospital:  LOS: 11 days     Chief Complaint: Perioperative management, encephalopathy    Subjective   S     Interval History: No acute issues over the last 24 hours.  Ongoing concern from staff that overall mentation not improving.  She moves spontaneously and moans but does not follow commands or interact.  Hemodynamically stable.  Afebrile.    The patient's relevant past medical, surgical and social history were reviewed and updated in Epic as appropriate.      ROS: Unable to obtain secondary to altered mentation    Objective   O     Intake/Ouptut 24 hrs (7:00AM - 6:59 AM)  Intake & Output (last 3 days)         11/01 0701 11/02 0700 11/02 0701 11/03 0700 11/03 0701 11/04 0700 11/04 0701 11/05 0700    I.V. (mL/kg) 2819 (30.2) 1344 (14.4) 1925.2 (20.7) 528.3 (5.7)    Other   186     NG/  260     IV Piggyback 1300 100 50     Total Intake(mL/kg) 4269 (45.8) 1444 (15.5) 2421.2 (26) 528.3 (5.7)    Urine (mL/kg/hr) 1715 (0.8) 1055 (0.5) 1750 (0.8) 175 (0.3)    Emesis/NG output 100       Chest Tube 460 330 270 20    Total Output 2275 1385 2020 195    Net +1994 +59 +401.2 +333.3                  Medications (drips):  niCARdipine, Last Rate: Stopped (11/04/23 1100)    Respiratory Support: Low flow nasal cannula    Physical Examination:  Vital Signs: Blood pressure 127/76, pulse 75, temperature 99.3 °F (37.4 °C), temperature source Bladder, resp. rate 22, height 157.5 cm (62\"), weight 93.2 kg (205 lb 7.5 oz), SpO2 92%.    General: The patient appears in no acute distress. Alert, cooperative and interactive.  Chest: Diminished air movement but overall clear to auscultation bilaterally, No wheezing, rhonchi, or rales. Normal work of breathing. Equal chest rise.  On supplemental oxygen with appropriate SPO2  Cardiac: Regular rhythm, normal rate, S1S2 auscultated. No murmurs, rubs or gallops.   Extremities: Nonpitting lower extremity edema. No clubbing or cyanosis.  Skin: No rashes, open " wounds, or bruising. Warm, dry, well-perfused.  Neuro: Grimaces to noxious stimuli.  Does not localize to voice.  Not alert or oriented.  Protecting airway.    Lines, Drains & Airways       Active LDAs       Name Placement date Placement time Site Days    Chest Tube 3 Mediastinal 11/01/23  --  Mediastinal  3    NG/OG Tube Nasoduodenal 10 Fr Left nostril 11/03/23  1010  Left nostril  1    Urethral Catheter Temperature probe;Silicone 16 Fr. 11/01/23  --  -- 3    Y Chest Tube 1 and 2 Mediastinal 28 Fr. Mediastinal 28 Fr. 11/01/23  --  -- 3    Arterial Line 11/01/23 Right Radial 11/01/23  0837  created via procedure documentation  Radial  3    Introducer- Double Lumen 11/01/23 Internal jugular Right 11/01/23  1015  created via procedure documentation  Internal jugular  3    Pacer Wires 11/01/23  --  Ventricular  3             Results from last 7 days   Lab Units 11/04/23  0441 11/03/23  1341 11/03/23 0447   WBC 10*3/mm3 12.70* 14.51* 11.77*   HEMOGLOBIN g/dL 7.6* 7.7* 7.6*   MCV fL 96.6 95.9 95.1   PLATELETS 10*3/mm3 132* 118* 107*     Results from last 7 days   Lab Units 11/04/23  0441 11/03/23  1341 11/03/23  0447 11/02/23  0321 11/01/23 2012 11/01/23  1634   SODIUM mmol/L 146* 145 145   < > 141 143   POTASSIUM mmol/L 3.6 3.9 3.3*  3.3*   < > 4.3 4.1   CO2 mmol/L 21.0* 24.0 21.0*   < > 21.0* 23.0   CREATININE mg/dL 1.15* 1.32* 1.62*   < > 1.30* 1.07*   GLUCOSE mg/dL 222* 133* 139*   < > 220* 100*   MAGNESIUM mg/dL 2.2  --   --   --  2.1 2.1   PHOSPHORUS mg/dL 2.5  --   --   --  5.1* 4.5    < > = values in this interval not displayed.     Estimated Creatinine Clearance: 42.1 mL/min (A) (by C-G formula based on SCr of 1.15 mg/dL (H)).  Results from last 7 days   Lab Units 11/04/23  0441 11/02/23  0321 10/29/23  1028   ALK PHOS U/L 93 54 91   BILIRUBIN mg/dL 0.6 1.7* 1.3*   ALT (SGPT) U/L <5 12 24   AST (SGOT) U/L 25 38* 28       Results from last 7 days   Lab Units 11/03/23  1348 11/02/23  1853 11/02/23  1142  11/02/23  0457 11/02/23  0137 11/02/23  0132   PH, ARTERIAL pH units 7.413 7.388 7.376 7.328*  --  7.354   PCO2, ARTERIAL mm Hg 37.5 39.0 40.7 46.3*  --  43.6   PO2 ART mm Hg 67.7* 77.7* 86.0 88.2  --  69.9*   FIO2 % 36 40 40 50 40 40     CXR (11/04/2023):   Radiology Impression:   Interval removal of pulmonary artery catheter with right IJ sheath in place. Enteric tube noted with nasogastric tube removed. Remaining support hardware projects unchanged. Lung volumes are mildly diminished with some scattered atelectasis. There is no   enlarging pleural effusion or distinct pneumothorax. Unchanged heart and mediastinal contours.     Results: Reviewed.  - I reviewed the patient's new laboratory and imaging results.  - I independently reviewed the patient's new images.    Medications: Reviewed.    Assessment & Plan    A / P     Active Hospital Problems    Diagnosis     **Acute exacerbation of congestive heart failure     Coronary artery disease involving native coronary artery of native heart with unstable angina pectoris     HLD (hyperlipidemia)     Essential hypertension     Acute on chronic systolic congestive heart failure     Acute respiratory failure with hypoxia     Elevated troponin     Non-STEMI (non-ST elevated myocardial infarction)      David Cochran is a 78-year-old female with a past medical history significant for hypertension and dyslipidemia and no prior cardiac history who presented to the hospital with a hypertensive urgency, elevation cardiac enzymes, and biventricular heart failure.  A new diagnosis of type 2 diabetes mellitus was made.  Cardiac catheterization revealed multivessel disease and echocardiogram revealed an EF of 31%.     She underwent CABG x4 performed by Dr. Cleveland on 11/2.  Extubated on the first night.     Postoperative course complicated by encephalopathy, anemia and ANURADHA.      Plan:  - Ongoing concern for encephalopathy. Continue to avoid sedatives. Continuing to monitor mental  status. Consulted neurology on 11/04 they have ordered UA and CT head which are pending   - Maintain blood pressure within target range. Cardene if needed and may start oral medications  - Steady hemoglobin drop though patient has not required transfusion.  Large bowel movement on 11/04 without melena or BRBPR.  Mild, vaginal bleeding was reviewed by OB/GYN on 11/03.  Going for CT head on 11/04 to work-up ongoing encephalopathy.  While there we will obtain CT abdomen/pelvis to look for any etiology of  bleed or evidence of RPA  - Transitioned off insulin gtt  - BiPAP or NT suctioning as needed  - Aspirin/statin/beta-blocker    Advance Directives:   Code Status and Medical Interventions:   Ordered at: 10/24/23 1339     Level Of Support Discussed With:    Patient     Code Status (Patient has no pulse and is not breathing):    CPR (Attempt to Resuscitate)     Medical Interventions (Patient has pulse or is breathing):    Full Support     High level of risk due to severe exacerbation of chronic illness and illness with threat to life or bodily function.    I conducted multidisciplinary rounds in the plan of care was discussed with the multidisciplinary team at that time. In attendance at multidisciplinary rounds was clinical pharmacist, dietitian, nursing staff and case management.    I discussed the patient's findings and my recommendations with patient, nursing staff, and consulting provider    -- Jorden Salomon MD  Pulmonary/Critical Care

## 2023-11-04 NOTE — PROGRESS NOTES
Michael Cochran  [id number]  1945   LOS: 11 days   Patient Care Team:  Bo Rodriguez PA as PCP - General (Family Medicine)    Chief Complaint:  NSTEMI / HTN / UNCONTROLLED T2 DM / OBESITY / AT RISK FOR GELY / HFrEF / CABG x 4 (1 November 2023) / HFrEF    Subjective     Moaning softly without response to verbal or tactile stimuli.  No discernible posturing or overt seizure activity.  Nutritional support with NG feedings via KO feed [facility]    Objective     Vital Sign Min/Max for last 24 hours  Temp  Min: 99 °F (37.2 °C)  Max: 99.9 °F (37.7 °C)   BP  Min: 112/64  Max: 173/74   Pulse  Min: 55  Max: 90   Resp  Min: 22  Max: 28   SpO2  Min: 86 %  Max: 99 %               11/01/23  0438 11/02/23  0500   Weight: 92.4 kg (203 lb 12.8 oz) 93.2 kg (205 lb 7.5 oz)         Intake/Output Summary (Last 24 hours) at 11/4/2023 0917  Last data filed at 11/4/2023 0630  Gross per 24 hour   Intake 2421.16 ml   Output 1800 ml   Net 621.16 ml       Physical Exam:     General Appearance:  Unresponsive, in no acute distress   Lungs:   Left basilar crackles,respirations regular, even and                unlabored    Heart:    Regular and normal rate, normal S1 and S2, grade 1/6 systolic murmur, no gallop, no rub, no click   Abdomen:  Extremities:   Soft, nontender, bowel sounds audible x4  1+ generalized edema, normal range of motion   Pulses:   Pulses palpable and equal bilaterally      Results Review:   Results from last 7 days   Lab Units 11/04/23  0441 11/03/23  1341 11/03/23  0447   SODIUM mmol/L 146* 145 145   POTASSIUM mmol/L 3.6 3.9 3.3*  3.3*   CHLORIDE mmol/L 113* 110* 109*   CO2 mmol/L 21.0* 24.0 21.0*   BUN mg/dL 37* 35* 35*   CREATININE mg/dL 1.15* 1.32* 1.62*   GLUCOSE mg/dL 222* 133* 139*   CALCIUM mg/dL 8.4* 8.8 8.7     Results from last 7 days   Lab Units 11/04/23  0441 11/03/23  1341 11/03/23  0447   WBC 10*3/mm3 12.70* 14.51* 11.77*   HEMOGLOBIN g/dL 7.6* 7.7* 7.6*   HEMATOCRIT % 25.6*  26.0* 25.1*   PLATELETS 10*3/mm3 132* 118* 107*     Results from last 7 days   Lab Units 11/04/23  0441   CHOLESTEROL mg/dL 98   TRIGLYCERIDES mg/dL 123     OP NOTE (1 November 2023):    MEDIAN STERNOTOMY, CORONARY ARTERY BYPASS GRAFTING X4, UTILIZING THE LEFT INTERANL MAMMARY ARTERY, EVH OF THE LEFT GREATER SAPHENOUS VEIN, EXPLORATION OF THE RIGHT LEG, PRIMITIVO PER ANETHESIA     ALBUMIN: 3.5  LFTs: WNL  CXR:    Findings:    Interval removal of pulmonary artery catheter with right IJ sheath in place. Enteric tube noted with nasogastric tube removed. Remaining support hardware projects unchanged. Lung volumes are mildly diminished with some scattered atelectasis. There is no enlarging pleural effusion or distinct pneumothorax. Unchanged heart and mediastinal contours.     IMPRESSION:    Interval removal of pulmonary artery catheter with right IJ sheath in place. Enteric tube noted with nasogastric tube removed. Remaining support hardware projects unchanged. Lung volumes are mildly diminished with some scattered atelectasis. There is no   enlarging pleural effusion or distinct pneumothorax. Unchanged heart and mediastinal contours.    NO NEW EKG.    Medication Review: REVIEWED; DRIP = IV nicardipine 15 mg/hour continuous infusion.    Assessment & Plan     Severe anemia with improving renal function.  Hypertension under suboptimal control.  Persistent altered neurologic examination and mental status.  Concur with neurology consultation and will need neuroimaging.  Would continue supportive care.      Acute exacerbation of congestive heart failure    Non-STEMI (non-ST elevated myocardial infarction)    HLD (hyperlipidemia)    Essential hypertension    Acute on chronic systolic congestive heart failure    Acute respiratory failure with hypoxia    Elevated troponin    Coronary artery disease involving native coronary artery of native heart with unstable angina pectoris    11/04/23  09:17 EDT

## 2023-11-05 ENCOUNTER — APPOINTMENT (OUTPATIENT)
Dept: NEUROLOGY | Facility: HOSPITAL | Age: 78
End: 2023-11-05
Payer: MEDICARE

## 2023-11-05 PROBLEM — I16.0 HYPERTENSIVE URGENCY: Status: ACTIVE | Noted: 2023-11-05

## 2023-11-05 PROBLEM — Z95.1 S/P CABG X 4: Status: ACTIVE | Noted: 2023-11-05

## 2023-11-05 PROBLEM — R79.89 ELEVATED TROPONIN: Status: RESOLVED | Noted: 2023-10-24 | Resolved: 2023-11-05

## 2023-11-05 PROBLEM — N17.9 AKI (ACUTE KIDNEY INJURY): Status: ACTIVE | Noted: 2023-11-05

## 2023-11-05 PROBLEM — D62 ACUTE BLOOD LOSS ANEMIA: Status: ACTIVE | Noted: 2023-11-05

## 2023-11-05 PROBLEM — I25.5 ISCHEMIC CARDIOMYOPATHY: Status: ACTIVE | Noted: 2023-11-05

## 2023-11-05 PROBLEM — G93.40 ENCEPHALOPATHY: Status: ACTIVE | Noted: 2023-11-05

## 2023-11-05 LAB
ABO GROUP BLD: NORMAL
ANION GAP SERPL CALCULATED.3IONS-SCNC: 11 MMOL/L (ref 5–15)
BACTERIA SPEC AEROBE CULT: NO GROWTH
BASOPHILS # BLD AUTO: 0.04 10*3/MM3 (ref 0–0.2)
BASOPHILS NFR BLD AUTO: 0.3 % (ref 0–1.5)
BLD GP AB SCN SERPL QL: NEGATIVE
BUN SERPL-MCNC: 57 MG/DL (ref 8–23)
BUN/CREAT SERPL: 54.3 (ref 7–25)
CALCIUM SPEC-SCNC: 8.2 MG/DL (ref 8.6–10.5)
CHLORIDE SERPL-SCNC: 119 MMOL/L (ref 98–107)
CO2 SERPL-SCNC: 19 MMOL/L (ref 22–29)
CREAT SERPL-MCNC: 1.05 MG/DL (ref 0.57–1)
D-LACTATE SERPL-SCNC: 1 MMOL/L (ref 0.5–2)
DEPRECATED RDW RBC AUTO: 51.5 FL (ref 37–54)
EGFRCR SERPLBLD CKD-EPI 2021: 54.2 ML/MIN/1.73
EOSINOPHIL # BLD AUTO: 0.09 10*3/MM3 (ref 0–0.4)
EOSINOPHIL NFR BLD AUTO: 0.7 % (ref 0.3–6.2)
ERYTHROCYTE [DISTWIDTH] IN BLOOD BY AUTOMATED COUNT: 14.6 % (ref 12.3–15.4)
GLUCOSE BLDC GLUCOMTR-MCNC: 169 MG/DL (ref 70–130)
GLUCOSE BLDC GLUCOMTR-MCNC: 181 MG/DL (ref 70–130)
GLUCOSE BLDC GLUCOMTR-MCNC: 189 MG/DL (ref 70–130)
GLUCOSE SERPL-MCNC: 188 MG/DL (ref 65–99)
HCT VFR BLD AUTO: 24.3 % (ref 34–46.6)
HGB BLD-MCNC: 7.2 G/DL (ref 12–15.9)
IMM GRANULOCYTES # BLD AUTO: 0.07 10*3/MM3 (ref 0–0.05)
IMM GRANULOCYTES NFR BLD AUTO: 0.6 % (ref 0–0.5)
LYMPHOCYTES # BLD AUTO: 1 10*3/MM3 (ref 0.7–3.1)
LYMPHOCYTES NFR BLD AUTO: 8.2 % (ref 19.6–45.3)
MAGNESIUM SERPL-MCNC: 2.4 MG/DL (ref 1.6–2.4)
MCH RBC QN AUTO: 29 PG (ref 26.6–33)
MCHC RBC AUTO-ENTMCNC: 29.6 G/DL (ref 31.5–35.7)
MCV RBC AUTO: 98 FL (ref 79–97)
MONOCYTES # BLD AUTO: 0.85 10*3/MM3 (ref 0.1–0.9)
MONOCYTES NFR BLD AUTO: 7 % (ref 5–12)
NEUTROPHILS NFR BLD AUTO: 10.1 10*3/MM3 (ref 1.7–7)
NEUTROPHILS NFR BLD AUTO: 83.2 % (ref 42.7–76)
NRBC BLD AUTO-RTO: 0 /100 WBC (ref 0–0.2)
PLATELET # BLD AUTO: 152 10*3/MM3 (ref 140–450)
PMV BLD AUTO: 13.7 FL (ref 6–12)
POTASSIUM SERPL-SCNC: 4.2 MMOL/L (ref 3.5–5.2)
RBC # BLD AUTO: 2.48 10*6/MM3 (ref 3.77–5.28)
RH BLD: POSITIVE
SODIUM SERPL-SCNC: 149 MMOL/L (ref 136–145)
T&S EXPIRATION DATE: NORMAL
WBC NRBC COR # BLD: 12.15 10*3/MM3 (ref 3.4–10.8)

## 2023-11-05 PROCEDURE — 95816 EEG AWAKE AND DROWSY: CPT | Performed by: PSYCHIATRY & NEUROLOGY

## 2023-11-05 PROCEDURE — 99233 SBSQ HOSP IP/OBS HIGH 50: CPT | Performed by: INTERNAL MEDICINE

## 2023-11-05 PROCEDURE — 25010000002 CEFTRIAXONE PER 250 MG: Performed by: INTERNAL MEDICINE

## 2023-11-05 PROCEDURE — 99232 SBSQ HOSP IP/OBS MODERATE 35: CPT | Performed by: INTERNAL MEDICINE

## 2023-11-05 PROCEDURE — 63710000001 INSULIN REGULAR HUMAN PER 5 UNITS: Performed by: INTERNAL MEDICINE

## 2023-11-05 PROCEDURE — 95816 EEG AWAKE AND DROWSY: CPT

## 2023-11-05 PROCEDURE — 87150 DNA/RNA AMPLIFIED PROBE: CPT | Performed by: INTERNAL MEDICINE

## 2023-11-05 PROCEDURE — 97110 THERAPEUTIC EXERCISES: CPT

## 2023-11-05 PROCEDURE — 94660 CPAP INITIATION&MGMT: CPT

## 2023-11-05 PROCEDURE — 97530 THERAPEUTIC ACTIVITIES: CPT

## 2023-11-05 PROCEDURE — 86900 BLOOD TYPING SEROLOGIC ABO: CPT | Performed by: THORACIC SURGERY (CARDIOTHORACIC VASCULAR SURGERY)

## 2023-11-05 PROCEDURE — 86901 BLOOD TYPING SEROLOGIC RH(D): CPT | Performed by: THORACIC SURGERY (CARDIOTHORACIC VASCULAR SURGERY)

## 2023-11-05 PROCEDURE — 63710000001 INSULIN DETEMIR PER 5 UNITS: Performed by: INTERNAL MEDICINE

## 2023-11-05 PROCEDURE — 87147 CULTURE TYPE IMMUNOLOGIC: CPT | Performed by: INTERNAL MEDICINE

## 2023-11-05 PROCEDURE — 80048 BASIC METABOLIC PNL TOTAL CA: CPT | Performed by: THORACIC SURGERY (CARDIOTHORACIC VASCULAR SURGERY)

## 2023-11-05 PROCEDURE — 94799 UNLISTED PULMONARY SVC/PX: CPT

## 2023-11-05 PROCEDURE — 25810000003 SODIUM CHLORIDE 0.9 % SOLUTION 250 ML FLEX CONT: Performed by: THORACIC SURGERY (CARDIOTHORACIC VASCULAR SURGERY)

## 2023-11-05 PROCEDURE — 83605 ASSAY OF LACTIC ACID: CPT | Performed by: INTERNAL MEDICINE

## 2023-11-05 PROCEDURE — 85025 COMPLETE CBC W/AUTO DIFF WBC: CPT | Performed by: THORACIC SURGERY (CARDIOTHORACIC VASCULAR SURGERY)

## 2023-11-05 PROCEDURE — 86850 RBC ANTIBODY SCREEN: CPT | Performed by: THORACIC SURGERY (CARDIOTHORACIC VASCULAR SURGERY)

## 2023-11-05 PROCEDURE — 83735 ASSAY OF MAGNESIUM: CPT | Performed by: THORACIC SURGERY (CARDIOTHORACIC VASCULAR SURGERY)

## 2023-11-05 PROCEDURE — 97164 PT RE-EVAL EST PLAN CARE: CPT

## 2023-11-05 PROCEDURE — 99232 SBSQ HOSP IP/OBS MODERATE 35: CPT | Performed by: PSYCHIATRY & NEUROLOGY

## 2023-11-05 PROCEDURE — 82948 REAGENT STRIP/BLOOD GLUCOSE: CPT

## 2023-11-05 PROCEDURE — 87040 BLOOD CULTURE FOR BACTERIA: CPT | Performed by: INTERNAL MEDICINE

## 2023-11-05 RX ORDER — HYDRALAZINE HYDROCHLORIDE 25 MG/1
25 TABLET, FILM COATED ORAL EVERY 6 HOURS SCHEDULED
Status: DISCONTINUED | OUTPATIENT
Start: 2023-11-05 | End: 2023-11-06

## 2023-11-05 RX ORDER — AMLODIPINE BESYLATE 10 MG/1
10 TABLET ORAL
Status: DISCONTINUED | OUTPATIENT
Start: 2023-11-06 | End: 2023-11-14

## 2023-11-05 RX ORDER — AMLODIPINE BESYLATE 5 MG/1
5 TABLET ORAL ONCE
Status: COMPLETED | OUTPATIENT
Start: 2023-11-05 | End: 2023-11-05

## 2023-11-05 RX ADMIN — INSULIN DETEMIR 8 UNITS: 100 INJECTION, SOLUTION SUBCUTANEOUS at 08:52

## 2023-11-05 RX ADMIN — INSULIN HUMAN 2 UNITS: 100 INJECTION, SOLUTION PARENTERAL at 17:22

## 2023-11-05 RX ADMIN — AMLODIPINE BESYLATE 5 MG: 5 TABLET ORAL at 09:01

## 2023-11-05 RX ADMIN — Medication 10 ML: at 22:31

## 2023-11-05 RX ADMIN — ACETAMINOPHEN 650 MG: 325 TABLET ORAL at 15:25

## 2023-11-05 RX ADMIN — INSULIN HUMAN 2 UNITS: 100 INJECTION, SOLUTION PARENTERAL at 17:21

## 2023-11-05 RX ADMIN — HYDRALAZINE HYDROCHLORIDE 25 MG: 25 TABLET, FILM COATED ORAL at 12:10

## 2023-11-05 RX ADMIN — NICARDIPINE HYDROCHLORIDE 5 MG/HR: 25 INJECTION, SOLUTION INTRAVENOUS at 05:02

## 2023-11-05 RX ADMIN — INSULIN HUMAN 2 UNITS: 100 INJECTION, SOLUTION PARENTERAL at 12:11

## 2023-11-05 RX ADMIN — Medication 30 ML: at 08:47

## 2023-11-05 RX ADMIN — NICARDIPINE HYDROCHLORIDE 15 MG/HR: 25 INJECTION, SOLUTION INTRAVENOUS at 06:59

## 2023-11-05 RX ADMIN — CEFTRIAXONE SODIUM 2000 MG: 2 INJECTION, POWDER, FOR SOLUTION INTRAMUSCULAR; INTRAVENOUS at 15:25

## 2023-11-05 RX ADMIN — INSULIN HUMAN 2 UNITS: 100 INJECTION, SOLUTION PARENTERAL at 08:03

## 2023-11-05 RX ADMIN — ATORVASTATIN CALCIUM 80 MG: 40 TABLET, FILM COATED ORAL at 22:27

## 2023-11-05 RX ADMIN — CARVEDILOL 12.5 MG: 12.5 TABLET, FILM COATED ORAL at 08:57

## 2023-11-05 RX ADMIN — Medication 30 ML: at 15:30

## 2023-11-05 RX ADMIN — CARVEDILOL 12.5 MG: 12.5 TABLET, FILM COATED ORAL at 17:14

## 2023-11-05 RX ADMIN — ACETAMINOPHEN 650 MG: 325 TABLET ORAL at 08:47

## 2023-11-05 RX ADMIN — AMLODIPINE BESYLATE 5 MG: 5 TABLET ORAL at 08:46

## 2023-11-05 RX ADMIN — ASPIRIN 325 MG: 325 TABLET ORAL at 08:47

## 2023-11-05 RX ADMIN — Medication 30 ML: at 22:27

## 2023-11-05 RX ADMIN — INSULIN HUMAN 4 UNITS: 100 INJECTION, SOLUTION PARENTERAL at 00:00

## 2023-11-05 RX ADMIN — INSULIN DETEMIR 8 UNITS: 100 INJECTION, SOLUTION SUBCUTANEOUS at 22:27

## 2023-11-05 RX ADMIN — HYDRALAZINE HYDROCHLORIDE 25 MG: 25 TABLET, FILM COATED ORAL at 17:13

## 2023-11-05 NOTE — PROGRESS NOTES
Neurology Note    Patient:  Michael Cochran    YOB: 1945    REFERRING PHYSICIAN:  Dr. Cleveland    CHIEF COMPLAINT:    AMS    HISTORY OF PRESENT ILLNESS:   The patient remains nonverbal, moans, moves limbs spontaneously, no seizures reported, remains afebrile.    Past Medical History:  Past Medical History:   Diagnosis Date    Hyperlipidemia     Hypertension        Past Surgical History:  Past Surgical History:   Procedure Laterality Date    BREAST FIBROADENOMA SURGERY      9 y/o-was benign    CARDIAC CATHETERIZATION N/A 10/26/2023    Procedure: Left Heart Cath;  Surgeon: Jordi Hodge MD;  Location: [facility] CATH INVASIVE LOCATION;  Service: Cardiovascular;  Laterality: N/A;    CORONARY ARTERY BYPASS GRAFT N/A 11/1/2023    Procedure: MEDIAN STERNOTOMY, CORONARY ARTERY BYPASS GRAFTING X4, UTILIZING THE LEFT INTERANL MAMMARY ARTERY, EVH OF THE LEFT GREATER SAPHENOUS VEIN, EXPLORATION OF THE RIGHT LEG, PRIMITIVO PER ANETHESIA;  Surgeon: Dirk Cleveland MD;  Location: [facility] OR;  Service: Cardiothoracic;  Laterality: N/A;       Social History:   Social History     Socioeconomic History    Marital status:    Tobacco Use    Smoking status: Some Days     Types: Cigarettes    Smokeless tobacco: Never    Tobacco comments:     Smokes rarely   Vaping Use    Vaping Use: Never used   Substance and Sexual Activity    Alcohol use: Never    Drug use: Never        Family History:   History reviewed. No pertinent family history.    Medications Prior to Admission:    Prior to Admission medications    Medication Sig Start Date End Date Taking? Authorizing Provider   Accu-Chek Softclix Lancets lancets Use to test blood glucose twice daily 10/28/23   Madhav Martinez MD   Blood Glucose Monitoring Suppl (Blood Glucose Monitor System) w/Device kit Use to test blood glucose twice daily 10/28/23   Madhav Martinez MD   glucose blood test strip Use one strip to test blood glucose twice daily 10/28/23   Madhav Martinez MD        Allergies:  Dynacirc [isradipine] and Codeine      Review of system  Review of Systems   Unable to perform ROS: Patient nonverbal       Vitals:    11/05/23 1230   BP:    Pulse: 64   Resp:    Temp:    SpO2: 94%       Physical exam  Physical Exam  Eyes:      Pupils: Pupils are equal, round, and reactive to light.   Cardiovascular:      Rate and Rhythm: Normal rate and regular rhythm.   Pulmonary:      Effort: Pulmonary effort is normal.   Neurological:      Comments: Sitting up propped up, eyes closed, no response to voice, DIOMEDES, stirs and moans some to passive eye opening, withdraws limbs some.           Lab Results   Component Value Date    WBC 12.15 (H) 11/05/2023    HGB 7.2 (L) 11/05/2023    HCT 24.3 (L) 11/05/2023    MCV 98.0 (H) 11/05/2023     11/05/2023     Lab Results   Component Value Date    GLUCOSE 188 (H) 11/05/2023    BUN 57 (H) 11/05/2023    CREATININE 1.05 (H) 11/05/2023    EGFRIFNONA 62 06/09/2021    BCR 54.3 (H) 11/05/2023    CO2 19.0 (L) 11/05/2023    CALCIUM 8.2 (L) 11/05/2023    ALBUMIN 3.5 11/04/2023    AST 25 11/04/2023    ALT <5 11/04/2023     ntains abnormal data Urinalysis, Microscopic Only - Indwelling Urethral Catheter  Order: [id number] - Reflex for Order [id number]  Status: Final result       Visible to patient: Yes (not seen)       Next appt: None    Specimen Information: Indwelling Urethral Catheter; Urine   0 Result Notes       Component  Ref Range & Units 1 d ago 5 d ago   RBC, UA  None Seen, 0-2 /HPF 6-10 Abnormal  0-2   WBC, UA  None Seen, 0-2 /HPF Too Numerous to Count Abnormal  21-50 Abnormal    Bacteria, UA  None Seen, Trace /HPF Trace 3+ Abnormal    Squamous Epithelial Cells, UA  None Seen, 0-2 /HPF 0-2 3-6 Abnormal    Transitional Epithelial Cells, UA  0 - 2 /HPF 0-2    Renal Epithelial Cells, UA  0 - 2 /HPF 3-6 Abnormal     Hyaline Casts, UA  0 - 6 /LPF 0-6 0-6   Methodology Manual Light Microscopy Automated Microscopy   [facility]  [facility]                      Urine Culture No growth           [facility]       Radiological Studies:   CT HEAD WO CONTRAST     Date of Exam: 11/4/2023 10:35 PM EDT     Indication: AMS.     Comparison: None available.     Technique: Axial CT images were obtained of the head without contrast administration.  Automated exposure control and iterative construction methods were used.        Findings:  There is no evidence of hemorrhage. There is no mass effect or midline shift.     There is no extracerebral collection.     Ventricles are normal in size and configuration for patient's stated age.       Posterior fossa is within normal limits.     Calvarium and skull base appear intact.   Visualized sinuses show no air fluid levels. Visualized orbits are unremarkable.     IMPRESSION:  Impression:  No acute intracranial process identified.           Electronically Signed: Radha Cedillo MD    11/4/2023 11:04 PM EDT    Workstation ID: [id number]    During this visit the following were done:  Labs Reviewed [x]    Labs Ordered []    Radiology Reports Reviewed [x]    Radiology Ordered []    EKG, echo, and/or stress test reviewed []    EEG results reviewed  []    EEG reviewed and interpreted per myself   []    Discussed case with neurointerventionalist or neuroradiologist []    Referring Provider Records Reviewed []    ER Records Reviewed []    Hospital Records Reviewed []    History Obtained From Family []    Radiological images view and Interpreted per myself [x]    Case Discussed with referring provider []     Decision to obtain and request outside records  []        Assessment and Plan     Post-op encephalopathy, question of left-sided weakness, CT head w/o acute changes, EEG w/o epileptic changes. Suspect role of UTI, UA with TNTC WBCs, cx no growth. S/P CABG 11/2.   - Treat UTI.   - MRI brain when cleared.   - ST, PT, OT as tolerated.                 Electronically signed by Ranjan Woods MD on 11/5/2023 at 13:08  EST

## 2023-11-05 NOTE — PROGRESS NOTES
"INTENSIVIST   PROGRESS NOTE     Hospital:  LOS: 12 days     Chief Complaint: Perioperative management, encephalopathy    Subjective   S     Interval History: Continued concern for encephalopathy over the last 24 hours.  Patient protecting airway with stable oxygen saturations on nasal cannula.  She is afebrile.  Hemodynamically stable.    The patient's relevant past medical, surgical and social history were reviewed and updated in Epic as appropriate.      ROS: Unable to obtain secondary to altered mentation    Objective   O     Intake/Ouptut 24 hrs (7:00AM - 6:59 AM)  Intake & Output (last 3 days)         11/02 0701 11/03 0700 11/03 0701 11/04 0700 11/04 0701 11/05 0700 11/05 0701 11/06 0700    I.V. (mL/kg) 1344 (14.4) 1925.2 (20.7) 1612.3 (17.3) 260 (2.8)    Other  186 395 195    NG/GT  260 682 240    IV Piggyback 100 50      Total Intake(mL/kg) 1444 (15.5) 2421.2 (26) 2689.3 (28.9) 695 (7.5)    Urine (mL/kg/hr) 1055 (0.5) 1750 (0.8) 1300 (0.6) 480 (0.7)    Emesis/NG output        Stool   0     Chest Tube 330 270 400 90    Total Output 1385 2020 1700 570    Net +59 +401.2 +989.3 +125            Stool Unmeasured Occurrence   2 x           Medications (drips):  niCARdipine, Last Rate: Stopped (11/05/23 0900)    Respiratory Support: Low flow nasal cannula    Physical Examination:  Vital Signs: Blood pressure 133/64, pulse 66, temperature 98.1 °F (36.7 °C), temperature source Bladder, resp. rate 20, height 157.5 cm (62\"), weight 93.2 kg (205 lb 7.5 oz), SpO2 94%.    Unchanged over the last 24 hours:   General: The patient appears in no acute distress.   Chest: Diminished air movement but overall clear to auscultation bilaterally, No wheezing, rhonchi, or rales. Normal work of breathing. Equal chest rise.  On supplemental oxygen with appropriate SPO2  Cardiac: Regular rhythm, normal rate, S1S2 auscultated. No murmurs, rubs or gallops.   Extremities: Nonpitting lower extremity edema. No clubbing or cyanosis.  Skin: " No rashes, open wounds, or bruising. Warm, dry, well-perfused.  Neuro: Grimaces to noxious stimuli.  Does not localize to voice.  Not alert or oriented.  Protecting airway.    Lines, Drains & Airways       Active LDAs       Name Placement date Placement time Site Days    Chest Tube 3 Mediastinal 11/01/23  --  Mediastinal  3    NG/OG Tube Nasoduodenal 10 Fr Left nostril 11/03/23  1010  Left nostril  1    Urethral Catheter Temperature probe;Silicone 16 Fr. 11/01/23  --  -- 3    Y Chest Tube 1 and 2 Mediastinal 28 Fr. Mediastinal 28 Fr. 11/01/23  --  -- 3    Arterial Line 11/01/23 Right Radial 11/01/23  0837  created via procedure documentation  Radial  3    Introducer- Double Lumen 11/01/23 Internal jugular Right 11/01/23  1015  created via procedure documentation  Internal jugular  3    Pacer Wires 11/01/23  --  Ventricular  3        Results from last 7 days   Lab Units 11/05/23  0404 11/04/23  1706 11/04/23 0441   WBC 10*3/mm3 12.15* 12.74* 12.70*   HEMOGLOBIN g/dL 7.2* 7.4* 7.6*   MCV fL 98.0* 97.3* 96.6   PLATELETS 10*3/mm3 152 145 132*     Results from last 7 days   Lab Units 11/05/23  0404 11/04/23  1706 11/04/23  0441 11/03/23  1341 11/02/23  0321 11/01/23 2012 11/01/23  1634   SODIUM mmol/L 149*  --  146* 145   < > 141 143   POTASSIUM mmol/L 4.2 4.5 3.6 3.9   < > 4.3 4.1   CO2 mmol/L 19.0*  --  21.0* 24.0   < > 21.0* 23.0   CREATININE mg/dL 1.05*  --  1.15* 1.32*   < > 1.30* 1.07*   GLUCOSE mg/dL 188*  --  222* 133*   < > 220* 100*   MAGNESIUM mg/dL 2.4  --  2.2  --   --  2.1 2.1   PHOSPHORUS mg/dL  --   --  2.5  --   --  5.1* 4.5    < > = values in this interval not displayed.     Estimated Creatinine Clearance: 46.2 mL/min (A) (by C-G formula based on SCr of 1.05 mg/dL (H)).  Results from last 7 days   Lab Units 11/04/23  0441 11/02/23  0321   ALK PHOS U/L 93 54   BILIRUBIN mg/dL 0.6 1.7*   ALT (SGPT) U/L <5 12   AST (SGOT) U/L 25 38*       Results from last 7 days   Lab Units 11/03/23  1348 11/02/23  6643  11/02/23  1142 11/02/23  0457 11/02/23  0137 11/02/23  0132   PH, ARTERIAL pH units 7.413 7.388 7.376 7.328*  --  7.354   PCO2, ARTERIAL mm Hg 37.5 39.0 40.7 46.3*  --  43.6   PO2 ART mm Hg 67.7* 77.7* 86.0 88.2  --  69.9*   FIO2 % 36 40 40 50 40 40     CXR (11/04/2023):   Radiology Impression:   Interval removal of pulmonary artery catheter with right IJ sheath in place. Enteric tube noted with nasogastric tube removed. Remaining support hardware projects unchanged. Lung volumes are mildly diminished with some scattered atelectasis. There is no   enlarging pleural effusion or distinct pneumothorax. Unchanged heart and mediastinal contours.     Results: Reviewed.  - I reviewed the patient's new laboratory and imaging results.  - I independently reviewed the patient's new images.    Medications: Reviewed.    Assessment & Plan    A / P     Active Hospital Problems    Diagnosis     **Acute exacerbation of congestive heart failure     Coronary artery disease involving native coronary artery of native heart with unstable angina pectoris     HLD (hyperlipidemia)     Essential hypertension     Acute on chronic systolic congestive heart failure     Acute respiratory failure with hypoxia     Elevated troponin     Non-STEMI (non-ST elevated myocardial infarction)      David Cochran is a 78-year-old female with a past medical history significant for hypertension and dyslipidemia and no prior cardiac history who presented to the hospital with a hypertensive urgency, elevation cardiac enzymes, and biventricular heart failure.  A new diagnosis of type 2 diabetes mellitus was made.  Cardiac catheterization revealed multivessel disease and echocardiogram revealed an EF of 31%.     She underwent CABG x4 performed by Dr. Cleveland on 11/2.  Extubated on the first night.     Postoperative course complicated by encephalopathy, anemia and ANURADHA.      Plan:  - Ongoing concern for encephalopathy. Continue to avoid sedatives. Continuing to  monitor mental status. Consulted neurology on 11/04.  CT head on 11/04 with no acute intracranial abnormality seen.  EEG performed on 11/05 with pending results.  MRI pending  - UA on 11/04 somewhat concerning for UTI with numerous WBCs, moderate leukocytes.  Cultures pending.  Started ceftriaxone for potential UTI  - Maintain blood pressure within target range. Cardene if needed and may start oral medications  - Ongoing hypernatremia and sodium continues to rise--though significant changes the last 24 to 48 hours.  Increase free water on 11/05 and will recheck electrolytes tomorrow  - Steady hemoglobin drop though patient has not required transfusion.  Large bowel movement on 11/04 without melena or BRBPR.  Mild, vaginal bleeding was reviewed by OB/GYN on 11/03. Obtain CT abdomen/pelvis to look for any etiology of  bleed or evidence of RPA.  Imaging demonstrated an enlarged uterus with questionable fibroids.  No RPA  - Transitioned off insulin gtt  - BiPAP or NT suctioning as needed  - Aspirin/statin/beta-blocker    Advance Directives:   Code Status and Medical Interventions:   Ordered at: 10/24/23 5828     Level Of Support Discussed With:    Patient     Code Status (Patient has no pulse and is not breathing):    CPR (Attempt to Resuscitate)     Medical Interventions (Patient has pulse or is breathing):    Full Support     High level of risk due to severe exacerbation of chronic illness and illness with threat to life or bodily function.    I conducted multidisciplinary rounds in the plan of care was discussed with the multidisciplinary team at that time. In attendance at multidisciplinary rounds was clinical pharmacist, dietitian, nursing staff and case management.    I discussed the patient's findings and my recommendations with patient, nursing staff, and consulting provider    -- Jorden Salomon MD  Pulmonary/Critical Care

## 2023-11-05 NOTE — PLAN OF CARE
Goal Outcome Evaluation:         1. Neuro-  Ms Dimas moves right side spontaneously and left side to stimuli.  She did not follow commands nor open eyes. RN at bedside when Dr Beltran examined pt.  EEG performed and MRI ordered.  Awaiting MRI availability.    2.  Respiratory-  2 LT NC kept oxygen saturation greater than 94%.  Bipap placed at 1700.  Chest tubes d/c'd.    3. Cardiac-  Sinus rhythm/Sinus bradycardia (HR 55-69 bpm) and -155 mmHg.   Coreg 12.5 mg BID, Norvasc increased to 10 mg daily with Hydralazine 25 mg q 6hr ordered.  V wires d/c'd.    4.  GI-  Novasource Renal @ 30 ml/hr and FW @ 30 ml q 2 hr. FMS d/c'd.  Small bowel movement,.    5. -  Urine output 620 ml.  Nolasco d/c'd.    6. Maximum temperature- 98.7    7. Rocephin administered (see Intensivist notes)

## 2023-11-05 NOTE — PLAN OF CARE
Goal Outcome Evaluation:  Plan of Care Reviewed With: patient           Outcome Evaluation: PT re-evaluation completed for pt s/p CABG x4 presenting with generalized weakness, SOA, impaired cognition, poor balance and coordination, and decreased functional mobility. Pt required depA x2 for bed mobility. Transferred from bed to chair with mechanical lift. Pt tolerated ther ex, but did not follow any commands to participate. Pt's goals have been revised and pt would continue to benefit from PT skilled care. Recommend D/C to SNF.      Anticipated Discharge Disposition (PT): skilled nursing facility

## 2023-11-05 NOTE — PROGRESS NOTES
"Michael Cochran  [id number]  1945     LOS: 12 days   Patient Care Team:  Bo Rodriguez PA as PCP - General (Family Medicine)    Chief Complaint: Coronary artery disease      Subjective: Resting comfortably    Objective:     Vital Sign Min/Max for last 24 hours  Temp  Min: 97.9 °F (36.6 °C)  Max: 99.3 °F (37.4 °C)   BP  Min: 90/57  Max: 152/55   Pulse  Min: 55  Max: 81   Resp  Min: 20  Max: 26   SpO2  Min: 88 %  Max: 100 %   No data recorded   No data recorded     Flowsheet Rows      Flowsheet Row First Filed Value   Admission Height 157.5 cm (62\") Documented at 10/24/2023 1040   Admission Weight 93.9 kg (207 lb) Documented at 10/24/2023 1040            Physical Exam:    Wound: Satisfactory    Pulses:     Mediastinal and Chest Tube Drainage:       Results Review:   Results from last 7 days   Lab Units 11/05/23  0404   WBC 10*3/mm3 12.15*   HEMOGLOBIN g/dL 7.2*   HEMATOCRIT % 24.3*   PLATELETS 10*3/mm3 152     Results from last 7 days   Lab Units 11/05/23  0404   SODIUM mmol/L 149*   POTASSIUM mmol/L 4.2   CHLORIDE mmol/L 119*   CO2 mmol/L 19.0*   BUN mg/dL 57*   CREATININE mg/dL 1.05*   GLUCOSE mg/dL 188*   CALCIUM mg/dL 8.2*     Results from last 7 days   Lab Units 11/03/23  1348   PH, ARTERIAL pH units 7.413   PO2 ART mm Hg 67.7*   PCO2, ARTERIAL mm Hg 37.5   HCO3 ART mmol/L 24.0         Assessment      Acute exacerbation of congestive heart failure    HLD (hyperlipidemia)    Essential hypertension    Acute on chronic systolic congestive heart failure    Acute respiratory failure with hypoxia    Elevated troponin    Non-STEMI (non-ST elevated myocardial infarction)    Coronary artery disease involving native coronary artery of native heart with unstable angina pectoris      We will DC MTs.        Jordi Berry MD  11/05/23  06:49 EST      Please note that portions of this note were completed with a voice recognition program. Efforts were made to edit the dictations, but words may be " mistranscribed

## 2023-11-05 NOTE — THERAPY RE-EVALUATION
Patient Name: Michael Cochran  : 1945    MRN: [id number]                              Today's Date: 2023       Admit Date: 10/24/2023    Visit Dx:     ICD-10-CM ICD-9-CM   1. Elevated troponin  R79.89 790.6   2. Acute on chronic congestive heart failure, unspecified heart failure type  I50.9 428.0   3. Hypertensive emergency  I16.1 401.9   4. Acute respiratory failure with hypoxia  J96.01 518.81   5. Pleural effusion, left  J90 511.9   6. Hypokalemia  E87.6 276.8   7. Non-STEMI (non-ST elevated myocardial infarction)  I21.4 410.70   8. Coronary artery disease involving native coronary artery of native heart with unstable angina pectoris  I25.110 414.01     411.1     Patient Active Problem List   Diagnosis    HLD (hyperlipidemia)    Essential hypertension    Acute on chronic systolic congestive heart failure    Acute respiratory failure with hypoxia    Elevated troponin    Acute exacerbation of congestive heart failure    Non-STEMI (non-ST elevated myocardial infarction)    Coronary artery disease involving native coronary artery of native heart with unstable angina pectoris     Past Medical History:   Diagnosis Date    Hyperlipidemia     Hypertension      Past Surgical History:   Procedure Laterality Date    BREAST FIBROADENOMA SURGERY      9 y/o-was benign    CARDIAC CATHETERIZATION N/A 10/26/2023    Procedure: Left Heart Cath;  Surgeon: Jordi Hodge MD;  Location:  [facility] CATH INVASIVE LOCATION;  Service: Cardiovascular;  Laterality: N/A;    CORONARY ARTERY BYPASS GRAFT N/A 2023    Procedure: MEDIAN STERNOTOMY, CORONARY ARTERY BYPASS GRAFTING X4, UTILIZING THE LEFT INTERANL MAMMARY ARTERY, EVH OF THE LEFT GREATER SAPHENOUS VEIN, EXPLORATION OF THE RIGHT LEG, PRIMITIVO PER ANETHESIA;  Surgeon: Dirk Cleveland MD;  Location:  [facility] OR;  Service: Cardiothoracic;  Laterality: N/A;      General Information       Row Name 23 1332          Physical Therapy Time and Intention    Document Type  re-evaluation  -KG     Mode of Treatment physical therapy  -KG       Row Name 11/05/23 1332          General Information    Patient Profile Reviewed yes  -KG     Prior Level of Function --  please see IE  -KG     Existing Precautions/Restrictions cardiac;fall;oxygen therapy device and L/min;sternal;other (see comments)  NG; no command following; decreased responsiveness/alertness  -KG     Barriers to Rehab medically complex;cognitive status  -KG       Row Name 11/05/23 1332          Living Environment    People in Home --  please see IE  -KG       Row Name 11/05/23 1332          Cognition    Orientation Status (Cognition) disoriented to;person;place;situation;time  -KG       Row Name 11/05/23 1332          Safety Issues, Functional Mobility    Safety Issues Affecting Function (Mobility) ability to follow commands;awareness of need for assistance;insight into deficits/self-awareness;safety precaution awareness;safety precautions follow-through/compliance;sequencing abilities  -KG     Impairments Affecting Function (Mobility) balance;cognition;coordination;endurance/activity tolerance;postural/trunk control;shortness of breath;strength;range of motion (ROM)  -KG     Cognitive Impairments, Mobility Safety/Performance attention;awareness, need for assistance;insight into deficits/self-awareness;safety precaution awareness;safety precaution follow-through;sequencing abilities  -KG               User Key  (r) = Recorded By, (t) = Taken By, (c) = Cosigned By      Initials Name Provider Type    KG Brittney Rocha, PT Physical Therapist                   Mobility       Row Name 11/05/23 2180          Bed Mobility    Bed Mobility rolling left;rolling right  -KG     Rolling Left [place] (Bed Mobility) dependent (less than 25% patient effort);2 person assist;verbal cues  -KG     Rolling Right [place] (Bed Mobility) dependent (less than 25% patient effort);2 person assist;verbal cues  -KG     Assistive Device  (Bed Mobility) draw sheet  -KG     Comment, (Bed Mobility) Pt rolled L and R for placement of sling.  -KG       Row Name 11/05/23 1333          Transfers    Comment, (Transfers) Pt transferred from bed to chair with mechanical lift. Unable to assess STS transfers.  -KG       Row Name 11/05/23 1333          Bed-Chair Transfer    Bed-Chair [place] (Transfers) dependent (less than 25% patient effort);2 person assist;verbal cues  -KG     Assistive Device (Bed-Chair Transfers) lift device  -KG       Row Name 11/05/23 1333          Sit-Stand Transfer    Sit-Stand [place] (Transfers) unable to assess  -KG       Row Name 11/05/23 1333          Gait/Stairs (Locomotion)    [place] Level (Gait) unable to assess  -KG     Comment, (Gait/Stairs) Ambulation deferred. Not appropriate to assess.  -KG               User Key  (r) = Recorded By, (t) = Taken By, (c) = Cosigned By      Initials Name Provider Type    KG Brittney Rocha, PT Physical Therapist                   Obj/Interventions       Row Name 11/05/23 1334          Motor Skills    Therapeutic Exercise hip;knee;ankle  -KG       Row Name 11/05/23 1334          Hip (Therapeutic Exercise)    Hip (Therapeutic Exercise) strengthening exercise  -KG     Hip Strengthening (Therapeutic Exercise) bilateral;flexion;extension;aBduction;aDduction;sitting;10 repetitions  -KG       Row Name 11/05/23 1334          Knee (Therapeutic Exercise)    Knee (Therapeutic Exercise) strengthening exercise  -KG     Knee Strengthening (Therapeutic Exercise) bilateral;flexion;extension;heel slides;SLR (straight leg raise);SAQ (short arc quad);sitting;10 repetitions  -KG       Row Name 11/05/23 1334          Ankle (Therapeutic Exercise)    Ankle (Therapeutic Exercise) strengthening exercise  -KG     Ankle Strengthening (Therapeutic Exercise) bilateral;dorsiflexion;plantarflexion;sitting;10 repetitions  -KG       Row Name 11/05/23 1334          Elbow/Forearm (Therapeutic Exercise)     Elbow/Forearm (Therapeutic Exercise) strengthening exercise  -KG     Elbow/Forearm Strengthening (Therapeutic Exercise) bilateral;flexion;extension;sitting;10 repetitions  -KG       Row Name 11/05/23 1334          Wrist (Therapeutic Exercise)    Wrist (Therapeutic Exercise) strengthening exercise  -KG     Wrist Strengthening (Therapeutic Exercise) bilateral;flexion;extension;10 repetitions  -KG               User Key  (r) = Recorded By, (t) = Taken By, (c) = Cosigned By      Initials Name Provider Type    KG Brittney Rocha N, PT Physical Therapist                   Goals/Plan       Row Name 11/05/23 1337          Bed Mobility Goal 1 (PT)    Activity/Assistive Device (Bed Mobility Goal 1, PT) sit to supine;supine to sit  -KG     [place] Level/Cues Needed (Bed Mobility Goal 1, PT) maximum assist (25-49% patient effort)  -KG     Time Frame (Bed Mobility Goal 1, PT) 2 weeks  -KG     Progress/Outcomes (Bed Mobility Goal 1, PT) goal ongoing;goal revised this date  -KG       Row Name 11/05/23 1337          Transfer Goal 1 (PT)    Activity/Assistive Device (Transfer Goal 1, PT) sit-to-stand/stand-to-sit;bed-to-chair/chair-to-bed  -KG     [place] Level/Cues Needed (Transfer Goal 1, PT) maximum assist (25-49% patient effort)  -KG     Time Frame (Transfer Goal 1, PT) 2 weeks  -KG     Progress/Outcome (Transfer Goal 1, PT) goal ongoing;goal revised this date  -KG       Row Name 11/05/23 1337          Gait Training Goal 1 (PT)    Activity/Assistive Device (Gait Training Goal 1, PT) gait (walking locomotion)  -KG     [place] Level (Gait Training Goal 1, PT) maximum assist (25-49% patient effort)  -KG     Distance (Gait Training Goal 1, PT) 5 feet  -KG     Time Frame (Gait Training Goal 1, PT) 2 weeks  -KG     Progress/Outcome (Gait Training Goal 1, PT) goal ongoing;goal revised this date  -KG       Row Name 11/05/23 1337          Stairs Goal 1 (PT)    Progress/Outcome (Stairs Goal 1, PT) goal no longer  appropriate  -KG               User Key  (r) = Recorded By, (t) = Taken By, (c) = Cosigned By      Initials Name Provider Type    ARIEL Brittney Rocha, PT Physical Therapist                   Clinical Impression       Row Name 11/05/23 8280          Pain    Additional Documentation Pain Scale: FACES Pre/Post-Treatment (Group)  -KG       Row Name 11/05/23 4877          Pain Scale: FACES Pre/Post-Treatment    Pain: FACES Scale, Pretreatment 0-->no hurt  -KG     Posttreatment Pain Rating 0-->no hurt  -KG       Row Name 11/05/23 1068          Plan of Care Review    Plan of Care Reviewed With patient  -KG     Outcome Evaluation PT re-evaluation completed for pt s/p CABG x4 presenting with generalized weakness, SOA, impaired cognition, poor balance and coordination, and decreased functional mobility. Pt required depA x2 for bed mobility. Transferred from bed to chair with mechanical lift. Pt tolerated ther ex, but did not follow any commands to participate. Pt's goals have been revised and pt would continue to benefit from PT skilled care. Recommend D/C to SNF.  -KG       [facility] Name 11/05/23 1038          Therapy Assessment/Plan (PT)    Rehab Potential (PT) fair, will monitor progress closely  -KG     Criteria for Skilled Interventions Met (PT) yes;skilled treatment is necessary  -KG     Therapy Frequency (PT) daily  -KG       Row Name 11/05/23 7399          Vital Signs    Pre Systolic BP Rehab 127  -KG     Pre Treatment Diastolic BP 54  -KG     Post Systolic BP Rehab 141  -KG     Post Treatment Diastolic BP 64  -KG     Pretreatment Heart Rate (beats/min) 58  -KG     Posttreatment Heart Rate (beats/min) 60  -KG     Pre SpO2 (%) 94  -KG     O2 Delivery Pre Treatment supplemental O2  -KG     Post SpO2 (%) 93  -KG     O2 Delivery Post Treatment supplemental O2  -KG     Pre Patient Position Supine  -KG     Intra Patient Position Side Lying  -KG     Post Patient Position Sitting  -KG       Row Name 11/05/23 6789           Positioning and Restraints    Pre-Treatment Position in bed  -KG     Post Treatment Position chair  -KG     In Chair reclined;call light within reach;encouraged to call for assist;with nsg;RUE elevated;LUE elevated;waffle cushion;on mechanical lift sling;legs elevated  -KG               User Key  (r) = Recorded By, (t) = Taken By, (c) = Cosigned By      Initials Name Provider Type    Brittney Crouch PT Physical Therapist                   Outcome Measures       Row Name 11/05/23 1337 11/05/23 0800       How much help from another person do you currently need...    Turning from your back to your side while in flat bed without using bedrails? 1  -KG 1  -TD    Moving from lying on back to sitting on the side of a flat bed without bedrails? 1  -KG 1  -TD    Moving to and from a bed to a chair (including a wheelchair)? 1  -KG 1  -TD    Standing up from a chair using your arms (e.g., wheelchair, bedside chair)? 1  -KG 1  -TD    Climbing 3-5 steps with a railing? 1  -KG 1  -TD    To walk in hospital room? 1  -KG 1  -TD    AM-PAC 6 Clicks Score (PT) 6  -KG 6  -TD    Highest level of mobility 2 --> Bed activities/dependent transfer  -KG 2 --> Bed activities/dependent transfer  -TD      Row Name 11/05/23 1337          Functional Assessment    Outcome Measure Options AM-PAC 6 Clicks Basic Mobility (PT)  -KG               User Key  (r) = Recorded By, (t) = Taken By, (c) = Cosigned By      Initials Name Provider Type    TD Gilbert Ferrell RN Registered Nurse    Brittney Crouch, PT Physical Therapist                                 Physical Therapy Education       Title: PT OT SLP Therapies (In Progress)       Topic: Physical Therapy (In Progress)       Point: Mobility training (In Progress)       Learning Progress Summary             Patient Acceptance, E, NR by KG at 11/5/2023 0940    Acceptance, E, VU,NR by ALEXANDER at 10/27/2023 1544    Comment: continued mobility while awaiting CABG, sternal precautions following  CABG    Acceptance, E,D, VU,NR by LR at 10/25/2023 1327    Comment: Educated on benefits of mobility, correct sit<->stand t/f technique, correct gait mechanics, PLB, energy conservation, and progression of POC.                         Point: Home exercise program (In Progress)       Learning Progress Summary             Patient Acceptance, E, NR by KG at 11/5/2023 0940                         Point: Body mechanics (In Progress)       Learning Progress Summary             Patient Acceptance, E, NR by KG at 11/5/2023 0940    Acceptance, E,D, VU,NR by LR at 10/25/2023 1327    Comment: Educated on benefits of mobility, correct sit<->stand t/f technique, correct gait mechanics, PLB, energy conservation, and progression of POC.                         Point: Precautions (In Progress)       Learning Progress Summary             Patient Acceptance, E, NR by KG at 11/5/2023 0940    Acceptance, E, VU,NR by ML at 10/27/2023 1544    Comment: continued mobility while awaiting CABG, sternal precautions following CABG    Acceptance, E,D, VU,NR by LR at 10/25/2023 1327    Comment: Educated on benefits of mobility, correct sit<->stand t/f technique, correct gait mechanics, PLB, energy conservation, and progression of POC.                                         User Key       Initials Effective Dates Name Provider Type Discipline    LR 02/03/23 -  Alissa Pierre, PT Physical Therapist PT    KG 05/22/20 -  Brittney Rocha PT Physical Therapist PT    ML 04/22/21 -  Alisa Salguero Physical Therapist PT                  PT Recommendation and Plan     Plan of Care Reviewed With: patient  Outcome Evaluation: PT re-evaluation completed for pt s/p CABG x4 presenting with generalized weakness, SOA, impaired cognition, poor balance and coordination, and decreased functional mobility. Pt required depA x2 for bed mobility. Transferred from bed to chair with mechanical lift. Pt tolerated ther ex, but did not follow any commands to  participate. Pt's goals have been revised and pt would continue to benefit from PT skilled care. Recommend D/C to SNF.     Time Calculation:         PT Charges       Row Name 11/05/23 0940             Time Calculation    Start Time 0940  -KG      PT Received On 11/05/23  -KG      PT Goal Re-Cert Due Date 11/15/23  -KG         Time Calculation- PT    Total Timed Code Minutes- PT 24 minute(s)  -KG         Timed Charges    42807 - PT Therapeutic Exercise Minutes 10  -KG      06888 - PT Therapeutic Activity Minutes 14  -KG         Untimed Charges    PT Eval/Re-eval Minutes 23  -KG         Total Minutes    Timed Charges Total Minutes 24  -KG      Untimed Charges Total Minutes 23  -KG       Total Minutes 47  -KG                User Key  (r) = Recorded By, (t) = Taken By, (c) = Cosigned By      Initials Name Provider Type    KG Brittney Rocha, PT Physical Therapist                  Therapy Charges for Today       Code Description Service Date Service Provider Modifiers Qty    40936560763 HC PT THER PROC EA 15 MIN 11/5/2023 Brittney Rocha, PT GP 1    92936476967 HC PT THERAPEUTIC ACT EA 15 MIN 11/5/2023 Brittney Rocha, PT GP 1    54059067824 HC PT RE-EVAL ESTABLISHED PLAN 2 11/5/2023 Brittney Rocha, PT GP 1            PT G-Codes  Outcome Measure Options: AM-PAC 6 Clicks Basic Mobility (PT)  AM-PAC 6 Clicks Score (PT): 6  AM-PAC 6 Clicks Score (OT): 21  PT Discharge Summary  Anticipated Discharge Disposition (PT): skilled nursing facility    Kimberly Rocha PT  11/5/2023

## 2023-11-05 NOTE — PROGRESS NOTES
Michael Cochran  [id number]  1945   LOS: 12 days   Patient Care Team:  Bo Rodriguez PA as PCP - General (Family Medicine)    Chief Complaint:  NSTEMI / HTN / UNCONTROLLED T2 DM / OBESITY / AT RISK FOR GELY / HFrEF / CABG x 4 (1 November 2023) / HFrEF     Subjective     Moaning with out purposeful response to verbal or tactile stimuli.  No discernible posturing or overt seizure activity.  Tolerating nutritional support with Novasource Renal at 30 cc/hour via KO feed.    Objective     Vital Sign Min/Max for last 24 hours  Temp  Min: 97.9 °F (36.6 °C)  Max: 99.3 °F (37.4 °C)   BP  Min: 90/57  Max: 151/75   Pulse  Min: 55  Max: 80   Resp  Min: 20  Max: 26   SpO2  Min: 88 %  Max: 100 %               11/01/23  0438 11/02/23  0500   Weight: 92.4 kg (203 lb 12.8 oz) 93.2 kg (205 lb 7.5 oz)         Intake/Output Summary (Last 24 hours) at 11/5/2023 0834  Last data filed at 11/5/2023 0600  Gross per 24 hour   Intake 2689.33 ml   Output 1505 ml   Net 1184.33 ml       Physical Exam:     General Appearance:  Unresponsive, moaning, in no acute distress   Lungs:     Clear to auscultation,respirations regular, even and                unlabored    Heart:    Regular and normal rate, normal S1 and S2, grade 1/6 systolic murmur, no gallop, no rub, no click   Abdomen:  Extremities:   Soft, nontender, bowel sounds audible x4    No edema, normal range of motion   Pulses:   Pulses palpable and equal bilaterally      Results Review:   Results from last 7 days   Lab Units 11/05/23  0404 11/04/23  1706 11/04/23  0441 11/03/23  1341   SODIUM mmol/L 149*  --  146* 145   POTASSIUM mmol/L 4.2 4.5 3.6 3.9   CHLORIDE mmol/L 119*  --  113* 110*   CO2 mmol/L 19.0*  --  21.0* 24.0   BUN mg/dL 57*  --  37* 35*   CREATININE mg/dL 1.05*  --  1.15* 1.32*   GLUCOSE mg/dL 188*  --  222* 133*   CALCIUM mg/dL 8.2*  --  8.4* 8.8     Results from last 7 days   Lab Units 11/05/23  0404 11/04/23  1706 11/04/23  0441   WBC 10*3/mm3 12.15* 12.74*  12.70*   HEMOGLOBIN g/dL 7.2* 7.4* 7.6*   HEMATOCRIT % 24.3* 25.0* 25.6*   PLATELETS 10*3/mm3 152 145 132*     Results from last 7 days   Lab Units 11/04/23  0441   CHOLESTEROL mg/dL 98   TRIGLYCERIDES mg/dL 123     ACCU-CHEKS: 224 - 213 - 202 - 206 = 188 - 169 MG/DL.    MAGNESIUM: 2.4    NO NEW CXR / EKG.    HEAD CT SCAN    Findings:    There is no evidence of hemorrhage. There is no mass effect or midline shift.     There is no extracerebral collection.     Ventricles are normal in size and configuration for patient's stated age.       Posterior fossa is within normal limits.     Calvarium and skull base appear intact.   Visualized sinuses show no air fluid levels. Visualized orbits are unremarkable.     IMPRESSION: No acute intracranial process identified.     ABDOMINOPELVIC CT SCAN:    Findings:    The study is limited by noncontrasted technique. There are tiny calcified gallstones. Unenhanced images of the liver, adrenal glands, pancreas, spleen, and left kidney are unremarkable. There are round masses in the right kidney which are difficult to evaluate. Statistically, they probably represent renal cysts but could be evaluated further with a renal ultrasound or repeat CT exam with IV contrast. The uterus is enlarged extending into the upper pelvis. It is of heterogeneous density and the patient may have fibroids. The adnexal structures are normal. There is a Nolasco cath in the urinary bladder. The patient also has a rectal catheter in place. There is increased stool in the rectosigmoid colon. There are no dilated loops of bowel to indicate anobstructive process. There is no abnormal bowel wall thickening. The patient has a feeding tube in place with the tip terminating in the duodenum. There are atherosclerotic vascular calcifications throughout the abdomen and pelvis. There is a small fat density left inguinal hernia. There is no retroperitoneal hematoma. There is no free fluid in the abdomen or pelvis. Within  the lower chest, there are extensive postsurgical changes. The patient has bilateral chest tubes and a mediastinal chest tube in place. The heart is enlarged. There are atherosclerotic vascular calcifications. There are trace pleural effusions. There is bilateral lower lobe atelectasis. There are no suspicious osteolytic or sclerotic lesions within the bony structures.     IMPRESSIONS:     1. The study is limited by noncontrasted technique.  2. No retroperitoneal hematoma. There is no free fluid in the abdomen or pelvis.  3. Cholelithiasis.  4. Round masses in the right kidney difficult to evaluate without contrast. Statistically, they probably represent renal cysts but could be evaluated further with either renal ultrasound or a repeat CT exam with IV contrast.  5. Mild rectal sigmoid colon fecal impaction. There is a rectal catheter in place.  6. Small fat density left inguinal hernia.  7. Postsurgical changes within the lower chest.  8. Enlarged uterus with questionable fibroids.    Medication Review: REVIEWED; DRIP = IV nicardipine 15 mg/hour continuous infusion.    Assessment & Plan     Severe anemia with progressive hyponatremia and prerenal azotemia.  Needs free water infusion and would consider transfusion of 1 packed RBCs.  Hypertension persists.  We will continue carvedilol and increase dose of amlodipine to 10 mg daily and add hydralazine 25 mg every 6 hours and hold for systolic blood pressure less than 120 torr.  No significant progress.      Acute exacerbation of congestive heart failure    Non-STEMI (non-ST elevated myocardial infarction)    HLD (hyperlipidemia)    Essential hypertension    Acute on chronic systolic congestive heart failure    Acute respiratory failure with hypoxia    Elevated troponin    Coronary artery disease involving native coronary artery of native heart with unstable angina pectoris    11/05/23  08:34 EST

## 2023-11-06 ENCOUNTER — APPOINTMENT (OUTPATIENT)
Dept: MRI IMAGING | Facility: HOSPITAL | Age: 78
End: 2023-11-06
Payer: MEDICARE

## 2023-11-06 ENCOUNTER — APPOINTMENT (OUTPATIENT)
Dept: GENERAL RADIOLOGY | Facility: HOSPITAL | Age: 78
End: 2023-11-06
Payer: MEDICARE

## 2023-11-06 ENCOUNTER — APPOINTMENT (OUTPATIENT)
Dept: CARDIOLOGY | Facility: HOSPITAL | Age: 78
End: 2023-11-06
Payer: MEDICARE

## 2023-11-06 DIAGNOSIS — N95.0 POSTMENOPAUSAL BLEEDING: Primary | ICD-10-CM

## 2023-11-06 PROBLEM — I63.9 EMBOLIC STROKE: Status: ACTIVE | Noted: 2023-11-06

## 2023-11-06 LAB
ALBUMIN SERPL-MCNC: 3.1 G/DL (ref 3.5–5.2)
ALBUMIN SERPL-MCNC: 3.3 G/DL (ref 3.5–5.2)
ALBUMIN/GLOB SERPL: 1.4 G/DL
ALBUMIN/GLOB SERPL: 1.7 G/DL
ALP SERPL-CCNC: 114 U/L (ref 39–117)
ALP SERPL-CCNC: 117 U/L (ref 39–117)
ALT SERPL W P-5'-P-CCNC: 10 U/L (ref 1–33)
ALT SERPL W P-5'-P-CCNC: 14 U/L (ref 1–33)
ANION GAP SERPL CALCULATED.3IONS-SCNC: 11 MMOL/L (ref 5–15)
ANION GAP SERPL CALCULATED.3IONS-SCNC: 7 MMOL/L (ref 5–15)
APTT PPP: 22.9 SECONDS (ref 60–90)
AST SERPL-CCNC: 28 U/L (ref 1–32)
AST SERPL-CCNC: 37 U/L (ref 1–32)
BACTERIA BLD CULT: ABNORMAL
BASE EXCESS BLDA CALC-SCNC: 1 MMOL/L (ref -5–5)
BASE EXCESS BLDA CALC-SCNC: 2 MMOL/L (ref -5–5)
BASE EXCESS BLDA CALC-SCNC: 4 MMOL/L (ref -5–5)
BASE EXCESS BLDA CALC-SCNC: 4 MMOL/L (ref -5–5)
BASE EXCESS BLDA CALC-SCNC: 5 MMOL/L (ref -5–5)
BASE EXCESS BLDA CALC-SCNC: 6 MMOL/L (ref -5–5)
BASE EXCESS BLDA CALC-SCNC: 7 MMOL/L (ref -5–5)
BASE EXCESS BLDA CALC-SCNC: 8 MMOL/L (ref -5–5)
BH CV ECHO MEAS - AO MAX PG: 10.8 MMHG
BH CV ECHO MEAS - AO MEAN PG: 6 MMHG
BH CV ECHO MEAS - AO V2 MAX: 164 CM/SEC
BH CV ECHO MEAS - AO V2 VTI: 31.5 CM
BH CV ECHO MEAS - EDV(CUBED): 85.2 ML
BH CV ECHO MEAS - EDV(MOD-SP2): 143 ML
BH CV ECHO MEAS - EDV(MOD-SP4): 102 ML
BH CV ECHO MEAS - EF(MOD-BP): 67 %
BH CV ECHO MEAS - EF(MOD-SP2): 65.7 %
BH CV ECHO MEAS - EF(MOD-SP4): 67.5 %
BH CV ECHO MEAS - ESV(CUBED): 22 ML
BH CV ECHO MEAS - ESV(MOD-SP2): 49.1 ML
BH CV ECHO MEAS - ESV(MOD-SP4): 33.1 ML
BH CV ECHO MEAS - FS: 36.4 %
BH CV ECHO MEAS - IVS/LVPW: 1 CM
BH CV ECHO MEAS - IVSD: 1.2 CM
BH CV ECHO MEAS - LV MASS(C)D: 191.3 GRAMS
BH CV ECHO MEAS - LVIDD: 4.4 CM
BH CV ECHO MEAS - LVIDS: 2.8 CM
BH CV ECHO MEAS - LVPWD: 1.2 CM
BH CV ECHO MEAS - SV(MOD-SP2): 93.9 ML
BH CV ECHO MEAS - SV(MOD-SP4): 68.9 ML
BH CV ECHO SHUNT ASSESSMENT PERFORMED (HIDDEN SCRIPTING): 1
BILIRUB SERPL-MCNC: 0.5 MG/DL (ref 0–1.2)
BILIRUB SERPL-MCNC: 0.6 MG/DL (ref 0–1.2)
BOTTLE TYPE: ABNORMAL
BUN SERPL-MCNC: 59 MG/DL (ref 8–23)
BUN SERPL-MCNC: 65 MG/DL (ref 8–23)
BUN/CREAT SERPL: 62.1 (ref 7–25)
BUN/CREAT SERPL: 65 (ref 7–25)
CA-I BLDA-SCNC: 0.91 MMOL/L (ref 1.2–1.32)
CA-I BLDA-SCNC: 0.92 MMOL/L (ref 1.2–1.32)
CA-I BLDA-SCNC: 0.96 MMOL/L (ref 1.2–1.32)
CA-I BLDA-SCNC: 0.96 MMOL/L (ref 1.2–1.32)
CA-I BLDA-SCNC: 0.98 MMOL/L (ref 1.2–1.32)
CA-I BLDA-SCNC: 1.11 MMOL/L (ref 1.2–1.32)
CA-I BLDA-SCNC: 1.16 MMOL/L (ref 1.2–1.32)
CA-I BLDA-SCNC: 1.17 MMOL/L (ref 1.2–1.32)
CA-I BLDA-SCNC: 1.24 MMOL/L (ref 1.2–1.32)
CA-I BLDA-SCNC: 1.38 MMOL/L (ref 1.2–1.32)
CALCIUM SPEC-SCNC: 8.1 MG/DL (ref 8.6–10.5)
CALCIUM SPEC-SCNC: 8.6 MG/DL (ref 8.6–10.5)
CHLORIDE SERPL-SCNC: 119 MMOL/L (ref 98–107)
CHLORIDE SERPL-SCNC: 120 MMOL/L (ref 98–107)
CHOLEST SERPL-MCNC: 88 MG/DL (ref 0–200)
CO2 BLDA-SCNC: 26 MMOL/L (ref 24–29)
CO2 BLDA-SCNC: 27 MMOL/L (ref 24–29)
CO2 BLDA-SCNC: 29 MMOL/L (ref 24–29)
CO2 BLDA-SCNC: 29 MMOL/L (ref 24–29)
CO2 BLDA-SCNC: 31 MMOL/L (ref 24–29)
CO2 BLDA-SCNC: 33 MMOL/L (ref 24–29)
CO2 SERPL-SCNC: 22 MMOL/L (ref 22–29)
CO2 SERPL-SCNC: 23 MMOL/L (ref 22–29)
CREAT SERPL-MCNC: 0.95 MG/DL (ref 0.57–1)
CREAT SERPL-MCNC: 1 MG/DL (ref 0.57–1)
CRP SERPL-MCNC: 3.55 MG/DL (ref 0–0.5)
DEPRECATED RDW RBC AUTO: 52 FL (ref 37–54)
DEPRECATED RDW RBC AUTO: 52.1 FL (ref 37–54)
EGFRCR SERPLBLD CKD-EPI 2021: 57.4 ML/MIN/1.73
EGFRCR SERPLBLD CKD-EPI 2021: 61.1 ML/MIN/1.73
ERYTHROCYTE [DISTWIDTH] IN BLOOD BY AUTOMATED COUNT: 14.6 % (ref 12.3–15.4)
ERYTHROCYTE [DISTWIDTH] IN BLOOD BY AUTOMATED COUNT: 14.6 % (ref 12.3–15.4)
ERYTHROCYTE [SEDIMENTATION RATE] IN BLOOD: 49 MM/HR (ref 0–30)
GLOBULIN UR ELPH-MCNC: 1.8 GM/DL
GLOBULIN UR ELPH-MCNC: 2.3 GM/DL
GLUCOSE BLDC GLUCOMTR-MCNC: 103 MG/DL (ref 70–130)
GLUCOSE BLDC GLUCOMTR-MCNC: 105 MG/DL (ref 70–130)
GLUCOSE BLDC GLUCOMTR-MCNC: 114 MG/DL (ref 70–130)
GLUCOSE BLDC GLUCOMTR-MCNC: 121 MG/DL (ref 70–130)
GLUCOSE BLDC GLUCOMTR-MCNC: 121 MG/DL (ref 70–130)
GLUCOSE BLDC GLUCOMTR-MCNC: 123 MG/DL (ref 70–130)
GLUCOSE BLDC GLUCOMTR-MCNC: 125 MG/DL (ref 70–130)
GLUCOSE BLDC GLUCOMTR-MCNC: 134 MG/DL (ref 70–130)
GLUCOSE BLDC GLUCOMTR-MCNC: 137 MG/DL (ref 70–130)
GLUCOSE BLDC GLUCOMTR-MCNC: 148 MG/DL (ref 70–130)
GLUCOSE BLDC GLUCOMTR-MCNC: 154 MG/DL (ref 70–130)
GLUCOSE BLDC GLUCOMTR-MCNC: 160 MG/DL (ref 70–130)
GLUCOSE BLDC GLUCOMTR-MCNC: 174 MG/DL (ref 70–130)
GLUCOSE BLDC GLUCOMTR-MCNC: 191 MG/DL (ref 70–130)
GLUCOSE BLDC GLUCOMTR-MCNC: 98 MG/DL (ref 70–130)
GLUCOSE SERPL-MCNC: 152 MG/DL (ref 65–99)
GLUCOSE SERPL-MCNC: 179 MG/DL (ref 65–99)
HCO3 BLDA-SCNC: 25 MMOL/L (ref 22–26)
HCO3 BLDA-SCNC: 26.1 MMOL/L (ref 22–26)
HCO3 BLDA-SCNC: 28 MMOL/L (ref 22–26)
HCO3 BLDA-SCNC: 28.1 MMOL/L (ref 22–26)
HCO3 BLDA-SCNC: 29.6 MMOL/L (ref 22–26)
HCO3 BLDA-SCNC: 29.6 MMOL/L (ref 22–26)
HCO3 BLDA-SCNC: 29.7 MMOL/L (ref 22–26)
HCO3 BLDA-SCNC: 29.9 MMOL/L (ref 22–26)
HCO3 BLDA-SCNC: 30.1 MMOL/L (ref 22–26)
HCO3 BLDA-SCNC: 31.3 MMOL/L (ref 22–26)
HCT VFR BLD AUTO: 24.6 % (ref 34–46.6)
HCT VFR BLD AUTO: 25.9 % (ref 34–46.6)
HCT VFR BLDA CALC: 25 % (ref 38–51)
HCT VFR BLDA CALC: 26 % (ref 38–51)
HCT VFR BLDA CALC: 27 % (ref 38–51)
HCT VFR BLDA CALC: 33 % (ref 38–51)
HCT VFR BLDA CALC: 35 % (ref 38–51)
HCT VFR BLDA CALC: 37 % (ref 38–51)
HGB BLD-MCNC: 7.1 G/DL (ref 12–15.9)
HGB BLD-MCNC: 7.6 G/DL (ref 12–15.9)
HGB BLDA-MCNC: 11.2 G/DL (ref 12–17)
HGB BLDA-MCNC: 11.9 G/DL (ref 12–17)
HGB BLDA-MCNC: 12.6 G/DL (ref 12–17)
HGB BLDA-MCNC: 8.5 G/DL (ref 12–17)
HGB BLDA-MCNC: 8.8 G/DL (ref 12–17)
HGB BLDA-MCNC: 9.2 G/DL (ref 12–17)
INR PPP: 1.25 (ref 0.89–1.12)
LV EF 2D ECHO EST: 60 %
MAGNESIUM SERPL-MCNC: 2.5 MG/DL (ref 1.6–2.4)
MCH RBC QN AUTO: 28.9 PG (ref 26.6–33)
MCH RBC QN AUTO: 28.9 PG (ref 26.6–33)
MCHC RBC AUTO-ENTMCNC: 28.9 G/DL (ref 31.5–35.7)
MCHC RBC AUTO-ENTMCNC: 29.3 G/DL (ref 31.5–35.7)
MCV RBC AUTO: 100 FL (ref 79–97)
MCV RBC AUTO: 98.5 FL (ref 79–97)
PCO2 BLDA: 34.3 MM HG (ref 35–45)
PCO2 BLDA: 36.6 MM HG (ref 35–45)
PCO2 BLDA: 38.2 MM HG (ref 35–45)
PCO2 BLDA: 38.5 MM HG (ref 35–45)
PCO2 BLDA: 40.5 MM HG (ref 35–45)
PCO2 BLDA: 40.9 MM HG (ref 35–45)
PCO2 BLDA: 41.7 MM HG (ref 35–45)
PCO2 BLDA: 42.4 MM HG (ref 35–45)
PCO2 BLDA: 48.1 MM HG (ref 35–45)
PCO2 BLDA: 49.4 MM HG (ref 35–45)
PH BLDA: 7.39 PH UNITS (ref 7.35–7.6)
PH BLDA: 7.42 PH UNITS (ref 7.35–7.6)
PH BLDA: 7.44 PH UNITS (ref 7.35–7.6)
PH BLDA: 7.44 PH UNITS (ref 7.35–7.6)
PH BLDA: 7.45 PH UNITS (ref 7.35–7.6)
PH BLDA: 7.45 PH UNITS (ref 7.35–7.6)
PH BLDA: 7.46 PH UNITS (ref 7.35–7.6)
PH BLDA: 7.47 PH UNITS (ref 7.35–7.6)
PH BLDA: 7.47 PH UNITS (ref 7.35–7.6)
PH BLDA: 7.55 PH UNITS (ref 7.35–7.6)
PHOSPHATE SERPL-MCNC: 3.7 MG/DL (ref 2.5–4.5)
PLATELET # BLD AUTO: 184 10*3/MM3 (ref 140–450)
PLATELET # BLD AUTO: 218 10*3/MM3 (ref 140–450)
PMV BLD AUTO: 12.8 FL (ref 6–12)
PMV BLD AUTO: 13.3 FL (ref 6–12)
PO2 BLDA: 151 MMHG (ref 80–105)
PO2 BLDA: 164 MMHG (ref 80–105)
PO2 BLDA: 310 MMHG (ref 80–105)
PO2 BLDA: 353 MMHG (ref 80–105)
PO2 BLDA: 359 MMHG (ref 80–105)
PO2 BLDA: 36 MMHG (ref 80–105)
PO2 BLDA: 376 MMHG (ref 80–105)
PO2 BLDA: 378 MMHG (ref 80–105)
PO2 BLDA: 393 MMHG (ref 80–105)
PO2 BLDA: 43 MMHG (ref 80–105)
POTASSIUM BLDA-SCNC: 2.7 MMOL/L (ref 3.5–4.9)
POTASSIUM BLDA-SCNC: 3 MMOL/L (ref 3.5–4.9)
POTASSIUM BLDA-SCNC: 3 MMOL/L (ref 3.5–4.9)
POTASSIUM BLDA-SCNC: 3.1 MMOL/L (ref 3.5–4.9)
POTASSIUM BLDA-SCNC: 3.2 MMOL/L (ref 3.5–4.9)
POTASSIUM BLDA-SCNC: 3.9 MMOL/L (ref 3.5–4.9)
POTASSIUM BLDA-SCNC: 3.9 MMOL/L (ref 3.5–4.9)
POTASSIUM BLDA-SCNC: 4 MMOL/L (ref 3.5–4.9)
POTASSIUM BLDA-SCNC: 4.1 MMOL/L (ref 3.5–4.9)
POTASSIUM BLDA-SCNC: 4.2 MMOL/L (ref 3.5–4.9)
POTASSIUM SERPL-SCNC: 3.9 MMOL/L (ref 3.5–5.2)
POTASSIUM SERPL-SCNC: 4.1 MMOL/L (ref 3.5–5.2)
PREALB SERPL-MCNC: 12 MG/DL (ref 20–40)
PROT SERPL-MCNC: 4.9 G/DL (ref 6–8.5)
PROT SERPL-MCNC: 5.6 G/DL (ref 6–8.5)
PROTHROMBIN TIME: 15.8 SECONDS (ref 12.2–14.5)
RBC # BLD AUTO: 2.46 10*6/MM3 (ref 3.77–5.28)
RBC # BLD AUTO: 2.63 10*6/MM3 (ref 3.77–5.28)
SAO2 % BLDA: 100 % (ref 95–98)
SAO2 % BLDA: 71 % (ref 95–98)
SAO2 % BLDA: 82 % (ref 95–98)
SAO2 % BLDA: 99 % (ref 95–98)
SODIUM BLD-SCNC: 138 MMOL/L (ref 138–146)
SODIUM BLD-SCNC: 138 MMOL/L (ref 138–146)
SODIUM BLD-SCNC: 139 MMOL/L (ref 138–146)
SODIUM BLD-SCNC: 140 MMOL/L (ref 138–146)
SODIUM BLD-SCNC: 141 MMOL/L (ref 138–146)
SODIUM BLD-SCNC: 141 MMOL/L (ref 138–146)
SODIUM SERPL-SCNC: 149 MMOL/L (ref 136–145)
SODIUM SERPL-SCNC: 153 MMOL/L (ref 136–145)
TRIGL SERPL-MCNC: 119 MG/DL (ref 0–150)
UFH PPP CHRO-ACNC: 0.1 IU/ML (ref 0.3–0.7)
UFH PPP CHRO-ACNC: 0.17 IU/ML (ref 0.3–0.7)
WBC NRBC COR # BLD: 13.45 10*3/MM3 (ref 3.4–10.8)
WBC NRBC COR # BLD: 13.93 10*3/MM3 (ref 3.4–10.8)

## 2023-11-06 PROCEDURE — 83735 ASSAY OF MAGNESIUM: CPT

## 2023-11-06 PROCEDURE — 63710000001 INSULIN REGULAR HUMAN PER 5 UNITS: Performed by: INTERNAL MEDICINE

## 2023-11-06 PROCEDURE — 0 DEXTROSE 5 % SOLUTION: Performed by: INTERNAL MEDICINE

## 2023-11-06 PROCEDURE — 70551 MRI BRAIN STEM W/O DYE: CPT

## 2023-11-06 PROCEDURE — 25810000003 SODIUM CHLORIDE 0.9 % SOLUTION

## 2023-11-06 PROCEDURE — 85027 COMPLETE CBC AUTOMATED: CPT | Performed by: PHYSICIAN ASSISTANT

## 2023-11-06 PROCEDURE — 63710000001 INSULIN DETEMIR PER 5 UNITS: Performed by: INTERNAL MEDICINE

## 2023-11-06 PROCEDURE — 85520 HEPARIN ASSAY: CPT

## 2023-11-06 PROCEDURE — 25010000002 VANCOMYCIN 10 G RECONSTITUTED SOLUTION

## 2023-11-06 PROCEDURE — 85027 COMPLETE CBC AUTOMATED: CPT | Performed by: INTERNAL MEDICINE

## 2023-11-06 PROCEDURE — 87641 MR-STAPH DNA AMP PROBE: CPT | Performed by: INTERNAL MEDICINE

## 2023-11-06 PROCEDURE — 85520 HEPARIN ASSAY: CPT | Performed by: PSYCHIATRY & NEUROLOGY

## 2023-11-06 PROCEDURE — 93321 DOPPLER ECHO F-UP/LMTD STD: CPT

## 2023-11-06 PROCEDURE — 80053 COMPREHEN METABOLIC PANEL: CPT | Performed by: INTERNAL MEDICINE

## 2023-11-06 PROCEDURE — 84100 ASSAY OF PHOSPHORUS: CPT

## 2023-11-06 PROCEDURE — 84478 ASSAY OF TRIGLYCERIDES: CPT

## 2023-11-06 PROCEDURE — 99291 CRITICAL CARE FIRST HOUR: CPT | Performed by: INTERNAL MEDICINE

## 2023-11-06 PROCEDURE — 93308 TTE F-UP OR LMTD: CPT

## 2023-11-06 PROCEDURE — 82948 REAGENT STRIP/BLOOD GLUCOSE: CPT

## 2023-11-06 PROCEDURE — 85610 PROTHROMBIN TIME: CPT | Performed by: PSYCHIATRY & NEUROLOGY

## 2023-11-06 PROCEDURE — 94660 CPAP INITIATION&MGMT: CPT

## 2023-11-06 PROCEDURE — 93010 ELECTROCARDIOGRAM REPORT: CPT | Performed by: INTERNAL MEDICINE

## 2023-11-06 PROCEDURE — 99232 SBSQ HOSP IP/OBS MODERATE 35: CPT | Performed by: INTERNAL MEDICINE

## 2023-11-06 PROCEDURE — 97530 THERAPEUTIC ACTIVITIES: CPT

## 2023-11-06 PROCEDURE — 85730 THROMBOPLASTIN TIME PARTIAL: CPT | Performed by: PSYCHIATRY & NEUROLOGY

## 2023-11-06 PROCEDURE — 93005 ELECTROCARDIOGRAM TRACING: CPT | Performed by: INTERNAL MEDICINE

## 2023-11-06 PROCEDURE — 99232 SBSQ HOSP IP/OBS MODERATE 35: CPT | Performed by: PSYCHIATRY & NEUROLOGY

## 2023-11-06 PROCEDURE — 71045 X-RAY EXAM CHEST 1 VIEW: CPT

## 2023-11-06 PROCEDURE — 93321 DOPPLER ECHO F-UP/LMTD STD: CPT | Performed by: INTERNAL MEDICINE

## 2023-11-06 PROCEDURE — 93325 DOPPLER ECHO COLOR FLOW MAPG: CPT

## 2023-11-06 PROCEDURE — 25010000002 CEFTRIAXONE PER 250 MG: Performed by: INTERNAL MEDICINE

## 2023-11-06 PROCEDURE — 86140 C-REACTIVE PROTEIN: CPT | Performed by: INTERNAL MEDICINE

## 2023-11-06 PROCEDURE — 80053 COMPREHEN METABOLIC PANEL: CPT

## 2023-11-06 PROCEDURE — 94799 UNLISTED PULMONARY SVC/PX: CPT

## 2023-11-06 PROCEDURE — 84134 ASSAY OF PREALBUMIN: CPT | Performed by: INTERNAL MEDICINE

## 2023-11-06 PROCEDURE — 85652 RBC SED RATE AUTOMATED: CPT | Performed by: INTERNAL MEDICINE

## 2023-11-06 PROCEDURE — 93325 DOPPLER ECHO COLOR FLOW MAPG: CPT | Performed by: INTERNAL MEDICINE

## 2023-11-06 PROCEDURE — 25810000003 SODIUM CHLORIDE 0.9 % SOLUTION 250 ML FLEX CONT: Performed by: THORACIC SURGERY (CARDIOTHORACIC VASCULAR SURGERY)

## 2023-11-06 PROCEDURE — 82465 ASSAY BLD/SERUM CHOLESTEROL: CPT

## 2023-11-06 PROCEDURE — 25010000002 HEPARIN (PORCINE) 25000-0.45 UT/250ML-% SOLUTION: Performed by: PSYCHIATRY & NEUROLOGY

## 2023-11-06 PROCEDURE — 93308 TTE F-UP OR LMTD: CPT | Performed by: INTERNAL MEDICINE

## 2023-11-06 RX ORDER — METHYLPHENIDATE HYDROCHLORIDE 5 MG/1
5 TABLET ORAL 2 TIMES DAILY
Status: DISCONTINUED | OUTPATIENT
Start: 2023-11-06 | End: 2023-11-06

## 2023-11-06 RX ORDER — POTASSIUM CHLORIDE 1.5 G/1.58G
20 POWDER, FOR SOLUTION ORAL ONCE
Status: COMPLETED | OUTPATIENT
Start: 2023-11-06 | End: 2023-11-06

## 2023-11-06 RX ORDER — CARVEDILOL 6.25 MG/1
6.25 TABLET ORAL 2 TIMES DAILY WITH MEALS
Status: DISCONTINUED | OUTPATIENT
Start: 2023-11-06 | End: 2023-11-15 | Stop reason: HOSPADM

## 2023-11-06 RX ORDER — METHYLPHENIDATE HYDROCHLORIDE 5 MG/1
5 TABLET ORAL 2 TIMES DAILY
Status: DISCONTINUED | OUTPATIENT
Start: 2023-11-06 | End: 2023-11-07

## 2023-11-06 RX ORDER — HEPARIN SODIUM 10000 [USP'U]/100ML
14 INJECTION, SOLUTION INTRAVENOUS
Status: DISCONTINUED | OUTPATIENT
Start: 2023-11-06 | End: 2023-11-08

## 2023-11-06 RX ORDER — DEXTROSE MONOHYDRATE 50 MG/ML
100 INJECTION, SOLUTION INTRAVENOUS CONTINUOUS
Status: ACTIVE | OUTPATIENT
Start: 2023-11-06 | End: 2023-11-06

## 2023-11-06 RX ORDER — HYDRALAZINE HYDROCHLORIDE 25 MG/1
50 TABLET, FILM COATED ORAL EVERY 6 HOURS SCHEDULED
Status: DISCONTINUED | OUTPATIENT
Start: 2023-11-06 | End: 2023-11-08

## 2023-11-06 RX ADMIN — DOCUSATE SODIUM 100 MG: 50 LIQUID ORAL at 20:02

## 2023-11-06 RX ADMIN — DEXTROSE MONOHYDRATE 100 ML/HR: 50 INJECTION, SOLUTION INTRAVENOUS at 09:49

## 2023-11-06 RX ADMIN — AMLODIPINE BESYLATE 10 MG: 10 TABLET ORAL at 08:08

## 2023-11-06 RX ADMIN — ACETAMINOPHEN 650 MG: 325 TABLET ORAL at 08:09

## 2023-11-06 RX ADMIN — Medication 10 ML: at 09:53

## 2023-11-06 RX ADMIN — VANCOMYCIN HYDROCHLORIDE 1750 MG: 10 INJECTION, POWDER, LYOPHILIZED, FOR SOLUTION INTRAVENOUS at 16:19

## 2023-11-06 RX ADMIN — ASPIRIN 325 MG: 325 TABLET ORAL at 08:08

## 2023-11-06 RX ADMIN — POTASSIUM CHLORIDE 20 MEQ: 1.5 FOR SOLUTION ORAL at 19:59

## 2023-11-06 RX ADMIN — Medication 10 ML: at 20:02

## 2023-11-06 RX ADMIN — DOCUSATE SODIUM 100 MG: 50 LIQUID ORAL at 08:08

## 2023-11-06 RX ADMIN — ACETAMINOPHEN 650 MG: 325 TABLET ORAL at 16:18

## 2023-11-06 RX ADMIN — INSULIN DETEMIR 8 UNITS: 100 INJECTION, SOLUTION SUBCUTANEOUS at 20:02

## 2023-11-06 RX ADMIN — INSULIN HUMAN 2 UNITS: 100 INJECTION, SOLUTION PARENTERAL at 12:39

## 2023-11-06 RX ADMIN — INSULIN HUMAN 2 UNITS: 100 INJECTION, SOLUTION PARENTERAL at 17:47

## 2023-11-06 RX ADMIN — ACETAMINOPHEN 650 MG: 325 TABLET ORAL at 00:42

## 2023-11-06 RX ADMIN — CEFTRIAXONE SODIUM 2000 MG: 2 INJECTION, POWDER, FOR SOLUTION INTRAMUSCULAR; INTRAVENOUS at 14:58

## 2023-11-06 RX ADMIN — HYDRALAZINE HYDROCHLORIDE 50 MG: 25 TABLET, FILM COATED ORAL at 18:14

## 2023-11-06 RX ADMIN — INSULIN DETEMIR 8 UNITS: 100 INJECTION, SOLUTION SUBCUTANEOUS at 08:09

## 2023-11-06 RX ADMIN — SENNOSIDES 10 ML: 8.8 LIQUID ORAL at 20:02

## 2023-11-06 RX ADMIN — INSULIN HUMAN 2 UNITS: 100 INJECTION, SOLUTION PARENTERAL at 06:43

## 2023-11-06 RX ADMIN — HYDRALAZINE HYDROCHLORIDE 25 MG: 25 TABLET, FILM COATED ORAL at 12:39

## 2023-11-06 RX ADMIN — HYDRALAZINE HYDROCHLORIDE 25 MG: 25 TABLET, FILM COATED ORAL at 06:43

## 2023-11-06 RX ADMIN — NICARDIPINE HYDROCHLORIDE 5 MG/HR: 25 INJECTION, SOLUTION INTRAVENOUS at 14:08

## 2023-11-06 RX ADMIN — METHYLPHENIDATE HYDROCHLORIDE 5 MG: 5 TABLET ORAL at 17:47

## 2023-11-06 RX ADMIN — INSULIN HUMAN 2 UNITS: 100 INJECTION, SOLUTION PARENTERAL at 00:42

## 2023-11-06 RX ADMIN — HEPARIN SODIUM 11 UNITS/KG/HR: 10000 INJECTION, SOLUTION INTRAVENOUS at 16:18

## 2023-11-06 RX ADMIN — SENNOSIDES 10 ML: 8.8 LIQUID ORAL at 08:08

## 2023-11-06 RX ADMIN — ATORVASTATIN CALCIUM 80 MG: 40 TABLET, FILM COATED ORAL at 20:02

## 2023-11-06 NOTE — PROGRESS NOTES
Michael Cochran  [id number]  1945   LOS: 13 days   Patient Care Team:  Bo Rodriguez PA as PCP - General (Family Medicine)    Chief Complaint:   NSTEMI / HTN / UNCONTROLLED T2 DM / OBESITY / AT RISK FOR GELY / HFrEF / CABG x 4 (1 November 2023) / HFrEF / ALTERED MSE & ABNORMAL BRAIN MRI    Subjective     Unresponsive to verbal as well as tactile stimuli.  No posturing or overt seizure activity.  Tolerating nutritional support with Novasource renal 30 cc/hour the coming 50 cc/hour free water.    Objective     Vital Sign Min/Max for last 24 hours  Temp  Min: 97.4 °F (36.3 °C)  Max: 98.4 °F (36.9 °C)   BP  Min: 111/55  Max: 178/76   Pulse  Min: 45  Max: 73   Resp  Min: 14  Max: 20   SpO2  Min: 92 %  Max: 99 %               11/01/23  0438 11/02/23  0500   Weight: 92.4 kg (203 lb 12.8 oz) 93.2 kg (205 lb 7.5 oz)         Intake/Output Summary (Last 24 hours) at 11/6/2023 0811  Last data filed at 11/6/2023 0630  Gross per 24 hour   Intake 1566 ml   Output 835 ml   Net 731 ml       Physical Exam:     General Appearance:    Alert, cooperative, in no acute distress   Lungs:     Clear to auscultation,respirations regular, even and                unlabored    Heart:    Regular and normal rate, normal S1 and S2, grade 1/6 systolic murmur, no gallop, no rub, no click   Abdomen:  Extremities:   Soft, nontender, bowel sounds audible x4    No edema, normal range of motion   Pulses:   Pulses palpable and equal bilaterally      Results Review:   Results from last 7 days   Lab Units 11/06/23  0408 11/05/23  0404 11/04/23  1706 11/04/23  0441   SODIUM mmol/L 153* 149*  --  146*   POTASSIUM mmol/L 4.1 4.2 4.5 3.6   CHLORIDE mmol/L 120* 119*  --  113*   CO2 mmol/L 22.0 19.0*  --  21.0*   BUN mg/dL 65* 57*  --  37*   CREATININE mg/dL 1.00 1.05*  --  1.15*   GLUCOSE mg/dL 152* 188*  --  222*   CALCIUM mg/dL 8.1* 8.2*  --  8.4*     Results from last 7 days   Lab Units 11/06/23  0408 11/05/23  0404 11/04/23  1706   WBC 10*3/mm3  13.45* 12.15* 12.74*   HEMOGLOBIN g/dL 7.1* 7.2* 7.4*   HEMATOCRIT % 24.6* 24.3* 25.0*   PLATELETS 10*3/mm3 184 152 145     Results from last 7 days   Lab Units 11/06/23  0408   CHOLESTEROL mg/dL 88   TRIGLYCERIDES mg/dL 119     ALBUMIN: 3.1  LFTs: WNL  CRP: 3.55  BRAIN MRI:    Findings:    Multiple small foci of diffusion restriction are seen within the paramedian bilateral frontal and parietal lobes extending to the bilateral basal ganglia. Patchy diffusion restriction is present within the left cerebellum. Corresponding decreased signal is present within the ADC map. Patchy FLAIR signal changes are seen corresponding to the areas of diffusion restriction. Moderate periventricular and subcortical FLAIR signal changes are present. There is no evidence of acute or chronic intracranial hemorrhage. No mass effect or midline shift. No abnormal extra-axial collections. The major vascular flow voids appear intact. The basal ganglia, brainstem and cerebellum appear within normal limits. Calvarial and superficial soft tissue signal is within normal limits. Orbits appear unremarkable. The paranasal sinuses and the mastoid air cells appear well aerated. Midline structures are intact.         IMPRESSIONS:    1.Multiple small foci of diffusion restriction present within the paramedian bilateral frontal and parietal lobes extending to the bilateral basal ganglia as well as the left cerebellum consistent with acute to subacute infarcts. Findings are more subacute given presence of FLAIR signal changes corresponding to these regions related to gliosis. Given the distribution findings are likely related to a watershed type episode. No evidence of hemorrhage, mass effect or midline shift.  2.Moderate periventricular and subcortical FLAIR signal changes likely related to chronic microvascular ischemic change.    CXR:    Findings:    Stable enlargement of the cardiac silhouette with surgical changes post CABG. There is no change in  position of a right IJ introducer. Stable position of an OG tube. Mild basilar atelectasis. The lungs are otherwise clear. Small left pleural effusion     IMPRESSION: Stable chest    EEG:    SUMMARY:     Moderate generalized slow     No focal features or epileptiform activity are seen     IMPRESSION: Diffuse cerebral dysfunction of moderate degree, nonspecific    NO NEW EKG.    Medication Review: REVIEWED; NO DRIPS.    Assessment & Plan     Blood pressure under improved control but remains somewhat labile with intermittent bradycardia; reduce carvedilol to 6.25 mg BID.  Altered mental status and encephalopathy probably related to combined hypernatremia and multiple intraoperative intracranial infarcts.  Will need higher dose of free water to attempt to correct progressive hypernatremia.  Doubt SIADH.  Prognosis very guarded and appears very limited for any significant meaningful neurologic recovery; await further assessment of neurology.      Multivessel CAD with unstable angina pectoris manifesting as dyspnea    Non-STEMI (non-ST elevated myocardial infarction)    HLD (hyperlipidemia)    Acute hypoxemic respiratory failure due to pulmonary edema    Acute exacerbation of congestive heart failure    S/P CABG x 4 on 11/2/2023    Ischemic cardiomyopathy with LVEF 31%    Hypertensive urgency with diastolic dysfunction    Postop ANURADHA resolved    Postop Encephalopathy    Postop acute blood loss anemia    11/06/23  08:11 EST

## 2023-11-06 NOTE — PROGRESS NOTES
"Pharmacy Consult-Vancomycin Dosing  Michael Cochran is a  78 y.o. female receiving vancomycin therapy.     Indication:Bacteremia  Consulting Provider: Intensivist  ID Consult: No    Goal AUC: 400 - 600 mg/L*hr    Current Antimicrobial Therapy  Anti-Infectives (From admission, onward)      Ordered     Dose/Rate Route Frequency Start Stop    11/06/23 1455  Pharmacy to dose vancomycin        Ordering Provider: Dirk Thomas MD     Does not apply Continuous PRN 11/06/23 1455 11/13/23 1454    11/05/23 1421  cefTRIAXone (ROCEPHIN) 2,000 mg in sodium chloride 0.9 % 100 mL IVPB        Ordering Provider: Lauro Salomon MD    2,000 mg  200 mL/hr over 30 Minutes Intravenous Every 24 Hours 11/05/23 1515 11/10/23 1514    11/01/23 1616  ceFAZolin 2000 mg IVPB in 100 mL NS (MBP)        Ordering Provider: Robyn Newton PA-C    2 g  over 30 Minutes Intravenous Every 8 Hours 11/01/23 2200 11/03/23 0715    11/01/23 0808  ceFAZolin 2000 mg IVPB in 100 mL NS (MBP)        Ordering Provider: Charisse Michael APRN    2,000 mg  over 30 Minutes Intravenous Once 11/01/23 0810 11/01/23 1438            Allergies  Allergies as of 10/24/2023 - Reviewed 10/24/2023   Allergen Reaction Noted    Dynacirc [isradipine] Other (See Comments) 05/18/2021    Codeine Rash 05/18/2021       Labs    Results from last 7 days   Lab Units 11/06/23  0408 11/05/23  0404 11/04/23  0441   BUN mg/dL 65* 57* 37*   CREATININE mg/dL 1.00 1.05* 1.15*       Results from last 7 days   Lab Units 11/06/23  0408 11/05/23  0404 11/04/23  1706   WBC 10*3/mm3 13.45* 12.15* 12.74*       Evaluation of Dosing     Last Dose Received in the ED/Outside Facility: No  Is Patient on Dialysis or Renal Replacement: No    Ht - 157.5 cm (62\")  Wt - 93.2 kg (205 lb 7.5 oz)    Estimated Creatinine Clearance: 48.5 mL/min (by C-G formula based on SCr of 1 mg/dL).    Intake & Output (last 3 days)         11/03 0701 11/04 0700 11/04 0701 11/05 0700 11/05 0701 11/06 0700 11/06 " 0701  11/07 0700    I.V. (mL/kg) 1925.2 (20.7) 1612.3 (17.3) 260 (2.8) 418 (4.5)    Other 186 395 506 450    NG/ 682 700 285    IV Piggyback 50  100 100    Total Intake(mL/kg) 2421.2 (26) 2689.3 (28.9) 1566 (16.8) 1253 (13.4)    Urine (mL/kg/hr) 1750 (0.8) 1300 (0.6) 910 (0.4) 800 (1.1)    Stool  0      Chest Tube 270 400 90     Total Output 2020 1700 1000 800    Net +401.2 +989.3 +566 +453            Urine Unmeasured Occurrence   1 x     Stool Unmeasured Occurrence  2 x              Microbiology and Radiology  Microbiology Results (last 10 days)       Procedure Component Value - Date/Time    Blood Culture - Blood, Hand, Right [[id number]]  (Abnormal) Collected: 11/05/23 1517    Lab Status: Preliminary result Specimen: Blood from Hand, Right Updated: 11/06/23 1453     Blood Culture Abnormal Stain     Gram Stain Aerobic Bottle Gram positive cocci in groups    Urine Culture - Urine, Indwelling Urethral Catheter [[id number]]  (Normal) Collected: 11/04/23 1519    Lab Status: Final result Specimen: Urine from Indwelling Urethral Catheter Updated: 11/05/23 2101     Urine Culture No growth            Reported Vancomycin Levels                         Insight[facility] AUC Calculation:    Current AUC:   -- mg/L*hr    Predicted Steady State AUC on Current Dose: -- mg/L*hr  _________________________________    Predicted Steady State AUC on New Dose:   417 mg/L*hr    Assessment/Plan:  1. Pharmacy to dose vancomycin for bacteremia.   2. Patient received a loading dose of vancomycin 1750mg IV x1.  3. Will start on a maintenance dose of vancomycin 1250mg IV Q24H.  4. Vancomycin random scheduled for Wednesday 11/8 with AM labs.  5. Monitor renal function, cultures and sensitivities, and clinical status, and adjust regimen as necessary.  Pharmacy will continue to follow.    Elin Mahan, WagnerD, BCPS  11/6/2023  15:05 EST

## 2023-11-06 NOTE — PROGRESS NOTES
HEPARIN INFUSION  Michael Cochran is a  78 y.o. female receiving heparin infusion.     Therapy for (VTE/Cardiac): Small acute/subacute embolic strokes   Patient Weight: 93.2 kg  Initial Bolus (Y/N):   NO  Any Bolus (Y/N):   NO        Signs or Symptoms of Bleeding: None noted    Cardiac or Other (Not VTE)   Initial Bolus: 60 units/kg (Max 4,000 units)  Initial rate: 12 units/kg/hr (Max 1,000 units/hr)   Anti Xa Rebolus Infusion Hold time Change infusion Dose (Units/kg/hr) Next Anti Xa or aPTT Level Due   < 0.11 50 Units/kg  (4000 Units Max) None Increase by  3 Units/kg/hr 6 hours   0.11- 0.19 25 Units/kg  (2000 Units Max) None Increase by  2 Units/kg/hr 6 hours   0.2 - 0.29 0 None Increase by  1 Units/kg/hr 6 hours   0.3 - 0.5 0 None No Change 6 hours (after 2 consecutive levels in range check qAM)   0.51 - 0.6 0 None Decrease by  1 Units/kg/hr 6 hours   0.61 - 0.8 0 30 Minutes Decrease by  2 Units/kg/hr 6 hours   0.81 - 1 0 60 Minutes Decrease by  3 Units/kg/hr 6 hours   >1 0 Hold  After Anti Xa less than 0.5 decrease previous rate by  4 Units/kg/hr  Every 2 hours until Anti Xa  less than 0.5 then when infusion restarts in 6 hours       Results from last 7 days   Lab Units 11/06/23  0408 11/05/23  0404 11/04/23  1706 11/03/23  0447 11/02/23  0321 11/01/23 2012 11/01/23  1634   INR   --   --   --   --  1.33*  --  1.49*   HEMOGLOBIN g/dL 7.1* 7.2* 7.4*   < > 8.6*   < > 10.7*   HEMATOCRIT % 24.6* 24.3* 25.0*   < > 27.5*   < > 34.6   PLATELETS 10*3/mm3 184 152 145   < > 161   < > 147    < > = values in this interval not displayed.          Date   Time   Anti-Xa Current Rate (Unit/kg/hr) Bolus   (Units) Rate Change   (Unit/kg/hr) New Rate (Unit/kg/hr) Next   Anti-Xa Comments  Pump Check Daily   11/6  pending -- -- +11 11 2100 Dw RN       --           --           --           --           --           --           --           --           --           --           --           --           --           --           --            --           --           --           --           --         Elin aMhan, Carolyn  11/6/2023  14:28 EST

## 2023-11-06 NOTE — PROGRESS NOTES
Order received for Phase II Cardiac Rehab. Staff will follow up once patient is transferred to telemetry.

## 2023-11-06 NOTE — THERAPY TREATMENT NOTE
Patient Name: Michael Cochran  : 1945    MRN: [id number]                              Today's Date: 2023       Admit Date: 10/24/2023    Visit Dx:     ICD-10-CM ICD-9-CM   1. Elevated troponin  R79.89 790.6   2. Acute on chronic congestive heart failure, unspecified heart failure type  I50.9 428.0   3. Hypertensive emergency  I16.1 401.9   4. Acute respiratory failure with hypoxia  J96.01 518.81   5. Pleural effusion, left  J90 511.9   6. Hypokalemia  E87.6 276.8   7. Non-STEMI (non-ST elevated myocardial infarction)  I21.4 410.70   8. Coronary artery disease involving native coronary artery of native heart with unstable angina pectoris  I25.110 414.01     411.1     Patient Active Problem List   Diagnosis    HLD (hyperlipidemia)    Acute hypoxemic respiratory failure due to pulmonary edema    Acute exacerbation of congestive heart failure    Non-STEMI (non-ST elevated myocardial infarction)    Multivessel CAD with unstable angina pectoris manifesting as dyspnea    S/P CABG x 4 on 2023    Ischemic cardiomyopathy with LVEF 31%    Hypertensive urgency with diastolic dysfunction    Postop ANURADHA resolved    Postop Encephalopathy    Postop acute blood loss anemia     Past Medical History:   Diagnosis Date    Hyperlipidemia     Hypertension      Past Surgical History:   Procedure Laterality Date    BREAST FIBROADENOMA SURGERY      9 y/o-was benign    CARDIAC CATHETERIZATION N/A 10/26/2023    Procedure: Left Heart Cath;  Surgeon: Jordi Hodge MD;  Location:  [facility];  Service: Cardiovascular;  Laterality: N/A;    CORONARY ARTERY BYPASS GRAFT N/A 2023    Procedure: MEDIAN STERNOTOMY, CORONARY ARTERY BYPASS GRAFTING X4, UTILIZING THE LEFT INTERANL MAMMARY ARTERY, EVH OF THE LEFT GREATER SAPHENOUS VEIN, EXPLORATION OF THE RIGHT LEG, PRIMITIVO PER ANETHESIA;  Surgeon: Dirk Cleveland MD;  Location: [facility] OR;  Service: Cardiothoracic;  Laterality: N/A;      General Information       Row  Name 11/06/23 1417          Physical Therapy Time and Intention    Document Type therapy note (daily note)  -AE     Mode of Treatment physical therapy  -AE       Row Name 11/06/23 1417          General Information    Patient Profile Reviewed yes  -AE     Existing Precautions/Restrictions cardiac;fall;oxygen therapy device and L/min;sternal;other (see comments)  NG; no command following; decreased responsiveness/alertness  -AE     Barriers to Rehab medically complex;cognitive status  -AE       Row Name 11/06/23 1417          Cognition    Orientation Status (Cognition) unable/difficult to assess  no verbal communication/eye opening  -AE       Row Name 11/06/23 1417          Safety Issues, Functional Mobility    Impairments Affecting Function (Mobility) balance;cognition;coordination;endurance/activity tolerance;postural/trunk control;shortness of breath;strength;range of motion (ROM)  -AE     Cognitive Impairments, Mobility Safety/Performance attention;awareness, need for assistance;safety precaution awareness;safety precaution follow-through;insight into deficits/self-awareness;sequencing abilities  -AE     Comment, Safety Issues/Impairments (Mobility) Responds to stimuli but otherwise no verbal communication/eye opening/command following.  -AE               User Key  (r) = Recorded By, (t) = Taken By, (c) = Cosigned By      Initials Name Provider Type    AE Jcarlos Rodriguez PT Physical Therapist                   Mobility       Row Name 11/06/23 1420          Bed-Chair Transfer    Bed-Chair [place] (Transfers) unable to assess  -AE       Row Name 11/06/23 1420          Sit-Stand Transfer    Sit-Stand [place] (Transfers) unable to assess  -AE       Row Name 11/06/23 1420          Gait/Stairs (Locomotion)    [place] Level (Gait) unable to assess  -AE     [place] Level (Stairs) unable to assess  -AE               User Key  (r) = Recorded By, (t) = Taken By, (c) = Cosigned By      Initials Name  Provider Type    AE Jcarlos Rodriguez PT Physical Therapist                   Obj/Interventions       Row Name 11/06/23 1420          Motor Skills    Therapeutic Exercise hip;knee;ankle  Pt only tolerated PROM of BLE, followed no commands  -AE       Row Name 11/06/23 1420          Hip (Therapeutic Exercise)    Hip (Therapeutic Exercise) PROM (passive range of motion)  -AE     Hip PROM (Therapeutic Exercise) flexion;extension;10 repetitions;bilateral;aBduction;aDduction;supine  -AE       Row Name 11/06/23 1420          Knee (Therapeutic Exercise)    Knee (Therapeutic Exercise) PROM (passive range of motion)  -AE     Knee PROM (Therapeutic Exercise) bilateral;flexion;extension;supine;10 repetitions  -AE       Row Name 11/06/23 1420          Ankle (Therapeutic Exercise)    Ankle (Therapeutic Exercise) PROM (passive range of motion)  -AE     Ankle Strengthening (Therapeutic Exercise) bilateral;dorsiflexion;plantarflexion;supine;10 repetitions  -AE               User Key  (r) = Recorded By, (t) = Taken By, (c) = Cosigned By      Initials Name Provider Type    AE Jcarlos Rodriguez PT Physical Therapist                   Goals/Plan    No documentation.                  Clinical Impression       Row Name 11/06/23 1422          Pain    Additional Documentation Pain Scale: FACES Pre/Post-Treatment (Group)  -AE       Row Name 11/06/23 1422          Pain Scale: FACES Pre/Post-Treatment    Pain: FACES Scale, Pretreatment 0-->no hurt  -AE     Posttreatment Pain Rating 0-->no hurt  -AE       Row Name 11/06/23 1422          Plan of Care Review    Plan of Care Reviewed With patient  -AE     Progress declining  -AE     Outcome Evaluation Pt continues to present with decreased command following and inability to complete mobility d/t cognition. Pt tolerated PROM of B UE/LE while supine in bed. No command following noted, pt only responded to stimuli but no verbal communication or eye opening. Continue to progress as appropriate.  -AE        Row Name 11/06/23 1422          Vital Signs    Pre Systolic BP Rehab 1180  -AE     Pre Treatment Diastolic BP 80  -AE     Post Systolic BP Rehab 177  -AE     Post Treatment Diastolic BP 70  -AE     Pretreatment Heart Rate (beats/min) 55  -AE     Posttreatment Heart Rate (beats/min) 57  -AE     Pre SpO2 (%) 96  -AE     O2 Delivery Pre Treatment nasal cannula  -AE     O2 Delivery Intra Treatment nasal cannula  -AE     Post SpO2 (%) 96  -AE     O2 Delivery Post Treatment nasal cannula  -AE     Pre Patient Position Supine  -AE     Intra Patient Position Supine  -AE     Post Patient Position Supine  -AE       Row Name 11/06/23 1422          Positioning and Restraints    Pre-Treatment Position in bed  -AE     Post Treatment Position bed  -AE     In Bed notified nsg;supine;call light within reach;encouraged to call for assist;exit alarm on;side rails up x3;LUE elevated;RUE elevated;SCD pump applied;waffle boots/both  -AE               User Key  (r) = Recorded By, (t) = Taken By, (c) = Cosigned By      Initials Name Provider Type    AE Jcarlos Rodriguez, PT Physical Therapist                   Outcome Measures       Row Name 11/06/23 1426 11/06/23 0800       How much help from another person do you currently need...    Turning from your back to your side while in flat bed without using bedrails? 1  -AE 1  -TD    Moving from lying on back to sitting on the side of a flat bed without bedrails? 1  -AE 1  -TD    Moving to and from a bed to a chair (including a wheelchair)? 1  -AE 1  -TD    Standing up from a chair using your arms (e.g., wheelchair, bedside chair)? 1  -AE 1  -TD    Climbing 3-5 steps with a railing? 1  -AE 1  -TD    To walk in hospital room? 1  -AE 1  -TD    AM-PAC 6 Clicks Score (PT) 6  -AE 6  -TD    Highest level of mobility 2 --> Bed activities/dependent transfer  -AE 2 --> Bed activities/dependent transfer  -TD      Row Name 11/06/23 1426          Functional Assessment    Outcome Measure Options AM-PAC 6  Clicks Basic Mobility (PT)  -AE               User Key  (r) = Recorded By, (t) = Taken By, (c) = Cosigned By      Initials Name Provider Type    Gilbert Pruitt RN Registered Nurse    Jcarlos Borden PT Physical Therapist                                 Physical Therapy Education       Title: PT OT SLP Therapies (In Progress)       Topic: Physical Therapy (In Progress)       Point: Mobility training (In Progress)       Learning Progress Summary             Patient Acceptance, E, NR by AE at 11/6/2023 1310    Acceptance, E, NR by KG at 11/5/2023 0940    Acceptance, E, VU,NR by ML at 10/27/2023 1544    Comment: continued mobility while awaiting CABG, sternal precautions following CABG    Acceptance, E,D, VU,NR by LR at 10/25/2023 1327    Comment: Educated on benefits of mobility, correct sit<->stand t/f technique, correct gait mechanics, PLB, energy conservation, and progression of POC.                         Point: Home exercise program (In Progress)       Learning Progress Summary             Patient Acceptance, E, NR by AE at 11/6/2023 1310    Acceptance, E, NR by KG at 11/5/2023 0940                         Point: Body mechanics (In Progress)       Learning Progress Summary             Patient Acceptance, E, NR by AE at 11/6/2023 1310    Acceptance, E, NR by KG at 11/5/2023 0940    Acceptance, E,D, VU,NR by LR at 10/25/2023 1327    Comment: Educated on benefits of mobility, correct sit<->stand t/f technique, correct gait mechanics, PLB, energy conservation, and progression of POC.                         Point: Precautions (In Progress)       Learning Progress Summary             Patient Acceptance, E, NR by AE at 11/6/2023 1310    Acceptance, E, NR by KG at 11/5/2023 0940    Acceptance, E, VU,NR by ML at 10/27/2023 1544    Comment: continued mobility while awaiting CABG, sternal precautions following CABG    Acceptance, E,D, VU,NR by LR at 10/25/2023 1327    Comment: Educated on benefits of mobility, correct  sit<->stand t/f technique, correct gait mechanics, PLB, energy conservation, and progression of POC.                                         User Key       Initials Effective Dates Name Provider Type Discipline    LR 02/03/23 -  Alissa Pierre, PT Physical Therapist PT    KG 05/22/20 -  Brittney Rocha PT Physical Therapist PT    ML 04/22/21 -  Alisa Salguero Physical Therapist PT    AE 09/21/21 -  Jcarlos Rodriguez PT Physical Therapist PT                  PT Recommendation and Plan     Plan of Care Reviewed With: patient  Progress: declining  Outcome Evaluation: Pt continues to present with decreased command following and inability to complete mobility d/t cognition. Pt tolerated PROM of B UE/LE while supine in bed. No command following noted, pt only responded to stimuli but no verbal communication or eye opening. Continue to progress as appropriate.     Time Calculation:         PT Charges       Row Name 11/06/23 1427             Time Calculation    Start Time 1310  -AE      PT Received On 11/06/23  -AE      PT Goal Re-Cert Due Date 11/15/23  -AE         Timed Charges    97680 - PT Therapeutic Activity Minutes 15  -AE         Total Minutes    Timed Charges Total Minutes 15  -AE       Total Minutes 15  -AE                User Key  (r) = Recorded By, (t) = Taken By, (c) = Cosigned By      Initials Name Provider Type    AE Jcarlos Rodriguez PT Physical Therapist                  Therapy Charges for Today       Code Description Service Date Service Provider Modifiers Qty    [id number]  PT THERAPEUTIC ACT EA 15 MIN 11/6/2023 Jcarlos Rodriguez PT GP 1            PT G-Codes  Outcome Measure Options: AM-PAC 6 Clicks Basic Mobility (PT)  AM-PAC 6 Clicks Score (PT): 6  AM-PAC 6 Clicks Score (OT): 21       Jcarlos Rodriguez PT  11/6/2023

## 2023-11-06 NOTE — CONSULTS
Clinical Nutrition     Nutrition Support Assessment  Reason for Visit: MDR, Follow-up protocol, EN      Patient Name: Michael Cochran  YOB: 1945  MRN: [id number]  Date of Encounter: 11/06/23 09:15 EST  Admission date: 10/24/2023    Comments:    Patient no longer in need of renal EN formula and is hypernatremia. RD changed EN formula/regimen to the following:  Initiate Peptamen AF @ 40 ml/hr and advance by 15 ml/hr Q 4 hours to goal rate 70 ml/hr. Water flush @ 50 ml/hr.  At goal rate this regimen provides 1400 ml formula, 1680 calories (105% est needs), 106 g protein (96% est needs), 8.5 g fiber, 1135 ml free water from formula, 2235 ml total water from formula/flushes.    RD will continue to monitor and adjust EN regimen as appropriate.      Nutrition Assessment   Admission Diagnosis:  Acute exacerbation of congestive heart failure [I50.9]  CAD (coronary artery disease) [I25.10]      Problem List:    Multivessel CAD with unstable angina pectoris manifesting as dyspnea    HLD (hyperlipidemia)    Acute hypoxemic respiratory failure due to pulmonary edema    Acute exacerbation of congestive heart failure    Non-STEMI (non-ST elevated myocardial infarction)    S/P CABG x 4 on 11/2/2023    Ischemic cardiomyopathy with LVEF 31%    Hypertensive urgency with diastolic dysfunction    Postop ANURADHA resolved    Postop Encephalopathy    Postop acute blood loss anemia    Vaginal bleeding    T2DM (new dx)    PMH:   She  has a past medical history of Hyperlipidemia and Hypertension.    PSH:  She  has a past surgical history that includes Breast fibroadenoma surgery; Cardiac catheterization (N/A, 10/26/2023); and Coronary artery bypass graft (N/A, 11/1/2023).      Applicable Nutrition Concerns:       Applicable Interval History:   (11/1) s/p CABG, intubated, large bore NG tube;  extubated  (11/3) small bore NG tube placed (peripyloric tip placement per KUB);  EN initiated - Novasource Renal  (11/4) CT  head - no acute changes      Reported/Observed/Food/Nutrition Related History:   11/6  Post-op encephalopathy continues. Requiring supplemental oxygen. Not currently requiring any pressor support. Does not follow commands. Tolerating EN @ goal rate 35 ml/hr. Developed hypernatremia over the weekend so water flush was increased from 30 ml Q 2 hours to 25 ml/hr and it is currently 50 ml/hr. Plan for D5% @ 100 ml/hr x 5 hours. RD to adjust EN formula/regimen. Intensivist would like water flush rate @ 50 ml/hr regardless. Over the weekend pt also had FMS - RN reports yesterday pt was stooling around the FMS so it was removed.    11/3  Patient remains extubated and not requiring any pressor support; however, she remains minimally responsive with intermittent need for Bipap. CTS team would like to begin EN today. RD spoke with RN and recommended placing a small bore post-pyloric feeding tube due to intermittent use of Bipap (EN would need to be held when Bipap on if using NG tube). RN to discuss with MD during MDR. NKFA.    Labs    Labs Reviewed: Yes     Renal function improved.  Hypernatremia and resulting elevated BUN    Results from last 7 days   Lab Units 11/06/23  0408 11/05/23  1517 11/05/23  0404 11/04/23  1706 11/04/23  0441 11/03/23  0447 11/02/23  0321 11/01/23 2012   GLUCOSE mg/dL 152*  --  188*  --  222*   < > 262* 220*   BUN mg/dL 65*  --  57*  --  37*   < > 32* 34*   CREATININE mg/dL 1.00  --  1.05*  --  1.15*   < > 1.55* 1.30*   SODIUM mmol/L 153*  --  149*  --  146*   < > 142 141   CHLORIDE mmol/L 120*  --  119*  --  113*   < > 104 106   POTASSIUM mmol/L 4.1  --  4.2 4.5 3.6   < > 3.7 4.3   PHOSPHORUS mg/dL 3.7  --   --   --  2.5  --   --  5.1*   MAGNESIUM mg/dL 2.5*  --  2.4  --  2.2  --   --  2.1   ALT (SGPT) U/L 10  --   --   --  <5  --  12  --    LACTATE mmol/L  --  1.0  --   --   --   --   --   --     < > = values in this interval not displayed.       Results from last 7 days   Lab Units  "11/06/23  0408 11/04/23  0441 11/02/23  0321 11/01/23 2012 11/01/23  1634   ALBUMIN g/dL 3.1* 3.5 4.3   < > 3.2*   CRP mg/dL 3.55*  --   --   --   --    IONIZED CALCIUM mmol/L  --   --   --   --  1.28   CHOLESTEROL mg/dL 88 98  --   --   --    TRIGLYCERIDES mg/dL 119 123  --   --   --     < > = values in this interval not displayed.       Results from last 7 days   Lab Units 11/06/23  0626 11/06/23  0028 11/05/23  1713 11/05/23  1128 11/05/23  0538 11/04/23  2319   GLUCOSE mg/dL 154* 160* 189* 181* 169* 206*     Lab Results   Lab Value Date/Time    HGBA1C 9.80 (H) 10/25/2023 0357               Medications    Medications Reviewed: Yes  Pertinent  Scheduled: abx, colace, insulin, senekot  Infusion:  PRN:     Intake/Ouptut 24 hrs (0701 - 0700)   I&O's Reviewed: Yes     Last BM (11/5) - creamy    Anthropometrics     Flowsheet Rows      Flowsheet Row First Filed Value   Admission Height 157.5 cm (62\") Documented at 10/24/2023 1040   Admission Weight 93.9 kg (207 lb) Documented at 10/24/2023 1040       Height: Height: 157.5 cm (62\")  Last Filed Weight: Weight: 93.2 kg (205 lb 7.5 oz) (11/02/23 0500)  Method: Weight Method: Standing scale  BMI: BMI (Calculated): 37.6  BMI classification: Obese Class II: 35-39.9kg/m2  IBW:  110 lbs    UBW:   Weight       Weight (kg) Weight (lbs) Weight Method Visit Report   5/18/2021 92.715 kg  204 lb 6.4 oz   --    5/26/2021 92.715 kg  204 lb 6.4 oz   --    6/9/2021 93.078 kg  205 lb 3.2 oz   --    6/25/2021 92.534 kg  204 lb   --    8/16/2021 93.895 kg  207 lb   --    10/24/2023 93.895 kg  207 lb  Stated     10/25/2023 95.346 kg  210 lb 3.2 oz      10/26/2023 95.255 kg  210 lb      10/27/2023 92.987 kg  205 lb  Standing scale     10/28/2023 93.441 kg  206 lb  Standing scale     10/30/2023 93.849 kg  206 lb 14.4 oz      10/31/2023 93.577 kg  206 lb 4.8 oz  Standing scale     11/1/2023 92.443 kg  203 lb 12.8 oz  Standing scale     11/2/2023 93.2 kg  205 lb 7.5 oz          Nutrition Focused " "Physical Exam     Date: 11/3  Unable to perform exam due to: Defer pending indication    Needs Assessment   Date: 11/3    Height used:Height: 157.5 cm (62\")  Weights used: 203 lbs/92.3 kg (actual wt)     110 lbs/50 kg (IBW)      Estimated Calorie needs: ~1600 calories daily  Method: 16-18  Kcals/KG actual wt = 9168-6513    Estimated Protein needs: ~110 g protein daily  Method: 1.2 g/Kg actual wt = 111  Method: 2.0 g/kg IBW = 100    Estimated Fluid needs: Per clinical status      Current Nutrition Prescription     PO: NPO Diet NPO Type: Strict NPO  Oral Nutrition Supplement: N/A  Intake: 70% x 10 meals prior to surgery ()      EN: NovaSource Renal  Goal Rate: 35 ml/hr  Water Flushes: 50 ml/hr  Modular: Prosource-no carb 3/day  Route: NG  Tube: Small bore    At goal over: 20Hrs/day  Rx will supply:   Goal Volume 700  mL/day     Flush Volume 1000 mL/day     Energy 1580 Kcal/day 99 % Est Need   Protein 109 g/day 99 % Est Need   Fiber 0 g/day     Water in   mL     Total Water 1504 mL     Meet DRI No        --------------------------------------------------------------------------  Product/Rate verified at bedside: Yes  Infusing Rate at time of visit: 35 ml/hr, water @ 50 ml/hr    Average Delivery from [facility] Days: + 3 packets Prosource No Carb  Volume 691 mL/day 99  % Goal Vol.   Flush Volume 451 mL/day     Energy  Kcal/day  % Est Need   Protein  g/day  % Est Need   Fiber  g/day     Water in  EN  mL     Total Water  mL     Meet DRI No          Nutrition Diagnosis     Date: 11/3 Updated:   Problem Inadequate oral intake   Etiology AMS   Signs/Symptoms NPO   Status: ongoing - EN initiated (11/3)    Goal:   General: Nutrition support treatment  PO: Advace diet as medically feasible/appropriate  EN/PN: Tolerate EN at goal, Adjust EN    Nutrition Intervention      Follow treatment progress, Care plan reviewed, Await begin PO, Nutrition support order placed    Patient no longer in need of renal EN formula " and is hypernatremia. RD changed EN formula/regimen to the following:  Initiate Peptamen AF @ 40 ml/hr and advance by 15 ml/hr Q 4 hours to goal rate 70 ml/hr. Water flush @ 50 ml/hr.  At goal rate this regimen provides 1400 ml formula, 1680 calories (105% est needs), 106 g protein (96% est needs), 8.5 g fiber, 1135 ml free water from formula, 2235 ml total water from formula/flushes.    RD will continue to monitor and adjust EN regimen as appropriate.      Monitoring/Evaluation:   Per protocol, I&O, Pertinent labs, EN delivery/tolerance, Weight, Skin status, GI status, Symptoms, POC/GOC, Swallow function, Hemodynamic stability      Ioana Zuniga RD  Time Spent: 45 min

## 2023-11-06 NOTE — PROGRESS NOTES
Neurology Note    Patient:  Michael Cochran    YOB: 1945    REFERRING PHYSICIAN:  Dr. Cleveland    CHIEF COMPLAINT:    AMS    HISTORY OF PRESENT ILLNESS:   The patient remains drowsy and nonverbal, no seizures or PAF reported. She has had bradycardia with rates in the 40s.    Past Medical History:  Past Medical History:   Diagnosis Date    Hyperlipidemia     Hypertension        Past Surgical History:  Past Surgical History:   Procedure Laterality Date    BREAST FIBROADENOMA SURGERY      9 y/o-was benign    CARDIAC CATHETERIZATION N/A 10/26/2023    Procedure: Left Heart Cath;  Surgeon: Jordi Hodge MD;  Location: [facility] CATH INVASIVE LOCATION;  Service: Cardiovascular;  Laterality: N/A;    CORONARY ARTERY BYPASS GRAFT N/A 11/1/2023    Procedure: MEDIAN STERNOTOMY, CORONARY ARTERY BYPASS GRAFTING X4, UTILIZING THE LEFT INTERANL MAMMARY ARTERY, EVH OF THE LEFT GREATER SAPHENOUS VEIN, EXPLORATION OF THE RIGHT LEG, PRIMITIVO PER ANETHESIA;  Surgeon: Dirk Cleveland MD;  Location: [facility] OR;  Service: Cardiothoracic;  Laterality: N/A;       Social History:   Social History     Socioeconomic History    Marital status:    Tobacco Use    Smoking status: Some Days     Types: Cigarettes    Smokeless tobacco: Never    Tobacco comments:     Smokes rarely   Vaping Use    Vaping Use: Never used   Substance and Sexual Activity    Alcohol use: Never    Drug use: Never        Family History:   History reviewed. No pertinent family history.    Medications Prior to Admission:    Prior to Admission medications    Medication Sig Start Date End Date Taking? Authorizing Provider   Accu-Chek Softclix Lancets lancets Use to test blood glucose twice daily 10/28/23   Madhav Martinez MD   Blood Glucose Monitoring Suppl (Blood Glucose Monitor System) w/Device kit Use to test blood glucose twice daily 10/28/23   Madhav Martinez MD   glucose blood test strip Use one strip to test blood glucose twice daily 10/28/23    Madhav Martinez MD       Allergies:  Dynacirc [isradipine] and Codeine      Review of system  Review of Systems   Unable to perform ROS: Patient nonverbal       Vitals:    11/06/23 1000   BP: 173/89   Pulse: 51   Resp: 18   Temp:    SpO2: 95%       Physical exam  Physical Exam  Eyes:      Pupils: Pupils are equal, round, and reactive to light.   Cardiovascular:      Rate and Rhythm: Normal rate and regular rhythm.   Pulmonary:      Effort: Pulmonary effort is normal.   Neurological:      Comments: Opens eyes spontaneously, does not speak or follow commands, withdraws limbs more briskly on the right.           Lab Results   Component Value Date    WBC 13.45 (H) 11/06/2023    HGB 7.1 (L) 11/06/2023    HCT 24.6 (L) 11/06/2023    .0 (H) 11/06/2023     11/06/2023     Lab Results   Component Value Date    GLUCOSE 152 (H) 11/06/2023    BUN 65 (H) 11/06/2023    CREATININE 1.00 11/06/2023    EGFRIFNONA 62 06/09/2021    BCR 65.0 (H) 11/06/2023    CO2 22.0 11/06/2023    CALCIUM 8.1 (L) 11/06/2023    ALBUMIN 3.1 (L) 11/06/2023    AST 28 11/06/2023    ALT 10 11/06/2023         Radiological Studies:   XR Chest 1 View    Result Date: 11/6/2023  XR CHEST 1 VW Date of Exam: 11/6/2023 2:50 AM EST Indication: post CABG Comparison: 11/4/2023 Findings: Stable enlargement of the cardiac silhouette with surgical changes post CABG. There is no change in position of a right IJ introducer. Stable position of an OG tube. Mild basilar atelectasis. The lungs are otherwise clear. Small left pleural effusion     Impression: Stable chest Electronically Signed: John العراقي MD  11/6/2023 7:17 AM EST  Workstation ID: [id number]    MRI Brain Without Contrast    Result Date: 11/6/2023  MRI BRAIN WO CONTRAST Date of Exam: 11/6/2023 12:40 AM EST Indication: AMS.  Comparison: 11/4/2023. Technique:  Routine multiplanar/multisequence sequence images of the brain were obtained without contrast administration. Findings: Multiple small foci  of diffusion restriction are seen within the paramedian bilateral frontal and parietal lobes extending to the bilateral basal ganglia. Patchy diffusion restriction is present within the left cerebellum. Corresponding decreased signal is present within the ADC map. Patchy FLAIR signal changes are seen corresponding to the areas of diffusion restriction. Moderate periventricular and subcortical FLAIR signal changes are present. There is no evidence of acute or chronic intracranial hemorrhage. No mass effect or midline shift. No abnormal extra-axial collections. The major vascular flow voids appear intact. The basal ganglia, brainstem and cerebellum appear within normal limits. Calvarial and superficial soft tissue signal is within  normal limits. Orbits appear unremarkable. The paranasal sinuses and the mastoid air cells appear well aerated. Midline structures are intact.     Impression: 1.Multiple small foci of diffusion restriction present within the paramedian bilateral frontal and parietal lobes extending to the bilateral basal ganglia as well as the left cerebellum consistent with acute to subacute infarcts. Findings are more subacute given presence of FLAIR signal changes corresponding to these regions related to gliosis. Given the distribution findings are likely related to a watershed type episode. No evidence of hemorrhage, mass effect or midline shift. 2.Moderate periventricular and subcortical FLAIR signal changes likely related to chronic microvascular ischemic change. Electronically Signed: Radha Cedillo MD  11/6/2023 1:55 AM EST  Workstation ID: [id number]    EEG    Result Date: 11/5/2023  Reason for referral: 78 y.o.female with altered mental status Technical Summary:  A 19 channel digital EEG was performed using the international 10-20 placement system, including eye leads and EKG leads. Duration: 23 minutes Findings: The patient is lethargic and moaning.  The background shows diffuse medium amplitude 3-6  Hz intermixed delta and theta activity present symmetrically over both hemispheres.  Low amplitude EMG artifact is seen over the frontal leads.  Photic stimulation does not change the background.  Hyperventilation is not performed.  No focal features or epileptiform activity are seen. Video: Available Technical quality: Superior EKG: Ana, 60 to 70 bpm SUMMARY: Moderate generalized slow No focal features or epileptiform activity are seen     Diffuse cerebral dysfunction of moderate degree, nonspecific This report is transcribed using the Dragon dictation system.      CT Abdomen Pelvis Without Contrast    Result Date: 11/5/2023  CT ABDOMEN PELVIS WO CONTRAST Date of Exam: 11/4/2023 10:35 PM EDT Indication: Abdominal pain, postoperative acute blood loss, anemia. Comparison: KUB performed on 11/3/2023. Technique: Axial CT images were obtained of the abdomen and pelvis without the administration of contrast. Reconstructed coronal and sagittal images were also obtained. Automated exposure control and iterative construction methods were used. Findings: The study is limited by noncontrasted technique. There are tiny calcified gallstones. Unenhanced images of the liver, adrenal glands, pancreas, spleen, and left kidney are unremarkable. There are round masses in the right kidney which are difficult to evaluate. Statistically, they probably represent renal cysts but could be evaluated further with a renal ultrasound or repeat CT exam with IV contrast. The uterus is enlarged extending into the upper pelvis. It is of heterogeneous density and the patient  may have fibroids. The adnexal structures are normal. There is a Nolasco cath in the urinary bladder. The patient also has a rectal catheter in place. There is increased stool in the rectosigmoid colon. There are no dilated loops of bowel to indicate an obstructive process. There is no abnormal bowel wall thickening. The patient has a feeding tube in place with the tip  terminating in the duodenum. There are atherosclerotic vascular calcifications throughout the abdomen and pelvis. There is a small fat density left inguinal hernia. There is no retroperitoneal hematoma. There is no free fluid in the abdomen or pelvis. Within the lower chest, there are extensive postsurgical changes. The patient has bilateral chest tubes and a mediastinal chest tube in place. The heart is enlarged. There are atherosclerotic vascular calcifications. There are trace pleural effusions. There is bilateral lower lobe atelectasis. There are no suspicious osteolytic or sclerotic lesions within the bony structures.     Impression: 1. The study is limited by noncontrasted technique. 2. No retroperitoneal hematoma. There is no free fluid in the abdomen or pelvis. 3. Cholelithiasis. 4. Round masses in the right kidney difficult to evaluate without contrast. Statistically, they probably represent renal cysts but could be evaluated further with either renal ultrasound or a repeat CT exam with IV contrast. 5. Mild rectal sigmoid colon fecal impaction. There is a rectal catheter in place. 6. Small fat density left inguinal hernia. 7. Postsurgical changes within the lower chest. 8. Enlarged uterus with questionable fibroids. Electronically Signed: Larry Evans MD  11/5/2023 8:23 AM EST  Workstation ID: [id number]    CT Head Without Contrast    Result Date: 11/4/2023  CT HEAD WO CONTRAST Date of Exam: 11/4/2023 10:35 PM EDT Indication: AMS. Comparison: None available. Technique: Axial CT images were obtained of the head without contrast administration.  Automated exposure control and iterative construction methods were used. Findings: There is no evidence of hemorrhage. There is no mass effect or midline shift. There is no extracerebral collection. Ventricles are normal in size and configuration for patient's stated age.  Posterior fossa is within normal limits. Calvarium and skull base appear intact.   Visualized  sinuses show no air fluid levels. Visualized orbits are unremarkable.     Impression: No acute intracranial process identified. Electronically Signed: Radha Cedillo MD  11/4/2023 11:04 PM EDT  Workstation ID: [id number]    XR Chest 1 View    Result Date: 11/4/2023  XR CHEST 1 VW Date of Exam: 11/4/2023 3:25 AM EDT Indication: Hypoxia Comparison: 1 day prior. Findings: Interval removal of pulmonary artery catheter with right IJ sheath in place. Enteric tube noted with nasogastric tube removed. Remaining support hardware projects unchanged. Lung volumes are mildly diminished with some scattered atelectasis. There is no enlarging pleural effusion or distinct pneumothorax. Unchanged heart and mediastinal contours.     Impression: Interval removal of pulmonary artery catheter with right IJ sheath in place. Enteric tube noted with nasogastric tube removed. Remaining support hardware projects unchanged. Lung volumes are mildly diminished with some scattered atelectasis. There is no enlarging pleural effusion or distinct pneumothorax. Unchanged heart and mediastinal contours. Electronically Signed: Dm Stanley MD  11/4/2023 8:52 AM EDT  Workstation ID: [id number]    XR Abdomen KUB    Result Date: 11/3/2023  XR ABDOMEN KUB Date of Exam: 11/3/2023 9:20 AM CDT Indication: Keofeed placement Comparison: None available. Findings: There is an enteric tube with tip in the peripyloric region.     Impression: Enteric tube tip in the peripyloric region. Electronically Signed: Marc Castillo MD  11/3/2023 9:31 AM CDT  Workstation ID: [id number]    XR Chest 1 View    Result Date: 11/3/2023  XR CHEST 1 VW Date of Exam: 11/3/2023 2:00 AM CDT Indication: Post-Op Heart Surgery Comparison: 11/2/2023 Findings: Cardiomediastinal silhouette and support lines and tubes appear unchanged. There is persistent pulmonary vascular congestion with minimal left basilar airspace disease and small left effusion. No acute osseous abnormality identified.      Impression: No significant change Electronically Signed: Marc Castillo MD  11/3/2023 6:20 AM CDT  Workstation ID: [id number]    Spirometry with Diffusion Capacity    Result Date: 11/3/2023  Spirometry with DLCO was done on 10/28/2023.  Please see scanned PFT report for details. Interpretation: No obstruction.  Decrease in both FEV1 and FVC which may suggest restriction.  Suggest full pulmonary function testing if clinically indicated.  Low DLCO which corrects when adjusted for alveolar ventilated volumes.  No prior study available for immediate comparison.     XR Chest 1 View    Result Date: 11/2/2023  XR CHEST 1 VW Date of Exam: 11/2/2023 2:25 AM EDT Indication: Post-Op Heart Surgery Comparison: 11/1/2023 Findings: Interval removal of endotracheal tube. No change in position of an NG tube with tip in the stomach. Stable cardiomegaly and surgical change post CABG. There is left lower lobe opacity likely atelectasis and a small pleural effusion. The right lung is grossly clear.     Impression: Interval removal of endotracheal tube Increased left lower lobe opacity compatible atelectasis and small effusion Electronically Signed: John العراقي MD  11/2/2023 7:12 AM EDT  Workstation ID: [id number]    XR Chest 1 View    Result Date: 11/1/2023  XR CHEST 1 VW Date of Exam: 11/1/2023 4:19 PM EDT Indication: Post-Op Check Line & Tube Placement Comparison: Chest radiograph 10/26/2023. Findings: Median sternotomy and CABG. ET tube within the mid thoracic trachea terminates 5.2 cm above marcelo. Right IJ approach [place]-Gume catheter terminates over the main pulmonary artery. Gastric tube oriented antegrade below diaphragm. Thoracotomy tubes noted. Cardiomediastinal silhouette enlarged similar to preprocedural imaging. Lung volumes are low with streaky retrocardiac opacities favoring atelectasis. No overt edema, lobar infiltrate, large effusion or pneumothorax.     Impression: Support devices appear in standard position. No  pneumothorax or convincing active airspace process. Electronically Signed: Gonzalo Zamora MD  11/1/2023 4:45 PM EDT  Workstation ID: [id number]    Central Line    Result Date: 11/1/2023  Marlyn Mims MD     11/1/2023 11:44 AM Central Line Patient reassessed immediately prior to procedure Patient location during procedure: OR Start time: 11/1/2023 10:15 AM Indications: vascular access, central pressure monitoring and MD/Surgeon request Staff Anesthesiologist: Marlyn Mims MD Preanesthetic Checklist Completed: patient identified, IV checked, site marked, risks and benefits discussed, surgical consent, monitors and equipment checked, pre-op evaluation and timeout performed Central Line Prep Sterile Tech:cap, gloves, gown, mask and sterile barriers Prep: chloraprep Patient monitoring: blood pressure monitoring, continuous pulse oximetry and EKG Central Line Procedure Laterality:right Location:internal jugular Catheter Type:MAC and [place]-Gume Catheter Size:9 Fr Guidance:ultrasound guided PROCEDURE NOTE/ULTRASOUND INTERPRETATION.  Using ultrasound guidance the potential vascular sites for insertion of the catheter were visualized to determine the patency of the vessel to be used for vascular access.  After selecting the appropriate site for insertion, the needle was visualized under ultrasound being inserted into the internal jugular vein, followed by ultrasound confirmation of wire and catheter placement. There were no abnormalities seen on ultrasound; an image was taken; and the patient tolerated the procedure with no complications. Images: still images not obtained (printer failure) Assessment Post procedure:biopatch applied, line sutured, occlusive dressing applied and secured with tape Assessement:blood return through all ports, free fluid flow, chest x-ray ordered and Xavier Test Complications:no Patient Tolerance:patient tolerated the procedure well with no apparent complications     Intra-Op Anesthesia  PRIMITIVO    Result Date: 11/1/2023  Marlyn Mims MD     11/1/2023  3:17 PM Intra-Op Anesthesia PRIMITIVO Procedure Performed: Intra-Op Anesthesia PRIMITIVO      Start Time:  11/1/2023 10:19 AM      End Time:  Preanesthesia Checklist: Patient identified, IV assessed, risks and benefits discussed, monitors and equipment assessed, procedure being performed at surgeon's request and anesthesia consent obtained. General Procedure Information Physician Requesting Echo: Dirk Cleveland MD Location performed:  OR Intubated Bite block placed Heart visualized Probe Insertion:  Easy Probe Type:  Multiplane Modalities:  2D only, color flow mapping, continuous wave Doppler and pulse wave Doppler Echocardiographic and Doppler Measurements Ventricles Right Ventricle: Cavity size dilated.  Hypertrophy not present.  Thrombus not present.  Global function normal.  Left Ventricle: Cavity size dilated.  Thrombus not present.  Global Function normal.  Ejection Fraction 40%.  Valves Aortic Valve: Annulus normal.  Stenosis not present.  Regurgitation absent.  Leaflets normal.  Leaflet motions normal.  Mitral Valve: Annulus normal.  Stenosis not present.  Regurgitation trace.  Leaflets normal.  Leaflet motions normal.  Tricuspid Valve: Annulus normal.  Stenosis not present.  Regurgitation mild.  Leaflets normal.  Leaflet motions normal.  Pulmonic Valve: Annulus normal.  Stenosis not present.  Regurgitation trace.  Aorta Ascending Aorta: Size normal.  Dissection not present.  Plaque thickness less than 3 mm.  Mobile plaque not present.  Aortic Arch: Size normal.  Dissection not present.  Plaque thickness less than 3 mm.  Mobile plaque not present.  Descending Aorta: Size normal.  Dissection not present.  Plaque thickness less than 3 mm.  Mobile plaque not present.  [facility] Right Atrium: Size normal.  Spontaneous echo contrast not present.  Thrombus not present.  Tumor not present.  Device not present.  Left Atrium: Size normal.  Spontaneous echo  contrast not present.  Thrombus not present.  Tumor not present.  Device not present.  Septa Ventricular Septum: Intra-ventricular septum morphology normal.  Other Findings Pericardium:  normal Pleural Effusion:  left Anesthesia Information Performed Personally Anesthesiologist:  Marlyn Mims MD Echocardiogram Comments:      Findings discussed with Dr. Cleveland  Prebypass Normal RV fxn, slightly decreased LV fxn, EF 40-45%, mild TR, mild MR, no AI, no AS, small left pleural effusion.  Post CABG x 5 RV and LV systolic fxn preserved.  No changes in valve fxn. Aorta intact    Arterial Line    Result Date: 11/1/2023  Rich Chambers MD     11/1/2023  8:37 AM Arterial Line Patient reassessed immediately prior to procedure Patient location during procedure: pre-op Line placed for hemodynamic monitoring. Preanesthetic Checklist Completed: patient identified, IV checked, site marked, risks and benefits discussed, surgical consent, monitors and equipment checked, pre-op evaluation and timeout performed Arterial Line Prep  Sterile Tech: cap, gloves and sterile barriers Prep: ChloraPrep Patient monitoring: blood pressure monitoring, continuous pulse oximetry and EKG Arterial Line Procedure Laterality:right Location:  radial artery Catheter size: 20 G Guidance: ultrasound guided Number of attempts: 1 Successful placement: yes Post Assessment Dressing Type: line sutured, occlusive dressing applied, secured with tape and wrist guard applied. Complications no Circ/Move/Sens Assessment: normal and unchanged. Patient Tolerance: patient tolerated the procedure well with no apparent complications     Duplex Carotid Ultrasound CAR    Result Date: 10/27/2023    Right internal carotid artery demonstrates a less than 50% stenosis.   Left internal carotid artery demonstrates a less than 50% stenosis.     Cardiac Catheterization/Vascular Study    Result Date: 10/26/2023    Ostial 95% LAD stenosis involve the origin of a large first  diagonal as well as some retrograde mild to moderate plaque in the left main.   Bifurcation right coronary artery 95% stenosis.   70% LCx stenosis   LVEF 40-45% with anterior apical hypokinesis     XR Chest 1 View    Result Date: 10/26/2023  XR CHEST 1 VW Date of Exam: 10/26/2023 2:35 AM EDT Indication: DYSPNEA, ON EXERTION dyspnea Comparison: 10/24/2023. Findings: Exam is limited by portable technique and obesity. There is stable moderate cardiomegaly. Suggestion of small effusions with mild bibasilar atelectasis. No definite congestive failure identified.     Impression: Limited study. Findings suggest mild bibasilar atelectasis with possible small effusions. Electronically Signed: Marielos Camilo MD  10/26/2023 7:49 AM EDT  Workstation ID: [id number]    XR Chest 1 View    Result Date: 10/25/2023  XR CHEST 1 VW Date of Exam: 10/24/2023 11:42 PM EDT Indication: increased o2 Comparison: Chest radiograph 10/24/2023 Findings: The heart is enlarged. There is bilateral basilar discoid atelectasis with small bilateral pleural effusions. Mild increase in the interstitial markings may indicate pulmonary edema.     Impression: Cardiomegaly with mild pulmonary edema with small bilateral pleural effusions and basilar atelectasis. Electronically Signed: Madhu Rees MD  10/25/2023 12:06 AM EDT  Workstation ID: [id number]    Adult Transthoracic Echo Complete W/ Cont if Necessary Per Protocol    Result Date: 10/24/2023    Left ventricular systolic function is moderate-severely decreased. Calculated left ventricular EF = 31.4%. The apex is akinetic with severe hypokinesis of the anterior, anterolateral, and septal walls.   The left ventricular cavity is mildly dilated.   Left ventricular diastolic function is consistent with (grade II w/high LAP) pseudonormalization.   The aortic valve is structurally normal with no stenosis present. No significant aortic valve regurgitation is present.   The mitral valve is structurally normal with  no significant stenosis present. Mild mitral valve regurgitation is present.     XR Chest 1 View    Result Date: 10/24/2023  XR CHEST 1 VW Date of Exam: 10/24/2023 11:08 AM EDT Indication: SOA triage protocol Comparison: None available. Findings: No prior exams. There is moderate cardiomegaly. There is a moderate sized right pleural effusion which obscures detail of the underlying right lung base. Suggestion of mild atelectasis of both lower lobes. Exam is somewhat limited by portable technique and obesity. Pulmonary vessels appear within normal limits for technique.     Impression: Cardiomegaly. Moderate right effusion. Mild bibasilar atelectasis. Electronically Signed: Marielos Camilo MD  10/24/2023 11:21 AM EDT  Workstation ID: [id number]       During this visit the following were done:  Labs Reviewed [x]    Labs Ordered []    Radiology Reports Reviewed [x]    Radiology Ordered []    EKG, echo, and/or stress test reviewed [x]    EEG results reviewed  []    EEG reviewed and interpreted per myself   []    Discussed case with neurointerventionalist or neuroradiologist []    Referring Provider Records Reviewed []    ER Records Reviewed []    Hospital Records Reviewed []    History Obtained From Family []    Radiological images view and Interpreted per myself [x]    Case Discussed with referring provider []     Decision to obtain and request outside records  []        Assessment and Plan     Multiple bilateral small acute/subacute embolic strokes, suspect apical thrombus, TTE on 10/24 with apical akinesis. EEG w/o epileptic changes. Suspect role of UTI, UA with TNTC WBCs, cx no growth. S/P CABG 11/2. Guarded prognosis.   - TTE.   - Continue aspirin and statin.   - Try Ritalin for drowsiness.   - Start heparin gtt if okay from surgical standpoint, bridge to OAC.   - Treat UTI.   - ST, PT, OT as tolerated.                 Electronically signed by Ranjan Woods MD on 11/6/2023 at 12:14 EST

## 2023-11-06 NOTE — PLAN OF CARE
Goal Outcome Evaluation:      1.  Neuro-  Ms Dimas moves extremities but not purposefully.    She does not open eyes nor track.   MRI obtained on night shift (see Dr notes for results).  Heparin drip initiated per Neurology.    2.  Respiratory-  2 LT NC kept oxygen saturation greater than 94%.      3.  Cardiac-  Sinus rhythm/Sinus Bradycardia (HR 44-65 bpm) and -180 mmHg.  Orders per Intensivist to keep SBP under 180 mmHg.      4. GI-  TF switched to Peptamen AF @ 40 ml/hr and FW @ 50 ml/hr.    Bowel regimen administered.    5.  -  1100 ml per Pure wick    6.  Misc-  Vancomycin initiated per Blood culture results.  Echo obtained per Neurology.

## 2023-11-06 NOTE — CASE MANAGEMENT/SOCIAL WORK
Continued Stay Note  [facility]     Patient Name: Michael Cochran  MRN: [id number]  Today's Date: 11/6/2023    Admit Date: 10/24/2023    Plan: tbd   Discharge Plan       Row Name 11/06/23 1253       Plan    Plan tbd    Patient/Family in Agreement with Plan yes    Plan Comments Discussed in MDR. Therapy recommending SNF. Pt not currently participating in care. Still requiring O2 not used at home. Has Keofeed with tubfeeding. Not medically ready for discharge. CM will cont to follow.    Final Discharge Disposition Code 30 - still a patient                   Discharge Codes    No documentation.                 Expected Discharge Date and Time       Expected Discharge Date Expected Discharge Time    Nov 9, 2023               Gricel Flowers RN

## 2023-11-06 NOTE — PLAN OF CARE
Goal Outcome Evaluation:  Plan of Care Reviewed With: patient        Progress: declining  Outcome Evaluation: Pt continues to present with decreased command following and inability to complete mobility d/t cognition. Pt tolerated PROM of B UE/LE while supine in bed. No command following noted, pt only responded to stimuli but no verbal communication or eye opening. Continue to progress as appropriate.

## 2023-11-06 NOTE — PROGRESS NOTES
Intensivist Note     11/6/2023  Hospital Day: 13  5 Days Post-Op  ICU Stays Timeline         Hospital Admission: 10/24/23 1044 - Current  ICU stays: 1        In Date/Time Event Department ICU Stay Duration     10/24/23 1044 Admission [facility] EMERGENCY DEPT      10/24/23 1326 Transfer In  [facility] 2F      10/26/23 1618 Transfer In [facility] CATH LAB      10/26/23 1656 Transfer In  [facility] CVOU      10/26/23 1745 Transfer In  [facility] 6A      11/01/23 0802 Transfer In  [facility] OR      11/01/23 1610 Transfer In [facility] 2HSIC 4 days 15 hours 15 minutes                 Ms. Michael Cochran, 78 y.o. female is followed for:    Multivessel CAD with unstable angina pectoris manifesting as dyspnea    Acute exacerbation of congestive heart failure    Non-STEMI (non-ST elevated myocardial infarction)    Ischemic cardiomyopathy with LVEF 31%    Hypertensive urgency with diastolic dysfunction    Acute hypoxemic respiratory failure due to pulmonary edema    S/P CABG x 4 on 11/2/2023    Postop ANURADHA resolved    Postop Encephalopathy    Postop acute blood loss anemia    Postoperative multiple bilateral embolic strokes    HLD (hyperlipidemia)       SUBJECTIVE     78-year-old white female with PMH of hypertension and hyperlipidemia but no previous cardiac issues.  Patient [facility]24[facility] with hypertensive urgency, biventricular heart failure, and elevated cardiac enzymes consistent with NSTEMI.  She was also newly diagnosed with type II DM.  LHC was performed revealing multivessel disease and she was documented to have an ischemic cardiomyopathy with LVEF of 31%.  Patient subsequently underwent CABG x 4 by Dr. Cleveland 11/2/2023 and was extubated that evening.  Unfortunately postop course was complicated by encephalopathy, ANURADHA, and anemia.  Neurology was consulted but ANURADHA improved.  Encephalopathy however persisted but CT of the head was performed and was unrevealing.  MRI was subsequently ordered.    Interval history: No improvement  "neurologically overnight.  Remains sedated and lethargic but is not receiving anything.  She does move spontaneously not purposefully and will not follow commands or open eyes.  MRI today reveals subacute scattered areas of ischemic infarct in the bilateral frontal and parietal lobes extending to the basal ganglia and the left cerebellum.  The distribution suggests a watershed type of episode.  Today patient is hypertensive with blood pressure 178/76 but is also bradycardic with heart rates in the low 50s.  UOP is low with 910 cc out over the last 24 hours.  BUN increased slightly from 57 to 65, but creatinine remains normal at 1.0.  Sodium remains elevated and today has increased from 149, to 153.  Tmax 99.9 with WBC 13.45 on day 2 Rocephin, and vancomycin was added today because of a staph species growing from blood culture obtained 11/5/2023 (possible contaminant).  Is receiving adequate nutrition with Novasource renal.    ROS: Per subjective, all other systems reviewed and were negative.    The patient's relevant PMH, PSH, FH, and SH were reviewed and updated in Epic as appropriate. Allergies and Medications reviewed.    OBJECTIVE     /65   Pulse 51   Temp 98.1 °F (36.7 °C) (Axillary)   Resp 16   Ht 157.5 cm (62.01\")   Wt 93.2 kg (205 lb 7.5 oz)   SpO2 96%   BMI 37.57 kg/m²   Oxygen Concentration (%): 40  Flow (L/min): 2    Flowsheet Rows      Flowsheet Row First Filed Value   Admission Height 157.5 cm (62\") Documented at 10/24/2023 1040   Admission Weight 93.9 kg (207 lb) Documented at 10/24/2023 1040          Intake & Output (last day)         11/05 0701  11/06 0700 11/06 0701  11/07 0700    I.V. (mL/kg) 260 (2.8) 528 (5.7)    Other 506 710    NG/ 445    IV Piggyback 100 600    Total Intake(mL/kg) 1566 (16.8) 2283 (24.5)    Urine (mL/kg/hr) 910 (0.4) 1150 (1.1)    Stool      Chest Tube 90     Total Output 1000 1150    Net +566 +1133          Urine Unmeasured Occurrence 1 x       "       Exam:  General Exam:  Elderly acutely on chronically ill white female propped up in bed.  Lethargic and poorly arousable  HEENT: Pupils equal and reactive. Nose and throat clear.  Neck:                          Supple, no JVD, thyromegaly, or adenopathy  Lungs: Clear to auscultation and percussion anteriorly.  No obvious wheezes, rales, rhonchi  Cardiovascular: RRR without murmurs or gallops.  HR 54 bpm  Abdomen: Soft nontender without organomegaly or masses.  Tolerating enteral feeds :                             Pure wick catheter in place  Rectal: Deferred.  Extremities: No cyanosis or clubbing but trace to 1+ lower extremity edema.  Neurologic:                 Would not open eyes to loud verbal and tactile stimulation.  Will withdraw feet and arms to touch and noxious stimuli.  Moves all extremities minimally but not purposefully      CXR 11/6/2023: Heart mildly enlarged but no congestive changes.  Mild bibasilar platelike atelectasis.  Right IJ and NG tube in position.    INFUSIONS  Pharmacy to dose vancomycin,         Results from last 7 days   Lab Units 11/06/23  1556 11/06/23 0408 11/05/23 0404   WBC 10*3/mm3 13.93* 13.45* 12.15*   HEMOGLOBIN g/dL 7.6* 7.1* 7.2*   HEMATOCRIT % 25.9* 24.6* 24.3*   PLATELETS 10*3/mm3 218 184 152     Results from last 7 days   Lab Units 11/06/23  1556 11/06/23  0408   SODIUM mmol/L 149* 153*   POTASSIUM mmol/L 3.9 4.1   CHLORIDE mmol/L 119* 120*   CO2 mmol/L 23.0 22.0   BUN mg/dL 59* 65*   CREATININE mg/dL 0.95 1.00   GLUCOSE mg/dL 179* 152*   CALCIUM mg/dL 8.6 8.1*     Results from last 7 days   Lab Units 11/06/23  0408 11/05/23  0404 11/04/23 0441 11/01/23 2012   MAGNESIUM mg/dL 2.5* 2.4 2.2 2.1   PHOSPHORUS mg/dL 3.7  --  2.5 5.1*     Results from last 7 days   Lab Units 11/06/23  1556 11/06/23  0408 11/04/23 0441   ALK PHOS U/L 117 114 93   BILIRUBIN mg/dL 0.5 0.6 0.6   ALT (SGPT) U/L 14 10 <5   AST (SGOT) U/L 37* 28 25       Lab Results   Component Value Date  "   SEDRATE 49 (H) 11/06/2023       Lab Results   Component Value Date    PROBNP 4,242.0 (H) 10/29/2023    PROBNP 13,276.0 (H) 10/24/2023         Procalitonin Results:          Covid Tests          10/24/2023    11:09   Common Labsle   COVID19 Not Detected       COVID LABS:  Results From Last 14 Days   Lab Units 11/06/23  1556 11/06/23  0408 11/05/23  1517 11/04/23  0441 11/02/23  0321 11/01/23  1634 10/29/23  1028 10/27/23  0429 10/25/23  0357 10/25/23  0357 10/24/23  1435 10/24/23  1106   PROBNP pg/mL  --   --   --   --   --   --  4,242.0*  --   --   --   --  13,276.0*   CRP mg/dL  --  3.55*  --   --   --   --   --   --   --   --   --   --    D DIMER QUANT MCGFEU/mL  --   --   --   --   --   --   --   --   --   --   --  0.64   LACTATE mmol/L  --   --  1.0  --   --   --   --   --   --   --   --   --    PROTIME Seconds 15.8*  --   --   --  16.6* 18.2*  --   --   --   --   --   --    INR  1.25*  --   --   --  1.33* 1.49*  --   --   --   --   --   --    SED RATE mm/hr 49*  --   --   --   --   --   --   --   --   --   --   --    HSTROP T ng/L  --   --   --   --   --   --   --   --   --  631* 499* 356*   TRIGLYCERIDES mg/dL  --  119  --  123  --   --   --  305*   < > 230*  --   --     < > = values in this interval not displayed.          Lab Results   Component Value Date    TROPONINT 631 (C) 10/25/2023     Lab Results   Component Value Date    TSH 8.790 (H) 10/25/2023     Lab Results   Component Value Date    LACTATE 1.0 11/05/2023     No results found for: \"CORTISOL\"    Results from last 7 days   Lab Units 11/03/23  1348 11/02/23  1853 11/02/23  1142 11/02/23  0457 11/02/23  0137 11/02/23  0132   PH, ARTERIAL pH units 7.413 7.388 7.376 7.328*  --  7.354   PCO2, ARTERIAL mm Hg 37.5 39.0 40.7 46.3*  --  43.6   PO2 ART mm Hg 67.7* 77.7* 86.0 88.2  --  69.9*   HCO3 ART mmol/L 24.0 23.5 23.8 24.3  --  24.3   FIO2 % 36 40 40 50 40 40       I reviewed the patient's results, images and medication.    Assessment & Plan "   ASSESSMENT        Multivessel CAD with unstable angina pectoris manifesting as dyspnea    Acute exacerbation of congestive heart failure    Non-STEMI (non-ST elevated myocardial infarction)    Ischemic cardiomyopathy with LVEF 31%    Hypertensive urgency with diastolic dysfunction    Acute hypoxemic respiratory failure due to pulmonary edema    S/P CABG x 4 on 11/2/2023    Postop ANURADHA resolved    Postop Encephalopathy    Postop acute blood loss anemia    Postoperative multiple bilateral embolic strokes    HLD (hyperlipidemia)      DISCUSSION: Unfortunate situation.  Obviously has had cardioembolic strokes post CABG.  Preop echocardiogram revealed no evidence of an apical thrombus and there are emboli could have also come off the proximal great vessels.  What ever the case Main focus now is support of blood pressure, continue aspirin and statins, correct electrolytes (especially hypernatremia), and continue nutrition.  Discussed the possibility of using Ritalin to help with level of alertness and neurology is not opposed    PLAN     500 cc of D5W over the next 5 hours   Increase free water in enteral feeds.  Change Novasource renal to Peptamen AF  Continue to watch hematocrit  Would let blood pressure ride somewhat high in view of strokes  Trial of Ritalin to see if improves level of alertness  Neurology has started a heparin drip  Coreg decreased by cardiology because of intermittent bradycardia      Plan of care and goals reviewed with multidisciplinary team at daily rounds.    I discussed the patient's findings and my recommendations with family, nursing staff, and consulting provider    Patient is critically ill due to innumerable problems as outlined above and has a high risk of life-threatening decline in condition.  They require continuous monitoring and frequent reassessment of condition for adjustment of management in order to lessen risk.  I visited the patient's bedside and interacted with nurse numerous  times today.    Time spent Critical care 40 min (It does not include procedure time).    Electronically signed by Dirk Thomas MD, 11/06/23, 7:25 AM EST.   Pulmonary / Critical care medicine

## 2023-11-06 NOTE — PROGRESS NOTES
Cardiothoracic Surgery Progress Note      POD # 5 s/p CABG x 4     LOS: 13 days      Subjective:  Opens eyes, not following commands  Comfortably in bed      Objective:  Vital Signs  Temp:  [97.4 °F (36.3 °C)-98.6 °F (37 °C)] 97.4 °F (36.3 °C)  Heart Rate:  [45-73] 48  Resp:  [14-24] 18  BP: (111-162)/() 162/66    Physical Exam:   General Appearance:  Does not follow commands.  Moving extremities, withdraws to pain, PERRL   Lungs: clear to auscultation, respirations regular, respirations even, and respirations unlabored   Heart: regular rhythm & normal rate, normal S1, S2, and no murmur, no gallop, no rub   Skin: Incision c/d/i     Results:    Results from last 7 days   Lab Units 11/06/23  0408   WBC 10*3/mm3 13.45*   HEMOGLOBIN g/dL 7.1*   HEMATOCRIT % 24.6*   PLATELETS 10*3/mm3 184     Results from last 7 days   Lab Units 11/06/23  0408   SODIUM mmol/L 153*   POTASSIUM mmol/L 4.1   CHLORIDE mmol/L 120*   CO2 mmol/L 22.0   BUN mg/dL 65*   CREATININE mg/dL 1.00   GLUCOSE mg/dL 152*   CALCIUM mg/dL 8.1*       Assessment:  POD # 5 s/p CABG x 4    Plan:  Minimize sedation/narcotics  Pulmonary toilet  Continue enteric feeds  May need PEG tube   Will continue to follow neurologic exam  May require LTACH upon D/C    Dirk Cleveland MD  11/06/23  06:58 EST

## 2023-11-07 LAB
ALBUMIN SERPL-MCNC: 3.2 G/DL (ref 3.5–5.2)
ANION GAP SERPL CALCULATED.3IONS-SCNC: 11 MMOL/L (ref 5–15)
BACTERIA SPEC AEROBE CULT: ABNORMAL
BASOPHILS # BLD AUTO: 0.04 10*3/MM3 (ref 0–0.2)
BASOPHILS NFR BLD AUTO: 0.3 % (ref 0–1.5)
BUN SERPL-MCNC: 51 MG/DL (ref 8–23)
BUN/CREAT SERPL: 56.7 (ref 7–25)
CALCIUM SPEC-SCNC: 9 MG/DL (ref 8.6–10.5)
CHLORIDE SERPL-SCNC: 119 MMOL/L (ref 98–107)
CO2 SERPL-SCNC: 20 MMOL/L (ref 22–29)
CREAT SERPL-MCNC: 0.9 MG/DL (ref 0.57–1)
DEPRECATED RDW RBC AUTO: 51.7 FL (ref 37–54)
EGFRCR SERPLBLD CKD-EPI 2021: 65.2 ML/MIN/1.73
EOSINOPHIL # BLD AUTO: 0.34 10*3/MM3 (ref 0–0.4)
EOSINOPHIL NFR BLD AUTO: 2.6 % (ref 0.3–6.2)
ERYTHROCYTE [DISTWIDTH] IN BLOOD BY AUTOMATED COUNT: 14.4 % (ref 12.3–15.4)
GLUCOSE BLDC GLUCOMTR-MCNC: 166 MG/DL (ref 70–130)
GLUCOSE BLDC GLUCOMTR-MCNC: 167 MG/DL (ref 70–130)
GLUCOSE BLDC GLUCOMTR-MCNC: 190 MG/DL (ref 70–130)
GLUCOSE BLDC GLUCOMTR-MCNC: 202 MG/DL (ref 70–130)
GLUCOSE SERPL-MCNC: 193 MG/DL (ref 65–99)
GRAM STN SPEC: ABNORMAL
HCT VFR BLD AUTO: 26.9 % (ref 34–46.6)
HGB BLD-MCNC: 7.7 G/DL (ref 12–15.9)
IMM GRANULOCYTES # BLD AUTO: 0.19 10*3/MM3 (ref 0–0.05)
IMM GRANULOCYTES NFR BLD AUTO: 1.4 % (ref 0–0.5)
ISOLATED FROM: ABNORMAL
LDH SERPL-CCNC: 251 U/L (ref 135–214)
LYMPHOCYTES # BLD AUTO: 1.95 10*3/MM3 (ref 0.7–3.1)
LYMPHOCYTES NFR BLD AUTO: 14.8 % (ref 19.6–45.3)
MCH RBC QN AUTO: 28.3 PG (ref 26.6–33)
MCHC RBC AUTO-ENTMCNC: 28.6 G/DL (ref 31.5–35.7)
MCV RBC AUTO: 98.9 FL (ref 79–97)
MONOCYTES # BLD AUTO: 0.94 10*3/MM3 (ref 0.1–0.9)
MONOCYTES NFR BLD AUTO: 7.1 % (ref 5–12)
MRSA DNA SPEC QL NAA+PROBE: NEGATIVE
NEUTROPHILS NFR BLD AUTO: 73.8 % (ref 42.7–76)
NEUTROPHILS NFR BLD AUTO: 9.73 10*3/MM3 (ref 1.7–7)
NRBC BLD AUTO-RTO: 0.2 /100 WBC (ref 0–0.2)
PHOSPHATE SERPL-MCNC: 3.1 MG/DL (ref 2.5–4.5)
PLATELET # BLD AUTO: 222 10*3/MM3 (ref 140–450)
PMV BLD AUTO: 12.9 FL (ref 6–12)
POTASSIUM SERPL-SCNC: 4 MMOL/L (ref 3.5–5.2)
POTASSIUM SERPL-SCNC: 4 MMOL/L (ref 3.5–5.2)
PROCALCITONIN SERPL-MCNC: 0.15 NG/ML (ref 0–0.25)
QT INTERVAL: 468 MS
QTC INTERVAL: 435 MS
RBC # BLD AUTO: 2.72 10*6/MM3 (ref 3.77–5.28)
SODIUM SERPL-SCNC: 150 MMOL/L (ref 136–145)
UFH PPP CHRO-ACNC: 0.3 IU/ML (ref 0.3–0.7)
UFH PPP CHRO-ACNC: 0.3 IU/ML (ref 0.3–0.7)
UFH PPP CHRO-ACNC: 0.38 IU/ML (ref 0.3–0.7)
WBC NRBC COR # BLD: 13.19 10*3/MM3 (ref 3.4–10.8)

## 2023-11-07 PROCEDURE — 80069 RENAL FUNCTION PANEL: CPT | Performed by: INTERNAL MEDICINE

## 2023-11-07 PROCEDURE — 97530 THERAPEUTIC ACTIVITIES: CPT

## 2023-11-07 PROCEDURE — C1894 INTRO/SHEATH, NON-LASER: HCPCS

## 2023-11-07 PROCEDURE — 25010000002 CEFTRIAXONE PER 250 MG: Performed by: INTERNAL MEDICINE

## 2023-11-07 PROCEDURE — 99233 SBSQ HOSP IP/OBS HIGH 50: CPT | Performed by: STUDENT IN AN ORGANIZED HEALTH CARE EDUCATION/TRAINING PROGRAM

## 2023-11-07 PROCEDURE — 97168 OT RE-EVAL EST PLAN CARE: CPT

## 2023-11-07 PROCEDURE — 85025 COMPLETE CBC W/AUTO DIFF WBC: CPT | Performed by: INTERNAL MEDICINE

## 2023-11-07 PROCEDURE — 84145 PROCALCITONIN (PCT): CPT | Performed by: INTERNAL MEDICINE

## 2023-11-07 PROCEDURE — 63710000001 INSULIN REGULAR HUMAN PER 5 UNITS: Performed by: INTERNAL MEDICINE

## 2023-11-07 PROCEDURE — 25010000002 HEPARIN (PORCINE) 25000-0.45 UT/250ML-% SOLUTION

## 2023-11-07 PROCEDURE — 97110 THERAPEUTIC EXERCISES: CPT

## 2023-11-07 PROCEDURE — 83615 LACTATE (LD) (LDH) ENZYME: CPT | Performed by: INTERNAL MEDICINE

## 2023-11-07 PROCEDURE — C1751 CATH, INF, PER/CENT/MIDLINE: HCPCS

## 2023-11-07 PROCEDURE — 02HV33Z INSERTION OF INFUSION DEVICE INTO SUPERIOR VENA CAVA, PERCUTANEOUS APPROACH: ICD-10-PCS | Performed by: THORACIC SURGERY (CARDIOTHORACIC VASCULAR SURGERY)

## 2023-11-07 PROCEDURE — 84132 ASSAY OF SERUM POTASSIUM: CPT | Performed by: THORACIC SURGERY (CARDIOTHORACIC VASCULAR SURGERY)

## 2023-11-07 PROCEDURE — 63710000001 INSULIN DETEMIR PER 5 UNITS: Performed by: INTERNAL MEDICINE

## 2023-11-07 PROCEDURE — 85520 HEPARIN ASSAY: CPT

## 2023-11-07 PROCEDURE — 99291 CRITICAL CARE FIRST HOUR: CPT | Performed by: INTERNAL MEDICINE

## 2023-11-07 PROCEDURE — 99232 SBSQ HOSP IP/OBS MODERATE 35: CPT | Performed by: INTERNAL MEDICINE

## 2023-11-07 PROCEDURE — 82948 REAGENT STRIP/BLOOD GLUCOSE: CPT

## 2023-11-07 RX ORDER — METHYLPHENIDATE HYDROCHLORIDE 5 MG/1
5 TABLET ORAL ONCE
Status: COMPLETED | OUTPATIENT
Start: 2023-11-07 | End: 2023-11-07

## 2023-11-07 RX ORDER — SODIUM CHLORIDE 0.9 % (FLUSH) 0.9 %
10 SYRINGE (ML) INJECTION AS NEEDED
Status: DISCONTINUED | OUTPATIENT
Start: 2023-11-07 | End: 2023-11-15 | Stop reason: HOSPADM

## 2023-11-07 RX ORDER — SODIUM CHLORIDE 0.9 % (FLUSH) 0.9 %
10 SYRINGE (ML) INJECTION EVERY 12 HOURS SCHEDULED
Status: DISCONTINUED | OUTPATIENT
Start: 2023-11-07 | End: 2023-11-15 | Stop reason: HOSPADM

## 2023-11-07 RX ORDER — METHYLPHENIDATE HYDROCHLORIDE 5 MG/1
5 TABLET ORAL DAILY
Status: DISCONTINUED | OUTPATIENT
Start: 2023-11-08 | End: 2023-11-09

## 2023-11-07 RX ADMIN — ACETAMINOPHEN 650 MG: 325 TABLET ORAL at 15:45

## 2023-11-07 RX ADMIN — INSULIN HUMAN 2 UNITS: 100 INJECTION, SOLUTION PARENTERAL at 00:14

## 2023-11-07 RX ADMIN — HYDRALAZINE HYDROCHLORIDE 50 MG: 25 TABLET, FILM COATED ORAL at 17:11

## 2023-11-07 RX ADMIN — CARVEDILOL 6.25 MG: 6.25 TABLET, FILM COATED ORAL at 08:16

## 2023-11-07 RX ADMIN — CEFTRIAXONE SODIUM 2000 MG: 2 INJECTION, POWDER, FOR SOLUTION INTRAMUSCULAR; INTRAVENOUS at 15:37

## 2023-11-07 RX ADMIN — INSULIN HUMAN 2 UNITS: 100 INJECTION, SOLUTION PARENTERAL at 05:49

## 2023-11-07 RX ADMIN — INSULIN HUMAN 4 UNITS: 100 INJECTION, SOLUTION PARENTERAL at 12:04

## 2023-11-07 RX ADMIN — METHYLPHENIDATE HYDROCHLORIDE 5 MG: 5 TABLET ORAL at 08:16

## 2023-11-07 RX ADMIN — HYDRALAZINE HYDROCHLORIDE 50 MG: 25 TABLET, FILM COATED ORAL at 05:50

## 2023-11-07 RX ADMIN — AMLODIPINE BESYLATE 10 MG: 10 TABLET ORAL at 08:16

## 2023-11-07 RX ADMIN — HYDRALAZINE HYDROCHLORIDE 50 MG: 25 TABLET, FILM COATED ORAL at 00:15

## 2023-11-07 RX ADMIN — Medication 10 ML: at 12:03

## 2023-11-07 RX ADMIN — INSULIN HUMAN 2 UNITS: 100 INJECTION, SOLUTION PARENTERAL at 23:43

## 2023-11-07 RX ADMIN — ACETAMINOPHEN 650 MG: 325 TABLET ORAL at 23:43

## 2023-11-07 RX ADMIN — ASPIRIN 325 MG: 325 TABLET ORAL at 08:16

## 2023-11-07 RX ADMIN — HYDRALAZINE HYDROCHLORIDE 50 MG: 25 TABLET, FILM COATED ORAL at 12:04

## 2023-11-07 RX ADMIN — ATORVASTATIN CALCIUM 80 MG: 40 TABLET, FILM COATED ORAL at 21:52

## 2023-11-07 RX ADMIN — METHYLPHENIDATE HYDROCHLORIDE 5 MG: 5 TABLET ORAL at 13:33

## 2023-11-07 RX ADMIN — DOCUSATE SODIUM 100 MG: 50 LIQUID ORAL at 08:16

## 2023-11-07 RX ADMIN — CARVEDILOL 6.25 MG: 6.25 TABLET, FILM COATED ORAL at 17:11

## 2023-11-07 RX ADMIN — ACETAMINOPHEN 650 MG: 325 TABLET ORAL at 08:16

## 2023-11-07 RX ADMIN — INSULIN HUMAN 2 UNITS: 100 INJECTION, SOLUTION PARENTERAL at 17:56

## 2023-11-07 RX ADMIN — HYDRALAZINE HYDROCHLORIDE 50 MG: 25 TABLET, FILM COATED ORAL at 23:43

## 2023-11-07 RX ADMIN — Medication 10 ML: at 08:19

## 2023-11-07 RX ADMIN — INSULIN HUMAN 4 UNITS: 100 INJECTION, SOLUTION PARENTERAL at 23:43

## 2023-11-07 RX ADMIN — SENNOSIDES 10 ML: 8.8 LIQUID ORAL at 08:16

## 2023-11-07 RX ADMIN — INSULIN DETEMIR 10 UNITS: 100 INJECTION, SOLUTION SUBCUTANEOUS at 21:52

## 2023-11-07 RX ADMIN — ACETAMINOPHEN 650 MG: 325 TABLET ORAL at 00:15

## 2023-11-07 RX ADMIN — INSULIN HUMAN 4 UNITS: 100 INJECTION, SOLUTION PARENTERAL at 17:56

## 2023-11-07 RX ADMIN — INSULIN DETEMIR 8 UNITS: 100 INJECTION, SOLUTION SUBCUTANEOUS at 08:19

## 2023-11-07 RX ADMIN — HEPARIN SODIUM 13 UNITS/KG/HR: 10000 INJECTION, SOLUTION INTRAVENOUS at 13:38

## 2023-11-07 NOTE — PLAN OF CARE
Goal Outcome Evaluation:  Plan of Care Reviewed With: patient        Progress: no change  Outcome Evaluation: patient is still unable to follow commands she is moving right UE and LE spontaneously and has increased tone in left UE and LE. patient sat at the edge of the bed with assist of 2 for 6 min patient has eys open but not able to focus on an object. we will continue to work on sitting balance as tolerated. anticipate patient to need SNF at D/C pending progress with medical status she will need long term care.      Anticipated Discharge Disposition (PT): skilled nursing facility

## 2023-11-07 NOTE — PROGRESS NOTES
Intensivist Note     11/7/2023  Hospital Day: 14  6 Days Post-Op  ICU Stays Timeline         Hospital Admission: 10/24/23 1044 - Current  ICU stays: 1        In Date/Time Event Department ICU Stay Duration     10/24/23 1044 Admission [facility] EMERGENCY DEPT      10/24/23 1326 Transfer In  [facility] 2F      10/26/23 1618 Transfer In [facility] CATH LAB      10/26/23 1656 Transfer In  [facility] CVOU      10/26/23 1745 Transfer In  [facility] 6A      11/01/23 0802 Transfer In  [facility] OR      11/01/23 1610 Transfer In [facility] 2HSIC 5 days 15 hours 20 minutes                 Ms. Michael Cochran, 78 y.o. female is followed for:    Multivessel CAD with unstable angina pectoris manifesting as dyspnea    Acute exacerbation of congestive heart failure    Non-STEMI (non-ST elevated myocardial infarction)    Ischemic cardiomyopathy with LVEF 31%    Hypertensive urgency with diastolic dysfunction    Acute hypoxemic respiratory failure due to pulmonary edema    S/P CABG x 4 on 11/2/2023    Postop ANURADHA resolved    Postop Encephalopathy    Postoperative multiple bilateral embolic strokes    HLD (hyperlipidemia)    Postop acute blood loss anemia       SUBJECTIVE     78-year-old white female with PMH of hypertension and hyperlipidemia but no previous cardiac issues.  Patient presented to [facility] 10/24/2023 with hypertensive urgency, biventricular heart failure, and elevated cardiac enzymes consistent with NSTEMI.  She was also newly diagnosed with type II DM.  LHC was performed revealing multivessel disease and she was documented to have an ischemic cardiomyopathy with LVEF of 31%. Patient subsequently underwent CABG x 4 by Dr. Cleveland 11/2/2023 and was extubated that evening.  Unfortunately postop course was complicated by encephalopathy, ANURADHA, and anemia.  ANURADHA subsequently improved. Encephalopathy however persisted and neurology was consulted.  CT of the head was performed and was unrevealing and EEG just revealed diffuse slowing without evidence of seizure  "activity.  MRI was subsequently ordered revealing subacute scattered areas of ischemic infarct bilaterally in the frontal and parietal lobes and extending to the bilateral basal ganglia and left cerebellum.  Unclear if this was a watershed event due to hypotension or an embolic event.  Neurology began the patient on a heparin drip, repeated her echocardiogram to see if there was any evidence of an apical clot, and is observing closely.    Interval history: No dramatic changes in her mental status although she seems more arousable.  Remains encephalopathic and unable to follow commands.  Will withdraw lower extremities to touch but does not move her left side a great deal.  Moves right side spontaneously but not to command.  Opens eyes to loud verbal and tactile stimulation but does not focus, and very little response to threat.  Moans and grunts but no intelligible speech.  Hemodynamics adequate with a blood pressure 138/90 and remains in a sinus rhythm with a rate of 73 bpm.  UOP adequate at 2.35 L out over 24 hours and BUN/creatinine improved slightly to 51/0.9.  Remains afebrile and WBC unchanged at 13.19.  Remains on Rocephin which was apparently started 11/4/2023 for suspected UTI (but her urine culture is negative).  Vancomycin was started because of a positive blood culture which turned out to be contaminant and will be stopped today.  Presently tolerating enteral nutrition at goal with Peptamen AF.  Sodium remains high at 150 but should improve over the next 24 hours.       ROS: Per subjective, all other systems reviewed and were negative.    The patient's relevant PMH, PSH, FH, and SH were reviewed and updated in Epic as appropriate. Allergies and Medications reviewed.    OBJECTIVE     /74   Pulse 70   Temp 98.3 °F (36.8 °C) (Axillary)   Resp 16   Ht 157.5 cm (62.01\")   Wt 93.2 kg (205 lb 7.5 oz)   SpO2 98%   BMI 37.57 kg/m²   Oxygen Concentration (%): 40  Flow (L/min): 2    Flowsheet Rows  " "    Flowsheet Row First Filed Value   Admission Height 157.5 cm (62\") Documented at 10/24/2023 1040   Admission Weight 93.9 kg (207 lb) Documented at 10/24/2023 1040          Intake & Output (last day)         11/06 0701 11/07 0700 11/07 0701 11/08 0700    I.V. (mL/kg) 676 (7.3) 48 (0.5)    Other 1161 270    NG/GT 1072 280    IV Piggyback 600     Total Intake(mL/kg) 3509 (37.7) 598 (6.4)    Urine (mL/kg/hr) 2350 (1.1) 700 (1.8)    Chest Tube      Total Output 2350 700    Net +1159 -102                  Exam:  General Exam:  Debilitated elderly chronically ill-appearing white female propped up slightly in bed.  Frequently moans and groans and somewhat restless  HEENT: Pupils equal and reactive.  ND tube in place  Neck:                          Supple, no JVD, thyromegaly, or adenopathy.  Right IJ line in place  Lungs: Scattered rhonchi that clear somewhat with cough  Cardiovascular: RRR without murmurs or gallops.  HR 73 bpm and sinus on monitor  Abdomen: Soft nontender without organomegaly or masses.   and rectal: Pure wick urinary collection device in place  Extremities: No cyanosis clubbing edema.  Neurologic:                 Little spontaneous movement of left arm and leg but will withdraw to touch and noxious stimuli.  Moves right arm spontaneously.  Opens eyes to loud verbal stimulation.  Keeps head turned to the right.  Intermittent moaning but no intelligible speech and cannot follow any commands      CXR: No film today    INFUSIONS  heparin, 13 Units/kg/hr, Last Rate: 13 Units/kg/hr (11/07/23 0010)  niCARdipine, 5-15 mg/hr, Last Rate: Stopped (11/06/23 1530)        Results from last 7 days   Lab Units 11/07/23  0532 11/06/23  1556 11/06/23  0408   WBC 10*3/mm3 13.19* 13.93* 13.45*   HEMOGLOBIN g/dL 7.7* 7.6* 7.1*   HEMATOCRIT % 26.9* 25.9* 24.6*   PLATELETS 10*3/mm3 222 218 184     Results from last 7 days   Lab Units 11/07/23  0532 11/06/23  1556   SODIUM mmol/L 150* 149*   POTASSIUM mmol/L 4.0  4.0 " 3.9   CHLORIDE mmol/L 119* 119*   CO2 mmol/L 20.0* 23.0   BUN mg/dL 51* 59*   CREATININE mg/dL 0.90 0.95   GLUCOSE mg/dL 193* 179*   CALCIUM mg/dL 9.0 8.6     Results from last 7 days   Lab Units 11/07/23  0532 11/06/23  0408 11/05/23  0404 11/04/23  0441   MAGNESIUM mg/dL  --  2.5* 2.4 2.2   PHOSPHORUS mg/dL 3.1 3.7  --  2.5     Results from last 7 days   Lab Units 11/06/23  1556 11/06/23  0408 11/04/23  0441   ALK PHOS U/L 117 114 93   BILIRUBIN mg/dL 0.5 0.6 0.6   ALT (SGPT) U/L 14 10 <5   AST (SGOT) U/L 37* 28 25       Lab Results   Component Value Date    SEDRATE 49 (H) 11/06/2023       Lab Results   Component Value Date    PROBNP 4,242.0 (H) 10/29/2023    PROBNP 13,276.0 (H) 10/24/2023         Procalitonin Results:          Covid Tests          10/24/2023    11:09   Common Labsle   COVID19 Not Detected       COVID LABS:  Results From Last 14 Days   Lab Units 11/07/23  0532 11/06/23  1556 11/06/23  0408 11/05/23  1517 11/04/23  0441 11/02/23  0321 11/01/23  1634 10/29/23  1028 10/27/23  0429 10/25/23  0357 10/25/23  0357 10/24/23  1435   PROBNP pg/mL  --   --   --   --   --   --   --  4,242.0*  --   --   --   --    CRP mg/dL  --   --  3.55*  --   --   --   --   --   --   --   --   --    LACTATE mmol/L  --   --   --  1.0  --   --   --   --   --   --   --   --    LDH U/L 251*  --   --   --   --   --   --   --   --   --   --   --    PROTIME Seconds  --  15.8*  --   --   --  16.6* 18.2*  --   --   --   --   --    INR   --  1.25*  --   --   --  1.33* 1.49*  --   --   --   --   --    SED RATE mm/hr  --  49*  --   --   --   --   --   --   --   --   --   --    HSTROP T ng/L  --   --   --   --   --   --   --   --   --   --  631* 499*   TRIGLYCERIDES mg/dL  --   --  119  --  123  --   --   --  305*   < > 230*  --     < > = values in this interval not displayed.          Lab Results   Component Value Date    TROPONINT 631 (C) 10/25/2023     Lab Results   Component Value Date    TSH 8.790 (H) 10/25/2023     Lab Results  "  Component Value Date    LACTATE 1.0 11/05/2023     No results found for: \"CORTISOL\"    Results from last 7 days   Lab Units 11/03/23  1348 11/02/23  1853 11/02/23  1142 11/02/23  0457 11/02/23  0137 11/02/23  0132   PH, ARTERIAL pH units 7.413 7.388 7.376 7.328*  --  7.354   PCO2, ARTERIAL mm Hg 37.5 39.0 40.7 46.3*  --  43.6   PO2 ART mm Hg 67.7* 77.7* 86.0 88.2  --  69.9*   HCO3 ART mmol/L 24.0 23.5 23.8 24.3  --  24.3   FIO2 % 36 40 40 50 40 40       I reviewed the patient's results, images and medication.    Assessment & Plan   ASSESSMENT        Multivessel CAD with unstable angina pectoris manifesting as dyspnea    Acute exacerbation of congestive heart failure    Non-STEMI (non-ST elevated myocardial infarction)    Ischemic cardiomyopathy with LVEF 31%    Hypertensive urgency with diastolic dysfunction    Acute hypoxemic respiratory failure due to pulmonary edema    S/P CABG x 4 on 11/2/2023    Postop ANURADHA resolved    Postop Encephalopathy    Postoperative multiple bilateral embolic strokes    HLD (hyperlipidemia)    Postop acute blood loss anemia      DISCUSSION: Although slightly more arousable with restlessness and moaning and groaning (after Ritalin), I see no true improvement.  This all appears to be due to her multiple small bilateral cerebral infarcts which I think were embolic but I doubt they came from an apical thrombus as preoperative echo only shows akinesis not a thrombus but rather atheroemboli.  If with time and on a heparin drip patient shows no improvement I am not sure what the role would be for long-term anticoagulation.  If no significant neurologic improvement by postop day #7 I think the likelihood of a functional existence is minimal although she could survive with a trach/PEG in a skilled nursing facility or LTAC for an extended period of time.  We need to discuss this with daughter so she can make an informed decision regarding CODE STATUS and further care.    PLAN     Maintain " present level of support  PICC line today  Discontinue vancomycin  Stop Rocephin after 3 days  All IV meds are to be given in D5W if possible  Continue enteral nutrition with 50 cc/hr of free water  Follow-up sodium tomorrow  Discussed CODE STATUS as well as PEG feeding tube with daughter  Possible palliative care consult for goals of care  Increase Levemir from 8 to 10 units twice daily  Increase regular insulin from 2 units to 4 units every 6 hours    Plan of care and goals reviewed with multidisciplinary team at daily rounds.    I discussed the patient's findings and my recommendations with nursing staff and primary care team    Patient is critically ill due to innumerable problems as noted above and has a high risk of life-threatening decline in condition.  They require continuous monitoring and frequent reassessment of condition for adjustment of management in order to lessen risk.  I visited the patient's bedside and interacted with nurse numerous times today.    Time spent Critical care 35 min (It does not include procedure time).    Electronically signed by Dirk Thomas MD, 11/07/23, 7:30 AM EST.   Pulmonary / Critical care medicine

## 2023-11-07 NOTE — THERAPY RE-EVALUATION
Patient Name: Michael Cochran  : 1945    MRN: [id number]                              Today's Date: 2023       Admit Date: 10/24/2023    Visit Dx:     ICD-10-CM ICD-9-CM   1. Elevated troponin  R79.89 790.6   2. Acute on chronic congestive heart failure, unspecified heart failure type  I50.9 428.0   3. Hypertensive emergency  I16.1 401.9   4. Acute respiratory failure with hypoxia  J96.01 518.81   5. Pleural effusion, left  J90 511.9   6. Hypokalemia  E87.6 276.8   7. Non-STEMI (non-ST elevated myocardial infarction)  I21.4 410.70   8. Coronary artery disease involving native coronary artery of native heart with unstable angina pectoris  I25.110 414.01     411.1     Patient Active Problem List   Diagnosis    HLD (hyperlipidemia)    Acute hypoxemic respiratory failure due to pulmonary edema    Acute exacerbation of congestive heart failure    Non-STEMI (non-ST elevated myocardial infarction)    Multivessel CAD with unstable angina pectoris manifesting as dyspnea    S/P CABG x 4 on 2023    Ischemic cardiomyopathy with LVEF 31%    Hypertensive urgency with diastolic dysfunction    Postop ANURADHA resolved    Postop Encephalopathy    Postop acute blood loss anemia    Postoperative multiple bilateral embolic strokes     Past Medical History:   Diagnosis Date    Hyperlipidemia     Hypertension      Past Surgical History:   Procedure Laterality Date    BREAST FIBROADENOMA SURGERY      9 y/o-was benign    CARDIAC CATHETERIZATION N/A 10/26/2023    Procedure: Left Heart Cath;  Surgeon: Jordi Hodge MD;  Location:  [facility];  Service: Cardiovascular;  Laterality: N/A;    CORONARY ARTERY BYPASS GRAFT N/A 2023    Procedure: MEDIAN STERNOTOMY, CORONARY ARTERY BYPASS GRAFTING X4, UTILIZING THE LEFT INTERANL MAMMARY ARTERY, EVH OF THE LEFT GREATER SAPHENOUS VEIN, EXPLORATION OF THE RIGHT LEG, PRIMITIVO PER ANETHESIA;  Surgeon: Dirk Cleveland MD;  Location: [facility] OR;  Service: Cardiothoracic;   Laterality: N/A;      General Information       Row Name 11/07/23 1446          OT Time and Intention    Document Type re-evaluation  -CS     Mode of Treatment occupational therapy;co-treatment  -CS       Row Name 11/07/23 1446          General Information    Patient Profile Reviewed yes  -CS     Prior Level of Function --  see IE  -CS     Existing Precautions/Restrictions cardiac;fall;sternal;other (see comments)  NG, L sided weakness/tone, global aphasia  -CS     Barriers to Rehab medically complex;cognitive status;visual deficit  -CS       Row Name 11/07/23 1446          Living Environment    People in Home --  see IE  -CS       Row Name 11/07/23 1446          Cognition    Orientation Status (Cognition) unable/difficult to assess  -CS       Row Name 11/07/23 1446          Safety Issues, Functional Mobility    Impairments Affecting Function (Mobility) balance;cognition;endurance/activity tolerance;postural/trunk control;strength;coordination;motor control;motor planning;grasp;muscle tone abnormal;visual/perceptual  -CS     Cognitive Impairments, Mobility Safety/Performance attention;awareness, need for assistance;insight into deficits/self-awareness;sequencing abilities;judgment;problem-solving/reasoning;safety precaution follow-through;safety precaution awareness;impulsivity  -CS               User Key  (r) = Recorded By, (t) = Taken By, (c) = Cosigned By      Initials Name Provider Type    CS Analy Chavez OT Occupational Therapist                     Mobility/ADL's       Row Name 11/07/23 1446          Bed Mobility    Bed Mobility rolling left;rolling right;supine-sit;sit-supine  -CS     Rolling Left Todd (Bed Mobility) dependent (less than 25% patient effort);2 person assist;verbal cues  -CS     Rolling Right Todd (Bed Mobility) dependent (less than 25% patient effort);2 person assist;verbal cues  -CS     Supine-Sit Todd (Bed Mobility) maximum assist (25% patient effort);2 person  assist;verbal cues  -CS     Sit-Supine Gervais (Bed Mobility) maximum assist (25% patient effort);2 person assist;verbal cues  -CS     Assistive Device (Bed Mobility) bed rails;draw sheet;head of bed elevated  -       Row Name 11/07/23 1446          Transfers    Comment, (Transfers) deferred d/t poor sitting balance  -CS       Row Name 11/07/23 1446          Activities of Daily Living    BADL Assessment/Intervention lower body dressing;toileting;grooming  -       Row Name 11/07/23 1446          Lower Body Dressing Assessment/Training    Gervais Level (Lower Body Dressing) don;socks;dependent (less than 25% patient effort)  -CS     Position (Lower Body Dressing) supine  -CS       Row Name 11/07/23 1446          Toileting Assessment/Training    Gervais Level (Toileting) adjust/manage clothing;change pad/brief;perform perineal hygiene;dependent (less than 25% patient effort)  -CS     Position (Toileting) supine  -       Row Name 11/07/23 1446          Grooming Assessment/Training    Gervais Level (Grooming) oral care regimen;dependent (less than 25% patient effort)  -     Assistive Devices (Grooming) suction brush  -CS     Position (Grooming) sitting up in bed  -               User Key  (r) = Recorded By, (t) = Taken By, (c) = Cosigned By      Initials Name Provider Type    CS Analy Chavez OT Occupational Therapist                   Obj/Interventions       Row Name 11/07/23 1447          Sensory Assessment (Somatosensory)    Sensory Assessment (Somatosensory) unable/difficult to assess  -       Row Name 11/07/23 1447          Vision Assessment/Intervention    Visual Impairment/Limitations unable/difficult to assess  -     Visual Processing Deficit visual attention, left  -       Row Name 11/07/23 1447          Range of Motion Comprehensive    General Range of Motion bilateral upper extremity ROM WFL  -       Row Name 11/07/23 1447          Strength Comprehensive (MMT)     Comment, General Manual Muscle Testing (MMT) Assessment RUE observed grossly 3/5, LUE observed grossly 1/5  -       Row Name 11/07/23 1447          Shoulder (Therapeutic Exercise)    Shoulder (Therapeutic Exercise) PROM (passive range of motion)  -     Shoulder PROM (Therapeutic Exercise) right;flexion;10 repetitions  -CS       Row Name 11/07/23 1447          Elbow/Forearm (Therapeutic Exercise)    Elbow/Forearm (Therapeutic Exercise) PROM (passive range of motion)  -     Elbow/Forearm PROM (Therapeutic Exercise) right;flexion;extension;supination;pronation;10 repetitions  -       Row Name 11/07/23 1447          Wrist (Therapeutic Exercise)    Wrist (Therapeutic Exercise) PROM (passive range of motion)  -     Wrist PROM (Therapeutic Exercise) right;flexion;extension;10 repetitions  -       Row Name 11/07/23 1447          Hand (Therapeutic Exercise)    Hand (Therapeutic Exercise) PROM (passive range of motion)  -     Hand PROM (Therapeutic Exercise) right;finger flexion;finger extension;10 repetitions  -       Row Name 11/07/23 1447          Motor Skills    Therapeutic Exercise shoulder;elbow/forearm;wrist;hand  -       Row Name 11/07/23 1447          Balance    Balance Assessment sitting static balance;sitting dynamic balance  -     Static Sitting Balance moderate assist  -     Dynamic Sitting Balance maximum assist  -CS     Position, Sitting Balance sitting edge of bed  -     Balance Interventions sitting;static;dynamic;dynamic reaching;weight shifting activity  -     Comment, Balance L lateral and posterior lean, performing R/L weight shift w/ RUE weight bearing w/ proprioceptive input and progressed to brief moments of Min A  -CS               User Key  (r) = Recorded By, (t) = Taken By, (c) = Cosigned By      Initials Name Provider Type    CS Analy Chavez, OT Occupational Therapist                   Goals/Plan       Row Name 11/07/23 1457          Transfer Goal 1 (OT)     Activity/Assistive Device (Transfer Goal 1, OT) sit-to-stand/stand-to-sit  -CS     Pratt Level/Cues Needed (Transfer Goal 1, OT) maximum assist (25-49% patient effort)  -CS     Time Frame (Transfer Goal 1, OT) long term goal (LTG);10 days  -CS     Progress/Outcome (Transfer Goal 1, OT) goal ongoing  -CS       Row Name 11/07/23 1452          Grooming Goal 1 (OT)    Activity/Device (Grooming Goal 1, OT) wash face, hands  -CS     Pratt (Grooming Goal 1, OT) moderate assist (50-74% patient effort)  -CS     Time Frame (Grooming Goal 1, OT) long term goal (LTG);10 days  -CS     Progress/Outcome (Grooming Goal 1, OT) goal ongoing  -CS       Row Name 11/07/23 1452          Problem Specific Goal 1 (OT)    Problem Specific Goal 1 (OT) Pt will demonstrate static sitting balance w/ CGA for ~2 mins.  -CS     Time Frame (Problem Specific Goal 1, OT) long term goal (LTG);10 days  -CS     Progress/Outcome (Problem Specific Goal 1, OT) goal ongoing  -CS       Row Name 11/07/23 1452          Therapy Assessment/Plan (OT)    Planned Therapy Interventions (OT) activity tolerance training;adaptive equipment training;BADL retraining;functional balance retraining;occupation/activity based interventions;ROM/therapeutic exercise;strengthening exercise;transfer/mobility retraining  -CS               User Key  (r) = Recorded By, (t) = Taken By, (c) = Cosigned By      Initials Name Provider Type    CS Analy Chavez OT Occupational Therapist                   Clinical Impression       Row Name 11/07/23 1449          Pain Scale: FACES Pre/Post-Treatment    Pain: FACES Scale, Pretreatment 0-->no hurt  -CS     Posttreatment Pain Rating 0-->no hurt  -CS       Row Name 11/07/23 1449          Plan of Care Review    Plan of Care Reviewed With patient  -CS     Progress declining  -CS     Outcome Evaluation OT Re-eval complete. Pt presents w/ L sided weakness/tone, cognitive deficits, and balance deficits warranting cont skilled IPOT POC  to promote return to PLOF. Recommend pt DC to SNF, pending medical needs.  -CS       Row Name 11/07/23 1449          Therapy Assessment/Plan (OT)    Patient/Family Therapy Goal Statement (OT) Return to PLOF  -CS     Rehab Potential (OT) good, to achieve stated therapy goals  -CS     Criteria for Skilled Therapeutic Interventions Met (OT) yes;skilled treatment is necessary  -CS     Therapy Frequency (OT) daily  -CS       Row Name 11/07/23 1449          Therapy Plan Review/Discharge Plan (OT)    Anticipated Discharge Disposition (OT) [facility] nursing [facility]  -CS       Row Name 11/07/23 1449          Vital Signs    Pre Systolic BP Rehab 158  -CS     Pre Treatment Diastolic BP 96  -CS     Post Systolic BP Rehab 109  -CS     Post Treatment Diastolic   -CS     Pretreatment Heart Rate (beats/min) 70  -CS     Posttreatment Heart Rate (beats/min) 71  -CS     Pre SpO2 (%) 96  -CS     O2 Delivery Pre Treatment nasal cannula  -CS     Post SpO2 (%) 96  -CS     O2 Delivery Post Treatment nasal cannula  -CS     Pre Patient Position Supine  -CS     Intra Patient Position Sitting  -CS     Post Patient Position Supine  -CS       Row Name 11/07/23 1449          Positioning and Restraints    Pre-Treatment Position in bed  -CS     Post Treatment Position bed  -CS     In Bed notified nsg;fowlers;call light within reach;encouraged to call for assist;exit alarm on;with PT;RUE elevated;LUE elevated;side rails up x3;legs elevated;heels elevated;waffle boots/both  -CS               User Key  (r) = Recorded By, (t) = Taken By, (c) = Cosigned By      Initials Name Provider Type    CS Analy Chavez, OT Occupational Therapist                   Outcome Measures       Row Name 11/07/23 1453          How much help from another is currently needed...    Putting on and taking off regular lower body clothing? 1  -CS     Bathing (including washing, rinsing, and drying) 1  -CS     Toileting (which includes using toilet bed pan or urinal) 1  -CS      Putting on and taking off regular upper body clothing 1  -CS     Taking care of personal grooming (such as brushing teeth) 1  -CS     Eating meals 1  -CS     AM-PAC 6 Clicks Score (OT) 6  -CS       Row Name 11/07/23 0800          How much help from another person do you currently need...    Turning from your back to your side while in flat bed without using bedrails? 1  -TD     Moving from lying on back to sitting on the side of a flat bed without bedrails? 1  -TD     Moving to and from a bed to a chair (including a wheelchair)? 1  -TD     Standing up from a chair using your arms (e.g., wheelchair, bedside chair)? 1  -TD     Climbing 3-5 steps with a railing? 1  -TD     To walk in hospital room? 1  -TD     AM-PAC 6 Clicks Score (PT) 6  -TD     Highest level of mobility 2 --> Bed activities/dependent transfer  -TD       Row Name 11/07/23 1453          Modified Vernon Scale    Pre-Stroke Modified Lynn Scale 6 - Unable to determine (UTD) from the medical record documentation  -CS     Modified Vernon Scale 4 - Moderately severe disability.  Unable to walk without assistance, and unable to attend to own bodily needs without assistance.  -CS       Row Name 11/07/23 1453          Functional Assessment    Outcome Measure Options AM-PAC 6 Clicks Daily Activity (OT);Modified Lynn  -CS               User Key  (r) = Recorded By, (t) = Taken By, (c) = Cosigned By      Initials Name Provider Type    TD Day, Gilbert CASTORENA RN Registered Nurse    Analy Hernandez OT Occupational Therapist                    Occupational Therapy Education       Title: PT OT SLP Therapies (In Progress)       Topic: Occupational Therapy (In Progress)       Point: ADL training (In Progress)       Description:   Instruct learner(s) on proper safety adaptation and remediation techniques during self care or transfers.   Instruct in proper use of assistive devices.                  Learning Progress Summary             Patient Acceptance, E, NR by ISIDRO at  11/7/2023 1453    Acceptance, E, VU by AN at 10/25/2023 1034                         Point: Home exercise program (In Progress)       Description:   Instruct learner(s) on appropriate technique for monitoring, assisting and/or progressing therapeutic exercises/activities.                  Learning Progress Summary             Patient Acceptance, E, NR by  at 11/7/2023 1453                         Point: Precautions (In Progress)       Description:   Instruct learner(s) on prescribed precautions during self-care and functional transfers.                  Learning Progress Summary             Patient Acceptance, E, NR by  at 11/7/2023 1453    Acceptance, E, VU by AN at 10/25/2023 1034                         Point: Body mechanics (In Progress)       Description:   Instruct learner(s) on proper positioning and spine alignment during self-care, functional mobility activities and/or exercises.                  Learning Progress Summary             Patient Acceptance, E, NR by  at 11/7/2023 1453    Acceptance, E, VU by AN at 10/25/2023 1034                                         User Key       Initials Effective Dates Name Provider Type Discipline     09/02/21 -  Analy Chavez, OT Occupational Therapist OT    DAPHNIE 09/21/21 -  Aruna Sarah OT Occupational Therapist OT                  OT Recommendation and Plan  Planned Therapy Interventions (OT): activity tolerance training, adaptive equipment training, BADL retraining, functional balance retraining, occupation/activity based interventions, ROM/therapeutic exercise, strengthening exercise, transfer/mobility retraining  Therapy Frequency (OT): daily  Plan of Care Review  Plan of Care Reviewed With: patient  Progress: declining  Outcome Evaluation: OT Re-eval complete. Pt presents w/ L sided weakness/tone, cognitive deficits, and balance deficits warranting cont skilled IPOT POC to promote return to PLOF. Recommend pt DC to SNF, pending medical needs.     Time  Calculation:         Time Calculation- OT       Row Name 11/07/23 1454             Time Calculation- OT    OT Start Time 1315  -CS      OT Received On 11/07/23  -CS      OT Goal Re-Cert Due Date 11/17/23  -CS         Timed Charges    41931 - OT Therapeutic Exercise Minutes 5  -CS      11020 - OT Therapeutic Activity Minutes 5  -CS      97927 - OT Self Care/Mgmt Minutes 5  -CS         Untimed Charges    OT Eval/Re-eval Minutes 20  -CS         Total Minutes    Timed Charges Total Minutes 15  -CS      Untimed Charges Total Minutes 20  -CS       Total Minutes 35  -CS                User Key  (r) = Recorded By, (t) = Taken By, (c) = Cosigned By      Initials Name Provider Type    CS Analy Chavez OT Occupational Therapist                  Therapy Charges for Today       Code Description Service Date Service Provider Modifiers Qty    72988268458 HC OT THER PROC EA 15 MIN 11/7/2023 Analy Chavez OT GO 1    06382382973 HC OT RE-EVAL 2 11/7/2023 Analy Chavez OT GO 1                 Analy Chavez OT  11/7/2023

## 2023-11-07 NOTE — PLAN OF CARE
Goal Outcome Evaluation:      1. Neuro-  Ms Dimas continues to move right side spontaneously but does not follow commands.  She moves left foot to touch and left hand to painful stimuli.  She now opens eyes spontaneously but does not track.    2.  Respiratory-  2 LT NC kept oxygen saturation greater than 94%.    3. Cardiac-  Sinus rhythm with HR 55-70 bpm with -177 mmHg.  Hydralazine 50 mg q6 hr, Norvasc 10 mg in a.m., Coreg 6.25 mg in morning and supper time.    4.  GI-  Peptman AF @ 70 ml/hr and FW @ 50 ml/hr.  Bowel movement x 3.    5. -  1700 ml urine output.    6.  PICC line placed.  Cordis d/c'd,

## 2023-11-07 NOTE — PROGRESS NOTES
Cardiothoracic Surgery Progress Note      POD # 6 s/p CABG x 4     LOS: 14 days      Subjective:  Resting in bed  No acute events per nursing    Objective:  Vital Signs  Temp:  [97.9 °F (36.6 °C)-98.4 °F (36.9 °C)] 98.2 °F (36.8 °C)  Heart Rate:  [47-77] 71  Resp:  [12-20] 14  BP: (127-183)/() 156/67    Physical Exam:   General Appearance:  Does not follow commands.  Moving extremities, withdraws to pain, PERRL   Lungs: clear to auscultation, respirations regular, respirations even, and respirations unlabored   Heart: regular rhythm & normal rate, normal S1, S2, and no murmur, no gallop, no rub   Skin: Incision c/d/i     Results:    Results from last 7 days   Lab Units 11/07/23  0532   WBC 10*3/mm3 13.19*   HEMOGLOBIN g/dL 7.7*   HEMATOCRIT % 26.9*   PLATELETS 10*3/mm3 222     Results from last 7 days   Lab Units 11/07/23  0532 11/06/23  1556   SODIUM mmol/L  --  149*   POTASSIUM mmol/L 4.0 3.9   CHLORIDE mmol/L  --  119*   CO2 mmol/L  --  23.0   BUN mg/dL  --  59*   CREATININE mg/dL  --  0.95   GLUCOSE mg/dL  --  179*   CALCIUM mg/dL  --  8.6       Assessment:  POD # 6 s/p CABG x 4    Plan:  PICC line insertion   Minimize sedation/narcotics  Pulmonary toilet  Continue enteric feeds  Permissive hypertension per Neurology   Likely PEG tube to facilitate enteric nutrition  May require LTACH upon D/C    Dirk Cleveland MD  11/07/23  07:05 EST

## 2023-11-07 NOTE — PROGRESS NOTES
[facility] Neurology    Progress Note    Patient Name: Michael Cochran  : 1945  MRN: [id number]  Primary Care Physician:  Bo Rodriguez PA  Date of admission: 10/24/2023    Subjective     Reason for consult: AMS    History of Present Illness   Continues on heparin gtt. No significant changes in exam.     Review of Systems   Unable to obtain     Objective     Vitals:   Temp:  [98 °F (36.7 °C)-98.4 °F (36.9 °C)] 98.2 °F (36.8 °C)  Heart Rate:  [47-77] 75  Resp:  [12-20] 14  BP: (127-183)/() 138/90  Flow (L/min):  [2] 2    Neurological Examination no sedation  Mental status: does open eyes to noxious stimulation  Speech/language: does not follow commands  Cranial nerves: PERRL. Gaze midline with roving eye movements. Does blink to threat. Does not track. Face appears symmetric.     Motor: Some spontaneous movement of right arm. Normal tone.     Sensation: withdrawals to light pressure in right arm and leg, left leg. No withdrawal in left arm.     Current Medications    Current Facility-Administered Medications:     acetaminophen (TYLENOL) tablet 650 mg, 650 mg, Nasogastric, Q8H, Dikr Cleveland MD, 650 mg at 23 08    amLODIPine (NORVASC) tablet 10 mg, 10 mg, Nasogastric, Q24H, Madhu Sánchez MD, 10 mg at 23 0816    aspirin tablet 325 mg, 325 mg, Nasogastric, Daily, Dirk Cleveland MD, 325 mg at 23 08    atorvastatin (LIPITOR) tablet 80 mg, 80 mg, Nasogastric, Nightly, Dirk Cleveland MD, 80 mg at 23    bisacodyl (DULCOLAX) suppository 10 mg, 10 mg, Rectal, Daily PRN, Dirk Cleveland MD    carvedilol (COREG) tablet 6.25 mg, 6.25 mg, Nasogastric, BID With Meals, Madhu Sánchez MD, 6.25 mg at 23 08    cefTRIAXone (ROCEPHIN) 2,000 mg in sodium chloride 0.9 % 100 mL IVPB, 2,000 mg, Intravenous, Q24H, Lauro Salomon MD, Last Rate: 200 mL/hr at 23 1458, 2,000 mg at 23 1458    dextrose (D50W) (25 g/50 mL) IV injection 25 g, 25 g, Intravenous,  Q15 Min PRN, Lauro Salomon MD    dextrose (GLUTOSE) oral gel 15 g, 15 g, Oral, Q15 Min PRN, Lauro Salomon MD    sennosides (SENOKOT) 8.8 MG/5ML syrup 10 mL, 10 mL, Nasogastric, BID, 10 mL at 11/07/23 0816 **AND** docusate sodium (COLACE) liquid 100 mg, 100 mg, Nasogastric, BID, Dirk Cleveland MD, 100 mg at 11/07/23 0816    glucagon (GLUCAGEN) injection 1 mg, 1 mg, Intramuscular, Q15 Min PRN, Lauro Salomon MD    heparin 36647 units/250 mL (100 units/mL) in 0.45 % NaCl infusion, 13 Units/kg/hr, Intravenous, Titrated, Ash Gann [facility], Last Rate: 12.11 mL/hr at 11/07/23 0010, 13 Units/kg/hr at 11/07/23 0010    hydrALAZINE (APRESOLINE) tablet 50 mg, 50 mg, Oral, Q6H, Madhu Sánchez MD, 50 mg at 11/07/23 0550    insulin detemir (LEVEMIR) injection 8 Units, 8 Units, Subcutaneous, Q12H, Lauro Salomon MD, 8 Units at 11/07/23 0819    insulin regular (humuLIN R,novoLIN R) injection 2 Units, 2 Units, Subcutaneous, Q6H, Lauro Salomon MD, 2 Units at 11/07/23 0549    insulin regular (humuLIN R,novoLIN R) injection 2-9 Units, 2-9 Units, Subcutaneous, Q6H, Lauro Salomon MD, 2 Units at 11/07/23 0549    ipratropium-albuterol (DUO-NEB) nebulizer solution 3 mL, 3 mL, Nebulization, Q4H PRN, Robyn Newton PA-C    Magnesium Cardiology Dose Replacement - Follow Nurse / BPA Driven Protocol, , Does not apply, PRN, Robyn Newton PA-C    methylphenidate (RITALIN) tablet 5 mg, 5 mg, Nasogastric, BID, Ranjan Woods MD, 5 mg at 11/07/23 0816    niCARdipine (CARDENE) 25mg in 250mL NS infusion, 5-15 mg/hr, Intravenous, Titrated, Dirk Cleveland MD, Stopped at 11/06/23 1530    nitroglycerin (NITROSTAT) SL tablet 0.4 mg, 0.4 mg, Sublingual, Q5 Min PRN, Robyn Newton PA-C    ondansetron (ZOFRAN) injection 4 mg, 4 mg, Intravenous, Q6H PRN, Robyn Newton PA-C    Pharmacy Consult - MTM, , Does not apply, Daily, Robyn Newton PA-C, Given at 10/27/23 1025    Pharmacy to Dose Heparin, , Does not  apply, Continuous PRN, Ranjan Woods MD    Pharmacy to dose vancomycin, , Does not apply, Continuous PRN, Dirk Thomas MD    Phosphorus Replacement - Follow Nurse / BPA Driven Protocol, , Does not apply, PRN, Robyn Newton PA-C    Potassium Replacement - Follow Nurse / BPA Driven Protocol, , Does not apply, PRN, Robyn Newton PA-CROW    sodium chloride 0.9 % flush 10 mL, 10 mL, Intravenous, Q12H, Robyn Newton PA-CROW, 10 mL at 11/07/23 0819    sodium chloride 0.9 % flush 10 mL, 10 mL, Intravenous, PRN, Robyn Newton PA-CROW    sodium chloride 0.9 % infusion 40 mL, 40 mL, Intravenous, PRN, Robyn Newton PA-C    vancomycin 1250 mg/250 mL 0.9% NS IVPB ([facility]), 1,250 mg, Intravenous, Q24H, Elin Mahan, PharmD    Laboratory Results:   Lab Results   Component Value Date    GLUCOSE 193 (H) 11/07/2023    CALCIUM 9.0 11/07/2023     (H) 11/07/2023    K 4.0 11/07/2023    K 4.0 11/07/2023    CO2 20.0 (L) 11/07/2023     (H) 11/07/2023    BUN 51 (H) 11/07/2023    CREATININE 0.90 11/07/2023    EGFRIFNONA 62 06/09/2021    BCR 56.7 (H) 11/07/2023    ANIONGAP 11.0 11/07/2023     Lab Results   Component Value Date    WBC 13.19 (H) 11/07/2023    HGB 7.7 (L) 11/07/2023    HCT 26.9 (L) 11/07/2023    MCV 98.9 (H) 11/07/2023     11/07/2023     Lab Results   Component Value Date    CHOL 88 11/06/2023    CHOL 98 11/04/2023    CHOL 242 (H) 10/27/2023     Lab Results   Component Value Date    HDL 34 (L) 10/27/2023    HDL 42 10/25/2023    HDL 43 06/09/2021     Lab Results   Component Value Date     (H) 10/27/2023     (H) 10/25/2023     (H) 06/09/2021     Lab Results   Component Value Date    TRIG 119 11/06/2023    TRIG 123 11/04/2023    TRIG 305 (H) 10/27/2023     Lab Results   Component Value Date    HGBA1C 9.80 (H) 10/25/2023     Lab Results   Component Value Date    INR 1.25 (H) 11/06/2023    INR 1.33 (H) 11/02/2023    INR 1.49 (H) 11/01/2023    PROTIME 15.8 (H)  11/06/2023    PROTIME 16.6 (H) 11/02/2023    PROTIME 18.2 (H) 11/01/2023     Lab Results   Component Value Date    FOLATE 11.80 10/29/2023     Lab Results   Component Value Date    ZLBHFAMY28 437 10/29/2023     COVID and flu swab negative  Urine culture no growth      Images/Procedures   CT head 11/4/2023  No acute intracranial process. generalized volume loss    MRI brain without contrast on 11/5/23  Multiple bilateral acute and subacute infarcts.  Moderate periventricular subcortical flair changes related to chronic microvascular ischemic change.    Routine EEG 11/5/2023  Diffuse moderate generalized slowing    TTE 11/6/2023    Left ventricular systolic function is normal. Estimated left ventricular EF = 60%    Left ventricular wall thickness is consistent with mild concentric hypertrophy.    Trace to mild mitral regurgitation.    Saline test results are negative for intracardiac shunting.    There is a trivial pericardial effusion adjacent to the left ventricle.    Assessment / Plan   Active Hospital Problems:  Bilateral acute/subacute infarcts  Toxic metabolic encephalopathy      Brief Patient Summary:  Michael Cochran is a 78 y.o. female with history of hypertension, hyperlipidemia, presenting with CHF and NSTEMI, s/p CABG on 11/2. CT head unrevealing. EEG without epileptic changes. MRI brain with bilateral strokes due to possible apical thrombus vs watershed. TTE and PRIMITIVO without thrombus, but she has been started on heparin gtt. On review of vitals, no episodes of hypotension noted and prior carotid US showed no stenosis. Prognosis is guarded.     Plan:   -Continue aspirin and statin  -Continue ritalin for drowsiness   -Delirium precautions    I have discussed the above with the bedside RN  Time spent with patient: 35 minutes in face-to-face evaluation and management of the patient.      Trenton Kelsey DO, MS

## 2023-11-07 NOTE — PLAN OF CARE
Goal Outcome Evaluation:  Plan of Care Reviewed With: patient        Progress: declining  Outcome Evaluation: OT Re-eval complete. Pt presents w/ L sided weakness/tone, cognitive deficits, and balance deficits warranting cont skilled IPOT POC to promote return to PLOF. Recommend pt DC to SNF, pending medical needs.      Anticipated Discharge Disposition (OT): skilled nursing facility

## 2023-11-07 NOTE — PROGRESS NOTES
Michael Cochran  [id number]  1945   LOS: 14 days   Patient Care Team:  Bo Rodriguez PA as PCP - General (Family Medicine)    Chief Complaint:  NSTEMI / HTN / UNCONTROLLED T2 DM / OBESITY / AT RISK FOR GELY / HFrEF / CABG x 4 (1 November 2023) / HFrEF / ALTERED MSE & ABNORMAL BRAIN MRI     Subjective     Continued nonpurposeful movement without response to verbal commands.  No discernible posturing or overt seizure activity.  Remains on 2 L/min nasal cannula (oximetry 96%).  Continued nutritional support with Peptamen AF 70 cc/hour with free water at 50 cc/hour.    Objective     Vital Sign Min/Max for last 24 hours  Temp  Min: 97.9 °F (36.6 °C)  Max: 98.4 °F (36.9 °C)   BP  Min: 127/83  Max: 183/67   Pulse  Min: 47  Max: 77   Resp  Min: 12  Max: 20   SpO2  Min: 93 %  Max: 99 %      Weight  Min: 93.2 kg (205 lb 7.5 oz)  Max: 93.2 kg (205 lb 7.5 oz)         11/02/23  0500 11/06/23  1651   Weight: 93.2 kg (205 lb 7.5 oz) 93.2 kg (205 lb 7.5 oz)         Intake/Output Summary (Last 24 hours) at 11/7/2023 0730  Last data filed at 11/7/2023 0600  Gross per 24 hour   Intake 2431 ml   Output 2350 ml   Net 81 ml       Physical Exam:     General Appearance:  Unresponsive, in no acute distress   Lungs:     Clear to auscultation,respirations regular, even and                unlabored    Heart:    Regular and normal rate, normal S1 and S2, no            murmur, no gallop, no rub, no click   Abdomen:  Extremities:   Soft, nontender, bowel sounds audible x4    No edema, normal range of motion   Pulses:   Pulses palpable and equal bilaterally      Results Review:   Results from last 7 days   Lab Units 11/07/23  0532 11/06/23  1556 11/06/23  0408 11/05/23  0404   SODIUM mmol/L  --  149* 153* 149*   POTASSIUM mmol/L 4.0 3.9 4.1 4.2   CHLORIDE mmol/L  --  119* 120* 119*   CO2 mmol/L  --  23.0 22.0 19.0*   BUN mg/dL  --  59* 65* 57*   CREATININE mg/dL  --  0.95 1.00 1.05*   GLUCOSE mg/dL  --  179* 152* 188*   CALCIUM mg/dL   --  8.6 8.1* 8.2*     Results from last 7 days   Lab Units 11/07/23  0532 11/06/23  1556 11/06/23  0408   WBC 10*3/mm3 13.19* 13.93* 13.45*   HEMOGLOBIN g/dL 7.7* 7.6* 7.1*   HEMATOCRIT % 26.9* 25.9* 24.6*   PLATELETS 10*3/mm3 222 218 184     Results from last 7 days   Lab Units 11/06/23  0408   CHOLESTEROL mg/dL 88   TRIGLYCERIDES mg/dL 119     LDH: 251  K+: 4.0  NO NEW EKG / CXR / CT SCAN.  ECHO:      Left ventricular systolic function is normal. Estimated left ventricular EF = 60%    Left ventricular wall thickness is consistent with mild concentric hypertrophy.    Trace to mild mitral regurgitation.    Saline test results are negative for intracardiac shunting.    There is a trivial pericardial effusion adjacent to the left ventricle.    Medication Review: REVIEWED;  DRIP = continuous IV heparin 13 unit/kilogram/hour infusion.      Assessment & Plan     Stable hemodynamics and rhythm.  Echocardiogram demonstrates resolution of preoperative systolic left ventricular dysfunction following surgical revascularization and medical treatment.  No apical mural thrombi has been demonstrated on serial echocardiograms as well as left ventricular angiogram at the time of catheterization studies as well as intraoperative PRIMITIVO at the time of CABG on 1 November 2023.  Additionally, no significant thoracic aortic plaque was demonstrated at the time of PRIMITIVO.  Acceptable hypertension control and now off IV nicardipine.  Persistent severe anemia with improving serum sodium level yesterday evening.  We will continue current cardiac medications and supportive care.  Prognosis remains guarded.    Discussed with patient, RN, and Dr. Cleveland.      Multivessel CAD with unstable angina pectoris manifesting as dyspnea    Non-STEMI (non-ST elevated myocardial infarction)    HLD (hyperlipidemia)    Acute hypoxemic respiratory failure due to pulmonary edema    Acute exacerbation of congestive heart failure    S/P CABG x 4 on 11/2/2023     Ischemic cardiomyopathy with LVEF 31%    Hypertensive urgency with diastolic dysfunction    Postop ANURADHA resolved    Postop Encephalopathy    Postop acute blood loss anemia    Postoperative multiple bilateral embolic strokes    11/07/23  07:30 EST

## 2023-11-07 NOTE — NURSING NOTE
Prior to central line removal, order for the removal of catheter was verified, patient was assessed, necessary materials were gathered and patient was unable to be educated due to patient condition .    Patient was positioned flat to ensure that the insertion site was at or below the level of the heart.    Hands were washed, clean gloves were applied and central line dressing was gently removed. Catheter exit site was not cultured.     A new pair of clean gloves were then applied. Insertion site was cleansed with 2% Chlorhexidine swab using a circular motion beginning at the insertion site and moving outward for 30 seconds and allowed to dry.     Clamp on line was not present.     Patient unable to perform Valsalva maneuver or hold breath during central line removal due to current condition.     The central line was grasped at the insertion site and slowly pulled outward parallel to the skin. Resistance was not met.    After central line was completely removed, a sterile 4x4 gauze pad was used to apply light pressure until bleeding stopped. At that time, petroleum-based gauze and a sterile occlusive dressing was applied to exit site.     Patient was instructed to keep dressing in place for at least 24 hours and to remain in a flat or reclining position for 30 minutes post-removal.     Catheter was inspected after removal and was intact. Tip of central line was not sent for culture. Patient tolerated procedure.    Prior to central line removal, order for the removal of catheter was verified, patient was assessed, necessary materials were gathered and patient was unable to be educated due to patient condition .    Patient was positioned flat to ensure that the insertion site was at or below the level of the heart.    Hands were washed, clean gloves were applied and central line dressing was gently removed. Catheter exit site was not cultured.     A new pair of clean gloves were then applied. Insertion site was  cleansed with 2% Chlorhexidine swab using a circular motion beginning at the insertion site and moving outward for 30 seconds and allowed to dry.     Clamp on line was present and clamped.     Patient unable to perform Valsalva maneuver or hold breath during central line removal due to current condition.     The central line was grasped at the insertion site and slowly pulled outward parallel to the skin. Resistance was not met.    After central line was completely removed, a sterile 4x4 gauze pad was used to apply light pressure until bleeding stopped. At that time, petroleum-based gauze and a sterile occlusive dressing was applied to exit site.     Patient was instructed to keep dressing in place for at least 24 hours and to remain in a flat or reclining position for 30 minutes post-removal.     Catheter was inspected after removal and was intact. Tip of central line was not sent for culture. Patient tolerated procedure.

## 2023-11-07 NOTE — PROGRESS NOTES
HEPARIN INFUSION  Michael Cochran is a  78 y.o. female receiving heparin infusion.     Therapy for (VTE/Cardiac): Small acute/subacute embolic strokes   Patient Weight: 93.2 kg  Initial Bolus (Y/N):   NO  Any Bolus (Y/N):   NO        Signs or Symptoms of Bleeding: None noted    Cardiac or Other (Not VTE)   Initial Bolus: 60 units/kg (Max 4,000 units)  Initial rate: 12 units/kg/hr (Max 1,000 units/hr)   Anti Xa Rebolus Infusion Hold time Change infusion Dose (Units/kg/hr) Next Anti Xa or aPTT Level Due   < 0.11 50 Units/kg  (4000 Units Max) None Increase by  3 Units/kg/hr 6 hours   0.11- 0.19 25 Units/kg  (2000 Units Max) None Increase by  2 Units/kg/hr 6 hours   0.2 - 0.29 0 None Increase by  1 Units/kg/hr 6 hours   0.3 - 0.5 0 None No Change 6 hours (after 2 consecutive levels in range check qAM)   0.51 - 0.6 0 None Decrease by  1 Units/kg/hr 6 hours   0.61 - 0.8 0 30 Minutes Decrease by  2 Units/kg/hr 6 hours   0.81 - 1 0 60 Minutes Decrease by  3 Units/kg/hr 6 hours   >1 0 Hold  After Anti Xa less than 0.5 decrease previous rate by  4 Units/kg/hr  Every 2 hours until Anti Xa  less than 0.5 then when infusion restarts in 6 hours       Results from last 7 days   Lab Units 11/07/23  0532 11/06/23  1556 11/06/23  0408 11/03/23  0447 11/02/23  0321 11/01/23 2012 11/01/23  1634   INR   --  1.25*  --   --  1.33*  --  1.49*   HEMOGLOBIN g/dL 7.7* 7.6* 7.1*   < > 8.6*   < > 10.7*   HEMATOCRIT % 26.9* 25.9* 24.6*   < > 27.5*   < > 34.6   PLATELETS 10*3/mm3 222 218 184   < > 161   < > 147    < > = values in this interval not displayed.          Date   Time   Anti-Xa Current Rate (Unit/kg/hr) Bolus   (Units) Rate Change   (Unit/kg/hr) New Rate (Unit/kg/hr) Next   Anti-Xa Comments  Pump Check Daily   11/6 1600 0.1 -- -- +11 11 2100 Dw RN   11/6 2239 0.17 11 -- +2 13 0600 Discussed w/ nurse   11/6 0532 0.3 13 -- -- 13 1200 DW RN   11/7 1203 0.38 13 -- -- 13 1800 Discussed w/ nurse. Verified pump       --           --            --           --           --           --           --           --           --           --           --           --           --           --           --           --           --         Elin Mahan PharmD  11/7/2023  08:21 EST

## 2023-11-07 NOTE — PROGRESS NOTES
Multidisciplinary Rounds    Time: 20min  Patient Name: Michael Cochran  Date of Encounter: 23 10:00 EST  MRN: [id number]  Admission date: 10/24/2023      Reason for visit: MDR. RD to continue to follow per protocol.       EMR reviewed  Labs reviewed - hypernatremia ongoing  Medications reviewed - bowel regimen      Additional information obtained during MDR: Pt not following commands, opens eyes to stimuli. Tolerating EN at goal rate. Plan for GOC/POC discussions including topic of PEG. This morning patient's bowels are moving.      Current diet: NPO Diet NPO Type: Strict NPO      EN: Peptamen AF  Goal Rate: 70 ml/hr  Water Flushes: 50 ml/hr  Modular: None  Route: NG  Tube: Small bore    At goal over: 20Hrs/day  Rx will supply:   Goal Volume 1400  mL/day     Flush Volume 1000 mL/day     Energy 1680 Kcal/day 105 % Est Need   Protein 106 g/day 96 % Est Need   Fiber 8.5 g/day     Water in  EN 1135 mL     Total Water 2235 mL     Meet DRI Yes        --------------------------------------------------------------------------  Product/Rate verified at bedside: Yes  Infusing Rate at time of visit: 70 ml/hr, water @ 50 ml/hr    Average Delivery from [facility] Day: (mix of NSR/Pept AF)  Volume 1072 mL/day   % Goal Vol.   Flush Volume 1161 mL/day     Energy  Kcal/day  % Est Need   Protein  g/day  % Est Need   Fiber  g/day     Water in  EN  mL     Total Water  mL     Meet DRI No          Intervention:  Follow treatment plan  Care plan reviewed  -Continue current EN regimen at this time.      Follow up:   Per protocol      Ioana Zuniga RD  10:00 EST

## 2023-11-07 NOTE — THERAPY TREATMENT NOTE
Patient Name: Michael Cochran  : 1945    MRN: [id number]                              Today's Date: 2023       Admit Date: 10/24/2023    Visit Dx:     ICD-10-CM ICD-9-CM   1. Elevated troponin  R79.89 790.6   2. Acute on chronic congestive heart failure, unspecified heart failure type  I50.9 428.0   3. Hypertensive emergency  I16.1 401.9   4. Acute respiratory failure with hypoxia  J96.01 518.81   5. Pleural effusion, left  J90 511.9   6. Hypokalemia  E87.6 276.8   7. Non-STEMI (non-ST elevated myocardial infarction)  I21.4 410.70   8. Coronary artery disease involving native coronary artery of native heart with unstable angina pectoris  I25.110 414.01     411.1     Patient Active Problem List   Diagnosis    HLD (hyperlipidemia)    Acute hypoxemic respiratory failure due to pulmonary edema    Acute exacerbation of congestive heart failure    Non-STEMI (non-ST elevated myocardial infarction)    Multivessel CAD with unstable angina pectoris manifesting as dyspnea    S/P CABG x 4 on 2023    Ischemic cardiomyopathy with LVEF 31%    Hypertensive urgency with diastolic dysfunction    Postop ANURADHA resolved    Postop Encephalopathy    Postop acute blood loss anemia    Postoperative multiple bilateral embolic strokes     Past Medical History:   Diagnosis Date    Hyperlipidemia     Hypertension      Past Surgical History:   Procedure Laterality Date    BREAST FIBROADENOMA SURGERY      9 y/o-was benign    CARDIAC CATHETERIZATION N/A 10/26/2023    Procedure: Left Heart Cath;  Surgeon: Jordi Hodge MD;  Location:  [facility] CATH INVASIVE LOCATION;  Service: Cardiovascular;  Laterality: N/A;    CORONARY ARTERY BYPASS GRAFT N/A 2023    Procedure: MEDIAN STERNOTOMY, CORONARY ARTERY BYPASS GRAFTING X4, UTILIZING THE LEFT INTERANL MAMMARY ARTERY, EVH OF THE LEFT GREATER SAPHENOUS VEIN, EXPLORATION OF THE RIGHT LEG, PRIMITIVO PER ANETHESIA;  Surgeon: Dirk Cleveland MD;  Location: [facility] OR;  Service: Cardiothoracic;   Laterality: N/A;      General Information       Row Name 11/07/23 1443          Physical Therapy Time and Intention    Document Type therapy note (daily note)  -SUE     Mode of Treatment physical therapy  -SUE       Row Name 11/07/23 1443          General Information    Patient Profile Reviewed yes  -SUE     Existing Precautions/Restrictions cardiac;fall;sternal  -SUE     Barriers to Rehab medically complex  -SUE       Row Name 11/07/23 1443          Home Main Entrance    Number of Stairs, Main Entrance five  -SUE       Row Name 11/07/23 1443          Cognition    Orientation Status (Cognition) unable/difficult to assess  -SUE       Row Name 11/07/23 1443          Safety Issues, Functional Mobility    Safety Issues Affecting Function (Mobility) ability to follow commands;safety precautions follow-through/compliance;insight into deficits/self-awareness;safety precaution awareness;awareness of need for assistance  -SUE     Impairments Affecting Function (Mobility) balance;cognition;endurance/activity tolerance;postural/trunk control;strength  -SUE     Comment, Safety Issues/Impairments (Mobility) patient has eyes open with increased activity she continues to be unable to follow commands  -SUE               User Key  (r) = Recorded By, (t) = Taken By, (c) = Cosigned By      Initials Name Provider Type    SUE Oenida Daly, PT Physical Therapist                   Mobility       Row Name 11/07/23 1445          Bed Mobility    Bed Mobility rolling left;rolling right;scooting/bridging;supine-sit  -SUE     Rolling Left [place] (Bed Mobility) dependent (less than 25% patient effort)  -SUE     Rolling Right [place] (Bed Mobility) dependent (less than 25% patient effort)  -SUE     Scooting/Bridging [place] (Bed Mobility) dependent (less than 25% patient effort)  -SUE     Supine-Sit [place] (Bed Mobility) dependent (less than 25% patient effort);2 person assist  -SUE     Assistive Device (Bed Mobility) draw sheet;head  of bed elevated  -SUE     Comment, (Bed Mobility) patient rolled right ans left for cleaning BM prior to tx.  -SUE       Row Name 11/07/23 1445          Transfers    Comment, (Transfers) patient is now safe to attempt transfers at this time due to poor trunk control in sitting patient pushes to the left with right UE  -SUE       Row Name 11/07/23 1445          Bed-Chair Transfer    Bed-Chair Thomas (Transfers) unable to assess  -SUE       Row Name 11/07/23 1445          Sit-Stand Transfer    Sit-Stand Thomas (Transfers) unable to assess  -[facility] Name 11/07/23 1445          Gait/Stairs (Locomotion)    Thomas Level (Gait) unable to assess  -               User Key  (r) = Recorded By, (t) = Taken By, (c) = Cosigned By      Initials Name Provider Type    Oneida Serra, PT Physical Therapist                   Obj/Interventions       [facility] Name 11/07/23 1448          Motor Skills    Therapeutic Exercise hip;knee  -[facility] Name 11/07/23 1448          Hip (Therapeutic Exercise)    Hip PROM (Therapeutic Exercise) bilateral;flexion;extension;5 repetitions;supine  -[facility] Name 11/07/23 1448          Knee (Therapeutic Exercise)    Knee (Therapeutic Exercise) PROM (passive range of motion)  -     Knee PROM (Therapeutic Exercise) bilateral;flexion;extension;5 second hold;sitting  -[facility] Name 11/07/23 1448          Ankle (Therapeutic Exercise)    Ankle (Therapeutic Exercise) PROM (passive range of motion)  -     Ankle PROM (Therapeutic Exercise) bilateral;dorsiflexion;plantarflexion;5 repetitions;supine  -[facility] Name 11/07/23 1448          Balance    Balance Assessment sitting static balance;sitting dynamic balance  -SUE     Static Sitting Balance maximum assist  -SUE     Dynamic Sitting Balance maximum assist  -SUE     Position, Sitting Balance supported;sitting edge of bed  -SUE     Comment, Balance harsh sat at the edge of the bed for 6 min initially requiring max assist as  patient pushes strongly with right UE. with weight shifting activity patient was able to progress to short bouts  of requiring min assist.  -SUE               User Key  (r) = Recorded By, (t) = Taken By, (c) = Cosigned By      Initials Name Provider Type    Oneida Serra, PT Physical Therapist                   Goals/Plan       Row Name 11/07/23 1456          Therapy Assessment/Plan (PT)    Planned Therapy Interventions (PT) balance training;bed mobility training;transfer training;strengthening  -SUE               User Key  (r) = Recorded By, (t) = Taken By, (c) = Cosigned By      Initials Name Provider Type    Oneida Serra, PT Physical Therapist                   Clinical Impression       Row Name 11/07/23 1451          Pain Scale: FACES Pre/Post-Treatment    Pain: FACES Scale, Pretreatment 0-->no hurt  -SUE     Posttreatment Pain Rating 0-->no hurt  -SUE       Row Name 11/07/23 1456          Plan of Care Review    Plan of Care Reviewed With patient  -SUE     Progress no change  -SUE     Outcome Evaluation patient is still unable to follow commands she is moving right UE and LE spontaneously and has increased tone in left UE and LE. patient sat at the edge of the bed with assist of 2 for 6 min patient has eys open but not able to focus on an object. we will continue to work on sitting balance as tolerated. anticipate patient to need SNF at D/C pending progress with medical status she will need long term care.  -SUE       Row Name 11/07/23 1451          Therapy Assessment/Plan (PT)    Patient/Family Therapy Goals Statement (PT) unable to state  -SUE     Rehab Potential (PT) fair, will monitor progress closely  -SUE     Criteria for Skilled Interventions Met (PT) yes;skilled treatment is necessary  -SUE     Therapy Frequency (PT) daily  -SUE       Row Name 11/07/23 1458          Vital Signs    Pre Systolic BP Rehab 169  -SUE     Pre Treatment Diastolic BP 80  -SUE     Post Systolic BP Rehab 154  -SUE     Post  Treatment Diastolic BP 74  -SUE     Pretreatment Heart Rate (beats/min) 73  -SUE     Posttreatment Heart Rate (beats/min) 67  -SUE     Pre SpO2 (%) 93  -SUE     O2 Delivery Pre Treatment room air  -SUE     Post SpO2 (%) 96  -SUE     O2 Delivery Post Treatment room air  -SUE     Pre Patient Position Supine  -SUE     Intra Patient Position Sitting  -SUE     Post Patient Position Side Lying  -SUE       Row Name 11/07/23 1451          Positioning and Restraints    Pre-Treatment Position in bed  -SUE     Post Treatment Position bed  -SUE     In Bed notified nsg;side lying left;call light within reach;encouraged to call for assist;with nsg  -SUE               User Key  (r) = Recorded By, (t) = Taken By, (c) = Cosigned By      Initials Name Provider Type    Oneida Serra, PT Physical Therapist                   Outcome Measures       Row Name 11/07/23 1457 11/07/23 0800       How much help from another person do you currently need...    Turning from your back to your side while in flat bed without using bedrails? 2  -SUE 1  -TD    Moving from lying on back to sitting on the side of a flat bed without bedrails? 2  -SUE 1  -TD    Moving to and from a bed to a chair (including a wheelchair)? 1  -SUE 1  -TD    Standing up from a chair using your arms (e.g., wheelchair, bedside chair)? 1  -SUE 1  -TD    Climbing 3-5 steps with a railing? 1  -SUE 1  -TD    To walk in hospital room? 1  -SUE 1  -TD    AM-PAC 6 Clicks Score (PT) 8  -SUE 6  -TD    Highest level of mobility 3 --> Sat at edge of bed  -SUE 2 --> Bed activities/dependent transfer  -TD      Row Name 11/07/23 1453          Modified [place] Scale    Pre-Stroke Modified Lynn Scale 6 - Unable to determine (UTD) from the medical record documentation  -CS     Modified Lynn Scale 4 - Moderately severe disability.  Unable to walk without assistance, and unable to attend to own bodily needs without assistance.  -       Row Name 11/07/23 1453          Functional Assessment    Outcome  Measure Options AM-PAC 6 Clicks Daily Activity (OT);Modified [place]  -CS               User Key  (r) = Recorded By, (t) = Taken By, (c) = Cosigned By      Initials Name Provider Type    Oneida Serra PT Physical Therapist    Gilbert Pruitt RN Registered Nurse    Analy Hernandez, OT Occupational Therapist                                 Physical Therapy Education       Title: PT OT SLP Therapies (In Progress)       Topic: Physical Therapy (In Progress)       Point: Mobility training (In Progress)       Learning Progress Summary             Patient Acceptance, E, NR by SUE at 11/7/2023 1300    Acceptance, E, NR by AE at 11/6/2023 1310    Acceptance, E, NR by KG at 11/5/2023 0940    Acceptance, E, VU,NR by ML at 10/27/2023 1544    Comment: continued mobility while awaiting CABG, sternal precautions following CABG    Acceptance, E,D, VU,NR by LR at 10/25/2023 1327    Comment: Educated on benefits of mobility, correct sit<->stand t/f technique, correct gait mechanics, PLB, energy conservation, and progression of POC.                         Point: Home exercise program (In Progress)       Learning Progress Summary             Patient Acceptance, E, NR by SUE at 11/7/2023 1300    Acceptance, E, NR by AE at 11/6/2023 1310    Acceptance, E, NR by KG at 11/5/2023 0940                         Point: Body mechanics (In Progress)       Learning Progress Summary             Patient Acceptance, E, NR by SUE at 11/7/2023 1300    Acceptance, E, NR by AE at 11/6/2023 1310    Acceptance, E, NR by KG at 11/5/2023 0940    Acceptance, E,D, VU,NR by LR at 10/25/2023 1327    Comment: Educated on benefits of mobility, correct sit<->stand t/f technique, correct gait mechanics, PLB, energy conservation, and progression of POC.                         Point: Precautions (In Progress)       Learning Progress Summary             Patient Acceptance, E, NR by SUE at 11/7/2023 1300    Acceptance, E, NR by AE at 11/6/2023 1310    Acceptance,  E, NR by KG at 11/5/2023 0940    Acceptance, E, VU,NR by ML at 10/27/2023 1544    Comment: continued mobility while awaiting CABG, sternal precautions following CABG    Acceptance, E,D, VU,NR by LR at 10/25/2023 1327    Comment: Educated on benefits of mobility, correct sit<->stand t/f technique, correct gait mechanics, PLB, energy conservation, and progression of POC.                                         User Key       Initials Effective Dates Name Provider Type Discipline    SUE 02/03/23 -  Oneida Daly, PT Physical Therapist PT    LR 02/03/23 -  Alissa Pierre, PT Physical Therapist PT    KG 05/22/20 -  Brittney Rocha, PT Physical Therapist PT    ML 04/22/21 -  Alisa Salguero Physical Therapist PT    AE 09/21/21 -  Jcarlos Rodriguez, PT Physical Therapist PT                  PT Recommendation and Plan  Planned Therapy Interventions (PT): balance training, bed mobility training, transfer training, strengthening  Plan of Care Reviewed With: patient  Progress: no change  Outcome Evaluation: patient is still unable to follow commands she is moving right UE and LE spontaneously and has increased tone in left UE and LE. patient sat at the edge of the bed with assist of 2 for 6 min patient has eys open but not able to focus on an object. we will continue to work on sitting balance as tolerated. anticipate patient to need SNF at D/C pending progress with medical status she will need long term care.     Time Calculation:         PT Charges       Row Name 11/07/23 1457             Time Calculation    Start Time 1300  -SUE      PT Received On 11/07/23  -SUE      PT Goal Re-Cert Due Date 11/15/23  -SUE         Time Calculation- PT    Total Timed Code Minutes- PT 23 minute(s)  -SUE         Timed Charges    53584 - PT Therapeutic Activity Minutes 23  -SUE         Total Minutes    Timed Charges Total Minutes 23  -SUE       Total Minutes 23  -SUE                User Key  (r) = Recorded By, (t) = Taken By, (c) =  Cosigned By      Initials Name Provider Type    Oneida Serra, PT Physical Therapist                  Therapy Charges for Today       Code Description Service Date Service Provider Modifiers Qty    17251795236 HC PT THERAPEUTIC ACT EA 15 MIN 11/7/2023 Oneida Daly, PT GP 2            PT G-Codes  Outcome Measure Options: AM-PAC 6 Clicks Daily Activity (OT), Modified [place]ink[place]  AM-PAC 6 Clicks Score (PT): 8  AM-PAC 6 Clicks Score (OT): 6  Modified Wink[place] Scale: 4 - Moderately severe disability.  Unable to walk without assistance, and unable to attend to own bodily needs without assistance.  PT Discharge Summary  Anticipated Discharge Disposition (PT): skilled nursing facility    Oneida Daly PT  11/7/2023

## 2023-11-08 ENCOUNTER — TELEPHONE (OUTPATIENT)
Dept: OBSTETRICS AND GYNECOLOGY | Facility: CLINIC | Age: 78
End: 2023-11-08
Payer: MEDICARE

## 2023-11-08 ENCOUNTER — APPOINTMENT (OUTPATIENT)
Dept: GENERAL RADIOLOGY | Facility: HOSPITAL | Age: 78
End: 2023-11-08
Payer: MEDICARE

## 2023-11-08 LAB
ANION GAP SERPL CALCULATED.3IONS-SCNC: 11 MMOL/L (ref 5–15)
BUN SERPL-MCNC: 30 MG/DL (ref 8–23)
BUN/CREAT SERPL: 39.5 (ref 7–25)
CALCIUM SPEC-SCNC: 8.9 MG/DL (ref 8.6–10.5)
CHLORIDE SERPL-SCNC: 115 MMOL/L (ref 98–107)
CO2 SERPL-SCNC: 22 MMOL/L (ref 22–29)
CREAT SERPL-MCNC: 0.76 MG/DL (ref 0.57–1)
DEPRECATED RDW RBC AUTO: 49.1 FL (ref 37–54)
EGFRCR SERPLBLD CKD-EPI 2021: 79.8 ML/MIN/1.73
ERYTHROCYTE [DISTWIDTH] IN BLOOD BY AUTOMATED COUNT: 14.2 % (ref 12.3–15.4)
GLUCOSE BLDC GLUCOMTR-MCNC: 176 MG/DL (ref 70–130)
GLUCOSE BLDC GLUCOMTR-MCNC: 177 MG/DL (ref 70–130)
GLUCOSE BLDC GLUCOMTR-MCNC: 183 MG/DL (ref 70–130)
GLUCOSE BLDC GLUCOMTR-MCNC: 203 MG/DL (ref 70–130)
GLUCOSE BLDC GLUCOMTR-MCNC: 219 MG/DL (ref 70–130)
GLUCOSE SERPL-MCNC: 182 MG/DL (ref 65–99)
HCT VFR BLD AUTO: 29.3 % (ref 34–46.6)
HGB BLD-MCNC: 8.4 G/DL (ref 12–15.9)
MAGNESIUM SERPL-MCNC: 1.9 MG/DL (ref 1.6–2.4)
MCH RBC QN AUTO: 28.3 PG (ref 26.6–33)
MCHC RBC AUTO-ENTMCNC: 28.7 G/DL (ref 31.5–35.7)
MCV RBC AUTO: 98.7 FL (ref 79–97)
PLATELET # BLD AUTO: 279 10*3/MM3 (ref 140–450)
PMV BLD AUTO: 12.8 FL (ref 6–12)
POTASSIUM SERPL-SCNC: 4.1 MMOL/L (ref 3.5–5.2)
RBC # BLD AUTO: 2.97 10*6/MM3 (ref 3.77–5.28)
SODIUM SERPL-SCNC: 148 MMOL/L (ref 136–145)
UFH PPP CHRO-ACNC: 0.26 IU/ML (ref 0.3–0.7)
WBC NRBC COR # BLD: 14.41 10*3/MM3 (ref 3.4–10.8)

## 2023-11-08 PROCEDURE — 63710000001 INSULIN REGULAR HUMAN PER 5 UNITS: Performed by: INTERNAL MEDICINE

## 2023-11-08 PROCEDURE — 25010000002 MAGNESIUM SULFATE 4 GM/100ML SOLUTION: Performed by: THORACIC SURGERY (CARDIOTHORACIC VASCULAR SURGERY)

## 2023-11-08 PROCEDURE — 71045 X-RAY EXAM CHEST 1 VIEW: CPT

## 2023-11-08 PROCEDURE — 82948 REAGENT STRIP/BLOOD GLUCOSE: CPT

## 2023-11-08 PROCEDURE — 85027 COMPLETE CBC AUTOMATED: CPT | Performed by: THORACIC SURGERY (CARDIOTHORACIC VASCULAR SURGERY)

## 2023-11-08 PROCEDURE — 97530 THERAPEUTIC ACTIVITIES: CPT

## 2023-11-08 PROCEDURE — 99291 CRITICAL CARE FIRST HOUR: CPT | Performed by: INTERNAL MEDICINE

## 2023-11-08 PROCEDURE — 83735 ASSAY OF MAGNESIUM: CPT | Performed by: THORACIC SURGERY (CARDIOTHORACIC VASCULAR SURGERY)

## 2023-11-08 PROCEDURE — 80048 BASIC METABOLIC PNL TOTAL CA: CPT | Performed by: THORACIC SURGERY (CARDIOTHORACIC VASCULAR SURGERY)

## 2023-11-08 PROCEDURE — 99232 SBSQ HOSP IP/OBS MODERATE 35: CPT | Performed by: INTERNAL MEDICINE

## 2023-11-08 PROCEDURE — 85520 HEPARIN ASSAY: CPT

## 2023-11-08 PROCEDURE — 99232 SBSQ HOSP IP/OBS MODERATE 35: CPT | Performed by: STUDENT IN AN ORGANIZED HEALTH CARE EDUCATION/TRAINING PROGRAM

## 2023-11-08 PROCEDURE — 63710000001 INSULIN DETEMIR PER 5 UNITS: Performed by: INTERNAL MEDICINE

## 2023-11-08 RX ORDER — ASPIRIN 81 MG/1
81 TABLET, CHEWABLE ORAL DAILY
Status: DISCONTINUED | OUTPATIENT
Start: 2023-11-09 | End: 2023-11-15 | Stop reason: HOSPADM

## 2023-11-08 RX ORDER — MAGNESIUM SULFATE HEPTAHYDRATE 40 MG/ML
4 INJECTION, SOLUTION INTRAVENOUS ONCE
Status: COMPLETED | OUTPATIENT
Start: 2023-11-08 | End: 2023-11-08

## 2023-11-08 RX ORDER — HYDRALAZINE HYDROCHLORIDE 25 MG/1
50 TABLET, FILM COATED ORAL EVERY 6 HOURS SCHEDULED
Status: DISCONTINUED | OUTPATIENT
Start: 2023-11-08 | End: 2023-11-14

## 2023-11-08 RX ADMIN — MAGNESIUM SULFATE HEPTAHYDRATE 4 G: 40 INJECTION, SOLUTION INTRAVENOUS at 08:39

## 2023-11-08 RX ADMIN — INSULIN HUMAN 2 UNITS: 100 INJECTION, SOLUTION PARENTERAL at 05:52

## 2023-11-08 RX ADMIN — INSULIN HUMAN 4 UNITS: 100 INJECTION, SOLUTION PARENTERAL at 12:42

## 2023-11-08 RX ADMIN — METHYLPHENIDATE HYDROCHLORIDE 5 MG: 5 TABLET ORAL at 08:34

## 2023-11-08 RX ADMIN — INSULIN HUMAN 4 UNITS: 100 INJECTION, SOLUTION PARENTERAL at 12:41

## 2023-11-08 RX ADMIN — ATORVASTATIN CALCIUM 80 MG: 40 TABLET, FILM COATED ORAL at 19:47

## 2023-11-08 RX ADMIN — AMLODIPINE BESYLATE 10 MG: 10 TABLET ORAL at 08:34

## 2023-11-08 RX ADMIN — SENNOSIDES 10 ML: 8.8 LIQUID ORAL at 19:47

## 2023-11-08 RX ADMIN — DOCUSATE SODIUM 100 MG: 50 LIQUID ORAL at 19:48

## 2023-11-08 RX ADMIN — HYDRALAZINE HYDROCHLORIDE 50 MG: 25 TABLET, FILM COATED ORAL at 17:27

## 2023-11-08 RX ADMIN — INSULIN HUMAN 2 UNITS: 100 INJECTION, SOLUTION PARENTERAL at 17:49

## 2023-11-08 RX ADMIN — HYDRALAZINE HYDROCHLORIDE 50 MG: 25 TABLET, FILM COATED ORAL at 05:52

## 2023-11-08 RX ADMIN — Medication 10 ML: at 08:37

## 2023-11-08 RX ADMIN — ASPIRIN 325 MG: 325 TABLET ORAL at 08:34

## 2023-11-08 RX ADMIN — CARVEDILOL 6.25 MG: 6.25 TABLET, FILM COATED ORAL at 17:27

## 2023-11-08 RX ADMIN — Medication 10 ML: at 19:49

## 2023-11-08 RX ADMIN — INSULIN HUMAN 4 UNITS: 100 INJECTION, SOLUTION PARENTERAL at 05:52

## 2023-11-08 RX ADMIN — INSULIN HUMAN 4 UNITS: 100 INJECTION, SOLUTION PARENTERAL at 17:27

## 2023-11-08 RX ADMIN — ACETAMINOPHEN 650 MG: 325 TABLET ORAL at 08:34

## 2023-11-08 RX ADMIN — ACETAMINOPHEN 650 MG: 325 TABLET ORAL at 17:27

## 2023-11-08 RX ADMIN — CARVEDILOL 6.25 MG: 6.25 TABLET, FILM COATED ORAL at 08:34

## 2023-11-08 RX ADMIN — INSULIN DETEMIR 12 UNITS: 100 INJECTION, SOLUTION SUBCUTANEOUS at 19:48

## 2023-11-08 RX ADMIN — INSULIN DETEMIR 10 UNITS: 100 INJECTION, SOLUTION SUBCUTANEOUS at 08:37

## 2023-11-08 NOTE — CONSULTS
Order noted for diabetes education per stroke protocol. Educator saw 10/28/23. GCS 9 and inappropriate for further education at this time. Would qualify for OP stroke/diabetes class at discharge.Diabetes Ed will follow.

## 2023-11-08 NOTE — TELEPHONE ENCOUNTER
----- Message from Kayleen Marcos Rep sent at 11/8/2023  8:30 AM EST -----  Regarding: RE: Follow-up appointment needed  I spoke with patients daughter this morning and she informed me that mother is still in the ICU. She did not want to make an appointment at this time because when pt gets out she will most likely be going to long term living facility. It was explained to daughter that pt needs to be seen for postmenopausal bleeding, however daughter stated that you can see her at the ICU.   ----- Message -----  From: Aleksandar Ruby MD  Sent: 11/6/2023   8:22 PM EST  To: Kayleen Marcos  Subject: RE: Follow-up appointment needed                 Thank you Nicky.  Ultrasound order has been placed.  ----- Message -----  From: Nicky Glass RegSched Rep  Sent: 11/6/2023   9:02 AM EST  To: Aleksandar Ruby MD  Subject: RE: Follow-up appointment needed                 Dr. Ruby, when you get a chance will you put in a u/s order? I will get her scheduled and call her daughter with the appointment date and time.  ----- Message -----  From: Aleksandar Ruby MD  Sent: 11/2/2023   9:28 PM EST  To: Dirk Cleveland MD; #  Subject: Follow-up appointment needed                     This patient is currently in the ICU.  She has postmenopausal bleeding then will need to be worked up after she is more stable.  It will need to be done in the office after she is discharged she will need an ultrasound in the clinic the same day as the visit to begin to work-up this postmenopausal bleeding..  I would like to get her set up with an office appointment to see me in 2 weeks time.      Her daughter is listed in the contacts.  I believe her name is Katharina Barragan.  We may have to call her and let her know about the scheduled follow-up time once it is arranged.  Lets try to call patient's daughter no sooner than Monday with that information.  I plan to stop by the hospital on Friday to see the patient and  introduced myself and tell them about the plans for outpatient follow-up.    Thank you.

## 2023-11-08 NOTE — PLAN OF CARE
Goal Outcome Evaluation:  Plan of Care Reviewed With: patient        Progress: declining  Outcome Evaluation: Pt continues to present with decreased command following and no eye opening despite attempts to improve. Pt tolerated dep A x2 rolling in bed and supine to sit/sit to supine transfers. Pt unable to improve upright sitting balance while sitting EOB despite attempts of weight shifting and placement of RUE. Continue to progress as appropriate.      Anticipated Discharge Disposition (PT): skilled nursing facility

## 2023-11-08 NOTE — PROGRESS NOTES
Intensivist Note     11/8/2023  Hospital Day: 15  7 Days Post-Op  ICU Stays Timeline         Hospital Admission: 10/24/23 1044 - Current  ICU stays: 1        In Date/Time Event Department ICU Stay Duration     10/24/23 1044 Admission [facility] EMERGENCY DEPT      10/24/23 1326 Transfer In  [facility] 2F      10/26/23 1618 Transfer In [facility] CATH LAB      10/26/23 1656 Transfer In  [facility] CVOU      10/26/23 1745 Transfer In  [facility] 6A      11/01/23 0802 Transfer In  [facility] OR      11/01/23 1610 Transfer In [facility] 2HSIC 6 days 15 hours 5 minutes                 Ms. Michael Cochran, 78 y.o. female is followed for:    Multivessel CAD with unstable angina pectoris manifesting as dyspnea    Acute exacerbation of congestive heart failure    Non-STEMI (non-ST elevated myocardial infarction)    Ischemic cardiomyopathy with LVEF 31%    Hypertensive urgency with diastolic dysfunction    Acute hypoxemic respiratory failure due to pulmonary edema    S/P CABG x 4 on 11/2/2023    Postop ANURADHA resolved    Postop Encephalopathy    Postoperative multiple bilateral embolic strokes    HLD (hyperlipidemia)    Postop acute blood loss anemia       SUBJECTIVE     78-year-old white female with PMH of hypertension and hyperlipidemia but no previous cardiac issues. Patient presented to [facility] 10/24/2023 with hypertensive urgency, biventricular heart failure, and elevated cardiac enzymes consistent with NSTEMI.  he was also newly diagnosed with type II DM.  LHC was performed revealing multivessel disease and she was documented to have an ischemic cardiomyopathy with LVEF of 31%. Patient subsequently underwent CABG x 4 by Dr. Cleveland 11/2/2023 and was extubated that evening. Unfortunately postop course was complicated by encephalopathy, ANURADHA, and anemia.  ANURADHA subsequently improved. Encephalopathy however persisted and neurology was consulted.  CT of the head was performed and was unrevealing and EEG just revealed diffuse slowing without evidence of seizure  "activity.  MRI was subsequently ordered revealing subacute scattered areas of ischemic infarct bilaterally in the frontal and parietal lobes and extending to the bilateral basal ganglia and left cerebellum.  Unclear if this was a watershed event due to hypotension or an embolic event related to atheroemboli from the great vessels.  Neurology began the patient on a heparin drip, repeated her echocardiogram to see if there was any evidence of an apical clot.  Echo revealed no apical clot.    Interval history: I can see no change in her mental status since yesterday.  Remains encephalopathic and unable to follow commands.  At times will open eyes to loud verbal and tactile stimuli but not always.  Will withdraw lower extremities to touch but does not spontaneously move her left side a great deal.  Does move right side spontaneously but not to command.  Continues to moan and grunt at times but no intelligible speech.  Hemodynamics adequate without the need for any pressors.  No arrhythmias noted.  UOP significant with 4.05 L out over the last 24 hours and BUN/creatinine have improved dropping from 51/0.90, to 30/0.76.  Does not appear in any respiratory distress and O2 sats are 92% on 1 L.  Continues to tolerate enteral feeds at goal with Peptamen AF.  Sodium level finally coming down and today is 148.    ROS: Unable to obtain due to encephalopathy/confusional state    The patient's relevant PMH, PSH, FH, and SH were reviewed and updated in Epic as appropriate. Allergies and Medications reviewed.    OBJECTIVE     /77 (BP Location: Left arm, Patient Position: Lying)   Pulse 89   Temp 98.8 °F (37.1 °C) (Oral)   Resp 20   Ht 157.5 cm (62.01\")   Wt 93.2 kg (205 lb 7.5 oz)   SpO2 92%   BMI 37.57 kg/m²   Oxygen Concentration (%): 35  Flow (L/min): 1    Flowsheet Rows      Flowsheet Row First Filed Value   Admission Height 157.5 cm (62\") Documented at 10/24/2023 1040   Admission Weight 93.9 kg (207 lb) Documented " at 10/24/2023 1040          Intake & Output (last day)         11/07 0701 11/08 0700 11/08 0701 11/09 0700    I.V. (mL/kg) 232 (2.5)     Other 1416     NG/GT 1506     IV Piggyback 100     Total Intake(mL/kg) 3254 (34.9)     Urine (mL/kg/hr) 4050 (1.8)     Total Output 4050     Net -796                   Exam:  General Exam:  Chronically ill-appearing white female propped up in bed.  Restless  HEENT: Pupils equal and reactive.  ND tube in place  Neck:                          Supple, no JVD, thyromegaly, or adenopathy.  Right IJ line was removed  Lungs: Few scattered rhonchi but clearer than yesterday  Cardiovascular: RRR without murmurs or gallops.  HR 88 bpm  Abdomen: Soft nontender without organomegaly or masses.   and rectal: Pure wick catheter in place  Extremities: No cyanosis clubbing edema.  Right arm PICC line in place  Neurologic:                 Unchanged.  Still with spontaneous movement of all extremities but right side more so than left.  Withdraws all extremities to noxious stimuli.  Did not open eyes to verbal stimulation today.  Still with intermittent moaning but no intelligible speech and cannot follow commands.      Chest X-Ray: Heart size normal.  Sternal wires intact.  Film somewhat rotated.  No consolidative infiltrates but some vascular prominence noted.  Right IJ line has been removed and there is now a right arm PICC line in place    INFUSIONS  heparin, 13 Units/kg/hr, Last Rate: 13 Units/kg/hr (11/07/23 1344)  niCARdipine, 5-15 mg/hr, Last Rate: Stopped (11/06/23 1530)  Pharmacy to Dose Heparin,         Results from last 7 days   Lab Units 11/08/23  0534 11/07/23  0532 11/06/23  1556   WBC 10*3/mm3 14.41* 13.19* 13.93*   HEMOGLOBIN g/dL 8.4* 7.7* 7.6*   HEMATOCRIT % 29.3* 26.9* 25.9*   PLATELETS 10*3/mm3 279 222 218     Results from last 7 days   Lab Units 11/08/23  0534 11/07/23  0532   SODIUM mmol/L 148* 150*   POTASSIUM mmol/L 4.1 4.0  4.0   CHLORIDE mmol/L 115* 119*   CO2 mmol/L  "22.0 20.0*   BUN mg/dL 30* 51*   CREATININE mg/dL 0.76 0.90   GLUCOSE mg/dL 182* 193*   CALCIUM mg/dL 8.9 9.0     Results from last 7 days   Lab Units 11/08/23  0534 11/07/23  0532 11/06/23  0408 11/05/23  0404 11/04/23  0441   MAGNESIUM mg/dL 1.9  --  2.5* 2.4 2.2   PHOSPHORUS mg/dL  --  3.1 3.7  --  2.5     Results from last 7 days   Lab Units 11/06/23  1556 11/06/23  0408 11/04/23  0441   ALK PHOS U/L 117 114 93   BILIRUBIN mg/dL 0.5 0.6 0.6   ALT (SGPT) U/L 14 10 <5   AST (SGOT) U/L 37* 28 25       Lab Results   Component Value Date    SEDRATE 49 (H) 11/06/2023       Lab Results   Component Value Date    PROBNP 4,242.0 (H) 10/29/2023    PROBNP 13,276.0 (H) 10/24/2023         Procalitonin Results:      Lab 11/07/23  0532   PROCALCITONIN 0.15         Covid Tests          10/24/2023    11:09   Common Labsle   COVID19 Not Detected       COVID LABS:  Results From Last 14 Days   Lab Units 11/07/23  0532 11/06/23  1556 11/06/23  0408 11/05/23  1517 11/04/23  0441 11/02/23  0321 11/01/23  1634 10/29/23  1028 10/27/23  0429   PROBNP pg/mL  --   --   --   --   --   --   --  4,242.0*  --    CRP mg/dL  --   --  3.55*  --   --   --   --   --   --    LACTATE mmol/L  --   --   --  1.0  --   --   --   --   --    LDH U/L 251*  --   --   --   --   --   --   --   --    PROCALCITONIN ng/mL 0.15  --   --   --   --   --   --   --   --    PROTIME Seconds  --  15.8*  --   --   --  16.6* 18.2*  --   --    INR   --  1.25*  --   --   --  1.33* 1.49*  --   --    SED RATE mm/hr  --  49*  --   --   --   --   --   --   --    TRIGLYCERIDES mg/dL  --   --  119  --  123  --   --   --  305*          Lab Results   Component Value Date    TROPONINT 631 (C) 10/25/2023     Lab Results   Component Value Date    TSH 8.790 (H) 10/25/2023     Lab Results   Component Value Date    LACTATE 1.0 11/05/2023     No results found for: \"CORTISOL\"    Results from last 7 days   Lab Units 11/03/23  1348 11/02/23  1853 11/02/23  1142 11/02/23  0457 11/02/23  0137 " 11/02/23  0132   PH, ARTERIAL pH units 7.413 7.388 7.376 7.328*  --  7.354   PCO2, ARTERIAL mm Hg 37.5 39.0 40.7 46.3*  --  43.6   PO2 ART mm Hg 67.7* 77.7* 86.0 88.2  --  69.9*   HCO3 ART mmol/L 24.0 23.5 23.8 24.3  --  24.3   FIO2 % 36 40 40 50 40 40           I reviewed the patient's results, images and medication.    Assessment & Plan   ASSESSMENT        Multivessel CAD with unstable angina pectoris manifesting as dyspnea    Acute exacerbation of congestive heart failure    Non-STEMI (non-ST elevated myocardial infarction)    Ischemic cardiomyopathy with LVEF 31%    Hypertensive urgency with diastolic dysfunction    Acute hypoxemic respiratory failure due to pulmonary edema    S/P CABG x 4 on 11/2/2023    Postop ANURADHA resolved    Postop Encephalopathy    Postoperative multiple bilateral embolic strokes    HLD (hyperlipidemia)    Postop acute blood loss anemia      DISCUSSION: No change or improvement in neurologic status.  Long discussion with the daughter.  She was informed that patient would require a PEG feeding tube as skilled nursing facilities will not take patient with an ND tube.  Numerous questions answered regarding patient's prognosis.  I indicated because of the bilateral extensive nature of her infarcts and her age of 78 prognosis for recovery to a functional state was poor.  We discussed CODE STATUS and were in agreement that should patient suddenly deteriorated she would not wish her to undergo aggressive resuscitative measures with CPR, or to be intubated and placed back on the ventilator.  She does however want to continue good general care and hope for the best with respect to recovery.  She does agree to proceeding with a PEG feeding tube    PLAN     1.  CODE STATUS changed to DNR but full support except she is not to be intubated  2.  Continue enteral feeds and free water and follow-up sodium  3.  PEG feeding tube per Dr. Cleveland  4.  Has completed course of Rocephin  5.  Increase Levemir to 12  units every 12 hours    Plan of care and goals reviewed with multidisciplinary team at daily rounds.    I discussed the patient's findings and my recommendations with family, nursing staff, and primary care team    Patient is critically ill due to multiple cerebral infarcts and subsequent poor neurologic status.  She has a high risk of life-threatening decline in condition and requires continuous monitoring and frequent reassessment of condition for adjustment of management in order to lessen risk.  I visited the patient's bedside and interacted with nurse numerous times today.    Time spent Critical care 35 min (It does not include procedure time).    Electronically signed by Dirk Thomas MD, 11/08/23, 7:15 AM EST.   Pulmonary / Critical care medicine

## 2023-11-08 NOTE — CASE MANAGEMENT/SOCIAL WORK
Discharge Planning Assessment  [facility]     Patient Name: Michael Cochran  MRN: [id number]  Today's Date: 11/8/2023    Admit Date: 10/24/2023    Plan: SNF to long-term care   Discharge Needs Assessment    No documentation.                  Discharge Plan       Row Name 11/08/23 1505       Plan    Plan SNF to long-term care    Patient/Family in Agreement with Plan yes    Plan Comments Discussed patient in MDR. Currently requiring TF thru nasal tube. Not following any commands. Continuing to requiring supplemental O2. PEG placement discussed. Will look at SNF to LTC placement once PEG is placed.    Final Discharge Disposition Code 03 - skilled nursing facility (SNF)                  Continued Care and Services - Admitted Since 10/24/2023    Coordination has not been started for this encounter.       Expected Discharge Date and Time       Expected Discharge Date Expected Discharge Time    Nov 14, 2023            Demographic Summary    No documentation.                  Functional Status    No documentation.                  Psychosocial    No documentation.                  Abuse/Neglect    No documentation.                  Legal    No documentation.                  Substance Abuse    No documentation.                  Patient Forms    No documentation.                     Gricel Flowers RN

## 2023-11-08 NOTE — PROGRESS NOTES
[facility]  IP Progress Note      Chief Complaint/Reason for service: Number status post recent non-STEMI secondary to severe multivessel disease and subsequent CABG #2 ischemic cardiomyopathy with resolution of LV systolic dysfunction #3 postop mental status abnormalities with MRI evidence of stroke    Subjective   Subjective: The patient remains lethargic.  Current systolic blood pressure 153    Past medical, surgical, social and family history reviewed in the patient's electronic medical record.    Objective     Vital Sign Min/Max for last 24 hours  Temp  Min: 98.3 °F (36.8 °C)  Max: 98.8 °F (37.1 °C)   BP  Min: 122/77  Max: 177/109   Pulse  Min: 61  Max: 90   Resp  Min: 14  Max: 22   SpO2  Min: 91 %  Max: 98 %   Flow (L/min)  Min: 1  Max: 15      Intake/Output Summary (Last 24 hours) at 11/8/2023 0655  Last data filed at 11/8/2023 0600  Gross per 24 hour   Intake 3254 ml   Output 4050 ml   Net -796 ml             Current Facility-Administered Medications:     acetaminophen (TYLENOL) tablet 650 mg, 650 mg, Nasogastric, Q8H, Dirk Cleveland MD, 650 mg at 11/07/23 2343    amLODIPine (NORVASC) tablet 10 mg, 10 mg, Nasogastric, Q24H, Madhu Sánchez MD, 10 mg at 11/07/23 0816    aspirin tablet 325 mg, 325 mg, Nasogastric, Daily, Dirk Cleveland MD, 325 mg at 11/07/23 0816    atorvastatin (LIPITOR) tablet 80 mg, 80 mg, Nasogastric, Nightly, Dirk Cleveland MD, 80 mg at 11/07/23 2152    bisacodyl (DULCOLAX) suppository 10 mg, 10 mg, Rectal, Daily PRN, Dirk Cleveland MD    carvedilol (COREG) tablet 6.25 mg, 6.25 mg, Nasogastric, BID With Meals, Madhu Sánchez MD, 6.25 mg at 11/07/23 1711    dextrose (D50W) (25 g/50 mL) IV injection 25 g, 25 g, Intravenous, Q15 Min PRN, Lauro Salomon MD    dextrose (GLUTOSE) oral gel 15 g, 15 g, Oral, Q15 Min PRN, Lauro Salomon MD    sennosides (SENOKOT) 8.8 MG/5ML syrup 10 mL, 10 mL, Nasogastric, BID, 10 mL at 11/07/23 0816 **AND** docusate  sodium (COLACE) liquid 100 mg, 100 mg, Nasogastric, BID, Dirk Cleveland MD, 100 mg at 11/07/23 0816    glucagon (GLUCAGEN) injection 1 mg, 1 mg, Intramuscular, Q15 Min PRN, Lauro Salomon MD    heparin 35426 units/250 mL (100 units/mL) in 0.45 % NaCl infusion, 13 Units/kg/hr, Intravenous, Titrated, Ash Gann [facility], Last Rate: 12.11 mL/hr at 11/07/23 1344, 13 Units/kg/hr at 11/07/23 1344    hydrALAZINE (APRESOLINE) tablet 50 mg, 50 mg, Oral, Q6H, Madhu Sánchez MD, 50 mg at 11/08/23 0552    insulin detemir (LEVEMIR) injection 10 Units, 10 Units, Subcutaneous, Q12H, Dirk Thomas MD, 10 Units at 11/07/23 2152    insulin regular (humuLIN R,novoLIN R) injection 2-9 Units, 2-9 Units, Subcutaneous, Q6H, Lauro Salomon MD, 2 Units at 11/08/23 0552    insulin regular (humuLIN R,novoLIN R) injection 4 Units, 4 Units, Subcutaneous, Q6H, Dirk Thomas MD, 4 Units at 11/08/23 0552    ipratropium-albuterol (DUO-NEB) nebulizer solution 3 mL, 3 mL, Nebulization, Q4H PRN, Robyn Newton PA-C    Magnesium Cardiology Dose Replacement - Follow Nurse / BPA Driven Protocol, , Does not apply, PRN, Robyn Newton PA-C    methylphenidate (RITALIN) tablet 5 mg, 5 mg, Nasogastric, Daily, Dirk Thomas MD    niCARdipine (CARDENE) 25mg in 250mL NS infusion, 5-15 mg/hr, Intravenous, Titrated, Dirk Cleveland MD, Stopped at 11/06/23 1530    nitroglycerin (NITROSTAT) SL tablet 0.4 mg, 0.4 mg, Sublingual, Q5 Min PRN, Robyn Newton PA-C    ondansetron (ZOFRAN) injection 4 mg, 4 mg, Intravenous, Q6H PRN, Robyn Newton PA-C    Pharmacy Consult - MTM, , Does not apply, Daily, Robyn Newton PA-C, Given at 10/27/23 1025    Pharmacy to Dose Heparin, , Does not apply, Continuous PRN, Ranjan Woods MD    Phosphorus Replacement - Follow Nurse / BPA Driven Protocol, , Does not apply, PRN, Robyn Newton PA-C    Potassium Replacement - Follow Nurse / BPA Driven Protocol, , Does not apply, PRN,  Robyn Newton PA-C    sodium chloride 0.9 % flush 10 mL, 10 mL, Intravenous, Q12H, Robyn Newton PA-C, 10 mL at 11/07/23 0819    sodium chloride 0.9 % flush 10 mL, 10 mL, Intravenous, PRN, Robyn Newton PA-CROW    sodium chloride 0.9 % flush 10 mL, 10 mL, Intravenous, Q12H, Dirk Cleveland MD, 10 mL at 11/07/23 1203    sodium chloride 0.9 % flush 10 mL, 10 mL, Intravenous, PRN, Dirk Cleveland MD    sodium chloride 0.9 % infusion 40 mL, 40 mL, Intravenous, PRN, Robyn Newton PA-C    Physical Exam: General elderly female in bed does not respond to physical stimulation with examination        HEENT: No JVP.  Feeding tube present       Respiratory: Equal bilateral symmetrical expansion overall clear to auscultation       Cardiovascular: Regular rate and rhythm without murmur gallop or click.  She does have pitting edema to palpation       GI: Obese and soft       Lower Extremities: No lesions       Neuro: Cannot assess due to patient unresponsiveness       Skin: Warm and dry with pitting edema to palpation       Psych: Cannot assess due to patient's unresponsiveness    Results Review: Input exceeds outtake by 6.7 L.  Heart rates in the 80s.  Blood pressures 1 22-1 73 systolic GFR 79.8 sodium 148 hemoglobin 8.4    Radiology Results:  Imaging Results (Last 72 Hours)       Procedure Component Value Units Date/Time    XR Chest 1 View [[id number]] Resulted: 11/08/23 0309     Updated: 11/08/23 0309    XR Chest 1 View [[id number]] Collected: 11/06/23 0714     Updated: 11/06/23 0720    Narrative:      XR CHEST 1 VW    Date of Exam: 11/6/2023 2:50 AM EST    Indication: post CABG    Comparison: 11/4/2023    Findings:  Stable enlargement of the cardiac silhouette with surgical changes post CABG. There is no change in position of a right IJ introducer. Stable position of an OG tube. Mild basilar atelectasis. The lungs are otherwise clear. Small left pleural effusion      Impression:      Impression:  Stable  chest      Electronically Signed: John العراقي MD    11/6/2023 7:17 AM EST    Workstation ID: [id number]    MRI Brain Without Contrast [[id number]] Collected: 11/06/23 0153     Updated: 11/06/23 0158    Narrative:      MRI BRAIN WO CONTRAST    Date of Exam: 11/6/2023 12:40 AM EST    Indication: AMS.     Comparison: 11/4/2023.    Technique:  Routine multiplanar/multisequence sequence images of the brain were obtained without contrast administration.      Findings:  Multiple small foci of diffusion restriction are seen within the paramedian bilateral frontal and parietal lobes extending to the bilateral basal ganglia. Patchy diffusion restriction is present within the left cerebellum. Corresponding decreased signal   is present within the ADC map. Patchy FLAIR signal changes are seen corresponding to the areas of diffusion restriction. Moderate periventricular and subcortical FLAIR signal changes are present. There is no evidence of acute or chronic intracranial   hemorrhage. No mass effect or midline shift. No abnormal extra-axial collections. The major vascular flow voids appear intact. The basal ganglia, brainstem and cerebellum appear within normal limits. Calvarial and superficial soft tissue signal is within   normal limits. Orbits appear unremarkable. The paranasal sinuses and the mastoid air cells appear well aerated. Midline structures are intact.         Impression:      Impression:  1.Multiple small foci of diffusion restriction present within the paramedian bilateral frontal and parietal lobes extending to the bilateral basal ganglia as well as the left cerebellum consistent with acute to subacute infarcts. Findings are more   subacute given presence of FLAIR signal changes corresponding to these regions related to gliosis. Given the distribution findings are likely related to a watershed type episode. No evidence of hemorrhage, mass effect or midline shift.  2.Moderate periventricular and subcortical  FLAIR signal changes likely related to chronic microvascular ischemic change.        Electronically Signed: Radha Cedillo MD    11/6/2023 1:55 AM EST    Workstation ID: [id number]    CT Abdomen Pelvis Without Contrast [[id number]] Collected: 11/05/23 0811     Updated: 11/05/23 0827    Narrative:      CT ABDOMEN PELVIS WO CONTRAST    Date of Exam: 11/4/2023 10:35 PM EDT    Indication: Abdominal pain, postoperative acute blood loss, anemia.    Comparison: KUB performed on 11/3/2023.    Technique: Axial CT images were obtained of the abdomen and pelvis without the administration of contrast. Reconstructed coronal and sagittal images were also obtained. Automated exposure control and iterative construction methods were used.      Findings:  The study is limited by noncontrasted technique. There are tiny calcified gallstones. Unenhanced images of the liver, adrenal glands, pancreas, spleen, and left kidney are unremarkable. There are round masses in the right kidney which are difficult to   evaluate. Statistically, they probably represent renal cysts but could be evaluated further with a renal ultrasound or repeat CT exam with IV contrast. The uterus is enlarged extending into the upper pelvis. It is of heterogeneous density and the patient   may have fibroids. The adnexal structures are normal. There is a Nolasco cath in the urinary bladder. The patient also has a rectal catheter in place. There is increased stool in the rectosigmoid colon. There are no dilated loops of bowel to indicate an   obstructive process. There is no abnormal bowel wall thickening. The patient has a feeding tube in place with the tip terminating in the duodenum. There are atherosclerotic vascular calcifications throughout the abdomen and pelvis. There is a small fat   density left inguinal hernia. There is no retroperitoneal hematoma. There is no free fluid in the abdomen or pelvis. Within the lower chest, there are extensive postsurgical changes. The  patient has bilateral chest tubes and a mediastinal chest tube in   place. The heart is enlarged. There are atherosclerotic vascular calcifications. There are trace pleural effusions. There is bilateral lower lobe atelectasis. There are no suspicious osteolytic or sclerotic lesions within the bony structures.      Impression:      Impression:    1. The study is limited by noncontrasted technique.  2. No retroperitoneal hematoma. There is no free fluid in the abdomen or pelvis.  3. Cholelithiasis.  4. Round masses in the right kidney difficult to evaluate without contrast. Statistically, they probably represent renal cysts but could be evaluated further with either renal ultrasound or a repeat CT exam with IV contrast.  5. Mild rectal sigmoid colon fecal impaction. There is a rectal catheter in place.  6. Small fat density left inguinal hernia.  7. Postsurgical changes within the lower chest.  8. Enlarged uterus with questionable fibroids.          Electronically Signed: Larry Evans MD    11/5/2023 8:23 AM EST    Workstation ID: [id number]            EKG: Sinus bradycardia poor eye progression T wave abnormality    ECHO: Repeat echo shows EF 60% with LVH    Tele: Rhythm is sinus with no A-fib or a flutter    Assessment   Assessment/Plan: Status post recent non-STEMI secondary to multivessel CAD.  Patient underwent successful four-vessel CABG. continue aspirin statin and beta-blocker  2 hypertension-has occasional elevated blood pressure will titrate meds  3 mental status abnormalities with MRI evidence of bilateral acute events.  Review of telemetry shows no A-fib or flutter.    Gurmeet Colón MD  11/08/23  06:55 EST

## 2023-11-08 NOTE — TELEPHONE ENCOUNTER
Called and spoke to patient's daughter.  I explained at this point my advice would be until she recovers from her current cardiopulmonary issues it would be my strong advice that we not to try to work-up any postmenopausal bleeding she is having.  If she is hemorrhaging I am happy to see her in the hospital but for now trying to do pelvic exam, endometrial sampling or transvaginal ultrasound seems not a priority until such time as she becomes more clinically stable.  Her daughter has our phone number and knows were happy to see her mother at any point.  Her daughter was very comfortable with this plan and will reach out to us should she need us.

## 2023-11-08 NOTE — PLAN OF CARE
Problem: Adult Inpatient Plan of Care  Goal: Plan of Care Review  Outcome: Ongoing, Progressing     Problem: Adjustment to Surgery (Cardiovascular Surgery)  Goal: Optimal Coping with Heart Surgery  Outcome: Ongoing, Progressing     Problem: Restraint, Nonviolent  Goal: Absence of Harm or Injury  Outcome: Ongoing, Progressing  Intervention: Implement Least Restrictive Safety Strategies  Intervention: Protect Dignity, Rights, and Personal Wellbeing  Intervention: Protect Skin and Joint Integrity   Goal Outcome Evaluation:           Progress: no change

## 2023-11-08 NOTE — NURSING NOTE
Pt had uneventful day.     No change in neuro status. Adequate urine output. CT of head without ordered. Called CT and they stated they will call back when ready. Heparin drip turned off at 11:00. SCD's on, Boots on, tube feeding going at goal and pt tolerating.

## 2023-11-08 NOTE — PROGRESS NOTES
Cardiothoracic Surgery Progress Note    POD # 7 s/p CABG x 4     LOS: 15 days      Subjective:  No acute events per nursing    Objective:  Vital Signs  Temp:  [98.3 °F (36.8 °C)-98.8 °F (37.1 °C)] 98.8 °F (37.1 °C)  Heart Rate:  [61-90] 89  Resp:  [14-22] 20  BP: (122-177)/() 122/77    Physical Exam:   General Appearance:  Does not follow commands.  Moving extremities, withdraws to pain, PERRL   Lungs: clear to auscultation, respirations regular, respirations even, and respirations unlabored   Heart: regular rhythm & normal rate, normal S1, S2, and no murmur, no gallop, no rub   Skin: Incision c/d/i     Results:    Results from last 7 days   Lab Units 11/08/23  0534   WBC 10*3/mm3 14.41*   HEMOGLOBIN g/dL 8.4*   HEMATOCRIT % 29.3*   PLATELETS 10*3/mm3 279     Results from last 7 days   Lab Units 11/08/23  0534   SODIUM mmol/L 148*   POTASSIUM mmol/L 4.1   CHLORIDE mmol/L 115*   CO2 mmol/L 22.0   BUN mg/dL 30*   CREATININE mg/dL 0.76   GLUCOSE mg/dL 182*   CALCIUM mg/dL 8.9       Assessment:  POD # 7 s/p CABG x 4    Plan:  Heparin drip per Neurology  Minimize sedation/narcotics  Pulmonary toilet  Continue enteric feeds  Permissive hypertension per Neurology   Head of bed elevated at all times  PEG tube to facilitate enteric nutrition - will need to discuss with family  Will require LTACH upon D/C    Dirk Cleveland MD  11/08/23  07:08 EST

## 2023-11-08 NOTE — PROGRESS NOTES
Stroke Progress Note       Chief Complaint: Encephalopathy  Subjective    Subjective     Subjective:  Patient continues to be altered, does not follow any commands.    Review of Systems   Unable to obtain    Objective      Temp:  [98.7 °F (37.1 °C)-98.8 °F (37.1 °C)] 98.8 °F (37.1 °C)  Heart Rate:  [70-90] 79  Resp:  [14-22] 20  BP: (122-177)/() 122/70          Patient continues to be nonverbal, does not open eyes spontaneously, bilateral eyelid apraxia  Pupils equal round reactive, corneal intact, cough and gag intact.  Minimal withdrawal on on all extremities, more on the right compared to the left.  Bilateral upgoing toe.    Results Review:    I reviewed the patient's new clinical results.    Lab Results (last 24 hours)       Procedure Component Value Units Date/Time    Basic Metabolic Panel [[id number]]  (Abnormal) Collected: 11/08/23 0534    Specimen: Blood Updated: 11/08/23 0646     Glucose 182 mg/dL      BUN 30 mg/dL      Creatinine 0.76 mg/dL      Sodium 148 mmol/L      Potassium 4.1 mmol/L      Chloride 115 mmol/L      CO2 22.0 mmol/L      Calcium 8.9 mg/dL      BUN/Creatinine Ratio 39.5     Anion Gap 11.0 mmol/L      eGFR 79.8 mL/min/1.73     Narrative:      GFR Normal >60  Chronic Kidney Disease <60  Kidney Failure <15    The GFR formula is only valid for adults with stable renal function between ages 18 and 70.    Magnesium [[id number]]  (Normal) Collected: 11/08/23 0534    Specimen: Blood Updated: 11/08/23 0646     Magnesium 1.9 mg/dL     Heparin Anti-Xa [[id number]]  (Abnormal) Collected: 11/08/23 0534    Specimen: Blood Updated: 11/08/23 0642     Heparin Anti-Xa (UFH) 0.26 IU/ml     Narrative:      Verified by repeat analysis.      CBC (No Diff) [[id number]]  (Abnormal) Collected: 11/08/23 0534    Specimen: Blood Updated: 11/08/23 0605     WBC 14.41 10*3/mm3      RBC 2.97 10*6/mm3      Hemoglobin 8.4 g/dL      Hematocrit 29.3 %      MCV 98.7 fL      MCH 28.3 pg      MCHC 28.7 g/dL      RDW 14.2 %   "    RDW-SD 49.1 fl      MPV 12.8 fL      Platelets 279 10*3/mm3     POC Glucose Once [[id number]]  (Abnormal) Collected: 11/08/23 0514    Specimen: Blood Updated: 11/08/23 0515     Glucose 176 mg/dL     POC Glucose Once [[id number]]  (Abnormal) Collected: 11/07/23 2322    Specimen: Blood Updated: 11/07/23 2323     Glucose 166 mg/dL     Heparin Anti-Xa [[id number]]  (Normal) Collected: 11/07/23 1719    Specimen: Blood Updated: 11/07/23 1846     Heparin Anti-Xa (UFH) 0.30 IU/ml     POC Glucose Once [[id number]]  (Abnormal) Collected: 11/07/23 1737    Specimen: Blood Updated: 11/07/23 1740     Glucose 167 mg/dL     Blood Culture - Blood, Arm, Right [[id number]]  (Normal) Collected: 11/05/23 1517    Specimen: Blood from Arm, Right Updated: 11/07/23 1630     Blood Culture No growth at 2 days    Heparin Anti-Xa [[id number]]  (Normal) Collected: 11/07/23 1203    Specimen: Blood Updated: 11/07/23 1251     Heparin Anti-Xa (UFH) 0.38 IU/ml     POC Glucose Once [[id number]]  (Abnormal) Collected: 11/07/23 1128    Specimen: Blood Updated: 11/07/23 1140     Glucose 202 mg/dL     Procalcitonin [[id number]]  (Normal) Collected: 11/07/23 0532    Specimen: Blood Updated: 11/07/23 1136     Procalcitonin 0.15 ng/mL     Narrative:      As a Marker for Sepsis (Non-Neonates):    1. <0.5 ng/mL represents a low risk of severe sepsis and/or septic shock.  2. >2 ng/mL represents a high risk of severe sepsis and/or septic shock.    As a Marker for Lower Respiratory Tract Infections that require antibiotic therapy:    PCT on Admission    Antibiotic Therapy       6-12 Hrs later    >0.5                Strongly Recommended  >0.25 - <0.5        Recommended   0.1 - 0.25          Discouraged              Remeasure/reassess PCT  <0.1                Strongly Discouraged     Remeasure/reassess PCT    As 28 day mortality risk marker: \"Change in Procalcitonin Result\" (>80% or <=80%) if Day 0 (or Day 1) and Day 4 values are available. Refer to " http://www.[facility]-pct-calculator.com    Change in PCT <=80%  A decrease of PCT levels below or equal to 80% defines a positive change in PCT test result representing a higher risk for 28-day all-cause mortality of patients diagnosed with severe sepsis for septic shock.    Change in PCT >80%  A decrease of PCT levels of more than 80% defines a negative change in PCT result representing a lower risk for 28-day all-cause mortality of patients diagnosed with severe sepsis or septic shock.             XR Chest 1 View    Result Date: 11/8/2023  Impression: Support lines and tubes as discussed without significant change otherwise Electronically Signed: Marc Castillo MD  11/8/2023 6:42 AM CST  Workstation ID: [id number]   Results for orders placed during the hospital encounter of 10/24/23    Adult Transthoracic Echo Limited W/ Cont if Necessary Per Protocol    Interpretation Summary    Left ventricular systolic function is normal. Estimated left ventricular EF = 60%    Left ventricular wall thickness is consistent with mild concentric hypertrophy.    Trace to mild mitral regurgitation.    Saline test results are negative for intracardiac shunting.    There is a trivial pericardial effusion adjacent to the left ventricle.            Assessment/Plan     Assessment/Plan:  Multifocal bihemispheric embolic strokes,-this could be secondary to macro-embolism from atherosclerotic central arteries with hypoperfusion during intraoperative / early postoperative.  -Patient carotid ultrasound shows no evidence of significant plaque or stenosis.  -Patient intraoperative echo does not evidence any thrombus/normal EF.  -Discontinue heparin, as there is no evidence of atrial flutter/hypokinesis/thrombus on the echo.  -Recommend aspirin and statin.  -Need to continue to monitor for any arrhythmia   -Will get a CT head today   -Normal blood pressure goals.    Prognosis is guarded, needs goals of care discussion.  Patient will be left with  significant disability due to stroke. I anticipate patient will be going to a long term care requiring PEG.   Critically ill, spent more than 35 minutes  discussing the plan with multidisciplinary team.    Stroke team will continue to follow the patient.       Tom Randall MD  11/08/23  11:01 EST

## 2023-11-08 NOTE — THERAPY TREATMENT NOTE
Patient Name: Michael Cochran  : 1945    MRN: [id number]                              Today's Date: 2023       Admit Date: 10/24/2023    Visit Dx:     ICD-10-CM ICD-9-CM   1. Elevated troponin  R79.89 790.6   2. Acute on chronic congestive heart failure, unspecified heart failure type  I50.9 428.0   3. Hypertensive emergency  I16.1 401.9   4. Acute respiratory failure with hypoxia  J96.01 518.81   5. Pleural effusion, left  J90 511.9   6. Hypokalemia  E87.6 276.8   7. Non-STEMI (non-ST elevated myocardial infarction)  I21.4 410.70   8. Coronary artery disease involving native coronary artery of native heart with unstable angina pectoris  I25.110 414.01     411.1     Patient Active Problem List   Diagnosis    HLD (hyperlipidemia)    Acute hypoxemic respiratory failure due to pulmonary edema    Acute exacerbation of congestive heart failure    Non-STEMI (non-ST elevated myocardial infarction)    Multivessel CAD with unstable angina pectoris manifesting as dyspnea    S/P CABG x 4 on 2023    Ischemic cardiomyopathy with LVEF 31%    Hypertensive urgency with diastolic dysfunction    Postop ANURADHA resolved    Postop Encephalopathy    Postop acute blood loss anemia    Postoperative multiple bilateral embolic strokes     Past Medical History:   Diagnosis Date    Hyperlipidemia     Hypertension      Past Surgical History:   Procedure Laterality Date    BREAST FIBROADENOMA SURGERY      9 y/o-was benign    CARDIAC CATHETERIZATION N/A 10/26/2023    Procedure: Left Heart Cath;  Surgeon: Jordi Hodge MD;  Location:  [facility] CATH INVASIVE LOCATION;  Service: Cardiovascular;  Laterality: N/A;    CORONARY ARTERY BYPASS GRAFT N/A 2023    Procedure: MEDIAN STERNOTOMY, CORONARY ARTERY BYPASS GRAFTING X4, UTILIZING THE LEFT INTERANL MAMMARY ARTERY, EVH OF THE LEFT GREATER SAPHENOUS VEIN, EXPLORATION OF THE RIGHT LEG, PRIMITIVO PER ANETHESIA;  Surgeon: Dirk Cleveland MD;  Location: [facility] OR;  Service: Cardiothoracic;   Laterality: N/A;      General Information       Row Name 11/08/23 1535          Physical Therapy Time and Intention    Document Type therapy note (daily note)  -AE     Mode of Treatment physical therapy  -AE       Row Name 11/08/23 1535          General Information    Patient Profile Reviewed yes  -AE     Existing Precautions/Restrictions cardiac;fall;sternal;other (see comments)  NG, L sided weakness/tone, global aphasia  -AE     Barriers to Rehab medically complex;cognitive status  -AE       Row Name 11/08/23 1535          Cognition    Orientation Status (Cognition) unable/difficult to assess  -AE       Row Name 11/08/23 1535          Safety Issues, Functional Mobility    Safety Issues Affecting Function (Mobility) awareness of need for assistance;insight into deficits/self-awareness;safety precaution awareness;safety precautions follow-through/compliance;sequencing abilities  -AE     Impairments Affecting Function (Mobility) balance;cognition;endurance/activity tolerance;postural/trunk control;strength;coordination;motor control;motor planning;grasp;muscle tone abnormal;visual/perceptual  -AE     Cognitive Impairments, Mobility Safety/Performance attention;awareness, need for assistance;safety precaution awareness;safety precaution follow-through;sequencing abilities;insight into deficits/self-awareness;judgment  -AE     Comment, Safety Issues/Impairments (Mobility) No eye opening and unable to follow commands this session.  -AE               User Key  (r) = Recorded By, (t) = Taken By, (c) = Cosigned By      Initials Name Provider Type    AE Jcarlos Rodriguez PT Physical Therapist                   Mobility       Row Name 11/08/23 1539          Bed Mobility    Bed Mobility rolling left;rolling right;supine-sit;sit-supine  -AE     Rolling Left [place] (Bed Mobility) dependent (less than 25% patient effort);2 person assist  -AE     Rolling Right [place] (Bed Mobility) dependent (less than 25% patient  effort);2 person assist  -AE     Scooting/Bridging [place] (Bed Mobility) dependent (less than 25% patient effort);2 person assist  -AE     Supine-Sit [place] (Bed Mobility) dependent (less than 25% patient effort);2 person assist  -AE     Sit-Supine [place] (Bed Mobility) dependent (less than 25% patient effort);2 person assist;verbal cues  -AE     Assistive Device (Bed Mobility) draw sheet;head of bed elevated  -AE     Comment, (Bed Mobility) VCs for attempts to follow commands while rolling in bed and completing supine-sit transfers. Pt required dep A with use of draw sheet for all mobility, no eye opening noted throughout session despite attempts.  -AE       Row Name 11/08/23 1539          Transfers    Comment, (Transfers) Deferred d/t poor sitting balance and decreased command following  -AE       Row Name 11/08/23 1539          Bed-Chair Transfer    Bed-Chair [place] (Transfers) unable to assess  -AE       Row Name 11/08/23 1539          Sit-Stand Transfer    Sit-Stand [place] (Transfers) unable to assess  -AE       Row Name 11/08/23 1539          Gait/Stairs (Locomotion)    [place] Level (Gait) unable to assess  -AE               User Key  (r) = Recorded By, (t) = Taken By, (c) = Cosigned By      Initials Name Provider Type    AE Jcarlos Rodriguez PT Physical Therapist                   Obj/Interventions       Row Name 11/08/23 1541          Motor Skills    Therapeutic Exercise knee  -AE       Row Name 11/08/23 1541          Knee (Therapeutic Exercise)    Knee (Therapeutic Exercise) PROM (passive range of motion)  -AE     Knee PROM (Therapeutic Exercise) bilateral;flexion;extension;5 repetitions  -AE       Row Name 11/08/23 1541          Balance    Balance Assessment sitting static balance;sitting dynamic balance  -AE     Static Sitting Balance dependent;2-person assist  -AE     Dynamic Sitting Balance dependent;2-person assist  -AE     Position, Sitting Balance supported;sitting  edge of bed  -AE     Comment, Balance Attempts to improve sitting balance while sitting EOB. Pt unable to improve despite weight shifting activities and attempts to improve RUE placement for balance.  -AE               User Key  (r) = Recorded By, (t) = Taken By, (c) = Cosigned By      Initials Name Provider Type    AE Jcarlos Rodriguez PT Physical Therapist                   Goals/Plan    No documentation.                  Clinical Impression       Row Name 11/08/23 1544          Pain Scale: FACES Pre/Post-Treatment    Pain: FACES Scale, Pretreatment 0-->no hurt  -AE     Posttreatment Pain Rating 0-->no hurt  -AE       Row Name 11/08/23 1544          Plan of Care Review    Plan of Care Reviewed With patient  -AE     Progress declining  -AE     Outcome Evaluation Pt continues to present with decreased command following and no eye opening despite attempts to improve. Pt tolerated dep A x2 rolling in bed and supine to sit/sit to supine transfers. Pt unable to improve upright sitting balance while sitting EOB despite attempts of weight shifting and placement of RUE. Continue to progress as appropriate.  -AE       Row Name 11/08/23 1544          Vital Signs    Pre Systolic BP Rehab 122  -AE     Pre Treatment Diastolic BP 61  -AE     Post Systolic BP Rehab 116  -AE     Post Treatment Diastolic BP 68  -AE     Pretreatment Heart Rate (beats/min) 77  -AE     Posttreatment Heart Rate (beats/min) 78  -AE     O2 Delivery Pre Treatment room air  -AE     O2 Delivery Intra Treatment room air  -AE     Post SpO2 (%) 92  -AE     O2 Delivery Post Treatment room air  -AE     Pre Patient Position Supine  -AE     Intra Patient Position Sitting  -AE     Post Patient Position Side Lying  -AE       Row Name 11/08/23 1544          Positioning and Restraints    Pre-Treatment Position in bed  -AE     Post Treatment Position bed  -AE     In Bed notified nsg;side lying right;call light within reach;encouraged to call for assist;side rails up  x3;LUE elevated;RUE elevated;RLE elevated;LLE elevated;waffle boots/both  -AE               User Key  (r) = Recorded By, (t) = Taken By, (c) = Cosigned By      Initials Name Provider Type    Jcarlos Borden, PT Physical Therapist                   Outcome Measures       Row Name 11/08/23 1548          How much help from another person do you currently need...    Turning from your back to your side while in flat bed without using bedrails? 1  -AE     Moving from lying on back to sitting on the side of a flat bed without bedrails? 1  -AE     Moving to and from a bed to a chair (including a wheelchair)? 1  -AE     Standing up from a chair using your arms (e.g., wheelchair, bedside chair)? 1  -AE     Climbing 3-5 steps with a railing? 1  -AE     To walk in hospital room? 1  -AE     AM-PAC 6 Clicks Score (PT) 6  -AE     Highest level of mobility 2 --> Bed activities/dependent transfer  -AE       Row Name 11/08/23 1548          Functional Assessment    Outcome Measure Options AM-PAC 6 Clicks Basic Mobility (PT)  -AE               User Key  (r) = Recorded By, (t) = Taken By, (c) = Cosigned By      Initials Name Provider Type    Jcarlos Borden PT Physical Therapist                                 Physical Therapy Education       Title: PT OT SLP Therapies (In Progress)       Topic: Physical Therapy (In Progress)       Point: Mobility training (In Progress)       Learning Progress Summary             Patient Acceptance, E, NR by AE at 11/8/2023 1319    Acceptance, E, NR by SUE at 11/7/2023 1300    Acceptance, E, NR by AE at 11/6/2023 1310    Acceptance, E, NR by KG at 11/5/2023 0940    Acceptance, E, VU,NR by ML at 10/27/2023 1544    Comment: continued mobility while awaiting CABG, sternal precautions following CABG    Acceptance, E,D, VU,NR by LR at 10/25/2023 1327    Comment: Educated on benefits of mobility, correct sit<->stand t/f technique, correct gait mechanics, PLB, energy conservation, and progression of  POC.                         Point: Home exercise program (In Progress)       Learning Progress Summary             Patient Acceptance, E, NR by AE at 11/8/2023 1319    Acceptance, E, NR by SUE at 11/7/2023 1300    Acceptance, E, NR by AE at 11/6/2023 1310    Acceptance, E, NR by KG at 11/5/2023 0940                         Point: Body mechanics (In Progress)       Learning Progress Summary             Patient Acceptance, E, NR by AE at 11/8/2023 1319    Acceptance, E, NR by SUE at 11/7/2023 1300    Acceptance, E, NR by AE at 11/6/2023 1310    Acceptance, E, NR by KG at 11/5/2023 0940    Acceptance, E,D, VU,NR by LR at 10/25/2023 1327    Comment: Educated on benefits of mobility, correct sit<->stand t/f technique, correct gait mechanics, PLB, energy conservation, and progression of POC.                         Point: Precautions (In Progress)       Learning Progress Summary             Patient Acceptance, E, NR by AE at 11/8/2023 1319    Acceptance, E, NR by SUE at 11/7/2023 1300    Acceptance, E, NR by AE at 11/6/2023 1310    Acceptance, E, NR by KG at 11/5/2023 0940    Acceptance, E, VU,NR by ML at 10/27/2023 1544    Comment: continued mobility while awaiting CABG, sternal precautions following CABG    Acceptance, E,D, VU,NR by LR at 10/25/2023 1327    Comment: Educated on benefits of mobility, correct sit<->stand t/f technique, correct gait mechanics, PLB, energy conservation, and progression of POC.                                         User Key       Initials Effective Dates Name Provider Type Discipline    SUE 02/03/23 -  Oneida Daly, PT Physical Therapist PT    LR 02/03/23 -  Alissa Pierre, PT Physical Therapist PT    KG 05/22/20 -  Brittney Rocha, PT Physical Therapist PT    ML 04/22/21 -  Alisa Salguero Physical Therapist PT    AE 09/21/21 -  Jcarlos Rodriguez, PT Physical Therapist PT                  PT Recommendation and Plan     Plan of Care Reviewed With: patient  Progress:  declining  Outcome Evaluation: Pt continues to present with decreased command following and no eye opening despite attempts to improve. Pt tolerated dep A x2 rolling in bed and supine to sit/sit to supine transfers. Pt unable to improve upright sitting balance while sitting EOB despite attempts of weight shifting and placement of RUE. Continue to progress as appropriate.     Time Calculation:         PT Charges       Row Name 11/08/23 1549             Time Calculation    Start Time 1319  -AE      PT Received On 11/08/23  -AE      PT Goal Re-Cert Due Date 11/15/23  -AE         Timed Charges    06170 - PT Therapeutic Activity Minutes 24  -AE         Total Minutes    Timed Charges Total Minutes 24  -AE       Total Minutes 24  -AE                User Key  (r) = Recorded By, (t) = Taken By, (c) = Cosigned By      Initials Name Provider Type    AE Jcalros Rodriguez PT Physical Therapist                  Therapy Charges for Today       Code Description Service Date Service Provider Modifiers Qty    62606071923 HC PT THERAPEUTIC ACT EA 15 MIN 11/8/2023 Jcarlos Rodriguez, PT GP 2    32311823280 HC PT THER SUPP EA 15 MIN 11/8/2023 Jcarlos Rodriguez, PT GP 2            PT G-Codes  Outcome Measure Options: AM-PAC 6 Clicks Basic Mobility (PT)  AM-PAC 6 Clicks Score (PT): 6  AM-PAC 6 Clicks Score (OT): 6  Modified [place] Scale: 4 - Moderately severe disability.  Unable to walk without assistance, and unable to attend to own bodily needs without assistance.  PT Discharge Summary  Anticipated Discharge Disposition (PT): skilled nursing facility    Jcarlos Rodriguez PT  11/8/2023

## 2023-11-08 NOTE — PROGRESS NOTES
Multidisciplinary Rounds    Time: 20min  Patient Name: Michael Cochran  Date of Encounter: 23 10:25 EST  MRN: [id number]  Admission date: 10/24/2023      Reason for visit: MDR. RD to continue to follow per protocol.       EMR reviewed  Labs reviewed - Na (148)  Medications reviewed      Additional information obtained during MDR: No changes in neurological status. Pt still not following commands. Hypernatremia slowly improving. RN reports pt tolerating EN at goal rate. BM x2 within past 24 hours per I/Os. Bowel regimen medications last given yesterday morning. Intensivist plans to speak with pt's daughter today regarding GOC/POC. ?PEG      Current diet: NPO Diet NPO Type: Strict NPO      EN: Peptamen AF  Goal Rate: 70 ml/hr  Water Flushes: 50 ml/hr  Modular: None  Route: NG  Tube: Small bore    At goal over: 20Hrs/day  Rx will supply:   Goal Volume 1400  mL/day     Flush Volume 1000 mL/day     Energy 1680 Kcal/day 105 % Est Need   Protein 106 g/day 96 % Est Need   Fiber 8.5 g/day     Water in  EN 1135 mL     Total Water 2235 mL     Meet DRI Yes        --------------------------------------------------------------------------  Product/Rate verified at bedside: Yes  Infusing Rate at time of visit: 70 ml/hr, water @ 50 ml/hr    Average Delivery from [facility] Day:  Volume 1506 mL/day 108  % Goal Vol.   Flush Volume 1416 mL/day     Energy  Kcal/day  % Est Need   Protein  g/day  % Est Need   Fiber  g/day     Water in  EN  mL     Total Water  mL     Meet DRI No          Intervention:  Follow treatment plan  Care plan reviewed  Continue current EN regimen at this time.  Will continue to monitor GOC/POC.      Follow up:   Per protocol      Ioana Zuniga RD  10:00 EST

## 2023-11-09 ENCOUNTER — APPOINTMENT (OUTPATIENT)
Dept: CT IMAGING | Facility: HOSPITAL | Age: 78
End: 2023-11-09
Payer: MEDICARE

## 2023-11-09 LAB
ALBUMIN SERPL-MCNC: 3.1 G/DL (ref 3.5–5.2)
ANION GAP SERPL CALCULATED.3IONS-SCNC: 11 MMOL/L (ref 5–15)
BACTERIA UR QL AUTO: ABNORMAL /HPF
BASOPHILS # BLD AUTO: 0.03 10*3/MM3 (ref 0–0.2)
BASOPHILS NFR BLD AUTO: 0.2 % (ref 0–1.5)
BILIRUB UR QL STRIP: NEGATIVE
BUN SERPL-MCNC: 32 MG/DL (ref 8–23)
BUN/CREAT SERPL: 45.7 (ref 7–25)
CALCIUM SPEC-SCNC: 9 MG/DL (ref 8.6–10.5)
CHLORIDE SERPL-SCNC: 111 MMOL/L (ref 98–107)
CLARITY UR: ABNORMAL
CO2 SERPL-SCNC: 24 MMOL/L (ref 22–29)
COLOR UR: YELLOW
CREAT SERPL-MCNC: 0.7 MG/DL (ref 0.57–1)
DEPRECATED RDW RBC AUTO: 50.4 FL (ref 37–54)
EGFRCR SERPLBLD CKD-EPI 2021: 88.1 ML/MIN/1.73
EOSINOPHIL # BLD AUTO: 0.29 10*3/MM3 (ref 0–0.4)
EOSINOPHIL NFR BLD AUTO: 1.9 % (ref 0.3–6.2)
ERYTHROCYTE [DISTWIDTH] IN BLOOD BY AUTOMATED COUNT: 14.6 % (ref 12.3–15.4)
GLUCOSE BLDC GLUCOMTR-MCNC: 154 MG/DL (ref 70–130)
GLUCOSE BLDC GLUCOMTR-MCNC: 157 MG/DL (ref 70–130)
GLUCOSE BLDC GLUCOMTR-MCNC: 208 MG/DL (ref 70–130)
GLUCOSE BLDC GLUCOMTR-MCNC: 209 MG/DL (ref 70–130)
GLUCOSE SERPL-MCNC: 152 MG/DL (ref 65–99)
GLUCOSE UR STRIP-MCNC: NEGATIVE MG/DL
HCT VFR BLD AUTO: 25.7 % (ref 34–46.6)
HGB BLD-MCNC: 7.6 G/DL (ref 12–15.9)
HGB UR QL STRIP.AUTO: ABNORMAL
HYALINE CASTS UR QL AUTO: ABNORMAL /LPF
IMM GRANULOCYTES # BLD AUTO: 0.25 10*3/MM3 (ref 0–0.05)
IMM GRANULOCYTES NFR BLD AUTO: 1.7 % (ref 0–0.5)
KETONES UR QL STRIP: NEGATIVE
LEUKOCYTE ESTERASE UR QL STRIP.AUTO: ABNORMAL
LYMPHOCYTES # BLD AUTO: 1.86 10*3/MM3 (ref 0.7–3.1)
LYMPHOCYTES NFR BLD AUTO: 12.4 % (ref 19.6–45.3)
MAGNESIUM SERPL-MCNC: 2.2 MG/DL (ref 1.6–2.4)
MCH RBC QN AUTO: 29 PG (ref 26.6–33)
MCHC RBC AUTO-ENTMCNC: 29.6 G/DL (ref 31.5–35.7)
MCV RBC AUTO: 98.1 FL (ref 79–97)
MONOCYTES # BLD AUTO: 1.02 10*3/MM3 (ref 0.1–0.9)
MONOCYTES NFR BLD AUTO: 6.8 % (ref 5–12)
NEUTROPHILS NFR BLD AUTO: 11.56 10*3/MM3 (ref 1.7–7)
NEUTROPHILS NFR BLD AUTO: 77 % (ref 42.7–76)
NITRITE UR QL STRIP: NEGATIVE
NRBC BLD AUTO-RTO: 0.1 /100 WBC (ref 0–0.2)
PH UR STRIP.AUTO: 5.5 [PH] (ref 5–8)
PHOSPHATE SERPL-MCNC: 4.9 MG/DL (ref 2.5–4.5)
PLATELET # BLD AUTO: 277 10*3/MM3 (ref 140–450)
PMV BLD AUTO: 12.9 FL (ref 6–12)
POTASSIUM SERPL-SCNC: 4 MMOL/L (ref 3.5–5.2)
PROT UR QL STRIP: ABNORMAL
RBC # BLD AUTO: 2.62 10*6/MM3 (ref 3.77–5.28)
RBC # UR STRIP: ABNORMAL /HPF
REF LAB TEST METHOD: ABNORMAL
SODIUM SERPL-SCNC: 146 MMOL/L (ref 136–145)
SP GR UR STRIP: 1.01 (ref 1–1.03)
SQUAMOUS #/AREA URNS HPF: ABNORMAL /HPF
UROBILINOGEN UR QL STRIP: ABNORMAL
WBC # UR STRIP: ABNORMAL /HPF
WBC NRBC COR # BLD: 15.01 10*3/MM3 (ref 3.4–10.8)
YEAST URNS QL MICRO: ABNORMAL /HPF

## 2023-11-09 PROCEDURE — 97530 THERAPEUTIC ACTIVITIES: CPT

## 2023-11-09 PROCEDURE — 63710000001 INSULIN REGULAR HUMAN PER 5 UNITS: Performed by: INTERNAL MEDICINE

## 2023-11-09 PROCEDURE — 82948 REAGENT STRIP/BLOOD GLUCOSE: CPT

## 2023-11-09 PROCEDURE — 70450 CT HEAD/BRAIN W/O DYE: CPT

## 2023-11-09 PROCEDURE — 97535 SELF CARE MNGMENT TRAINING: CPT

## 2023-11-09 PROCEDURE — 81001 URINALYSIS AUTO W/SCOPE: CPT | Performed by: INTERNAL MEDICINE

## 2023-11-09 PROCEDURE — 99232 SBSQ HOSP IP/OBS MODERATE 35: CPT | Performed by: INTERNAL MEDICINE

## 2023-11-09 PROCEDURE — 87086 URINE CULTURE/COLONY COUNT: CPT | Performed by: INTERNAL MEDICINE

## 2023-11-09 PROCEDURE — 83735 ASSAY OF MAGNESIUM: CPT | Performed by: THORACIC SURGERY (CARDIOTHORACIC VASCULAR SURGERY)

## 2023-11-09 PROCEDURE — 99233 SBSQ HOSP IP/OBS HIGH 50: CPT | Performed by: STUDENT IN AN ORGANIZED HEALTH CARE EDUCATION/TRAINING PROGRAM

## 2023-11-09 PROCEDURE — 80069 RENAL FUNCTION PANEL: CPT | Performed by: INTERNAL MEDICINE

## 2023-11-09 PROCEDURE — 85025 COMPLETE CBC W/AUTO DIFF WBC: CPT | Performed by: INTERNAL MEDICINE

## 2023-11-09 PROCEDURE — 63710000001 INSULIN DETEMIR PER 5 UNITS: Performed by: INTERNAL MEDICINE

## 2023-11-09 PROCEDURE — 99233 SBSQ HOSP IP/OBS HIGH 50: CPT | Performed by: INTERNAL MEDICINE

## 2023-11-09 RX ORDER — METHYLPHENIDATE HYDROCHLORIDE 5 MG/1
10 TABLET ORAL DAILY
Status: COMPLETED | OUTPATIENT
Start: 2023-11-10 | End: 2023-11-13

## 2023-11-09 RX ADMIN — CARVEDILOL 6.25 MG: 6.25 TABLET, FILM COATED ORAL at 18:32

## 2023-11-09 RX ADMIN — ASPIRIN 81 MG CHEWABLE TABLET 81 MG: 81 TABLET CHEWABLE at 08:26

## 2023-11-09 RX ADMIN — INSULIN HUMAN 2 UNITS: 100 INJECTION, SOLUTION PARENTERAL at 05:32

## 2023-11-09 RX ADMIN — ACETAMINOPHEN 650 MG: 325 TABLET ORAL at 01:17

## 2023-11-09 RX ADMIN — HYDRALAZINE HYDROCHLORIDE 50 MG: 25 TABLET, FILM COATED ORAL at 01:17

## 2023-11-09 RX ADMIN — INSULIN HUMAN 4 UNITS: 100 INJECTION, SOLUTION PARENTERAL at 12:33

## 2023-11-09 RX ADMIN — DOCUSATE SODIUM 100 MG: 50 LIQUID ORAL at 08:25

## 2023-11-09 RX ADMIN — HYDRALAZINE HYDROCHLORIDE 50 MG: 25 TABLET, FILM COATED ORAL at 12:33

## 2023-11-09 RX ADMIN — DOCUSATE SODIUM 100 MG: 50 LIQUID ORAL at 21:45

## 2023-11-09 RX ADMIN — HYDRALAZINE HYDROCHLORIDE 50 MG: 25 TABLET, FILM COATED ORAL at 18:32

## 2023-11-09 RX ADMIN — SENNOSIDES 10 ML: 8.8 LIQUID ORAL at 08:25

## 2023-11-09 RX ADMIN — Medication 10 ML: at 21:48

## 2023-11-09 RX ADMIN — ATORVASTATIN CALCIUM 80 MG: 40 TABLET, FILM COATED ORAL at 21:45

## 2023-11-09 RX ADMIN — INSULIN HUMAN 2 UNITS: 100 INJECTION, SOLUTION PARENTERAL at 01:17

## 2023-11-09 RX ADMIN — SACUBITRIL AND VALSARTAN 1 TABLET: 24; 26 TABLET, FILM COATED ORAL at 21:45

## 2023-11-09 RX ADMIN — INSULIN HUMAN 4 UNITS: 100 INJECTION, SOLUTION PARENTERAL at 05:32

## 2023-11-09 RX ADMIN — INSULIN HUMAN 4 UNITS: 100 INJECTION, SOLUTION PARENTERAL at 01:17

## 2023-11-09 RX ADMIN — INSULIN DETEMIR 12 UNITS: 100 INJECTION, SOLUTION SUBCUTANEOUS at 08:28

## 2023-11-09 RX ADMIN — INSULIN DETEMIR 15 UNITS: 100 INJECTION, SOLUTION SUBCUTANEOUS at 21:45

## 2023-11-09 RX ADMIN — INSULIN HUMAN 5 UNITS: 100 INJECTION, SOLUTION PARENTERAL at 18:32

## 2023-11-09 RX ADMIN — HYDRALAZINE HYDROCHLORIDE 50 MG: 25 TABLET, FILM COATED ORAL at 05:32

## 2023-11-09 RX ADMIN — SENNOSIDES 10 ML: 8.8 LIQUID ORAL at 21:44

## 2023-11-09 RX ADMIN — METHYLPHENIDATE HYDROCHLORIDE 5 MG: 5 TABLET ORAL at 08:26

## 2023-11-09 RX ADMIN — INSULIN HUMAN 2 UNITS: 100 INJECTION, SOLUTION PARENTERAL at 18:32

## 2023-11-09 RX ADMIN — AMLODIPINE BESYLATE 10 MG: 10 TABLET ORAL at 08:26

## 2023-11-09 RX ADMIN — CARVEDILOL 6.25 MG: 6.25 TABLET, FILM COATED ORAL at 08:26

## 2023-11-09 RX ADMIN — Medication 10 ML: at 08:28

## 2023-11-09 NOTE — PLAN OF CARE
Goal Outcome Evaluation:  Plan of Care Reviewed With: patient, daughter        Progress: improving  Outcome Evaluation: Pt continues to present with decreased command following and difficulty improving independence with balance and mobility. Pt improved to max A x1 with brief periods of min A x1 this session while sitting EOB, improved eye opening and RUE command following noted at times. Continues to require dep A x2 for bed mobility. Continue to progress per pt tolerance.      Anticipated Discharge Disposition (PT): skilled nursing facility

## 2023-11-09 NOTE — PROGRESS NOTES
Cardiothoracic Surgery Progress Note    POD # 8 s/p CABG x 4     LOS: 16 days      Subjective:  No significant changes. Heparin drip stopped    Objective:  Vital Signs  Temp:  [97.8 °F (36.6 °C)-99.4 °F (37.4 °C)] 97.9 °F (36.6 °C)  Heart Rate:  [63-90] 66  Resp:  [18-22] 18  BP: (111-171)/() 132/79    Physical Exam:   General Appearance:  Does not follow commands.  Moving extremities, withdraws to pain, PERRL   Lungs: clear to auscultation, respirations regular, respirations even, and respirations unlabored   Heart: regular rhythm & normal rate, normal S1, S2, and no murmur, no gallop, no rub   Skin: Incision c/d/i     Results:    Results from last 7 days   Lab Units 11/09/23  0531   WBC 10*3/mm3 15.01*   HEMOGLOBIN g/dL 7.6*   HEMATOCRIT % 25.7*   PLATELETS 10*3/mm3 277     Results from last 7 days   Lab Units 11/09/23  0531   SODIUM mmol/L 146*   POTASSIUM mmol/L 4.0   CHLORIDE mmol/L 111*   CO2 mmol/L 24.0   BUN mg/dL 32*   CREATININE mg/dL 0.70   GLUCOSE mg/dL 152*   CALCIUM mg/dL 9.0       Assessment:  POD # 8 s/p CABG x 4    Plan:  CT head pending  Minimize sedation/narcotics  Pulmonary toilet  Continue enteric feeds  Permissive hypertension per Neurology   Head of bed elevated at all times  PEG tube to facilitate enteric nutrition - will need to discuss with family today  Continue supportive care  Will require LTACH upon D/C    Dirk Cleveland MD  11/09/23  07:09 EST

## 2023-11-09 NOTE — PLAN OF CARE
Goal Outcome Evaluation:  Plan of Care Reviewed With: patient, daughter        Progress: improving  Outcome Evaluation: Pt improved to Max A for sitting balance and brief periods of Min A with cuing however, pt continues to follow minimal commands and demo's no active ROM on the L side. Cont POC.      Anticipated Discharge Disposition (OT): skilled nursing facility

## 2023-11-09 NOTE — SIGNIFICANT NOTE
11/09/23 0903   SLP Deferred Reason   SLP Deferred Reason Unable to evaluate, medical status change  (Consulted for clinical swallow eval/cognitive-communication eval per stroke protocol. Reviewed chart & spoke to RN who reported pt remains inappropriate to participate. Will place evals on hold. Please notify SLP when pt appropriate.)

## 2023-11-09 NOTE — THERAPY TREATMENT NOTE
Patient Name: Michael Cochran  : 1945    MRN: [id number]                              Today's Date: 2023       Admit Date: 10/24/2023    Visit Dx:     ICD-10-CM ICD-9-CM   1. Elevated troponin  R79.89 790.6   2. Acute on chronic congestive heart failure, unspecified heart failure type  I50.9 428.0   3. Hypertensive emergency  I16.1 401.9   4. Acute respiratory failure with hypoxia  J96.01 518.81   5. Pleural effusion, left  J90 511.9   6. Hypokalemia  E87.6 276.8   7. Non-STEMI (non-ST elevated myocardial infarction)  I21.4 410.70   8. Coronary artery disease involving native coronary artery of native heart with unstable angina pectoris  I25.110 414.01     411.1     Patient Active Problem List   Diagnosis    HLD (hyperlipidemia)    Acute hypoxemic respiratory failure due to pulmonary edema    Acute exacerbation of congestive heart failure    Non-STEMI (non-ST elevated myocardial infarction)    Multivessel CAD with unstable angina pectoris manifesting as dyspnea    S/P CABG x 4 on 2023    Ischemic cardiomyopathy with LVEF 31%    Hypertensive urgency with diastolic dysfunction    Postop ANURADHA resolved    Postop Encephalopathy    Postop acute blood loss anemia    Postoperative multiple bilateral embolic strokes     Past Medical History:   Diagnosis Date    Hyperlipidemia     Hypertension      Past Surgical History:   Procedure Laterality Date    BREAST FIBROADENOMA SURGERY      9 y/o-was benign    CARDIAC CATHETERIZATION N/A 10/26/2023    Procedure: Left Heart Cath;  Surgeon: Jordi Hodge MD;  Location:  [facility];  Service: Cardiovascular;  Laterality: N/A;    CORONARY ARTERY BYPASS GRAFT N/A 2023    Procedure: MEDIAN STERNOTOMY, CORONARY ARTERY BYPASS GRAFTING X4, UTILIZING THE LEFT INTERANL MAMMARY ARTERY, EVH OF THE LEFT GREATER SAPHENOUS VEIN, EXPLORATION OF THE RIGHT LEG, PRIMITIVO PER ANETHESIA;  Surgeon: Dirk Cleveland MD;  Location: [facility] OR;  Service: Cardiothoracic;   Laterality: N/A;      General Information       Row Name 11/09/23 1612          Physical Therapy Time and Intention    Document Type therapy note (daily note)  -AE     Mode of Treatment physical therapy  -AE       Row Name 11/09/23 1612          General Information    Patient Profile Reviewed yes  -AE     Existing Precautions/Restrictions cardiac;fall;sternal;other (see comments)  NG, L sided weakness/tone, global aphasia  -AE     Barriers to Rehab medically complex;cognitive status  -AE       Row Name 11/09/23 1612          Cognition    Orientation Status (Cognition) unable/difficult to assess  -AE       Row Name 11/09/23 1612          Safety Issues, Functional Mobility    Safety Issues Affecting Function (Mobility) awareness of need for assistance;insight into deficits/self-awareness;safety precaution awareness;safety precautions follow-through/compliance;sequencing abilities;ability to follow commands;problem-solving  -AE     Impairments Affecting Function (Mobility) balance;cognition;endurance/activity tolerance;postural/trunk control;strength;coordination;motor control;motor planning;grasp;muscle tone abnormal;visual/perceptual;range of motion (ROM)  -AE     Cognitive Impairments, Mobility Safety/Performance awareness, need for assistance;insight into deficits/self-awareness;safety precaution awareness;safety precaution follow-through;sequencing abilities;attention  -AE               User Key  (r) = Recorded By, (t) = Taken By, (c) = Cosigned By      Initials Name Provider Type    AE Jcarlos Rodriguez PT Physical Therapist                   Mobility       Row Name 11/09/23 1613          Bed Mobility    Bed Mobility rolling left;rolling right;supine-sit;sit-supine;scooting/bridging  -AE     Rolling Left [place] (Bed Mobility) dependent (less than 25% patient effort);2 person assist  -AE     Rolling Right [place] (Bed Mobility) dependent (less than 25% patient effort);2 person assist  -AE      Scooting/Bridging [place] (Bed Mobility) dependent (less than 25% patient effort);2 person assist  -AE     Supine-Sit [place] (Bed Mobility) dependent (less than 25% patient effort);2 person assist  -AE     Sit-Supine [place] (Bed Mobility) dependent (less than 25% patient effort);2 person assist  -AE     Assistive Device (Bed Mobility) draw sheet;head of bed elevated  -AE     Comment, (Bed Mobility) VCs for hand placement and sequencing however pt unable to follow commands with bed mobility. Dep A required with use of draw sheet.  -AE       Row Name 11/09/23 1613          Transfers    Comment, (Transfers) Deferred d/t poor sitting balance and decreased command following.  -AE       Row Name 11/09/23 1613          Bed-Chair Transfer    Bed-Chair [place] (Transfers) unable to assess  -AE       Row Name 11/09/23 1613          Sit-Stand Transfer    Sit-Stand [place] (Transfers) unable to assess  -AE       Row Name 11/09/23 1613          Gait/Stairs (Locomotion)    [place] Level (Gait) unable to assess  -AE               User Key  (r) = Recorded By, (t) = Taken By, (c) = Cosigned By      Initials Name Provider Type    AE Jcarlos Rodriguez PT Physical Therapist                   Obj/Interventions       Row Name 11/09/23 1614          Balance    Balance Assessment sitting static balance;sitting dynamic balance  -AE     Static Sitting Balance maximum assist;1-person assist;verbal cues  brief periods of min A x1 with hand placement and cues  -AE     Dynamic Sitting Balance maximum assist;1-person assist;verbal cues  -AE     Position, Sitting Balance unsupported;sitting edge of bed  -AE               User Key  (r) = Recorded By, (t) = Taken By, (c) = Cosigned By      Initials Name Provider Type    AE Jcarlos Rodriguez PT Physical Therapist                   Goals/Plan    No documentation.                  Clinical Impression       Row Name 11/09/23 1615          Pain Scale: FACES  Pre/Post-Treatment    Pain: FACES Scale, Pretreatment 2-->hurts little bit  -AE     Posttreatment Pain Rating 2-->hurts little bit  -AE     Pain Location generalized  -AE     Pre/Posttreatment Pain Comment mumbling throughout session  -AE       Row Name 11/09/23 1615          Plan of Care Review    Plan of Care Reviewed With patient;daughter  -AE     Progress improving  -AE     Outcome Evaluation Pt continues to present with decreased command following and difficulty improving independence with balance and mobility. Pt improved to max A x1 with brief periods of min A x1 this session while sitting EOB, improved eye opening and RUE command following noted at times. Continues to require dep A x2 for bed mobility. Continue to progress per pt tolerance.  -AE       Row Name 11/09/23 1615          Vital Signs    Pre Systolic BP Rehab 160  -AE     Pre Treatment Diastolic BP 78  -AE     Post Systolic BP Rehab 163  -AE     Post Treatment Diastolic BP 81  -AE     Pretreatment Heart Rate (beats/min) 82  -AE     Posttreatment Heart Rate (beats/min) 80  -AE     Pre SpO2 (%) 93  -AE     O2 Delivery Pre Treatment nasal cannula  -AE     O2 Delivery Intra Treatment nasal cannula  -AE     Post SpO2 (%) 96  -AE     O2 Delivery Post Treatment nasal cannula  -AE     Pre Patient Position Supine  -AE     Intra Patient Position Sitting  -AE     Post Patient Position Supine  -AE       Row Name 11/09/23 1615          Positioning and Restraints    Pre-Treatment Position in bed  -AE     Post Treatment Position bed  -AE     In Bed notified nsg;fowlers;call light within reach;encouraged to call for assist;exit alarm on;patient within staff view;with family/caregiver;side rails up x3;LUE elevated;RUE elevated;SCD pump applied;heels elevated  -AE               User Key  (r) = Recorded By, (t) = Taken By, (c) = Cosigned By      Initials Name Provider Type    AE Jcarlos Rodriguez, PT Physical Therapist                   Outcome Measures       Row Name  11/09/23 1618          How much help from another person do you currently need...    Turning from your back to your side while in flat bed without using bedrails? 1  -AE     Moving from lying on back to sitting on the side of a flat bed without bedrails? 1  -AE     Moving to and from a bed to a chair (including a wheelchair)? 1  -AE     Standing up from a chair using your arms (e.g., wheelchair, bedside chair)? 1  -AE     Climbing 3-5 steps with a railing? 1  -AE     To walk in hospital room? 1  -AE     AM-PAC 6 Clicks Score (PT) 6  -AE     Highest level of mobility 2 --> Bed activities/dependent transfer  -AE       Row Name 11/09/23 1618 11/09/23 1610       Functional Assessment    Outcome Measure Options AM-PAC 6 Clicks Basic Mobility (PT)  -AE AM-PAC 6 Clicks Daily Activity (OT)  -AN              User Key  (r) = Recorded By, (t) = Taken By, (c) = Cosigned By      Initials Name Provider Type    Aruna Muñiz, OT Occupational Therapist    Jcarlos Borden, PT Physical Therapist                                 Physical Therapy Education       Title: PT OT SLP Therapies (In Progress)       Topic: Physical Therapy (In Progress)       Point: Mobility training (In Progress)       Learning Progress Summary             Patient Acceptance, E, NR by AE at 11/9/2023 1503    Acceptance, E, NR by AE at 11/8/2023 1319    Acceptance, E, NR by SUE at 11/7/2023 1300    Acceptance, E, NR by AE at 11/6/2023 1310    Acceptance, E, NR by KG at 11/5/2023 0940    Acceptance, E, VU,NR by ML at 10/27/2023 1544    Comment: continued mobility while awaiting CABG, sternal precautions following CABG    Acceptance, E,D, VU,NR by LR at 10/25/2023 1327    Comment: Educated on benefits of mobility, correct sit<->stand t/f technique, correct gait mechanics, PLB, energy conservation, and progression of POC.                         Point: Home exercise program (In Progress)       Learning Progress Summary             Patient Acceptance, E,  NR by AE at 11/9/2023 1503    Acceptance, E, NR by AE at 11/8/2023 1319    Acceptance, E, NR by SUE at 11/7/2023 1300    Acceptance, E, NR by AE at 11/6/2023 1310    Acceptance, E, NR by KG at 11/5/2023 0940                         Point: Body mechanics (In Progress)       Learning Progress Summary             Patient Acceptance, E, NR by AE at 11/9/2023 1503    Acceptance, E, NR by AE at 11/8/2023 1319    Acceptance, E, NR by SUE at 11/7/2023 1300    Acceptance, E, NR by AE at 11/6/2023 1310    Acceptance, E, NR by KG at 11/5/2023 0940    Acceptance, E,D, VU,NR by LR at 10/25/2023 1327    Comment: Educated on benefits of mobility, correct sit<->stand t/f technique, correct gait mechanics, PLB, energy conservation, and progression of POC.                         Point: Precautions (In Progress)       Learning Progress Summary             Patient Acceptance, E, NR by AE at 11/9/2023 1503    Acceptance, E, NR by AE at 11/8/2023 1319    Acceptance, E, NR by SUE at 11/7/2023 1300    Acceptance, E, NR by AE at 11/6/2023 1310    Acceptance, E, NR by KG at 11/5/2023 0940    Acceptance, E, VU,NR by ML at 10/27/2023 1544    Comment: continued mobility while awaiting CABG, sternal precautions following CABG    Acceptance, E,D, VU,NR by LR at 10/25/2023 1327    Comment: Educated on benefits of mobility, correct sit<->stand t/f technique, correct gait mechanics, PLB, energy conservation, and progression of POC.                                         User Key       Initials Effective Dates Name Provider Type Discipline    SUE 02/03/23 -  Oneida Daly, PT Physical Therapist PT    LR 02/03/23 -  Alissa Pierre, PT Physical Therapist PT    KG 05/22/20 -  Brittney Rocha, PT Physical Therapist PT    ML 04/22/21 -  Alisa Salguero Physical Therapist PT    AE 09/21/21 -  Jcarlos Rodriguez, PT Physical Therapist PT                  PT Recommendation and Plan     Plan of Care Reviewed With: patient, daughter  Progress:  improving  Outcome Evaluation: Pt continues to present with decreased command following and difficulty improving independence with balance and mobility. Pt improved to max A x1 with brief periods of min A x1 this session while sitting EOB, improved eye opening and RUE command following noted at times. Continues to require dep A x2 for bed mobility. Continue to progress per pt tolerance.     Time Calculation:         PT Charges       Row Name 11/09/23 1618             Time Calculation    Start Time 1503  -AE      PT Received On 11/09/23  -AE      PT Goal Re-Cert Due Date 11/15/23  -AE         Timed Charges    51294 - PT Therapeutic Activity Minutes 15  -AE         Total Minutes    Timed Charges Total Minutes 15  -AE       Total Minutes 15  -AE                User Key  (r) = Recorded By, (t) = Taken By, (c) = Cosigned By      Initials Name Provider Type    AE Jcarlos Rodriguez PT Physical Therapist                  Therapy Charges for Today       Code Description Service Date Service Provider Modifiers Qty    01781611428  PT THERAPEUTIC ACT EA 15 MIN 11/8/2023 Jcarlos Rodriguez, PT GP 2    62709412208 HC PT THER SUPP EA 15 MIN 11/8/2023 Jcarlos oRdriguez, PT GP 2    [id number]  PT THERAPEUTIC ACT EA 15 MIN 11/9/2023 Jcarlos Rodriguez, PT GP 1            PT G-Codes  Outcome Measure Options: AM-PAC 6 Clicks Basic Mobility (PT)  AM-PAC 6 Clicks Score (PT): 6  AM-PAC 6 Clicks Score (OT): 6  Modified [place] Scale: 4 - Moderately severe disability.  Unable to walk without assistance, and unable to attend to own bodily needs without assistance.  PT Discharge Summary  Anticipated Discharge Disposition (PT): skilled nursing facility    Jcarlos Rodriguez PT  11/9/2023

## 2023-11-09 NOTE — CASE MANAGEMENT/SOCIAL WORK
Continued Stay Note  [facility]     Patient Name: Michael Cochran  MRN: [id number]  Today's Date: 11/9/2023    Admit Date: 10/24/2023    Plan: SNF to LTC   Discharge Plan       Row Name 11/09/23 1609       Plan    Plan SNF to LTC    Patient/Family in Agreement with Plan yes    Plan Comments Spoke with daughter at bedside. Patient will recieve a PEG tomorrow. Plan will be SNF to LTC. Daughter is aware pt cant participate with therapy services. She is aalso aware pt may have to go into facility straight LTC due to not being able to recieve skillled services. No other dc needs at this time. CM will cont to follow.    Final Discharge Disposition Code 30 - still a patient                   Discharge Codes    No documentation.                 Expected Discharge Date and Time       Expected Discharge Date Expected Discharge Time    Nov 14, 2023               Gricel Flowers RN

## 2023-11-09 NOTE — THERAPY TREATMENT NOTE
Patient Name: Michael Cochran  : 1945    MRN: [id number]                              Today's Date: 2023       Admit Date: 10/24/2023    Visit Dx:     ICD-10-CM ICD-9-CM   1. Elevated troponin  R79.89 790.6   2. Acute on chronic congestive heart failure, unspecified heart failure type  I50.9 428.0   3. Hypertensive emergency  I16.1 401.9   4. Acute respiratory failure with hypoxia  J96.01 518.81   5. Pleural effusion, left  J90 511.9   6. Hypokalemia  E87.6 276.8   7. Non-STEMI (non-ST elevated myocardial infarction)  I21.4 410.70   8. Coronary artery disease involving native coronary artery of native heart with unstable angina pectoris  I25.110 414.01     411.1     Patient Active Problem List   Diagnosis    HLD (hyperlipidemia)    Acute hypoxemic respiratory failure due to pulmonary edema    Acute exacerbation of congestive heart failure    Non-STEMI (non-ST elevated myocardial infarction)    Multivessel CAD with unstable angina pectoris manifesting as dyspnea    S/P CABG x 4 on 2023    Ischemic cardiomyopathy with LVEF 31%    Hypertensive urgency with diastolic dysfunction    Postop ANURADHA resolved    Postop Encephalopathy    Postop acute blood loss anemia    Postoperative multiple bilateral embolic strokes     Past Medical History:   Diagnosis Date    Hyperlipidemia     Hypertension      Past Surgical History:   Procedure Laterality Date    BREAST FIBROADENOMA SURGERY      9 y/o-was benign    CARDIAC CATHETERIZATION N/A 10/26/2023    Procedure: Left Heart Cath;  Surgeon: Jordi Hodge MD;  Location:  [facility];  Service: Cardiovascular;  Laterality: N/A;    CORONARY ARTERY BYPASS GRAFT N/A 2023    Procedure: MEDIAN STERNOTOMY, CORONARY ARTERY BYPASS GRAFTING X4, UTILIZING THE LEFT INTERANL MAMMARY ARTERY, EVH OF THE LEFT GREATER SAPHENOUS VEIN, EXPLORATION OF THE RIGHT LEG, PRIMITIVO PER ANETHESIA;  Surgeon: Dirk Cleveland MD;  Location: [facility] OR;  Service: Cardiothoracic;   Laterality: N/A;      General Information       Row Name 11/09/23 1559          OT Time and Intention    Document Type therapy note (daily note)  -AN     Mode of Treatment occupational therapy;co-treatment  -AN       Row Name 11/09/23 5879          General Information    Patient Profile Reviewed yes  -AN     Existing Precautions/Restrictions cardiac;fall;sternal;other (see comments)  NG, L sided weakness/tone, global aphasia  -AN     Barriers to Rehab medically complex;cognitive status  -AN       Row Name 11/09/23 1259          Cognition    Orientation Status (Cognition) unable/difficult to assess;other (see comments)  pt mumbling throughout with eyes close  -AN       Row Name 11/09/23 1559          Safety Issues, Functional Mobility    Safety Issues Affecting Function (Mobility) awareness of need for assistance;insight into deficits/self-awareness;judgment;problem-solving;safety precaution awareness;sequencing abilities;safety precautions follow-through/compliance;ability to follow commands  -AN     Impairments Affecting Function (Mobility) balance;cognition;endurance/activity tolerance;postural/trunk control;strength;coordination;motor control;motor planning;grasp;muscle tone abnormal;visual/perceptual;range of motion (ROM)  -AN     Cognitive Impairments, Mobility Safety/Performance awareness, need for assistance;safety precaution awareness;attention;safety precaution follow-through;insight into deficits/self-awareness;sequencing abilities;problem-solving/reasoning;judgment  -AN               User Key  (r) = Recorded By, (t) = Taken By, (c) = Cosigned By      Initials Name Provider Type    AN Aruna Sarah OT Occupational Therapist                     Mobility/ADL's       Row Name 11/09/23 4291          Bed Mobility    Bed Mobility rolling left;rolling right;supine-sit-supine;scooting/bridging  -AN     Rolling Left [place] (Bed Mobility) dependent (less than 25% patient effort);2 person assist  -AN      Rolling Right [place] (Bed Mobility) dependent (less than 25% patient effort);2 person assist  -AN     Scooting/Bridging [place] (Bed Mobility) dependent (less than 25% patient effort);2 person assist  -AN     Bed Mobility, Safety Issues cognitive deficits limit understanding;decreased use of arms for pushing/pulling;decreased use of legs for bridging/pushing;impaired trunk control for bed mobility  -AN     Comment, (Bed Mobility) Rolling L and R for placement of clean linens. Pt required dep x 2 for sup<>sit with assist at trunk, BLE, and UE  -AN       Row Name 11/09/23 1602          Transfers    Comment, (Transfers) deferred due to poor sitting balance and decreased command following  -AN       Row Name 11/09/23 1602          Activities of Daily Living    BADL Assessment/Intervention grooming;lower body dressing  -AN       Row Name 11/09/23 1602          Lower Body Dressing Assessment/Training    [place] Level (Lower Body Dressing) don;socks;dependent (less than 25% patient effort);doff  -AN     Position (Lower Body Dressing) supine  -AN       Row Name 11/09/23 1602          Grooming Assessment/Training    [place] Level (Grooming) wash face, hands;dependent (less than 25% patient effort)  -AN     Position (Grooming) edge of bed sitting  -AN     Comment, (Grooming) Pt tolerated EOB sitting for ~10 mins for core strengthening, grooming tasks, and visual scanning with family involvement.  -AN               User Key  (r) = Recorded By, (t) = Taken By, (c) = Cosigned By      Initials Name Provider Type    AN Aruna Sarah OT Occupational Therapist                   Obj/Interventions       Row Name 11/09/23 1605          Shoulder (Therapeutic Exercise)    Shoulder (Therapeutic Exercise) PROM (passive range of motion)  -AN     Shoulder PROM (Therapeutic Exercise) left;flexion;extension;supine;3 repetitions  -AN       Row Name 11/09/23 1605          Elbow/Forearm (Therapeutic Exercise)     Elbow/Forearm (Therapeutic Exercise) PROM (passive range of motion)  -AN     Elbow/Forearm PROM (Therapeutic Exercise) left;flexion;extension;supine;3 repetitions  -AN       Row Name 11/09/23 1605          Wrist (Therapeutic Exercise)    Wrist (Therapeutic Exercise) PROM (passive range of motion)  -AN     Wrist PROM (Therapeutic Exercise) left;flexion;extension;3 repetitions  -AN       Row Name 11/09/23 1605          Hand (Therapeutic Exercise)    Hand (Therapeutic Exercise) PROM (passive range of motion)  -AN     Hand PROM (Therapeutic Exercise) left;finger flexion;finger extension;3 repetitions  -AN       Row Name 11/09/23 1605          Motor Skills    Therapeutic Exercise shoulder;elbow/forearm;wrist;hand  -AN       Row Name 11/09/23 1605          Balance    Balance Assessment sitting static balance;sitting dynamic balance  -AN     Static Sitting Balance verbal cues;non-verbal cues (demo/gesture);maximum assist;1-person assist;other (see comments)  improved to brief periods of Min A with cues for hand placement  -AN     Dynamic Sitting Balance verbal cues;non-verbal cues (demo/gesture);maximum assist;1-person assist  -AN     Position, Sitting Balance supported;sitting edge of bed  -AN     Balance Interventions sitting;supported;static;dynamic;highly challenging;core stability exercise;occupation based/functional task  -AN     Comment, Balance L lateral lean due to RUE pushing requiring Mod cues to correct throughout  -AN               User Key  (r) = Recorded By, (t) = Taken By, (c) = Cosigned By      Initials Name Provider Type    AN Aruna Sarah OT Occupational Therapist                   Goals/Plan    No documentation.                  Clinical Impression       Row Name 11/09/23 1608          Pain Assessment    Additional Documentation Pain Scale: FACES Pre/Post-Treatment (Group)  -AN       Row Name 11/09/23 1608          Pain Scale: FACES Pre/Post-Treatment    Pain: FACES Scale, Pretreatment 2-->hurts  little bit  -AN     Posttreatment Pain Rating 2-->hurts little bit  -AN     Pain Location generalized  -AN       Row Name 11/09/23 1608          Plan of Care Review    Plan of Care Reviewed With patient;daughter  -AN     Progress improving  -AN     Outcome Evaluation Pt improved to Max A for sitting balance and brief periods of Min A with cuing however, pt continues to follow minimal commands and demo's no active ROM on the L side. Cont POC.  -AN       Row Name 11/09/23 1608          Therapy Plan Review/Discharge Plan (OT)    Anticipated Discharge Disposition (OT) skilled nursing facility  -AN       Row Name 11/09/23 1608          Vital Signs    Pre Systolic BP Rehab 160  -AN     Pre Treatment Diastolic BP 78  -AN     Post Systolic BP Rehab 163  -AN     Post Treatment Diastolic BP 81  -AN     Pretreatment Heart Rate (beats/min) 80  -AN     Posttreatment Heart Rate (beats/min) 82  -AN     Pre SpO2 (%) 94  -AN     O2 Delivery Pre Treatment nasal cannula  -AN     O2 Delivery Intra Treatment nasal cannula  -AN     Post SpO2 (%) 92  -AN     O2 Delivery Post Treatment nasal cannula  -AN     Pre Patient Position Supine  -AN     Intra Patient Position Sitting  -AN     Post Patient Position Supine  -AN       [facility] Name 11/09/23 1608          Positioning and Restraints    Pre-Treatment Position in bed  -AN     Post Treatment Position bed  -AN     In Bed notified nsg;supine;exit alarm on;encouraged to call for assist;with nsg;side rails up x3;RUE elevated;LUE elevated;legs elevated;SCD pump applied  -AN               User Key  (r) = Recorded By, (t) = Taken By, (c) = Cosigned By      Initials Name Provider Type    Aruna Muñiz OT Occupational Therapist                   Outcome Measures       Row Name 11/09/23 1610          How much help from another is currently needed...    Putting on and taking off regular lower body clothing? 1  -AN     Bathing (including washing, rinsing, and drying) 1  -AN     Toileting (which  includes using toilet bed pan or urinal) 1  -AN     Putting on and taking off regular upper body clothing 1  -AN     Taking care of personal grooming (such as brushing teeth) 1  -AN     Eating meals 1  -AN     AM-PAC 6 Clicks Score (OT) 6  -AN       Row Name 11/09/23 1610          Functional Assessment    Outcome Measure Options AM-PAC 6 Clicks Daily Activity (OT)  -AN               User Key  (r) = Recorded By, (t) = Taken By, (c) = Cosigned By      Initials Name Provider Type    Aruna Muiñz OT Occupational Therapist                    Occupational Therapy Education       Title: PT OT SLP Therapies (In Progress)       Topic: Occupational Therapy (Done)       Point: ADL training (Done)       Description:   Instruct learner(s) on proper safety adaptation and remediation techniques during self care or transfers.   Instruct in proper use of assistive devices.                  Learning Progress Summary             Patient Acceptance, E, VU by AN at 11/9/2023 1611    Acceptance, E, NR by CS at 11/7/2023 1453    Acceptance, E, VU by AN at 10/25/2023 1034   Family Acceptance, E, VU by AN at 11/9/2023 1611                         Point: Home exercise program (Done)       Description:   Instruct learner(s) on appropriate technique for monitoring, assisting and/or progressing therapeutic exercises/activities.                  Learning Progress Summary             Patient Acceptance, E, VU by AN at 11/9/2023 1611    Acceptance, E, NR by CS at 11/7/2023 1453   Family Acceptance, E, VU by AN at 11/9/2023 1611                         Point: Precautions (Done)       Description:   Instruct learner(s) on prescribed precautions during self-care and functional transfers.                  Learning Progress Summary             Patient Acceptance, E, VU by AN at 11/9/2023 1611    Acceptance, E, NR by CS at 11/7/2023 1453    Acceptance, E, VU by AN at 10/25/2023 1034   Family Acceptance, E, VU by AN at 11/9/2023 1611                          Point: Body mechanics (Done)       Description:   Instruct learner(s) on proper positioning and spine alignment during self-care, functional mobility activities and/or exercises.                  Learning Progress Summary             Patient Acceptance, E, VU by AN at 11/9/2023 1611    Acceptance, E, NR by  at 11/7/2023 1453    Acceptance, E, VU by AN at 10/25/2023 1034   Family Acceptance, E, VU by AN at 11/9/2023 1611                                         User Key       Initials Effective Dates Name Provider Type Discipline     09/02/21 -  Analy Chavez OT Occupational Therapist OT    DAPHNIE 09/21/21 -  Aruna Sarah OT Occupational Therapist OT                  OT Recommendation and Plan  Therapy Frequency (OT): evaluation only  Plan of Care Review  Plan of Care Reviewed With: patient, daughter  Progress: improving  Outcome Evaluation: Pt improved to Max A for sitting balance and brief periods of Min A with cuing however, pt continues to follow minimal commands and demo's no active ROM on the L side. Cont POC.     Time Calculation:   Evaluation Complexity (OT)  Review Occupational Profile/Medical/Therapy History Complexity: brief/low complexity  Assessment, Occupational Performance/Identification of Deficit Complexity: 1-3 performance deficits  Clinical Decision Making Complexity (OT): problem focused assessment/low complexity  Overall Complexity of Evaluation (OT): low complexity     Time Calculation- OT       Row Name 11/09/23 1611             Time Calculation- OT    OT Start Time 1510  -AN      OT Received On 11/09/23  -AN         Timed Charges    91403 - OT Self Care/Mgmt Minutes 10  -AN         Total Minutes    Timed Charges Total Minutes 10  -AN       Total Minutes 10  -AN                User Key  (r) = Recorded By, (t) = Taken By, (c) = Cosigned By      Initials Name Provider Type    AN Aruna Sarah OT Occupational Therapist                  Therapy Charges for Today       Code  Description Service Date Service Provider Modifiers Qty    [id number] HC OT SELF CARE/MGMT/TRAIN EA 15 MIN 11/9/2023 Aruna Sarah OT GO 1                 Aruna Sarah OT  11/9/2023

## 2023-11-09 NOTE — PROGRESS NOTES
[facility] at [facility]  IP Progress Note      Chief Complaint/Reason for service: Status post recent non-STEMI secondary to severe multivessel coronary artery disease #2 ischemic cardiomyopathy #3 hypertension     Subjective   Subjective: The nurse reports no changes overnight and the patient.  NIH is 29.  CT of the head was performed results pending.  Patient is unresponsive to anything other than pain    Past medical, surgical, social and family history reviewed in the patient's electronic medical record.    Objective     Vital Sign Min/Max for last 24 hours  Temp  Min: 97.8 °F (36.6 °C)  Max: 99.4 °F (37.4 °C)   BP  Min: 111/61  Max: 171/87   Pulse  Min: 63  Max: 90   Resp  Min: 18  Max: 22   SpO2  Min: 92 %  Max: 97 %   Flow (L/min)  Min: 1  Max: 2      Intake/Output Summary (Last 24 hours) at 11/9/2023 0713  Last data filed at 11/9/2023 0400  Gross per 24 hour   Intake 1286 ml   Output 1400 ml   Net -114 ml             Current Facility-Administered Medications:     amLODIPine (NORVASC) tablet 10 mg, 10 mg, Nasogastric, Q24H, Madhu Sánchez MD, 10 mg at 11/08/23 0834    aspirin chewable tablet 81 mg, 81 mg, Nasogastric, Daily, Dirk Thomas MD    atorvastatin (LIPITOR) tablet 80 mg, 80 mg, Nasogastric, Nightly, Dirk Cleveland MD, 80 mg at 11/08/23 1947    bisacodyl (DULCOLAX) suppository 10 mg, 10 mg, Rectal, Daily PRN, Dirk Cleveland MD    carvedilol (COREG) tablet 6.25 mg, 6.25 mg, Nasogastric, BID With Meals, Madhu Sánchez MD, 6.25 mg at 11/08/23 1727    dextrose (D50W) (25 g/50 mL) IV injection 25 g, 25 g, Intravenous, Q15 Min PRN, Lauro Salomon MD    sennosides (SENOKOT) 8.8 MG/5ML syrup 10 mL, 10 mL, Nasogastric, BID, 10 mL at 11/08/23 1947 **AND** docusate sodium (COLACE) liquid 100 mg, 100 mg, Nasogastric, BID, Dirk Cleveland MD, 100 mg at 11/08/23 1948    glucagon (GLUCAGEN) injection 1 mg, 1 mg, Intramuscular, Q15 Min PRN, Lauro Salomon MD    hydrALAZINE  (APRESOLINE) tablet 50 mg, 50 mg, Nasogastric, Q6H, Dirk Thomas MD, 50 mg at 11/09/23 0532    insulin detemir (LEVEMIR) injection 12 Units, 12 Units, Subcutaneous, Q12H, Dirk Thomas MD, 12 Units at 11/08/23 1948    insulin regular (humuLIN R,novoLIN R) injection 2-9 Units, 2-9 Units, Subcutaneous, Q6H, Lauro Salomon MD, 2 Units at 11/09/23 0532    insulin regular (humuLIN R,novoLIN R) injection 4 Units, 4 Units, Subcutaneous, Q6H, Dirk Thomas MD, 4 Units at 11/09/23 0532    ipratropium-albuterol (DUO-NEB) nebulizer solution 3 mL, 3 mL, Nebulization, Q4H PRN, Robyn Newton PA-C    Magnesium Cardiology Dose Replacement - Follow Nurse / BPA Driven Protocol, , Does not apply, PRN, Robyn Newton PA-C    methylphenidate (RITALIN) tablet 5 mg, 5 mg, Nasogastric, Daily, Dirk Thomas MD, 5 mg at 11/08/23 0834    niCARdipine (CARDENE) 25mg in 250mL NS infusion, 5-15 mg/hr, Intravenous, Titrated, Dirk Cleveland MD, Stopped at 11/06/23 1530    nitroglycerin (NITROSTAT) SL tablet 0.4 mg, 0.4 mg, Sublingual, Q5 Min PRN, Robyn Newton PA-C    ondansetron (ZOFRAN) injection 4 mg, 4 mg, Intravenous, Q6H PRN, Robyn Newton PA-C    Phosphorus Replacement - Follow Nurse / BPA Driven Protocol, , Does not apply, PRN, Robyn Newton PA-C    Potassium Replacement - Follow Nurse / BPA Driven Protocol, , Does not apply, PRN, Robyn Newton PA-C    sodium chloride 0.9 % flush 10 mL, 10 mL, Intravenous, Q12H, Dirk Cleveland MD, 10 mL at 11/08/23 1949    sodium chloride 0.9 % flush 10 mL, 10 mL, Intravenous, PRN, Dirk Cleveland MD    sodium chloride 0.9 % infusion 40 mL, 40 mL, Intravenous, PRN, Robyn Newton PA-C    Physical Exam: General obese female in bed not dyspneic not tachypneic unresponsive        HEENT: No JVP.  Feeding tube is present through the nare       Respiratory: Decreased breath sounds with no obvious wheezes or rhonchi.  Sparse crackles noted        Cardiovascular: Regular rate and rhythm without murmur gallop or click and trace edema to palpation       GI: Obese and soft       Lower Extremities: Venous harvest sites appear free of infection       Neuro: Cannot assess due to encephalopathy       Skin: Warm and dry       Psych: Cannot assess due to encephalopathy    Results Review: Patient is a net +6.5 L since admission.  The sodium is improved down to 146.  Heart rates in the 70s blood pressures 1 32-1 45.  GFR is 88    Radiology Results:  Imaging Results (Last 72 Hours)       Procedure Component Value Units Date/Time    CT Head Without Contrast [[id number]] Resulted: 11/09/23 0411     Updated: 11/09/23 0415    XR Chest 1 View [[id number]] Collected: 11/08/23 0741     Updated: 11/08/23 0745    Narrative:      XR CHEST 1 VW    Date of Exam: 11/8/2023 1:35 AM CST    Indication: Ischemic cardiomyopathy post CABG.  Now with multiple bilateral cerebral infarcts and poor clearance of oral secretions    Comparison: 11/6/2023    Findings:  Cardiomediastinal silhouette is unchanged. Removal of right IJ sheath and placement of a right-sided PICC with tip in the mid SVC. There is persistent pulmonary vascular congestion with mild basilar airspace disease and minimal effusions. No   pneumothorax. No acute osseous abnormality identified.      Impression:      Impression:  Support lines and tubes as discussed without significant change otherwise      Electronically Signed: Marc Castillo MD    11/8/2023 6:42 AM CST    Workstation ID: [id number]    XR Chest 1 View [[id number]] Collected: 11/06/23 0714     Updated: 11/06/23 0720    Narrative:      XR CHEST 1 VW    Date of Exam: 11/6/2023 2:50 AM EST    Indication: post CABG    Comparison: 11/4/2023    Findings:  Stable enlargement of the cardiac silhouette with surgical changes post CABG. There is no change in position of a right IJ introducer. Stable position of an OG tube. Mild basilar atelectasis. The lungs are otherwise clear.  Small left pleural effusion      Impression:      Impression:  Stable chest      Electronically Signed: John العراقي MD    11/6/2023 7:17 AM EST    Workstation ID: [id number]            EKG: Sinus rhythm    ECHO: Postop EF 60%    Tele: No A-fib or flutter    Assessment   Assessment/Plan: 1 status post recent non-STEMI secondary to multivessel disease.  Patient underwent four-vessel CABG.  Currently on Coreg aspirin statin  2 hypertension-adjust medications  3 ischemic cardiomyopathy with preop EF in the 30s.  We will start Entresto      Gurmeet Colón MD  11/09/23  07:13 EST

## 2023-11-09 NOTE — PROGRESS NOTES
Stroke Progress Note       Chief Complaint: Encephalopathy  Subjective    Subjective     Subjective:  Patient withdraws on right upper extremity spontaneously.      Review of Systems   Unable to obtain    Objective      Temp:  [97.8 °F (36.6 °C)-98.8 °F (37.1 °C)] 98.5 °F (36.9 °C)  Heart Rate:  [63-90] 87  Resp:  [18-20] 18  BP: (114-171)/() 132/63          Patient continues to be nonverbal, does not open eyes spontaneously, bilateral eyelid apraxia  Pupils equal round reactive, corneal intact, cough and gag intact.  Minimal withdrawal on on all extremities, more on the right compared to the left.  Bilateral upgoing toe.    Results Review:    I reviewed the patient's new clinical results.    Lab Results (last 24 hours)       Procedure Component Value Units Date/Time    POC Glucose Once [[id number]]  (Abnormal) Collected: 11/09/23 1140    Specimen: Blood Updated: 11/09/23 1152     Glucose 208 mg/dL     Renal Function Panel [[id number]]  (Abnormal) Collected: 11/09/23 0531    Specimen: Blood Updated: 11/09/23 0654     Glucose 152 mg/dL      BUN 32 mg/dL      Creatinine 0.70 mg/dL      Sodium 146 mmol/L      Potassium 4.0 mmol/L      Chloride 111 mmol/L      CO2 24.0 mmol/L      Calcium 9.0 mg/dL      Albumin 3.1 g/dL      Phosphorus 4.9 mg/dL      Anion Gap 11.0 mmol/L      BUN/Creatinine Ratio 45.7     eGFR 88.1 mL/min/1.73     Narrative:      GFR Normal >60  Chronic Kidney Disease <60  Kidney Failure <15    The GFR formula is only valid for adults with stable renal function between ages 18 and 70.    Magnesium [[id number]]  (Normal) Collected: 11/09/23 0531    Specimen: Blood Updated: 11/09/23 0653     Magnesium 2.2 mg/dL     CBC & Differential [[id number]]  (Abnormal) Collected: 11/09/23 0531    Specimen: Blood Updated: 11/09/23 0622    Narrative:      The following orders were created for panel order CBC & Differential.  Procedure                               Abnormality         Status                      ---------                               -----------         ------                     CBC Auto Differential[[id number]]        Abnormal            Final result                 Please view results for these tests on the individual orders.    CBC Auto Differential [[id number]]  (Abnormal) Collected: 11/09/23 0531    Specimen: Blood Updated: 11/09/23 0622     WBC 15.01 10*3/mm3      RBC 2.62 10*6/mm3      Hemoglobin 7.6 g/dL      Hematocrit 25.7 %      MCV 98.1 fL      MCH 29.0 pg      MCHC 29.6 g/dL      RDW 14.6 %      RDW-SD 50.4 fl      MPV 12.9 fL      Platelets 277 10*3/mm3      Neutrophil % 77.0 %      Lymphocyte % 12.4 %      Monocyte % 6.8 %      Eosinophil % 1.9 %      Basophil % 0.2 %      Immature Grans % 1.7 %      Neutrophils, Absolute 11.56 10*3/mm3      Lymphocytes, Absolute 1.86 10*3/mm3      Monocytes, Absolute 1.02 10*3/mm3      Eosinophils, Absolute 0.29 10*3/mm3      Basophils, Absolute 0.03 10*3/mm3      Immature Grans, Absolute 0.25 10*3/mm3      nRBC 0.1 /100 WBC     POC Glucose Once [[id number]]  (Abnormal) Collected: 11/09/23 0503    Specimen: Blood Updated: 11/09/23 0503     Glucose 154 mg/dL     POC Glucose Once [[id number]]  (Abnormal) Collected: 11/08/23 2307    Specimen: Blood Updated: 11/08/23 2307     Glucose 183 mg/dL     POC Glucose Once [[id number]]  (Abnormal) Collected: 11/08/23 1947    Specimen: Blood Updated: 11/08/23 1948     Glucose 203 mg/dL     POC Glucose Once [[id number]]  (Abnormal) Collected: 11/08/23 1745    Specimen: Blood Updated: 11/08/23 1747     Glucose 177 mg/dL     Blood Culture - Blood, Arm, Right [[id number]]  (Normal) Collected: 11/05/23 1517    Specimen: Blood from Arm, Right Updated: 11/08/23 1630     Blood Culture No growth at 3 days          CT Head Without Contrast    Result Date: 11/9/2023  Impression: Redemonstration of scattered supratentorial and infratentorial hypodensities, compatible with evolving acute infarcts as seen on prior studies. No intracranial  hemorrhage. No new or large territory infarct. No mass effect. Electronically Signed: Brain Pack MD  11/9/2023 8:13 AM EST  Workstation ID: [id number]    XR Chest 1 View    Result Date: 11/8/2023  Impression: Support lines and tubes as discussed without significant change otherwise Electronically Signed: Marc Castillo MD  11/8/2023 6:42 AM CST  Workstation ID: [id number]   Results for orders placed during the hospital encounter of 10/24/23    Adult Transthoracic Echo Limited W/ Cont if Necessary Per Protocol    Interpretation Summary    Left ventricular systolic function is normal. Estimated left ventricular EF = 60%    Left ventricular wall thickness is consistent with mild concentric hypertrophy.    Trace to mild mitral regurgitation.    Saline test results are negative for intracardiac shunting.    There is a trivial pericardial effusion adjacent to the left ventricle.            Assessment/Plan     Assessment/Plan:  Multifocal bihemispheric embolic strokes,-this could be secondary to macro-embolism from atherosclerotic central arteries with hypoperfusion during intraoperative / early postoperative.  -Patient carotid ultrasound shows no evidence of significant plaque or stenosis.  -Patient intraoperative echo does not evidence any thrombus/normal EF.  -Discontinue heparin, as there is no evidence of atrial flutter/hypokinesis/thrombus on the echo.  -Recommend aspirin and statin.  -Need to continue to monitor for any arrhythmia   -Repeat CT head is stable,  -Normal blood pressure goals.    Prognosis is guarded. I anticipate patient will be going to a long term care requiring PEG.  Critically ill, spent more than 35 minutes  discussing the plan with multidisciplinary team.    No further work-up from stroke standpoint,  Stroke clinic follow-up in 1 month.      Tom Randall MD  11/09/23  13:53 EST

## 2023-11-09 NOTE — PROGRESS NOTES
Intensivist Note     11/9/2023  Hospital Day: 16  8 Days Post-Op  ICU Stays Timeline         Hospital Admission: 10/24/23 1044 - Current  ICU stays: 1        In Date/Time Event Department ICU Stay Duration     10/24/23 1044 Admission [facility] EMERGENCY DEPT      10/24/23 1326 Transfer In  [facility] 2F      10/26/23 1618 Transfer In [facility] CATH LAB      10/26/23 1656 Transfer In  [facility] CVOU      10/26/23 1745 Transfer In  [facility] 6A      11/01/23 0802 Transfer In  [facility] OR      11/01/23 1610 Transfer In [facility] 2HSIC 7 days 16 hours 13 minutes                 Ms. Michael Cochran, 78 y.o. female is followed for:    Multivessel CAD with unstable angina pectoris manifesting as dyspnea    Acute exacerbation of congestive heart failure    Non-STEMI (non-ST elevated myocardial infarction)    Ischemic cardiomyopathy with LVEF 31%    Hypertensive urgency with diastolic dysfunction    Acute hypoxemic respiratory failure due to pulmonary edema    S/P CABG x 4 on 11/2/2023    Postop ANURADHA resolved    Postop Encephalopathy    Postoperative multiple bilateral embolic strokes    HLD (hyperlipidemia)    Postop acute blood loss anemia       SUBJECTIVE     78-year-old white female with PMH of hypertension and hyperlipidemia but no previous cardiac issues. Patient presented to [facility] 10/24/2023 with hypertensive urgency, biventricular heart failure, and elevated cardiac enzymes consistent with NSTEMI.  he was also newly diagnosed with type II DM.  LHC was performed revealing multivessel disease and she was documented to have an ischemic cardiomyopathy with LVEF of 31%. Patient subsequently underwent CABG x 4 by Dr. Cleveland 11/2/2023 and was extubated that evening. Unfortunately postop course was complicated by encephalopathy, ANURADHA, and anemia.  ANURADHA subsequently improved. Encephalopathy however persisted and neurology was consulted.  CT of the head was performed and was unrevealing and EEG just revealed diffuse slowing without evidence of seizure  "activity.  MRI was subsequently ordered revealing subacute scattered areas of ischemic infarct bilaterally in the frontal and parietal lobes and extending to the bilateral basal ganglia and left cerebellum.  Unclear if this was a watershed event due to hypotension or an embolic event related to atheroemboli from the great vessels.  Neurology initially began patient on a heparin drip, but this was subsequently canceled as echocardiogram was unrevealing.  Long discussions were held with the patient's daughter after I and neurology indicated that her prognosis for a functional existence was very poor.  It was decided to make the patient DNR/DNI, but to continue with full support to give her every chance for recovery.  That would require a PEG feeding tube    Interval history: No change or improvement in neurologic function.  Keeps head turned to the right, continuous moaning, withdraws to minimal stimuli in all extremities right greater than left but the only purposeful movement appears to be with her right arm when she reaches for her ND tube.  Occasionally opens eyes to loud verbal and tactile stimuli but does not focus on examiner or follow any commands.  Hemodynamics remain adequate at 154/79 mmHg and remains in a sinus rhythm with a heart rate of 80.  UOP adequate with 1.5 L out over the last 24 hours and BUN/creatinine are unchanged at 32/0.70.  Urine is very cloudy.  Tmax 99.4 (yesterday) but afebrile today although WBC continues to creep up at 15.01.  Procalcitonin was normal 11/7/2023.  On present insulin coverage and Peptamen AF enteral feeds at goal, glycemic control varies between 152, and 208.       ROS: Per subjective, all other systems reviewed and were negative.    The patient's relevant PMH, PSH, FH, and SH were reviewed and updated in Epic as appropriate. Allergies and Medications reviewed.    OBJECTIVE     /63   Pulse 87   Temp 98.5 °F (36.9 °C) (Axillary)   Resp 18   Ht 157.5 cm (62.01\")  " " Wt 93.2 kg (205 lb 7.5 oz)   SpO2 95%   BMI 37.57 kg/m²   Oxygen Concentration (%): 35  Flow (L/min): 2    Flowsheet Rows      Flowsheet Row First Filed Value   Admission Height 157.5 cm (62\") Documented at 10/24/2023 1040   Admission Weight 93.9 kg (207 lb) Documented at 10/24/2023 1040          Intake & Output (last day)         11/08 0701 11/09 0700 11/09 0701  11/10 0700    I.V. (mL/kg)      Other 972 315    NG/GT 1479 276    IV Piggyback      Total Intake(mL/kg) 2451 (26.3) 591 (6.3)    Urine (mL/kg/hr) 1500 (0.7)     Total Output 1500     Net +951 +591          Urine Unmeasured Occurrence 2 x             Exam:  General Exam:  Elderly, chronically ill-appearing white female supine in bed in NAD  HEENT: Patient keeps head turned to the right.  Pupils equal and reactive.  Extremely dry oral and pharyngeal mucosa.  ND tube in place  Neck:                          Supple, no JVD, thyromegaly, or adenopathy  Lungs: Scattered rhonchi  Cardiovascular: RRR without murmurs or gallops.  HR 80 bpm  Abdomen: Soft nontender without organomegaly or masses.   and rectal: Pure wick catheter functioning  Extremities: No cyanosis clubbing edema.  Right arm PICC line in place  Neurologic:                 Occasional purposeful movement with right arm.  All other extremities will withdraw to noxious stimuli but rarely moves spontaneously.  Keeps head turned to the right.  Moans continuously.  Occasionally opens eyes but unable to focus or track.      Chest X-Ray: No film today.  Previous films have revealed no consolidative infiltrates    INFUSIONS  niCARdipine, 5-15 mg/hr, Last Rate: Stopped (11/06/23 1530)        Results from last 7 days   Lab Units 11/09/23  0531 11/08/23  0534 11/07/23  0532   WBC 10*3/mm3 15.01* 14.41* 13.19*   HEMOGLOBIN g/dL 7.6* 8.4* 7.7*   HEMATOCRIT % 25.7* 29.3* 26.9*   PLATELETS 10*3/mm3 277 279 222     Results from last 7 days   Lab Units 11/09/23  0531 11/08/23  0534   SODIUM mmol/L 146* 148* " "  POTASSIUM mmol/L 4.0 4.1   CHLORIDE mmol/L 111* 115*   CO2 mmol/L 24.0 22.0   BUN mg/dL 32* 30*   CREATININE mg/dL 0.70 0.76   GLUCOSE mg/dL 152* 182*   CALCIUM mg/dL 9.0 8.9     Results from last 7 days   Lab Units 11/09/23  0531 11/08/23  0534 11/07/23  0532 11/06/23  0408   MAGNESIUM mg/dL 2.2 1.9  --  2.5*   PHOSPHORUS mg/dL 4.9*  --  3.1 3.7     Results from last 7 days   Lab Units 11/06/23  1556 11/06/23  0408 11/04/23  0441   ALK PHOS U/L 117 114 93   BILIRUBIN mg/dL 0.5 0.6 0.6   ALT (SGPT) U/L 14 10 <5   AST (SGOT) U/L 37* 28 25       Lab Results   Component Value Date    SEDRATE 49 (H) 11/06/2023       Lab Results   Component Value Date    PROBNP 4,242.0 (H) 10/29/2023    PROBNP 13,276.0 (H) 10/24/2023         Procalitonin Results:      Lab 11/07/23  0532   PROCALCITONIN 0.15         Covid Tests          10/24/2023    11:09   Common Labsle   COVID19 Not Detected       COVID LABS:  Results From Last 14 Days   Lab Units 11/07/23  0532 11/06/23  1556 11/06/23  0408 11/05/23  1517 11/04/23  0441 11/02/23  0321 11/01/23  1634 10/29/23  1028 10/27/23  0429   PROBNP pg/mL  --   --   --   --   --   --   --  4,242.0*  --    CRP mg/dL  --   --  3.55*  --   --   --   --   --   --    LACTATE mmol/L  --   --   --  1.0  --   --   --   --   --    LDH U/L 251*  --   --   --   --   --   --   --   --    PROCALCITONIN ng/mL 0.15  --   --   --   --   --   --   --   --    PROTIME Seconds  --  15.8*  --   --   --  16.6* 18.2*  --   --    INR   --  1.25*  --   --   --  1.33* 1.49*  --   --    SED RATE mm/hr  --  49*  --   --   --   --   --   --   --    TRIGLYCERIDES mg/dL  --   --  119  --  123  --   --   --  305*          Lab Results   Component Value Date    TROPONINT 631 (C) 10/25/2023     Lab Results   Component Value Date    TSH 8.790 (H) 10/25/2023     Lab Results   Component Value Date    LACTATE 1.0 11/05/2023     No results found for: \"CORTISOL\"    Results from last 7 days   Lab Units 11/03/23  1348 11/02/23  1853 "   PH, ARTERIAL pH units 7.413 7.388   PCO2, ARTERIAL mm Hg 37.5 39.0   PO2 ART mm Hg 67.7* 77.7*   HCO3 ART mmol/L 24.0 23.5   FIO2 % 36 40         I reviewed the patient's results, images and medication.    Assessment & Plan   ASSESSMENT        Multivessel CAD with unstable angina pectoris manifesting as dyspnea    Acute exacerbation of congestive heart failure    Non-STEMI (non-ST elevated myocardial infarction)    Ischemic cardiomyopathy with LVEF 31%    Hypertensive urgency with diastolic dysfunction    Acute hypoxemic respiratory failure due to pulmonary edema    S/P CABG x 4 on 11/2/2023    Postop ANURADHA resolved    Postop Encephalopathy    Postoperative multiple bilateral embolic strokes    HLD (hyperlipidemia)    Postop acute blood loss anemia      DISCUSSION: No change in functional state and I think improvement unlikely.  Is just a question of time before she develops a UTI or pneumonia.  Family wishes to continue full support but does not wish CPR or intubation.  For now plan is to proceed with a PEG per Dr. Cleveland and look for an LTAC or skilled nursing facility to care for her.    PLAN     Increase Levemir to 15 units every 12 hours  Increase regular insulin to 5 units every 6 hours  Continue same SSI  PEG feeding tube per Dr. Cleveland  Entresto was started by cardiology  Urinalysis and culture so in case she spikes a temp we may already have an organism    Plan of care and goals reviewed with multidisciplinary team at daily rounds.    I discussed the patient's findings and my recommendations with nursing staff    Patient is critically ill due to irreversible problems as noted above and has a high risk of life-threatening decline in condition.  They require continuous monitoring and frequent reassessment of condition for adjustment of management in order to lessen risk.  I visited the patient's bedside and interacted with nurse numerous times today.    Time spent Critical care 35 min (It does not include  procedure time).    Electronically signed by Dirk Thomas MD, 11/09/23, 8:23 AM EST.   Pulmonary / Critical care medicine

## 2023-11-10 ENCOUNTER — APPOINTMENT (OUTPATIENT)
Dept: GENERAL RADIOLOGY | Facility: HOSPITAL | Age: 78
End: 2023-11-10
Payer: MEDICARE

## 2023-11-10 LAB
ANION GAP SERPL CALCULATED.3IONS-SCNC: 9 MMOL/L (ref 5–15)
BACTERIA SPEC AEROBE CULT: ABNORMAL
BACTERIA SPEC AEROBE CULT: NORMAL
BACTERIA UR QL AUTO: ABNORMAL /HPF
BASOPHILS # BLD AUTO: 0.04 10*3/MM3 (ref 0–0.2)
BASOPHILS NFR BLD AUTO: 0.2 % (ref 0–1.5)
BILIRUB UR QL STRIP: NEGATIVE
BUN SERPL-MCNC: 30 MG/DL (ref 8–23)
BUN/CREAT SERPL: 46.2 (ref 7–25)
CALCIUM SPEC-SCNC: 9.1 MG/DL (ref 8.6–10.5)
CHLORIDE SERPL-SCNC: 110 MMOL/L (ref 98–107)
CLARITY UR: CLEAR
CO2 SERPL-SCNC: 26 MMOL/L (ref 22–29)
COLOR UR: YELLOW
CREAT SERPL-MCNC: 0.65 MG/DL (ref 0.57–1)
DEPRECATED RDW RBC AUTO: 48.7 FL (ref 37–54)
EGFRCR SERPLBLD CKD-EPI 2021: 89.7 ML/MIN/1.73
EOSINOPHIL # BLD AUTO: 0.23 10*3/MM3 (ref 0–0.4)
EOSINOPHIL NFR BLD AUTO: 1.3 % (ref 0.3–6.2)
ERYTHROCYTE [DISTWIDTH] IN BLOOD BY AUTOMATED COUNT: 14.5 % (ref 12.3–15.4)
GLUCOSE BLDC GLUCOMTR-MCNC: 110 MG/DL (ref 70–130)
GLUCOSE BLDC GLUCOMTR-MCNC: 124 MG/DL (ref 70–130)
GLUCOSE BLDC GLUCOMTR-MCNC: 151 MG/DL (ref 70–130)
GLUCOSE BLDC GLUCOMTR-MCNC: 189 MG/DL (ref 70–130)
GLUCOSE SERPL-MCNC: 115 MG/DL (ref 65–99)
GLUCOSE UR STRIP-MCNC: NEGATIVE MG/DL
HCT VFR BLD AUTO: 25.8 % (ref 34–46.6)
HGB BLD-MCNC: 7.8 G/DL (ref 12–15.9)
HGB UR QL STRIP.AUTO: NEGATIVE
HYALINE CASTS UR QL AUTO: ABNORMAL /LPF
IMM GRANULOCYTES # BLD AUTO: 0.2 10*3/MM3 (ref 0–0.05)
IMM GRANULOCYTES NFR BLD AUTO: 1.1 % (ref 0–0.5)
KETONES UR QL STRIP: NEGATIVE
LEUKOCYTE ESTERASE UR QL STRIP.AUTO: ABNORMAL
LYMPHOCYTES # BLD AUTO: 1.54 10*3/MM3 (ref 0.7–3.1)
LYMPHOCYTES NFR BLD AUTO: 8.6 % (ref 19.6–45.3)
MCH RBC QN AUTO: 29 PG (ref 26.6–33)
MCHC RBC AUTO-ENTMCNC: 30.2 G/DL (ref 31.5–35.7)
MCV RBC AUTO: 95.9 FL (ref 79–97)
MONOCYTES # BLD AUTO: 0.99 10*3/MM3 (ref 0.1–0.9)
MONOCYTES NFR BLD AUTO: 5.5 % (ref 5–12)
NEUTROPHILS NFR BLD AUTO: 14.96 10*3/MM3 (ref 1.7–7)
NEUTROPHILS NFR BLD AUTO: 83.3 % (ref 42.7–76)
NITRITE UR QL STRIP: NEGATIVE
NRBC BLD AUTO-RTO: 0 /100 WBC (ref 0–0.2)
NT-PROBNP SERPL-MCNC: 6349 PG/ML (ref 0–1800)
PH UR STRIP.AUTO: <=5 [PH] (ref 5–8)
PLATELET # BLD AUTO: 327 10*3/MM3 (ref 140–450)
PMV BLD AUTO: 12.2 FL (ref 6–12)
POTASSIUM SERPL-SCNC: 4.1 MMOL/L (ref 3.5–5.2)
PROCALCITONIN SERPL-MCNC: 0.17 NG/ML (ref 0–0.25)
PROT UR QL STRIP: ABNORMAL
RBC # BLD AUTO: 2.69 10*6/MM3 (ref 3.77–5.28)
RBC # UR STRIP: ABNORMAL /HPF
REF LAB TEST METHOD: ABNORMAL
SODIUM SERPL-SCNC: 145 MMOL/L (ref 136–145)
SP GR UR STRIP: 1.02 (ref 1–1.03)
SQUAMOUS #/AREA URNS HPF: ABNORMAL /HPF
UROBILINOGEN UR QL STRIP: ABNORMAL
WBC # UR STRIP: ABNORMAL /HPF
WBC NRBC COR # BLD: 17.96 10*3/MM3 (ref 3.4–10.8)
YEAST URNS QL MICRO: ABNORMAL /HPF

## 2023-11-10 PROCEDURE — 99232 SBSQ HOSP IP/OBS MODERATE 35: CPT | Performed by: INTERNAL MEDICINE

## 2023-11-10 PROCEDURE — 25010000002 MIDAZOLAM PER 1 MG: Performed by: THORACIC SURGERY (CARDIOTHORACIC VASCULAR SURGERY)

## 2023-11-10 PROCEDURE — 83880 ASSAY OF NATRIURETIC PEPTIDE: CPT | Performed by: INTERNAL MEDICINE

## 2023-11-10 PROCEDURE — 0DH63UZ INSERTION OF FEEDING DEVICE INTO STOMACH, PERCUTANEOUS APPROACH: ICD-10-PCS | Performed by: THORACIC SURGERY (CARDIOTHORACIC VASCULAR SURGERY)

## 2023-11-10 PROCEDURE — 63710000001 INSULIN REGULAR HUMAN PER 5 UNITS: Performed by: INTERNAL MEDICINE

## 2023-11-10 PROCEDURE — 99233 SBSQ HOSP IP/OBS HIGH 50: CPT | Performed by: INTERNAL MEDICINE

## 2023-11-10 PROCEDURE — 87086 URINE CULTURE/COLONY COUNT: CPT | Performed by: STUDENT IN AN ORGANIZED HEALTH CARE EDUCATION/TRAINING PROGRAM

## 2023-11-10 PROCEDURE — 81001 URINALYSIS AUTO W/SCOPE: CPT | Performed by: STUDENT IN AN ORGANIZED HEALTH CARE EDUCATION/TRAINING PROGRAM

## 2023-11-10 PROCEDURE — 25010000002 FENTANYL CITRATE (PF) 50 MCG/ML SOLUTION: Performed by: THORACIC SURGERY (CARDIOTHORACIC VASCULAR SURGERY)

## 2023-11-10 PROCEDURE — 85025 COMPLETE CBC W/AUTO DIFF WBC: CPT | Performed by: INTERNAL MEDICINE

## 2023-11-10 PROCEDURE — 63710000001 INSULIN DETEMIR PER 5 UNITS: Performed by: INTERNAL MEDICINE

## 2023-11-10 PROCEDURE — 80048 BASIC METABOLIC PNL TOTAL CA: CPT | Performed by: INTERNAL MEDICINE

## 2023-11-10 PROCEDURE — 43246 EGD PLACE GASTROSTOMY TUBE: CPT | Performed by: PHYSICIAN ASSISTANT

## 2023-11-10 PROCEDURE — 25010000002 CEFAZOLIN PER 500 MG: Performed by: PHYSICIAN ASSISTANT

## 2023-11-10 PROCEDURE — 82948 REAGENT STRIP/BLOOD GLUCOSE: CPT

## 2023-11-10 PROCEDURE — 43246 EGD PLACE GASTROSTOMY TUBE: CPT | Performed by: THORACIC SURGERY (CARDIOTHORACIC VASCULAR SURGERY)

## 2023-11-10 PROCEDURE — 74018 RADEX ABDOMEN 1 VIEW: CPT

## 2023-11-10 PROCEDURE — 84145 PROCALCITONIN (PCT): CPT | Performed by: INTERNAL MEDICINE

## 2023-11-10 RX ORDER — MIDAZOLAM HYDROCHLORIDE 1 MG/ML
4 INJECTION INTRAMUSCULAR; INTRAVENOUS ONCE
Status: COMPLETED | OUTPATIENT
Start: 2023-11-10 | End: 2023-11-10

## 2023-11-10 RX ORDER — NYSTATIN 100000 U/G
1 CREAM TOPICAL EVERY 12 HOURS SCHEDULED
Status: COMPLETED | OUTPATIENT
Start: 2023-11-10 | End: 2023-11-14

## 2023-11-10 RX ORDER — MIDAZOLAM HYDROCHLORIDE 1 MG/ML
INJECTION INTRAMUSCULAR; INTRAVENOUS
Status: ACTIVE
Start: 2023-11-10 | End: 2023-11-10

## 2023-11-10 RX ORDER — FENTANYL CITRATE 50 UG/ML
100 INJECTION, SOLUTION INTRAMUSCULAR; INTRAVENOUS
Status: DISCONTINUED | OUTPATIENT
Start: 2023-11-10 | End: 2023-11-15 | Stop reason: HOSPADM

## 2023-11-10 RX ORDER — FAMOTIDINE 10 MG/ML
20 INJECTION, SOLUTION INTRAVENOUS EVERY 12 HOURS SCHEDULED
Status: DISCONTINUED | OUTPATIENT
Start: 2023-11-10 | End: 2023-11-14

## 2023-11-10 RX ORDER — FLUCONAZOLE 200 MG/1
200 TABLET ORAL
Status: COMPLETED | OUTPATIENT
Start: 2023-11-10 | End: 2023-11-12

## 2023-11-10 RX ADMIN — ATORVASTATIN CALCIUM 80 MG: 40 TABLET, FILM COATED ORAL at 20:53

## 2023-11-10 RX ADMIN — INSULIN HUMAN 5 UNITS: 100 INJECTION, SOLUTION PARENTERAL at 12:29

## 2023-11-10 RX ADMIN — SENNOSIDES 10 ML: 8.8 LIQUID ORAL at 20:53

## 2023-11-10 RX ADMIN — HYDRALAZINE HYDROCHLORIDE 50 MG: 25 TABLET, FILM COATED ORAL at 12:27

## 2023-11-10 RX ADMIN — Medication 10 ML: at 20:54

## 2023-11-10 RX ADMIN — DOCUSATE SODIUM 100 MG: 50 LIQUID ORAL at 08:39

## 2023-11-10 RX ADMIN — INSULIN HUMAN 4 UNITS: 100 INJECTION, SOLUTION PARENTERAL at 00:19

## 2023-11-10 RX ADMIN — SACUBITRIL AND VALSARTAN 1 TABLET: 24; 26 TABLET, FILM COATED ORAL at 08:39

## 2023-11-10 RX ADMIN — CARVEDILOL 6.25 MG: 6.25 TABLET, FILM COATED ORAL at 17:39

## 2023-11-10 RX ADMIN — SACUBITRIL AND VALSARTAN 1 TABLET: 24; 26 TABLET, FILM COATED ORAL at 20:53

## 2023-11-10 RX ADMIN — HYDRALAZINE HYDROCHLORIDE 50 MG: 25 TABLET, FILM COATED ORAL at 00:18

## 2023-11-10 RX ADMIN — SODIUM CHLORIDE 2000 MG: 900 INJECTION INTRAVENOUS at 09:24

## 2023-11-10 RX ADMIN — INSULIN DETEMIR 15 UNITS: 100 INJECTION, SOLUTION SUBCUTANEOUS at 20:53

## 2023-11-10 RX ADMIN — FAMOTIDINE 20 MG: 10 INJECTION INTRAVENOUS at 10:22

## 2023-11-10 RX ADMIN — NYSTATIN 1 APPLICATION: 100000 CREAM TOPICAL at 12:31

## 2023-11-10 RX ADMIN — FLUCONAZOLE 200 MG: 200 TABLET ORAL at 12:31

## 2023-11-10 RX ADMIN — ASPIRIN 81 MG CHEWABLE TABLET 81 MG: 81 TABLET CHEWABLE at 08:38

## 2023-11-10 RX ADMIN — INSULIN HUMAN 2 UNITS: 100 INJECTION, SOLUTION PARENTERAL at 12:29

## 2023-11-10 RX ADMIN — FAMOTIDINE 20 MG: 10 INJECTION INTRAVENOUS at 20:53

## 2023-11-10 RX ADMIN — MIDAZOLAM HYDROCHLORIDE 4 MG: 1 INJECTION, SOLUTION INTRAMUSCULAR; INTRAVENOUS at 09:23

## 2023-11-10 RX ADMIN — CARVEDILOL 6.25 MG: 6.25 TABLET, FILM COATED ORAL at 08:39

## 2023-11-10 RX ADMIN — NYSTATIN 1 APPLICATION: 100000 CREAM TOPICAL at 20:54

## 2023-11-10 RX ADMIN — AMLODIPINE BESYLATE 10 MG: 10 TABLET ORAL at 08:39

## 2023-11-10 RX ADMIN — METHYLPHENIDATE HYDROCHLORIDE 10 MG: 5 TABLET ORAL at 12:27

## 2023-11-10 RX ADMIN — FENTANYL CITRATE 100 MCG: 50 INJECTION, SOLUTION INTRAMUSCULAR; INTRAVENOUS at 09:23

## 2023-11-10 RX ADMIN — Medication 10 ML: at 08:39

## 2023-11-10 RX ADMIN — HYDRALAZINE HYDROCHLORIDE 50 MG: 25 TABLET, FILM COATED ORAL at 17:39

## 2023-11-10 RX ADMIN — INSULIN HUMAN 5 UNITS: 100 INJECTION, SOLUTION PARENTERAL at 00:18

## 2023-11-10 RX ADMIN — SENNOSIDES 10 ML: 8.8 LIQUID ORAL at 08:39

## 2023-11-10 RX ADMIN — DOCUSATE SODIUM 100 MG: 50 LIQUID ORAL at 20:53

## 2023-11-10 RX ADMIN — HYDRALAZINE HYDROCHLORIDE 50 MG: 25 TABLET, FILM COATED ORAL at 05:48

## 2023-11-10 NOTE — PROGRESS NOTES
[facility] at [facility]  IP Progress Note      Chief Complaint/Reason for service: Ischemic cardiomyopathy secondary to severe multivessel disease and recent non-STEMI #2 hypertension    Subjective   Subjective: The patient is doing some moaning.  She does not respond to voice or when examined    Past medical, surgical, social and family history reviewed in the patient's electronic medical record.    Objective     Vital Sign Min/Max for last 24 hours  Temp  Min: 98.5 °F (36.9 °C)  Max: 99.5 °F (37.5 °C)   BP  Min: 114/65  Max: 170/86   Pulse  Min: 65  Max: 89   Resp  Min: 16  Max: 18   SpO2  Min: 91 %  Max: 97 %   Flow (L/min)  Min: 2  Max: 2      Intake/Output Summary (Last 24 hours) at 11/10/2023 0645  Last data filed at 11/10/2023 0000  Gross per 24 hour   Intake 2754 ml   Output 1300 ml   Net 1454 ml             Current Facility-Administered Medications:     amLODIPine (NORVASC) tablet 10 mg, 10 mg, Nasogastric, Q24H, Madhu Sánchez MD, 10 mg at 11/09/23 0826    aspirin chewable tablet 81 mg, 81 mg, Nasogastric, Daily, Dirk Thomas MD, 81 mg at 11/09/23 0826    atorvastatin (LIPITOR) tablet 80 mg, 80 mg, Nasogastric, Nightly, Dirk Cleveland MD, 80 mg at 11/09/23 2145    bisacodyl (DULCOLAX) suppository 10 mg, 10 mg, Rectal, Daily PRN, Dirk Cleveland MD    carvedilol (COREG) tablet 6.25 mg, 6.25 mg, Nasogastric, BID With Meals, Madhu Sánchez MD, 6.25 mg at 11/09/23 1832    dextrose (D50W) (25 g/50 mL) IV injection 25 g, 25 g, Intravenous, Q15 Min PRN, Lauro Salomon MD    sennosides (SENOKOT) 8.8 MG/5ML syrup 10 mL, 10 mL, Nasogastric, BID, 10 mL at 11/09/23 2144 **AND** docusate sodium (COLACE) liquid 100 mg, 100 mg, Nasogastric, BID, Dirk Cleveland MD, 100 mg at 11/09/23 2140    glucagon (GLUCAGEN) injection 1 mg, 1 mg, Intramuscular, Q15 Min PRN, Lauro Salomon MD    hydrALAZINE (APRESOLINE) tablet 50 mg, 50 mg, Nasogastric, Q6H, Dirk Thomas MD, 50 mg at 11/10/23  0548    insulin detemir (LEVEMIR) injection 15 Units, 15 Units, Subcutaneous, Q12H, Dirk Thomas MD, 15 Units at 11/09/23 2145    insulin regular (humuLIN R,novoLIN R) injection 2-9 Units, 2-9 Units, Subcutaneous, Q6H, Lauro Salomon MD, 4 Units at 11/10/23 0019    insulin regular (humuLIN R,novoLIN R) injection 5 Units, 5 Units, Subcutaneous, Q6H, Dirk Thomas MD, 5 Units at 11/10/23 0018    ipratropium-albuterol (DUO-NEB) nebulizer solution 3 mL, 3 mL, Nebulization, Q4H PRN, Robyn Newton PA-C    Magnesium Cardiology Dose Replacement - Follow Nurse / BPA Driven Protocol, , Does not apply, PRN, Robyn Newton PA-C    methylphenidate (RITALIN) tablet 10 mg, 10 mg, Nasogastric, Daily, Dirk Thomas MD    nitroglycerin (NITROSTAT) SL tablet 0.4 mg, 0.4 mg, Sublingual, Q5 Min PRN, Robyn Newton PA-C    ondansetron (ZOFRAN) injection 4 mg, 4 mg, Intravenous, Q6H PRN, Robyn Newton PA-C    Phosphorus Replacement - Follow Nurse / BPA Driven Protocol, , Does not apply, PRN, Robyn Newton PA-C    Potassium Replacement - Follow Nurse / BPA Driven Protocol, , Does not apply, PRN, Robyn Newton PA-C    sacubitril-valsartan (ENTRESTO) 24-26 MG tablet 1 tablet, 1 tablet, Nasogastric, Q12H, Gurmeet Colón MD, 1 tablet at 11/09/23 2145    sodium chloride 0.9 % flush 10 mL, 10 mL, Intravenous, Q12H, Dirk Cleveland MD, 10 mL at 11/09/23 2148    sodium chloride 0.9 % flush 10 mL, 10 mL, Intravenous, PRN, Dirk Cleveland MD    sodium chloride 0.9 % infusion 40 mL, 40 mL, Intravenous, PRN, Robyn Newton PA-C    Physical Exam: General elderly female in bed moaning sound but does not respond to verbal stimuli or physical stimuli current systolic blood pressure 155        HEENT: No JVP is present.  Feeding tube is present       Respiratory: Equal bilateral symmetrical expansion crackles at the bases       Cardiovascular: Regular rate and rhythm without any obvious murmur and  trace edema to palpation       GI: Soft       Lower Extremities: No lesions       Neuro: Cannot assess due to patient obtundation       Skin: Warm and dry trace edema palpation       Psych: Cannot assess due to obtundation    Results Review: Patient is a net +7.2 L since admission.  Hemoglobin 7.8.  GFR is 89.7.  Heart rate 66-75    Radiology Results:  Imaging Results (Last 72 Hours)       Procedure Component Value Units Date/Time    CT Head Without Contrast [[id number]] Collected: 11/09/23 0807     Updated: 11/09/23 0816    Narrative:      CT HEAD WO CONTRAST    Date of Exam: 11/9/2023 4:11 AM EST    Indication: Stroke, follow up.    Comparison: Brain MRI 11/6/2023, head CT 11/4/2023    Technique: Axial CT images were obtained of the head without contrast administration.  Automated exposure control and iterative construction methods were used.      Findings:  No acute intracranial hemorrhage. Evolving scattered acute infarcts within the bilateral corona radiata and centrum semiovale and left cerebellum, appearing slightly more hypodense compared to prior CT of the head from 11/4/2023. No evidence of new or   large territory infarct. No mass effect. Similar generalized cerebral volume loss. No extra-axial collections. Normal size and configuration of the ventricles. There is intracranial atherosclerosis. Unremarkable appearance of the orbits. There is partial   visualization of nasogastric tube. The mastoid air cells are clear. There is submucosal thickening/opacifications in the right anterior ethmoid air cells and right frontal sinus. No acute or suspicious bony findings.      Impression:      Impression:  Redemonstration of scattered supratentorial and infratentorial hypodensities, compatible with evolving acute infarcts as seen on prior studies. No intracranial hemorrhage. No new or large territory infarct. No mass effect.        Electronically Signed: Brain Pack MD    11/9/2023 8:13 AM EST    Workstation  ID: [id number]    XR Chest 1 View [[id number]] Collected: 11/08/23 0741     Updated: 11/08/23 0745    Narrative:      XR CHEST 1 VW    Date of Exam: 11/8/2023 1:35 AM CST    Indication: Ischemic cardiomyopathy post CABG.  Now with multiple bilateral cerebral infarcts and poor clearance of oral secretions    Comparison: 11/6/2023    Findings:  Cardiomediastinal silhouette is unchanged. Removal of right IJ sheath and placement of a right-sided PICC with tip in the mid SVC. There is persistent pulmonary vascular congestion with mild basilar airspace disease and minimal effusions. No   pneumothorax. No acute osseous abnormality identified.      Impression:      Impression:  Support lines and tubes as discussed without significant change otherwise      Electronically Signed: Marc Castillo MD    11/8/2023 6:42 AM CST    Workstation ID: [id number]            EKG: Sinus rhythm    ECHO: EF 60%    Tele: Sinus rhythm    Assessment   Assessment/Plan: Ischemic cardiomyopathy secondary to recent non-STEMI and multivessel disease.  Patient is undergone successful revascularization and her EF is recovered back to 60%.  Continue aspirin beta-blocker and statin  2 hypertension-the patient was started on Entresto yesterday  3 bilateral stroke  Check BNP and give Lasix if elevated    Gurmeet Colón MD  11/10/23  06:45 EST

## 2023-11-10 NOTE — CONSULTS
Consult received for diabetes education. She was seen by diabetes education for full education on 10/28 prior to surgery. She is planning to go to SNF at discharge per case management. She had a PEG placed today.  Patient does not qualify for the follow up stroke class based on the exclusion criteria of  inability to follow TLC diet due to tube feeding.

## 2023-11-10 NOTE — CASE MANAGEMENT/SOCIAL WORK
Continued Stay Note  [facility]     Patient Name: Michael Cochran  MRN: [id number]  Today's Date: 11/10/2023    Admit Date: 10/24/2023    Plan: SNF to LTC   Discharge Plan       Row Name 11/10/23 1512       Plan    Plan SNF to LTC    Patient/Family in Agreement with Plan yes    Plan Comments Discussed patient in MDR. PEG tube placed this am. Referrals faxed to facilities in [place] for placement. Daughter prefers [facility]. No other discharge needs at this time. CM to continue to follow.    Final Discharge Disposition Code 03 - skilled nursing facility (SNF)                   Discharge Codes    No documentation.                 Expected Discharge Date and Time       Expected Discharge Date Expected Discharge Time    Nov 14, 2023               Gricel Flowers RN

## 2023-11-10 NOTE — PLAN OF CARE
Goal Outcome Evaluation:      Patient was more active at beginning of shift, reacted positive to bath and hair washing/brushing. Much less restless afterward.   Tube feed turned off per order in anticipation of PEG tube placement on 11/10.

## 2023-11-10 NOTE — PROGRESS NOTES
Intensivist Note     11/10/2023  Hospital Day: 17  Day of Surgery  ICU Stays Timeline         Hospital Admission: 10/24/23 1044 - Current  ICU stays: 1        In Date/Time Event Department ICU Stay Duration     10/24/23 1044 Admission [facility] EMERGENCY DEPT      10/24/23 1326 Transfer In  [facility] 2F      10/26/23 1618 Transfer In [facility] CATH LAB      10/26/23 1656 Transfer In  [facility] CVOU      10/26/23 1745 Transfer In  [facility] 6A      11/01/23 0802 Transfer In  [facility] OR      11/01/23 1610 Transfer In [facility] 2HSIC 8 days 21 hours 43 minutes                 Ms. Michael Cochran, 78 y.o. female is followed for:    Multivessel CAD with unstable angina pectoris manifesting as dyspnea    Acute exacerbation of congestive heart failure    Non-STEMI (non-ST elevated myocardial infarction)    Ischemic cardiomyopathy with LVEF 31%    Hypertensive urgency with diastolic dysfunction    Acute hypoxemic respiratory failure due to pulmonary edema    S/P CABG x 4 on 11/2/2023    Postop ANURADHA resolved    Postop Encephalopathy    Postoperative multiple bilateral embolic strokes    HLD (hyperlipidemia)    Postop acute blood loss anemia       SUBJECTIVE     78-year-old white female with PMH of hypertension and hyperlipidemia but no previous cardiac issues. Patient presented to [facility] 10/24/2023 with hypertensive urgency, biventricular heart failure, and elevated cardiac enzymes consistent with NSTEMI.  he was also newly diagnosed with type II DM.  LHC was performed revealing multivessel disease and she was documented to have an ischemic cardiomyopathy with LVEF of 31%. Patient subsequently underwent CABG x 4 by Dr. Cleveland 11/2/2023 and was extubated that evening. Unfortunately postop course was complicated by encephalopathy, ANURADHA, and anemia.  ANURADHA subsequently improved. Encephalopathy however persisted and neurology was consulted.  CT of the head was performed and was unrevealing and EEG just revealed diffuse slowing without evidence of seizure  "activity.  MRI was subsequently ordered revealing subacute scattered areas of ischemic infarct bilaterally in the frontal and parietal lobes and extending to the bilateral basal ganglia and left cerebellum.  Unclear if this was a watershed event due to hypotension or an embolic event related to atheroemboli from the great vessels.  Neurology initially began patient on a heparin drip, but this was subsequently canceled as echocardiogram was unrevealing.  Long discussions were held with the patient's daughter after I and neurology indicated that her prognosis for a functional existence was very poor.  It was decided to make the patient DNR/DNI, but to continue with full support to give her every chance for recovery.  That would require a PEG feeding tube.    Interval history: No change or improvement.  Remains with severe encephalopathy.  Continual moaning with intermittent restlessness.  Moves all extremities in various times but minimally on the left.  Right arm has some purposeful movement reaching for tubes but nothing to command and she keeps her eyes closed.  Occasionally will open her eyes but does not track.  Hemodynamics adequate with blood pressure 124/64.  Remains in a sinus rhythm with a rate of 73 bpm.  Nursing tells me that her perineum has a severe yeast dermatitis in the region of her pure wick urinary collection device and this will be exchanged for a Nolasco catheter.  Tmax 99.5 and WBC is slowly increasing to 17.96 although procalcitonin still only 0.17.       ROS: Per subjective, all other systems reviewed and were negative.    The patient's relevant PMH, PSH, FH, and SH were reviewed and updated in Epic as appropriate. Allergies and Medications reviewed.    OBJECTIVE     BP (P) 92/72 (BP Location: Left arm, Patient Position: Lying)   Pulse 76   Temp (P) 98 °F (36.7 °C) (Axillary)   Resp (P) 18   Ht 157.5 cm (62.01\")   Wt 93.2 kg (205 lb 7.5 oz)   SpO2 96%   BMI 37.57 kg/m²   Oxygen " "Concentration (%): 35  Flow (L/min): (P) 2    Flowsheet Rows      Flowsheet Row First Filed Value   Admission Height 157.5 cm (62\") Documented at 10/24/2023 1040   Admission Weight 93.9 kg (207 lb) Documented at 10/24/2023 1040          Intake & Output (last day)         11/09 0701  11/10 0700 11/10 0701 11/11 0700    Other 603 150    NG/     Total Intake(mL/kg) 1589 (17) 150 (1.6)    Urine (mL/kg/hr) 1300 (0.6) 2000 (3.1)    Total Output 1300 2000    Net +289 -1850          Urine Unmeasured Occurrence  1 x            Exam:  General Exam:  Elderly, chronically ill appearing, debilitated white female supine in bed moaning and restless  HEENT: Pupils equal and reactive. Nose and throat clear.  Neck:                          Supple, no JVD, thyromegaly, or adenopathy  Lungs: Clear to auscultation and percussion anteriorly and posteriorly.  Cardiovascular: Regular rate and rhythm without murmurs or gallops.  Abdomen: Soft nontender without organomegaly or masses.   and rectal: Deferred.  Extremities: No cyanosis clubbing edema.  Neurologic:                 Continuous moaning and restlessness.  Head primarily turned to right but can turn to the left.  Can move all extremities but minimally on the left.  Has some purposeful movement of right arm (reaches for tubes and face).  Keeps eyes closed.  Cannot follow any commands      Chest X-Ray: No film today    INFUSIONS       Results from last 7 days   Lab Units 11/10/23  0401 11/09/23 0531 11/08/23  0534   WBC 10*3/mm3 17.96* 15.01* 14.41*   HEMOGLOBIN g/dL 7.8* 7.6* 8.4*   HEMATOCRIT % 25.8* 25.7* 29.3*   PLATELETS 10*3/mm3 327 277 279     Results from last 7 days   Lab Units 11/10/23  0401 11/09/23  0531   SODIUM mmol/L 145 146*   POTASSIUM mmol/L 4.1 4.0   CHLORIDE mmol/L 110* 111*   CO2 mmol/L 26.0 24.0   BUN mg/dL 30* 32*   CREATININE mg/dL 0.65 0.70   GLUCOSE mg/dL 115* 152*   CALCIUM mg/dL 9.1 9.0     Results from last 7 days   Lab Units 11/09/23  0531 " "11/08/23  0534 11/07/23  0532 11/06/23  0408   MAGNESIUM mg/dL 2.2 1.9  --  2.5*   PHOSPHORUS mg/dL 4.9*  --  3.1 3.7     Results from last 7 days   Lab Units 11/06/23  1556 11/06/23  0408 11/04/23  0441   ALK PHOS U/L 117 114 93   BILIRUBIN mg/dL 0.5 0.6 0.6   ALT (SGPT) U/L 14 10 <5   AST (SGOT) U/L 37* 28 25       Lab Results   Component Value Date    SEDRATE 49 (H) 11/06/2023       Lab Results   Component Value Date    PROBNP 6,349.0 (H) 11/10/2023    PROBNP 4,242.0 (H) 10/29/2023         Procalitonin Results:      Lab 11/10/23  0401 11/07/23  0532   PROCALCITONIN 0.17 0.15         Covid Tests          10/24/2023    11:09   Common Labsle   COVID19 Not Detected       COVID LABS:  Results From Last 14 Days   Lab Units 11/10/23  0401 11/07/23  0532 11/06/23  1556 11/06/23  0408 11/05/23  1517 11/04/23  0441 11/02/23  0321 11/01/23  1634 10/29/23  1028   PROBNP pg/mL 6,349.0*  --   --   --   --   --   --   --  4,242.0*   CRP mg/dL  --   --   --  3.55*  --   --   --   --   --    LACTATE mmol/L  --   --   --   --  1.0  --   --   --   --    LDH U/L  --  251*  --   --   --   --   --   --   --    PROCALCITONIN ng/mL 0.17 0.15  --   --   --   --   --   --   --    PROTIME Seconds  --   --  15.8*  --   --   --  16.6* 18.2*  --    INR   --   --  1.25*  --   --   --  1.33* 1.49*  --    SED RATE mm/hr  --   --  49*  --   --   --   --   --   --    TRIGLYCERIDES mg/dL  --   --   --  119  --  123  --   --   --           Lab Results   Component Value Date    TROPONINT 631 (C) 10/25/2023     Lab Results   Component Value Date    TSH 8.790 (H) 10/25/2023     Lab Results   Component Value Date    LACTATE 1.0 11/05/2023     No results found for: \"CORTISOL\"        I reviewed the patient's results, images and medication.    Assessment & Plan   ASSESSMENT        Multivessel CAD with unstable angina pectoris manifesting as dyspnea    Acute exacerbation of congestive heart failure    Non-STEMI (non-ST elevated myocardial infarction)    " Ischemic cardiomyopathy with LVEF 31%    Hypertensive urgency with diastolic dysfunction    Acute hypoxemic respiratory failure due to pulmonary edema    S/P CABG x 4 on 11/2/2023    Postop ANURADHA resolved    Postop Encephalopathy    Postoperative multiple bilateral embolic strokes    HLD (hyperlipidemia)    Postop acute blood loss anemia      DISCUSSION: No change or improvement.  PEG feeding tube to be placed today per Dr. Cleveland.  Her yeast dermatitis/cystitis requires placement of a Nolasco catheter to protect her perineum and the institution of Diflucan and topical nystatin cream.  I remain concerned about the slowly progressive leukocytosis although procalcitonin remains normal and she has no significant fever.  If she does spike a temp would need empiric broad-spectrum antimicrobial coverage for hospital-acquired infections    PLAN     PEG feeding tube today  Place Nolasco catheter  Diflucan 200 mg daily for 3 days  Nystatin cream to perineum  Resume enteral feeds after cleared by surgery to use PEG feeding tube  LTAC or skilled nursing facility first of the week  If spikes a temp again broad-spectrum antimicrobial coverage for hospital-acquired infections  CXR in a.m.    Plan of care and goals reviewed with multidisciplinary team at daily rounds.    I discussed the patient's findings and my recommendations with nursing staff    Time spent Critical care 20 min (It does not include procedure time).    Electronically signed by Dirk Thomas MD, 11/10/23, 1:53 PM EST.   Pulmonary / Critical care medicine

## 2023-11-10 NOTE — PROGRESS NOTES
Cardiothoracic Surgery Progress Note    POD # 9 s/p CABG x 4     LOS: 17 days      Subjective:  No acute events per nursing    Objective:  Vital Signs  Temp:  [98.5 °F (36.9 °C)-99.5 °F (37.5 °C)] 98.9 °F (37.2 °C)  Heart Rate:  [65-89] 76  Resp:  [16-18] 18  BP: (114-170)/() 153/69    Physical Exam:   General Appearance:  Does not follow commands.  Moving extremities, withdraws to pain, PERRL, moaning   Lungs: clear to auscultation, respirations regular, respirations even, and respirations unlabored   Heart: regular rhythm & normal rate, normal S1, S2, and no murmur, no gallop, no rub   Skin: Incision c/d/i     Results:    Results from last 7 days   Lab Units 11/10/23  0401   WBC 10*3/mm3 17.96*   HEMOGLOBIN g/dL 7.8*   HEMATOCRIT % 25.8*   PLATELETS 10*3/mm3 327     Results from last 7 days   Lab Units 11/10/23  0401   SODIUM mmol/L 145   POTASSIUM mmol/L 4.1   CHLORIDE mmol/L 110*   CO2 mmol/L 26.0   BUN mg/dL 30*   CREATININE mg/dL 0.65   GLUCOSE mg/dL 115*   CALCIUM mg/dL 9.1       Assessment:  POD # 9 s/p CABG x 4    Plan:  Minimize sedation/narcotics  Pulmonary toilet  Continue enteric feeds  PEG tube today  Permissive hypertension per Neurology   Head of bed elevated at all times  Continue supportive care  Oral care  Will require LTACH,  to arrange    Dirk Cleveland MD  11/10/23  07:29 EST

## 2023-11-10 NOTE — PROGRESS NOTES
EN F/U    Time: 20min  Patient Name: Michael Cochran  Date of Encounter: 23 20:39 EST  MRN: [id number]  Admission date: 10/24/2023      Reason for visit: brief EN review.       EMR reviewed  Labs reviewed - Na (146) - cont improvement  Medications reviewed      Additional information obtained during MDR: Plan for PEG 11/10.      Current diet: NPO Diet NPO Type: Strict NPO  NPO Diet NPO Type: Strict NPO      EN: Peptamen AF  Goal Rate: 70 ml/hr  Water Flushes: 50 ml/hr  Modular: None  Route: NG  Tube: Small bore    At goal over: 20Hrs/day  Rx will supply:   Goal Volume 1400  mL/day     Flush Volume 1000 mL/day     Energy 1680 Kcal/day 105 % Est Need   Protein 106 g/day 96 % Est Need   Fiber 8.5 g/day     Water in  EN 1135 mL     Total Water 2235 mL     Meet DRI Yes        --------------------------------------------------------------------------  Product/Rate verified at bedside: Yes  Infusing Rate at time of visit: 70 ml/hr, water @ 50 ml/hr    Average Delivery from [facility]:  Volume 1379 mL/day 99  % Goal Vol.   Flush Volume 972 mL/day     Energy  Kcal/day  % Est Need   Protein  g/day  % Est Need   Fiber  g/day     Water in  EN  mL     Total Water 2089 mL     Meet DRI No          Intervention:  Follow treatment plan  Care plan reviewed  Continue current EN regimen at this time.  Will continue to monitor GOC/POC.      Follow up:   Per protocol      Adry Lozano, RD  10:00 EST

## 2023-11-10 NOTE — DISCHARGE PLACEMENT REQUEST
"Michael Murphy (78 y.o. Female)   Gricel Flowers -[phone]      Date of Birth   1945    Social Security Number       Address   [address] [place], [place] [address]    Home Phone   [phone]    MRN   [id number]       Roman Catholic   None    Marital Status                               Admission Date   10/24/23    Admission Type   Emergency    Admitting Provider   Ranjan Cheema MD    Attending Provider   Dirk Cleveland MD    Department, Room/Bed   [facility]       Discharge Date       Discharge Disposition       Discharge Destination                                 Attending Provider: Dirk Cleveland MD    Allergies: Dynacirc [Isradipine], Codeine    Isolation: None   Infection: None   Code Status: No CPR    Ht: 157.5 cm (62.01\")   Wt: 93.2 kg (205 lb 7.5 oz)    Admission Cmt: None   Principal Problem: Multivessel CAD with unstable angina pectoris manifesting as dyspnea [I25.110]                   Active Insurance as of 10/24/2023       Primary Coverage       Payor Plan Insurance Group Employer/Plan Group    ANTHEM MEDICARE REPLACEMENT ANTHEM MEDICARE ADVANTAGE [id number]       Payor Plan Address Payor Plan Phone Number Payor Plan Fax Number Effective Dates    [address] [phone]  2023 - None Entered    [facility] [id number]         Subscriber Name Subscriber Birth Date Member ID       MICHAEL MURPHY 1945 [id number]                     Emergency Contacts        (Rel.) Home Phone Work Phone Mobile Phone    MICHELLE GAVIRIA (Daughter) [phone] -- [phone]                 History & Physical        Kera Suarez DO at 10/24/23 [facility]9              [facility] Medicine Services  HISTORY AND PHYSICAL    Patient Name: Michael Murphy  : 1945  MRN: [id number]  Primary Care Physician: Bo Rodriguez PA  Date of admission: 10/24/2023      Subjective  Subjective     Chief Complaint:  Chest " pain and shortness of air    HPI:  Michael Cochran is a 78 y.o. female with a past medical history of hypertension and HLD that presented to the ED complaining of shortness of air that's worse with exertion and chest pain. The patient also states she noticed some swelling in her legs. She states the shortness of air has been worsening over the last few days. She has no history of heart failure and states she takes medications for blood pressure and elevated cholesterol. The patient states she also noticed some chest pain/tightness. The patient is requiring 4L NC to keep her oxygen saturations greater than 90% in the ED. BNP is elevated and her chest xray shows cardiomegaly with a moderate pleural effusion. The patient will be admitted to the hospitalist service for further evaluation and treatment.      Personal History     Past Medical History:   Diagnosis Date    Hyperlipidemia     Hypertension              Past Surgical History:   Procedure Laterality Date    BREAST FIBROADENOMA SURGERY      9 y/o-was benign       Family History: her family history is not on file.     Social History:  reports that she has been smoking cigarettes. She has never used smokeless tobacco. She reports that she does not drink alcohol and does not use drugs.  Social History     Social History Narrative    Not on file       Medications:  Available home medication information reviewed.  Medications Prior to Admission   Medication Sig Dispense Refill Last Dose    amLODIPine (Norvasc) 10 MG tablet Take 1 tablet by mouth Daily. 30 tablet 11 More than a month    cloNIDine (CATAPRES) 0.1 MG tablet TAKE 1 TABLET BY MOUTH TWICE A DAY 60 tablet 1 More than a month    hydroCHLOROthiazide (HYDRODIURIL) 25 MG tablet Take 1 tablet by mouth Daily. 90 tablet 3 More than a month       Allergies   Allergen Reactions    Dynacirc [Isradipine] Other (See Comments)     Causes tic    Codeine Rash       Objective  Objective     Vital Signs:   Temp:  [97.6 °F  (36.4 °C)-98.3 °F (36.8 °C)] 98.3 °F (36.8 °C)  Heart Rate:  [] 93  Resp:  [18-22] 18  BP: (194-250)/(111-158) 195/111  Flow (L/min):  [4] 4       Physical Exam   Constitutional: Awake, alert  Eyes: PERRLA, sclerae anicteric, no conjunctival injection  HENT: NCAT, mucous membranes moist  Neck: Supple, no thyromegaly, no lymphadenopathy, trachea midline  Respiratory: decreased and course with rhonchi to auscultation bilaterally, nonlabored respirations on 4L NC  Cardiovascular: RRR, no murmurs, rubs, or gallops, palpable pedal pulses bilaterally  Gastrointestinal: Positive bowel sounds, soft, nontender, nondistended  Musculoskeletal: 1+ bilateral ankle edema, no clubbing or cyanosis to extremities  Psychiatric: Appropriate affect, cooperative  Neurologic: Oriented x 3, strength symmetric in all extremities, Cranial Nerves grossly intact to confrontation, speech clear  Skin: No rashes    Result Review:  I have personally reviewed the results from the time of this admission to 10/24/2023 13:47 EDT and agree with these findings:  [x]  Laboratory list / accordion  []  Microbiology  [x]  Radiology  []  EKG/Telemetry   []  Cardiology/Vascular   []  Pathology  []  Old records  []  Other:    LAB RESULTS:      Lab 10/24/23  1106   WBC 12.08*   HEMOGLOBIN 14.5   HEMATOCRIT 46.5   PLATELETS 227   NEUTROS ABS 10.21*   IMMATURE GRANS (ABS) 0.05   LYMPHS ABS 1.11   MONOS ABS 0.63   EOS ABS 0.04   MCV 91.5   D DIMER QUANT 0.64         Lab 10/24/23  1106   SODIUM 142   POTASSIUM 3.2*   CHLORIDE 104   CO2 22.0   ANION GAP 16.0*   BUN 15   CREATININE 1.02*   EGFR 56.1*   GLUCOSE 288*   CALCIUM 9.2   MAGNESIUM 1.9         Lab 10/24/23  1106   TOTAL PROTEIN 7.5   ALBUMIN 3.7   GLOBULIN 3.8   ALT (SGPT) 19   AST (SGOT) 25   BILIRUBIN 2.1*   ALK PHOS 110         Lab 10/24/23  1106   PROBNP 13,276.0*   HSTROP T 356*                     Microbiology Results (last 10 days)       Procedure Component Value - Date/Time    COVID-19 and FLU  A/B PCR - Swab, Nasopharynx [[id number]]  (Normal) Collected: 10/24/23 1109    Lab Status: Final result Specimen: Swab from Nasopharynx Updated: 10/24/23 1145     COVID19 Not Detected     Influenza A PCR Not Detected     Influenza B PCR Not Detected    Narrative:      Fact sheet for providers: https://www.fda.gov/media/264198/download    Fact sheet for patients: https://www.fda.gov/media/414397/download    Test performed by PCR.            XR Chest 1 View    Result Date: 10/24/2023  XR CHEST 1 VW Date of Exam: 10/24/2023 11:08 AM EDT Indication: SOA triage protocol Comparison: None available. Findings: No prior exams. There is moderate cardiomegaly. There is a moderate sized right pleural effusion which obscures detail of the underlying right lung base. Suggestion of mild atelectasis of both lower lobes. Exam is somewhat limited by portable technique and obesity. Pulmonary vessels appear within normal limits for technique.     Impression: Impression: Cardiomegaly. Moderate right effusion. Mild bibasilar atelectasis. Electronically Signed: Marielos Camilo MD  10/24/2023 11:21 AM EDT  Workstation ID: [id number]         Assessment & Plan  Assessment & Plan     Active Hospital Problems    Diagnosis  POA    **Acute exacerbation of congestive heart failure [I50.9]  Yes    HLD (hyperlipidemia) [E78.5]  Yes    Essential hypertension [I10]  Yes    Acute on chronic systolic congestive heart failure [I50.23]  Yes    Acute respiratory failure with hypoxia [J96.01]  Yes    Elevated troponin [R79.89]  Yes     Michael Cochran is a 78 y.o. female with a past medical history of hypertension and HLD that presented to the ED complaining of shortness of air that's worse with exertion and chest pain.    Acute exacerbation of congestive heart failure- new diagnosis  Respiratory failure secondary to above with hypoxemia  Pleural effusion related to above  - patient with BNP of 13,000 on admission  - CXR showing cardiomegaly and moderate  pleural effusion  - patient requiring 4L NC (no O2 at baseline  - ECHO pending  - continue home Amlodipine; hold HCTZ  - hold Clonidine- likely stop  - Bumex 2mg IV given in ED- continue Bumex 1mg IV BID  - strict I/Os and daily weights  - telemetry with pulse oximetry  - fluid restriction- 1500ml/day  - consult cardiology    Elevated troponin  - likely type 2 troponin leak secondary to new heart failure  - trend  - EKG reviewed    Hypertensive Urgency  - continue home Amlodipine; hold HCTZ  - hold Clonidine- likely stop indefinitely and may be contributing to rebound hypertension  - Hydralazine 10mg IV Q6H prn for SBP> 175  - consult cardiology    HLD  - continue statin  - lipid panel    DVT prophylaxis:  Heparin SQ      CODE STATUS:  Full/CPR  Code Status and Medical Interventions:   Ordered at: 10/24/23 1339     Level Of Support Discussed With:    Patient     Code Status (Patient has no pulse and is not breathing):    CPR (Attempt to Resuscitate)     Medical Interventions (Patient has pulse or is breathing):    Full Support       Expected Discharge   Expected Discharge Date: 10/27/2023; Expected Discharge Time:      Kera Suarez DO  10/24/23      Electronically signed by Kera Suarez DO at 10/24/23 1347       Current Facility-Administered Medications   Medication Dose Route Frequency Provider Last Rate Last Admin    amLODIPine (NORVASC) tablet 10 mg  10 mg Nasogastric Q24H Madhu Sánchez MD   10 mg at 11/10/23 0839    aspirin chewable tablet 81 mg  81 mg Nasogastric Daily Dirk Thomas MD   81 mg at 11/10/23 0838    atorvastatin (LIPITOR) tablet 80 mg  80 mg Nasogastric Nightly Dirk Cleveland MD   80 mg at 11/09/23 2145    bisacodyl (DULCOLAX) suppository 10 mg  10 mg Rectal Daily PRN Dirk Cleveland MD        carvedilol (COREG) tablet 6.25 mg  6.25 mg Nasogastric BID With Meals Madhu Sánchez MD   6.25 mg at 11/10/23 0839    dextrose (D50W) (25 g/50 mL) IV injection 25 g  25 g Intravenous Q15 Min  PRN Lauro Salomon MD        sennosides (SENOKOT) 8.8 MG/5ML syrup 10 mL  10 mL Nasogastric BID Dirk Cleveland MD   10 mL at 11/10/23 0839    And    docusate sodium (COLACE) liquid 100 mg  100 mg Nasogastric BID Dirk Cleveland MD   100 mg at 11/10/23 0839    famotidine (PEPCID) injection 20 mg  20 mg Intravenous Q12H Toña Moseley PA-C   20 mg at 11/10/23 1022    fentaNYL citrate (PF) (SUBLIMAZE) injection 100 mcg  100 mcg Intravenous Q1H PRN Dirk Cleveland MD   100 mcg at 11/10/23 0923    fluconazole (DIFLUCAN) tablet 200 mg  200 mg Nasogastric Q24H Dirk Thomas MD   200 mg at 11/10/23 1231    glucagon (GLUCAGEN) injection 1 mg  1 mg Intramuscular Q15 Min PRN Lauro Salomon MD        hydrALAZINE (APRESOLINE) tablet 50 mg  50 mg Nasogastric Q6H Dirk Thomas MD   50 mg at 11/10/23 1227    insulin detemir (LEVEMIR) injection 15 Units  15 Units Subcutaneous Q12H Dirk Thomas MD   15 Units at 11/09/23 2145    insulin regular (humuLIN R,novoLIN R) injection 2-9 Units  2-9 Units Subcutaneous Q6H Lauro Salomon MD   2 Units at 11/10/23 1229    insulin regular (humuLIN R,novoLIN R) injection 5 Units  5 Units Subcutaneous Q6H Dirk Thomas MD   5 Units at 11/10/23 1229    ipratropium-albuterol (DUO-NEB) nebulizer solution 3 mL  3 mL Nebulization Q4H PRN Robyn Newton PA-C        Magnesium Cardiology Dose Replacement - Follow Nurse / BPA Driven Protocol   Does not apply PRN Robyn Newton PA-C        methylphenidate (RITALIN) tablet 10 mg  10 mg Nasogastric Daily Dirk Thomas MD   10 mg at 11/10/23 1227    nitroglycerin (NITROSTAT) SL tablet 0.4 mg  0.4 mg Sublingual Q5 Min PRN Robyn Newton PA-C        nystatin (MYCOSTATIN) [id number] UNIT/GM cream 1 application   1 application  Topical Q12H Dirk Thomas MD   1 application  at 11/10/23 1231    ondansetron (ZOFRAN) injection 4 mg  4 mg Intravenous Q6H PRN Robyn Newton PA-C        Phosphorus Replacement - Follow Nurse /  BPA Driven Protocol   Does not apply PRN Robyn Newton PA-C        Potassium Replacement - Follow Nurse / BPA Driven Protocol   Does not apply PRN Robyn Newton PA-C        sacubitril-valsartan (ENTRESTO) 24-26 MG tablet 1 tablet  1 tablet Nasogastric Q12H Gurmeet Colón MD   1 tablet at 11/10/23 0839    sodium chloride 0.9 % flush 10 mL  10 mL Intravenous Q12H Dikr Cleveland MD   10 mL at 11/10/23 0839    sodium chloride 0.9 % flush 10 mL  10 mL Intravenous PRN Dirk Cleveland MD        sodium chloride 0.9 % infusion 40 mL  40 mL Intravenous PRN Robyn Newton PA-C            Physician Progress Notes (most recent note)        Dirk Thomas MD at 11/10/23 1352            Intensivist Note     11/10/2023  Hospital Day: 17  Day of Surgery  ICU Stays Timeline         Hospital Admission: 10/24/23 1044 - Current  ICU stays: 1        In Date/Time Event Department ICU Stay Duration     10/24/23 1044 Admission  [facility]      10/24/23 1326 Transfer In  [facility] 2F      10/26/23 1618 Transfer In  [facility] CATH LAB      10/26/23 1656 Transfer In  [facility] CVOU      10/26/23 1745 Transfer In  [facility] 6A      11/01/23 0802 Transfer In  [facility] OR      11/01/23 1610 Transfer In  [facility] 2HSIC 8 days 21 hours 43 minutes                 Ms. Michael Cochran, 78 y.o. female is followed for:    Multivessel CAD with unstable angina pectoris manifesting as dyspnea    Acute exacerbation of congestive heart failure    Non-STEMI (non-ST elevated myocardial infarction)    Ischemic cardiomyopathy with LVEF 31%    Hypertensive urgency with diastolic dysfunction    Acute hypoxemic respiratory failure due to pulmonary edema    S/P CABG x 4 on 11/2/2023    Postop ANURADHA resolved    Postop Encephalopathy    Postoperative multiple bilateral embolic strokes    HLD (hyperlipidemia)    Postop acute blood loss anemia       SUBJECTIVE     78-year-old white female with PMH of hypertension and hyperlipidemia but no previous cardiac  issues. Patient presented to [facility] 10/24/2023 with hypertensive urgency, biventricular heart failure, and elevated cardiac enzymes consistent with NSTEMI.  he was also newly diagnosed with type II DM.  LHC was performed revealing multivessel disease and she was documented to have an ischemic cardiomyopathy with LVEF of 31%. Patient subsequently underwent CABG x 4 by Dr. Cleveland 11/2/2023 and was extubated that evening. Unfortunately postop course was complicated by encephalopathy, ANURADHA, and anemia.  ANURADHA subsequently improved. Encephalopathy however persisted and neurology was consulted.  CT of the head was performed and was unrevealing and EEG just revealed diffuse slowing without evidence of seizure activity.  MRI was subsequently ordered revealing subacute scattered areas of ischemic infarct bilaterally in the frontal and parietal lobes and extending to the bilateral basal ganglia and left cerebellum.  Unclear if this was a watershed event due to hypotension or an embolic event related to atheroemboli from the great vessels.  Neurology initially began patient on a heparin drip, but this was subsequently canceled as echocardiogram was unrevealing.  Long discussions were held with the patient's daughter after I and neurology indicated that her prognosis for a functional existence was very poor.  It was decided to make the patient DNR/DNI, but to continue with full support to give her every chance for recovery.  That would require a PEG feeding tube.    Interval history: No change or improvement.  Remains with severe encephalopathy.  Continual moaning with intermittent restlessness.  Moves all extremities in various times but minimally on the left.  Right arm has some purposeful movement reaching for tubes but nothing to command and she keeps her eyes closed.  Occasionally will open her eyes but does not track.  Hemodynamics adequate with blood pressure 124/64.  Remains in a sinus rhythm with a rate of 73 bpm.  Nursing  "tells me that her perineum has a severe yeast dermatitis in the region of her pure wick urinary collection device and this will be exchanged for a Nolasco catheter.  Tmax 99.5 and WBC is slowly increasing to 17.96 although procalcitonin still only 0.17.       ROS: Per subjective, all other systems reviewed and were negative.    The patient's relevant PMH, PSH, FH, and SH were reviewed and updated in Epic as appropriate. Allergies and Medications reviewed.    OBJECTIVE     BP (P) 92/72 (BP Location: Left arm, Patient Position: Lying)   Pulse 76   Temp (P) 98 °F (36.7 °C) (Axillary)   Resp (P) 18   Ht 157.5 cm (62.01\")   Wt 93.2 kg (205 lb 7.5 oz)   SpO2 96%   BMI 37.57 kg/m²   Oxygen Concentration (%): 35  Flow (L/min): (P) 2    Flowsheet Rows      Flowsheet Row First Filed Value   Admission Height 157.5 cm (62\") Documented at 10/24/2023 1040   Admission Weight 93.9 kg (207 lb) Documented at 10/24/2023 1040          Intake & Output (last day)         11/09 0701  11/10 0700 11/10 0701  11/11 0700    Other 603 150    NG/     Total Intake(mL/kg) 1589 (17) 150 (1.6)    Urine (mL/kg/hr) 1300 (0.6) 2000 (3.1)    Total Output 1300 2000    Net +289 -1850          Urine Unmeasured Occurrence  1 x            Exam:  General Exam:  Elderly, chronically ill appearing, debilitated white female supine in bed moaning and restless  HEENT: Pupils equal and reactive. Nose and throat clear.  Neck:                          Supple, no JVD, thyromegaly, or adenopathy  Lungs: Clear to auscultation and percussion anteriorly and posteriorly.  Cardiovascular: Regular rate and rhythm without murmurs or gallops.  Abdomen: Soft nontender without organomegaly or masses.   and rectal: Deferred.  Extremities: No cyanosis clubbing edema.  Neurologic:                 Continuous moaning and restlessness.  Head primarily turned to right but can turn to the left.  Can move all extremities but minimally on the left.  Has some purposeful " movement of right arm (reaches for tubes and face).  Keeps eyes closed.  Cannot follow any commands      Chest X-Ray: No film today    INFUSIONS       Results from last 7 days   Lab Units 11/10/23  0401 11/09/23  0531 11/08/23  0534   WBC 10*3/mm3 17.96* 15.01* 14.41*   HEMOGLOBIN g/dL 7.8* 7.6* 8.4*   HEMATOCRIT % 25.8* 25.7* 29.3*   PLATELETS 10*3/mm3 327 277 279     Results from last 7 days   Lab Units 11/10/23  0401 11/09/23  0531   SODIUM mmol/L 145 146*   POTASSIUM mmol/L 4.1 4.0   CHLORIDE mmol/L 110* 111*   CO2 mmol/L 26.0 24.0   BUN mg/dL 30* 32*   CREATININE mg/dL 0.65 0.70   GLUCOSE mg/dL 115* 152*   CALCIUM mg/dL 9.1 9.0     Results from last 7 days   Lab Units 11/09/23  0531 11/08/23  0534 11/07/23  0532 11/06/23  0408   MAGNESIUM mg/dL 2.2 1.9  --  2.5*   PHOSPHORUS mg/dL 4.9*  --  3.1 3.7     Results from last 7 days   Lab Units 11/06/23  1556 11/06/23  0408 11/04/23  0441   ALK PHOS U/L 117 114 93   BILIRUBIN mg/dL 0.5 0.6 0.6   ALT (SGPT) U/L 14 10 <5   AST (SGOT) U/L 37* 28 25       Lab Results   Component Value Date    SEDRATE 49 (H) 11/06/2023       Lab Results   Component Value Date    PROBNP 6,349.0 (H) 11/10/2023    PROBNP 4,242.0 (H) 10/29/2023         Procalitonin Results:      Lab 11/10/23  0401 11/07/23  0532   PROCALCITONIN 0.17 0.15         Covid Tests          10/24/2023    11:09   Common Labsle   COVID19 Not Detected       COVID LABS:  Results From Last 14 Days   Lab Units 11/10/23  0401 11/07/23  0532 11/06/23  1556 11/06/23  0408 11/05/23  1517 11/04/23  0441 11/02/23  0321 11/01/23  1634 10/29/23  1028   PROBNP pg/mL 6,349.0*  --   --   --   --   --   --   --  4,242.0*   CRP mg/dL  --   --   --  3.55*  --   --   --   --   --    LACTATE mmol/L  --   --   --   --  1.0  --   --   --   --    LDH U/L  --  251*  --   --   --   --   --   --   --    PROCALCITONIN ng/mL 0.17 0.15  --   --   --   --   --   --   --    PROTIME Seconds  --   --  15.8*  --   --   --  16.6* 18.2*  --    INR   --    "--  1.25*  --   --   --  1.33* 1.49*  --    SED RATE mm/hr  --   --  49*  --   --   --   --   --   --    TRIGLYCERIDES mg/dL  --   --   --  119  --  123  --   --   --           Lab Results   Component Value Date    TROPONINT 631 (C) 10/25/2023     Lab Results   Component Value Date    TSH 8.790 (H) 10/25/2023     Lab Results   Component Value Date    LACTATE 1.0 11/05/2023     No results found for: \"CORTISOL\"        I reviewed the patient's results, images and medication.    Assessment & Plan   ASSESSMENT        Multivessel CAD with unstable angina pectoris manifesting as dyspnea    Acute exacerbation of congestive heart failure    Non-STEMI (non-ST elevated myocardial infarction)    Ischemic cardiomyopathy with LVEF 31%    Hypertensive urgency with diastolic dysfunction    Acute hypoxemic respiratory failure due to pulmonary edema    S/P CABG x 4 on 11/2/2023    Postop ANURADHA resolved    Postop Encephalopathy    Postoperative multiple bilateral embolic strokes    HLD (hyperlipidemia)    Postop acute blood loss anemia      DISCUSSION: No change or improvement.  PEG feeding tube to be placed today per Dr. Cleveland.  Her yeast dermatitis/cystitis requires placement of a Nolasco catheter to protect her perineum and the institution of Diflucan and topical nystatin cream.  I remain concerned about the slowly progressive leukocytosis although procalcitonin remains normal and she has no significant fever.  If she does spike a temp would need empiric broad-spectrum antimicrobial coverage for hospital-acquired infections    PLAN     PEG feeding tube today  Place Nolasco catheter  Diflucan 200 mg daily for 3 days  Nystatin cream to perineum  Resume enteral feeds after cleared by surgery to use PEG feeding tube  LTAC or skilled nursing facility first of the week  If spikes a temp again broad-spectrum antimicrobial coverage for hospital-acquired infections  CXR in a.m.    Plan of care and goals reviewed with multidisciplinary team at " daily rounds.    I discussed the patient's findings and my recommendations with nursing staff    Time spent Critical care 20 min (It does not include procedure time).    Electronically signed by Dirk Thomas MD, 11/10/23, 1:53 PM EST.   Pulmonary / Critical care medicine       Electronically signed by Dirk Thomas MD at 11/10/23 1404          Physical Therapy Notes (most recent note)        Jcarlos Rodriguez, PT at 23 1503  Version 1 of 1         Patient Name: Michael Cochran  : 1945    MRN: [id number]                              Today's Date: 2023       Admit Date: 10/24/2023    Visit Dx:     ICD-10-CM ICD-9-CM   1. Elevated troponin  R79.89 790.6   2. Acute on chronic congestive heart failure, unspecified heart failure type  I50.9 428.0   3. Hypertensive emergency  I16.1 401.9   4. Acute respiratory failure with hypoxia  J96.01 518.81   5. Pleural effusion, left  J90 511.9   6. Hypokalemia  E87.6 276.8   7. Non-STEMI (non-ST elevated myocardial infarction)  I21.4 410.70   8. Coronary artery disease involving native coronary artery of native heart with unstable angina pectoris  I25.110 414.01     411.1     Patient Active Problem List   Diagnosis    HLD (hyperlipidemia)    Acute hypoxemic respiratory failure due to pulmonary edema    Acute exacerbation of congestive heart failure    Non-STEMI (non-ST elevated myocardial infarction)    Multivessel CAD with unstable angina pectoris manifesting as dyspnea    S/P CABG x 4 on 2023    Ischemic cardiomyopathy with LVEF 31%    Hypertensive urgency with diastolic dysfunction    Postop ANURADHA resolved    Postop Encephalopathy    Postop acute blood loss anemia    Postoperative multiple bilateral embolic strokes     Past Medical History:   Diagnosis Date    Hyperlipidemia     Hypertension      Past Surgical History:   Procedure Laterality Date    BREAST FIBROADENOMA SURGERY      9 y/o-was benign    CARDIAC CATHETERIZATION N/A 10/26/2023     Procedure: Left Heart Cath;  Surgeon: Jordi Hodge MD;  Location:  [facility];  Service: Cardiovascular;  Laterality: N/A;    CORONARY ARTERY BYPASS GRAFT N/A 11/1/2023    Procedure: MEDIAN STERNOTOMY, CORONARY ARTERY BYPASS GRAFTING X4, UTILIZING THE LEFT INTERANL MAMMARY ARTERY, EVH OF THE LEFT GREATER SAPHENOUS VEIN, EXPLORATION OF THE RIGHT LEG, PRIMITIVO PER ANETHESIA;  Surgeon: Dirk Cleveland MD;  Location: [facility] OR;  Service: Cardiothoracic;  Laterality: N/A;      General Information       Row Name 11/09/23 1612          Physical Therapy Time and Intention    Document Type therapy note (daily note)  -AE     Mode of Treatment physical therapy  -AE       Row Name 11/09/23 1612          General Information    Patient Profile Reviewed yes  -AE     Existing Precautions/Restrictions cardiac;fall;sternal;other (see comments)  NG, L sided weakness/tone, global aphasia  -AE     Barriers to Rehab medically complex;cognitive status  -AE       Row Name 11/09/23 1612          Cognition    Orientation Status (Cognition) unable/difficult to assess  -AE       Row Name 11/09/23 1612          Safety Issues, Functional Mobility    Safety Issues Affecting Function (Mobility) awareness of need for assistance;insight into deficits/self-awareness;safety precaution awareness;safety precautions follow-through/compliance;sequencing abilities;ability to follow commands;problem-solving  -AE     Impairments Affecting Function (Mobility) balance;cognition;endurance/activity tolerance;postural/trunk control;strength;coordination;motor control;motor planning;grasp;muscle tone abnormal;visual/perceptual;range of motion (ROM)  -AE     Cognitive Impairments, Mobility Safety/Performance awareness, need for assistance;insight into deficits/self-awareness;safety precaution awareness;safety precaution follow-through;sequencing abilities;attention  -AE               User Key  (r) = Recorded By, (t) = Taken By, (c) = Cosigned By       Initials Name Provider Type    AE Jcarlos Rodriguez, PT Physical Therapist                   Mobility       Row Name 11/09/23 1613          Bed Mobility    Bed Mobility rolling left;rolling right;supine-sit;sit-supine;scooting/bridging  -AE     Rolling Left [place] (Bed Mobility) dependent (less than 25% patient effort);2 person assist  -AE     Rolling Right [place] (Bed Mobility) dependent (less than 25% patient effort);2 person assist  -AE     Scooting/Bridging [place] (Bed Mobility) dependent (less than 25% patient effort);2 person assist  -AE     Supine-Sit [place] (Bed Mobility) dependent (less than 25% patient effort);2 person assist  -AE     Sit-Supine [place] (Bed Mobility) dependent (less than 25% patient effort);2 person assist  -AE     Assistive Device (Bed Mobility) draw sheet;head of bed elevated  -AE     Comment, (Bed Mobility) VCs for hand placement and sequencing however pt unable to follow commands with bed mobility. Dep A required with use of draw sheet.  -AE       Row Name 11/09/23 1613          Transfers    Comment, (Transfers) Deferred d/t poor sitting balance and decreased command following.  -AE       Row Name 11/09/23 1613          Bed-Chair Transfer    Bed-Chair [place] (Transfers) unable to assess  -AE       Row Name 11/09/23 1613          Sit-Stand Transfer    Sit-Stand [place] (Transfers) unable to assess  -AE       Row Name 11/09/23 1613          Gait/Stairs (Locomotion)    [place] Level (Gait) unable to assess  -AE               User Key  (r) = Recorded By, (t) = Taken By, (c) = Cosigned By      Initials Name Provider Type    AE Jcarlos Rodriguez PT Physical Therapist                   Obj/Interventions       Row Name 11/09/23 1614          Balance    Balance Assessment sitting static balance;sitting dynamic balance  -AE     Static Sitting Balance maximum assist;1-person assist;verbal cues  brief periods of min A x1 with hand placement and  cues  -AE     Dynamic Sitting Balance maximum assist;1-person assist;verbal cues  -AE     Position, Sitting Balance unsupported;sitting edge of bed  -AE               User Key  (r) = Recorded By, (t) = Taken By, (c) = Cosigned By      Initials Name Provider Type    AE Jcarlos Rodriguez, PT Physical Therapist                   Goals/Plan    No documentation.                  Clinical Impression       Row Name 11/09/23 1615          Pain Scale: FACES Pre/Post-Treatment    Pain: FACES Scale, Pretreatment 2-->hurts little bit  -AE     Posttreatment Pain Rating 2-->hurts little bit  -AE     Pain Location generalized  -AE     Pre/Posttreatment Pain Comment mumbling throughout session  -AE       Row Name 11/09/23 1615          Plan of Care Review    Plan of Care Reviewed With patient;daughter  -AE     Progress improving  -AE     Outcome Evaluation Pt continues to present with decreased command following and difficulty improving independence with balance and mobility. Pt improved to max A x1 with brief periods of min A x1 this session while sitting EOB, improved eye opening and RUE command following noted at times. Continues to require dep A x2 for bed mobility. Continue to progress per pt tolerance.  -AE       Row Name 11/09/23 1615          Vital Signs    Pre Systolic BP Rehab 160  -AE     Pre Treatment Diastolic BP 78  -AE     Post Systolic BP Rehab 163  -AE     Post Treatment Diastolic BP 81  -AE     Pretreatment Heart Rate (beats/min) 82  -AE     Posttreatment Heart Rate (beats/min) 80  -AE     Pre SpO2 (%) 93  -AE     O2 Delivery Pre Treatment nasal cannula  -AE     O2 Delivery Intra Treatment nasal cannula  -AE     Post SpO2 (%) 96  -AE     O2 Delivery Post Treatment nasal cannula  -AE     Pre Patient Position Supine  -AE     Intra Patient Position Sitting  -AE     Post Patient Position Supine  -AE       Row Name 11/09/23 1615          Positioning and Restraints    Pre-Treatment Position in bed  -AE     Post Treatment  Position bed  -AE     In Bed notified nsg;fowlers;call light within reach;encouraged to call for assist;exit alarm on;patient within staff view;with family/caregiver;side rails up x3;LUE elevated;RUE elevated;SCD pump applied;heels elevated  -AE               User Key  (r) = Recorded By, (t) = Taken By, (c) = Cosigned By      Initials Name Provider Type    Jcarlos Borden, PT Physical Therapist                   Outcome Measures       Row Name 11/09/23 1618          How much help from another person do you currently need...    Turning from your back to your side while in flat bed without using bedrails? 1  -AE     Moving from lying on back to sitting on the side of a flat bed without bedrails? 1  -AE     Moving to and from a bed to a chair (including a wheelchair)? 1  -AE     Standing up from a chair using your arms (e.g., wheelchair, bedside chair)? 1  -AE     Climbing 3-5 steps with a railing? 1  -AE     To walk in hospital room? 1  -AE     AM-PAC 6 Clicks Score (PT) 6  -AE     Highest level of mobility 2 --> Bed activities/dependent transfer  -AE       Row Name 11/09/23 1618 11/09/23 1610       Functional Assessment    Outcome Measure Options AM-PAC 6 Clicks Basic Mobility (PT)  -AE AM-PAC 6 Clicks Daily Activity (OT)  -AN              User Key  (r) = Recorded By, (t) = Taken By, (c) = Cosigned By      Initials Name Provider Type    Aruna Muñiz OT Occupational Therapist    Jcarlos Borden, PT Physical Therapist                                 Physical Therapy Education       Title: PT OT SLP Therapies (In Progress)       Topic: Physical Therapy (In Progress)       Point: Mobility training (In Progress)       Learning Progress Summary             Patient Acceptance, E, NR by AE at 11/9/2023 1503    Acceptance, E, NR by AE at 11/8/2023 1319    Acceptance, E, NR by SUE at 11/7/2023 1300    Acceptance, E, NR by AE at 11/6/2023 1310    Acceptance, E, NR by KG at 11/5/2023 0940    Acceptance, E, VU,NR  by ML at 10/27/2023 1544    Comment: continued mobility while awaiting CABG, sternal precautions following CABG    Acceptance, E,D, VU,NR by LR at 10/25/2023 1327    Comment: Educated on benefits of mobility, correct sit<->stand t/f technique, correct gait mechanics, PLB, energy conservation, and progression of POC.                         Point: Home exercise program (In Progress)       Learning Progress Summary             Patient Acceptance, E, NR by AE at 11/9/2023 1503    Acceptance, E, NR by AE at 11/8/2023 1319    Acceptance, E, NR by SUE at 11/7/2023 1300    Acceptance, E, NR by AE at 11/6/2023 1310    Acceptance, E, NR by KG at 11/5/2023 0940                         Point: Body mechanics (In Progress)       Learning Progress Summary             Patient Acceptance, E, NR by AE at 11/9/2023 1503    Acceptance, E, NR by AE at 11/8/2023 1319    Acceptance, E, NR by SUE at 11/7/2023 1300    Acceptance, E, NR by AE at 11/6/2023 1310    Acceptance, E, NR by KG at 11/5/2023 0940    Acceptance, E,D, VU,NR by LR at 10/25/2023 1327    Comment: Educated on benefits of mobility, correct sit<->stand t/f technique, correct gait mechanics, PLB, energy conservation, and progression of POC.                         Point: Precautions (In Progress)       Learning Progress Summary             Patient Acceptance, E, NR by AE at 11/9/2023 1503    Acceptance, E, NR by AE at 11/8/2023 1319    Acceptance, E, NR by SUE at 11/7/2023 1300    Acceptance, E, NR by AE at 11/6/2023 1310    Acceptance, E, NR by KG at 11/5/2023 0940    Acceptance, E, VU,NR by ML at 10/27/2023 1544    Comment: continued mobility while awaiting CABG, sternal precautions following CABG    Acceptance, E,D, VU,NR by LR at 10/25/2023 1327    Comment: Educated on benefits of mobility, correct sit<->stand t/f technique, correct gait mechanics, PLB, energy conservation, and progression of POC.                                         User Key       Initials Effective  Dates Name Provider Type Discipline    SUE 02/03/23 -  Oneida Daly, PT Physical Therapist PT    LR 02/03/23 -  Alissa Pierre, PT Physical Therapist PT    KG 05/22/20 -  Brittney Rocha, PT Physical Therapist PT    ML 04/22/21 -  Alisa Salguero Physical Therapist PT    AE 09/21/21 -  Jcarlos Rodriguez PT Physical Therapist PT                  PT Recommendation and Plan     Plan of Care Reviewed With: patient, daughter  Progress: improving  Outcome Evaluation: Pt continues to present with decreased command following and difficulty improving independence with balance and mobility. Pt improved to max A x1 with brief periods of min A x1 this session while sitting EOB, improved eye opening and RUE command following noted at times. Continues to require dep A x2 for bed mobility. Continue to progress per pt tolerance.     Time Calculation:         PT Charges       Row Name 11/09/23 1618             Time Calculation    Start Time 1503  -AE      PT Received On 11/09/23  -AE      PT Goal Re-Cert Due Date 11/15/23  -AE         Timed Charges    22482 - PT Therapeutic Activity Minutes 15  -AE         Total Minutes    Timed Charges Total Minutes 15  -AE       Total Minutes 15  -AE                User Key  (r) = Recorded By, (t) = Taken By, (c) = Cosigned By      Initials Name Provider Type    AE Jcarlos Rodriguez, ANTONY Physical Therapist                  Therapy Charges for Today       Code Description Service Date Service Provider Modifiers Qty    75066514689 HC PT THERAPEUTIC ACT EA 15 MIN 11/8/2023 Jcarlos Rodriguez, PT GP 2    60601882083 HC PT THER SUPP EA 15 MIN 11/8/2023 Jcarlos Rodriguez, PT GP 2    97148377614 HC PT THERAPEUTIC ACT EA 15 MIN 11/9/2023 Jcarlos Rodriguez, PT GP 1            PT G-Codes  Outcome Measure Options: AM-PAC 6 Clicks Basic Mobility (PT)  AM-PAC 6 Clicks Score (PT): 6  AM-PAC 6 Clicks Score (OT): 6  Modified Dauphin Scale: 4 - Moderately severe disability.  Unable to walk without  assistance, and unable to attend to own bodily needs without assistance.  PT Discharge Summary  Anticipated Discharge Disposition (PT): skilled nursing facility    Jcarlos Rodriguez, PT  2023      Electronically signed by Jcarlos Rodriguez, PT at 23 1619          Occupational Therapy Notes (most recent note)        Aruna Sarah, OT at 23 1510          Patient Name: Michael Cochran  : 1945    MRN: [id number]                              Today's Date: 2023       Admit Date: 10/24/2023    Visit Dx:     ICD-10-CM ICD-9-CM   1. Elevated troponin  R79.89 790.6   2. Acute on chronic congestive heart failure, unspecified heart failure type  I50.9 428.0   3. Hypertensive emergency  I16.1 401.9   4. Acute respiratory failure with hypoxia  J96.01 518.81   5. Pleural effusion, left  J90 511.9   6. Hypokalemia  E87.6 276.8   7. Non-STEMI (non-ST elevated myocardial infarction)  I21.4 410.70   8. Coronary artery disease involving native coronary artery of native heart with unstable angina pectoris  I25.110 414.01     411.1     Patient Active Problem List   Diagnosis    HLD (hyperlipidemia)    Acute hypoxemic respiratory failure due to pulmonary edema    Acute exacerbation of congestive heart failure    Non-STEMI (non-ST elevated myocardial infarction)    Multivessel CAD with unstable angina pectoris manifesting as dyspnea    S/P CABG x 4 on 2023    Ischemic cardiomyopathy with LVEF 31%    Hypertensive urgency with diastolic dysfunction    Postop ANURADHA resolved    Postop Encephalopathy    Postop acute blood loss anemia    Postoperative multiple bilateral embolic strokes     Past Medical History:   Diagnosis Date    Hyperlipidemia     Hypertension      Past Surgical History:   Procedure Laterality Date    BREAST FIBROADENOMA SURGERY      9 y/o-was benign    CARDIAC CATHETERIZATION N/A 10/26/2023    Procedure: Left Heart Cath;  Surgeon: Jordi Hodge MD;  Location: [facility] CATH [facility]AS[facility];   Service: Cardiovascular;  Laterality: N/A;    CORONARY ARTERY BYPASS GRAFT N/A 11/1/2023    Procedure: MEDIAN STERNOTOMY, CORONARY ARTERY BYPASS GRAFTING X4, UTILIZING THE LEFT INTERANL MAMMARY ARTERY, EVH OF THE LEFT GREATER SAPHENOUS VEIN, EXPLORATION OF THE RIGHT LEG, PRIMITIVO PER ANETHESIA;  Surgeon: Dirk Cleveland MD;  Location: [facility];  Service: Cardiothoracic;  Laterality: N/A;      General Information       Row Name 11/09/23 1559          OT Time and Intention    Document Type therapy note (daily note)  -AN     Mode of Treatment occupational therapy;co-treatment  -AN       Row Name 11/09/23 1559          General Information    Patient Profile Reviewed yes  -AN     Existing Precautions/Restrictions cardiac;fall;sternal;other (see comments)  NG, L sided weakness/tone, global aphasia  -AN     Barriers to Rehab medically complex;cognitive status  -AN       Row Name 11/09/23 1559          Cognition    Orientation Status (Cognition) unable/difficult to assess;other (see comments)  pt mumbling throughout with eyes close  -AN       Row Name 11/09/23 1559          Safety Issues, Functional Mobility    Safety Issues Affecting Function (Mobility) awareness of need for assistance;insight into deficits/self-awareness;judgment;problem-solving;safety precaution awareness;sequencing abilities;safety precautions follow-through/compliance;ability to follow commands  -AN     Impairments Affecting Function (Mobility) balance;cognition;endurance/activity tolerance;postural/trunk control;strength;coordination;motor control;motor planning;grasp;muscle tone abnormal;visual/perceptual;range of motion (ROM)  -AN     Cognitive Impairments, Mobility Safety/Performance awareness, need for assistance;safety precaution awareness;attention;safety precaution follow-through;insight into deficits/self-awareness;sequencing abilities;problem-solving/reasoning;judgment  -AN               User Key  (r) = Recorded By, (t) = Taken By, (c) =  Cosigned By      Initials Name Provider Type    Aruna Muñiz OT Occupational Therapist                     Mobility/ADL's       Row Name 11/09/23 1602          Bed Mobility    Bed Mobility rolling left;rolling right;supine-sit-supine;scooting/bridging  -AN     Rolling Left [place] (Bed Mobility) dependent (less than 25% patient effort);2 person assist  -AN     Rolling Right [place] (Bed Mobility) dependent (less than 25% patient effort);2 person assist  -AN     Scooting/Bridging [place] (Bed Mobility) dependent (less than 25% patient effort);2 person assist  -AN     Bed Mobility, Safety Issues cognitive deficits limit understanding;decreased use of arms for pushing/pulling;decreased use of legs for bridging/pushing;impaired trunk control for bed mobility  -AN     Comment, (Bed Mobility) Rolling L and R for placement of clean linens. Pt required dep x 2 for sup<>sit with assist at trunk, BLE, and UE  -AN       Row Name 11/09/23 1602          Transfers    Comment, (Transfers) deferred due to poor sitting balance and decreased command following  -AN       Row Name 11/09/23 1602          Activities of Daily Living    BADL Assessment/Intervention grooming;lower body dressing  -AN       Row Name 11/09/23 1602          Lower Body Dressing Assessment/Training    [place] Level (Lower Body Dressing) don;socks;dependent (less than 25% patient effort);doff  -AN     Position (Lower Body Dressing) supine  -AN       Row Name 11/09/23 1602          Grooming Assessment/Training    [place] Level (Grooming) wash face, hands;dependent (less than 25% patient effort)  -AN     Position (Grooming) edge of bed sitting  -AN     Comment, (Grooming) Pt tolerated EOB sitting for ~10 mins for core strengthening, grooming tasks, and visual scanning with family involvement.  -AN               User Key  (r) = Recorded By, (t) = Taken By, (c) = Cosigned By      Initials Name Provider Type    Aruna Muñiz OT  Occupational Therapist                   Obj/Interventions       Row Name 11/09/23 1605          Shoulder (Therapeutic Exercise)    Shoulder (Therapeutic Exercise) PROM (passive range of motion)  -AN     Shoulder PROM (Therapeutic Exercise) left;flexion;extension;supine;3 repetitions  -AN       Row Name 11/09/23 1605          Elbow/Forearm (Therapeutic Exercise)    Elbow/Forearm (Therapeutic Exercise) PROM (passive range of motion)  -AN     Elbow/Forearm PROM (Therapeutic Exercise) left;flexion;extension;supine;3 repetitions  -AN       Row Name 11/09/23 1605          Wrist (Therapeutic Exercise)    Wrist (Therapeutic Exercise) PROM (passive range of motion)  -AN     Wrist PROM (Therapeutic Exercise) left;flexion;extension;3 repetitions  -AN       Row Name 11/09/23 1605          Hand (Therapeutic Exercise)    Hand (Therapeutic Exercise) PROM (passive range of motion)  -AN     Hand PROM (Therapeutic Exercise) left;finger flexion;finger extension;3 repetitions  -AN       Row Name 11/09/23 1605          Motor Skills    Therapeutic Exercise shoulder;elbow/forearm;wrist;hand  -AN       Row Name 11/09/23 1605          Balance    Balance Assessment sitting static balance;sitting dynamic balance  -AN     Static Sitting Balance verbal cues;non-verbal cues (demo/gesture);maximum assist;1-person assist;other (see comments)  improved to brief periods of Min A with cues for hand placement  -AN     Dynamic Sitting Balance verbal cues;non-verbal cues (demo/gesture);maximum assist;1-person assist  -AN     Position, Sitting Balance supported;sitting edge of bed  -AN     Balance Interventions sitting;supported;static;dynamic;highly challenging;core stability exercise;occupation based/functional task  -AN     Comment, Balance L lateral lean due to RUE pushing requiring Mod cues to correct throughout  -AN               User Key  (r) = Recorded By, (t) = Taken By, (c) = Cosigned By      Initials Name Provider Type    DAPHNIE Sarah  ISABEL Valdovinos Occupational Therapist                   Goals/Plan    No documentation.                  Clinical Impression       Row Name 11/09/23 1608          Pain Assessment    Additional Documentation Pain Scale: FACES Pre/Post-Treatment (Group)  -AN       Row Name 11/09/23 1608          Pain Scale: FACES Pre/Post-Treatment    Pain: FACES Scale, Pretreatment 2-->hurts little bit  -AN     Posttreatment Pain Rating 2-->hurts little bit  -AN     Pain Location generalized  -AN       Row Name 11/09/23 1608          Plan of Care Review    Plan of Care Reviewed With patient;daughter  -AN     Progress improving  -AN     Outcome Evaluation Pt improved to Max A for sitting balance and brief periods of Min A with cuing however, pt continues to follow minimal commands and demo's no active ROM on the L side. Cont POC.  -AN       Row Name 11/09/23 1608          Therapy Plan Review/Discharge Plan (OT)    Anticipated Discharge Disposition (OT) skilled nursing facility  -AN       Row Name 11/09/23 1608          Vital Signs    Pre Systolic BP Rehab 160  -AN     Pre Treatment Diastolic BP 78  -AN     Post Systolic BP Rehab 163  -AN     Post Treatment Diastolic BP 81  -AN     Pretreatment Heart Rate (beats/min) 80  -AN     Posttreatment Heart Rate (beats/min) 82  -AN     Pre SpO2 (%) 94  -AN     O2 Delivery Pre Treatment nasal cannula  -AN     O2 Delivery Intra Treatment nasal cannula  -AN     Post SpO2 (%) 92  -AN     O2 Delivery Post Treatment nasal cannula  -AN     Pre Patient Position Supine  -AN     Intra Patient Position Sitting  -AN     Post Patient Position Supine  -AN       Row Name 11/09/23 1608          Positioning and Restraints    Pre-Treatment Position in bed  -AN     Post Treatment Position bed  -AN     In Bed notified nsg;supine;exit alarm on;encouraged to call for assist;with nsg;side rails up x3;RUE elevated;LUE elevated;legs elevated;SCD pump applied  -AN               User Key  (r) = Recorded By, (t) = Taken By,  (c) = Cosigned By      Initials Name Provider Type    Aruna Muñiz OT Occupational Therapist                   Outcome Measures       Row Name 11/09/23 1610          How much help from another is currently needed...    Putting on and taking off regular lower body clothing? 1  -AN     Bathing (including washing, rinsing, and drying) 1  -AN     Toileting (which includes using toilet bed pan or urinal) 1  -AN     Putting on and taking off regular upper body clothing 1  -AN     Taking care of personal grooming (such as brushing teeth) 1  -AN     Eating meals 1  -AN     AM-PAC 6 Clicks Score (OT) 6  -AN       Row Name 11/09/23 1610          Functional Assessment    Outcome Measure Options AM-PAC 6 Clicks Daily Activity (OT)  -AN               User Key  (r) = Recorded By, (t) = Taken By, (c) = Cosigned By      Initials Name Provider Type    Aruna Muñiz OT Occupational Therapist                    Occupational Therapy Education       Title: PT OT SLP Therapies (In Progress)       Topic: Occupational Therapy (Done)       Point: ADL training (Done)       Description:   Instruct learner(s) on proper safety adaptation and remediation techniques during self care or transfers.   Instruct in proper use of assistive devices.                  Learning Progress Summary             Patient Acceptance, E, VU by AN at 11/9/2023 1611    Acceptance, E, NR by CS at 11/7/2023 1453    Acceptance, E, VU by AN at 10/25/2023 1034   Family Acceptance, E, VU by AN at 11/9/2023 1611                         Point: Home exercise program (Done)       Description:   Instruct learner(s) on appropriate technique for monitoring, assisting and/or progressing therapeutic exercises/activities.                  Learning Progress Summary             Patient Acceptance, E, VU by AN at 11/9/2023 1611    Acceptance, E, NR by CS at 11/7/2023 1453   Family Acceptance, E, VU by AN at 11/9/2023 1611                         Point: Precautions  (Done)       Description:   Instruct learner(s) on prescribed precautions during self-care and functional transfers.                  Learning Progress Summary             Patient Acceptance, E, VU by AN at 11/9/2023 1611    Acceptance, E, NR by CS at 11/7/2023 1453    Acceptance, E, VU by AN at 10/25/2023 1034   Family Acceptance, E, VU by AN at 11/9/2023 1611                         Point: Body mechanics (Done)       Description:   Instruct learner(s) on proper positioning and spine alignment during self-care, functional mobility activities and/or exercises.                  Learning Progress Summary             Patient Acceptance, E, VU by AN at 11/9/2023 1611    Acceptance, E, NR by CS at 11/7/2023 1453    Acceptance, E, VU by AN at 10/25/2023 1034   Family Acceptance, E, VU by AN at 11/9/2023 1611                                         User Key       Initials Effective Dates Name Provider Type Discipline     09/02/21 -  Analy Chavez, OT Occupational Therapist OT    DAPHNIE 09/21/21 -  Aruna Sarah OT Occupational Therapist OT                  OT Recommendation and Plan  Therapy Frequency (OT): evaluation only  Plan of Care Review  Plan of Care Reviewed With: patient, daughter  Progress: improving  Outcome Evaluation: Pt improved to Max A for sitting balance and brief periods of Min A with cuing however, pt continues to follow minimal commands and demo's no active ROM on the L side. Cont POC.     Time Calculation:   Evaluation Complexity (OT)  Review Occupational Profile/Medical/Therapy History Complexity: brief/low complexity  Assessment, Occupational Performance/Identification of Deficit Complexity: 1-3 performance deficits  Clinical Decision Making Complexity (OT): problem focused assessment/low complexity  Overall Complexity of Evaluation (OT): low complexity     Time Calculation- OT       Row Name 11/09/23 1611             Time Calculation- OT    OT Start Time 1510  -AN      OT Received On 11/09/23   -AN         Timed Charges    93371 - OT Self Care/Mgmt Minutes 10  -AN         Total Minutes    Timed Charges Total Minutes 10  -AN       Total Minutes 10  -AN                User Key  (r) = Recorded By, (t) = Taken By, (c) = Cosigned By      Initials Name Provider Type    Aruna Muñiz OT Occupational Therapist                  Therapy Charges for Today       Code Description Service Date Service Provider Modifiers Qty    [id number] HC OT SELF CARE/MGMT/TRAIN EA 15 MIN 11/9/2023 Aruna Sarah OT GO 1                 Aruna Sarah OT  11/9/2023    Electronically signed by Aruna Sarah OT at 11/09/23 6630

## 2023-11-10 NOTE — OP NOTE
DATE OF PROCEDURE: 11/10/2023     PREOPERATIVE DIAGNOSES:  1. Stroke  2. Multivessel coronary artery disease    POSTOPERATIVE DIAGNOSES:    1. Stroke  2. Multivessel coronary artery disease    PROCEDURES PERFORMED:    1. Esophagogastroduodenoscopy  2. Percutaneous gastrostomy tube insertion    SURGEON: Dirk Cleveland MD       ASSISTANTS:    1. Toña Moseley PA-C was responsible for performing the following activities: Retraction and their skilled assistance was necessary for the success of this case.    ANESTHESIA: Versed and fentanyl IV     ESTIMATED BLOOD LOSS: Minimal      INDICATIONS:  78-year-old  female with a history of hypertension, hyperlipidemia, diabetes mellitus and previous tobacco abuse who presented with shortness of breath.  She was found to have multivessel coronary artery disease and underwent CABG on 11/1/23.  Postoperatively the patient has failed to follow commands and was found to have a stroke on MRI.  A PEG tube was needed for reliable nutrition administration.  The risks and benefits of surgery were discussed with the patient's family including pain, bleeding, infection, bowel perforation and death. They understood these risks and wished to proceed with surgery.      DESCRIPTION OF PROCEDURE:  In the ICU, the patient was administered IV sedation.  A timeout was performed including the patient's name, procedure and antibiotic administration.  A bite block was placed and the upper endoscope inserted.  The esophagus was normal in appearance.  The gastroesophageal junction had normal mucosa.  The stomach on retroflex view had trivial hiatal hernia.  A previously healed gastric ulcer was present along with scattered diffuse petechia on the gastric mucosa.  Good fingertip indentation and transillumination was obtained.  A small left upper quadrant incision was made at the point of maximal fingertip indentation and the needle inserted into the stomach.  The guidewire was then grasped  with the scope and pulled out the mouth.  The 20Fr [employer] pull PEG was then pulled into position at 3 cm on the skin.  The overlying bumper was placed snugly on the skin along with the clamp and cap. The scope was reintroduced and the bumper was in appropriate position on the stomach wall.  The duodenal bulb was then inspected and was free of ulcerations.  The second portion of the duodenum was inspected and found to be free of masses and contained copious amounts of bilious fluid. The scope was withdrawn while desufflating.  Her previous nasoenteric tube was removed.  The patient tolerated the procedure well and remained in the ICU under the care of the nursing staff and intensivists.

## 2023-11-10 NOTE — CONSULTS
Clinical Nutrition     Nutrition Support Assessment  Reason for Visit: MDR, Follow-up protocol, EN      Patient Name: Michael Cochran  YOB: 1945  MRN: [id number]  Date of Encounter: 11/10/23 08:29 EST  Admission date: 10/24/2023    Comments:    After PEG placed today and when ok per MD:  Initiate Peptamen AF @ 40 ml/hr and advance by 15 ml/hr Q 4 hours to goal rate 70 ml/hr. Water flush @ 50 ml/hr.  At goal rate this regimen provides 1400 ml formula, 1680 calories (105% est needs), 106 g protein (96% est needs), 8.5 g fiber, 1135 ml free water from formula, 2235 ml total water from formula/flushes.    RD will continue to monitor and adjust EN regimen as appropriate.      Nutrition Assessment   Admission Diagnosis:  Acute exacerbation of congestive heart failure [I50.9]  CAD (coronary artery disease) [I25.10]      Problem List:    Multivessel CAD with unstable angina pectoris manifesting as dyspnea    HLD (hyperlipidemia)    Acute hypoxemic respiratory failure due to pulmonary edema    Acute exacerbation of congestive heart failure    Non-STEMI (non-ST elevated myocardial infarction)    S/P CABG x 4 on 11/2/2023    Ischemic cardiomyopathy with LVEF 31%    Hypertensive urgency with diastolic dysfunction    Postop ANURADHA resolved    Postop Encephalopathy    Postop acute blood loss anemia    Postoperative multiple bilateral embolic strokes    Vaginal bleeding    T2DM (new dx)    PMH:   She  has a past medical history of Hyperlipidemia and Hypertension.    PSH:  She  has a past surgical history that includes Breast fibroadenoma surgery; Cardiac catheterization (N/A, 10/26/2023); and Coronary artery bypass graft (N/A, 11/1/2023).      Applicable Nutrition Concerns:       Applicable Interval History:   (11/1) s/p CABG, intubated, large bore NG tube;  extubated  (11/3) small bore NG tube placed (peripyloric tip placement per KUB);  EN initiated - Novasource Renal  (11/4) CT head - no acute  changes  (11/10) PEG placed      Reported/Observed/Food/Nutrition Related History:   11/10  EN held overnight for PEG today. RN reports PEG placed at bedside this morning during MDR, CTS noticed an old gastric ulcers so plan to start pepcid, no changes in neurological status. Will resume EN soon.    11/6  Post-op encephalopathy continues. Requiring supplemental oxygen. Not currently requiring any pressor support. Does not follow commands. Tolerating EN @ goal rate 35 ml/hr. Developed hypernatremia over the weekend so water flush was increased from 30 ml Q 2 hours to 25 ml/hr and it is currently 50 ml/hr. Plan for D5% @ 100 ml/hr x 5 hours. RD to adjust EN formula/regimen. Intensivist would like water flush rate @ 50 ml/hr regardless. Over the weekend pt also had FMS - RN reports yesterday pt was stooling around the FMS so it was removed.    11/3  Patient remains extubated and not requiring any pressor support; however, she remains minimally responsive with intermittent need for Bipap. CTS team would like to begin EN today. RD spoke with RN and recommended placing a small bore post-pyloric feeding tube due to intermittent use of Bipap (EN would need to be held when Bipap on if using NG tube). RN to discuss with MD during MDR. NKFA.    Labs    Labs Reviewed: Yes       Results from last 7 days   Lab Units 11/10/23  0401 11/09/23  0531 11/08/23  0534 11/07/23  0532 11/06/23  1556 11/06/23  0408 11/05/23  1517 11/04/23  1706 11/04/23  0441   GLUCOSE mg/dL 115* 152* 182* 193* 179* 152*  --    < > 222*   BUN mg/dL 30* 32* 30* 51* 59* 65*  --    < > 37*   CREATININE mg/dL 0.65 0.70 0.76 0.90 0.95 1.00  --    < > 1.15*   SODIUM mmol/L 145 146* 148* 150* 149* 153*  --    < > 146*   CHLORIDE mmol/L 110* 111* 115* 119* 119* 120*  --    < > 113*   POTASSIUM mmol/L 4.1 4.0 4.1 4.0  4.0 3.9 4.1  --    < > 3.6   PHOSPHORUS mg/dL  --  4.9*  --  3.1  --  3.7  --   --  2.5   MAGNESIUM mg/dL  --  2.2 1.9  --   --  2.5*  --    < > 2.2  "  ALT (SGPT) U/L  --   --   --   --  14 10  --   --  <5   LACTATE mmol/L  --   --   --   --   --   --  1.0  --   --     < > = values in this interval not displayed.       Results from last 7 days   Lab Units 11/09/23  0531 11/07/23  0532 11/06/23  1556 11/06/23  0408 11/04/23  0441   ALBUMIN g/dL 3.1* 3.2* 3.3* 3.1* 3.5   PREALBUMIN mg/dL  --   --   --  12.0*  --    CRP mg/dL  --   --   --  3.55*  --    CHOLESTEROL mg/dL  --   --   --  88 98   TRIGLYCERIDES mg/dL  --   --   --  119 123       Results from last 7 days   Lab Units 11/10/23  0523 11/09/23  2324 11/09/23  1808 11/09/23  1140 11/09/23  0503 11/08/23  2307   GLUCOSE mg/dL 110 209* 157* 208* 154* 183*     Lab Results   Lab Value Date/Time    HGBA1C 9.80 (H) 10/25/2023 0357         Results from last 7 days   Lab Units 11/10/23  0401   PROBNP pg/mL 6,349.0*       Medications    Medications Reviewed: Yes  Pertinent  Scheduled: colace, insulin, senekot  Infusion:  PRN:     Intake/Ouptut 24 hrs (0701 - 0700)   I&O's Reviewed: Yes     Last documented BM (11/8)    Anthropometrics     Flowsheet Rows      Flowsheet Row First Filed Value   Admission Height 157.5 cm (62\") Documented at 10/24/2023 1040   Admission Weight 93.9 kg (207 lb) Documented at 10/24/2023 1040       Height: Height: 157.5 cm (62.01\")  Last Filed Weight: Weight: 93.2 kg (205 lb 7.5 oz) (11/06/23 1651)  Method: Weight Method: Standing scale  BMI: BMI (Calculated): 37.6  BMI classification: Obese Class II: 35-39.9kg/m2  IBW:  110 lbs    UBW:   Weight       Weight (kg) Weight (lbs) Weight Method Visit Report   5/18/2021 92.715 kg  204 lb 6.4 oz   --    5/26/2021 92.715 kg  204 lb 6.4 oz   --    6/9/2021 93.078 kg  205 lb 3.2 oz   --    6/25/2021 92.534 kg  204 lb   --    8/16/2021 93.895 kg  207 lb   --    10/24/2023 93.895 kg  207 lb  Stated     10/25/2023 95.346 kg  210 lb 3.2 oz      10/26/2023 95.255 kg  210 lb      10/27/2023 92.987 kg  205 lb  Standing scale     10/28/2023 93.441 kg  206 lb  " "Standing scale     10/30/2023 93.849 kg  206 lb 14.4 oz      10/31/2023 93.577 kg  206 lb 4.8 oz  Standing scale     2023 92.443 kg  203 lb 12.8 oz  Standing scale     2023 93.2 kg  205 lb 7.5 oz          Nutrition Focused Physical Exam     Date: 11/3  Unable to perform exam due to: Defer pending indication    Needs Assessment   Date: 11/10    Height used:Height: 157.5 cm (62.01\")  Weights used: 203 lbs/92.3 kg (actual wt)     110 lbs/50 kg (IBW)      Estimated Calorie needs: ~1600 calories daily  Method: 16-18  Kcals/KG actual wt = 1266-8317    Estimated Protein needs: ~110 g protein daily  Method: 1.2 g/Kg actual wt = 111  Method: 2.0 g/kg IBW = 100    Estimated Fluid needs: Per clinical status      Current Nutrition Prescription     PO: NPO Diet NPO Type: Strict NPO  Oral Nutrition Supplement: N/A  Intake: 70% x 10 meals prior to surgery ()      EN: Peptamen AF  Goal Rate: 70 ml/hr  Water Flushes: 50 ml/hr  Modular: None  Route: NG  Tube: Small bore    At goal over: 20Hrs/day  Rx will supply:   Goal Volume 1400 mL/day     Flush Volume 1000 mL/day     Energy 1680 Kcal/day 105 % Est Need   Protein 106 g/day 96 % Est Need   Fiber 8.5 g/day     Water in  EN 1135 mL     Total Water 2235 mL     Meet DRI No        --------------------------------------------------------------------------  Product/Rate verified at bedside: No - held for PEG  Infusing Rate at time of visit: N/A    Average Delivery from [facility] Days:  Volume 766 mL/day 55  % Goal Vol.   Flush Volume 823 mL/day     Energy  Kcal/day  % Est Need   Protein  g/day  % Est Need   Fiber  g/day     Water in  EN  mL     Total Water  mL     Meet DRI No          Nutrition Diagnosis     Date: 11/3 Updated:   Problem Inadequate oral intake   Etiology AMS   Signs/Symptoms NPO   Status: ongoing - EN initiated (11/3), PEG placed (11/10)    Goal:   General: Nutrition support treatment  PO: Advace diet as medically feasible/appropriate  EN/PN: Maintain " EN goal regimen (will initiate @ lower rate and advance over 8 hours with new PEG placed this morning)    Nutrition Intervention      Follow treatment progress, Care plan reviewed, Nutrition support order placed    After PEG placed today and when ok per MD:  Initiate Peptamen AF @ 40 ml/hr and advance by 15 ml/hr Q 4 hours to goal rate 70 ml/hr. Water flush @ 50 ml/hr.  At goal rate this regimen provides 1400 ml formula, 1680 calories (105% est needs), 106 g protein (96% est needs), 8.5 g fiber, 1135 ml free water from formula, 2235 ml total water from formula/flushes.    RD will continue to monitor and adjust EN regimen as appropriate.    Monitoring/Evaluation:   Per protocol, I&O, Pertinent labs, EN delivery/tolerance, Weight, Skin status, GI status, Symptoms, Hemodynamic stability      Ioana Zuniga RD  Time Spent: 45 min

## 2023-11-11 ENCOUNTER — APPOINTMENT (OUTPATIENT)
Dept: GENERAL RADIOLOGY | Facility: HOSPITAL | Age: 78
End: 2023-11-11
Payer: MEDICARE

## 2023-11-11 LAB
ALBUMIN SERPL-MCNC: 3.2 G/DL (ref 3.5–5.2)
ANION GAP SERPL CALCULATED.3IONS-SCNC: 11 MMOL/L (ref 5–15)
BACTERIA SPEC AEROBE CULT: ABNORMAL
BASOPHILS # BLD AUTO: 0.03 10*3/MM3 (ref 0–0.2)
BASOPHILS NFR BLD AUTO: 0.2 % (ref 0–1.5)
BUN SERPL-MCNC: 35 MG/DL (ref 8–23)
BUN/CREAT SERPL: 39.3 (ref 7–25)
CALCIUM SPEC-SCNC: 8.5 MG/DL (ref 8.6–10.5)
CHLORIDE SERPL-SCNC: 108 MMOL/L (ref 98–107)
CO2 SERPL-SCNC: 25 MMOL/L (ref 22–29)
CREAT SERPL-MCNC: 0.89 MG/DL (ref 0.57–1)
DEPRECATED RDW RBC AUTO: 51.3 FL (ref 37–54)
EGFRCR SERPLBLD CKD-EPI 2021: 66 ML/MIN/1.73
EOSINOPHIL # BLD AUTO: 0.22 10*3/MM3 (ref 0–0.4)
EOSINOPHIL NFR BLD AUTO: 1.5 % (ref 0.3–6.2)
ERYTHROCYTE [DISTWIDTH] IN BLOOD BY AUTOMATED COUNT: 14.8 % (ref 12.3–15.4)
GLUCOSE BLDC GLUCOMTR-MCNC: 144 MG/DL (ref 70–130)
GLUCOSE BLDC GLUCOMTR-MCNC: 154 MG/DL (ref 70–130)
GLUCOSE BLDC GLUCOMTR-MCNC: 172 MG/DL (ref 70–130)
GLUCOSE BLDC GLUCOMTR-MCNC: 204 MG/DL (ref 70–130)
GLUCOSE SERPL-MCNC: 165 MG/DL (ref 65–99)
HCT VFR BLD AUTO: 25.8 % (ref 34–46.6)
HGB BLD-MCNC: 7.6 G/DL (ref 12–15.9)
IMM GRANULOCYTES # BLD AUTO: 0.09 10*3/MM3 (ref 0–0.05)
IMM GRANULOCYTES NFR BLD AUTO: 0.6 % (ref 0–0.5)
LYMPHOCYTES # BLD AUTO: 1.21 10*3/MM3 (ref 0.7–3.1)
LYMPHOCYTES NFR BLD AUTO: 8.2 % (ref 19.6–45.3)
MCH RBC QN AUTO: 28.7 PG (ref 26.6–33)
MCHC RBC AUTO-ENTMCNC: 29.5 G/DL (ref 31.5–35.7)
MCV RBC AUTO: 97.4 FL (ref 79–97)
MONOCYTES # BLD AUTO: 0.89 10*3/MM3 (ref 0.1–0.9)
MONOCYTES NFR BLD AUTO: 6 % (ref 5–12)
NEUTROPHILS NFR BLD AUTO: 12.33 10*3/MM3 (ref 1.7–7)
NEUTROPHILS NFR BLD AUTO: 83.5 % (ref 42.7–76)
NRBC BLD AUTO-RTO: 0 /100 WBC (ref 0–0.2)
PHOSPHATE SERPL-MCNC: 4.1 MG/DL (ref 2.5–4.5)
PLATELET # BLD AUTO: 344 10*3/MM3 (ref 140–450)
PMV BLD AUTO: 12.2 FL (ref 6–12)
POTASSIUM SERPL-SCNC: 4.3 MMOL/L (ref 3.5–5.2)
RBC # BLD AUTO: 2.65 10*6/MM3 (ref 3.77–5.28)
SODIUM SERPL-SCNC: 144 MMOL/L (ref 136–145)
WBC NRBC COR # BLD: 14.77 10*3/MM3 (ref 3.4–10.8)

## 2023-11-11 PROCEDURE — 3E0G76Z INTRODUCTION OF NUTRITIONAL SUBSTANCE INTO UPPER GI, VIA NATURAL OR ARTIFICIAL OPENING: ICD-10-PCS | Performed by: THORACIC SURGERY (CARDIOTHORACIC VASCULAR SURGERY)

## 2023-11-11 PROCEDURE — 63710000001 INSULIN REGULAR HUMAN PER 5 UNITS: Performed by: INTERNAL MEDICINE

## 2023-11-11 PROCEDURE — 80069 RENAL FUNCTION PANEL: CPT | Performed by: INTERNAL MEDICINE

## 2023-11-11 PROCEDURE — 94799 UNLISTED PULMONARY SVC/PX: CPT

## 2023-11-11 PROCEDURE — 99024 POSTOP FOLLOW-UP VISIT: CPT | Performed by: THORACIC SURGERY (CARDIOTHORACIC VASCULAR SURGERY)

## 2023-11-11 PROCEDURE — 63710000001 INSULIN DETEMIR PER 5 UNITS: Performed by: INTERNAL MEDICINE

## 2023-11-11 PROCEDURE — 82948 REAGENT STRIP/BLOOD GLUCOSE: CPT

## 2023-11-11 PROCEDURE — 99232 SBSQ HOSP IP/OBS MODERATE 35: CPT | Performed by: INTERNAL MEDICINE

## 2023-11-11 PROCEDURE — 85025 COMPLETE CBC W/AUTO DIFF WBC: CPT | Performed by: INTERNAL MEDICINE

## 2023-11-11 PROCEDURE — 94761 N-INVAS EAR/PLS OXIMETRY MLT: CPT

## 2023-11-11 PROCEDURE — 71045 X-RAY EXAM CHEST 1 VIEW: CPT

## 2023-11-11 RX ADMIN — ATORVASTATIN CALCIUM 80 MG: 40 TABLET, FILM COATED ORAL at 21:09

## 2023-11-11 RX ADMIN — INSULIN HUMAN 2 UNITS: 100 INJECTION, SOLUTION PARENTERAL at 00:19

## 2023-11-11 RX ADMIN — INSULIN DETEMIR 15 UNITS: 100 INJECTION, SOLUTION SUBCUTANEOUS at 09:04

## 2023-11-11 RX ADMIN — INSULIN HUMAN 5 UNITS: 100 INJECTION, SOLUTION PARENTERAL at 23:58

## 2023-11-11 RX ADMIN — NYSTATIN 1 APPLICATION: 100000 CREAM TOPICAL at 21:11

## 2023-11-11 RX ADMIN — Medication 10 ML: at 21:11

## 2023-11-11 RX ADMIN — SENNOSIDES 10 ML: 8.8 LIQUID ORAL at 08:53

## 2023-11-11 RX ADMIN — SENNOSIDES 10 ML: 8.8 LIQUID ORAL at 21:10

## 2023-11-11 RX ADMIN — SACUBITRIL AND VALSARTAN 1 TABLET: 24; 26 TABLET, FILM COATED ORAL at 21:09

## 2023-11-11 RX ADMIN — INSULIN HUMAN 5 UNITS: 100 INJECTION, SOLUTION PARENTERAL at 00:19

## 2023-11-11 RX ADMIN — INSULIN HUMAN 5 UNITS: 100 INJECTION, SOLUTION PARENTERAL at 12:30

## 2023-11-11 RX ADMIN — INSULIN HUMAN 4 UNITS: 100 INJECTION, SOLUTION PARENTERAL at 23:59

## 2023-11-11 RX ADMIN — SACUBITRIL AND VALSARTAN 1 TABLET: 24; 26 TABLET, FILM COATED ORAL at 08:53

## 2023-11-11 RX ADMIN — INSULIN HUMAN 5 UNITS: 100 INJECTION, SOLUTION PARENTERAL at 17:37

## 2023-11-11 RX ADMIN — NYSTATIN 1 APPLICATION: 100000 CREAM TOPICAL at 08:54

## 2023-11-11 RX ADMIN — INSULIN DETEMIR 15 UNITS: 100 INJECTION, SOLUTION SUBCUTANEOUS at 21:10

## 2023-11-11 RX ADMIN — FLUCONAZOLE 200 MG: 200 TABLET ORAL at 08:56

## 2023-11-11 RX ADMIN — CARVEDILOL 6.25 MG: 6.25 TABLET, FILM COATED ORAL at 17:37

## 2023-11-11 RX ADMIN — CARVEDILOL 6.25 MG: 6.25 TABLET, FILM COATED ORAL at 08:53

## 2023-11-11 RX ADMIN — FAMOTIDINE 20 MG: 10 INJECTION INTRAVENOUS at 08:53

## 2023-11-11 RX ADMIN — INSULIN HUMAN 5 UNITS: 100 INJECTION, SOLUTION PARENTERAL at 05:57

## 2023-11-11 RX ADMIN — HYDRALAZINE HYDROCHLORIDE 50 MG: 25 TABLET, FILM COATED ORAL at 17:37

## 2023-11-11 RX ADMIN — INSULIN HUMAN 2 UNITS: 100 INJECTION, SOLUTION PARENTERAL at 12:30

## 2023-11-11 RX ADMIN — DOCUSATE SODIUM 100 MG: 50 LIQUID ORAL at 21:10

## 2023-11-11 RX ADMIN — INSULIN HUMAN 2 UNITS: 100 INJECTION, SOLUTION PARENTERAL at 05:57

## 2023-11-11 RX ADMIN — HYDRALAZINE HYDROCHLORIDE 50 MG: 25 TABLET, FILM COATED ORAL at 05:57

## 2023-11-11 RX ADMIN — HYDRALAZINE HYDROCHLORIDE 50 MG: 25 TABLET, FILM COATED ORAL at 12:31

## 2023-11-11 RX ADMIN — DOCUSATE SODIUM 100 MG: 50 LIQUID ORAL at 08:53

## 2023-11-11 RX ADMIN — ASPIRIN 81 MG CHEWABLE TABLET 81 MG: 81 TABLET CHEWABLE at 08:53

## 2023-11-11 RX ADMIN — FAMOTIDINE 20 MG: 10 INJECTION INTRAVENOUS at 21:10

## 2023-11-11 RX ADMIN — AMLODIPINE BESYLATE 10 MG: 10 TABLET ORAL at 08:53

## 2023-11-11 RX ADMIN — Medication 10 ML: at 08:54

## 2023-11-11 RX ADMIN — METHYLPHENIDATE HYDROCHLORIDE 10 MG: 5 TABLET ORAL at 08:53

## 2023-11-11 NOTE — PROGRESS NOTES
Intensive Care Follow-up     Hospital:  LOS: 18 days   Ms. Michael Cochran, 78 y.o. female is followed for:   Coronary artery disease involving native coronary artery of native heart with unstable angina pectoris   Followed for medical needs     Subjective     78-year-old white female with PMH of hypertension and hyperlipidemia but no previous cardiac issues. Patient presented to [facility] 10/24/2023 with hypertensive urgency, biventricular heart failure, and elevated cardiac enzymes consistent with NSTEMI.  he was also newly diagnosed with type II DM.  LHC was performed revealing multivessel disease and she was documented to have an ischemic cardiomyopathy with LVEF of 31%. Patient subsequently underwent CABG x 4 by Dr. Cleveland 11/2/2023 and was extubated that evening. Unfortunately postop course was complicated by encephalopathy, ANURADHA, and anemia.  ANURADHA subsequently improved. Encephalopathy however persisted and neurology was consulted.  CT of the head was performed and was unrevealing and EEG just revealed diffuse slowing without evidence of seizure activity.  MRI was subsequently ordered revealing subacute scattered areas of ischemic infarct bilaterally in the frontal and parietal lobes and extending to the bilateral basal ganglia and left cerebellum.  Unclear if this was a watershed event due to hypotension or an embolic event related to atheroemboli from the great vessels.  Neurology initially began patient on a heparin drip, but this was subsequently canceled as echocardiogram was unrevealing.  Long discussions were held with the patient's daughter after I and neurology indicated that her prognosis for a functional existence was very poor.  It was decided to make the patient DNR/DNI, but to continue with full support to give her every chance for recovery.  That would require a PEG feeding tube.   Interval History:  The chart has been reviewed.  The patient is awake but not interactive currently.  Hemodynamics have  "remained stable.    The patient's past medical, surgical and social history were reviewed and updated in Epic as appropriate.        Objective     Infusions:     Medications:  amLODIPine, 10 mg, Nasogastric, Q24H  aspirin, 81 mg, Nasogastric, Daily  atorvastatin, 80 mg, Nasogastric, Nightly  carvedilol, 6.25 mg, Nasogastric, BID With Meals  sennosides, 10 mL, Nasogastric, BID   And  docusate sodium, 100 mg, Nasogastric, BID  famotidine, 20 mg, Intravenous, Q12H  fluconazole, 200 mg, Nasogastric, Q24H  hydrALAZINE, 50 mg, Nasogastric, Q6H  insulin detemir, 15 Units, Subcutaneous, Q12H  insulin regular, 2-9 Units, Subcutaneous, Q6H  insulin regular, 5 Units, Subcutaneous, Q6H  methylphenidate, 10 mg, Nasogastric, Daily  nystatin, 1 application , Topical, Q12H  sacubitril-valsartan, 1 tablet, Nasogastric, Q12H  sodium chloride, 10 mL, Intravenous, Q12H        Vital Sign Min/Max for last 24 hours  Temp  Min: 97.7 °F (36.5 °C)  Max: 98.4 °F (36.9 °C)   BP  Min: 95/63  Max: 147/65   Pulse  Min: 61  Max: 80   Resp  Min: 18  Max: 22   SpO2  Min: 90 %  Max: 99 %   Flow (L/min)  Min: 2  Max: 4       Input/Output for last 24 hour shift  11/10 0701 - 11/11 0700  In: 1576 [I.V.:154]  Out: 2925 [Urine:2925]      Objective:  General Appearance:  Uncomfortable and ill-appearing.    Vital signs: (most recent): Blood pressure 99/74, pulse 73, temperature 98 °F (36.7 °C), temperature source Axillary, resp. rate 18, height 157.5 cm (62.01\"), weight 93.2 kg (205 lb 7.5 oz), SpO2 92%.    HEENT: (Eyes mouth open.  Not following.)    Lungs:  Normal effort and normal respiratory rate.  Breath sounds clear to auscultation.    Heart: Normal rate.  Regular rhythm.  S1 normal and S2 normal.  No murmur.   Abdomen: Abdomen is soft and distended.  Bowel sounds are normal.   There is no abdominal tenderness.     Extremities: Normal range of motion.  There is dependent edema.    Neurological: (Patient is not interactive.).    Pupils:  Pupils are " equal, round, and reactive to light.  Pupils are equal.   Skin:  Warm.                Results from last 7 days   Lab Units 11/11/23  0409 11/10/23  0401 11/09/23  0531   WBC 10*3/mm3 14.77* 17.96* 15.01*   HEMOGLOBIN g/dL 7.6* 7.8* 7.6*   PLATELETS 10*3/mm3 344 327 277     Results from last 7 days   Lab Units 11/11/23  0409 11/10/23  0401 11/09/23  0531 11/08/23  0534 11/07/23  0532 11/06/23  1556 11/06/23  0408   SODIUM mmol/L 144 145 146* 148* 150*   < > 153*   POTASSIUM mmol/L 4.3 4.1 4.0 4.1 4.0  4.0   < > 4.1   CO2 mmol/L 25.0 26.0 24.0 22.0 20.0*   < > 22.0   BUN mg/dL 35* 30* 32* 30* 51*   < > 65*   CREATININE mg/dL 0.89 0.65 0.70 0.76 0.90   < > 1.00   MAGNESIUM mg/dL  --   --  2.2 1.9  --   --  2.5*   PHOSPHORUS mg/dL 4.1  --  4.9*  --  3.1  --  3.7   GLUCOSE mg/dL 165* 115* 152* 182* 193*   < > 152*    < > = values in this interval not displayed.     Estimated Creatinine Clearance: 54.5 mL/min (by C-G formula based on SCr of 0.89 mg/dL).            I reviewed the patient's results and images.     Assessment & Plan   Impression        Multivessel CAD with unstable angina pectoris manifesting as dyspnea    HLD (hyperlipidemia)    Acute hypoxemic respiratory failure due to pulmonary edema    Acute exacerbation of congestive heart failure    Non-STEMI (non-ST elevated myocardial infarction)    S/P CABG x 4 on 11/2/2023    Ischemic cardiomyopathy with LVEF 31%    Hypertensive urgency with diastolic dysfunction    Postop ANURADHA resolved    Postop Encephalopathy    Postop acute blood loss anemia    Postoperative multiple bilateral embolic strokes       Plan        Continue with current supportive care.  Continue nutritional support.  Follow-up labs been ordered for tomorrow.  Follow hemoglobin.    Plan of care and goals reviewed with mulitdisciplinary/antibiotic stewardship team during rounds.   I discussed the patient's findings and my recommendations with patient and nursing staff     Puneet Rodriguez MD,  FCCP  Pulmonology and Critical Care Medicine

## 2023-11-11 NOTE — PROGRESS NOTES
CTS Progress Note       LOS: 18 days   Patient Care Team:  Bo Rodriguez PA as PCP - General (Family Medicine)    Chief Complaint: Coronary artery disease involving native coronary artery of native heart with unstable angina pectoris    Vital Signs:  Temp:  [97.7 °F (36.5 °C)-98.6 °F (37 °C)] 98.3 °F (36.8 °C)  Heart Rate:  [61-80] 74  Resp:  [16-22] 20  BP: ()/() 134/69    Physical Exam: Confused and nonconversant       Results:     Results from last 7 days   Lab Units 11/11/23  0409   WBC 10*3/mm3 14.77*   HEMOGLOBIN g/dL 7.6*   HEMATOCRIT % 25.8*   PLATELETS 10*3/mm3 344     Results from last 7 days   Lab Units 11/11/23  0409   SODIUM mmol/L 144   POTASSIUM mmol/L 4.3   CHLORIDE mmol/L 108*   CO2 mmol/L 25.0   BUN mg/dL 35*   CREATININE mg/dL 0.89   GLUCOSE mg/dL 165*   CALCIUM mg/dL 8.5*           Imaging Results (Last 24 Hours)       Procedure Component Value Units Date/Time    XR Chest 1 View [[id number]] Resulted: 11/11/23 0228     Updated: 11/11/23 0312    XR Abdomen KUB [[id number]] Collected: 11/10/23 1023     Updated: 11/10/23 1031    Narrative:      XR ABDOMEN KUB    Date of Exam: 11/10/2023 9:56 AM EST    Indication: PEG placement    Comparison: 11/3/2023    Findings:  There is been interval placement of a percutaneous gastrostomy tube which projects over the mid abdomen just to the left of midline. There are multiple air-filled but nondistended loops of small bowel throughout the abdomen. There is a large amount of   stool within the rectum. There are surgical skin staples over the lower chest. There is an ovoid hyperdensity overlying the right upper quadrant of the abdomen which is nonspecific but could represent some oral contrast material.      Impression:      Impression:    1. Replacement of percutaneous gastrostomy tube and removal of Dobbhoff feeding tube.  2. Air-filled but nondistended loops of small bowel throughout the abdomen.      Electronically Signed: Genaro Hendrix  MD    11/10/2023 10:28 AM EST    Workstation ID: [id number]            Assessment      Multivessel CAD with unstable angina pectoris manifesting as dyspnea    HLD (hyperlipidemia)    Acute hypoxemic respiratory failure due to pulmonary edema    Acute exacerbation of congestive heart failure    Non-STEMI (non-ST elevated myocardial infarction)    S/P CABG x 4 on 11/2/2023    Ischemic cardiomyopathy with LVEF 31%    Hypertensive urgency with diastolic dysfunction    Postop ANURADHA resolved    Postop Encephalopathy    Postop acute blood loss anemia    Postoperative multiple bilateral embolic strokes    Status post PEG tube placement yesterday.  Will definitely need placement at time of discharge    Plan   As above    Please note that portions of this note were completed with a voice recognition program. Efforts were made to edit the dictations, but occasionally words are mistranscribed.    Gurmeet Justice MD  11/11/23  06:44 EST

## 2023-11-12 LAB
ANION GAP SERPL CALCULATED.3IONS-SCNC: 10 MMOL/L (ref 5–15)
BASOPHILS # BLD AUTO: 0.03 10*3/MM3 (ref 0–0.2)
BASOPHILS NFR BLD AUTO: 0.2 % (ref 0–1.5)
BUN SERPL-MCNC: 38 MG/DL (ref 8–23)
BUN/CREAT SERPL: 49.4 (ref 7–25)
CALCIUM SPEC-SCNC: 8.9 MG/DL (ref 8.6–10.5)
CHLORIDE SERPL-SCNC: 106 MMOL/L (ref 98–107)
CO2 SERPL-SCNC: 25 MMOL/L (ref 22–29)
CREAT SERPL-MCNC: 0.77 MG/DL (ref 0.57–1)
DEPRECATED RDW RBC AUTO: 50.8 FL (ref 37–54)
EGFRCR SERPLBLD CKD-EPI 2021: 78.6 ML/MIN/1.73
EOSINOPHIL # BLD AUTO: 0.27 10*3/MM3 (ref 0–0.4)
EOSINOPHIL NFR BLD AUTO: 1.5 % (ref 0.3–6.2)
ERYTHROCYTE [DISTWIDTH] IN BLOOD BY AUTOMATED COUNT: 14.7 % (ref 12.3–15.4)
GLUCOSE BLDC GLUCOMTR-MCNC: 167 MG/DL (ref 70–130)
GLUCOSE BLDC GLUCOMTR-MCNC: 174 MG/DL (ref 70–130)
GLUCOSE BLDC GLUCOMTR-MCNC: 198 MG/DL (ref 70–130)
GLUCOSE BLDC GLUCOMTR-MCNC: 220 MG/DL (ref 70–130)
GLUCOSE SERPL-MCNC: 178 MG/DL (ref 65–99)
HCT VFR BLD AUTO: 25.3 % (ref 34–46.6)
HGB BLD-MCNC: 7.5 G/DL (ref 12–15.9)
IMM GRANULOCYTES # BLD AUTO: 0.11 10*3/MM3 (ref 0–0.05)
IMM GRANULOCYTES NFR BLD AUTO: 0.6 % (ref 0–0.5)
LYMPHOCYTES # BLD AUTO: 1.33 10*3/MM3 (ref 0.7–3.1)
LYMPHOCYTES NFR BLD AUTO: 7.4 % (ref 19.6–45.3)
MCH RBC QN AUTO: 28.7 PG (ref 26.6–33)
MCHC RBC AUTO-ENTMCNC: 29.6 G/DL (ref 31.5–35.7)
MCV RBC AUTO: 96.9 FL (ref 79–97)
MONOCYTES # BLD AUTO: 1.12 10*3/MM3 (ref 0.1–0.9)
MONOCYTES NFR BLD AUTO: 6.3 % (ref 5–12)
NEUTROPHILS NFR BLD AUTO: 15.05 10*3/MM3 (ref 1.7–7)
NEUTROPHILS NFR BLD AUTO: 84 % (ref 42.7–76)
NRBC BLD AUTO-RTO: 0 /100 WBC (ref 0–0.2)
PLATELET # BLD AUTO: 361 10*3/MM3 (ref 140–450)
PMV BLD AUTO: 12.4 FL (ref 6–12)
POTASSIUM SERPL-SCNC: 4.1 MMOL/L (ref 3.5–5.2)
RBC # BLD AUTO: 2.61 10*6/MM3 (ref 3.77–5.28)
SODIUM SERPL-SCNC: 141 MMOL/L (ref 136–145)
WBC NRBC COR # BLD: 17.91 10*3/MM3 (ref 3.4–10.8)

## 2023-11-12 PROCEDURE — 99024 POSTOP FOLLOW-UP VISIT: CPT | Performed by: THORACIC SURGERY (CARDIOTHORACIC VASCULAR SURGERY)

## 2023-11-12 PROCEDURE — 63710000001 INSULIN DETEMIR PER 5 UNITS: Performed by: INTERNAL MEDICINE

## 2023-11-12 PROCEDURE — 85025 COMPLETE CBC W/AUTO DIFF WBC: CPT | Performed by: INTERNAL MEDICINE

## 2023-11-12 PROCEDURE — 63710000001 INSULIN REGULAR HUMAN PER 5 UNITS: Performed by: INTERNAL MEDICINE

## 2023-11-12 PROCEDURE — 99232 SBSQ HOSP IP/OBS MODERATE 35: CPT | Performed by: INTERNAL MEDICINE

## 2023-11-12 PROCEDURE — 97530 THERAPEUTIC ACTIVITIES: CPT

## 2023-11-12 PROCEDURE — 80048 BASIC METABOLIC PNL TOTAL CA: CPT | Performed by: INTERNAL MEDICINE

## 2023-11-12 PROCEDURE — 82948 REAGENT STRIP/BLOOD GLUCOSE: CPT

## 2023-11-12 RX ADMIN — INSULIN HUMAN 5 UNITS: 100 INJECTION, SOLUTION PARENTERAL at 17:20

## 2023-11-12 RX ADMIN — METHYLPHENIDATE HYDROCHLORIDE 10 MG: 5 TABLET ORAL at 08:36

## 2023-11-12 RX ADMIN — HYDRALAZINE HYDROCHLORIDE 50 MG: 25 TABLET, FILM COATED ORAL at 00:01

## 2023-11-12 RX ADMIN — AMLODIPINE BESYLATE 10 MG: 10 TABLET ORAL at 08:36

## 2023-11-12 RX ADMIN — FLUCONAZOLE 200 MG: 200 TABLET ORAL at 08:36

## 2023-11-12 RX ADMIN — Medication 10 ML: at 08:38

## 2023-11-12 RX ADMIN — INSULIN HUMAN 2 UNITS: 100 INJECTION, SOLUTION PARENTERAL at 17:20

## 2023-11-12 RX ADMIN — HYDRALAZINE HYDROCHLORIDE 50 MG: 25 TABLET, FILM COATED ORAL at 17:20

## 2023-11-12 RX ADMIN — INSULIN HUMAN 5 UNITS: 100 INJECTION, SOLUTION PARENTERAL at 12:52

## 2023-11-12 RX ADMIN — INSULIN DETEMIR 15 UNITS: 100 INJECTION, SOLUTION SUBCUTANEOUS at 08:43

## 2023-11-12 RX ADMIN — INSULIN HUMAN 4 UNITS: 100 INJECTION, SOLUTION PARENTERAL at 12:52

## 2023-11-12 RX ADMIN — HYDRALAZINE HYDROCHLORIDE 50 MG: 25 TABLET, FILM COATED ORAL at 05:30

## 2023-11-12 RX ADMIN — FAMOTIDINE 20 MG: 10 INJECTION INTRAVENOUS at 08:37

## 2023-11-12 RX ADMIN — CARVEDILOL 6.25 MG: 6.25 TABLET, FILM COATED ORAL at 08:36

## 2023-11-12 RX ADMIN — DOCUSATE SODIUM 100 MG: 50 LIQUID ORAL at 20:40

## 2023-11-12 RX ADMIN — INSULIN HUMAN 5 UNITS: 100 INJECTION, SOLUTION PARENTERAL at 05:30

## 2023-11-12 RX ADMIN — Medication 10 ML: at 20:41

## 2023-11-12 RX ADMIN — NYSTATIN 1 APPLICATION: 100000 CREAM TOPICAL at 20:41

## 2023-11-12 RX ADMIN — INSULIN DETEMIR 15 UNITS: 100 INJECTION, SOLUTION SUBCUTANEOUS at 20:40

## 2023-11-12 RX ADMIN — HYDRALAZINE HYDROCHLORIDE 50 MG: 25 TABLET, FILM COATED ORAL at 12:52

## 2023-11-12 RX ADMIN — INSULIN HUMAN 2 UNITS: 100 INJECTION, SOLUTION PARENTERAL at 05:30

## 2023-11-12 RX ADMIN — CARVEDILOL 6.25 MG: 6.25 TABLET, FILM COATED ORAL at 17:20

## 2023-11-12 RX ADMIN — ATORVASTATIN CALCIUM 80 MG: 40 TABLET, FILM COATED ORAL at 20:40

## 2023-11-12 RX ADMIN — SACUBITRIL AND VALSARTAN 1 TABLET: 24; 26 TABLET, FILM COATED ORAL at 08:37

## 2023-11-12 RX ADMIN — ASPIRIN 81 MG CHEWABLE TABLET 81 MG: 81 TABLET CHEWABLE at 08:36

## 2023-11-12 RX ADMIN — SENNOSIDES 10 ML: 8.8 LIQUID ORAL at 20:40

## 2023-11-12 RX ADMIN — NYSTATIN 1 APPLICATION: 100000 CREAM TOPICAL at 08:37

## 2023-11-12 RX ADMIN — FAMOTIDINE 20 MG: 10 INJECTION INTRAVENOUS at 20:40

## 2023-11-12 RX ADMIN — SACUBITRIL AND VALSARTAN 1 TABLET: 24; 26 TABLET, FILM COATED ORAL at 20:40

## 2023-11-12 NOTE — THERAPY TREATMENT NOTE
Patient Name: Michael Cochran  : 1945    MRN: [id number]                              Today's Date: 2023       Admit Date: 10/24/2023    Visit Dx:     ICD-10-CM ICD-9-CM   1. Elevated troponin  R79.89 790.6   2. Acute on chronic congestive heart failure, unspecified heart failure type  I50.9 428.0   3. Hypertensive emergency  I16.1 401.9   4. Acute respiratory failure with hypoxia  J96.01 518.81   5. Pleural effusion, left  J90 511.9   6. Hypokalemia  E87.6 276.8   7. Non-STEMI (non-ST elevated myocardial infarction)  I21.4 410.70   8. Coronary artery disease involving native coronary artery of native heart with unstable angina pectoris  I25.110 414.01     411.1     Patient Active Problem List   Diagnosis    HLD (hyperlipidemia)    Acute hypoxemic respiratory failure due to pulmonary edema    Acute exacerbation of congestive heart failure    Non-STEMI (non-ST elevated myocardial infarction)    Multivessel CAD with unstable angina pectoris manifesting as dyspnea    S/P CABG x 4 on 2023    Ischemic cardiomyopathy with LVEF 31%    Hypertensive urgency with diastolic dysfunction    Postop ANURADHA resolved    Postop Encephalopathy    Postop acute blood loss anemia    Postoperative multiple bilateral embolic strokes     Past Medical History:   Diagnosis Date    Hyperlipidemia     Hypertension      Past Surgical History:   Procedure Laterality Date    BREAST FIBROADENOMA SURGERY      9 y/o-was benign    CARDIAC CATHETERIZATION N/A 10/26/2023    Procedure: Left Heart Cath;  Surgeon: Jordi Hodge MD;  Location:  [facility] CATH INVASIVE LOCATION;  Service: Cardiovascular;  Laterality: N/A;    CORONARY ARTERY BYPASS GRAFT N/A 2023    Procedure: MEDIAN STERNOTOMY, CORONARY ARTERY BYPASS GRAFTING X4, UTILIZING THE LEFT INTERANL MAMMARY ARTERY, EVH OF THE LEFT GREATER SAPHENOUS VEIN, EXPLORATION OF THE RIGHT LEG, PRIMITIVO PER ANETHESIA;  Surgeon: Dirk Cleveland MD;  Location: [facility] OR;  Service: Cardiothoracic;   Laterality: N/A;      General Information       Row Name 11/12/23 5240          Physical Therapy Time and Intention    Document Type therapy note (daily note)  -SUE     Mode of Treatment physical therapy  -SUE       Row Name 11/12/23 2509          General Information    Patient Profile Reviewed yes  -SUE     Existing Precautions/Restrictions cardiac;fall;sternal  -SUE     Barriers to Rehab medically complex  -SUE       Row Name 11/12/23 1352          Cognition    Orientation Status (Cognition) unable/difficult to assess  -SUE       Row Name 11/12/23 8585          Safety Issues, Functional Mobility    Safety Issues Affecting Function (Mobility) ability to follow commands;insight into deficits/self-awareness;safety precautions follow-through/compliance  -SUE     Impairments Affecting Function (Mobility) balance;endurance/activity tolerance;strength;postural/trunk control;muscle tone abnormal  -SUE               User Key  (r) = Recorded By, (t) = Taken By, (c) = Cosigned By      Initials Name Provider Type    SUE Oneida Daly, PT Physical Therapist                   Mobility       Row Name 11/12/23 1344          Bed Mobility    Bed Mobility rolling left;rolling right;scooting/bridging;supine-sit;sit-supine  -SUE     Rolling Left [place] (Bed Mobility) dependent (less than 25% patient effort)  -SUE     Rolling Right [place] (Bed Mobility) dependent (less than 25% patient effort)  -SUE     Scooting/Bridging [place] (Bed Mobility) dependent (less than 25% patient effort)  -SUE     Supine-Sit [place] (Bed Mobility) dependent (less than 25% patient effort)  -SUE     Sit-Supine [place] (Bed Mobility) dependent (less than 25% patient effort)  -SUE     Assistive Device (Bed Mobility) draw sheet;head of bed elevated  -SUE     Comment, (Bed Mobility) patient is unable to follow commands  -SUE       Row Name 11/12/23 3026          Transfers    Comment, (Transfers) deferred poor sitting balance  -SUE       Row Name  11/12/23 [facility]          Bed-Chair Transfer    Bed-Chair [place] (Transfers) unable to assess  -       Row Name 11/12/23 [facility]          Sit-Stand Transfer    Sit-Stand [place] (Transfers) unable to assess  -       Row Name 11/12/23 [facility]5          Gait/Stairs (Locomotion)    [place] (Gait) unable to assess  -               User Key  (r) = Recorded By, (t) = Taken By, (c) = Cosigned By      Initials Name Provider Type    Oneida Serra PT Physical Therapist                   Obj/Interventions       [facility] Name 11/12/23 [facility]          Shoulder (Therapeutic Exercise)    Shoulder PROM (Therapeutic Exercise) bilateral;flexion;extension;aBduction;aDduction;10 repetitions;supine  -[facility] Name 11/12/23 [facility]          Hip (Therapeutic Exercise)    Hip PROM (Therapeutic Exercise) bilateral;flexion;extension;aBduction;aDduction;10 repetitions;supine  -[facility] Name 11/12/23 [facility]          Knee (Therapeutic Exercise)    Knee (Therapeutic Exercise) PROM (passive range of motion)  -     Knee PROM (Therapeutic Exercise) bilateral;flexion;extension;10 repetitions;supine  -[facility] Name 11/12/23 [facility]          Ankle (Therapeutic Exercise)    Ankle (Therapeutic Exercise) PROM (passive range of motion)  -     Ankle PROM (Therapeutic Exercise) bilateral;dorsiflexion;plantarflexion;10 repetitions;sitting  -[facility] Name 11/12/23 [facility]          Balance    Balance Assessment sitting static balance;sitting dynamic balance  -SUE     Static Sitting Balance maximum assist;1 person to manage equipment  -SUE     Dynamic Sitting Balance maximum assist;1 person to manage equipment  -SUE     Comment, Balance patient pushing heavily with right UE going into extensor pattern with manual flexion of right UE patient needs less assist to maintain sitting balance.  -SUE               User Key  (r) = Recorded By, (t) = Taken By, (c) = Cosigned By      Initials Name Provider Type    Oneida Serra PT  Physical Therapist                   Goals/Plan       Row Name 11/12/23 1403          Therapy Assessment/Plan (PT)    Planned Therapy Interventions (PT) balance training;bed mobility training;home exercise program;strengthening;transfer training  -SUE               User Key  (r) = Recorded By, (t) = Taken By, (c) = Cosigned By      Initials Name Provider Type    Oneida Serra, PT Physical Therapist                   Clinical Impression       Row Name 11/12/23 1400          Pain    Pretreatment Pain Rating 0/10 - no pain  -SUE     Posttreatment Pain Rating 0/10 - no pain  -SUE       Row Name 11/12/23 1400          Plan of Care Review    Plan of Care Reviewed With patient  -SUE     Progress no change  -SUE     Outcome Evaluation patient still unable to follow commands patient sat at the edge of the bed with max assist for 8 min. we will continue to work on sitting balance as tolerated. anticipate patient to need SNF and extended long term care at D/C  -       Row Name 11/12/23 1400          Therapy Assessment/Plan (PT)    Patient/Family Therapy Goals Statement (PT) unable to state  -SUE     Rehab Potential (PT) fair, will monitor progress closely  -SUE     Criteria for Skilled Interventions Met (PT) yes;skilled treatment is necessary  -SUE     Therapy Frequency (PT) daily  -SUE       Row Name 11/12/23 1400          Vital Signs    Pre Systolic BP Rehab 140  -SUE     Pre Treatment Diastolic BP 70  -SUE     Post Systolic BP Rehab 155  -SUE     Post Treatment Diastolic BP 76  -SUE     Pretreatment Heart Rate (beats/min) 80  -SUE     Posttreatment Heart Rate (beats/min) 92  -SUE     Pre SpO2 (%) 96  -SUE     O2 Delivery Pre Treatment room air  -SUE     Post SpO2 (%) 96  -SUE     O2 Delivery Post Treatment room air  -SUE     Pre Patient Position Supine  -SUE     Intra Patient Position Sitting  -SUE     Post Patient Position Supine  -SUE       Row Name 11/12/23 1400          Positioning and Restraints    Pre-Treatment Position in bed   -SUE     Post Treatment Position bed  -SUE     In Bed notified nsg;supine;call light within reach;encouraged to call for assist;exit alarm on;with nsg  -SUE               User Key  (r) = Recorded By, (t) = Taken By, (c) = Cosigned By      Initials Name Provider Type    Oneida Serra PT Physical Therapist                   Outcome Measures       Row Name 11/12/23 140          How much help from another person do you currently need...    Turning from your back to your side while in flat bed without using bedrails? 1  -SUE     Moving from lying on back to sitting on the side of a flat bed without bedrails? 1  -SUE     Moving to and from a bed to a chair (including a wheelchair)? 1  -SUE     Standing up from a chair using your arms (e.g., wheelchair, bedside chair)? 1  -SUE     Climbing 3-5 steps with a railing? 1  -SUE     To walk in hospital room? 1  -SUE     AM-PAC 6 Clicks Score (PT) 6  -SUE     Highest level of mobility 2 --> Bed activities/dependent transfer  -SUE               User Key  (r) = Recorded By, (t) = Taken By, (c) = Cosigned By      Initials Name Provider Type    Oneida Serra PT Physical Therapist                                 Physical Therapy Education       Title: PT OT SLP Therapies (In Progress)       Topic: Physical Therapy (In Progress)       Point: Mobility training (In Progress)       Learning Progress Summary             Patient Acceptance, E, NR by SUE at 11/12/2023 0900    Acceptance, E, NR by AE at 11/9/2023 1503    Acceptance, E, NR by AE at 11/8/2023 1319    Acceptance, E, NR by SUE at 11/7/2023 1300    Acceptance, E, NR by AE at 11/6/2023 1310    Acceptance, E, NR by KG at 11/5/2023 0940    Acceptance, E, VU,NR by ML at 10/27/2023 1544    Comment: continued mobility while awaiting CABG, sternal precautions following CABG    Acceptance, E,D, VU,NR by LR at 10/25/2023 1327    Comment: Educated on benefits of mobility, correct sit<->stand t/f technique, correct gait mechanics, PLB,  energy conservation, and progression of POC.                         Point: Home exercise program (In Progress)       Learning Progress Summary             Patient Acceptance, E, NR by SUE at 11/12/2023 0900    Acceptance, E, NR by AE at 11/9/2023 1503    Acceptance, E, NR by AE at 11/8/2023 1319    Acceptance, E, NR by SUE at 11/7/2023 1300    Acceptance, E, NR by AE at 11/6/2023 1310    Acceptance, E, NR by KG at 11/5/2023 0940                         Point: Body mechanics (In Progress)       Learning Progress Summary             Patient Acceptance, E, NR by SUE at 11/12/2023 0900    Acceptance, E, NR by AE at 11/9/2023 1503    Acceptance, E, NR by AE at 11/8/2023 1319    Acceptance, E, NR by SUE at 11/7/2023 1300    Acceptance, E, NR by AE at 11/6/2023 1310    Acceptance, E, NR by KG at 11/5/2023 0940    Acceptance, E,D, VU,NR by LR at 10/25/2023 1327    Comment: Educated on benefits of mobility, correct sit<->stand t/f technique, correct gait mechanics, PLB, energy conservation, and progression of POC.                         Point: Precautions (In Progress)       Learning Progress Summary             Patient Acceptance, E, NR by SUE at 11/12/2023 0900    Acceptance, E, NR by AE at 11/9/2023 1503    Acceptance, E, NR by AE at 11/8/2023 1319    Acceptance, E, NR by SUE at 11/7/2023 1300    Acceptance, E, NR by AE at 11/6/2023 1310    Acceptance, E, NR by KG at 11/5/2023 0940    Acceptance, E, VU,NR by ML at 10/27/2023 1544    Comment: continued mobility while awaiting CABG, sternal precautions following CABG    Acceptance, E,D, VU,NR by LR at 10/25/2023 1327    Comment: Educated on benefits of mobility, correct sit<->stand t/f technique, correct gait mechanics, PLB, energy conservation, and progression of POC.                                         User Key       Initials Effective Dates Name Provider Type Discipline    SUE 02/03/23 -  Oneida Daly PT Physical Therapist PT    LR 02/03/23 -  Alissa Pierre  Ruth, PT Physical Therapist PT    KG 05/22/20 -  Brittney Rocha PT Physical Therapist PT    ML 04/22/21 -  Alisa Salguero Physical Therapist PT    AE 09/21/21 -  Jcarlos Rodriguez, PT Physical Therapist PT                  PT Recommendation and Plan  Planned Therapy Interventions (PT): balance training, bed mobility training, home exercise program, strengthening, transfer training  Plan of Care Reviewed With: patient  Progress: no change  Outcome Evaluation: patient still unable to follow commands patient sat at the edge of the bed with max assist for 8 min. we will continue to work on sitting balance as tolerated. anticipate patient to need SNF and extended long term care at D/C     Time Calculation:         PT Charges       Row Name 11/12/23 1404             Time Calculation    Start Time 0900  -SUE      PT Received On 11/12/23  -SUE      PT Goal Re-Cert Due Date 11/15/23  -SUE         Time Calculation- PT    Total Timed Code Minutes- PT 15 minute(s)  -SUE         Timed Charges    31281 - PT Therapeutic Activity Minutes 15  -SUE         Total Minutes    Timed Charges Total Minutes 15  -SUE       Total Minutes 15  -SUE                User Key  (r) = Recorded By, (t) = Taken By, (c) = Cosigned By      Initials Name Provider Type    Oneida Serra, PT Physical Therapist                  Therapy Charges for Today       Code Description Service Date Service Provider Modifiers Qty    [id number] HC PT THERAPEUTIC ACT EA 15 MIN 11/12/2023 Oneida Daly PT GP 1            PT G-Codes  Outcome Measure Options: AM-PAC 6 Clicks Basic Mobility (PT)  AM-PAC 6 Clicks Score (PT): 6  AM-PAC 6 Clicks Score (OT): 6  Modified [place] Scale: 4 - Moderately severe disability.  Unable to walk without assistance, and unable to attend to own bodily needs without assistance.  PT Discharge Summary  Anticipated Discharge Disposition (PT): skilled nursing facility    Oneida Daly PT  11/12/2023

## 2023-11-12 NOTE — PROGRESS NOTES
Intensive Care Follow-up     Hospital:  LOS: 19 days   Ms. Michael Cochran, 78 y.o. female is followed for:   Coronary artery disease involving native coronary artery of native heart with unstable angina pectoris   Followed postoperatively for medical needs     Subjective        78-year-old white female with PMH of hypertension and hyperlipidemia but no previous cardiac issues. Patient presented to [facility]/2023 with hypertensive urgency, biventricular heart failure, and elevated cardiac enzymes consistent with NSTEMI.  he was also newly diagnosed with type II DM.  LHC was performed revealing multivessel disease and she was documented to have an ischemic cardiomyopathy with LVEF of 31%. Patient subsequently underwent CABG x 4 by Dr. Cleveland 11/2/2023 and was extubated that evening. Unfortunately postop course was complicated by encephalopathy, ANURADHA, and anemia.  ANURADHA subsequently improved. Encephalopathy however persisted and neurology was consulted.  CT of the head was performed and was unrevealing and EEG just revealed diffuse slowing without evidence of seizure activity.  MRI was subsequently ordered revealing subacute scattered areas of ischemic infarct bilaterally in the frontal and parietal lobes and extending to the bilateral basal ganglia and left cerebellum.  Unclear if this was a watershed event due to hypotension or an embolic event related to atheroemboli from the great vessels.  Neurology initially began patient on a heparin drip, but this was subsequently canceled as echocardiogram was unrevealing.  Long discussions were held with the patient's daughter after I and neurology indicated that her prognosis for a functional existence was very poor.  It was decided to make the patient DNR/DNI, but to continue with full support to give her every chance for recovery.   Interval History:  The chart has been reviewed.  The patient has remained afebrile.  She remains nonresponsive to voice.  She is moving her right  "upper extremity spontaneously.  She does not appear to be moving her left side at this time.    The patient's past medical, surgical and social history were reviewed and updated in Epic as appropriate.        Objective     Infusions:     Medications:  amLODIPine, 10 mg, Nasogastric, Q24H  aspirin, 81 mg, Nasogastric, Daily  atorvastatin, 80 mg, Nasogastric, Nightly  carvedilol, 6.25 mg, Nasogastric, BID With Meals  sennosides, 10 mL, Nasogastric, BID   And  docusate sodium, 100 mg, Nasogastric, BID  famotidine, 20 mg, Intravenous, Q12H  hydrALAZINE, 50 mg, Nasogastric, Q6H  insulin detemir, 15 Units, Subcutaneous, Q12H  insulin regular, 2-9 Units, Subcutaneous, Q6H  insulin regular, 5 Units, Subcutaneous, Q6H  methylphenidate, 10 mg, Nasogastric, Daily  nystatin, 1 application , Topical, Q12H  sacubitril-valsartan, 1 tablet, Nasogastric, Q12H  sodium chloride, 10 mL, Intravenous, Q12H        Vital Sign Min/Max for last 24 hours  Temp  Min: 97.2 °F (36.2 °C)  Max: 99.7 °F (37.6 °C)   BP  Min: 99/74  Max: 141/70   Pulse  Min: 65  Max: 90   Resp  Min: 18  Max: 24   SpO2  Min: 90 %  Max: 97 %   Flow (L/min)  Min: 3  Max: 3       Input/Output for last 24 hour shift  11/11 0701 - 11/12 0700  In: 2790   Out: 2000 [Urine:2000]      Objective:  General Appearance:  Uncomfortable and ill-appearing.    Vital signs: (most recent): Blood pressure 141/70, pulse 90, temperature 99.5 °F (37.5 °C), temperature source Axillary, resp. rate 18, height 157.5 cm (62.01\"), weight 93.2 kg (205 lb 7.5 oz), SpO2 90%.    HEENT: (Eyes mouth open.  Not following.)    Lungs:  Normal effort and normal respiratory rate.  Breath sounds clear to auscultation.    Heart: Normal rate.  Regular rhythm.  S1 normal and S2 normal.  No murmur.   Abdomen: Abdomen is soft and distended.  Bowel sounds are normal.   There is no abdominal tenderness.     Extremities: There is dependent edema.    Neurological: (Patient is not interactive.).    Pupils:  Pupils are " equal, round, and reactive to light.  Pupils are equal.   Skin:  Warm.                Results from last 7 days   Lab Units 11/12/23  0307 11/11/23 0409 11/10/23  0401   WBC 10*3/mm3 17.91* 14.77* 17.96*   HEMOGLOBIN g/dL 7.5* 7.6* 7.8*   PLATELETS 10*3/mm3 361 344 327     Results from last 7 days   Lab Units 11/12/23  0307 11/11/23  0409 11/10/23  0401 11/09/23  0531 11/08/23  0534 11/07/23  0532 11/06/23  1556 11/06/23  0408   SODIUM mmol/L 141 144 145 146* 148* 150*   < > 153*   POTASSIUM mmol/L 4.1 4.3 4.1 4.0 4.1 4.0  4.0   < > 4.1   CO2 mmol/L 25.0 25.0 26.0 24.0 22.0 20.0*   < > 22.0   BUN mg/dL 38* 35* 30* 32* 30* 51*   < > 65*   CREATININE mg/dL 0.77 0.89 0.65 0.70 0.76 0.90   < > 1.00   MAGNESIUM mg/dL  --   --   --  2.2 1.9  --   --  2.5*   PHOSPHORUS mg/dL  --  4.1  --  4.9*  --  3.1  --  3.7   GLUCOSE mg/dL 178* 165* 115* 152* 182* 193*   < > 152*    < > = values in this interval not displayed.     Estimated Creatinine Clearance: 62.9 mL/min (by C-G formula based on SCr of 0.77 mg/dL).            I reviewed the patient's results and images.     Assessment & Plan   Impression        Multivessel CAD with unstable angina pectoris manifesting as dyspnea    HLD (hyperlipidemia)    Acute hypoxemic respiratory failure due to pulmonary edema    Acute exacerbation of congestive heart failure    Non-STEMI (non-ST elevated myocardial infarction)    S/P CABG x 4 on 11/2/2023    Ischemic cardiomyopathy with LVEF 31%    Hypertensive urgency with diastolic dysfunction    Postop ANURADHA resolved    Postop Encephalopathy    Postop acute blood loss anemia    Postoperative multiple bilateral embolic strokes       Plan        Continue on with current support.  Nutrition as before.      Plan of care and goals reviewed with mulitdisciplinary/antibiotic stewardship team during rounds.   I discussed the patient's findings and my recommendations with nursing staff         Puneet Rodriguez MD, [facility]  Pulmonology and Critical Care  Medicine

## 2023-11-12 NOTE — PROGRESS NOTES
CTS Progress Note       LOS: 19 days   Patient Care Team:  Bo Rodriguez PA as PCP - General (Family Medicine)    Chief Complaint: Coronary artery disease involving native coronary artery of native heart with unstable angina pectoris    Vital Signs:  Temp:  [97.2 °F (36.2 °C)-99.7 °F (37.6 °C)] 99.5 °F (37.5 °C)  Heart Rate:  [65-90] 90  Resp:  [18-24] 18  BP: ()/() 138/77    Physical Exam: Confused.  Moves all extremities but does not follow commands       Results:     Results from last 7 days   Lab Units 11/12/23  0307   WBC 10*3/mm3 17.91*   HEMOGLOBIN g/dL 7.5*   HEMATOCRIT % 25.3*   PLATELETS 10*3/mm3 361     Results from last 7 days   Lab Units 11/12/23  0307   SODIUM mmol/L 141   POTASSIUM mmol/L 4.1   CHLORIDE mmol/L 106   CO2 mmol/L 25.0   BUN mg/dL 38*   CREATININE mg/dL 0.77   GLUCOSE mg/dL 178*   CALCIUM mg/dL 8.9           Imaging Results (Last 24 Hours)       Procedure Component Value Units Date/Time    XR Chest 1 View [[id number]] Collected: 11/11/23 0815     Updated: 11/11/23 0819    Narrative:      XR CHEST 1 VW    Date of Exam: 11/11/2023 2:30 AM EST    Indication: Progressive leukocytosis    Comparison: 11/8/2023    Findings:  Patient is status post median sternotomy. Right arm PICC overlies the superior cavoatrial junction. Cardiac silhouette is enlarged. Scattered bibasilar atelectasis is seen. No significant pleural effusion or pneumothorax is seen. No acute osseous lesion   is seen.      Impression:      Impression:  Cardiomegaly with scattered bibasilar atelectasis.    Electronically Signed: Sergio Esqueda MD    11/11/2023 8:16 AM EST    Workstation ID: [id number]            Assessment      Multivessel CAD with unstable angina pectoris manifesting as dyspnea    HLD (hyperlipidemia)    Acute hypoxemic respiratory failure due to pulmonary edema    Acute exacerbation of congestive heart failure    Non-STEMI (non-ST elevated myocardial infarction)    S/P CABG x 4 on  11/2/2023    Ischemic cardiomyopathy with LVEF 31%    Hypertensive urgency with diastolic dysfunction    Postop ANURADHA resolved    Postop Encephalopathy    Postop acute blood loss anemia    Postoperative multiple bilateral embolic strokes    Should definitely require skilled nursing facility at discharge    Plan   As above    Please note that portions of this note were completed with a voice recognition program. Efforts were made to edit the dictations, but occasionally words are mistranscribed.    Gurmeet Justice MD  11/12/23  07:36 EST

## 2023-11-12 NOTE — PLAN OF CARE
Goal Outcome Evaluation:  Plan of Care Reviewed With: patient        Progress: no change  Outcome Evaluation: patient still unable to follow commands patient sat at the edge of the bed with max assist for 8 min. we will continue to work on sitting balance as tolerated. anticipate patient to need SNF and extended long term care at D/C      Anticipated Discharge Disposition (PT): skilled nursing facility

## 2023-11-12 NOTE — PLAN OF CARE
Goal Outcome Evaluation:  Plan of Care Reviewed With: patient, grandchild(magaly)        Progress: no change

## 2023-11-13 LAB
ALBUMIN SERPL-MCNC: 3.1 G/DL (ref 3.5–5.2)
ALBUMIN/GLOB SERPL: 1.2 G/DL
ALP SERPL-CCNC: 91 U/L (ref 39–117)
ALT SERPL W P-5'-P-CCNC: 14 U/L (ref 1–33)
ANION GAP SERPL CALCULATED.3IONS-SCNC: 7 MMOL/L (ref 5–15)
AST SERPL-CCNC: 17 U/L (ref 1–32)
BILIRUB SERPL-MCNC: 1.1 MG/DL (ref 0–1.2)
BUN SERPL-MCNC: 38 MG/DL (ref 8–23)
BUN/CREAT SERPL: 46.3 (ref 7–25)
CALCIUM SPEC-SCNC: 8.8 MG/DL (ref 8.6–10.5)
CHLORIDE SERPL-SCNC: 108 MMOL/L (ref 98–107)
CHOLEST SERPL-MCNC: 117 MG/DL (ref 0–200)
CO2 SERPL-SCNC: 25 MMOL/L (ref 22–29)
CREAT SERPL-MCNC: 0.82 MG/DL (ref 0.57–1)
DEPRECATED RDW RBC AUTO: 52 FL (ref 37–54)
EGFRCR SERPLBLD CKD-EPI 2021: 72.9 ML/MIN/1.73
ERYTHROCYTE [DISTWIDTH] IN BLOOD BY AUTOMATED COUNT: 14.7 % (ref 12.3–15.4)
GLOBULIN UR ELPH-MCNC: 2.5 GM/DL
GLUCOSE BLDC GLUCOMTR-MCNC: 187 MG/DL (ref 70–130)
GLUCOSE BLDC GLUCOMTR-MCNC: 188 MG/DL (ref 70–130)
GLUCOSE BLDC GLUCOMTR-MCNC: 189 MG/DL (ref 70–130)
GLUCOSE SERPL-MCNC: 198 MG/DL (ref 65–99)
HCT VFR BLD AUTO: 24.5 % (ref 34–46.6)
HGB BLD-MCNC: 7.2 G/DL (ref 12–15.9)
MAGNESIUM SERPL-MCNC: 2.3 MG/DL (ref 1.6–2.4)
MCH RBC QN AUTO: 29 PG (ref 26.6–33)
MCHC RBC AUTO-ENTMCNC: 29.4 G/DL (ref 31.5–35.7)
MCV RBC AUTO: 98.8 FL (ref 79–97)
PHOSPHATE SERPL-MCNC: 3.2 MG/DL (ref 2.5–4.5)
PLATELET # BLD AUTO: 309 10*3/MM3 (ref 140–450)
PMV BLD AUTO: 12.3 FL (ref 6–12)
POTASSIUM SERPL-SCNC: 4.3 MMOL/L (ref 3.5–5.2)
PROT SERPL-MCNC: 5.6 G/DL (ref 6–8.5)
RBC # BLD AUTO: 2.48 10*6/MM3 (ref 3.77–5.28)
SODIUM SERPL-SCNC: 140 MMOL/L (ref 136–145)
TRIGL SERPL-MCNC: 124 MG/DL (ref 0–150)
WBC NRBC COR # BLD: 15.34 10*3/MM3 (ref 3.4–10.8)

## 2023-11-13 PROCEDURE — 94761 N-INVAS EAR/PLS OXIMETRY MLT: CPT

## 2023-11-13 PROCEDURE — 63710000001 INSULIN DETEMIR PER 5 UNITS: Performed by: INTERNAL MEDICINE

## 2023-11-13 PROCEDURE — 63710000001 INSULIN REGULAR HUMAN PER 5 UNITS: Performed by: INTERNAL MEDICINE

## 2023-11-13 PROCEDURE — 99231 SBSQ HOSP IP/OBS SF/LOW 25: CPT | Performed by: NURSE PRACTITIONER

## 2023-11-13 PROCEDURE — 99232 SBSQ HOSP IP/OBS MODERATE 35: CPT | Performed by: INTERNAL MEDICINE

## 2023-11-13 PROCEDURE — 82465 ASSAY BLD/SERUM CHOLESTEROL: CPT

## 2023-11-13 PROCEDURE — 94799 UNLISTED PULMONARY SVC/PX: CPT

## 2023-11-13 PROCEDURE — 84100 ASSAY OF PHOSPHORUS: CPT

## 2023-11-13 PROCEDURE — 82948 REAGENT STRIP/BLOOD GLUCOSE: CPT

## 2023-11-13 PROCEDURE — 83735 ASSAY OF MAGNESIUM: CPT

## 2023-11-13 PROCEDURE — 97110 THERAPEUTIC EXERCISES: CPT

## 2023-11-13 PROCEDURE — 84478 ASSAY OF TRIGLYCERIDES: CPT

## 2023-11-13 PROCEDURE — 80053 COMPREHEN METABOLIC PANEL: CPT

## 2023-11-13 PROCEDURE — 85027 COMPLETE CBC AUTOMATED: CPT | Performed by: THORACIC SURGERY (CARDIOTHORACIC VASCULAR SURGERY)

## 2023-11-13 RX ORDER — DOCUSATE SODIUM 50 MG/5 ML
100 LIQUID (ML) ORAL 2 TIMES DAILY
Status: DISCONTINUED | OUTPATIENT
Start: 2023-11-13 | End: 2023-11-15 | Stop reason: HOSPADM

## 2023-11-13 RX ADMIN — Medication 10 ML: at 20:32

## 2023-11-13 RX ADMIN — METHYLPHENIDATE HYDROCHLORIDE 10 MG: 5 TABLET ORAL at 10:54

## 2023-11-13 RX ADMIN — INSULIN HUMAN 2 UNITS: 100 INJECTION, SOLUTION PARENTERAL at 00:08

## 2023-11-13 RX ADMIN — DOCUSATE SODIUM 100 MG: 50 LIQUID ORAL at 20:32

## 2023-11-13 RX ADMIN — SACUBITRIL AND VALSARTAN 1 TABLET: 24; 26 TABLET, FILM COATED ORAL at 08:54

## 2023-11-13 RX ADMIN — CARVEDILOL 6.25 MG: 6.25 TABLET, FILM COATED ORAL at 17:22

## 2023-11-13 RX ADMIN — INSULIN DETEMIR 15 UNITS: 100 INJECTION, SOLUTION SUBCUTANEOUS at 20:32

## 2023-11-13 RX ADMIN — AMLODIPINE BESYLATE 10 MG: 10 TABLET ORAL at 08:54

## 2023-11-13 RX ADMIN — NYSTATIN 1 APPLICATION: 100000 CREAM TOPICAL at 20:33

## 2023-11-13 RX ADMIN — INSULIN HUMAN 2 UNITS: 100 INJECTION, SOLUTION PARENTERAL at 18:19

## 2023-11-13 RX ADMIN — CARVEDILOL 6.25 MG: 6.25 TABLET, FILM COATED ORAL at 08:54

## 2023-11-13 RX ADMIN — HYDRALAZINE HYDROCHLORIDE 50 MG: 25 TABLET, FILM COATED ORAL at 00:15

## 2023-11-13 RX ADMIN — INSULIN DETEMIR 15 UNITS: 100 INJECTION, SOLUTION SUBCUTANEOUS at 08:54

## 2023-11-13 RX ADMIN — FAMOTIDINE 20 MG: 10 INJECTION INTRAVENOUS at 08:58

## 2023-11-13 RX ADMIN — SACUBITRIL AND VALSARTAN 1 TABLET: 24; 26 TABLET, FILM COATED ORAL at 20:32

## 2023-11-13 RX ADMIN — Medication 10 ML: at 08:55

## 2023-11-13 RX ADMIN — INSULIN HUMAN 5 UNITS: 100 INJECTION, SOLUTION PARENTERAL at 00:07

## 2023-11-13 RX ADMIN — INSULIN HUMAN 5 UNITS: 100 INJECTION, SOLUTION PARENTERAL at 12:28

## 2023-11-13 RX ADMIN — NYSTATIN 1 APPLICATION: 100000 CREAM TOPICAL at 08:55

## 2023-11-13 RX ADMIN — INSULIN HUMAN 2 UNITS: 100 INJECTION, SOLUTION PARENTERAL at 12:28

## 2023-11-13 RX ADMIN — ASPIRIN 81 MG CHEWABLE TABLET 81 MG: 81 TABLET CHEWABLE at 08:54

## 2023-11-13 RX ADMIN — INSULIN HUMAN 5 UNITS: 100 INJECTION, SOLUTION PARENTERAL at 18:20

## 2023-11-13 RX ADMIN — ATORVASTATIN CALCIUM 80 MG: 40 TABLET, FILM COATED ORAL at 20:32

## 2023-11-13 RX ADMIN — FAMOTIDINE 20 MG: 10 INJECTION INTRAVENOUS at 20:32

## 2023-11-13 RX ADMIN — INSULIN HUMAN 5 UNITS: 100 INJECTION, SOLUTION PARENTERAL at 05:07

## 2023-11-13 RX ADMIN — HYDRALAZINE HYDROCHLORIDE 50 MG: 25 TABLET, FILM COATED ORAL at 17:22

## 2023-11-13 RX ADMIN — INSULIN HUMAN 2 UNITS: 100 INJECTION, SOLUTION PARENTERAL at 05:07

## 2023-11-13 RX ADMIN — HYDRALAZINE HYDROCHLORIDE 50 MG: 25 TABLET, FILM COATED ORAL at 05:07

## 2023-11-13 NOTE — PROGRESS NOTES
Intensive Care Follow-up     Hospital:  LOS: 20 days   Ms. Michael Cochran, 78 y.o. female is followed for:   Coronary artery disease involving native coronary artery of native heart with unstable angina pectoris        Subjective     78-year-old white female with PMH of hypertension and hyperlipidemia but no previous cardiac issues. Patient presented to [facility] 10/24/2023 with hypertensive urgency, biventricular heart failure, and elevated cardiac enzymes consistent with NSTEMI.  he was also newly diagnosed with type II DM.  LHC was performed revealing multivessel disease and she was documented to have an ischemic cardiomyopathy with LVEF of 31%. Patient subsequently underwent CABG x 4 by Dr. Cleveland 11/2/2023 and was extubated that evening. Unfortunately postop course was complicated by encephalopathy, ANURADHA, and anemia.  ANURADHA subsequently improved. Encephalopathy however persisted and neurology was consulted.  CT of the head was performed and was unrevealing and EEG just revealed diffuse slowing without evidence of seizure activity.  MRI was subsequently ordered revealing subacute scattered areas of ischemic infarct bilaterally in the frontal and parietal lobes and extending to the bilateral basal ganglia and left cerebellum.  Unclear if this was a watershed event due to hypotension or an embolic event related to atheroemboli from the great vessels.  Neurology initially began patient on a heparin drip, but this was subsequently canceled as echocardiogram was unrevealing.  Long discussions were held with the patient's daughter after I and neurology indicated that her prognosis for a functional existence was very poor.  It was decided to make the patient DNR/DNI, but to continue with full support to give her every chance for recovery.    Interval History:  The chart has been reviewed.  The patient remains poorly responsive and not interactive.  She is spontaneously moving her right upper extremity but does not appear to be  "moving anything else currently.  Protecting airway at this point.  She has remained afebrile.  Tolerating tube feeds per report.    The patient's past medical, surgical and social history were reviewed and updated in Epic as appropriate.        Objective     Infusions:     Medications:  amLODIPine, 10 mg, Nasogastric, Q24H  aspirin, 81 mg, Nasogastric, Daily  atorvastatin, 80 mg, Nasogastric, Nightly  carvedilol, 6.25 mg, Nasogastric, BID With Meals  docusate sodium, 100 mg, Nasogastric, BID  famotidine, 20 mg, Intravenous, Q12H  hydrALAZINE, 50 mg, Nasogastric, Q6H  insulin detemir, 15 Units, Subcutaneous, Q12H  insulin regular, 2-9 Units, Subcutaneous, Q6H  insulin regular, 5 Units, Subcutaneous, Q6H  methylphenidate, 10 mg, Nasogastric, Daily  nystatin, 1 application , Topical, Q12H  sacubitril-valsartan, 1 tablet, Nasogastric, Q12H  sodium chloride, 10 mL, Intravenous, Q12H        Vital Sign Min/Max for last 24 hours  Temp  Min: 98.4 °F (36.9 °C)  Max: 99.8 °F (37.7 °C)   BP  Min: 106/79  Max: 143/78   Pulse  Min: 74  Max: 97   Resp  Min: 16  Max: 22   SpO2  Min: 90 %  Max: 98 %   Flow (L/min)  Min: 2  Max: 2       Input/Output for last 24 hour shift  11/12 0701 - 11/13 0700  In: 2740   Out: 2275 [Urine:2275]      Objective:  General Appearance:  Uncomfortable and ill-appearing.    Vital signs: (most recent): Blood pressure 135/80, pulse 92, temperature 98.4 °F (36.9 °C), temperature source Axillary, resp. rate 20, height 157.5 cm (62.01\"), weight 93.2 kg (205 lb 7.5 oz), SpO2 96%.    Lungs:  Normal effort and normal respiratory rate.  Breath sounds clear to auscultation.    Heart: Normal rate.  Regular rhythm.  S1 normal and S2 normal.  No murmur.   Abdomen: Abdomen is soft and non-distended.  Bowel sounds are normal.   There is no abdominal tenderness.     Extremities: There is dependent edema.  (She is only currently moving her right upper extremity not purposefully.  Nothing to command.)  Neurological: " (Patient is not interactive.).    Pupils:  Pupils are equal, round, and reactive to light.  Pupils are equal.   Skin:  Warm.  No rash.               Results from last 7 days   Lab Units 11/13/23 0316 11/12/23 0307 11/11/23 0409   WBC 10*3/mm3 15.34* 17.91* 14.77*   HEMOGLOBIN g/dL 7.2* 7.5* 7.6*   PLATELETS 10*3/mm3 309 361 344     Results from last 7 days   Lab Units 11/13/23  0316 11/12/23  0307 11/11/23  0409 11/10/23  0401 11/09/23  0531 11/08/23  0534   SODIUM mmol/L 140 141 144   < > 146* 148*   POTASSIUM mmol/L 4.3 4.1 4.3   < > 4.0 4.1   CO2 mmol/L 25.0 25.0 25.0   < > 24.0 22.0   BUN mg/dL 38* 38* 35*   < > 32* 30*   CREATININE mg/dL 0.82 0.77 0.89   < > 0.70 0.76   MAGNESIUM mg/dL 2.3  --   --   --  2.2 1.9   PHOSPHORUS mg/dL 3.2  --  4.1  --  4.9*  --    GLUCOSE mg/dL 198* 178* 165*   < > 152* 182*    < > = values in this interval not displayed.     Estimated Creatinine Clearance: 59.1 mL/min (by C-G formula based on SCr of 0.82 mg/dL).            I reviewed the patient's results and images.     Assessment & Plan   Impression        Multivessel CAD with unstable angina pectoris manifesting as dyspnea    HLD (hyperlipidemia)    Acute hypoxemic respiratory failure due to pulmonary edema    Acute exacerbation of congestive heart failure    Non-STEMI (non-ST elevated myocardial infarction)    S/P CABG x 4 on 11/2/2023    Ischemic cardiomyopathy with LVEF 31%    Hypertensive urgency with diastolic dysfunction    Postop ANURADHA resolved    Postop Encephalopathy    Postop acute blood loss anemia    Postoperative multiple bilateral embolic strokes       Plan        Continue on with nutritional support.  My understanding is that the family would like to discuss her prognosis again with neurology.  I will have nursing contact them to this end.  Overall the patient has not made any progress over the last 3 days by my assessment.  We will continue to follow.    Plan of care and goals reviewed with  mulitdisciplinary/antibiotic stewardship team during rounds.   I discussed the patient's findings and my recommendations with nursing staff         Puneet Rodriguez MD, [facility]  Pulmonology and Critical Care Medicine

## 2023-11-13 NOTE — DISCHARGE PLACEMENT REQUEST
"Michael Murphy (78 y.o. Female)     Gricel Flowers -[phone]      Date of Birth   1945    Social Security Number       Address   [address] [place], [place] [address]    Home Phone   [phone]    MRN   [id number]       Hoahaoism   None    Marital Status                               Admission Date   10/24/23    Admission Type   Emergency    Admitting Provider   Ranjan Cheema MD    Attending Provider   Dirk Cleveland MD    Department, Room/Bed   [facility] S[facility]       Discharge Date       Discharge Disposition       Discharge Destination                                 Attending Provider: Dirk Cleveland MD    Allergies: Dynacirc [Isradipine], Codeine    Isolation: None   Infection: None   Code Status: No CPR    Ht: 157.5 cm (62.01\")   Wt: 93.2 kg (205 lb 7.5 oz)    Admission Cmt: None   Principal Problem: Multivessel CAD with unstable angina pectoris manifesting as dyspnea [I25.110]                   Active Insurance as of 10/24/2023       Primary Coverage       Payor Plan Insurance Group Employer/Plan Group    ANTHEM MEDICARE REPLACEMENT ANTHEM MEDICARE ADVANTAGE [id number]       Payor Plan Address Payor Plan Phone Number Payor Plan Fax Number Effective Dates    [address] [phone]  2023 - None Entered    [facility] [id number]         Subscriber Name Subscriber Birth Date Member ID       MICHAEL MURPHY 1945 [id number]                     Emergency Contacts        (Rel.) Home Phone Work Phone Mobile Phone    MICHELLE GAVIRAI (Daughter) [phone] -- [phone]                 History & Physical        Kera Suarez DO at 10/24/23 [facility]9              [facility] Medicine Services  HISTORY AND PHYSICAL    Patient Name: Michael Murphy  : 1945  MRN: [id number]  Primary Care Physician: Bo Rodriguez PA  Date of admission: 10/24/2023      Subjective  Subjective     Chief Complaint:  Chest " pain and shortness of air    HPI:  Michael Cochran is a 78 y.o. female with a past medical history of hypertension and HLD that presented to the ED complaining of shortness of air that's worse with exertion and chest pain. The patient also states she noticed some swelling in her legs. She states the shortness of air has been worsening over the last few days. She has no history of heart failure and states she takes medications for blood pressure and elevated cholesterol. The patient states she also noticed some chest pain/tightness. The patient is requiring 4L NC to keep her oxygen saturations greater than 90% in the ED. BNP is elevated and her chest xray shows cardiomegaly with a moderate pleural effusion. The patient will be admitted to the hospitalist service for further evaluation and treatment.      Personal History     Past Medical History:   Diagnosis Date    Hyperlipidemia     Hypertension              Past Surgical History:   Procedure Laterality Date    BREAST FIBROADENOMA SURGERY      9 y/o-was benign       Family History: her family history is not on file.     Social History:  reports that she has been smoking cigarettes. She has never used smokeless tobacco. She reports that she does not drink alcohol and does not use drugs.  Social History     Social History Narrative    Not on file       Medications:  Available home medication information reviewed.  Medications Prior to Admission   Medication Sig Dispense Refill Last Dose    amLODIPine (Norvasc) 10 MG tablet Take 1 tablet by mouth Daily. 30 tablet 11 More than a month    cloNIDine (CATAPRES) 0.1 MG tablet TAKE 1 TABLET BY MOUTH TWICE A DAY 60 tablet 1 More than a month    hydroCHLOROthiazide (HYDRODIURIL) 25 MG tablet Take 1 tablet by mouth Daily. 90 tablet 3 More than a month       Allergies   Allergen Reactions    Dynacirc [Isradipine] Other (See Comments)     Causes tic    Codeine Rash       Objective  Objective     Vital Signs:   Temp:  [97.6 °F  (36.4 °C)-98.3 °F (36.8 °C)] 98.3 °F (36.8 °C)  Heart Rate:  [] 93  Resp:  [18-22] 18  BP: (194-250)/(111-158) 195/111  Flow (L/min):  [4] 4       Physical Exam   Constitutional: Awake, alert  Eyes: PERRLA, sclerae anicteric, no conjunctival injection  HENT: NCAT, mucous membranes moist  Neck: Supple, no thyromegaly, no lymphadenopathy, trachea midline  Respiratory: decreased and course with rhonchi to auscultation bilaterally, nonlabored respirations on 4L NC  Cardiovascular: RRR, no murmurs, rubs, or gallops, palpable pedal pulses bilaterally  Gastrointestinal: Positive bowel sounds, soft, nontender, nondistended  Musculoskeletal: 1+ bilateral ankle edema, no clubbing or cyanosis to extremities  Psychiatric: Appropriate affect, cooperative  Neurologic: Oriented x 3, strength symmetric in all extremities, Cranial Nerves grossly intact to confrontation, speech clear  Skin: No rashes    Result Review:  I have personally reviewed the results from the time of this admission to 10/24/2023 13:47 EDT and agree with these findings:  [x]  Laboratory list / accordion  []  Microbiology  [x]  Radiology  []  EKG/Telemetry   []  Cardiology/Vascular   []  Pathology  []  Old records  []  Other:    LAB RESULTS:      Lab 10/24/23  1106   WBC 12.08*   HEMOGLOBIN 14.5   HEMATOCRIT 46.5   PLATELETS 227   NEUTROS ABS 10.21*   IMMATURE GRANS (ABS) 0.05   LYMPHS ABS 1.11   MONOS ABS 0.63   EOS ABS 0.04   MCV 91.5   D DIMER QUANT 0.64         Lab 10/24/23  1106   SODIUM 142   POTASSIUM 3.2*   CHLORIDE 104   CO2 22.0   ANION GAP 16.0*   BUN 15   CREATININE 1.02*   EGFR 56.1*   GLUCOSE 288*   CALCIUM 9.2   MAGNESIUM 1.9         Lab 10/24/23  1106   TOTAL PROTEIN 7.5   ALBUMIN 3.7   GLOBULIN 3.8   ALT (SGPT) 19   AST (SGOT) 25   BILIRUBIN 2.1*   ALK PHOS 110         Lab 10/24/23  1106   PROBNP 13,276.0*   HSTROP T 356*                     Microbiology Results (last 10 days)       Procedure Component Value - Date/Time    COVID-19 and FLU  A/B PCR - Swab, Nasopharynx [[id number]]  (Normal) Collected: 10/24/23 1109    Lab Status: Final result Specimen: Swab from Nasopharynx Updated: 10/24/23 1145     COVID19 Not Detected     Influenza A PCR Not Detected     Influenza B PCR Not Detected    Narrative:      Fact sheet for providers: https://www.fda.gov/media/047449/download    Fact sheet for patients: https://www.fda.gov/media/807592/download    Test performed by PCR.            XR Chest 1 View    Result Date: 10/24/2023  XR CHEST 1 VW Date of Exam: 10/24/2023 11:08 AM EDT Indication: SOA triage protocol Comparison: None available. Findings: No prior exams. There is moderate cardiomegaly. There is a moderate sized right pleural effusion which obscures detail of the underlying right lung base. Suggestion of mild atelectasis of both lower lobes. Exam is somewhat limited by portable technique and obesity. Pulmonary vessels appear within normal limits for technique.     Impression: Impression: Cardiomegaly. Moderate right effusion. Mild bibasilar atelectasis. Electronically Signed: Marielos Camilo MD  10/24/2023 11:21 AM EDT  Workstation ID: [id number]         Assessment & Plan  Assessment & Plan     Active Hospital Problems    Diagnosis  POA    **Acute exacerbation of congestive heart failure [I50.9]  Yes    HLD (hyperlipidemia) [E78.5]  Yes    Essential hypertension [I10]  Yes    Acute on chronic systolic congestive heart failure [I50.23]  Yes    Acute respiratory failure with hypoxia [J96.01]  Yes    Elevated troponin [R79.89]  Yes     Michael Cochran is a 78 y.o. female with a past medical history of hypertension and HLD that presented to the ED complaining of shortness of air that's worse with exertion and chest pain.    Acute exacerbation of congestive heart failure- new diagnosis  Respiratory failure secondary to above with hypoxemia  Pleural effusion related to above  - patient with BNP of 13,000 on admission  - CXR showing cardiomegaly and moderate  pleural effusion  - patient requiring 4L NC (no O2 at baseline  - ECHO pending  - continue home Amlodipine; hold HCTZ  - hold Clonidine- likely stop  - Bumex 2mg IV given in ED- continue Bumex 1mg IV BID  - strict I/Os and daily weights  - telemetry with pulse oximetry  - fluid restriction- 1500ml/day  - consult cardiology    Elevated troponin  - likely type 2 troponin leak secondary to new heart failure  - trend  - EKG reviewed    Hypertensive Urgency  - continue home Amlodipine; hold HCTZ  - hold Clonidine- likely stop indefinitely and may be contributing to rebound hypertension  - Hydralazine 10mg IV Q6H prn for SBP> 175  - consult cardiology    HLD  - continue statin  - lipid panel    DVT prophylaxis:  Heparin SQ      CODE STATUS:  Full/CPR  Code Status and Medical Interventions:   Ordered at: 10/24/23 1339     Level Of Support Discussed With:    Patient     Code Status (Patient has no pulse and is not breathing):    CPR (Attempt to Resuscitate)     Medical Interventions (Patient has pulse or is breathing):    Full Support       Expected Discharge   Expected Discharge Date: 10/27/2023; Expected Discharge Time:      Kera Suarez DO  10/24/23      Electronically signed by Kera Suarez DO at 10/24/23 1347       Current Facility-Administered Medications   Medication Dose Route Frequency Provider Last Rate Last Admin    amLODIPine (NORVASC) tablet 10 mg  10 mg Nasogastric Q24H Madhu Sánchez MD   10 mg at 11/13/23 0854    aspirin chewable tablet 81 mg  81 mg Nasogastric Daily Dirk Thomas MD   81 mg at 11/13/23 0854    atorvastatin (LIPITOR) tablet 80 mg  80 mg Nasogastric Nightly Dirk Cleveland MD   80 mg at 11/12/23 2040    bisacodyl (DULCOLAX) suppository 10 mg  10 mg Rectal Daily PRN Dirk Cleveland MD        carvedilol (COREG) tablet 6.25 mg  6.25 mg Nasogastric BID With Meals Madhu Sánchez MD   6.25 mg at 11/13/23 0854    dextrose (D50W) (25 g/50 mL) IV injection 25 g  25 g Intravenous Q15 Min  PRN Lauro Salomon MD        sennosides (SENOKOT) 8.8 MG/5ML syrup 10 mL  10 mL Nasogastric Nightly PRN Puneet Rodriguez MD        And    docusate sodium (COLACE) liquid 100 mg  100 mg Nasogastric BID Puneet Rodriguez MD        famotidine (PEPCID) injection 20 mg  20 mg Intravenous Q12H Toña Moseley PA-C   20 mg at 11/13/23 0858    fentaNYL citrate (PF) (SUBLIMAZE) injection 100 mcg  100 mcg Intravenous Q1H PRN Dirk Cleveland MD   100 mcg at 11/10/23 0923    glucagon (GLUCAGEN) injection 1 mg  1 mg Intramuscular Q15 Min PRN Lauro Salomon MD        hydrALAZINE (APRESOLINE) tablet 50 mg  50 mg Nasogastric Q6H Dirk Thomas MD   50 mg at 11/13/23 0507    insulin detemir (LEVEMIR) injection 15 Units  15 Units Subcutaneous Q12H Dirk Thomas MD   15 Units at 11/13/23 0854    insulin regular (humuLIN R,novoLIN R) injection 2-9 Units  2-9 Units Subcutaneous Q6H Lauro Salomon MD   2 Units at 11/13/23 0507    insulin regular (humuLIN R,novoLIN R) injection 5 Units  5 Units Subcutaneous Q6H Dirk Thomas MD   5 Units at 11/13/23 0507    ipratropium-albuterol (DUO-NEB) nebulizer solution 3 mL  3 mL Nebulization Q4H PRN Robyn Newton PA-C        Magnesium Cardiology Dose Replacement - Follow Nurse / BPA Driven Protocol   Does not apply PRN Robyn Newton PA-C        methylphenidate (RITALIN) tablet 10 mg  10 mg Nasogastric Daily Dirk Thomas MD   10 mg at 11/12/23 0836    nitroglycerin (NITROSTAT) SL tablet 0.4 mg  0.4 mg Sublingual Q5 Min PRN Robyn Newton PA-C        nystatin (MYCOSTATIN) 657389 UNIT/GM cream 1 application   1 application  Topical Q12H Dirk Thomas MD   1 application  at 11/13/23 0855    ondansetron (ZOFRAN) injection 4 mg  4 mg Intravenous Q6H PRN Robyn Newton PA-C        Phosphorus Replacement - Follow Nurse / BPA Driven Protocol   Does not apply PRN Robyn Newton PA-C        Potassium Replacement - Follow Nurse / BPA Driven Protocol    Does not apply PRN Robyn Newton PA-C        sacubitril-valsartan (ENTRESTO) 24-26 MG tablet 1 tablet  1 tablet Nasogastric Q12H Gurmeet Colón MD   1 tablet at 11/13/23 0854    sodium chloride 0.9 % flush 10 mL  10 mL Intravenous Q12H Dirk Cleveland MD   10 mL at 11/13/23 0855    sodium chloride 0.9 % flush 10 mL  10 mL Intravenous PRN Dirk Cleveland MD        sodium chloride 0.9 % infusion 40 mL  40 mL Intravenous PRN Robyn Newton PA-C            Physician Progress Notes (most recent note)        Puneet Rodriguez MD at 11/13/23 0951            Intensive Care Follow-up     Hospital:  LOS: 20 days   Ms. Michael Cochran, 78 y.o. female is followed for:   Coronary artery disease involving native coronary artery of native heart with unstable angina pectoris     Subjective   Subjective     78-year-old white female with PMH of hypertension and hyperlipidemia but no previous cardiac issues. Patient presented to [facility]/24/2023 with hypertensive urgency, biventricular heart failure, and elevated cardiac enzymes consistent with NSTEMI.  he was also newly diagnosed with type II DM.  LHC was performed revealing multivessel disease and she was documented to have an ischemic cardiomyopathy with LVEF of 31%. Patient subsequently underwent CABG x 4 by Dr. Cleveland 11/2/2023 and was extubated that evening. Unfortunately postop course was complicated by encephalopathy, ANURADHA, and anemia.  ANURADHA subsequently improved. Encephalopathy however persisted and neurology was consulted.  CT of the head was performed and was unrevealing and EEG just revealed diffuse slowing without evidence of seizure activity.  MRI was subsequently ordered revealing subacute scattered areas of ischemic infarct bilaterally in the frontal and parietal lobes and extending to the bilateral basal ganglia and left cerebellum.  Unclear if this was a watershed event due to hypotension or an embolic event related to atheroemboli from the great  vessels.  Neurology initially began patient on a heparin drip, but this was subsequently canceled as echocardiogram was unrevealing.  Long discussions were held with the patient's daughter after I and neurology indicated that her prognosis for a functional existence was very poor.  It was decided to make the patient DNR/DNI, but to continue with full support to give her every chance for recovery.    Interval History:  The chart has been reviewed.  The patient remains poorly responsive and not interactive.  She is spontaneously moving her right upper extremity but does not appear to be moving anything else currently.  Protecting airway at this point.  She has remained afebrile.  Tolerating tube feeds per report.    The patient's past medical, surgical and social history were reviewed and updated in Epic as appropriate.     Objective   Objective     Infusions:     Medications:  amLODIPine, 10 mg, Nasogastric, Q24H  aspirin, 81 mg, Nasogastric, Daily  atorvastatin, 80 mg, Nasogastric, Nightly  carvedilol, 6.25 mg, Nasogastric, BID With Meals  docusate sodium, 100 mg, Nasogastric, BID  famotidine, 20 mg, Intravenous, Q12H  hydrALAZINE, 50 mg, Nasogastric, Q6H  insulin detemir, 15 Units, Subcutaneous, Q12H  insulin regular, 2-9 Units, Subcutaneous, Q6H  insulin regular, 5 Units, Subcutaneous, Q6H  methylphenidate, 10 mg, Nasogastric, Daily  nystatin, 1 application , Topical, Q12H  sacubitril-valsartan, 1 tablet, Nasogastric, Q12H  sodium chloride, 10 mL, Intravenous, Q12H        Vital Sign Min/Max for last 24 hours  Temp  Min: 98.4 °F (36.9 °C)  Max: 99.8 °F (37.7 °C)   BP  Min: 106/79  Max: 143/78   Pulse  Min: 74  Max: 97   Resp  Min: 16  Max: 22   SpO2  Min: 90 %  Max: 98 %   Flow (L/min)  Min: 2  Max: 2       Input/Output for last 24 hour shift  11/12 0701 - 11/13 0700  In: 2740   Out: 2275 [Urine:2275]      Objective:  General Appearance:  Uncomfortable and ill-appearing.    Vital signs: (most recent): Blood  "pressure 135/80, pulse 92, temperature 98.4 °F (36.9 °C), temperature source Axillary, resp. rate 20, height 157.5 cm (62.01\"), weight 93.2 kg (205 lb 7.5 oz), SpO2 96%.    Lungs:  Normal effort and normal respiratory rate.  Breath sounds clear to auscultation.    Heart: Normal rate.  Regular rhythm.  S1 normal and S2 normal.  No murmur.   Abdomen: Abdomen is soft and non-distended.  Bowel sounds are normal.   There is no abdominal tenderness.     Extremities: There is dependent edema.  (She is only currently moving her right upper extremity not purposefully.  Nothing to command.)  Neurological: (Patient is not interactive.).    Pupils:  Pupils are equal, round, and reactive to light.  Pupils are equal.   Skin:  Warm.  No rash.               Results from last 7 days   Lab Units 11/13/23  0316 11/12/23  0307 11/11/23  0409   WBC 10*3/mm3 15.34* 17.91* 14.77*   HEMOGLOBIN g/dL 7.2* 7.5* 7.6*   PLATELETS 10*3/mm3 309 361 344     Results from last 7 days   Lab Units 11/13/23  0316 11/12/23  0307 11/11/23  0409 11/10/23  0401 11/09/23  0531 11/08/23  0534   SODIUM mmol/L 140 141 144   < > 146* 148*   POTASSIUM mmol/L 4.3 4.1 4.3   < > 4.0 4.1   CO2 mmol/L 25.0 25.0 25.0   < > 24.0 22.0   BUN mg/dL 38* 38* 35*   < > 32* 30*   CREATININE mg/dL 0.82 0.77 0.89   < > 0.70 0.76   MAGNESIUM mg/dL 2.3  --   --   --  2.2 1.9   PHOSPHORUS mg/dL 3.2  --  4.1  --  4.9*  --    GLUCOSE mg/dL 198* 178* 165*   < > 152* 182*    < > = values in this interval not displayed.     Estimated Creatinine Clearance: 59.1 mL/min (by C-G formula based on SCr of 0.82 mg/dL).            I reviewed the patient's results and images.     Assessment & Plan   Impression        Multivessel CAD with unstable angina pectoris manifesting as dyspnea    HLD (hyperlipidemia)    Acute hypoxemic respiratory failure due to pulmonary edema    Acute exacerbation of congestive heart failure    Non-STEMI (non-ST elevated myocardial infarction)    S/P CABG x 4 on " 11/2/2023    Ischemic cardiomyopathy with LVEF 31%    Hypertensive urgency with diastolic dysfunction    Postop ANURADHA resolved    Postop Encephalopathy    Postop acute blood loss anemia    Postoperative multiple bilateral embolic strokes       Plan        Continue on with nutritional support.  My understanding is that the family would like to discuss her prognosis again with neurology.  I will have nursing contact them to this end.  Overall the patient has not made any progress over the last 3 days by my assessment.  We will continue to follow.    Plan of care and goals reviewed with mulitdisciplinary/antibiotic stewardship team during rounds.   I discussed the patient's findings and my recommendations with nursing staff         Puneet Rodriguez MD, [facility]  Pulmonology and Critical Care Medicine       Electronically signed by Puneet Rodriguez MD at 11/13/23 0970          Consult Notes (most recent note)        Ranjan Woods MD at 11/04/23 1401        Consult Orders    1. Inpatient Neurology Consult General [[id number]] ordered by Dirk Cleveland MD at 11/04/23 1011                 Neurology Note    Patient:  Michael Cochran    YOB: 1945    REFERRING PHYSICIAN:  Dr. Cleveland    CHIEF COMPLAINT:    AMS    HISTORY OF PRESENT ILLNESS:   The patient is a 78 y.o. female with HTN, HLP, admitted on 10/24 with CHF, NSEMI, found to have severe mulivessel disease, s/p CABG on 11/22. She has been poorly responsive postop, moving spontaneously but not following verbal commands, weaker on the left side, no seizures reported.    Past Medical History:  Past Medical History:   Diagnosis Date    Hyperlipidemia     Hypertension        Past Surgical History:  Past Surgical History:   Procedure Laterality Date    BREAST FIBROADENOMA SURGERY      9 y/o-was benign    CARDIAC CATHETERIZATION N/A 10/26/2023    Procedure: Left Heart Cath;  Surgeon: Jordi Hodge MD;  Location: [facility] CATH INVASIVE LOCATION;   Service: Cardiovascular;  Laterality: N/A;    CORONARY ARTERY BYPASS GRAFT N/A 11/1/2023    Procedure: MEDIAN STERNOTOMY, CORONARY ARTERY BYPASS GRAFTING X4, UTILIZING THE LEFT INTERANL MAMMARY ARTERY, EVH OF THE LEFT GREATER SAPHENOUS VEIN, EXPLORATION OF THE RIGHT LEG, PRIMITIVO PER ANETHESIA;  Surgeon: Dirk Cleveland MD;  Location: Critical access hospital;  Service: Cardiothoracic;  Laterality: N/A;       Social History:   Social History     Socioeconomic History    Marital status:    Tobacco Use    Smoking status: Some Days     Types: Cigarettes    Smokeless tobacco: Never    Tobacco comments:     Smokes rarely   Vaping Use    Vaping Use: Never used   Substance and Sexual Activity    Alcohol use: Never    Drug use: Never        Family History:   History reviewed. No pertinent family history.    Medications Prior to Admission:    Prior to Admission medications    Medication Sig Start Date End Date Taking? Authorizing Provider   Accu-Chek Softclix Lancets lancets Use to test blood glucose twice daily 10/28/23   Madhav Martinez MD   Blood Glucose Monitoring Suppl (Blood Glucose Monitor System) w/Device kit Use to test blood glucose twice daily 10/28/23   Madhav Martinez MD   glucose blood test strip Use one strip to test blood glucose twice daily 10/28/23   Madhav Martinez MD       Allergies:  Dynacirc [isradipine] and Codeine      Review of system  Review of Systems   Unable to perform ROS: Mental status change       Vitals:    11/04/23 1300   BP: 127/76   Pulse: 75   Resp:    Temp:    SpO2: 92%       Physical exam  Physical Exam  Eyes:      Pupils: Pupils are equal, round, and reactive to light.   Cardiovascular:      Rate and Rhythm: Normal rate and regular rhythm.   Pulmonary:      Effort: Pulmonary effort is normal.   Musculoskeletal:      Cervical back: Neck supple.   Neurological:      Comments: Eyes closed, no response to voice, slight tonic rightward gaze deviation, no definite facial droop, moves RUE  spontaneously, withdraws all limbs briskly, question of bilateral extensor plantar responses.           Lab Results   Component Value Date    WBC 12.70 (H) 11/04/2023    HGB 7.6 (L) 11/04/2023    HCT 25.6 (L) 11/04/2023    MCV 96.6 11/04/2023     (L) 11/04/2023     Lab Results   Component Value Date    GLUCOSE 222 (H) 11/04/2023    BUN 37 (H) 11/04/2023    CREATININE 1.15 (H) 11/04/2023    EGFRIFNONA 62 06/09/2021    BCR 32.2 (H) 11/04/2023    CO2 21.0 (L) 11/04/2023    CALCIUM 8.4 (L) 11/04/2023    ALBUMIN 3.5 11/04/2023    AST 25 11/04/2023    ALT <5 11/04/2023     Vitamin B-12  211 - 946 pg/mL 437     Free T4  0.93 - 1.70 ng/dL 0.81 Low      Folate  4.78 - 24.20 ng/mL 11.80     TSH  0.270 - 4.200 uIU/mL 8.790 High      Duplex scan of extracranial arteries using B-mode, color flow, and/or spectral Doppler; bilateral    Accession Number: [id number]   Date of Study: 10/27/23   Ordering Provider: Roybn Newton PA-C   Clinical Indications: preop surgery        Interpreting Physicians  Performing Staff   Jordi Hodge MD Tech: Neisha Dean RVS        Clinical Indication    preop surgery     Admission Information    Admission Date/Time Discharge Date/Time Room/Bed   10/24/23  1044  S260/1     Interpretation Summary         Right internal carotid artery demonstrates a less than 50% stenosis.    Left internal carotid artery demonstrates a less than 50% stenosis.       Radiological Studies:   XR Chest 1 View    Result Date: 11/4/2023  XR CHEST 1 VW Date of Exam: 11/4/2023 3:25 AM EDT Indication: Hypoxia Comparison: 1 day prior. Findings: Interval removal of pulmonary artery catheter with right IJ sheath in place. Enteric tube noted with nasogastric tube removed. Remaining support hardware projects unchanged. Lung volumes are mildly diminished with some scattered atelectasis. There is no enlarging pleural effusion or distinct pneumothorax. Unchanged heart and mediastinal contours.     Impression: Interval  removal of pulmonary artery catheter with right IJ sheath in place. Enteric tube noted with nasogastric tube removed. Remaining support hardware projects unchanged. Lung volumes are mildly diminished with some scattered atelectasis. There is no enlarging pleural effusion or distinct pneumothorax. Unchanged heart and mediastinal contours. Electronically Signed: Dm Stanley MD  11/4/2023 8:52 AM EDT  Workstation ID: [id number]    XR Abdomen KUB    Result Date: 11/3/2023  XR ABDOMEN KUB Date of Exam: 11/3/2023 9:20 AM CDT Indication: Keofeed placement Comparison: None available. Findings: There is an enteric tube with tip in the peripyloric region.     Impression: Enteric tube tip in the peripyloric region. Electronically Signed: Marc Castillo MD  11/3/2023 9:31 AM CDT  Workstation ID: [id number]    XR Chest 1 View    Result Date: 11/3/2023  XR CHEST 1 VW Date of Exam: 11/3/2023 2:00 AM CDT Indication: Post-Op Heart Surgery Comparison: 11/2/2023 Findings: Cardiomediastinal silhouette and support lines and tubes appear unchanged. There is persistent pulmonary vascular congestion with minimal left basilar airspace disease and small left effusion. No acute osseous abnormality identified.     Impression: No significant change Electronically Signed: Marc Castillo MD  11/3/2023 6:20 AM CDT  Workstation ID: [id number]    Spirometry with Diffusion Capacity    Result Date: 11/3/2023  Spirometry with DLCO was done on 10/28/2023.  Please see scanned PFT report for details. Interpretation: No obstruction.  Decrease in both FEV1 and FVC which may suggest restriction.  Suggest full pulmonary function testing if clinically indicated.  Low DLCO which corrects when adjusted for alveolar ventilated volumes.  No prior study available for immediate comparison.     XR Chest 1 View    Result Date: 11/2/2023  XR CHEST 1 VW Date of Exam: 11/2/2023 2:25 AM EDT Indication: Post-Op Heart Surgery Comparison: 11/1/2023 Findings: Interval removal of  endotracheal tube. No change in position of an NG tube with tip in the stomach. Stable cardiomegaly and surgical change post CABG. There is left lower lobe opacity likely atelectasis and a small pleural effusion. The right lung is grossly clear.     Impression: Interval removal of endotracheal tube Increased left lower lobe opacity compatible atelectasis and small effusion Electronically Signed: John العراقي MD  11/2/2023 7:12 AM EDT  Workstation ID: [id number]    XR Chest 1 View    Result Date: 11/1/2023  XR CHEST 1 VW Date of Exam: 11/1/2023 4:19 PM EDT Indication: Post-Op Check Line & Tube Placement Comparison: Chest radiograph 10/26/2023. Findings: Median sternotomy and CABG. ET tube within the mid thoracic trachea terminates 5.2 cm above marcelo. Right IJ approach Concord-Gume catheter terminates over the main pulmonary artery. Gastric tube oriented antegrade below diaphragm. Thoracotomy tubes noted. Cardiomediastinal silhouette enlarged similar to preprocedural imaging. Lung volumes are low with streaky retrocardiac opacities favoring atelectasis. No overt edema, lobar infiltrate, large effusion or pneumothorax.     Impression: Support devices appear in standard position. No pneumothorax or convincing active airspace process. Electronically Signed: Gonzalo Zamora MD  11/1/2023 4:45 PM EDT  Workstation ID: [id number]    Central Line    Result Date: 11/1/2023  Marlyn Mims MD     11/1/2023 11:44 AM Central Line Patient reassessed immediately prior to procedure Patient location during procedure: OR Start time: 11/1/2023 10:15 AM Indications: vascular access, central pressure monitoring and MD/Surgeon request Staff Anesthesiologist: Marlyn Mims MD Preanesthetic Checklist Completed: patient identified, IV checked, site marked, risks and benefits discussed, surgical consent, monitors and equipment checked, pre-op evaluation and timeout performed Central Line Prep Sterile Tech:cap, gloves, gown, mask and  sterile barriers Prep: chloraprep Patient monitoring: blood pressure monitoring, continuous pulse oximetry and EKG Central Line Procedure Laterality:right Location:internal jugular Catheter Type:MAC and [place]-Gume Catheter Size:9 Fr Guidance:ultrasound guided PROCEDURE NOTE/ULTRASOUND INTERPRETATION.  Using ultrasound guidance the potential vascular sites for insertion of the catheter were visualized to determine the patency of the vessel to be used for vascular access.  After selecting the appropriate site for insertion, the needle was visualized under ultrasound being inserted into the internal jugular vein, followed by ultrasound confirmation of wire and catheter placement. There were no abnormalities seen on ultrasound; an image was taken; and the patient tolerated the procedure with no complications. Images: still images not obtained (printer failure) Assessment Post procedure:biopatch applied, line sutured, occlusive dressing applied and secured with tape Assessement:blood return through all ports, free fluid flow, chest x-ray ordered and Xavier Test Complications:no Patient Tolerance:patient tolerated the procedure well with no apparent complications     Intra-Op Anesthesia PRIMITIVO    Result Date: 11/1/2023  Marlyn Mims MD     11/1/2023  3:17 PM Intra-Op Anesthesia PRIMITIVO Procedure Performed: Intra-Op Anesthesia PRIMITIVO      Start Time:  11/1/2023 10:19 AM      End Time:  Preanesthesia Checklist: Patient identified, IV assessed, risks and benefits discussed, monitors and equipment assessed, procedure being performed at surgeon's request and anesthesia consent obtained. General Procedure Information Physician Requesting Echo: Dirk Cleveland MD Location performed:  OR Intubated Bite block placed Heart visualized Probe Insertion:  Easy Probe Type:  Multiplane Modalities:  2D only, color flow mapping, continuous wave Doppler and pulse wave Doppler Echocardiographic and Doppler Measurements Ventricles Right  Ventricle: Cavity size dilated.  Hypertrophy not present.  Thrombus not present.  Global function normal.  Left Ventricle: Cavity size dilated.  Thrombus not present.  Global Function normal.  Ejection Fraction 40%.  Valves Aortic Valve: Annulus normal.  Stenosis not present.  Regurgitation absent.  Leaflets normal.  Leaflet motions normal.  Mitral Valve: Annulus normal.  Stenosis not present.  Regurgitation trace.  Leaflets normal.  Leaflet motions normal.  Tricuspid Valve: Annulus normal.  Stenosis not present.  Regurgitation mild.  Leaflets normal.  Leaflet motions normal.  Pulmonic Valve: Annulus normal.  Stenosis not present.  Regurgitation trace.  Aorta Ascending Aorta: Size normal.  Dissection not present.  Plaque thickness less than 3 mm.  Mobile plaque not present.  Aortic Arch: Size normal.  Dissection not present.  Plaque thickness less than 3 mm.  Mobile plaque not present.  Descending Aorta: Size normal.  Dissection not present.  Plaque thickness less than 3 mm.  Mobile plaque not present.  Atria Right Atrium: Size normal.  Spontaneous echo contrast not present.  Thrombus not present.  Tumor not present.  Device not present.  Left Atrium: Size normal.  Spontaneous echo contrast not present.  Thrombus not present.  Tumor not present.  Device not present.  Septa Ventricular Septum: Intra-ventricular septum morphology normal.  Other Findings Pericardium:  normal Pleural Effusion:  left Anesthesia Information Performed Personally Anesthesiologist:  Marlyn Mims MD Echocardiogram Comments:      Findings discussed with Dr. Cleveland  Prebypass Normal RV fxn, slightly decreased LV fxn, EF 40-45%, mild TR, mild MR, no AI, no AS, small left pleural effusion.  Post CABG x 5 RV and LV systolic fxn preserved.  No changes in valve fxn. Aorta intact    Arterial Line    Result Date: 11/1/2023  Rich Chambers MD     11/1/2023  8:37 AM Arterial Line Patient reassessed immediately prior to procedure Patient  location during procedure: pre-op Line placed for hemodynamic monitoring. Preanesthetic Checklist Completed: patient identified, IV checked, site marked, risks and benefits discussed, surgical consent, monitors and equipment checked, pre-op evaluation and timeout performed Arterial Line Prep  Sterile Tech: cap, gloves and sterile barriers Prep: ChloraPrep Patient monitoring: blood pressure monitoring, continuous pulse oximetry and EKG Arterial Line Procedure Laterality:right Location:  radial artery Catheter size: 20 G Guidance: ultrasound guided Number of attempts: 1 Successful placement: yes Post Assessment Dressing Type: line sutured, occlusive dressing applied, secured with tape and wrist guard applied. Complications no Circ/Move/Sens Assessment: normal and unchanged. Patient Tolerance: patient tolerated the procedure well with no apparent complications     Duplex Carotid Ultrasound CAR    Result Date: 10/27/2023    Right internal carotid artery demonstrates a less than 50% stenosis.   Left internal carotid artery demonstrates a less than 50% stenosis.     Cardiac Catheterization/Vascular Study    Result Date: 10/26/2023    Ostial 95% LAD stenosis involve the origin of a large first diagonal as well as some retrograde mild to moderate plaque in the left main.   Bifurcation right coronary artery 95% stenosis.   70% LCx stenosis   LVEF 40-45% with anterior apical hypokinesis     XR Chest 1 View    Result Date: 10/26/2023  XR CHEST 1 VW Date of Exam: 10/26/2023 2:35 AM EDT Indication: DYSPNEA, ON EXERTION dyspnea Comparison: 10/24/2023. Findings: Exam is limited by portable technique and obesity. There is stable moderate cardiomegaly. Suggestion of small effusions with mild bibasilar atelectasis. No definite congestive failure identified.     Impression: Limited study. Findings suggest mild bibasilar atelectasis with possible small effusions. Electronically Signed: Marielos Camilo MD  10/26/2023 7:49 AM EDT   Workstation ID: [id number]    XR Chest 1 View    Result Date: 10/25/2023  XR CHEST 1 VW Date of Exam: 10/24/2023 11:42 PM EDT Indication: increased o2 Comparison: Chest radiograph 10/24/2023 Findings: The heart is enlarged. There is bilateral basilar discoid atelectasis with small bilateral pleural effusions. Mild increase in the interstitial markings may indicate pulmonary edema.     Impression: Cardiomegaly with mild pulmonary edema with small bilateral pleural effusions and basilar atelectasis. Electronically Signed: Madhu Rees MD  10/25/2023 12:06 AM EDT  Workstation ID: [id number]    Adult Transthoracic Echo Complete W/ Cont if Necessary Per Protocol    Result Date: 10/24/2023    Left ventricular systolic function is moderate-severely decreased. Calculated left ventricular EF = 31.4%. The apex is akinetic with severe hypokinesis of the anterior, anterolateral, and septal walls.   The left ventricular cavity is mildly dilated.   Left ventricular diastolic function is consistent with (grade II w/high LAP) pseudonormalization.   The aortic valve is structurally normal with no stenosis present. No significant aortic valve regurgitation is present.   The mitral valve is structurally normal with no significant stenosis present. Mild mitral valve regurgitation is present.     XR Chest 1 View    Result Date: 10/24/2023  XR CHEST 1 VW Date of Exam: 10/24/2023 11:08 AM EDT Indication: SOA triage protocol Comparison: None available. Findings: No prior exams. There is moderate cardiomegaly. There is a moderate sized right pleural effusion which obscures detail of the underlying right lung base. Suggestion of mild atelectasis of both lower lobes. Exam is somewhat limited by portable technique and obesity. Pulmonary vessels appear within normal limits for technique.     Impression: Cardiomegaly. Moderate right effusion. Mild bibasilar atelectasis. Electronically Signed: Marielos Camilo MD  10/24/2023 11:21 AM EDT  Workstation  ID: [id number]       During this visit the following were done:  Labs Reviewed [x]    Labs Ordered [x]    Radiology Reports Reviewed [x]    Radiology Ordered [x]    EKG, echo, and/or stress test reviewed []    EEG results reviewed  []    EEG reviewed and interpreted per myself   []    Discussed case with neurointerventionalist or neuroradiologist []    Referring Provider Records Reviewed []    ER Records Reviewed []    Hospital Records Reviewed []    History Obtained From Family []    Radiological images view and Interpreted per myself []    Case Discussed with referring provider []     Decision to obtain and request outside records  []        Assessment and Plan     Post-op encephalopathy, question of left-sided weakness, cause unclear. S/P CABG .   - CT head.   - EEG.   - UA c/s.   - ST, PT, OT as tolerated.    Thanks.              Electronically signed by Ranjan Woods MD on 2023 at 14:02 EDT          Electronically signed by Ranjan Woods MD at 23 2203          Physical Therapy Notes (most recent note)        Oneida Daly PT at 23 0900  Version 1 of 1         Patient Name: Michael Cochran  : 1945    MRN: [id number]                              Today's Date: 2023       Admit Date: 10/24/2023    Visit Dx:     ICD-10-CM ICD-9-CM   1. Elevated troponin  R79.89 790.6   2. Acute on chronic congestive heart failure, unspecified heart failure type  I50.9 428.0   3. Hypertensive emergency  I16.1 401.9   4. Acute respiratory failure with hypoxia  J96.01 518.81   5. Pleural effusion, left  J90 511.9   6. Hypokalemia  E87.6 276.8   7. Non-STEMI (non-ST elevated myocardial infarction)  I21.4 410.70   8. Coronary artery disease involving native coronary artery of native heart with unstable angina pectoris  I25.110 414.01     411.1     Patient Active Problem List   Diagnosis    HLD (hyperlipidemia)    Acute hypoxemic respiratory failure due to pulmonary edema    Acute  exacerbation of congestive heart failure    Non-STEMI (non-ST elevated myocardial infarction)    Multivessel CAD with unstable angina pectoris manifesting as dyspnea    S/P CABG x 4 on 11/2/2023    Ischemic cardiomyopathy with LVEF 31%    Hypertensive urgency with diastolic dysfunction    Postop ANURADHA resolved    Postop Encephalopathy    Postop acute blood loss anemia    Postoperative multiple bilateral embolic strokes     Past Medical History:   Diagnosis Date    Hyperlipidemia     Hypertension      Past Surgical History:   Procedure Laterality Date    BREAST FIBROADENOMA SURGERY      9 y/o-was benign    CARDIAC CATHETERIZATION N/A 10/26/2023    Procedure: Left Heart Cath;  Surgeon: Jordi Hodge MD;  Location:  [facility];  Service: Cardiovascular;  Laterality: N/A;    CORONARY ARTERY BYPASS GRAFT N/A 11/1/2023    Procedure: MEDIAN STERNOTOMY, CORONARY ARTERY BYPASS GRAFTING X4, UTILIZING THE LEFT INTERANL MAMMARY ARTERY, EVH OF THE LEFT GREATER SAPHENOUS VEIN, EXPLORATION OF THE RIGHT LEG, PRIMITIVO PER ANETHESIA;  Surgeon: Dirk Cleveland MD;  Location:  [facility];  Service: Cardiothoracic;  Laterality: N/A;      General Information       Row Name 11/12/23 4838          Physical Therapy Time and Intention    Document Type therapy note (daily note)  -SUE     Mode of Treatment physical therapy  -       Row Name 11/12/23 8358          General Information    Patient Profile Reviewed yes  -SUE     Existing Precautions/Restrictions cardiac;fall;sternal  -SUE     Barriers to Rehab medically complex  -SUE       Row Name 11/12/23 5480          Cognition    Orientation Status (Cognition) unable/difficult to assess  -       Row Name 11/12/23 9580          Safety Issues, Functional Mobility    Safety Issues Affecting Function (Mobility) ability to follow commands;insight into deficits/self-awareness;safety precautions follow-through/compliance  -SUE     Impairments Affecting Function (Mobility)  balance;endurance/activity tolerance;strength;postural/trunk control;muscle tone abnormal  -SUE               User Key  (r) = Recorded By, (t) = Taken By, (c) = Cosigned By      Initials Name Provider Type    Oneida Serra PT Physical Therapist                   Mobility       Row Name 11/12/23 1355          Bed Mobility    Bed Mobility rolling left;rolling right;scooting/bridging;supine-sit;sit-supine  -SUE     Rolling Left [place] (Bed Mobility) dependent (less than 25% patient effort)  -SUE     Rolling Right [place] (Bed Mobility) dependent (less than 25% patient effort)  -SUE     Scooting/Bridging [place] (Bed Mobility) dependent (less than 25% patient effort)  -SUE     Supine-Sit [place] (Bed Mobility) dependent (less than 25% patient effort)  -SUE     Sit-Supine [place] (Bed Mobility) dependent (less than 25% patient effort)  -SUE     Assistive Device (Bed Mobility) draw sheet;head of bed elevated  -SUE     Comment, (Bed Mobility) patient is unable to follow commands  -SUE       Row Name 11/12/23 [facility]5          Transfers    Comment, (Transfers) deferred poor sitting balance  -SUE       Row Name 11/12/23 [facility]          Bed-Chair Transfer    Bed-Chair [place] (Transfers) unable to assess  -SUE       Row Name 11/12/23 [facility]          Sit-Stand Transfer    Sit-Stand [place] (Transfers) unable to assess  -SUE       Row Name 11/12/23 [facility]          Gait/Stairs (Locomotion)    [place] Level (Gait) unable to assess  -SUE               User Key  (r) = Recorded By, (t) = Taken By, (c) = Cosigned By      Initials Name Provider Type    nOeida Serra PT Physical Therapist                   Obj/Interventions       Row Name 11/12/23 6756          Shoulder (Therapeutic Exercise)    Shoulder PROM (Therapeutic Exercise) bilateral;flexion;extension;aBduction;aDduction;10 repetitions;supine  -SUE       Row Name 11/12/23 1356          Hip (Therapeutic Exercise)    Hip PROM (Therapeutic  Exercise) bilateral;flexion;extension;aBduction;aDduction;10 repetitions;supine  -[facility] Name 11/12/23 1356          Knee (Therapeutic Exercise)    Knee (Therapeutic Exercise) PROM (passive range of motion)  -SUE     Knee PROM (Therapeutic Exercise) bilateral;flexion;extension;10 repetitions;supine  -[facility] Name 11/12/23 1356          Ankle (Therapeutic Exercise)    Ankle (Therapeutic Exercise) PROM (passive range of motion)  -SUE     Ankle PROM (Therapeutic Exercise) bilateral;dorsiflexion;plantarflexion;10 repetitions;sitting  -[facility] Name 11/12/23 1356          Balance    Balance Assessment sitting static balance;sitting dynamic balance  -SUE     Static Sitting Balance maximum assist;1 person to manage equipment  -SUE     Dynamic Sitting Balance maximum assist;1 person to manage equipment  -SUE     Comment, Balance patient pushing heavily with right UE going into extensor pattern with manual flexion of right UE patient needs less assist to maintain sitting balance.  -SUE               User Key  (r) = Recorded By, (t) = Taken By, (c) = Cosigned By      Initials Name Provider Type    Oneida Serra PT Physical Therapist                   Goals/Plan       Row Name 11/12/23 1403          Therapy Assessment/Plan (PT)    Planned Therapy Interventions (PT) balance training;bed mobility training;home exercise program;strengthening;transfer training  -SUE               User Key  (r) = Recorded By, (t) = Taken By, (c) = Cosigned By      Initials Name Provider Type    Oneida Serra, PT Physical Therapist                   Clinical Impression       [facility] Name 11/12/23 1400          Pain    Pretreatment Pain Rating 0/10 - no pain  -SUE     Posttreatment Pain Rating 0/10 - no pain  -SUE       Row Name 11/12/23 1400          Plan of Care Review    Plan of Care Reviewed With patient  -SUE     Progress no change  -SUE     Outcome Evaluation patient still unable to follow commands patient sat at the edge of  the bed with max assist for 8 min. we will continue to work on sitting balance as tolerated. anticipate patient to need SNF and extended long term care at D/C  -SUE       Row Name 11/12/23 1400          Therapy Assessment/Plan (PT)    Patient/Family Therapy Goals Statement (PT) unable to state  -SUE     Rehab Potential (PT) fair, will monitor progress closely  -SUE     Criteria for Skilled Interventions Met (PT) yes;skilled treatment is necessary  -SUE     Therapy Frequency (PT) daily  -SUE       Row Name 11/12/23 1400          Vital Signs    Pre Systolic BP Rehab 140  -SUE     Pre Treatment Diastolic BP 70  -SUE     Post Systolic BP Rehab 155  -SUE     Post Treatment Diastolic BP 76  -SUE     Pretreatment Heart Rate (beats/min) 80  -SUE     Posttreatment Heart Rate (beats/min) 92  -SUE     Pre SpO2 (%) 96  -SUE     O2 Delivery Pre Treatment room air  -SUE     Post SpO2 (%) 96  -SUE     O2 Delivery Post Treatment room air  -SUE     Pre Patient Position Supine  -SUE     Intra Patient Position Sitting  -SUE     Post Patient Position Supine  -SUE       Row Name 11/12/23 1400          Positioning and Restraints    Pre-Treatment Position in bed  -SUE     Post Treatment Position bed  -SUE     In Bed notified nsg;supine;call light within reach;encouraged to call for assist;exit alarm on;with nsg  -SUE               User Key  (r) = Recorded By, (t) = Taken By, (c) = Cosigned By      Initials Name Provider Type    Oneida Serra, PT Physical Therapist                   Outcome Measures       Row Name 11/12/23 1401          How much help from another person do you currently need...    Turning from your back to your side while in flat bed without using bedrails? 1  -SUE     Moving from lying on back to sitting on the side of a flat bed without bedrails? 1  -SUE     Moving to and from a bed to a chair (including a wheelchair)? 1  -SUE     Standing up from a chair using your arms (e.g., wheelchair, bedside chair)? 1  -SUE     Climbing 3-5 steps  with a railing? 1  -SUE     To walk in hospital room? 1  -SUE     AM-PAC 6 Clicks Score (PT) 6  -SUE     Highest level of mobility 2 --> Bed activities/dependent transfer  -SUE               User Key  (r) = Recorded By, (t) = Taken By, (c) = Cosigned By      Initials Name Provider Type    Oneida Serra PT Physical Therapist                                 Physical Therapy Education       Title: PT OT SLP Therapies (In Progress)       Topic: Physical Therapy (In Progress)       Point: Mobility training (In Progress)       Learning Progress Summary             Patient Acceptance, E, NR by SUE at 11/12/2023 0900    Acceptance, E, NR by AE at 11/9/2023 1503    Acceptance, E, NR by AE at 11/8/2023 1319    Acceptance, E, NR by SUE at 11/7/2023 1300    Acceptance, E, NR by AE at 11/6/2023 1310    Acceptance, E, NR by KG at 11/5/2023 0940    Acceptance, E, VU,NR by ML at 10/27/2023 1544    Comment: continued mobility while awaiting CABG, sternal precautions following CABG    Acceptance, E,D, VU,NR by LR at 10/25/2023 1327    Comment: Educated on benefits of mobility, correct sit<->stand t/f technique, correct gait mechanics, PLB, energy conservation, and progression of POC.                         Point: Home exercise program (In Progress)       Learning Progress Summary             Patient Acceptance, E, NR by SUE at 11/12/2023 0900    Acceptance, E, NR by AE at 11/9/2023 1503    Acceptance, E, NR by AE at 11/8/2023 1319    Acceptance, E, NR by SUE at 11/7/2023 1300    Acceptance, E, NR by AE at 11/6/2023 1310    Acceptance, E, NR by KG at 11/5/2023 0940                         Point: Body mechanics (In Progress)       Learning Progress Summary             Patient Acceptance, E, NR by SUE at 11/12/2023 0900    Acceptance, E, NR by AE at 11/9/2023 1503    Acceptance, E, NR by AE at 11/8/2023 1319    Acceptance, E, NR by SUE at 11/7/2023 1300    Acceptance, E, NR by AE at 11/6/2023 1310    Acceptance, E, NR by KG at 11/5/2023  0940    Acceptance, E,D, VU,NR by LR at 10/25/2023 1327    Comment: Educated on benefits of mobility, correct sit<->stand t/f technique, correct gait mechanics, PLB, energy conservation, and progression of POC.                         Point: Precautions (In Progress)       Learning Progress Summary             Patient Acceptance, E, NR by SUE at 11/12/2023 0900    Acceptance, E, NR by AE at 11/9/2023 1503    Acceptance, E, NR by AE at 11/8/2023 1319    Acceptance, E, NR by SUE at 11/7/2023 1300    Acceptance, E, NR by AE at 11/6/2023 1310    Acceptance, E, NR by KG at 11/5/2023 0940    Acceptance, E, VU,NR by ML at 10/27/2023 1544    Comment: continued mobility while awaiting CABG, sternal precautions following CABG    Acceptance, E,D, VU,NR by LR at 10/25/2023 1327    Comment: Educated on benefits of mobility, correct sit<->stand t/f technique, correct gait mechanics, PLB, energy conservation, and progression of POC.                                         User Key       Initials Effective Dates Name Provider Type Discipline    SUE 02/03/23 -  Oneida Daly, PT Physical Therapist PT    LR 02/03/23 -  Alissa Pierre, PT Physical Therapist PT    KG 05/22/20 -  Brittney Rocha, PT Physical Therapist PT    ML 04/22/21 -  Alisa Salguero Physical Therapist PT    AE 09/21/21 -  Jcarlos Rodriguez, PT Physical Therapist PT                  PT Recommendation and Plan  Planned Therapy Interventions (PT): balance training, bed mobility training, home exercise program, strengthening, transfer training  Plan of Care Reviewed With: patient  Progress: no change  Outcome Evaluation: patient still unable to follow commands patient sat at the edge of the bed with max assist for 8 min. we will continue to work on sitting balance as tolerated. anticipate patient to need SNF and extended long term care at D/C     Time Calculation:         PT Charges       Row Name 11/12/23 2035             Time Calculation    Start Time  0900  -SUE      PT Received On 23  -SUE      PT Goal Re-Cert Due Date 11/15/23  -SUE         Time Calculation- PT    Total Timed Code Minutes- PT 15 minute(s)  -SUE         Timed Charges    66096 - PT Therapeutic Activity Minutes 15  -SUE         Total Minutes    Timed Charges Total Minutes 15  -SUE       Total Minutes 15  -SUE                User Key  (r) = Recorded By, (t) = Taken By, (c) = Cosigned By      Initials Name Provider Type    Oneida Serra PT Physical Therapist                  Therapy Charges for Today       Code Description Service Date Service Provider Modifiers Qty    78291347678 HC PT THERAPEUTIC ACT EA 15 MIN 2023 Oneida Daly, PT GP 1            PT G-Codes  Outcome Measure Options: AM-PAC 6 Clicks Basic Mobility (PT)  AM-PAC 6 Clicks Score (PT): 6  AM-PAC 6 Clicks Score (OT): 6  Modified [place] Scale: 4 - Moderately severe disability.  Unable to walk without assistance, and unable to attend to own bodily needs without assistance.  PT Discharge Summary  Anticipated Discharge Disposition (PT): skilled nursing facility    Oneida Daly PT  2023      Electronically signed by Oneida Daly PT at 23 1404          Occupational Therapy Notes (most recent note)        Aruna Sarah, OT at 23 1510          Patient Name: Michael Cochran  : 1945    MRN: [id number]                              Today's Date: 2023       Admit Date: 10/24/2023    Visit Dx:     ICD-10-CM ICD-9-CM   1. Elevated troponin  R79.89 790.6   2. Acute on chronic congestive heart failure, unspecified heart failure type  I50.9 428.0   3. Hypertensive emergency  I16.1 401.9   4. Acute respiratory failure with hypoxia  J96.01 518.81   5. Pleural effusion, left  J90 511.9   6. Hypokalemia  E87.6 276.8   7. Non-STEMI (non-ST elevated myocardial infarction)  I21.4 410.70   8. 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    Ischemic cardiomyopathy with LVEF 31%    Hypertensive urgency with diastolic dysfunction    Postop ANURADHA resolved    Postop Encephalopathy    Postop acute blood loss anemia    Postoperative multiple bilateral embolic strokes     Past Medical History:   Diagnosis Date    Hyperlipidemia     Hypertension      Past Surgical History:   Procedure Laterality Date    BREAST FIBROADENOMA SURGERY      9 y/o-was benign    CARDIAC CATHETERIZATION N/A 10/26/2023    Procedure: Left Heart Cath;  Surgeon: Jordi Hodge MD;  Location:  [facility] CATH [facility]VASIVE LOCATION;  Service: Cardiovascular;  Laterality: N/A;    CORONARY ARTERY BYPASS GRAFT N/A 11/1/2023    Procedure: MEDIAN STERNOTOMY, CORONARY ARTERY BYPASS GRAFTING X4, UTILIZING THE LEFT INTERANL MAMMARY ARTERY, EVH OF THE LEFT GREATER SAPHENOUS VEIN, EXPLORATION OF THE RIGHT LEG, PRIMITIVO PER ANETHESIA;  Surgeon: Dirk Cleveland MD;  Location:  [facility];  Service: Cardiothoracic;  Laterality: N/A;      General Information       Row Name 11/09/23 1559          OT Time and Intention    Document Type therapy note (daily note)  -AN     Mode of Treatment occupational therapy;co-treatment  -AN       Row Name 11/09/23 0197          General Information    Patient Profile Reviewed yes  -AN     Existing Precautions/Restrictions cardiac;fall;sternal;other (see comments)  NG, L sided weakness/tone, global aphasia  -AN     Barriers to Rehab medically complex;cognitive status  -AN       Row Name 11/09/23 3025          Cognition    Orientation Status (Cognition) unable/difficult to assess;other (see comments)  pt mumbling throughout with eyes  close  -AN       Row Name 11/09/23 1559          Safety Issues, Functional Mobility    Safety Issues Affecting Function (Mobility) awareness of need for assistance;insight into deficits/self-awareness;judgment;problem-solving;safety precaution awareness;sequencing abilities;safety precautions follow-through/compliance;ability to follow commands  -AN     Impairments Affecting Function (Mobility) balance;cognition;endurance/activity tolerance;postural/trunk control;strength;coordination;motor control;motor planning;grasp;muscle tone abnormal;visual/perceptual;range of motion (ROM)  -AN     Cognitive Impairments, Mobility Safety/Performance awareness, need for assistance;safety precaution awareness;attention;safety precaution follow-through;insight into deficits/self-awareness;sequencing abilities;problem-solving/reasoning;judgment  -AN               User Key  (r) = Recorded By, (t) = Taken By, (c) = Cosigned By      Initials Name Provider Type    Aruna Muñiz OT Occupational Therapist                     Mobility/ADL's       Row Name 11/09/23 1602          Bed Mobility    Bed Mobility rolling left;rolling right;supine-sit-supine;scooting/bridging  -AN     Rolling Left [place] (Bed Mobility) dependent (less than 25% patient effort);2 person assist  -AN     Rolling Right [place] (Bed Mobility) dependent (less than 25% patient effort);2 person assist  -AN     Scooting/Bridging [place] (Bed Mobility) dependent (less than 25% patient effort);2 person assist  -AN     Bed Mobility, Safety Issues cognitive deficits limit understanding;decreased use of arms for pushing/pulling;decreased use of legs for bridging/pushing;impaired trunk control for bed mobility  -AN     Comment, (Bed Mobility) Rolling L and R for placement of clean linens. Pt required dep x 2 for sup<>sit with assist at trunk, BLE, and UE  -AN       Row Name 11/09/23 1602          Transfers    Comment, (Transfers) deferred due to poor  sitting balance and decreased command following  -AN       Row Name 11/09/23 1602          Activities of Daily Living    BADL Assessment/Intervention grooming;lower body dressing  -AN       Row Name 11/09/23 1602          Lower Body Dressing Assessment/Training    Continental Level (Lower Body Dressing) don;socks;dependent (less than 25% patient effort);doff  -AN     Position (Lower Body Dressing) supine  -AN       Row Name 11/09/23 1602          Grooming Assessment/Training    Continental Level (Grooming) wash face, hands;dependent (less than 25% patient effort)  -AN     Position (Grooming) edge of bed sitting  -AN     Comment, (Grooming) Pt tolerated EOB sitting for ~10 mins for core strengthening, grooming tasks, and visual scanning with family involvement.  -AN               User Key  (r) = Recorded By, (t) = Taken By, (c) = Cosigned By      Initials Name Provider Type    Aruna Muñiz OT Occupational Therapist                   Obj/Interventions       Row Name 11/09/23 1605          Shoulder (Therapeutic Exercise)    Shoulder (Therapeutic Exercise) PROM (passive range of motion)  -AN     Shoulder PROM (Therapeutic Exercise) left;flexion;extension;supine;3 repetitions  -AN       Row Name 11/09/23 1605          Elbow/Forearm (Therapeutic Exercise)    Elbow/Forearm (Therapeutic Exercise) PROM (passive range of motion)  -AN     Elbow/Forearm PROM (Therapeutic Exercise) left;flexion;extension;supine;3 repetitions  -AN       Row Name 11/09/23 1605          Wrist (Therapeutic Exercise)    Wrist (Therapeutic Exercise) PROM (passive range of motion)  -AN     Wrist PROM (Therapeutic Exercise) left;flexion;extension;3 repetitions  -AN       Row Name 11/09/23 1605          Hand (Therapeutic Exercise)    Hand (Therapeutic Exercise) PROM (passive range of motion)  -AN     Hand PROM (Therapeutic Exercise) left;finger flexion;finger extension;3 repetitions  -AN       Row Name 11/09/23 1605          Motor Skills     Therapeutic Exercise shoulder;elbow/forearm;wrist;hand  -AN       Row Name 11/09/23 1605          Balance    Balance Assessment sitting static balance;sitting dynamic balance  -AN     Static Sitting Balance verbal cues;non-verbal cues (demo/gesture);maximum assist;1-person assist;other (see comments)  improved to brief periods of Min A with cues for hand placement  -AN     Dynamic Sitting Balance verbal cues;non-verbal cues (demo/gesture);maximum assist;1-person assist  -AN     Position, Sitting Balance supported;sitting edge of bed  -AN     Balance Interventions sitting;supported;static;dynamic;highly challenging;core stability exercise;occupation based/functional task  -AN     Comment, Balance L lateral lean due to RUE pushing requiring Mod cues to correct throughout  -AN               User Key  (r) = Recorded By, (t) = Taken By, (c) = Cosigned By      Initials Name Provider Type    Aruna Muñiz OT Occupational Therapist                   Goals/Plan    No documentation.                  Clinical Impression       Row Name 11/09/23 1608          Pain Assessment    Additional Documentation Pain Scale: FACES Pre/Post-Treatment (Group)  -AN       [facility] Name 11/09/23 1608          Pain Scale: FACES Pre/Post-Treatment    Pain: FACES Scale, Pretreatment 2-->hurts little bit  -AN     Posttreatment Pain Rating 2-->hurts little bit  -AN     Pain Location generalized  -AN       Row Name 11/09/23 1608          Plan of Care Review    Plan of Care Reviewed With patient;daughter  -AN     Progress improving  -AN     Outcome Evaluation Pt improved to Max A for sitting balance and brief periods of Min A with cuing however, pt continues to follow minimal commands and demo's no active ROM on the L side. Cont POC.  -AN       Row Name 11/09/23 1608          Therapy Plan Review/Discharge Plan (OT)    Anticipated Discharge Disposition (OT) skilled nursing facility  -AN       Row Name 11/09/23 1608          Vital Signs    Pre  Systolic BP Rehab 160  -AN     Pre Treatment Diastolic BP 78  -AN     Post Systolic BP Rehab 163  -AN     Post Treatment Diastolic BP 81  -AN     Pretreatment Heart Rate (beats/min) 80  -AN     Posttreatment Heart Rate (beats/min) 82  -AN     Pre SpO2 (%) 94  -AN     O2 Delivery Pre Treatment nasal cannula  -AN     O2 Delivery Intra Treatment nasal cannula  -AN     Post SpO2 (%) 92  -AN     O2 Delivery Post Treatment nasal cannula  -AN     Pre Patient Position Supine  -AN     Intra Patient Position Sitting  -AN     Post Patient Position Supine  -AN       Row Name 11/09/23 1608          Positioning and Restraints    Pre-Treatment Position in bed  -AN     Post Treatment Position bed  -AN     In Bed notified nsg;supine;exit alarm on;encouraged to call for assist;with nsg;side rails up x3;RUE elevated;LUE elevated;legs elevated;SCD pump applied  -AN               User Key  (r) = Recorded By, (t) = Taken By, (c) = Cosigned By      Initials Name Provider Type    Aruna Muñiz OT Occupational Therapist                   Outcome Measures       Row Name 11/09/23 1610          How much help from another is currently needed...    Putting on and taking off regular lower body clothing? 1  -AN     Bathing (including washing, rinsing, and drying) 1  -AN     Toileting (which includes using toilet bed pan or urinal) 1  -AN     Putting on and taking off regular upper body clothing 1  -AN     Taking care of personal grooming (such as brushing teeth) 1  -AN     Eating meals 1  -AN     AM-PAC 6 Clicks Score (OT) 6  -AN       [facility] Name 11/09/23 1610          Functional Assessment    Outcome Measure Options AM-PAC 6 Clicks Daily Activity (OT)  -AN               User Key  (r) = Recorded By, (t) = Taken By, (c) = Cosigned By      Initials Name Provider Type    Aruna Muñiz OT Occupational Therapist                    Occupational Therapy Education       Title: PT OT SLP Therapies (In Progress)       Topic: Occupational  Therapy (Done)       Point: ADL training (Done)       Description:   Instruct learner(s) on proper safety adaptation and remediation techniques during self care or transfers.   Instruct in proper use of assistive devices.                  Learning Progress Summary             Patient Acceptance, E, VU by AN at 11/9/2023 1611    Acceptance, E, NR by  at 11/7/2023 1453    Acceptance, E, VU by AN at 10/25/2023 1034   Family Acceptance, E, VU by AN at 11/9/2023 1611                         Point: Home exercise program (Done)       Description:   Instruct learner(s) on appropriate technique for monitoring, assisting and/or progressing therapeutic exercises/activities.                  Learning Progress Summary             Patient Acceptance, E, VU by AN at 11/9/2023 1611    Acceptance, E, NR by  at 11/7/2023 1453   Family Acceptance, E, VU by AN at 11/9/2023 1611                         Point: Precautions (Done)       Description:   Instruct learner(s) on prescribed precautions during self-care and functional transfers.                  Learning Progress Summary             Patient Acceptance, E, VU by AN at 11/9/2023 1611    Acceptance, E, NR by CS at 11/7/2023 1453    Acceptance, E, VU by AN at 10/25/2023 1034   Family Acceptance, E, VU by AN at 11/9/2023 1611                         Point: Body mechanics (Done)       Description:   Instruct learner(s) on proper positioning and spine alignment during self-care, functional mobility activities and/or exercises.                  Learning Progress Summary             Patient Acceptance, E, VU by AN at 11/9/2023 1611    Acceptance, E, NR by  at 11/7/2023 1453    Acceptance, E, VU by AN at 10/25/2023 1034   Family Acceptance, E, VU by AN at 11/9/2023 1611                                         User Key       Initials Effective Dates Name Provider Type Discipline     09/02/21 -  Analy Chavez OT Occupational Therapist OT     09/21/21 -  Aruna Sarah OT  Occupational Therapist OT                  OT Recommendation and Plan  Therapy Frequency (OT): evaluation only  Plan of Care Review  Plan of Care Reviewed With: patient, daughter  Progress: improving  Outcome Evaluation: Pt improved to Max A for sitting balance and brief periods of Min A with cuing however, pt continues to follow minimal commands and demo's no active ROM on the L side. Cont POC.     Time Calculation:   Evaluation Complexity (OT)  Review Occupational Profile/Medical/Therapy History Complexity: brief/low complexity  Assessment, Occupational Performance/Identification of Deficit Complexity: 1-3 performance deficits  Clinical Decision Making Complexity (OT): problem focused assessment/low complexity  Overall Complexity of Evaluation (OT): low complexity     Time Calculation- OT       Row Name 11/09/23 1611             Time Calculation- OT    OT Start Time 1510  -AN      OT Received On 11/09/23  -AN         Timed Charges    26661 - OT Self Care/Mgmt Minutes 10  -AN         Total Minutes    Timed Charges Total Minutes 10  -AN       Total Minutes 10  -AN                User Key  (r) = Recorded By, (t) = Taken By, (c) = Cosigned By      Initials Name Provider Type    AN Aruna Sarah OT Occupational Therapist                  Therapy Charges for Today       Code Description Service Date Service Provider Modifiers Qty    08112433197 HC OT SELF CARE/MGMT/TRAIN EA 15 MIN 11/9/2023 Aruna Sarah OT GO 1                 Aruna Sarah OT  11/9/2023    Electronically signed by Aruna Sarah OT at 11/09/23 1612

## 2023-11-13 NOTE — PROGRESS NOTES
Cardiothoracic Surgery Progress Note    11/1/23 s/p CABG x 4  11/10/23 s/p PEG        LOS: 20 days      Subjective:  No significant changes     Objective:  Vital Signs  Temp:  [98.4 °F (36.9 °C)-100.2 °F (37.9 °C)] 98.4 °F (36.9 °C)  Heart Rate:  [77-97] 90  Resp:  [16-22] 20  BP: (106-143)/() 129/62    Physical Exam:   General Appearance:  Lying in bed.  Eyes closed, does not follow commands.  Moving extremities.      Lungs: clear to auscultation, respirations regular, respirations even, and respirations unlabored   Heart: regular rhythm & normal rate, normal S1, S2, no murmur, no gallop, no rub, and no click   Incision C/D/I  Results:    Results from last 7 days   Lab Units 11/13/23  0316   WBC 10*3/mm3 15.34*   HEMOGLOBIN g/dL 7.2*   HEMATOCRIT % 24.5*   PLATELETS 10*3/mm3 309     Results from last 7 days   Lab Units 11/13/23  0316   SODIUM mmol/L 140   POTASSIUM mmol/L 4.3   CHLORIDE mmol/L 108*   CO2 mmol/L 25.0   BUN mg/dL 38*   CREATININE mg/dL 0.82   GLUCOSE mg/dL 198*   CALCIUM mg/dL 8.8       Assessment:  11/1/23 s/p CABG x 4  11/10/23 s/p PEG    Plan:  Continue enteral nutrition at goal  Head of bed elevated at all times  PPI  Oral care  Discharge to SNF when bed available    Dirk Cleveland MD  11/13/23  07:09 EST

## 2023-11-13 NOTE — CASE MANAGEMENT/SOCIAL WORK
Continued Stay Note  [facility]     Patient Name: Michael Cochran  MRN: [id number]  Today's Date: 11/13/2023    Admit Date: 10/24/2023    Plan: SNF   Discharge Plan       Row Name 11/13/23 1216       Plan    Plan SNF    Patient/Family in Agreement with Plan yes    Plan Comments Spoke with daughter by phone. Darren agee has bed offer with daughter agreeable. Precert initaiated. CM will cont to follow.    Final Discharge Disposition Code 03 - skilled nursing facility (SNF)                   Discharge Codes    No documentation.                 Expected Discharge Date and Time       Expected Discharge Date Expected Discharge Time    Nov 17, 2023               Gricel Flowers RN

## 2023-11-13 NOTE — THERAPY TREATMENT NOTE
Patient Name: Michael Cochran  : 1945    MRN: [id number]                              Today's Date: 2023       Admit Date: 10/24/2023    Visit Dx:     ICD-10-CM ICD-9-CM   1. Elevated troponin  R79.89 790.6   2. Acute on chronic congestive heart failure, unspecified heart failure type  I50.9 428.0   3. Hypertensive emergency  I16.1 401.9   4. Acute respiratory failure with hypoxia  J96.01 518.81   5. Pleural effusion, left  J90 511.9   6. Hypokalemia  E87.6 276.8   7. Non-STEMI (non-ST elevated myocardial infarction)  I21.4 410.70   8. Coronary artery disease involving native coronary artery of native heart with unstable angina pectoris  I25.110 414.01     411.1     Patient Active Problem List   Diagnosis    HLD (hyperlipidemia)    Acute hypoxemic respiratory failure due to pulmonary edema    Acute exacerbation of congestive heart failure    Non-STEMI (non-ST elevated myocardial infarction)    Multivessel CAD with unstable angina pectoris manifesting as dyspnea    S/P CABG x 4 on 2023    Ischemic cardiomyopathy with LVEF 31%    Hypertensive urgency with diastolic dysfunction    Postop ANURADHA resolved    Postop Encephalopathy    Postop acute blood loss anemia    Postoperative multiple bilateral embolic strokes     Past Medical History:   Diagnosis Date    Hyperlipidemia     Hypertension      Past Surgical History:   Procedure Laterality Date    BREAST FIBROADENOMA SURGERY      9 y/o-was benign    CARDIAC CATHETERIZATION N/A 10/26/2023    Procedure: Left Heart Cath;  Surgeon: Jordi Hodge MD;  Location:  [facility] CATH [facility]VASIVE LOCATION;  Service: Cardiovascular;  Laterality: N/A;    CORONARY ARTERY BYPASS GRAFT N/A 2023    Procedure: MEDIAN STERNOTOMY, CORONARY ARTERY BYPASS GRAFTING X4, UTILIZING THE LEFT INTERANL MAMMARY ARTERY, EVH OF THE LEFT GREATER SAPHENOUS VEIN, EXPLORATION OF THE RIGHT LEG, PRIMITIVO PER ANETHESIA;  Surgeon: Dirk Cleveland MD;  Location: [facility] OR;  Service: Cardiothoracic;   Laterality: N/A;    ENDOSCOPY W/ PEG TUBE PLACEMENT N/A 11/10/2023    Procedure: ESOPHAGOGASTRODUODENOSCOPY WITH PERCUTANEOUS ENDOSCOPIC GASTROSTOMY TUBE INSERTION AT BEDSIDE;  Surgeon: Dirk Cleveland MD;  Location: [facility] ENDOSCOPY;  Service: Cardiothoracic;  Laterality: N/A;      General Information       Row Name 11/13/23 1145          Physical Therapy Time and Intention    Document Type therapy note (daily note)  -KG     Mode of Treatment physical therapy  -KG       Row Name 11/13/23 1145          General Information    Existing Precautions/Restrictions cardiac;fall;sternal;other (see comments)  L sided weakness; no command following  -KG     Barriers to Rehab medically complex;cognitive status  -KG       Row Name 11/13/23 1145          Cognition    Orientation Status (Cognition) unable/difficult to assess  -KG       Row Name 11/13/23 1145          Safety Issues, Functional Mobility    Safety Issues Affecting Function (Mobility) ability to follow commands;awareness of need for assistance;insight into deficits/self-awareness;safety precaution awareness;safety precautions follow-through/compliance;sequencing abilities  -KG     Impairments Affecting Function (Mobility) balance;cognition;coordination;endurance/activity tolerance;motor control;motor planning;postural/trunk control;range of motion (ROM);strength  -KG     Cognitive Impairments, Mobility Safety/Performance attention;awareness, need for assistance;insight into deficits/self-awareness;safety precaution awareness;safety precaution follow-through;sequencing abilities  -KG               User Key  (r) = Recorded By, (t) = Taken By, (c) = Cosigned By      Initials Name Provider Type    KG Brittney Rocha PT Physical Therapist                   Mobility       Row Name 11/13/23 1145          Bed Mobility    Comment, (Bed Mobility) All EOB/OOB mobility deferred due to pt's cognition and increased lethargy.  -KG       Row Name 11/13/23 1141           Transfers    Comment, (Transfers) Transfers deferred. Not appropriate to assess.  -KG       Row Name 11/13/23 1145          Bed-Chair Transfer    Bed-Chair [place] (Transfers) unable to assess  -KG       Row Name 11/13/23 1145          Sit-Stand Transfer    Sit-Stand [place] (Transfers) unable to assess  -KG       Row Name 11/13/23 1145          Gait/Stairs (Locomotion)    [place] Level (Gait) unable to assess  -KG     Comment, (Gait/Stairs) Ambulation deferred. Not appropriate to assess.  -KG               User Key  (r) = Recorded By, (t) = Taken By, (c) = Cosigned By      Initials Name Provider Type    KG Brittney Rocha, PT Physical Therapist                   Obj/Interventions       Row Name 11/13/23 1146          Motor Skills    Therapeutic Exercise shoulder;hip;knee;ankle  -KG       Row Name 11/13/23 1146          Shoulder (Therapeutic Exercise)    Shoulder Strengthening (Therapeutic Exercise) bilateral;flexion;extension;aBduction;aDduction;supine;10 repetitions  -KG       Row Name 11/13/23 1146          Hip (Therapeutic Exercise)    Hip (Therapeutic Exercise) strengthening exercise  -KG     Hip Strengthening (Therapeutic Exercise) bilateral;flexion;extension;aBduction;aDduction;external rotation;internal rotation;heel slides;supine;10 repetitions  -KG       Row Name 11/13/23 1146          Knee (Therapeutic Exercise)    Knee (Therapeutic Exercise) strengthening exercise  -KG     Knee Strengthening (Therapeutic Exercise) bilateral;flexion;extension;SLR (straight leg raise);SAQ (short arc quad);supine;10 repetitions  -KG       Row Name 11/13/23 1146          Ankle (Therapeutic Exercise)    Ankle (Therapeutic Exercise) strengthening exercise  -KG     Ankle Strengthening (Therapeutic Exercise) bilateral;dorsiflexion;plantarflexion;supine;10 repetitions  -KG       Row Name 11/13/23 1146          Elbow/Forearm (Therapeutic Exercise)    Elbow/Forearm (Therapeutic Exercise) strengthening exercise   -KG     Elbow/Forearm Strengthening (Therapeutic Exercise) bilateral;flexion;extension;supine;10 repetitions  -KG       Row Name 11/13/23 1146          Wrist (Therapeutic Exercise)    Wrist (Therapeutic Exercise) strengthening exercise  -KG     Wrist Strengthening (Therapeutic Exercise) bilateral;flexion;extension;10 repetitions  -KG               User Key  (r) = Recorded By, (t) = Taken By, (c) = Cosigned By      Initials Name Provider Type    KG Brittney Rocha, PT Physical Therapist                   Goals/Plan    No documentation.                  Clinical Impression       Row Name 11/13/23 1147          Pain    Additional Documentation Pain Scale: FACES Pre/Post-Treatment (Group)  -KG       Row Name 11/13/23 1147          Pain Scale: FACES Pre/Post-Treatment    Pain: FACES Scale, Pretreatment 0-->no hurt  -KG     Posttreatment Pain Rating 0-->no hurt  -KG       Row Name 11/13/23 1147          Plan of Care Review    Plan of Care Reviewed With patient  -KG     Progress no change  -KG     Outcome Evaluation All EOB/OOB mobility deferred this session due to pt's increased lethargy and cognition. Pt tolerated bed level ther ex, but did not follow any commands to actively participate. Continue to recommend PT skilled care and D/C to SNF.  -KG       Row Name 11/13/23 1140          Vital Signs    Pre Systolic BP Rehab 131  -KG     Pre Treatment Diastolic BP 67  -KG     Post Systolic BP Rehab 120  -KG     Post Treatment Diastolic BP 63  -KG     Pretreatment Heart Rate (beats/min) 81  -KG     Posttreatment Heart Rate (beats/min) 72  -KG     Pre SpO2 (%) 95  -KG     O2 Delivery Pre Treatment supplemental O2  -KG     Post SpO2 (%) 95  -KG     O2 Delivery Post Treatment supplemental O2  -KG     Pre Patient Position Supine  -KG     Intra Patient Position Supine  -KG     Post Patient Position Supine  -KG       Row Name 11/13/23 1143          Positioning and Restraints    Pre-Treatment Position in bed  -KG     Post  Treatment Position bed  -KG     In Bed notified nsg;supine;call light within reach;encouraged to call for assist;side rails up x3;RUE elevated;LUE elevated;R multipodus  -KG               User Key  (r) = Recorded By, (t) = Taken By, (c) = Cosigned By      Initials Name Provider Type    Brittney Crouch, PT Physical Therapist                   Outcome Measures       Row Name 11/13/23 1149          How much help from another person do you currently need...    Turning from your back to your side while in flat bed without using bedrails? 1  -KG     Moving from lying on back to sitting on the side of a flat bed without bedrails? 1  -KG     Moving to and from a bed to a chair (including a wheelchair)? 1  -KG     Standing up from a chair using your arms (e.g., wheelchair, bedside chair)? 1  -KG     Climbing 3-5 steps with a railing? 1  -KG     To walk in hospital room? 1  -KG     AM-PAC 6 Clicks Score (PT) 6  -KG     Highest level of mobility 2 --> Bed activities/dependent transfer  -KG       Row Name 11/13/23 1149          Functional Assessment    Outcome Measure Options AM-PAC 6 Clicks Basic Mobility (PT)  -KG               User Key  (r) = Recorded By, (t) = Taken By, (c) = Cosigned By      Initials Name Provider Type    Brittney Crouch PT Physical Therapist                                 Physical Therapy Education       Title: PT OT SLP Therapies (In Progress)       Topic: Physical Therapy (In Progress)       Point: Mobility training (In Progress)       Learning Progress Summary             Patient Acceptance, E, NR by KG at 11/13/2023 1051    Acceptance, E, NR by SUE at 11/12/2023 0900    Acceptance, E, NR by AE at 11/9/2023 1503    Acceptance, E, NR by AE at 11/8/2023 1319    Acceptance, E, NR by SUE at 11/7/2023 1300    Acceptance, E, NR by AE at 11/6/2023 1310    Acceptance, E, NR by KG at 11/5/2023 0940    Acceptance, E, VU,NR by ML at 10/27/2023 1544    Comment: continued mobility while awaiting  CABG, sternal precautions following CABG    Acceptance, E,D, VU,NR by LR at 10/25/2023 1327    Comment: Educated on benefits of mobility, correct sit<->stand t/f technique, correct gait mechanics, PLB, energy conservation, and progression of POC.                         Point: Home exercise program (In Progress)       Learning Progress Summary             Patient Acceptance, E, NR by KG at 11/13/2023 1051    Acceptance, E, NR by SUE at 11/12/2023 0900    Acceptance, E, NR by AE at 11/9/2023 1503    Acceptance, E, NR by AE at 11/8/2023 1319    Acceptance, E, NR by SUE at 11/7/2023 1300    Acceptance, E, NR by AE at 11/6/2023 1310    Acceptance, E, NR by KG at 11/5/2023 0940                         Point: Body mechanics (In Progress)       Learning Progress Summary             Patient Acceptance, E, NR by KG at 11/13/2023 1051    Acceptance, E, NR by SUE at 11/12/2023 0900    Acceptance, E, NR by AE at 11/9/2023 1503    Acceptance, E, NR by AE at 11/8/2023 1319    Acceptance, E, NR by SUE at 11/7/2023 1300    Acceptance, E, NR by AE at 11/6/2023 1310    Acceptance, E, NR by KG at 11/5/2023 0940    Acceptance, E,D, VU,NR by LR at 10/25/2023 1327    Comment: Educated on benefits of mobility, correct sit<->stand t/f technique, correct gait mechanics, PLB, energy conservation, and progression of POC.                         Point: Precautions (In Progress)       Learning Progress Summary             Patient Acceptance, E, NR by KG at 11/13/2023 1051    Acceptance, E, NR by SUE at 11/12/2023 0900    Acceptance, E, NR by AE at 11/9/2023 1503    Acceptance, E, NR by AE at 11/8/2023 1319    Acceptance, E, NR by SUE at 11/7/2023 1300    Acceptance, E, NR by AE at 11/6/2023 1310    Acceptance, E, NR by KG at 11/5/2023 0940    Acceptance, E, VU,NR by ML at 10/27/2023 1544    Comment: continued mobility while awaiting CABG, sternal precautions following CABG    Acceptance, E,D, VU,NR by LR at 10/25/2023 1325    Comment: Educated on  benefits of mobility, correct sit<->stand t/f technique, correct gait mechanics, PLB, energy conservation, and progression of POC.                                         User Key       Initials Effective Dates Name Provider Type Discipline    SUE 02/03/23 -  Oneida Daly, PT Physical Therapist PT    LR 02/03/23 -  Alissa Pierre, PT Physical Therapist PT    KG 05/22/20 -  Brittney Rocha, PT Physical Therapist PT    ML 04/22/21 -  Alisa Salguero Physical Therapist PT    AE 09/21/21 -  Jcarlos Rodriguez, PT Physical Therapist PT                  PT Recommendation and Plan     Plan of Care Reviewed With: patient  Progress: no change  Outcome Evaluation: All EOB/OOB mobility deferred this session due to pt's increased lethargy and cognition. Pt tolerated bed level ther ex, but did not follow any commands to actively participate. Continue to recommend PT skilled care and D/C to SNF.     Time Calculation:         PT Charges       Row Name 11/13/23 1051             Time Calculation    Start Time 1051  -KG      PT Received On 11/13/23  -KG      PT Goal Re-Cert Due Date 11/15/23  -KG         Time Calculation- PT    Total Timed Code Minutes- PT 10 minute(s)  -KG         Timed Charges    20106 - PT Therapeutic Exercise Minutes 10  -KG         Total Minutes    Timed Charges Total Minutes 10  -KG       Total Minutes 10  -KG                User Key  (r) = Recorded By, (t) = Taken By, (c) = Cosigned By      Initials Name Provider Type    KG Brittney Rocha, PT Physical Therapist                  Therapy Charges for Today       Code Description Service Date Service Provider Modifiers Qty    92670423583 HC PT THER PROC EA 15 MIN 11/13/2023 Brittney Rocha, PT GP 1            PT G-Codes  Outcome Measure Options: AM-PAC 6 Clicks Basic Mobility (PT)  AM-PAC 6 Clicks Score (PT): 6  AM-PAC 6 Clicks Score (OT): 6  Modified [place] Scale: 4 - Moderately severe disability.  Unable to walk without assistance, and  unable to attend to own bodily needs without assistance.  PT Discharge Summary  Anticipated Discharge Disposition (PT): skilled nursing facility    Kimberly Rocha, PT  11/13/2023

## 2023-11-13 NOTE — PROGRESS NOTES
Michael Cochran  [id number]  1945   LOS: 20 days   Patient Care Team:  Bo Rodriguez PA as PCP - General (Family Medicine)    Chief Complaint: Follow-up on cardiomyopathy secondary to severe MVCAD, hypertension    Subjective Patient nonconversant and is moaning.    Objective     Vital Sign Min/Max for last 24 hours  Temp  Min: 98.4 °F (36.9 °C)  Max: 99.8 °F (37.7 °C)   BP  Min: 106/79  Max: 143/78   Pulse  Min: 74  Max: 97   Resp  Min: 16  Max: 22   SpO2  Min: 90 %  Max: 98 %   No data recorded   No data recorded         11/02/23  0500 11/06/23  1651   Weight: 93.2 kg (205 lb 7.5 oz) 93.2 kg (205 lb 7.5 oz)         Intake/Output Summary (Last 24 hours) at 11/13/2023 0903  Last data filed at 11/13/2023 0558  Gross per 24 hour   Intake 2740 ml   Output 2075 ml   Net 665 ml       Physical Exam:     General Appearance:  Does not follow commands, in no acute distress, right basilic PICC, eyes closed, moaning   Lungs:     Clear to auscultation,respirations regular, even and                unlabored, 1 L O2 NC    Heart:    Regular and normal rate, normal S1 and S2, no            murmur, no gallop, no rub, no click   Abdomen:  Extremities:   Soft, nontender, bowel sounds audible x4, PEG    No edema, normal range of motion   Pulses:   Pulses palpable and equal bilaterally    Incision CDI  Results Review:   Results from last 7 days   Lab Units 11/13/23 0316 11/12/23 0307 11/11/23  0409   SODIUM mmol/L 140 141 144   POTASSIUM mmol/L 4.3 4.1 4.3   CHLORIDE mmol/L 108* 106 108*   CO2 mmol/L 25.0 25.0 25.0   BUN mg/dL 38* 38* 35*   CREATININE mg/dL 0.82 0.77 0.89   GLUCOSE mg/dL 198* 178* 165*   CALCIUM mg/dL 8.8 8.9 8.5*     Results from last 7 days   Lab Units 11/13/23 0316 11/12/23 0307 11/11/23  0409   WBC 10*3/mm3 15.34* 17.91* 14.77*   HEMOGLOBIN g/dL 7.2* 7.5* 7.6*   HEMATOCRIT % 24.5* 25.3* 25.8*   PLATELETS 10*3/mm3 309 361 344     Results from last 7 days   Lab Units 11/13/23  0316   CHOLESTEROL mg/dL  117   TRIGLYCERIDES mg/dL 124       Chest x-ray 11/11/2023:  Cardiomegaly with scattered bibasilar atelectasis.     No new ECG to review    Medication Review: Reviewed peptamen @70ml/hr    Assessment & Plan   Patient with ischemic cardiomyopathy and is status post CABG x4 vessels (11/2/2023) with postop stroke.  Patient awaiting discharge to SNF when bed is available.  Patient is status post PEG 11/10/2023.  She now has normal EF.  Patient is a DNR/DNI.  Continue current cardiac medications.      Multivessel CAD with unstable angina pectoris manifesting as dyspnea    HLD (hyperlipidemia)    Acute hypoxemic respiratory failure due to pulmonary edema    Acute exacerbation of congestive heart failure    Non-STEMI (non-ST elevated myocardial infarction)    S/P CABG x 4 on 11/2/2023    Ischemic cardiomyopathy with LVEF 31%    Hypertensive urgency with diastolic dysfunction    Postop ANURADHA resolved    Postop Encephalopathy    Postop acute blood loss anemia    Postoperative multiple bilateral embolic strokes    Electronically signed by FRACISCO Barboza, 11/13/23, 9:48 AM EST.     11/13/23  09:03 EST

## 2023-11-13 NOTE — PLAN OF CARE
Goal Outcome Evaluation:  Plan of Care Reviewed With: patient        Progress: no change  Outcome Evaluation: All EOB/OOB mobility deferred this session due to pt's increased lethargy and cognition. Pt tolerated bed level ther ex, but did not follow any commands to actively participate. Continue to recommend PT skilled care and D/C to SNF.      Anticipated Discharge Disposition (PT): skilled nursing facility

## 2023-11-13 NOTE — PROGRESS NOTES
Clinical Nutrition     Nutrition Support Assessment  Reason for Visit: MDR, Follow-up protocol, EN      Patient Name: Michael Cochran  YOB: 1945  MRN: [id number]  Date of Encounter: 11/13/23 07:54 EST  Admission date: 10/24/2023    Comments:  Monitor sodium levels  Change bowel regiment to PRN  RD will continue to monitor and adjust EN regimen as appropriate.    Nutrition Assessment   Admission Diagnosis:  Acute exacerbation of congestive heart failure [I50.9]  CAD (coronary artery disease) [I25.10]      Problem List:    Multivessel CAD with unstable angina pectoris manifesting as dyspnea    HLD (hyperlipidemia)    Acute hypoxemic respiratory failure due to pulmonary edema    Acute exacerbation of congestive heart failure    Non-STEMI (non-ST elevated myocardial infarction)    S/P CABG x 4 on 11/2/2023    Ischemic cardiomyopathy with LVEF 31%    Hypertensive urgency with diastolic dysfunction    Postop ANURADHA resolved    Postop Encephalopathy    Postop acute blood loss anemia    Postoperative multiple bilateral embolic strokes    Vaginal bleeding    T2DM (new dx)    PMH:   She  has a past medical history of Hyperlipidemia and Hypertension.    PSH:  She  has a past surgical history that includes Breast fibroadenoma surgery; Cardiac catheterization (N/A, 10/26/2023); and Coronary artery bypass graft (N/A, 11/1/2023).      Applicable Nutrition Concerns:       Applicable Interval History:   (11/1) s/p CABG, intubated, large bore NG tube;  extubated  (11/3) small bore NG tube placed (peripyloric tip placement per KUB);  EN initiated - Novasource Renal  (11/4) CT head - no acute changes  (11/5) MRI Multiple bilateral acute and subacute infarcts.  Moderate periventricular subcortical flair changes related to chronic microvascular ischemic change  (11/10) PEG placed    Reported/Observed/Food/Nutrition Related History:   11/13  No change in mental status.  Non-responsive to voice .  Not doing  anything purposeful.  Daughter is not sure on GOC moving forward-? Hospice vs long term care.  Daughter is concerned about patient overall recovery.  Overall tolerating EN.  + 3 BM yesterday. Case management working on disposition.     EN infusing at goal rate at time of visit. Patient not responsive with non purposeful hand movements.     11/10  EN held overnight for PEG today. RN reports PEG placed at bedside this morning during MDR, CTS noticed an old gastric ulcers so plan to start pepcid, no changes in neurological status. Will resume EN soon.    11/6  Post-op encephalopathy continues. Requiring supplemental oxygen. Not currently requiring any pressor support. Does not follow commands. Tolerating EN @ goal rate 35 ml/hr. Developed hypernatremia over the weekend so water flush was increased from 30 ml Q 2 hours to 25 ml/hr and it is currently 50 ml/hr. Plan for D5% @ 100 ml/hr x 5 hours. RD to adjust EN formula/regimen. Intensivist would like water flush rate @ 50 ml/hr regardless. Over the weekend pt also had FMS - RN reports yesterday pt was stooling around the FMS so it was removed.    11/3  Patient remains extubated and not requiring any pressor support; however, she remains minimally responsive with intermittent need for Bipap. CTS team would like to begin EN today. RD spoke with RN and recommended placing a small bore post-pyloric feeding tube due to intermittent use of Bipap (EN would need to be held when Bipap on if using NG tube). RN to discuss with MD during MDR. NKFA.    Labs    Labs Reviewed: Yes       Results from last 7 days   Lab Units 11/13/23  0316 11/12/23  0307 11/11/23  0409 11/10/23  0401 11/09/23  0531 11/08/23  0534 11/07/23  0532 11/06/23  1556   GLUCOSE mg/dL 198* 178* 165*   < > 152* 182*   < > 179*   BUN mg/dL 38* 38* 35*   < > 32* 30*   < > 59*   CREATININE mg/dL 0.82 0.77 0.89   < > 0.70 0.76   < > 0.95   SODIUM mmol/L 140 141 144   < > 146* 148*   < > 149*   CHLORIDE mmol/L 108* 106  "108*   < > 111* 115*   < > 119*   POTASSIUM mmol/L 4.3 4.1 4.3   < > 4.0 4.1   < > 3.9   PHOSPHORUS mg/dL 3.2  --  4.1  --  4.9*  --    < >  --    MAGNESIUM mg/dL 2.3  --   --   --  2.2 1.9  --   --    ALT (SGPT) U/L 14  --   --   --   --   --   --  14    < > = values in this interval not displayed.       Results from last 7 days   Lab Units 11/13/23  0316 11/11/23  0409 11/09/23  0531   ALBUMIN g/dL 3.1* 3.2* 3.1*   CHOLESTEROL mg/dL 117  --   --    TRIGLYCERIDES mg/dL 124  --   --        Results from last 7 days   Lab Units 11/13/23  0503 11/12/23  2337 11/12/23  1717 11/12/23  1142 11/12/23  0506 11/11/23  2352   GLUCOSE mg/dL 187* 198* 167* 220* 174* 204*     Lab Results   Lab Value Date/Time    HGBA1C 9.80 (H) 10/25/2023 0357         Results from last 7 days   Lab Units 11/10/23  0401   PROBNP pg/mL 6,349.0*       Medications    Medications Reviewed: Yes  Pertinent  Scheduled: colace, Pepcid, Insulin, Ritalin, Senokot,   Infusion:   PRN:     Intake/Ouptut 24 hrs (0701 - 0700)   I&O's Reviewed: Yes     + 3BM yesterday     Anthropometrics     Flowsheet Rows      Flowsheet Row First Filed Value   Admission Height 157.5 cm (62\") Documented at 10/24/2023 1040   Admission Weight 93.9 kg (207 lb) Documented at 10/24/2023 1040       Height: Height: 157.5 cm (62.01\")  Last Filed Weight: Weight: 93.2 kg (205 lb 7.5 oz) (11/06/23 1651)  Method: Weight Method: Standing scale  BMI: BMI (Calculated): 37.6  BMI classification: Obese Class II: 35-39.9kg/m2  IBW:  110 lbs    UBW:   Weight       Weight (kg) Weight (lbs) Weight Method Visit Report   8/16/2021 93.895 kg  207 lb   --    10/24/2023 93.895 kg  207 lb  Stated     10/25/2023 95.346 kg  210 lb 3.2 oz      10/26/2023 95.255 kg  210 lb      10/27/2023 92.987 kg  205 lb  Standing scale     10/28/2023 93.441 kg  206 lb  Standing scale     10/30/2023 93.849 kg  206 lb 14.4 oz      10/31/2023 93.577 kg  206 lb 4.8 oz  Standing scale     11/1/2023 92.443 kg  203 lb 12.8 oz  " "Standing scale     2023 93.2 kg  205 lb 7.5 oz      2023 93.2 kg  205 lb 7.5 oz          Nutrition Focused Physical Exam     Date: 11/3  Unable to perform exam due to: Defer pending indication    Needs Assessment   Date: 11/10    Height used:Height: 157.5 cm (62.01\")  Weights used: 203 lbs/92.3 kg (actual wt)     110 lbs/50 kg (IBW)    Estimated Calorie needs: ~1600 calories daily  Method: 16-18  Kcals/KG actual wt = 0225-5195    Estimated Protein needs: ~110 g protein daily  Method: 1.2 g/Kg actual wt = 111  Method: 2.0 g/kg IBW = 100    Estimated Fluid needs: Per clinical status      Current Nutrition Prescription     PO: NPO Diet NPO Type: Strict NPO  Oral Nutrition Supplement: N/A  Intake: 70% x 10 meals prior to surgery ()    EN: Peptamen AF  Goal Rate: 70 ml/hr  Water Flushes: 50 ml/hr  Modular: None  Route: PEG  Tube: Unknown    At goal over: 20Hrs/day  Rx will supply:   Goal Volume 1400 mL/day     Flush Volume 1000 mL/day     Energy 1680 Kcal/day 105 % Est Need   Protein 106 g/day 96 % Est Need   Fiber 8.5 g/day     Water in  EN 1135 mL     Total Water 2235 mL     Meet DRI No        --------------------------------------------------------------------------  Product/Rate verified at bedside: No - held for PEG  Infusing Rate at time of visit: N/A    Average Delivery from [facility] Days:  Volume 1389 mL/day 99  % Goal Vol.   Flush Volume 1376 mL/day     Energy  Kcal/day  % Est Need   Protein  g/day  % Est Need   Fiber  g/day     Water in  EN  mL     Total Water  mL     Meet DRI Yes          Nutrition Diagnosis     Date: 11/3 Updated:   Problem Inadequate oral intake   Etiology AMS   Signs/Symptoms NPO   Status: ongoing - EN initiated (11/3), PEG placed (11/10)    Goal:   General: Nutrition support treatment  PO: Advace diet as medically feasible/appropriate  EN/PN: Maintain EN     Nutrition Intervention      Follow treatment progress, Care plan reviewed, Nutrition support order " placed    Monitor sodium levels  Change bowel regiment to PRN  RD will continue to monitor and adjust EN regimen as appropriate.    Monitoring/Evaluation:   Per protocol, I&O, Pertinent labs, EN delivery/tolerance, Weight, Skin status, GI status, Symptoms, Hemodynamic stability      Sylvia Avila, MARIA  Time Spent: 25 min

## 2023-11-14 LAB
ANION GAP SERPL CALCULATED.3IONS-SCNC: 8 MMOL/L (ref 5–15)
BUN SERPL-MCNC: 41 MG/DL (ref 8–23)
BUN/CREAT SERPL: 53.9 (ref 7–25)
CALCIUM SPEC-SCNC: 8.9 MG/DL (ref 8.6–10.5)
CHLORIDE SERPL-SCNC: 108 MMOL/L (ref 98–107)
CO2 SERPL-SCNC: 25 MMOL/L (ref 22–29)
CREAT SERPL-MCNC: 0.76 MG/DL (ref 0.57–1)
DEPRECATED RDW RBC AUTO: 52.2 FL (ref 37–54)
EGFRCR SERPLBLD CKD-EPI 2021: 79.8 ML/MIN/1.73
ERYTHROCYTE [DISTWIDTH] IN BLOOD BY AUTOMATED COUNT: 14.6 % (ref 12.3–15.4)
GLUCOSE BLDC GLUCOMTR-MCNC: 198 MG/DL (ref 70–130)
GLUCOSE BLDC GLUCOMTR-MCNC: 201 MG/DL (ref 70–130)
GLUCOSE BLDC GLUCOMTR-MCNC: 218 MG/DL (ref 70–130)
GLUCOSE BLDC GLUCOMTR-MCNC: 232 MG/DL (ref 70–130)
GLUCOSE SERPL-MCNC: 189 MG/DL (ref 65–99)
HCT VFR BLD AUTO: 22.5 % (ref 34–46.6)
HGB BLD-MCNC: 6.7 G/DL (ref 12–15.9)
MCH RBC QN AUTO: 29.3 PG (ref 26.6–33)
MCHC RBC AUTO-ENTMCNC: 29.8 G/DL (ref 31.5–35.7)
MCV RBC AUTO: 98.3 FL (ref 79–97)
PLATELET # BLD AUTO: 288 10*3/MM3 (ref 140–450)
PMV BLD AUTO: 12.5 FL (ref 6–12)
POTASSIUM SERPL-SCNC: 4.4 MMOL/L (ref 3.5–5.2)
RBC # BLD AUTO: 2.29 10*6/MM3 (ref 3.77–5.28)
SODIUM SERPL-SCNC: 141 MMOL/L (ref 136–145)
WBC NRBC COR # BLD: 14.33 10*3/MM3 (ref 3.4–10.8)

## 2023-11-14 PROCEDURE — 86901 BLOOD TYPING SEROLOGIC RH(D): CPT | Performed by: PHYSICIAN ASSISTANT

## 2023-11-14 PROCEDURE — 99232 SBSQ HOSP IP/OBS MODERATE 35: CPT

## 2023-11-14 PROCEDURE — 97110 THERAPEUTIC EXERCISES: CPT

## 2023-11-14 PROCEDURE — 82948 REAGENT STRIP/BLOOD GLUCOSE: CPT

## 2023-11-14 PROCEDURE — 36430 TRANSFUSION BLD/BLD COMPNT: CPT

## 2023-11-14 PROCEDURE — 97530 THERAPEUTIC ACTIVITIES: CPT

## 2023-11-14 PROCEDURE — P9016 RBC LEUKOCYTES REDUCED: HCPCS

## 2023-11-14 PROCEDURE — 63710000001 INSULIN REGULAR HUMAN PER 5 UNITS: Performed by: INTERNAL MEDICINE

## 2023-11-14 PROCEDURE — 86900 BLOOD TYPING SEROLOGIC ABO: CPT | Performed by: PHYSICIAN ASSISTANT

## 2023-11-14 PROCEDURE — 63710000001 INSULIN DETEMIR PER 5 UNITS: Performed by: INTERNAL MEDICINE

## 2023-11-14 PROCEDURE — 86920 COMPATIBILITY TEST SPIN: CPT

## 2023-11-14 PROCEDURE — 80048 BASIC METABOLIC PNL TOTAL CA: CPT | Performed by: INTERNAL MEDICINE

## 2023-11-14 PROCEDURE — 86900 BLOOD TYPING SEROLOGIC ABO: CPT

## 2023-11-14 PROCEDURE — 86850 RBC ANTIBODY SCREEN: CPT | Performed by: PHYSICIAN ASSISTANT

## 2023-11-14 PROCEDURE — 99232 SBSQ HOSP IP/OBS MODERATE 35: CPT | Performed by: INTERNAL MEDICINE

## 2023-11-14 PROCEDURE — 85027 COMPLETE CBC AUTOMATED: CPT | Performed by: INTERNAL MEDICINE

## 2023-11-14 RX ORDER — AMLODIPINE BESYLATE 5 MG/1
5 TABLET ORAL
Status: DISCONTINUED | OUTPATIENT
Start: 2023-11-15 | End: 2023-11-15 | Stop reason: HOSPADM

## 2023-11-14 RX ORDER — PANTOPRAZOLE SODIUM 40 MG/1
40 TABLET, DELAYED RELEASE ORAL
Status: DISCONTINUED | OUTPATIENT
Start: 2023-11-14 | End: 2023-11-15

## 2023-11-14 RX ORDER — FAMOTIDINE 20 MG/1
20 TABLET, FILM COATED ORAL
Status: DISCONTINUED | OUTPATIENT
Start: 2023-11-14 | End: 2023-11-14

## 2023-11-14 RX ORDER — HYDRALAZINE HYDROCHLORIDE 10 MG/1
10 TABLET, FILM COATED ORAL EVERY 8 HOURS
Status: DISCONTINUED | OUTPATIENT
Start: 2023-11-14 | End: 2023-11-15 | Stop reason: HOSPADM

## 2023-11-14 RX ADMIN — INSULIN HUMAN 5 UNITS: 100 INJECTION, SOLUTION PARENTERAL at 12:42

## 2023-11-14 RX ADMIN — INSULIN HUMAN 2 UNITS: 100 INJECTION, SOLUTION PARENTERAL at 00:08

## 2023-11-14 RX ADMIN — AMLODIPINE BESYLATE 10 MG: 10 TABLET ORAL at 08:48

## 2023-11-14 RX ADMIN — DOCUSATE SODIUM 100 MG: 50 LIQUID ORAL at 08:48

## 2023-11-14 RX ADMIN — CARVEDILOL 6.25 MG: 6.25 TABLET, FILM COATED ORAL at 08:48

## 2023-11-14 RX ADMIN — INSULIN HUMAN 2 UNITS: 100 INJECTION, SOLUTION PARENTERAL at 18:18

## 2023-11-14 RX ADMIN — FAMOTIDINE 20 MG: 20 TABLET, FILM COATED ORAL at 08:47

## 2023-11-14 RX ADMIN — SACUBITRIL AND VALSARTAN 1 TABLET: 24; 26 TABLET, FILM COATED ORAL at 20:50

## 2023-11-14 RX ADMIN — INSULIN HUMAN 4 UNITS: 100 INJECTION, SOLUTION PARENTERAL at 12:41

## 2023-11-14 RX ADMIN — SACUBITRIL AND VALSARTAN 1 TABLET: 24; 26 TABLET, FILM COATED ORAL at 08:47

## 2023-11-14 RX ADMIN — CARVEDILOL 6.25 MG: 6.25 TABLET, FILM COATED ORAL at 18:17

## 2023-11-14 RX ADMIN — Medication 10 ML: at 20:51

## 2023-11-14 RX ADMIN — INSULIN DETEMIR 15 UNITS: 100 INJECTION, SOLUTION SUBCUTANEOUS at 20:51

## 2023-11-14 RX ADMIN — INSULIN DETEMIR 15 UNITS: 100 INJECTION, SOLUTION SUBCUTANEOUS at 08:48

## 2023-11-14 RX ADMIN — PANTOPRAZOLE SODIUM 40 MG: 40 TABLET, DELAYED RELEASE ORAL at 18:17

## 2023-11-14 RX ADMIN — NYSTATIN 1 APPLICATION: 100000 CREAM TOPICAL at 08:49

## 2023-11-14 RX ADMIN — ATORVASTATIN CALCIUM 80 MG: 40 TABLET, FILM COATED ORAL at 20:50

## 2023-11-14 RX ADMIN — Medication 10 ML: at 08:49

## 2023-11-14 RX ADMIN — INSULIN HUMAN 5 UNITS: 100 INJECTION, SOLUTION PARENTERAL at 18:18

## 2023-11-14 RX ADMIN — ASPIRIN 81 MG CHEWABLE TABLET 81 MG: 81 TABLET CHEWABLE at 08:47

## 2023-11-14 RX ADMIN — HYDRALAZINE HYDROCHLORIDE 10 MG: 10 TABLET ORAL at 21:00

## 2023-11-14 RX ADMIN — INSULIN HUMAN 5 UNITS: 100 INJECTION, SOLUTION PARENTERAL at 00:08

## 2023-11-14 RX ADMIN — INSULIN HUMAN 5 UNITS: 100 INJECTION, SOLUTION PARENTERAL at 05:28

## 2023-11-14 RX ADMIN — INSULIN HUMAN 4 UNITS: 100 INJECTION, SOLUTION PARENTERAL at 05:28

## 2023-11-14 RX ADMIN — NYSTATIN 1 APPLICATION: 100000 CREAM TOPICAL at 20:52

## 2023-11-14 RX ADMIN — DOCUSATE SODIUM 100 MG: 50 LIQUID ORAL at 20:50

## 2023-11-14 NOTE — PROGRESS NOTES
Intensive Care Follow-up     Hospital:  LOS: 21 days   Ms. Michael Cochran, 78 y.o. female is followed for:   Coronary artery disease involving native coronary artery of native heart with unstable angina pectoris        Subjective     78-year-old white female with PMH of hypertension and hyperlipidemia but no previous cardiac issues. Patient presented to [facility] 10/24/2023 with hypertensive urgency, biventricular heart failure, and elevated cardiac enzymes consistent with NSTEMI.  he was also newly diagnosed with type II DM.  LHC was performed revealing multivessel disease and she was documented to have an ischemic cardiomyopathy with LVEF of 31%. Patient subsequently underwent CABG x 4 by Dr. Cleveland 11/2/2023 and was extubated that evening. Unfortunately postop course was complicated by encephalopathy, ANURADHA, and anemia.  ANURADHA subsequently improved. Encephalopathy however persisted and neurology was consulted.  CT of the head was performed and was unrevealing and EEG just revealed diffuse slowing without evidence of seizure activity.  MRI was subsequently ordered revealing subacute scattered areas of ischemic infarct bilaterally in the frontal and parietal lobes and extending to the bilateral basal ganglia and left cerebellum.  Unclear if this was a watershed event due to hypotension or an embolic event related to atheroemboli from the great vessels.  Neurology initially began patient on a heparin drip, but this was subsequently canceled as echocardiogram was unrevealing.  Long discussions were held with the patient's daughter after I and neurology indicated that her prognosis for a functional existence was very poor.  It was decided to make the patient DNR/DNI, but to continue with full support to give her every chance for recovery.     Interval History:  The chart has been reviewed.  The patient is actually a bit more awake looking and does follow with her eyes.  She does answer yes to some questions.  Moving her right  "upper extremity a bit more purposefully.    The patient's past medical, surgical and social history were reviewed and updated in Epic as appropriate.        Objective     Infusions:     Medications:  [START ON 11/15/2023] amLODIPine, 5 mg, Nasogastric, Q24H  aspirin, 81 mg, Nasogastric, Daily  atorvastatin, 80 mg, Nasogastric, Nightly  carvedilol, 6.25 mg, Nasogastric, BID With Meals  docusate sodium, 100 mg, Nasogastric, BID  famotidine, 20 mg, Nasogastric, BID AC  hydrALAZINE, 10 mg, Nasogastric, Q8H  insulin detemir, 15 Units, Subcutaneous, Q12H  insulin regular, 2-9 Units, Subcutaneous, Q6H  insulin regular, 5 Units, Subcutaneous, Q6H  nystatin, 1 application , Topical, Q12H  sacubitril-valsartan, 1 tablet, Nasogastric, Q12H  sodium chloride, 10 mL, Intravenous, Q12H        Vital Sign Min/Max for last 24 hours  Temp  Min: 98.3 °F (36.8 °C)  Max: 98.9 °F (37.2 °C)   BP  Min: 81/66  Max: 130/86   Pulse  Min: 66  Max: 89   Resp  Min: 16  Max: 22   SpO2  Min: 90 %  Max: 97 %   No data recorded       Input/Output for last 24 hour shift  11/13 0701 - 11/14 0700  In: 2585   Out: 2050 [Urine:2050]      Objective:  General Appearance:  Uncomfortable and ill-appearing.    Vital signs: (most recent): Blood pressure 124/92, pulse 84, temperature 98.3 °F (36.8 °C), temperature source Axillary, resp. rate 22, height 157.5 cm (62.01\"), weight 93.2 kg (205 lb 7.5 oz), SpO2 95%.    HEENT: Normal HEENT exam.    Lungs:  Normal effort and normal respiratory rate.  Breath sounds clear to auscultation.    Heart: Normal rate.  Regular rhythm.  S1 normal and S2 normal.  No murmur.   Abdomen: Abdomen is soft and non-distended.  Bowel sounds are normal.   There is no abdominal tenderness.     Extremities: There is dependent edema.  (Minimal purposeful movement although not to command on her right upper extremity.  Does answer yes to certain questions.)  Neurological: Patient is alert.    Pupils:  Pupils are equal, round, and reactive to " light.  Pupils are equal.   Skin:  Warm.  No rash.               Results from last 7 days   Lab Units 11/13/23 0316 11/12/23 0307 11/11/23 0409   WBC 10*3/mm3 15.34* 17.91* 14.77*   HEMOGLOBIN g/dL 7.2* 7.5* 7.6*   PLATELETS 10*3/mm3 309 361 344     Results from last 7 days   Lab Units 11/13/23  0316 11/12/23  0307 11/11/23  0409 11/10/23  0401 11/09/23  0531 11/08/23  0534   SODIUM mmol/L 140 141 144   < > 146* 148*   POTASSIUM mmol/L 4.3 4.1 4.3   < > 4.0 4.1   CO2 mmol/L 25.0 25.0 25.0   < > 24.0 22.0   BUN mg/dL 38* 38* 35*   < > 32* 30*   CREATININE mg/dL 0.82 0.77 0.89   < > 0.70 0.76   MAGNESIUM mg/dL 2.3  --   --   --  2.2 1.9   PHOSPHORUS mg/dL 3.2  --  4.1  --  4.9*  --    GLUCOSE mg/dL 198* 178* 165*   < > 152* 182*    < > = values in this interval not displayed.     Estimated Creatinine Clearance: 59.1 mL/min (by C-G formula based on SCr of 0.82 mg/dL).              I reviewed the patient's results and images.     Assessment & Plan   Impression        Multivessel CAD with unstable angina pectoris manifesting as dyspnea    HLD (hyperlipidemia)    Acute hypoxemic respiratory failure due to pulmonary edema    Acute exacerbation of congestive heart failure    Non-STEMI (non-ST elevated myocardial infarction)    S/P CABG x 4 on 11/2/2023    Ischemic cardiomyopathy with LVEF 31%    Hypertensive urgency with diastolic dysfunction    Postop ANURADHA resolved    Postop Encephalopathy    Postop acute blood loss anemia    Postoperative multiple bilateral embolic strokes       Plan        We will allow for permissive hypertension.  Reorient as able.  Continue on with nutritional support.  Plan is for transition to nursing facility for further recovery.    Plan of care and goals reviewed with mulitdisciplinary/antibiotic stewardship team during rounds.   I discussed the patient's findings and my recommendations with patient and nursing staff         Puneet Rodriguez MD, [facility]  Pulmonology and Critical Care Medicine

## 2023-11-14 NOTE — THERAPY TREATMENT NOTE
Patient Name: Michael Cochran  : 1945    MRN: [id number]                              Today's Date: 2023       Admit Date: 10/24/2023    Visit Dx:     ICD-10-CM ICD-9-CM   1. Elevated troponin  R79.89 790.6   2. Acute on chronic congestive heart failure, unspecified heart failure type  I50.9 428.0   3. Hypertensive emergency  I16.1 401.9   4. Acute respiratory failure with hypoxia  J96.01 518.81   5. Pleural effusion, left  J90 511.9   6. Hypokalemia  E87.6 276.8   7. Non-STEMI (non-ST elevated myocardial infarction)  I21.4 410.70   8. Coronary artery disease involving native coronary artery of native heart with unstable angina pectoris  I25.110 414.01     411.1     Patient Active Problem List   Diagnosis    HLD (hyperlipidemia)    Acute hypoxemic respiratory failure due to pulmonary edema    Acute exacerbation of congestive heart failure    Non-STEMI (non-ST elevated myocardial infarction)    Multivessel CAD with unstable angina pectoris manifesting as dyspnea    S/P CABG x 4 on 2023    Ischemic cardiomyopathy with LVEF 31%    Hypertensive urgency with diastolic dysfunction    Postop ANURADHA resolved    Postop Encephalopathy    Postop acute blood loss anemia    Postoperative multiple bilateral embolic strokes     Past Medical History:   Diagnosis Date    Hyperlipidemia     Hypertension      Past Surgical History:   Procedure Laterality Date    BREAST FIBROADENOMA SURGERY      9 y/o-was benign    CARDIAC CATHETERIZATION N/A 10/26/2023    Procedure: Left Heart Cath;  Surgeon: Jordi Hodge MD;  Location:  [facility] CATH INVASIVE LOCATION;  Service: Cardiovascular;  Laterality: N/A;    CORONARY ARTERY BYPASS GRAFT N/A 2023    Procedure: MEDIAN STERNOTOMY, CORONARY ARTERY BYPASS GRAFTING X4, UTILIZING THE LEFT INTERANL MAMMARY ARTERY, EVH OF THE LEFT GREATER SAPHENOUS VEIN, EXPLORATION OF THE RIGHT LEG, PRIMITIVO PER ANETHESIA;  Surgeon: Dirk Cleveland MD;  Location: [facility] OR;  Service: Cardiothoracic;   Laterality: N/A;    ENDOSCOPY W/ PEG TUBE PLACEMENT N/A 11/10/2023    Procedure: ESOPHAGOGASTRODUODENOSCOPY WITH PERCUTANEOUS ENDOSCOPIC GASTROSTOMY TUBE INSERTION AT BEDSIDE;  Surgeon: Dirk Cleveland MD;  Location: [facility] ENDOSCOPY;  Service: Cardiothoracic;  Laterality: N/A;      General Information       Row Name 11/14/23 1433          OT Time and Intention    Document Type therapy note (daily note)  -MR     Mode of Treatment occupational therapy  -MR       Row Name 11/14/23 1433          General Information    Patient Profile Reviewed yes  -MR     Existing Precautions/Restrictions cardiac;fall;sternal;other (see comments)  L sided weakness; no command following  -MR     Barriers to Rehab medically complex;cognitive status  -MR       Row Name 11/14/23 1433          Cognition    Orientation Status (Cognition) unable/difficult to assess  -MR       Row Name 11/14/23 1433          Safety Issues, Functional Mobility    Safety Issues Affecting Function (Mobility) ability to follow commands;awareness of need for assistance;insight into deficits/self-awareness;sequencing abilities  -MR     Impairments Affecting Function (Mobility) balance;cognition;coordination;endurance/activity tolerance;motor control;motor planning;postural/trunk control;range of motion (ROM);strength  -MR     Cognitive Impairments, Mobility Safety/Performance attention;awareness, need for assistance;insight into deficits/self-awareness;safety precaution awareness;safety precaution follow-through;sequencing abilities  -MR               User Key  (r) = Recorded By, (t) = Taken By, (c) = Cosigned By      Initials Name Provider Type    Nelida Foster, OT Occupational Therapist                     Mobility/ADL's       Row Name 11/14/23 1434          Bed Mobility    Comment, (Bed Mobility) All EOB/OOB deferred d/t cognition and poor command following.  -MR       Row Name 11/14/23 1434          Transfers    Comment, (Transfers) All EOB/OOB deferred d/t  cognition and poor command following.  -MR               User Key  (r) = Recorded By, (t) = Taken By, (c) = Cosigned By      Initials Name Provider Type    Nelida Foster OT Occupational Therapist                   Obj/Interventions       Row Name 11/14/23 1435          Shoulder (Therapeutic Exercise)    Shoulder (Therapeutic Exercise) PROM (passive range of motion)  -MR     Shoulder PROM (Therapeutic Exercise) bilateral;flexion;extension;aBduction;aDduction;external rotation;internal rotation;supine;10 repetitions  -       Row Name 11/14/23 1435          Elbow/Forearm (Therapeutic Exercise)    Elbow/Forearm (Therapeutic Exercise) PROM (passive range of motion)  -MR     Elbow/Forearm PROM (Therapeutic Exercise) bilateral;flexion;extension;supination;pronation;supine;10 repetitions  -       Row Name 11/14/23 1435          Wrist (Therapeutic Exercise)    Wrist (Therapeutic Exercise) PROM (passive range of motion)  -MR     Wrist PROM (Therapeutic Exercise) bilateral;flexion;extension;10 repetitions  -       Row Name 11/14/23 1435          Hand (Therapeutic Exercise)    Hand (Therapeutic Exercise) PROM (passive range of motion)  -MR     Hand PROM (Therapeutic Exercise) bilateral;finger flexion;finger extension;10 repetitions  -       Row Name 11/14/23 1435          Motor Skills    Therapeutic Exercise shoulder;elbow/forearm;wrist;hand  -MR               User Key  (r) = Recorded By, (t) = Taken By, (c) = Cosigned By      Initials Name Provider Type    Nelida Foster OT Occupational Therapist                   Goals/Plan    No documentation.                  Clinical Impression       [facility] Name 11/14/23 1435          Pain Assessment    Additional Documentation Pain Scale: FACES Pre/Post-Treatment (Group)  -MR       Row Name 11/14/23 1435          Pain Scale: FACES Pre/Post-Treatment    Pain: FACES Scale, Pretreatment 0-->no hurt  -     Posttreatment Pain Rating 0-->no hurt  -       Row Name 11/14/23 1435           Plan of Care Review    Plan of Care Reviewed With patient  -MR     Progress no change  -MR     Outcome Evaluation Pt continues to be limited by poor command following, decreased cognition and lethargy. Pt tolerated bed level ther ex/PROM of BUE. OT donned L resting WHFO prefab orthosis. Skin was checked prior to application, RN aware and educated to doff after 2 hours. Continue to progress as able per current POC. Recommend SNF at d/c if deemed medically necessary.  -MR       Row Name 11/14/23 1435          Therapy Plan Review/Discharge Plan (OT)    Anticipated Discharge Disposition (OT) skilled nursing facility  -MR       Row Name 11/14/23 1435          Vital Signs    Pre Systolic BP Rehab 111  -MR     Pre Treatment Diastolic BP 61  -MR     Post Systolic BP Rehab 111  -MR     Post Treatment Diastolic BP 61  -MR     Pretreatment Heart Rate (beats/min) 75  -MR     Posttreatment Heart Rate (beats/min) 64  -MR     Pre SpO2 (%) 94  -MR     O2 Delivery Pre Treatment nasal cannula  -MR     O2 Delivery Intra Treatment nasal cannula  -MR     Post SpO2 (%) 94  -MR     O2 Delivery Post Treatment nasal cannula  -MR     Pre Patient Position Supine  -MR     Intra Patient Position Supine  -MR     Post Patient Position Supine  -MR       Row Name 11/14/23 1435          Positioning and Restraints    Pre-Treatment Position in bed  -MR     Post Treatment Position bed  -MR     In Bed notified nsg;call light within reach;encouraged to call for assist;exit alarm on;side rails up x3;fowlers;RUE elevated;LUE elevated;heels elevated  LUE in [facility] prefab orthosis - RN aware  -MR               User Key  (r) = Recorded By, (t) = Taken By, (c) = Cosigned By      Initials Name Provider Type    MR Nelida Sanchez, OT Occupational Therapist                   Outcome Measures       Row Name 11/14/23 1440          How much help from another is currently needed...    Putting on and taking off regular lower body clothing? 1  -MR     Bathing  (including washing, rinsing, and drying) 1  -MR     Toileting (which includes using toilet bed pan or urinal) 1  -MR     Putting on and taking off regular upper body clothing 1  -MR     Taking care of personal grooming (such as brushing teeth) 1  -MR     Eating meals 1  -MR     AM-PAC 6 Clicks Score (OT) 6  -MR       Row Name 11/14/23 1440          Functional Assessment    Outcome Measure Options AM-PAC 6 Clicks Daily Activity (OT)  -MR               User Key  (r) = Recorded By, (t) = Taken By, (c) = Cosigned By      Initials Name Provider Type    Nelida Foster, OT Occupational Therapist                    Occupational Therapy Education       Title: PT OT SLP Therapies (In Progress)       Topic: Occupational Therapy (Done)       Point: ADL training (Done)       Description:   Instruct learner(s) on proper safety adaptation and remediation techniques during self care or transfers.   Instruct in proper use of assistive devices.                  Learning Progress Summary             Patient Acceptance, E, VU by MR at 11/14/2023 1440    Acceptance, E, VU by AN at 11/9/2023 1611    Acceptance, E, NR by  at 11/7/2023 1453    Acceptance, E, VU by AN at 10/25/2023 1034   Family Acceptance, E, VU by AN at 11/9/2023 1611                         Point: Home exercise program (Done)       Description:   Instruct learner(s) on appropriate technique for monitoring, assisting and/or progressing therapeutic exercises/activities.                  Learning Progress Summary             Patient Acceptance, E, VU by MR at 11/14/2023 1440    Acceptance, E, VU by AN at 11/9/2023 1611    Acceptance, E, NR by CS at 11/7/2023 1453   Family Acceptance, E, VU by AN at 11/9/2023 1611                         Point: Precautions (Done)       Description:   Instruct learner(s) on prescribed precautions during self-care and functional transfers.                  Learning Progress Summary             Patient Acceptance, E, VU by MR at 11/14/2023  1440    Acceptance, E, VU by AN at 11/9/2023 1611    Acceptance, E, NR by  at 11/7/2023 1453    Acceptance, E, VU by AN at 10/25/2023 1034   Family Acceptance, E, VU by AN at 11/9/2023 1611                         Point: Body mechanics (Done)       Description:   Instruct learner(s) on proper positioning and spine alignment during self-care, functional mobility activities and/or exercises.                  Learning Progress Summary             Patient Acceptance, E, VU by MR at 11/14/2023 1440    Acceptance, E, VU by AN at 11/9/2023 1611    Acceptance, E, NR by  at 11/7/2023 1453    Acceptance, E, VU by AN at 10/25/2023 1034   Family Acceptance, E, VU by AN at 11/9/2023 1611                                         User Key       Initials Effective Dates Name Provider Type Discipline     09/02/21 -  Analy Chavez, OT Occupational Therapist OT    AN 09/21/21 -  Aruna Sarah, OT Occupational Therapist OT     09/22/22 -  Nelida Sanchez OT Occupational Therapist OT                  OT Recommendation and Plan     Plan of Care Review  Plan of Care Reviewed With: patient  Progress: no change  Outcome Evaluation: Pt continues to be limited by poor command following, decreased cognition and lethargy. Pt tolerated bed level ther ex/PROM of BUE. OT donned L resting WHFO prefab orthosis. Skin was checked prior to application, RN aware and educated to doff after 2 hours. Continue to progress as able per current POC. Recommend SNF at d/c if deemed medically necessary.     Time Calculation:         Time Calculation- OT       Row Name 11/14/23 1440             Time Calculation- OT    OT Start Time 1034  -MR      OT Received On 11/14/23  -MR         Timed Charges    88924 - OT Therapeutic Exercise Minutes 10  -MR      49000 - OT Self Care/Mgmt Minutes 2  -MR         Total Minutes    Timed Charges Total Minutes 12  -MR       Total Minutes 12  -MR                User Key  (r) = Recorded By, (t) = Taken By, (c) = Cosigned  By      Initials Name Provider Type    Nelida Foster OT Occupational Therapist                  Therapy Charges for Today       Code Description Service Date Service Provider Modifiers Qty    [id number] HC OT THER PROC EA 15 MIN 11/14/2023 Nelida Sanchez OT GO 1                 Nelida Sanchez OT  11/14/2023

## 2023-11-14 NOTE — CASE MANAGEMENT/SOCIAL WORK
Continued Stay Note  [facility]     Patient Name: Michael Cochran  MRN: [id number]  Today's Date: 11/14/2023    Admit Date: 10/24/2023    Plan: SNF [facility]   Discharge Plan       Row Name 11/14/23 1345       Plan    Plan SNF [facility]    Patient/Family in Agreement with Plan yes    Plan Comments I spoke w/Coby Curry liaison for [facility], patient has insurance approval for bed tomorrow for skilled rehab. I have arranged [facility]  stretcher transport for noon on Wednesday. Pharmacy updated in Epic to facility pharmacy. I updated RN Julia via chat message. I will call daughter and update her as well and see if she can come by tomorrow to sign DNR, EMS form for transport. I spoke w/daughter Katharina, she will be here @ bedside in am and can sign DNR, EMS form. She stated that she usually sees  Jorden and prays w/him every am. I also chat messaged Toña FLOREZ and updated that patient has approval and transport scheduled.     Final Discharge Disposition Code 03 - skilled nursing facility (SNF)                   Discharge Codes    No documentation.                 Expected Discharge Date and Time       Expected Discharge Date Expected Discharge Time    Nov 17, 2023               Kalyan Garcia RN

## 2023-11-14 NOTE — PROGRESS NOTES
Cardiothoracic Surgery Progress Note    11/1/23 s/p CABG x 4  11/10/23 s/p PEG        LOS: 21 days      Subjective:  Moving around in the bed this AM     Objective:  Vital Signs  Temp:  [98.3 °F (36.8 °C)-98.9 °F (37.2 °C)] 98.3 °F (36.8 °C)  Heart Rate:  [66-92] 80  Resp:  [16-22] 22  BP: ()/(52-97) 123/52    Physical Exam:   General Appearance:  Lying in bed.  Eyes open, turns head to person talking.  Saying oh God.  Moving extremities.      Lungs: clear to auscultation, respirations regular, respirations even, and respirations unlabored   Heart: regular rhythm & normal rate, normal S1, S2, no murmur, no gallop, no rub, and no click   Incision C/D/I  Results:    Results from last 7 days   Lab Units 11/13/23  0316   WBC 10*3/mm3 15.34*   HEMOGLOBIN g/dL 7.2*   HEMATOCRIT % 24.5*   PLATELETS 10*3/mm3 309     Results from last 7 days   Lab Units 11/13/23  0316   SODIUM mmol/L 140   POTASSIUM mmol/L 4.3   CHLORIDE mmol/L 108*   CO2 mmol/L 25.0   BUN mg/dL 38*   CREATININE mg/dL 0.82   GLUCOSE mg/dL 198*   CALCIUM mg/dL 8.8       Assessment:  11/1/23 s/p CABG x 4  11/10/23 s/p PEG    Plan:  Continue enteral nutrition at goal  Head of bed elevated at all times  PPI  Oral care  Discharge to SNF when bed available    Dirk Cleveland MD  11/14/23  07:30 EST

## 2023-11-14 NOTE — PLAN OF CARE
Goal Outcome Evaluation:  Plan of Care Reviewed With: patient        Progress: no change  Outcome Evaluation: Pt continues to be limited by poor command following, decreased cognition and lethargy. Pt tolerated bed level ther ex/PROM of BUE. OT donned L resting WHFO prefab orthosis. Skin was checked prior to application, RN aware and educated to doff after 2 hours. Continue to progress as able per current POC. Recommend SNF at d/c if deemed medically necessary.      Anticipated Discharge Disposition (OT): skilled nursing facility

## 2023-11-14 NOTE — THERAPY TREATMENT NOTE
Patient Name: Michael Cochran  : 1945    MRN: [id number]                              Today's Date: 2023       Admit Date: 10/24/2023    Visit Dx:     ICD-10-CM ICD-9-CM   1. Elevated troponin  R79.89 790.6   2. Acute on chronic congestive heart failure, unspecified heart failure type  I50.9 428.0   3. Hypertensive emergency  I16.1 401.9   4. Acute respiratory failure with hypoxia  J96.01 518.81   5. Pleural effusion, left  J90 511.9   6. Hypokalemia  E87.6 276.8   7. Non-STEMI (non-ST elevated myocardial infarction)  I21.4 410.70   8. Coronary artery disease involving native coronary artery of native heart with unstable angina pectoris  I25.110 414.01     411.1     Patient Active Problem List   Diagnosis    HLD (hyperlipidemia)    Acute hypoxemic respiratory failure due to pulmonary edema    Acute exacerbation of congestive heart failure    Non-STEMI (non-ST elevated myocardial infarction)    Multivessel CAD with unstable angina pectoris manifesting as dyspnea    S/P CABG x 4 on 2023    Ischemic cardiomyopathy with LVEF 31%    Hypertensive urgency with diastolic dysfunction    Postop ANURADHA resolved    Postop Encephalopathy    Postop acute blood loss anemia    Postoperative multiple bilateral embolic strokes     Past Medical History:   Diagnosis Date    Hyperlipidemia     Hypertension      Past Surgical History:   Procedure Laterality Date    BREAST FIBROADENOMA SURGERY      9 y/o-was benign    CARDIAC CATHETERIZATION N/A 10/26/2023    Procedure: Left Heart Cath;  Surgeon: Jordi Hodge MD;  Location:  [facility] CATH INVASIVE LOCATION;  Service: Cardiovascular;  Laterality: N/A;    CORONARY ARTERY BYPASS GRAFT N/A 2023    Procedure: MEDIAN STERNOTOMY, CORONARY ARTERY BYPASS GRAFTING X4, UTILIZING THE LEFT INTERANL MAMMARY ARTERY, EVH OF THE LEFT GREATER SAPHENOUS VEIN, EXPLORATION OF THE RIGHT LEG, PRIMITIVO PER ANETHESIA;  Surgeon: Dirk Cleveland MD;  Location: [facility] OR;  Service: Cardiothoracic;   Laterality: N/A;    ENDOSCOPY W/ PEG TUBE PLACEMENT N/A 11/10/2023    Procedure: ESOPHAGOGASTRODUODENOSCOPY WITH PERCUTANEOUS ENDOSCOPIC GASTROSTOMY TUBE INSERTION AT BEDSIDE;  Surgeon: Dirk Cleveland MD;  Location: [facility] ENDOSCOPY;  Service: Cardiothoracic;  Laterality: N/A;      General Information       Row Name 11/14/23 1457          Physical Therapy Time and Intention    Document Type therapy note (daily note)  -KG     Mode of Treatment physical therapy  -KG       Row Name 11/14/23 1457          General Information    Existing Precautions/Restrictions cardiac;fall;sternal;other (see comments)  L sided weakness; no command following  -KG     Barriers to Rehab medically complex;cognitive status  -KG       Row Name 11/14/23 1457          Cognition    Orientation Status (Cognition) unable/difficult to assess  -KG       Row Name 11/14/23 1457          Safety Issues, Functional Mobility    Safety Issues Affecting Function (Mobility) ability to follow commands;awareness of need for assistance;insight into deficits/self-awareness;safety precaution awareness;safety precautions follow-through/compliance;sequencing abilities  -KG     Impairments Affecting Function (Mobility) balance;cognition;coordination;endurance/activity tolerance;range of motion (ROM);strength  -KG     Cognitive Impairments, Mobility Safety/Performance attention;awareness, need for assistance;insight into deficits/self-awareness;safety precaution awareness;safety precaution follow-through;sequencing abilities  -KG               User Key  (r) = Recorded By, (t) = Taken By, (c) = Cosigned By      Initials Name Provider Type    KG Brittney Rocha, PT Physical Therapist                   Mobility       Row Name 11/14/23 1500          Bed Mobility    Comment, (Bed Mobility) All EOB/OOB mobility deferred due to cognitive status and inability to follow commands.  -KG       Row Name 11/14/23 1500          Transfers    Comment, (Transfers) All OOB  mobility deferred this session.  -KG       Row Name 11/14/23 1500          Bed-Chair Transfer    Bed-Chair [place] (Transfers) unable to assess  -KG       Row Name 11/14/23 1500          Sit-Stand Transfer    Sit-Stand [place] (Transfers) unable to assess  -KG       Row Name 11/14/23 1500          Gait/Stairs (Locomotion)    [place] Level (Gait) unable to assess  -KG     Comment, (Gait/Stairs) Ambulation deferred. Not appropriate to assess.  -KG               User Key  (r) = Recorded By, (t) = Taken By, (c) = Cosigned By      Initials Name Provider Type    Brittney Crouch PT Physical Therapist                   Obj/Interventions       Row Name 11/14/23 1501          Motor Skills    Therapeutic Exercise hip;knee;ankle  -KG       [facility] Name 11/14/23 1501          Hip (Therapeutic Exercise)    Hip (Therapeutic Exercise) strengthening exercise  -KG     Hip Strengthening (Therapeutic Exercise) bilateral;flexion;extension;aBduction;aDduction;external rotation;internal rotation;supine;10 repetitions  -KG       [facility] Name 11/14/23 1501          Knee (Therapeutic Exercise)    Knee (Therapeutic Exercise) strengthening exercise  -KG     Knee Strengthening (Therapeutic Exercise) bilateral;flexion;extension;SLR (straight leg raise);SAQ (short arc quad);supine;10 repetitions  -KG       Row Name 11/14/23 1501          Ankle (Therapeutic Exercise)    Ankle (Therapeutic Exercise) strengthening exercise  -KG     Ankle Strengthening (Therapeutic Exercise) bilateral;dorsiflexion;plantarflexion;supine;10 repetitions  -KG               User Key  (r) = Recorded By, (t) = Taken By, (c) = Cosigned By      Initials Name Provider Type    Brittney Crouch PT Physical Therapist                   Goals/Plan    No documentation.                  Clinical Impression       [facility] Name 11/14/23 1502          Pain    Additional Documentation Pain Scale: FACES Pre/Post-Treatment (Group)  -KG       Row Name 11/14/23 1507           Pain Scale: FACES Pre/Post-Treatment    Pain: FACES Scale, Pretreatment 0-->no hurt  -KG     Posttreatment Pain Rating 0-->no hurt  -KG       Row Name 11/14/23 1502          Plan of Care Review    Plan of Care Reviewed With patient  -KG     Progress no change  -KG     Outcome Evaluation All EOB/OOB mobility deferred due to pt's cognitive status and inability to follow commands. Pt tolerated bed level ther ex, but did not follow commands or remain alert enough to actively participate. Continue to recommend PT skilled care and D/C to SNF.  -KG       Row Name 11/14/23 1502          Vital Signs    Pre Systolic BP Rehab 111  -KG     Pre Treatment Diastolic BP 61  -KG     Post Systolic BP Rehab 111  -KG     Post Treatment Diastolic BP 61  -KG     Pretreatment Heart Rate (beats/min) 64  -KG     Posttreatment Heart Rate (beats/min) 70  -KG     Pre SpO2 (%) 94  -KG     O2 Delivery Pre Treatment room air  -KG     Post SpO2 (%) 94  -KG     O2 Delivery Post Treatment room air  -KG     Pre Patient Position Supine  -KG     Intra Patient Position Supine  -KG     Post Patient Position Supine  -KG       Row Name 11/14/23 1502          Positioning and Restraints    Pre-Treatment Position in bed  -KG     Post Treatment Position bed  -KG     In Bed notified nsg;supine;call light within reach;encouraged to call for assist;exit alarm on;side rails up x3;RUE elevated;LUE elevated;L multipodus  -KG               User Key  (r) = Recorded By, (t) = Taken By, (c) = Cosigned By      Initials Name Provider Type    KG Brittney Rocha, PT Physical Therapist                   Outcome Measures       Row Name 11/14/23 0151          How much help from another person do you currently need...    Turning from your back to your side while in flat bed without using bedrails? 1  -KG     Moving from lying on back to sitting on the side of a flat bed without bedrails? 1  -KG     Moving to and from a bed to a chair (including a wheelchair)? 1  -KG      Standing up from a chair using your arms (e.g., wheelchair, bedside chair)? 1  -KG     Climbing 3-5 steps with a railing? 1  -KG     To walk in hospital room? 1  -KG     AM-PAC 6 Clicks Score (PT) 6  -KG     Highest Level of Mobility Goal 2 --> Bed activities/dependent transfer  -KG       Row Name 11/14/23 1504 11/14/23 1440       Functional Assessment    Outcome Measure Options AM-PAC 6 Clicks Basic Mobility (PT)  -KG AM-PAC 6 Clicks Daily Activity (OT)  -MR              User Key  (r) = Recorded By, (t) = Taken By, (c) = Cosigned By      Initials Name Provider Type    KG Brittney Rocha, PT Physical Therapist    MR Nelida Sanchez, OT Occupational Therapist                                 Physical Therapy Education       Title: PT OT SLP Therapies (In Progress)       Topic: Physical Therapy (In Progress)       Point: Mobility training (In Progress)       Learning Progress Summary             Patient Acceptance, E, NR by KG at 11/14/2023 1058    Acceptance, E, NR by KG at 11/13/2023 1051    Acceptance, E, NR by SUE at 11/12/2023 0900    Acceptance, E, NR by AE at 11/9/2023 1503    Acceptance, E, NR by AE at 11/8/2023 1319    Acceptance, E, NR by SUE at 11/7/2023 1300    Acceptance, E, NR by AE at 11/6/2023 1310    Acceptance, E, NR by KG at 11/5/2023 0940    Acceptance, E, VU,NR by ML at 10/27/2023 1544    Comment: continued mobility while awaiting CABG, sternal precautions following CABG    Acceptance, E,D, VU,NR by LR at 10/25/2023 1327    Comment: Educated on benefits of mobility, correct sit<->stand t/f technique, correct gait mechanics, PLB, energy conservation, and progression of POC.                         Point: Home exercise program (In Progress)       Learning Progress Summary             Patient Acceptance, E, NR by KG at 11/14/2023 1058    Acceptance, E, NR by KG at 11/13/2023 1051    Acceptance, E, NR by SUE at 11/12/2023 0900    Acceptance, E, NR by AE at 11/9/2023 1503    Acceptance, E, NR by  AE at 11/8/2023 1319    Acceptance, E, NR by SUE at 11/7/2023 1300    Acceptance, E, NR by AE at 11/6/2023 1310    Acceptance, E, NR by KG at 11/5/2023 0940                         Point: Body mechanics (In Progress)       Learning Progress Summary             Patient Acceptance, E, NR by KG at 11/14/2023 1058    Acceptance, E, NR by KG at 11/13/2023 1051    Acceptance, E, NR by SUE at 11/12/2023 0900    Acceptance, E, NR by AE at 11/9/2023 1503    Acceptance, E, NR by AE at 11/8/2023 1319    Acceptance, E, NR by SUE at 11/7/2023 1300    Acceptance, E, NR by AE at 11/6/2023 1310    Acceptance, E, NR by KG at 11/5/2023 0940    Acceptance, E,D, VU,NR by LR at 10/25/2023 1327    Comment: Educated on benefits of mobility, correct sit<->stand t/f technique, correct gait mechanics, PLB, energy conservation, and progression of POC.                         Point: Precautions (In Progress)       Learning Progress Summary             Patient Acceptance, E, NR by KG at 11/14/2023 1058    Acceptance, E, NR by KG at 11/13/2023 1051    Acceptance, E, NR by SUE at 11/12/2023 0900    Acceptance, E, NR by AE at 11/9/2023 1503    Acceptance, E, NR by AE at 11/8/2023 1319    Acceptance, E, NR by SUE at 11/7/2023 1300    Acceptance, E, NR by AE at 11/6/2023 1310    Acceptance, E, NR by KG at 11/5/2023 0940    Acceptance, E, VU,NR by ML at 10/27/2023 1544    Comment: continued mobility while awaiting CABG, sternal precautions following CABG    Acceptance, E,D, VU,NR by LR at 10/25/2023 1327    Comment: Educated on benefits of mobility, correct sit<->stand t/f technique, correct gait mechanics, PLB, energy conservation, and progression of POC.                                         User Key       Initials Effective Dates Name Provider Type Discipline    SUE 02/03/23 -  Oneida Daly PT Physical Therapist PT    LR 02/03/23 -  Alissa Pierre, PT Physical Therapist PT    KG 05/22/20 -  Brittney Rocha, PT Physical Therapist  PT    ML 04/22/21 -  Alisa Salguero Physical Therapist PT    AE 09/21/21 -  Jcarlos Rodriguez PT Physical Therapist PT                  PT Recommendation and Plan     Plan of Care Reviewed With: patient  Progress: no change  Outcome Evaluation: All EOB/OOB mobility deferred due to pt's cognitive status and inability to follow commands. Pt tolerated bed level ther ex, but did not follow commands or remain alert enough to actively participate. Continue to recommend PT skilled care and D/C to SNF.     Time Calculation:         PT Charges       Row Name 11/14/23 1058             Time Calculation    Start Time 1058  -KG      PT Received On 11/14/23  -KG      PT Goal Re-Cert Due Date 11/15/23  -KG         Time Calculation- PT    Total Timed Code Minutes- PT 9 minute(s)  -KG         Timed Charges    27603 - PT Therapeutic Exercise Minutes 9  -KG      13558 - PT Therapeutic Activity Minutes --  -KG         Total Minutes    Timed Charges Total Minutes 9  -KG       Total Minutes 9  -KG                User Key  (r) = Recorded By, (t) = Taken By, (c) = Cosigned By      Initials Name Provider Type    KG Brittney Rocha, PT Physical Therapist                  Therapy Charges for Today       Code Description Service Date Service Provider Modifiers Qty    25523364674 HC PT THER PROC EA 15 MIN 11/13/2023 Brittney Rocha, PT GP 1    43512214685 HC PT THER PROC EA 15 MIN 11/14/2023 Brittney Rocha, PT GP 1            PT G-Codes  Outcome Measure Options: AM-PAC 6 Clicks Basic Mobility (PT)  AM-PAC 6 Clicks Score (PT): 6  AM-PAC 6 Clicks Score (OT): 6  Modified Lynn Scale: 4 - Moderately severe disability.  Unable to walk without assistance, and unable to attend to own bodily needs without assistance.  PT Discharge Summary  Anticipated Discharge Disposition (PT): skilled nursing facility    Kimberly Rocha PT  11/14/2023

## 2023-11-14 NOTE — PLAN OF CARE
Goal Outcome Evaluation:  Plan of Care Reviewed With: patient        Progress: no change  Outcome Evaluation: All EOB/OOB mobility deferred due to pt's cognitive status and inability to follow commands. Pt tolerated bed level ther ex, but did not follow commands or remain alert enough to actively participate. Continue to recommend PT skilled care and D/C to SNF.      Anticipated Discharge Disposition (PT): skilled nursing facility

## 2023-11-15 VITALS
HEIGHT: 62 IN | BODY MASS INDEX: 37.81 KG/M2 | HEART RATE: 72 BPM | OXYGEN SATURATION: 95 % | WEIGHT: 205.47 LBS | TEMPERATURE: 98.4 F | RESPIRATION RATE: 18 BRPM | SYSTOLIC BLOOD PRESSURE: 126 MMHG | DIASTOLIC BLOOD PRESSURE: 70 MMHG

## 2023-11-15 LAB
ABO GROUP BLD: NORMAL
BLD GP AB SCN SERPL QL: NEGATIVE
GLUCOSE BLDC GLUCOMTR-MCNC: 218 MG/DL (ref 70–130)
GLUCOSE BLDC GLUCOMTR-MCNC: 242 MG/DL (ref 70–130)
HCT VFR BLD AUTO: 26.2 % (ref 34–46.6)
HGB BLD-MCNC: 7.9 G/DL (ref 12–15.9)
RH BLD: POSITIVE
T&S EXPIRATION DATE: NORMAL

## 2023-11-15 PROCEDURE — 82948 REAGENT STRIP/BLOOD GLUCOSE: CPT

## 2023-11-15 PROCEDURE — 63710000001 INSULIN REGULAR HUMAN PER 5 UNITS: Performed by: INTERNAL MEDICINE

## 2023-11-15 PROCEDURE — 85018 HEMOGLOBIN: CPT | Performed by: PHYSICIAN ASSISTANT

## 2023-11-15 PROCEDURE — 97110 THERAPEUTIC EXERCISES: CPT

## 2023-11-15 PROCEDURE — 99239 HOSP IP/OBS DSCHRG MGMT >30: CPT | Performed by: STUDENT IN AN ORGANIZED HEALTH CARE EDUCATION/TRAINING PROGRAM

## 2023-11-15 PROCEDURE — 63710000001 INSULIN DETEMIR PER 5 UNITS: Performed by: INTERNAL MEDICINE

## 2023-11-15 PROCEDURE — 85014 HEMATOCRIT: CPT | Performed by: PHYSICIAN ASSISTANT

## 2023-11-15 RX ORDER — ASPIRIN 81 MG/1
81 TABLET, CHEWABLE ORAL DAILY
Qty: 30 TABLET | Refills: 11 | Status: ON HOLD | OUTPATIENT
Start: 2023-11-16 | End: 2024-11-15

## 2023-11-15 RX ORDER — CARVEDILOL 6.25 MG/1
6.25 TABLET ORAL 2 TIMES DAILY WITH MEALS
Qty: 180 TABLET | Refills: 3 | Status: ON HOLD | OUTPATIENT
Start: 2023-11-15 | End: 2024-11-14

## 2023-11-15 RX ORDER — ATORVASTATIN CALCIUM 80 MG/1
80 TABLET, FILM COATED ORAL NIGHTLY
Qty: 360 TABLET | Refills: 0 | Status: ON HOLD | OUTPATIENT
Start: 2023-11-15 | End: 2024-11-14

## 2023-11-15 RX ORDER — LANSOPRAZOLE 30 MG/1
30 CAPSULE, DELAYED RELEASE ORAL 2 TIMES DAILY
Qty: 720 CAPSULE | Refills: 0 | Status: ON HOLD | OUTPATIENT
Start: 2023-11-15 | End: 2024-11-14

## 2023-11-15 RX ORDER — AMLODIPINE BESYLATE 5 MG/1
5 TABLET ORAL
Qty: 30 TABLET | Refills: 11 | Status: ON HOLD | OUTPATIENT
Start: 2023-11-16 | End: 2024-11-15

## 2023-11-15 RX ADMIN — LANSOPRAZOLE 30 MG: 15 TABLET, ORALLY DISINTEGRATING, DELAYED RELEASE ORAL at 08:35

## 2023-11-15 RX ADMIN — INSULIN HUMAN 5 UNITS: 100 INJECTION, SOLUTION PARENTERAL at 00:05

## 2023-11-15 RX ADMIN — INSULIN DETEMIR 15 UNITS: 100 INJECTION, SOLUTION SUBCUTANEOUS at 08:08

## 2023-11-15 RX ADMIN — INSULIN HUMAN 5 UNITS: 100 INJECTION, SOLUTION PARENTERAL at 11:33

## 2023-11-15 RX ADMIN — INSULIN HUMAN 4 UNITS: 100 INJECTION, SOLUTION PARENTERAL at 06:04

## 2023-11-15 RX ADMIN — INSULIN HUMAN 2 UNITS: 100 INJECTION, SOLUTION PARENTERAL at 00:04

## 2023-11-15 RX ADMIN — Medication 10 ML: at 08:08

## 2023-11-15 RX ADMIN — SACUBITRIL AND VALSARTAN 1 TABLET: 24; 26 TABLET, FILM COATED ORAL at 08:08

## 2023-11-15 RX ADMIN — ASPIRIN 81 MG CHEWABLE TABLET 81 MG: 81 TABLET CHEWABLE at 08:08

## 2023-11-15 RX ADMIN — INSULIN HUMAN 4 UNITS: 100 INJECTION, SOLUTION PARENTERAL at 11:33

## 2023-11-15 RX ADMIN — INSULIN HUMAN 5 UNITS: 100 INJECTION, SOLUTION PARENTERAL at 06:04

## 2023-11-15 RX ADMIN — AMLODIPINE BESYLATE 5 MG: 5 TABLET ORAL at 08:08

## 2023-11-15 RX ADMIN — CARVEDILOL 6.25 MG: 6.25 TABLET, FILM COATED ORAL at 08:08

## 2023-11-15 NOTE — PROGRESS NOTES
Cardiothoracic Surgery Progress Note    11/1/23 s/p CABG x 4  11/10/23 s/p PEG        LOS: 22 days      Subjective:  Comfortable in bed  Following some commands per nursing    Objective:  Vital Signs  Temp:  [98.1 °F (36.7 °C)-99.3 °F (37.4 °C)] 98.6 °F (37 °C)  Heart Rate:  [61-90] 74  Resp:  [18-22] 18  BP: (106-149)/(55-92) 144/72    Physical Exam:   General Appearance:  Lying in bed.  Following some commands, will move right side   Lungs: clear to auscultation, respirations regular, respirations even, and respirations unlabored   Heart: regular rhythm & normal rate, normal S1, S2, no murmur, no gallop, no rub, and no click   Incision C/D/I  Results:    Results from last 7 days   Lab Units 11/15/23  0404 11/14/23  1519   WBC 10*3/mm3  --  14.33*   HEMOGLOBIN g/dL 7.9* 6.7*   HEMATOCRIT % 26.2* 22.5*   PLATELETS 10*3/mm3  --  288     Results from last 7 days   Lab Units 11/14/23  1519   SODIUM mmol/L 141   POTASSIUM mmol/L 4.4   CHLORIDE mmol/L 108*   CO2 mmol/L 25.0   BUN mg/dL 41*   CREATININE mg/dL 0.76   GLUCOSE mg/dL 189*   CALCIUM mg/dL 8.9       Assessment:  11/1/23 s/p CABG x 4  11/10/23 s/p PEG    Plan:  Continue enteral nutrition at goal  Head of bed elevated at all times  Shift patient as per protocol to avoid pressure ulcers  PPI  Oral care  Per CM note, transport available for SNF today    Dirk Cleveland MD  11/15/23  07:01 EST

## 2023-11-15 NOTE — CASE MANAGEMENT/SOCIAL WORK
Case Management Discharge Note      Final Note: Plan is [place] 11/15/23. Nurse to call report to [phone]. Discharge summary faxed to [phone]. [facility] transport at 1200. PCS on chart.         Selected Continued Care - Admitted Since 10/24/2023       Destination    No services have been selected for the patient.                Durable Medical Equipment    No services have been selected for the patient.                Dialysis/Infusion    No services have been selected for the patient.                Home Medical Care    No services have been selected for the patient.                Therapy    No services have been selected for the patient.                Community Resources    No services have been selected for the patient.                Community & DME    No services have been selected for the patient.                    Transportation Services  Ambulance: [facility]    Final Discharge Disposition Code: 03 - skilled nursing facility (SNF)

## 2023-11-15 NOTE — DISCHARGE SUMMARY
[facility] Cardiothoracic Surgery  DISCHARGE SUMMARY    Patient Name: Michael Cochran  : 1945  MRN: [id number]    Date of Admission: 10/24/2023 10:44 AM  Date of Discharge:  11/15/2023  Primary Care Physician: Bo Rodriguez PA  Referred By: No ref. provider found    IP Care Team:  Patient Care Team:  Bo Rodriguez PA as PCP - General (Family Medicine)    Consults       Date and Time Order Name Status Description    2023 10:11 AM Inpatient Neurology Consult General Completed     2023  9:44 AM Inpatient Obstetrics / Gynecology Consult      2023  4:16 PM Inpatient Consult to Cardiology Completed     2023  4:16 PM Inpatient Intensivist Consult - For Vent Management      10/26/2023  5:08 PM Inpatient Cardiothoracic Surgery Consult      10/24/2023  1:39 PM Inpatient Cardiology Consult Completed             Hospital Course     Presenting Problem: Coronary artery disease/NSTEMI    Active Hospital Problems    Diagnosis  POA    **Multivessel CAD with unstable angina pectoris manifesting as dyspnea [I25.110]  Yes    Postoperative multiple bilateral embolic strokes [I63.9]  No    S/P CABG x 4 on 2023 [Z95.1]  Not Applicable    Ischemic cardiomyopathy with LVEF 31% [I25.5]  Yes    Hypertensive urgency with diastolic dysfunction [I16.0]  Yes    Postop ANURADHA resolved [N17.9]  No    Postop Encephalopathy [G93.40]  No    Postop acute blood loss anemia [D62]  No    HLD (hyperlipidemia) [E78.5]  Yes    Acute hypoxemic respiratory failure due to pulmonary edema [J96.01]  Yes    Acute exacerbation of congestive heart failure [I50.9]  Yes    Non-STEMI (non-ST elevated myocardial infarction) [I21.4]  Yes      Resolved Hospital Problems    Diagnosis Date Resolved POA    Elevated troponin [R79.89] 2023 Yes        Procedures Performed:  PROCEDURES PERFORMED: 23    1. Coronary artery bypass graft x 4  2. Endoscopic vein harvesting (Left greater saphenous vein).     3. Exploration of right greater saphenous vein      Procedure(s): 11/10/23  ESOPHAGOGASTRODUODENOSCOPY WITH PERCUTANEOUS ENDOSCOPIC GASTROSTOMY TUBE INSERTION AT BEDSIDE       Hospital Course:  Michael Cochran is a 78 y.o. female who was taken to the operating room on 11/1/23 for coronary artery bypass grafting comms 4 with EVH of the left greater saphenous vein and exploration of the right greater saphenous vein with Dr. Cleveland.  She was sent to ICU in stable condition was extubated per ICU protocol. She was evaluated by PT/OT and was deemed appropriate for SNF.  She was discharged to SNF in stable condition on POD # 14. Her hospital course is below.    CAD s/p CABG x 4:  11/5: Chest tubes and pacing wires were removed      ANURADHA (resolved):  Stable kidney function creatinine 0.76 at discharge    Type 2 diabetes (newly diagnosed):  A1c 9.8  Diabetes educator was consulted and saw patient 10/28    Anemia:    Dysphagia:  S/p PEG tube placement on 11/10    Encephalopathy/multifocal bihemispheric embolic strokes:  -Neurology was consulted on 11/4 for altered mental status and new left-sided weakness  -MRI noting multiple small foci of diffusion present within the paramedian bilateral frontal and parietal lobe extending to the bilateral basal ganglia as well as the left cerebellum consistent with acute to subacute infarcts  -Neurology noting minimal withdrawal in all extremities, more on right vs left  -ASA, Statin       Discharge Follow Up Recommendations for outpatient labs/diagnostics:  Follow-up with PCP in 1 to 2 weeks  Follow-up with cardiology in 4 weeks  Follow-up with CT surgery in 6 weeks  Follow-up with stroke/neurology in 1 month        DO NOT REMOVE SUTURES AND/OR STAPLES THIS WILL BE ADDRESSED AT CT SURGERY FOLLOW-UP  If you have any questions or concerns please reach out to our office at [phone]    Day of Discharge     HPI:   Birgit Cochran is a 78-year-old female with past medical history of  hypertension (although she reports that she has not been taking any home medications), hyperlipidemia who presented to the emergency department on 10/24 with complaint of shortness of breath and chest pain.  This has been apparently going on for the past 6 weeks intermittently but worsened acutely on 10/24 which prompted her visit to the emergency department.  In the emergency department, she was hypoxic requiring 4L supplemental O2.  She also reports new onset lower extremity edema on the day she came to the emergency department.  Her BNP was also elevated, chest x-ray showed concern for cardiomegaly and pleural effusion.  Cardiology was consulted.  An echo revealed wall motion abnormalities and an EF of roughly 31%.  She then underwent cardiac cath which showed LAD, circumflex, RCA, and PDA disease.  Our service was consulted for possible CABG.  She is currently chest pain-free.  However, she is on 4 L nasal cannula. Patient denies any history of taking any blood thinners or lower extremity venous stripping.     Vital Signs:   Temp:  [98.2 °F (36.8 °C)-99.3 °F (37.4 °C)] 98.4 °F (36.9 °C)  Heart Rate:  [61-84] 81  Resp:  [18-22] 20  BP: (106-150)/(55-92) 150/81      Physical Exam:  General Appearance:  Lying in bed.  Following some commands, will move right side              Lungs: clear to auscultation, respirations regular, respirations even, and respirations unlabored              Heart: regular rhythm & normal rate, normal S1, S2, no murmur, no gallop, no rub, and no click              Incision C/D/I    Pertinent  and/or Most Recent Results     LAB RESULTS:        Lab 11/15/23  0404 11/14/23  1519 11/13/23  0316 11/12/23  0307 11/11/23  0409 11/10/23  0401 11/09/23  0531   WBC  --  14.33* 15.34* 17.91* 14.77* 17.96* 15.01*   HEMOGLOBIN 7.9* 6.7* 7.2* 7.5* 7.6* 7.8* 7.6*   HEMATOCRIT 26.2* 22.5* 24.5* 25.3* 25.8* 25.8* 25.7*   PLATELETS  --  288 309 361 344 327 277   NEUTROS ABS  --   --   --  15.05* 12.33*  14.96* 11.56*   IMMATURE GRANS (ABS)  --   --   --  0.11* 0.09* 0.20* 0.25*   LYMPHS ABS  --   --   --  1.33 1.21 1.54 1.86   MONOS ABS  --   --   --  1.12* 0.89 0.99* 1.02*   EOS ABS  --   --   --  0.27 0.22 0.23 0.29   MCV  --  98.3* 98.8* 96.9 97.4* 95.9 98.1*   PROCALCITONIN  --   --   --   --   --  0.17  --          Lab 11/14/23  1519 11/13/23  0316 11/12/23  0307 11/11/23  0409 11/10/23  0401 11/09/23  0531   SODIUM 141 140 141 144 145 146*   POTASSIUM 4.4 4.3 4.1 4.3 4.1 4.0   CHLORIDE 108* 108* 106 108* 110* 111*   CO2 25.0 25.0 25.0 25.0 26.0 24.0   ANION GAP 8.0 7.0 10.0 11.0 9.0 11.0   BUN 41* 38* 38* 35* 30* 32*   CREATININE 0.76 0.82 0.77 0.89 0.65 0.70   EGFR 79.8 72.9 78.6 66.0 89.7 88.1   GLUCOSE 189* 198* 178* 165* 115* 152*   CALCIUM 8.9 8.8 8.9 8.5* 9.1 9.0   MAGNESIUM  --  2.3  --   --   --  2.2   PHOSPHORUS  --  3.2  --  4.1  --  4.9*         Lab 11/13/23  0316 11/11/23  0409 11/09/23  0531   TOTAL PROTEIN 5.6*  --   --    ALBUMIN 3.1* 3.2* 3.1*   GLOBULIN 2.5  --   --    ALT (SGPT) 14  --   --    AST (SGOT) 17  --   --    BILIRUBIN 1.1  --   --    ALK PHOS 91  --   --          Lab 11/10/23  0401   PROBNP 6,349.0*         Lab 11/13/23  0316   CHOLESTEROL 117   TRIGLYCERIDES 124         Lab 11/14/23  1710   ABO TYPING A   RH TYPING Positive   ANTIBODY SCREEN Negative         Brief Urine Lab Results  (Last result in the past 365 days)        Color   Clarity   Blood   Leuk Est   Nitrite   Protein   CREAT   Urine HCG        11/10/23 1533 Yellow   Clear   Negative   Moderate (2+)   Negative   Trace                 Microbiology Results (last 10 days)       Procedure Component Value - Date/Time    Urine Culture - Urine, Indwelling Urethral Catheter [[id number]]  (Abnormal) Collected: 11/10/23 1533    Lab Status: Final result Specimen: Urine from Indwelling Urethral Catheter Updated: 11/11/23 1724     Urine Culture Yeast isolated    Narrative:      No further work up  Colonization of the urinary tract  without infection is common. Treatment is discouraged unless the patient is symptomatic, pregnant, or undergoing an invasive urologic procedure.    Urine Culture - Urine, Urine, Clean Catch [[id number]]  (Abnormal) Collected: 11/09/23 1635    Lab Status: Final result Specimen: Urine, Clean Catch Updated: 11/10/23 1238     Urine Culture Yeast isolated    Narrative:      Colonization of the urinary tract without infection is common. Treatment is discouraged unless the patient is symptomatic, pregnant, or undergoing an invasive urologic procedure.    MRSA Screen, PCR (Inpatient) - Swab, Nares [[id number]]  (Normal) Collected: 11/06/23 2245    Lab Status: Final result Specimen: Swab from Nares Updated: 11/07/23 0744     MRSA PCR Negative    Narrative:      The negative predictive value of this diagnostic test is high and should only be used to consider de-escalating anti-MRSA therapy. A positive result may indicate colonization with MRSA and must be correlated clinically.  MRSA Negative    Blood Culture - Blood, Hand, Right [[id number]]  (Abnormal) Collected: 11/05/23 1517    Lab Status: Final result Specimen: Blood from Hand, Right Updated: 11/07/23 0700     Blood Culture Staphylococcus, coagulase negative     Isolated from Aerobic Bottle     Gram Stain Aerobic Bottle Gram positive cocci in groups    Narrative:      Probable contaminant requires clinical correlation, susceptibility not performed unless requested by physician.      Blood Culture - Blood, Arm, Right [[id number]]  (Normal) Collected: 11/05/23 1517    Lab Status: Final result Specimen: Blood from Arm, Right Updated: 11/10/23 1630     Blood Culture No growth at 5 days    Blood Culture ID, PCR - Blood, Hand, Right [[id number]]  (Abnormal) Collected: 11/05/23 1517    Lab Status: Final result Specimen: Blood from Hand, Right Updated: 11/06/23 1635     BCID, PCR Staph spp, not aureus or lugdunensis. Identification by BCID2 PCR.     BOTTLE TYPE Aerobic Bottle             XR Chest 1 View    Result Date: 11/11/2023  XR CHEST 1 VW Date of Exam: 11/11/2023 2:30 AM EST Indication: Progressive leukocytosis Comparison: 11/8/2023 Findings: Patient is status post median sternotomy. Right arm PICC overlies the superior cavoatrial junction. Cardiac silhouette is enlarged. Scattered bibasilar atelectasis is seen. No significant pleural effusion or pneumothorax is seen. No acute osseous lesion is seen.     Impression: Cardiomegaly with scattered bibasilar atelectasis. Electronically Signed: Sergio Esqueda MD  11/11/2023 8:16 AM EST  Workstation ID: [id number]    XR Abdomen KUB    Result Date: 11/10/2023  XR ABDOMEN KUB Date of Exam: 11/10/2023 9:56 AM EST Indication: PEG placement Comparison: 11/3/2023 Findings: There is been interval placement of a percutaneous gastrostomy tube which projects over the mid abdomen just to the left of midline. There are multiple air-filled but nondistended loops of small bowel throughout the abdomen. There is a large amount of stool within the rectum. There are surgical skin staples over the lower chest. There is an ovoid hyperdensity overlying the right upper quadrant of the abdomen which is nonspecific but could represent some oral contrast material.     Impression: 1. Replacement of percutaneous gastrostomy tube and removal of Dobbhoff feeding tube. 2. Air-filled but nondistended loops of small bowel throughout the abdomen. Electronically Signed: Genaro Hendrix MD  11/10/2023 10:28 AM EST  Workstation ID: [id number]    CT Head Without Contrast    Result Date: 11/9/2023  CT HEAD WO CONTRAST Date of Exam: 11/9/2023 4:11 AM EST Indication: Stroke, follow up. Comparison: Brain MRI 11/6/2023, head CT 11/4/2023 Technique: Axial CT images were obtained of the head without contrast administration.  Automated exposure control and iterative construction methods were used. Findings: No acute intracranial hemorrhage. Evolving scattered acute infarcts within the  bilateral corona radiata and centrum semiovale and left cerebellum, appearing slightly more hypodense compared to prior CT of the head from 11/4/2023. No evidence of new or large territory infarct. No mass effect. Similar generalized cerebral volume loss. No extra-axial collections. Normal size and configuration of the ventricles. There is intracranial atherosclerosis. Unremarkable appearance of the orbits. There is partial  visualization of nasogastric tube. The mastoid air cells are clear. There is submucosal thickening/opacifications in the right anterior ethmoid air cells and right frontal sinus. No acute or suspicious bony findings.     Impression: Redemonstration of scattered supratentorial and infratentorial hypodensities, compatible with evolving acute infarcts as seen on prior studies. No intracranial hemorrhage. No new or large territory infarct. No mass effect. Electronically Signed: Brain Pack MD  11/9/2023 8:13 AM EST  Workstation ID: [id number]    XR Chest 1 View    Result Date: 11/8/2023  XR CHEST 1 VW Date of Exam: 11/8/2023 1:35 AM CST Indication: Ischemic cardiomyopathy post CABG.  Now with multiple bilateral cerebral infarcts and poor clearance of oral secretions Comparison: 11/6/2023 Findings: Cardiomediastinal silhouette is unchanged. Removal of right IJ sheath and placement of a right-sided PICC with tip in the mid SVC. There is persistent pulmonary vascular congestion with mild basilar airspace disease and minimal effusions. No pneumothorax. No acute osseous abnormality identified.     Impression: Support lines and tubes as discussed without significant change otherwise Electronically Signed: Marc Castillo MD  11/8/2023 6:42 AM CST  Workstation ID: [id number]    Adult Transthoracic Echo Limited W/ Cont if Necessary Per Protocol    Result Date: 11/6/2023    Left ventricular systolic function is normal. Estimated left ventricular EF = 60%   Left ventricular wall thickness is consistent with  mild concentric hypertrophy.   Trace to mild mitral regurgitation.   Saline test results are negative for intracardiac shunting.   There is a trivial pericardial effusion adjacent to the left ventricle.     XR Chest 1 View    Result Date: 11/6/2023  XR CHEST 1 VW Date of Exam: 11/6/2023 2:50 AM EST Indication: post CABG Comparison: 11/4/2023 Findings: Stable enlargement of the cardiac silhouette with surgical changes post CABG. There is no change in position of a right IJ introducer. Stable position of an OG tube. Mild basilar atelectasis. The lungs are otherwise clear. Small left pleural effusion     Impression: Stable chest Electronically Signed: John العراقي MD  11/6/2023 7:17 AM EST  Workstation ID: [id number]    MRI Brain Without Contrast    Result Date: 11/6/2023  MRI BRAIN WO CONTRAST Date of Exam: 11/6/2023 12:40 AM EST Indication: AMS.  Comparison: 11/4/2023. Technique:  Routine multiplanar/multisequence sequence images of the brain were obtained without contrast administration. Findings: Multiple small foci of diffusion restriction are seen within the paramedian bilateral frontal and parietal lobes extending to the bilateral basal ganglia. Patchy diffusion restriction is present within the left cerebellum. Corresponding decreased signal is present within the ADC map. Patchy FLAIR signal changes are seen corresponding to the areas of diffusion restriction. Moderate periventricular and subcortical FLAIR signal changes are present. There is no evidence of acute or chronic intracranial hemorrhage. No mass effect or midline shift. No abnormal extra-axial collections. The major vascular flow voids appear intact. The basal ganglia, brainstem and cerebellum appear within normal limits. Calvarial and superficial soft tissue signal is within  normal limits. Orbits appear unremarkable. The paranasal sinuses and the mastoid air cells appear well aerated. Midline structures are intact.     Impression: 1.Multiple small  foci of diffusion restriction present within the paramedian bilateral frontal and parietal lobes extending to the bilateral basal ganglia as well as the left cerebellum consistent with acute to subacute infarcts. Findings are more subacute given presence of FLAIR signal changes corresponding to these regions related to gliosis. Given the distribution findings are likely related to a watershed type episode. No evidence of hemorrhage, mass effect or midline shift. 2.Moderate periventricular and subcortical FLAIR signal changes likely related to chronic microvascular ischemic change. Electronically Signed: Radha Cedillo MD  11/6/2023 1:55 AM EST  Workstation ID: [id number]    EEG    Result Date: 11/5/2023  Reason for referral: 78 y.o.female with altered mental status Technical Summary:  A 19 channel digital EEG was performed using the international 10-20 placement system, including eye leads and EKG leads. Duration: 23 minutes Findings: The patient is lethargic and moaning.  The background shows diffuse medium amplitude 3-6 Hz intermixed delta and theta activity present symmetrically over both hemispheres.  Low amplitude EMG artifact is seen over the frontal leads.  Photic stimulation does not change the background.  Hyperventilation is not performed.  No focal features or epileptiform activity are seen. Video: Available Technical quality: Superior EKG: Giller, 60 to 70 bpm SUMMARY: Moderate generalized slow No focal features or epileptiform activity are seen     Diffuse cerebral dysfunction of moderate degree, nonspecific This report is transcribed using the Dragon dictation system.      CT Abdomen Pelvis Without Contrast    Result Date: 11/5/2023  CT ABDOMEN PELVIS WO CONTRAST Date of Exam: 11/4/2023 10:35 PM EDT Indication: Abdominal pain, postoperative acute blood loss, anemia. Comparison: KUB performed on 11/3/2023. Technique: Axial CT images were obtained of the abdomen and pelvis without the administration of  contrast. Reconstructed coronal and sagittal images were also obtained. Automated exposure control and iterative construction methods were used. Findings: The study is limited by noncontrasted technique. There are tiny calcified gallstones. Unenhanced images of the liver, adrenal glands, pancreas, spleen, and left kidney are unremarkable. There are round masses in the right kidney which are difficult to evaluate. Statistically, they probably represent renal cysts but could be evaluated further with a renal ultrasound or repeat CT exam with IV contrast. The uterus is enlarged extending into the upper pelvis. It is of heterogeneous density and the patient  may have fibroids. The adnexal structures are normal. There is a Nolasco cath in the urinary bladder. The patient also has a rectal catheter in place. There is increased stool in the rectosigmoid colon. There are no dilated loops of bowel to indicate an obstructive process. There is no abnormal bowel wall thickening. The patient has a feeding tube in place with the tip terminating in the duodenum. There are atherosclerotic vascular calcifications throughout the abdomen and pelvis. There is a small fat density left inguinal hernia. There is no retroperitoneal hematoma. There is no free fluid in the abdomen or pelvis. Within the lower chest, there are extensive postsurgical changes. The patient has bilateral chest tubes and a mediastinal chest tube in place. The heart is enlarged. There are atherosclerotic vascular calcifications. There are trace pleural effusions. There is bilateral lower lobe atelectasis. There are no suspicious osteolytic or sclerotic lesions within the bony structures.     Impression: 1. The study is limited by noncontrasted technique. 2. No retroperitoneal hematoma. There is no free fluid in the abdomen or pelvis. 3. Cholelithiasis. 4. Round masses in the right kidney difficult to evaluate without contrast. Statistically, they probably represent  renal cysts but could be evaluated further with either renal ultrasound or a repeat CT exam with IV contrast. 5. Mild rectal sigmoid colon fecal impaction. There is a rectal catheter in place. 6. Small fat density left inguinal hernia. 7. Postsurgical changes within the lower chest. 8. Enlarged uterus with questionable fibroids. Electronically Signed: Larry Evans MD  11/5/2023 8:23 AM EST  Workstation ID: [id number]    CT Head Without Contrast    Result Date: 11/4/2023  CT HEAD WO CONTRAST Date of Exam: 11/4/2023 10:35 PM EDT Indication: AMS. Comparison: None available. Technique: Axial CT images were obtained of the head without contrast administration.  Automated exposure control and iterative construction methods were used. Findings: There is no evidence of hemorrhage. There is no mass effect or midline shift. There is no extracerebral collection. Ventricles are normal in size and configuration for patient's stated age.  Posterior fossa is within normal limits. Calvarium and skull base appear intact.   Visualized sinuses show no air fluid levels. Visualized orbits are unremarkable.     Impression: No acute intracranial process identified. Electronically Signed: Radha Cedillo MD  11/4/2023 11:04 PM EDT  Workstation ID: [id number]     Results for orders placed during the hospital encounter of 10/24/23    Duplex Carotid Ultrasound CAR    Interpretation Summary    Right internal carotid artery demonstrates a less than 50% stenosis.    Left internal carotid artery demonstrates a less than 50% stenosis.      Results for orders placed during the hospital encounter of 10/24/23    Duplex Carotid Ultrasound CAR    Interpretation Summary    Right internal carotid artery demonstrates a less than 50% stenosis.    Left internal carotid artery demonstrates a less than 50% stenosis.      Results for orders placed during the hospital encounter of 10/24/23    Adult Transthoracic Echo Limited W/ Cont if Necessary Per  Protocol    Interpretation Summary    Left ventricular systolic function is normal. Estimated left ventricular EF = 60%    Left ventricular wall thickness is consistent with mild concentric hypertrophy.    Trace to mild mitral regurgitation.    Saline test results are negative for intracardiac shunting.    There is a trivial pericardial effusion adjacent to the left ventricle.      Discharge Details        Discharge Medications        New Medications        Instructions Start Date   Accu-Chek Guide Me w/Device kit   Use to test blood glucose twice daily      Accu-Chek Guide test strip  Generic drug: glucose blood   Use one strip to test blood glucose twice daily      Accu-Chek Softclix Lancets lancets   Use to test blood glucose twice daily      amLODIPine 5 MG tablet  Commonly known as: NORVASC   5 mg, Oral, Every 24 Hours Scheduled   Start Date: November 16, 2023     aspirin 81 MG chewable tablet   81 mg, Per G Tube, Daily   Start Date: November 16, 2023     atorvastatin 80 MG tablet  Commonly known as: LIPITOR   80 mg, Per G Tube, Nightly      carvedilol 6.25 MG tablet  Commonly known as: COREG   6.25 mg, Per G Tube, 2 Times Daily With Meals      insulin detemir 100 UNIT/ML injection  Commonly known as: LEVEMIR   15 Units, Subcutaneous, Every 12 Hours Scheduled      insulin regular 100 UNIT/ML injection  Commonly known as: humuLIN R,novoLIN R   5 Units, Subcutaneous, Every 6 Hours Scheduled      lansoprazole 30 MG Tablet Delayed Release Dispersible disintegrating tablet  Commonly known as: PREVACID SOLUTAB   30 mg, Per PEG Tube, 2 Times Daily      sacubitril-valsartan 24-26 MG tablet  Commonly known as: ENTRESTO   1 tablet, Per G Tube, Every 12 Hours Scheduled               Allergies   Allergen Reactions    Dynacirc [Isradipine] Other (See Comments)     Causes tic    Codeine Rash         Discharge Disposition:  Skilled Nursing Facility (DC - External)    Diet:.  Diet Order   Procedures    NPO Diet NPO Type:  Strict NPO   Tube feeds:  Initiate Peptamen AF @ 40 ml/hr continuous and advance by 15 ml/hr Q 4 hours to goal rate 70 ml/hr. Water flush @ 50 ml/hr    Activity:  Activity Instructions       Bathing Restrictions      Shower daily.  Use a clean wash cloth and antibacterial bar or liquid soap to clean incisions. No tub bath, swimming or hot tub until instructed by MD.    Type of Restriction: Bathing    Bathing Restrictions: No Tub Bath    Driving Restrictions      Type of Restriction: Driving    Driving Restrictions: No Driving Until Next Appointment    Lifting Restrictions      Type of Restriction: Lifting    Lifting Restrictions: Lifting Restriction (Indicate Limit)    Weight Limit (Pounds): 10    Length of Lifting Restriction: until next appointment        As tolerated    Restrictions or Other Recommendations:  Sternal precautions       CODE STATUS:    Code Status and Medical Interventions:   Ordered at: 11/08/23 1556     Medical Intervention Limits:    NO intubation (DNI)     Level Of Support Discussed With:    Next of Kin (If No Surrogate)     Code Status (Patient has no pulse and is not breathing):    No CPR (Do Not Attempt to Resuscitate)     Medical Interventions (Patient has pulse or is breathing):    Limited Support     Comments:    Discussed with daughter       Future Appointments   Date Time Provider Department Center   11/15/2023 12:00 PM [facility]       Additional Instructions for the Follow-ups that You Need to Schedule       Discharge Follow-up with Specialty: Cardiothoracic Surgery   As directed      Follow Up Details: Follow Up in 2-4 Weeks   Specialty: Cardiothoracic Surgery        Discharge Follow-up with Specialty: [facility]; 1 Week   As directed      Specialty: [facility]   Follow Up: 1 Week   Follow Up Details: Follow Up With [facility] Within 7 Days of Discharge. If Discharged on a Weekend, Schedule Follow Up For The Following Friday at 0900.         Cardiac Rehab Evaluation and Enrollment   Nov 20, 2023      Reason for Consult: Education                Special Instructions:   1.  Keep incisions clean and dry at all times. Take a shower daily. Do not take a tub bath or use hot tubs until seen by the cardiac surgeon in your follow-up visit. Clean  your body and incisions daily with Dial soap and water. Always use a clean washcloth. Do not scrub your incision(s). Do not re-use dressings on your incision(s). Do not use any lotions, creams, oils, powders, antibiotic ointment (i.e., Neosporin), peroxide, alcohol, or iodine UNLESS told to do by the cardiac surgeon.  Patient may shower, but no tub baths until CT Surgery followup.   2.  No lifting over 10 lbs until CT Surgery follow up.  3.  No driving until released to do so by the surgeon.      Surgical Leg Precautions:   -Avoid sitting in one position or standing for long periods of time.  -Do not cross your legs.  -Keep your legs elevated while sitting  -Do not use any lotions, creams, oils, powders, antibiotic ointment (i.e. Neosporin), peroxide, alcohol, or iodine unless specifically prescribed by the cardiac surgeon.  -If elastic stockings (PAULA hose) were prescribed to you, wear the stockings while you are up for at least two weeks after discharge. The stockings help decrease swelling. However, remove stockings at bedtime. Wash the stockings with mild soap and water, and make sure they are completely dry before you put them back on.    When to Call the Cardiac Surgeon:  -Increased swelling, redness, tenderness, and drainage  -Increased warmth in the skin around an incision  -Large amount of clear or pinkish drainage  -Sudden increased amount of drainage  -White, yellow, or greenish drainage  -Odor, which may be foul or sweet, coming from an incision  -An opening in your incisions  -Temperature higher than 101 degrees Fahrenheit   -Temperature higher than 100 degrees Fahrenheit two times within 24 hours  -Do  not hesitate to call the cardiac surgery nurse navigator or cardiac surgeon if you have concerns or questions.     *[facility] cardiac surgery nurse navigator is available Monday-Friday 8 a.m. to 4:30 p.m. [phone]  Call 911:  -Chest pain (pain similar to what you experienced prior to surgery)  -Fainting spells  -Bright red stool  -Vomiting bright red blood  -Shortness of breath not relieved by rest  -Sudden numbness or weakness in arms or legs  -Sudden, severe headache  -Heart rate faster than 150 beats/minute with shortness of breath or a new irregular heart rate    Robyn Newton PA-C  11/15/23  08:28 EST

## 2023-11-15 NOTE — PLAN OF CARE
Goal Outcome Evaluation:  Plan of Care Reviewed With: daughter           Outcome Evaluation: Pt continues to move right side, but not to command. Lt side flaccid and withdraws to pain.  Pt opens eyes spontaneously and occassionally appears to track.  Physical therapy and occupational therapy worked with patient.  UOP 1000ml.  1 unit of blood given for hemoglobin of 6.7.  Plan is to go to [facility] 11/15/2023.  will continue to monitor.  at 11/14/2023 2006

## 2023-11-15 NOTE — NURSING NOTE
Prior to central line removal, order for the removal of catheter was verified, patient was assessed, necessary materials were gathered and patient was educated regarding procedure .    Patient was positioned supine and flat to ensure that the insertion site was at or below the level of the heart.    Hands were washed, clean gloves were applied and central line dressing was gently removed. Catheter exit site was not cultured.     A new pair of clean gloves were then applied. Insertion site was cleansed with 2% Chlorhexidine swab using a circular motion beginning at the insertion site and moving outward for 30 seconds and allowed to dry.     Clamp on line was not present.     Patient unable to perform Valsalva maneuver or hold breath during central line removal due to current condition.     The central line was grasped at the insertion site and slowly pulled outward parallel to the skin. Resistance was not met.    After central line was completely removed, a sterile 4x4 gauze pad was used to apply light pressure until bleeding stopped. At that time, petroleum-based gauze and a sterile occlusive dressing was applied to exit site.     Patient was instructed to keep dressing in place for at least 24 hours and to remain in a flat or reclining position for 30 minutes post-removal.     Catheter was inspected after removal and was intact. Tip of central line was not sent for culture. Patient tolerated procedure.

## 2023-11-15 NOTE — NURSING NOTE
Phase II cardiac rehab referral is received. Due to the fact that patient will be going to a skilled nursing facility at discharge, staff will send a letter to patient's home alerting her to contact us when, and if, she wants to participate.

## 2023-11-15 NOTE — THERAPY PROGRESS REPORT/RE-CERT
Patient Name: Michael Cochran  : 1945    MRN: [id number]                              Today's Date: 11/15/2023       Admit Date: 10/24/2023    Visit Dx:     ICD-10-CM ICD-9-CM   1. Elevated troponin  R79.89 790.6   2. Acute on chronic congestive heart failure, unspecified heart failure type  I50.9 428.0   3. Hypertensive emergency  I16.1 401.9   4. Acute respiratory failure with hypoxia  J96.01 518.81   5. Pleural effusion, left  J90 511.9   6. Hypokalemia  E87.6 276.8   7. Non-STEMI (non-ST elevated myocardial infarction)  I21.4 410.70   8. Coronary artery disease involving native coronary artery of native heart with unstable angina pectoris  I25.110 414.01     411.1   9. Cerebrovascular accident (CVA) due to embolism of cerebral artery  I63.40 434.11     Patient Active Problem List   Diagnosis    HLD (hyperlipidemia)    Acute hypoxemic respiratory failure due to pulmonary edema    Acute exacerbation of congestive heart failure    Non-STEMI (non-ST elevated myocardial infarction)    Multivessel CAD with unstable angina pectoris manifesting as dyspnea    S/P CABG x 4 on 2023    Ischemic cardiomyopathy with LVEF 31%    Hypertensive urgency with diastolic dysfunction    Postop ANURADHA resolved    Postop Encephalopathy    Postop acute blood loss anemia    Postoperative multiple bilateral embolic strokes     Past Medical History:   Diagnosis Date    Hyperlipidemia     Hypertension      Past Surgical History:   Procedure Laterality Date    BREAST FIBROADENOMA SURGERY      9 y/o-was benign    CARDIAC CATHETERIZATION N/A 10/26/2023    Procedure: Left Heart Cath;  Surgeon: Jordi Hodge MD;  Location: [facility] INVASIVE LOCATION;  Service: Cardiovascular;  Laterality: N/A;    CORONARY ARTERY BYPASS GRAFT N/A 2023    Procedure: MEDIAN STERNOTOMY, CORONARY ARTERY BYPASS GRAFTING X4, UTILIZING THE LEFT INTERANL MAMMARY ARTERY, EVH OF THE LEFT GREATER SAPHENOUS VEIN, EXPLORATION OF THE RIGHT LEG, PRIMITIVO PER  ANETHESIA;  Surgeon: Dirk Cleveland MD;  Location: [facility] OR;  Service: Cardiothoracic;  Laterality: N/A;    ENDOSCOPY W/ PEG TUBE PLACEMENT N/A 11/10/2023    Procedure: ESOPHAGOGASTRODUODENOSCOPY WITH PERCUTANEOUS ENDOSCOPIC GASTROSTOMY TUBE INSERTION AT BEDSIDE;  Surgeon: Dirk Cleveland MD;  Location: [facility] ENDOSCOPY;  Service: Cardiothoracic;  Laterality: N/A;      General Information       Row Name 11/15/23 1136          Physical Therapy Time and Intention    Document Type progress note/recertification  -KG     Mode of Treatment physical therapy  -KG       Row Name 11/15/23 1136          General Information    Existing Precautions/Restrictions cardiac;fall;sternal;other (see comments)  L sided weakness; no command following  -KG     Barriers to Rehab medically complex;cognitive status  -KG       Row Name 11/15/23 1136          Cognition    Orientation Status (Cognition) unable/difficult to assess  -KG       Row Name 11/15/23 1136          Safety Issues, Functional Mobility    Safety Issues Affecting Function (Mobility) ability to follow commands;awareness of need for assistance;insight into deficits/self-awareness;safety precaution awareness;safety precautions follow-through/compliance  -KG     Impairments Affecting Function (Mobility) balance;cognition;coordination;endurance/activity tolerance;postural/trunk control;range of motion (ROM);strength  -KG     Cognitive Impairments, Mobility Safety/Performance attention;awareness, need for assistance;insight into deficits/self-awareness;safety precaution awareness;safety precaution follow-through;sequencing abilities  -KG               User Key  (r) = Recorded By, (t) = Taken By, (c) = Cosigned By      Initials Name Provider Type    KG Brittney Rocha, ANTONY Physical Therapist                   Mobility       Row Name 11/15/23 1136          Bed Mobility    Comment, (Bed Mobility) All EOB/OOB mobility deferred due to cognitive status and inability to follow  commands.  -KG       Row Name 11/15/23 1136          Transfers    Comment, (Transfers) All OOB mobility deferred this session.  -KG       Row Name 11/15/23 1136          Bed-Chair Transfer    Bed-Chair [place] (Transfers) unable to assess  -KG       Row Name 11/15/23 1136          Sit-Stand Transfer    Sit-Stand [place] (Transfers) unable to assess  -KG       Row Name 11/15/23 1136          Gait/Stairs (Locomotion)    [place] Level (Gait) unable to assess  -KG     Comment, (Gait/Stairs) Ambulation deferred. Not appropriate to assess.  -KG               User Key  (r) = Recorded By, (t) = Taken By, (c) = Cosigned By      Initials Name Provider Type    KG Brittney Rocha, PT Physical Therapist                   Obj/Interventions       Row Name 11/15/23 1137          Motor Skills    Therapeutic Exercise shoulder;hip;knee;ankle  -KG       Row Name 11/15/23 1137          Shoulder (Therapeutic Exercise)    Shoulder Strengthening (Therapeutic Exercise) bilateral;flexion;extension;aBduction;aDduction;supine;10 repetitions  -KG       Row Name 11/15/23 1137          Hip (Therapeutic Exercise)    Hip (Therapeutic Exercise) strengthening exercise  -KG     Hip Strengthening (Therapeutic Exercise) bilateral;flexion;extension;aBduction;aDduction;external rotation;internal rotation;supine;10 repetitions  -KG       Row Name 11/15/23 1137          Knee (Therapeutic Exercise)    Knee (Therapeutic Exercise) strengthening exercise  -KG     Knee Strengthening (Therapeutic Exercise) bilateral;flexion;extension;heel slides;SLR (straight leg raise);SAQ (short arc quad);supine;10 repetitions  -KG       Row Name 11/15/23 1137          Ankle (Therapeutic Exercise)    Ankle (Therapeutic Exercise) strengthening exercise  -KG     Ankle Strengthening (Therapeutic Exercise) bilateral;dorsiflexion;plantarflexion;supine;10 repetitions  -KG       Row Name 11/15/23 1137          Elbow/Forearm (Therapeutic Exercise)    Elbow/Forearm  (Therapeutic Exercise) strengthening exercise  -KG     Elbow/Forearm Strengthening (Therapeutic Exercise) bilateral;flexion;extension;supine;10 repetitions  -KG       Row Name 11/15/23 1137          Wrist (Therapeutic Exercise)    Wrist (Therapeutic Exercise) strengthening exercise  -KG     Wrist Strengthening (Therapeutic Exercise) bilateral;flexion;extension;10 repetitions  -KG               User Key  (r) = Recorded By, (t) = Taken By, (c) = Cosigned By      Initials Name Provider Type    KG Brittney Rocha, PT Physical Therapist                   Goals/Plan       Row Name 11/15/23 1141          Bed Mobility Goal 1 (PT)    Activity/Assistive Device (Bed Mobility Goal 1, PT) sit to supine;supine to sit  -KG     [place] Level/Cues Needed (Bed Mobility Goal 1, PT) maximum assist (25-49% patient effort)  -KG     Time Frame (Bed Mobility Goal 1, PT) 2 weeks  -KG     Progress/Outcomes (Bed Mobility Goal 1, PT) goal ongoing  -KG       Row Name 11/15/23 1141          Transfer Goal 1 (PT)    Activity/Assistive Device (Transfer Goal 1, PT) sit-to-stand/stand-to-sit;bed-to-chair/chair-to-bed  -KG     [place] Level/Cues Needed (Transfer Goal 1, PT) maximum assist (25-49% patient effort)  -KG     Time Frame (Transfer Goal 1, PT) 2 weeks  -KG     Progress/Outcome (Transfer Goal 1, PT) goal ongoing;goal revised this date  -KG       Row Name 11/15/23 1141          Gait Training Goal 1 (PT)    Progress/Outcome (Gait Training Goal 1, PT) goal no longer appropriate  -KG       Row Name 11/15/23 1141          Stairs Goal 1 (PT)    Progress/Outcome (Stairs Goal 1, PT) goal no longer appropriate  -KG               User Key  (r) = Recorded By, (t) = Taken By, (c) = Cosigned By      Initials Name Provider Type    KG Brittney Rocha, PT Physical Therapist                   Clinical Impression       Row Name 11/15/23 1138          Pain    Additional Documentation Pain Scale: FACES Pre/Post-Treatment (Group)  -KG        Row Name 11/15/23 1138          Pain Scale: FACES Pre/Post-Treatment    Pain: FACES Scale, Pretreatment 0-->no hurt  -KG     Posttreatment Pain Rating 0-->no hurt  -KG       Row Name 11/15/23 1138          Plan of Care Review    Plan of Care Reviewed With patient  -KG     Progress no change  -KG     Outcome Evaluation All EOB/OOB mobility deferred due to pt's increased lethargy and inability to follow commands. Pt tolerated bed level ther ex, but did not follow commands to actively participate. Pt would intermittently move R UE, but not to command. Continue to recommend PT skilled care and D/C to SNF.  -KG       Row Name 11/15/23 1138          Vital Signs    Pre Systolic BP Rehab 119  -KG     Pre Treatment Diastolic BP 65  -KG     Post Systolic BP Rehab 120  -KG     Post Treatment Diastolic BP 58  -KG     Pretreatment Heart Rate (beats/min) 64  -KG     Posttreatment Heart Rate (beats/min) 66  -KG     Pre SpO2 (%) 93  -KG     O2 Delivery Pre Treatment room air  -KG     Post SpO2 (%) 92  -KG     O2 Delivery Post Treatment room air  -KG     Pre Patient Position Supine  -KG     Intra Patient Position Supine  -KG     Post Patient Position Supine  -KG       Row Name 11/15/23 1138          Positioning and Restraints    Pre-Treatment Position in bed  -KG     Post Treatment Position bed  -KG     In Bed notified nsg;supine;call light within reach;encouraged to call for assist;exit alarm on;side rails up x3;RUE elevated;LUE elevated;R multipodus  -KG               User Key  (r) = Recorded By, (t) = Taken By, (c) = Cosigned By      Initials Name Provider Type    Brittney Crouch, PT Physical Therapist                   Outcome Measures       Row Name 11/15/23 1140 11/15/23 0800       How much help from another person do you currently need...    Turning from your back to your side while in flat bed without using bedrails? 1  -KG 1  -CH    Moving from lying on back to sitting on the side of a flat bed without bedrails? 1   -KG 1  -CH    Moving to and from a bed to a chair (including a wheelchair)? 1  -KG 1  -CH    Standing up from a chair using your arms (e.g., wheelchair, bedside chair)? 1  -KG 1  -CH    Climbing 3-5 steps with a railing? 1  -KG 1  -CH    To walk in hospital room? 1  -KG 1  -CH    AM-PAC 6 Clicks Score (PT) 6  -KG 6  -CH    Highest Level of Mobility Goal 2 --> Bed activities/dependent transfer  -KG 2 --> Bed activities/dependent transfer  -CH      Row Name 11/15/23 1140          Functional Assessment    Outcome Measure Options AM-PAC 6 Clicks Basic Mobility (PT)  -KG               User Key  (r) = Recorded By, (t) = Taken By, (c) = Cosigned By      Initials Name Provider Type    Anita Puente, RN Registered Nurse    Brittney Crouch, PT Physical Therapist                                 Physical Therapy Education       Title: PT OT SLP Therapies (In Progress)       Topic: Physical Therapy (In Progress)       Point: Mobility training (In Progress)       Learning Progress Summary             Patient Acceptance, E, NR by KG at 11/15/2023 0849    Nonacceptance, E,TB, NR,NL by CH at 11/15/2023 0844    Acceptance, E, NR by KG at 11/14/2023 1058    Acceptance, E, NR by KG at 11/13/2023 1051    Acceptance, E, NR by SUE at 11/12/2023 0900    Acceptance, E, NR by AE at 11/9/2023 1503    Acceptance, E, NR by AE at 11/8/2023 1319    Acceptance, E, NR by SUE at 11/7/2023 1300    Acceptance, E, NR by AE at 11/6/2023 1310    Acceptance, E, NR by KG at 11/5/2023 0940    Acceptance, E, VU,NR by ML at 10/27/2023 1544    Comment: continued mobility while awaiting CABG, sternal precautions following CABG    Acceptance, E,D, VU,NR by LR at 10/25/2023 1327    Comment: Educated on benefits of mobility, correct sit<->stand t/f technique, correct gait mechanics, PLB, energy conservation, and progression of POC.                         Point: Home exercise program (In Progress)       Learning Progress Summary             Patient  Acceptance, E, NR by KG at 11/15/2023 0849    Nonacceptance, E,TB, NR,NL by CH at 11/15/2023 0844    Acceptance, E, NR by KG at 11/14/2023 1058    Acceptance, E, NR by KG at 11/13/2023 1051    Acceptance, E, NR by SUE at 11/12/2023 0900    Acceptance, E, NR by AE at 11/9/2023 1503    Acceptance, E, NR by AE at 11/8/2023 1319    Acceptance, E, NR by SUE at 11/7/2023 1300    Acceptance, E, NR by AE at 11/6/2023 1310    Acceptance, E, NR by KG at 11/5/2023 0940                         Point: Body mechanics (In Progress)       Learning Progress Summary             Patient Acceptance, E, NR by KG at 11/15/2023 0849    Nonacceptance, E,TB, NR,NL by CH at 11/15/2023 0844    Acceptance, E, NR by KG at 11/14/2023 1058    Acceptance, E, NR by KG at 11/13/2023 1051    Acceptance, E, NR by SUE at 11/12/2023 0900    Acceptance, E, NR by AE at 11/9/2023 1503    Acceptance, E, NR by AE at 11/8/2023 1319    Acceptance, E, NR by SUE at 11/7/2023 1300    Acceptance, E, NR by AE at 11/6/2023 1310    Acceptance, E, NR by KG at 11/5/2023 0940    Acceptance, E,D, VU,NR by LR at 10/25/2023 1327    Comment: Educated on benefits of mobility, correct sit<->stand t/f technique, correct gait mechanics, PLB, energy conservation, and progression of POC.                         Point: Precautions (In Progress)       Learning Progress Summary             Patient Acceptance, E, NR by KG at 11/15/2023 0849    Nonacceptance, E,TB, NR,NL by CH at 11/15/2023 0844    Acceptance, E, NR by KG at 11/14/2023 1058    Acceptance, E, NR by KG at 11/13/2023 1051    Acceptance, E, NR by SUE at 11/12/2023 0900    Acceptance, E, NR by AE at 11/9/2023 1503    Acceptance, E, NR by AE at 11/8/2023 1319    Acceptance, E, NR by SUE at 11/7/2023 1300    Acceptance, E, NR by AE at 11/6/2023 1310    Acceptance, E, NR by KG at 11/5/2023 0940    Acceptance, E, VU,NR by ML at 10/27/2023 1544    Comment: continued mobility while awaiting CABG, sternal precautions following CABG     Acceptance, E,D, VU,NR by LR at 10/25/2023 1327    Comment: Educated on benefits of mobility, correct sit<->stand t/f technique, correct gait mechanics, PLB, energy conservation, and progression of POC.                                         User Key       Initials Effective Dates Name Provider Type Discipline    SUE 02/03/23 -  Oneida Daly, PT Physical Therapist PT    LR 02/03/23 -  Alissa Pierre, PT Physical Therapist PT    CH 06/16/21 -  Anita Leon, RN Registered Nurse Nurse    KG 05/22/20 -  Brittney Rocha, PT Physical Therapist PT    ML 04/22/21 -  Alisa Salguero Physical Therapist PT    AE 09/21/21 -  Jcarlos Rodriguez PT Physical Therapist PT                  PT Recommendation and Plan     Plan of Care Reviewed With: patient  Progress: no change  Outcome Evaluation: All EOB/OOB mobility deferred due to pt's increased lethargy and inability to follow commands. Pt tolerated bed level ther ex, but did not follow commands to actively participate. Pt would intermittently move R UE, but not to command. Continue to recommend PT skilled care and D/C to SNF.     Time Calculation:         PT Charges       Row Name 11/15/23 0849             Time Calculation    Start Time 0849  -KG      PT Received On 11/15/23  -KG      PT Goal Re-Cert Due Date 11/25/23  -KG         Time Calculation- PT    Total Timed Code Minutes- PT 10 minute(s)  -KG         Timed Charges    04937 - PT Therapeutic Exercise Minutes 10  -KG         Total Minutes    Timed Charges Total Minutes 10  -KG       Total Minutes 10  -KG                User Key  (r) = Recorded By, (t) = Taken By, (c) = Cosigned By      Initials Name Provider Type    KG Brittney Rocha, PT Physical Therapist                  Therapy Charges for Today       Code Description Service Date Service Provider Modifiers Qty    00915665637 HC PT THER PROC EA 15 MIN 11/14/2023 Brittney Rocha, PT GP 1    90769722514 HC PT THER PROC EA 15 MIN  11/15/2023 Brittney Rocha, PT GP 1            PT G-Codes  Outcome Measure Options: AM-PAC 6 Clicks Basic Mobility (PT)  AM-PAC 6 Clicks Score (PT): 6  AM-PAC 6 Clicks Score (OT): 6  Modified Lynn Scale: 4 - Moderately severe disability.  Unable to walk without assistance, and unable to attend to own bodily needs without assistance.  PT Discharge Summary  Anticipated Discharge Disposition (PT): skilled nursing facility    Kimberly Rocha, ANTONY  11/15/2023

## 2023-11-17 PROBLEM — R50.9 FEVER: Status: ACTIVE | Noted: 2023-11-17

## 2023-11-17 LAB
BH BB BLOOD EXPIRATION DATE: NORMAL
BH BB BLOOD TYPE BARCODE: 6200
BH BB DISPENSE STATUS: NORMAL
BH BB PRODUCT CODE: NORMAL
BH BB UNIT NUMBER: NORMAL
CROSSMATCH INTERPRETATION: NORMAL
UNIT  ABO: NORMAL
UNIT  RH: NORMAL

## 2023-11-17 NOTE — H&P (VIEW-ONLY)
[facility] Medicine Services  HISTORY AND PHYSICAL    Patient Name: Michael Cochran  : 1945  MRN: [id number]  Primary Care Physician: Bo Rodriguez PA  Date of admission: 2023      Subjective   Subjective     Chief Complaint:  fever    HPI:  Michael Cochran is a 78 y.o. female with past medical history significant for hypertension, lipidemia, coronary artery disease.  Patient was recently admitted to [facility] for chest pain and had CABG done.  Post CABG patient was sent to ICU and the patient was found to have altered mental status.  MRI of the brain revealed acute CVA/subacute multiple CVAs.  Patient was discharged to rehab on 11/15/2023.  Patient was sent back to emergency room from rehab because of fever.  At the time of my examination patient was nonverbal and was not interacting.  Patient's daughter was present at the bedside and told me that patient's mental status has been pretty much the same since discharge.  Also she reported to me that at the rehab patient had high temperature.      Personal History     Past Medical History:   Diagnosis Date    Hyperlipidemia     Hypertension              Past Surgical History:   Procedure Laterality Date    BREAST FIBROADENOMA SURGERY      9 y/o-was benign    CARDIAC CATHETERIZATION N/A 10/26/2023    Procedure: Left Heart Cath;  Surgeon: Jordi Hodge MD;  Location: [facility] CATH INVASIVE LOCATION;  Service: Cardiovascular;  Laterality: N/A;    CORONARY ARTERY BYPASS GRAFT N/A 2023    Procedure: MEDIAN STERNOTOMY, CORONARY ARTERY BYPASS GRAFTING X4, UTILIZING THE LEFT INTERANL MAMMARY ARTERY, EVH OF THE LEFT GREATER SAPHENOUS VEIN, EXPLORATION OF THE RIGHT LEG, PRIMITIVO PER ANETHESIA;  Surgeon: Dirk Cleveland MD;  Location: [facility] OR;  Service: Cardiothoracic;  Laterality: N/A;    ENDOSCOPY W/ PEG TUBE PLACEMENT N/A 11/10/2023    Procedure: ESOPHAGOGASTRODUODENOSCOPY WITH PERCUTANEOUS ENDOSCOPIC  GASTROSTOMY TUBE INSERTION AT BEDSIDE;  Surgeon: Dirk Cleveland MD;  Location: [facility];  Service: Cardiothoracic;  Laterality: N/A;       Family History: family history is not on file.     Social History:  reports that she has been smoking cigarettes. She has never used smokeless tobacco. She reports that she does not drink alcohol and does not use drugs.  Social History     Social History Narrative    Not on file       Medications:  Available home medication information reviewed.  Medications Prior to Admission   Medication Sig Dispense Refill Last Dose    Accu-Chek Softclix Lancets lancets Use to test blood glucose twice daily 100 each 1     amLODIPine (NORVASC) 5 MG tablet Take 1 tablet by mouth Daily. 30 tablet 11     aspirin 81 MG chewable tablet Administer 1 tablet per G tube Daily. 30 tablet 11     atorvastatin (LIPITOR) 80 MG tablet Administer 1 tablet per G tube Every Night. 360 tablet 0     Blood Glucose Monitoring Suppl (Blood Glucose Monitor System) w/Device kit Use to test blood glucose twice daily 1 each 0     carvedilol (COREG) 6.25 MG tablet Administer 1 tablet per G tube 2 (Two) Times a Day With Meals. 180 tablet 3     glucose blood test strip Use one strip to test blood glucose twice daily 50 each 1     insulin detemir (LEVEMIR) 100 UNIT/ML injection Inject 15 Units under the skin into the appropriate area as directed Every 12 (Twelve) Hours. 108 mL 0     insulin regular (humuLIN R,novoLIN R) 100 UNIT/ML injection Inject 5 Units under the skin into the appropriate area as directed Every 6 (Six) Hours. 72 mL 0     lansoprazole (PREVACID) 30 MG capsule Open contents of one capsule and give via g-tube 2 (Two) Times a Day. 720 capsule 0     sacubitril-valsartan (ENTRESTO) 24-26 MG tablet Administer 1 tablet per G tube Every 12 (Twelve) Hours. 60 tablet 4        Allergies   Allergen Reactions    Dynacirc [Isradipine] Other (See Comments)     Causes tic    Codeine Rash       Objective    Objective     Vital Signs:   Temp:  [98.7 °F (37.1 °C)-102.2 °F (39 °C)] 98.7 °F (37.1 °C)  Heart Rate:  [] 77  Resp:  [19-40] 19  BP: (127-167)/(69-85) 127/70  Flow (L/min):  [2] 2       Physical Exam   Constitutional: No acute respiratory or hemodynamic distress  HENT: NCAT, mucous membranes moist  Respiratory: Clear to auscultation bilaterally, respiratory effort normal   Cardiovascular: RRR, no murmurs, rubs, or gallops  Gastrointestinal: Positive bowel sounds, soft, nontenAbdomen is obese.  michelle, nondistended  Musculoskeletal: No bilateral ankle edema, really obese  Psychiatric: Unable to assess  Neurologic: Awake, does not interact, does not follow any commands, withdraws to painful stimuli only.  Skin: No rashes     Result Review:  I have personally reviewed the results from the time of this admission to 11/17/2023 16:19 EST and agree with these findings:  []  Laboratory list / accordion  []  Microbiology  []  Radiology  []  EKG/Telemetry   []  Cardiology/Vascular   []  Pathology  []  Old records  []  Other:  Most notable findings include: Patient had fever here at the ER.  Does not interact and does not follow any commands.  This is not significantly different than 2 days ago when the patient was discharged to rehab.        LAB RESULTS:      Lab 11/17/23  1005 11/15/23  0404 11/14/23  1519 11/13/23  0316 11/12/23  0307 11/11/23  0409   WBC 13.63*  --  14.33* 15.34* 17.91* 14.77*   HEMOGLOBIN 9.0* 7.9* 6.7* 7.2* 7.5* 7.6*   HEMATOCRIT 30.7* 26.2* 22.5* 24.5* 25.3* 25.8*   PLATELETS 401  --  288 309 361 344   NEUTROS ABS 11.44*  --   --   --  15.05* 12.33*   IMMATURE GRANS (ABS) 0.07*  --   --   --  0.11* 0.09*   LYMPHS ABS 1.05  --   --   --  1.33 1.21   MONOS ABS 0.93*  --   --   --  1.12* 0.89   EOS ABS 0.09  --   --   --  0.27 0.22   MCV 96.2  --  98.3* 98.8* 96.9 97.4*   LACTATE 1.5  --   --   --   --   --          Lab 11/17/23  1005 11/14/23  1519 11/13/23  0316 11/12/23  0307 11/11/23  0409    SODIUM 146* 141 140 141 144   POTASSIUM 5.0 4.4 4.3 4.1 4.3   CHLORIDE 112* 108* 108* 106 108*   CO2 23.0 25.0 25.0 25.0 25.0   ANION GAP 11.0 8.0 7.0 10.0 11.0   BUN 51* 41* 38* 38* 35*   CREATININE 1.19* 0.76 0.82 0.77 0.89   EGFR 46.6* 79.8 72.9 78.6 66.0   GLUCOSE 307* 189* 198* 178* 165*   CALCIUM 8.8 8.9 8.8 8.9 8.5*   MAGNESIUM 2.4  --  2.3  --   --    PHOSPHORUS  --   --  3.2  --  4.1         Lab 11/17/23  1005 11/13/23  0316 11/11/23  0409   TOTAL PROTEIN 6.5 5.6*  --    ALBUMIN 3.2* 3.1* 3.2*   GLOBULIN 3.3 2.5  --    ALT (SGPT) 33 14  --    AST (SGOT) 41* 17  --    BILIRUBIN 0.9 1.1  --    ALK PHOS 128* 91  --          Lab 11/17/23  1206 11/17/23  1005   HSTROP T 136* 146*         Lab 11/13/23  0316   CHOLESTEROL 117   TRIGLYCERIDES 124         Lab 11/14/23  1710   ABO TYPING A   RH TYPING Positive   ANTIBODY SCREEN Negative         UA          11/9/2023    16:35 11/10/2023    15:33 11/17/2023    11:12   Urinalysis   Squamous Epithelial Cells, UA 7-12  3-6  0-2    Specific Gravity, UA 1.015  1.016  1.014    Ketones, UA Negative  Negative  Negative    Blood, UA Moderate (2+)  Negative  Small (1+)    Leukocytes, UA Moderate (2+)  Moderate (2+)  Large (3+)    Nitrite, UA Negative  Negative  Negative    RBC, UA 6-10  3-5  3-5    WBC, UA 11-20  Too Numerous to Count  21-50    Bacteria, UA None Seen  None Seen  1+        Microbiology Results (last 10 days)       Procedure Component Value - Date/Time    COVID PRE-OP / PRE-PROCEDURE SCREENING ORDER (NO ISOLATION) - Swab, Nasopharynx [[id number]]  (Normal) Collected: 11/17/23 1030    Lab Status: Final result Specimen: Swab from Nasopharynx Updated: 11/17/23 2653    Narrative:      The following orders were created for panel order COVID PRE-OP / PRE-PROCEDURE SCREENING ORDER (NO ISOLATION) - Swab, Nasopharynx.  Procedure                               Abnormality         Status                     ---------                               -----------         ------                      Respiratory Panel PCR w/...[[id number]]  Normal              Final result                 Please view results for these tests on the individual orders.    Respiratory Panel PCR w/COVID-19(SARS-CoV-2) NUZHAT/PAUL/AUBRIE/PAD/COR/DEE In-House, NP Swab in UTM/VTM, 2 HR TAT - Swab, Nasopharynx [[id number]]  (Normal) Collected: 11/17/23 1030    Lab Status: Final result Specimen: Swab from Nasopharynx Updated: 11/17/23 1139     ADENOVIRUS, PCR Not Detected     Coronavirus 229E Not Detected     Coronavirus HKU1 Not Detected     Coronavirus NL63 Not Detected     Coronavirus OC43 Not Detected     COVID19 Not Detected     Human Metapneumovirus Not Detected     Human Rhinovirus/Enterovirus Not Detected     Influenza A PCR Not Detected     Influenza B PCR Not Detected     Parainfluenza Virus 1 Not Detected     Parainfluenza Virus 2 Not Detected     Parainfluenza Virus 3 Not Detected     Parainfluenza Virus 4 Not Detected     RSV, PCR Not Detected     Bordetella pertussis pcr Not Detected     Bordetella parapertussis PCR Not Detected     Chlamydophila pneumoniae PCR Not Detected     Mycoplasma pneumo by PCR Not Detected    Narrative:      In the setting of a positive respiratory panel with a viral infection PLUS a negative procalcitonin without other underlying concern for bacterial infection, consider observing off antibiotics or discontinuation of antibiotics and continue supportive care. If the respiratory panel is positive for atypical bacterial infection (Bordetella pertussis, Chlamydophila pneumoniae, or Mycoplasma pneumoniae), consider antibiotic de-escalation to target atypical bacterial infection.    Urine Culture - Urine, Indwelling Urethral Catheter [[id number]]  (Abnormal) Collected: 11/10/23 1533    Lab Status: Final result Specimen: Urine from Indwelling Urethral Catheter Updated: 11/11/23 2324     Urine Culture Yeast isolated    Narrative:      No further work up  Colonization of the urinary tract without  infection is common. Treatment is discouraged unless the patient is symptomatic, pregnant, or undergoing an invasive urologic procedure.    Urine Culture - Urine, Urine, Clean Catch [[id number]]  (Abnormal) Collected: 11/09/23 1635    Lab Status: Final result Specimen: Urine, Clean Catch Updated: 11/10/23 1238     Urine Culture Yeast isolated    Narrative:      Colonization of the urinary tract without infection is common. Treatment is discouraged unless the patient is symptomatic, pregnant, or undergoing an invasive urologic procedure.            CT Head Without Contrast    Result Date: 11/17/2023  CT HEAD WO CONTRAST Date of Exam: 11/17/2023 10:49 AM EST Indication: altered mental status. Comparison: Head CT dated 11/9/2023 Technique: Axial CT images were obtained of the head without contrast administration.  Automated exposure control and iterative construction methods were used. FINDINGS:  Foci of periventricular and subcortical white matter hypoattenuation are consistent with chronic, microvascular ischemic change. There is age-related loss of brain parenchymal volume with prominence of sulcation and ventriculomegaly.  No significant mass  effect, midline shift, intracranial hemorrhage, or hydrocephalus is identified. No extra-axial fluid collection is identified.   The calvarium and overlying soft tissues are unremarkable. Mucosal thickening within the right frontal sinus and right anterior ethmoid air cells. Mild left mastoid effusion. The visualized bony orbits, globes, and retrobulbar soft tissues are unremarkable.     Impression: 1.No acute intracranial abnormality is identified. 2.Findings compatible with chronic microvascular ischemic change and diffuse cortical atrophy. 3.Mucosal thickening within the right frontal sinus and right anterior ethmoid air cells. 4.Mild left mastoid effusion. Electronically Signed: Sergio Esqueda MD  11/17/2023 10:57 AM EST  Workstation ID: [id number]    XR Chest 1  View    Result Date: 11/17/2023  XR CHEST 1 VW Date of Exam: 11/17/2023 10:16 AM EST Indication: Weak/Dizzy/AMS triage protocol Comparison: 11/11/2023. Findings: Right PICC line has been removed. There is stable cardiomegaly. There is continued elevation of the right diaphragm. There are no definite acute lung infiltrates. There is mild blunting of the left costophrenic angle suggestive of small left effusion.     Impression: Impression: Findings suggest small left effusion. No acute process in the lung fields. Electronically Signed: Marielos Camilo MD  11/17/2023 10:50 AM EST  Workstation ID: [id number]     Results for orders placed during the hospital encounter of 10/24/23    Adult Transthoracic Echo Limited W/ Cont if Necessary Per Protocol    Interpretation Summary    Left ventricular systolic function is normal. Estimated left ventricular EF = 60%    Left ventricular wall thickness is consistent with mild concentric hypertrophy.    Trace to mild mitral regurgitation.    Saline test results are negative for intracardiac shunting.    There is a trivial pericardial effusion adjacent to the left ventricle.      Assessment & Plan   Assessment & Plan     Active Hospital Problems    Diagnosis  POA    **Fever [R50.9]  Yes       Patient is a 78-year-old female who was recently admitted to [facility] for hypertension and chest pain.  Patient ended up having a CABG done post CABG patient was sent to ICU.  Evidently patient suffered multiple bihemispheric CVA with altered mental status and severe peripheral weakness.  Patient was sent to rehab on 11/15.  Patient was sent back to the emergency room from rehab because of fever.    **Fever, possibly secondary to UTI.  However, the source of infection is not certain.  We will continue empiric antibiotics with Zosyn and vancomycin soon to be de-escalated.  Blood culture and urine culture are pending.    **Coronary artery disease s/p CABG recently.  Post CABG patient  had multiple bihemispheric stroke and altered mental status.  Patient was discharged to rehab on 11/15.  Patient's mental status and neurologic status was not significant different than now.    **Hypernatremia and acute kidney injury.  This is possibly secondary to dehydration.  We will hydrate the patient with normal saline for now.  We will also check the labs in AM.    **Diabetes mellitus.  Hemoglobin A1c was 9.80 on 10/25/2023.  Patient has PEG tube and we will put the patient on sliding scale with regular insulin.            DVT prophylaxis:  heparin      CODE STATUS:    Code Status and Medical Interventions:   Ordered at: 11/17/23 1552     Medical Intervention Limits:    NO intubation (DNI)     Level Of Support Discussed With:    Next of Kin (If No Surrogate)     Code Status (Patient has no pulse and is not breathing):    No CPR (Do Not Attempt to Resuscitate)     Medical Interventions (Patient has pulse or is breathing):    Limited Support       Expected Discharge       Expected discharge date/ time has not been documented. SARAH Martinez MD  11/17/23

## 2023-11-17 NOTE — ED PROVIDER NOTES
Subjective   History of Present Illness  78-year-old female sent in from [facility] for evaluation of altered mental status.  She was identified to have a tympanic temperature of 104.  She was noted to be less responsive to staff today.  Upon my evaluation with firm shaking and yelling at the patient she attempts to open her eyes very little.  She is very altered.  She is tachypneic and working hard to breathe with increased respiratory effort.  She is also very warm to the touch and tachycardic.  She recently had coronary artery bypass grafting his recent she is in [facility].  No other detailed information can be obtained from nursing home staff or patient.  No definitive sick contacts or medication changes reported.      Review of Systems   Unable to perform ROS: Mental status change   Constitutional:  Positive for activity change, appetite change, chills, fatigue and fever.   HENT:  Negative for congestion, ear pain, postnasal drip, sinus pressure and sore throat.    Eyes:  Negative for pain, redness and visual disturbance.   Respiratory:  Positive for shortness of breath. Negative for cough and chest tightness.    Cardiovascular:  Negative for chest pain, palpitations and leg swelling.   Gastrointestinal:  Negative for abdominal pain, anal bleeding, blood in stool, diarrhea, nausea and vomiting.   Endocrine: Negative for polydipsia and polyuria.   Genitourinary:  Negative for difficulty urinating, dysuria, frequency and urgency.   Musculoskeletal:  Negative for arthralgias, back pain and neck pain.   Skin:  Negative for pallor and rash.   Allergic/Immunologic: Negative for environmental allergies and immunocompromised state.   Neurological:  Negative for dizziness, weakness and headaches.   Hematological:  Negative for adenopathy.   Psychiatric/Behavioral:  Positive for confusion and decreased concentration. Negative for self-injury and suicidal ideas. The patient is not nervous/anxious.    All  other systems reviewed and are negative.      Past Medical History:   Diagnosis Date    Hyperlipidemia     Hypertension        Allergies   Allergen Reactions    Dynacirc [Isradipine] Other (See Comments)     Causes tic    Codeine Rash       Past Surgical History:   Procedure Laterality Date    BREAST FIBROADENOMA SURGERY      9 y/o-was benign    CARDIAC CATHETERIZATION N/A 10/26/2023    Procedure: Left Heart Cath;  Surgeon: Jordi Hodge MD;  Location: [facility] CATH INVASIVE LOCATION;  Service: Cardiovascular;  Laterality: N/A;    CORONARY ARTERY BYPASS GRAFT N/A 11/1/2023    Procedure: MEDIAN STERNOTOMY, CORONARY ARTERY BYPASS GRAFTING X4, UTILIZING THE LEFT INTERANL MAMMARY ARTERY, EVH OF THE LEFT GREATER SAPHENOUS VEIN, EXPLORATION OF THE RIGHT LEG, PRIMITIVO PER ANETHESIA;  Surgeon: Dirk Cleveland MD;  Location:  [facility];  Service: Cardiothoracic;  Laterality: N/A;    ENDOSCOPY W/ PEG TUBE PLACEMENT N/A 11/10/2023    Procedure: ESOPHAGOGASTRODUODENOSCOPY WITH PERCUTANEOUS ENDOSCOPIC GASTROSTOMY TUBE INSERTION AT BEDSIDE;  Surgeon: Dirk Cleveland MD;  Location:  [facility];  Service: Cardiothoracic;  Laterality: N/A;       History reviewed. No pertinent family history.    Social History     Socioeconomic History    Marital status:    Tobacco Use    Smoking status: Some Days     Types: Cigarettes    Smokeless tobacco: Never    Tobacco comments:     Smokes rarely   Vaping Use    Vaping Use: Never used   Substance and Sexual Activity    Alcohol use: Never    Drug use: Never           Objective   Physical Exam  Vitals and nursing note reviewed.   Constitutional:       General: She is in acute distress.      Appearance: Normal appearance. She is ill-appearing and toxic-appearing. She is not diaphoretic.      Comments: Warm to touch, fever   HENT:      Head: Normocephalic and atraumatic.      Right Ear: External ear normal.      Left Ear: External ear normal.      Nose: Nose normal.   Eyes:      General:  Lids are normal.      Pupils: Pupils are equal, round, and reactive to light.   Neck:      Trachea: No tracheal deviation.   Cardiovascular:      Rate and Rhythm: Regular rhythm. Tachycardia present.      Pulses: No decreased pulses.      Heart sounds: Normal heart sounds. No murmur heard.     No friction rub. No gallop.      Comments: Scar over the center of the chest is clean dry and intact.  Pulmonary:      Effort: Tachypnea, accessory muscle usage, prolonged expiration and respiratory distress present.      Breath sounds: Normal breath sounds. Examination of the right-lower field reveals decreased breath sounds. Examination of the left-lower field reveals decreased breath sounds. No decreased breath sounds, wheezing, rhonchi or rales.   Abdominal:      General: Bowel sounds are normal.      Palpations: Abdomen is soft.      Tenderness: There is no abdominal tenderness. There is no guarding or rebound.   Musculoskeletal:         General: No deformity. Normal range of motion.      Cervical back: Normal range of motion and neck supple.   Lymphadenopathy:      Cervical: No cervical adenopathy.   Skin:     General: Skin is warm and dry.      Findings: No rash.   Neurological:      Mental Status: She is lethargic, confused and unresponsive.      GCS: GCS eye subscore is 3. GCS verbal subscore is 4. GCS motor subscore is 6.      Cranial Nerves: No cranial nerve deficit.      Sensory: No sensory deficit.   Psychiatric:         Speech: Speech normal.         Behavior: Behavior normal.         Thought Content: Thought content normal.         Judgment: Judgment normal.         Critical Care    Performed by: Elton Rodrigues MD  Authorized by: Elton Rodrigues MD    Critical care provider statement:     Critical care time (minutes):  45    Critical care time was exclusive of:  Separately billable procedures and treating other patients    Critical care was time spent personally by me on the following activities:   Discussions with consultants, discussions with primary provider, evaluation of patient's response to treatment, examination of patient, obtaining history from patient or surrogate, development of treatment plan with patient or surrogate, ordering and performing treatments and interventions, ordering and review of laboratory studies, ordering and review of radiographic studies, pulse oximetry, re-evaluation of patient's condition and review of old charts    I assumed direction of critical care for this patient from another provider in my specialty: no      Care discussed with: admitting provider               ED Course                                           Medical Decision Making  Differential diagnosis includes sepsis, pneumonia, viral illness, urinary tract infection, dehydration, renal insufficiency, other unspecified etiology.    Lab evaluation shows leukocytosis, baseline H&H, baseline kidney function with no obvious or significant electrolyte abnormalities.    Troponin is elevated, but was trending down on recheck.    Urine shows large leuk esterase, 1+ bacteria, 21-50 white cells.  This is felt to be the source for the patient's current fever and sepsis.    Viral respiratory panel was negative.    Chest x-ray shows findings suggesting small left effusion, no acute process identified in the lungs.    CT scan of the head without contrast shows no acute intracranial abnormalities.  The patient does have findings consistent with small vessel chronic ischemic changes, and some chronic sinus disease.    The patient is altered, but per the daughter she typically is altered and not reactive at baseline.    Due to her current fever and signs of acute urinary tract infection I feel admission to the hospital for antibiotics is warranted.    I have initiated medication for fever control, IV fluids, broad-spectrum antibiotics.    Discussed the patient with the hospitalist, who will consult on the patient to determine  status of admission.    Problems Addressed:  Acute urinary tract infection: complicated acute illness or injury with systemic symptoms that poses a threat to life or bodily functions  Elevated troponin: complicated acute illness or injury  Hypoxia: complicated acute illness or injury with systemic symptoms that poses a threat to life or bodily functions  Sepsis with acute hypoxic respiratory failure without septic shock, due to unspecified organism: complicated acute illness or injury with systemic symptoms that poses a threat to life or bodily functions    Amount and/or Complexity of Data Reviewed  External Data Reviewed: labs, radiology, ECG and notes.  Labs: ordered. Decision-making details documented in ED Course.  Radiology: ordered and independent interpretation performed. Decision-making details documented in ED Course.  ECG/medicine tests: ordered and independent interpretation performed. Decision-making details documented in ED Course.     Details: EKG independently interpreted by myself shows sinus tachycardia without acute ischemic changes.    Risk  OTC drugs.  Prescription drug management.  Decision regarding hospitalization.        Final diagnoses:   Sepsis with acute hypoxic respiratory failure without septic shock, due to unspecified organism   Acute urinary tract infection   Elevated troponin   Hypoxia       ED Disposition  ED Disposition       ED Disposition   Decision to Admit    Condition   --    Comment   Level of Care: Telemetry [5]   Diagnosis: Sepsis [[id number]]   Admitting Physician: ROSHNI VASQUEZ [[id number]]                 No follow-up provider specified.       Medication List      No changes were made to your prescriptions during this visit.            Elton Rodrigues MD  11/18/23 6412

## 2023-11-17 NOTE — ED NOTES
Michael Cochran    Nursing Report ED to Floor:  Mental status: unresponsive  Ambulatory status: bedbound  Oxygen Therapy:  2L O2 NC  Cardiac Rhythm: NSR  Admitted from: ED  Safety Concerns:  fall risk  Social Issues: none  ED Room #:  15    ED Nurse Phone Extension - [phone] or may call [phone].      HPI:   Chief Complaint   Patient presents with    Altered Mental Status    Fever       Past Medical History:  Past Medical History:   Diagnosis Date    Hyperlipidemia     Hypertension         Past Surgical History:  Past Surgical History:   Procedure Laterality Date    BREAST FIBROADENOMA SURGERY      9 y/o-was benign    CARDIAC CATHETERIZATION N/A 10/26/2023    Procedure: Left Heart Cath;  Surgeon: Jordi Hodge MD;  Location:  [facility] CATH INVASIVE LOCATION;  Service: Cardiovascular;  Laterality: N/A;    CORONARY ARTERY BYPASS GRAFT N/A 11/1/2023    Procedure: MEDIAN STERNOTOMY, CORONARY ARTERY BYPASS GRAFTING X4, UTILIZING THE LEFT INTERANL MAMMARY ARTERY, EVH OF THE LEFT GREATER SAPHENOUS VEIN, EXPLORATION OF THE RIGHT LEG, PRIMITIVO PER ANETHESIA;  Surgeon: Dirk Cleveland MD;  Location:  [facility];  Service: Cardiothoracic;  Laterality: N/A;    ENDOSCOPY W/ PEG TUBE PLACEMENT N/A 11/10/2023    Procedure: ESOPHAGOGASTRODUODENOSCOPY WITH PERCUTANEOUS ENDOSCOPIC GASTROSTOMY TUBE INSERTION AT BEDSIDE;  Surgeon: Dirk Cleveland MD;  Location:  [facility];  Service: Cardiothoracic;  Laterality: N/A;        Admitting Doctor:   Madhav Martinez MD    Consulting Provider(s):  Consults       Date and Time Order Name Status Description    11/4/2023 10:11 AM Inpatient Neurology Consult General Completed     11/1/2023  4:16 PM Inpatient Consult to Cardiology Completed     10/24/2023  1:39 PM Inpatient Cardiology Consult Completed              Admitting Diagnosis:   The primary encounter diagnosis was Sepsis with acute hypoxic respiratory failure without septic shock, due to unspecified organism. Diagnoses of Acute urinary tract  infection, Elevated troponin, and Hypoxia were also pertinent to this visit.    Most Recent Vitals:   Vitals:    11/17/23 1242 11/17/23 1302 11/17/23 1320 11/17/23 1342   BP: 146/75 151/78 157/76 159/74   Patient Position:       Pulse: 92 89 89 87   Resp:       Temp:       TempSrc:       SpO2: 92%  91% 93%   Weight:       Height:           Active LDAs/IV Access:   Lines, Drains & Airways       Active LDAs       Name Placement date Placement time Site Days    Peripheral IV 11/17/23 Left Antecubital 11/17/23  --  Antecubital  less than 1    Gastrostomy/Enterostomy Percutaneous endoscopic gastrostomy (PEG) 20 Fr. LUQ 11/10/23  0940  LUQ  7    Urethral Catheter 16 Fr. 11/10/23  1020  -- 7                    Labs (abnormal labs have a star):   Labs Reviewed   COMPREHENSIVE METABOLIC PANEL - Abnormal; Notable for the following components:       Result Value    Glucose 307 (*)     BUN 51 (*)     Creatinine 1.19 (*)     Sodium 146 (*)     Chloride 112 (*)     Albumin 3.2 (*)     AST (SGOT) 41 (*)     Alkaline Phosphatase 128 (*)     BUN/Creatinine Ratio 42.9 (*)     eGFR 46.6 (*)     All other components within normal limits    Narrative:     GFR Normal >60  Chronic Kidney Disease <60  Kidney Failure <15    The GFR formula is only valid for adults with stable renal function between ages 18 and 70.   SINGLE HSTROPONIN T - Abnormal; Notable for the following components:    HS Troponin T 146 (*)     All other components within normal limits    Narrative:     High Sensitive Troponin T Reference Range:  <14.0 ng/L- Negative Female for AMI  <22.0 ng/L- Negative Male for AMI  >=14 - Abnormal Female indicating possible myocardial injury.  >=22 - Abnormal Male indicating possible myocardial injury.   Clinicians would have to utilize clinical acumen, EKG, Troponin, and serial changes to determine if it is an Acute Myocardial Infarction or myocardial injury due to an underlying chronic condition.        URINALYSIS W/ MICROSCOPIC IF  INDICATED (NO CULTURE) - Abnormal; Notable for the following components:    Appearance, UA Cloudy (*)     Blood, UA Small (1+) (*)     Protein, UA Trace (*)     Leuk Esterase, UA Large (3+) (*)     All other components within normal limits   CBC WITH AUTO DIFFERENTIAL - Abnormal; Notable for the following components:    WBC 13.63 (*)     RBC 3.19 (*)     Hemoglobin 9.0 (*)     Hematocrit 30.7 (*)     MCHC 29.3 (*)     Neutrophil % 83.9 (*)     Lymphocyte % 7.7 (*)     Neutrophils, Absolute 11.44 (*)     Monocytes, Absolute 0.93 (*)     Immature Grans, Absolute 0.07 (*)     All other components within normal limits   HIGH SENSITIVITIY TROPONIN T 2HR - Abnormal; Notable for the following components:    HS Troponin T 136 (*)     Troponin T Delta -10 (*)     All other components within normal limits    Narrative:     High Sensitive Troponin T Reference Range:  <14.0 ng/L- Negative Female for AMI  <22.0 ng/L- Negative Male for AMI  >=14 - Abnormal Female indicating possible myocardial injury.  >=22 - Abnormal Male indicating possible myocardial injury.   Clinicians would have to utilize clinical acumen, EKG, Troponin, and serial changes to determine if it is an Acute Myocardial Infarction or myocardial injury due to an underlying chronic condition.        URINALYSIS, MICROSCOPIC ONLY - Abnormal; Notable for the following components:    RBC, UA 3-5 (*)     WBC, UA 21-50 (*)     Bacteria, UA 1+ (*)     All other components within normal limits   RESPIRATORY PANEL PCR W/ COVID-19 (SARS-COV-2), NP SWAB IN UTM/VTP, 2 HR TAT - Normal    Narrative:     In the setting of a positive respiratory panel with a viral infection PLUS a negative procalcitonin without other underlying concern for bacterial infection, consider observing off antibiotics or discontinuation of antibiotics and continue supportive care. If the respiratory panel is positive for atypical bacterial infection (Bordetella pertussis, Chlamydophila pneumoniae, or  Mycoplasma pneumoniae), consider antibiotic de-escalation to target atypical bacterial infection.   MAGNESIUM - Normal   LACTIC ACID, PLASMA - Normal   COVID PRE-OP / PRE-PROCEDURE SCREENING ORDER (NO ISOLATION)    Narrative:     The following orders were created for panel order COVID PRE-OP / PRE-PROCEDURE SCREENING ORDER (NO ISOLATION) - Swab, Nasopharynx.  Procedure                               Abnormality         Status                     ---------                               -----------         ------                     Respiratory Panel PCR w/...[[id number]]  Normal              Final result                 Please view results for these tests on the individual orders.   BLOOD CULTURE   BLOOD CULTURE   RAINBOW DRAW    Narrative:     The following orders were created for panel order [place] Draw.  Procedure                               Abnormality         Status                     ---------                               -----------         ------                     Green Top (Gel)[[id number]]                                  Final result               Lavender Top[[id number]]                                     Final result               Gold Top - SST[[id number]]                                   Final result               Gray Top[[id number]]                                         Final result               Light Blue Top[[id number]]                                   Final result                 Please view results for these tests on the individual orders.   POCT GLUCOSE FINGERSTICK   CBC AND DIFFERENTIAL    Narrative:     The following orders were created for panel order CBC & Differential.  Procedure                               Abnormality         Status                     ---------                               -----------         ------                     CBC Auto Differential[[id number]]        Abnormal            Final result                 Please view results for these tests on the individual  orders.   GREEN TOP   LAVENDER TOP   GOLD TOP - SST   GRAY TOP   LIGHT BLUE TOP       Meds Given in ED:   Medications   sodium chloride 0.9 % flush 10 mL (has no administration in time range)   sodium chloride 0.9 % bolus 2,019 mL (2,019 mL Intravenous New Bag 11/17/23 1030)   piperacillin-tazobactam (ZOSYN) 3.375 g in iso-osmotic dextrose 50 ml (premix) (0 g Intravenous Stopped 11/17/23 1133)   vancomycin 1750 mg/500 mL 0.9% NS IVPB ([facility]) (0 mg Intravenous Stopped 11/17/23 1254)   acetaminophen (TYLENOL) suppository 650 mg (650 mg Rectal Given 11/17/23 1103)

## 2023-11-17 NOTE — H&P
[facility]  HISTORY AND PHYSICAL    Patient Name: Michael Cochran  : 1945  MRN: [id number]  Primary Care Physician: Bo Rodriguez PA  Date of admission: 2023      Subjective   Subjective     Chief Complaint:  fever    HPI:  Michael Cochran is a 78 y.o. female with past medical history significant for hypertension, lipidemia, coronary artery disease.  Patient was recently admitted to [facility] for chest pain and had CABG done.  Post CABG patient was sent to ICU and the patient was found to have altered mental status.  MRI of the brain revealed acute CVA/subacute multiple CVAs.  Patient was discharged to rehab on 11/15/2023.  Patient was sent back to emergency room from rehab because of fever.  At the time of my examination patient was nonverbal and was not interacting.  Patient's daughter was present at the bedside and told me that patient's mental status has been pretty much the same since discharge.  Also she reported to me that at the rehab patient had high temperature.      Personal History     Past Medical History:   Diagnosis Date    Hyperlipidemia     Hypertension              Past Surgical History:   Procedure Laterality Date    BREAST FIBROADENOMA SURGERY      9 y/o-was benign    CARDIAC CATHETERIZATION N/A 10/26/2023    Procedure: Left Heart Cath;  Surgeon: Jordi Hodge MD;  Location: [facility] CATH INVASIVE LOCATION;  Service: Cardiovascular;  Laterality: N/A;    CORONARY ARTERY BYPASS GRAFT N/A 2023    Procedure: MEDIAN STERNOTOMY, CORONARY ARTERY BYPASS GRAFTING X4, UTILIZING THE LEFT INTERANL MAMMARY ARTERY, EVH OF THE LEFT GREATER SAPHENOUS VEIN, EXPLORATION OF THE RIGHT LEG, PRIMITIVO PER ANETHESIA;  Surgeon: Dirk Cleveland MD;  Location: [facility] OR;  Service: Cardiothoracic;  Laterality: N/A;    ENDOSCOPY W/ PEG TUBE PLACEMENT N/A 11/10/2023    Procedure: ESOPHAGOGASTRODUODENOSCOPY WITH PERCUTANEOUS ENDOSCOPIC  GASTROSTOMY TUBE INSERTION AT BEDSIDE;  Surgeon: Dirk Cleveland MD;  Location: [facility];  Service: Cardiothoracic;  Laterality: N/A;       Family History: family history is not on file.     Social History:  reports that she has been smoking cigarettes. She has never used smokeless tobacco. She reports that she does not drink alcohol and does not use drugs.  Social History     Social History Narrative    Not on file       Medications:  Available home medication information reviewed.  Medications Prior to Admission   Medication Sig Dispense Refill Last Dose    Accu-Chek Softclix Lancets lancets Use to test blood glucose twice daily 100 each 1     amLODIPine (NORVASC) 5 MG tablet Take 1 tablet by mouth Daily. 30 tablet 11     aspirin 81 MG chewable tablet Administer 1 tablet per G tube Daily. 30 tablet 11     atorvastatin (LIPITOR) 80 MG tablet Administer 1 tablet per G tube Every Night. 360 tablet 0     Blood Glucose Monitoring Suppl (Blood Glucose Monitor System) w/Device kit Use to test blood glucose twice daily 1 each 0     carvedilol (COREG) 6.25 MG tablet Administer 1 tablet per G tube 2 (Two) Times a Day With Meals. 180 tablet 3     glucose blood test strip Use one strip to test blood glucose twice daily 50 each 1     insulin detemir (LEVEMIR) 100 UNIT/ML injection Inject 15 Units under the skin into the appropriate area as directed Every 12 (Twelve) Hours. 108 mL 0     insulin regular (humuLIN R,novoLIN R) 100 UNIT/ML injection Inject 5 Units under the skin into the appropriate area as directed Every 6 (Six) Hours. 72 mL 0     lansoprazole (PREVACID) 30 MG capsule Open contents of one capsule and give via g-tube 2 (Two) Times a Day. 720 capsule 0     sacubitril-valsartan (ENTRESTO) 24-26 MG tablet Administer 1 tablet per G tube Every 12 (Twelve) Hours. 60 tablet 4        Allergies   Allergen Reactions    Dynacirc [Isradipine] Other (See Comments)     Causes tic    Codeine Rash       Objective    Objective     Vital Signs:   Temp:  [98.7 °F (37.1 °C)-102.2 °F (39 °C)] 98.7 °F (37.1 °C)  Heart Rate:  [] 77  Resp:  [19-40] 19  BP: (127-167)/(69-85) 127/70  Flow (L/min):  [2] 2       Physical Exam   Constitutional: No acute respiratory or hemodynamic distress  HENT: NCAT, mucous membranes moist  Respiratory: Clear to auscultation bilaterally, respiratory effort normal   Cardiovascular: RRR, no murmurs, rubs, or gallops  Gastrointestinal: Positive bowel sounds, soft, nontenAbdomen is obese.  michelle, nondistended  Musculoskeletal: No bilateral ankle edema, really obese  Psychiatric: Unable to assess  Neurologic: Awake, does not interact, does not follow any commands, withdraws to painful stimuli only.  Skin: No rashes     Result Review:  I have personally reviewed the results from the time of this admission to 11/17/2023 16:19 EST and agree with these findings:  []  Laboratory list / accordion  []  Microbiology  []  Radiology  []  EKG/Telemetry   []  Cardiology/Vascular   []  Pathology  []  Old records  []  Other:  Most notable findings include: Patient had fever here at the ER.  Does not interact and does not follow any commands.  This is not significantly different than 2 days ago when the patient was discharged to rehab.        LAB RESULTS:      Lab 11/17/23  1005 11/15/23  0404 11/14/23  1519 11/13/23  0316 11/12/23  0307 11/11/23  0409   WBC 13.63*  --  14.33* 15.34* 17.91* 14.77*   HEMOGLOBIN 9.0* 7.9* 6.7* 7.2* 7.5* 7.6*   HEMATOCRIT 30.7* 26.2* 22.5* 24.5* 25.3* 25.8*   PLATELETS 401  --  288 309 361 344   NEUTROS ABS 11.44*  --   --   --  15.05* 12.33*   IMMATURE GRANS (ABS) 0.07*  --   --   --  0.11* 0.09*   LYMPHS ABS 1.05  --   --   --  1.33 1.21   MONOS ABS 0.93*  --   --   --  1.12* 0.89   EOS ABS 0.09  --   --   --  0.27 0.22   MCV 96.2  --  98.3* 98.8* 96.9 97.4*   LACTATE 1.5  --   --   --   --   --          Lab 11/17/23  1005 11/14/23  1519 11/13/23  0316 11/12/23  0307 11/11/23  0409    SODIUM 146* 141 140 141 144   POTASSIUM 5.0 4.4 4.3 4.1 4.3   CHLORIDE 112* 108* 108* 106 108*   CO2 23.0 25.0 25.0 25.0 25.0   ANION GAP 11.0 8.0 7.0 10.0 11.0   BUN 51* 41* 38* 38* 35*   CREATININE 1.19* 0.76 0.82 0.77 0.89   EGFR 46.6* 79.8 72.9 78.6 66.0   GLUCOSE 307* 189* 198* 178* 165*   CALCIUM 8.8 8.9 8.8 8.9 8.5*   MAGNESIUM 2.4  --  2.3  --   --    PHOSPHORUS  --   --  3.2  --  4.1         Lab 11/17/23  1005 11/13/23  0316 11/11/23  0409   TOTAL PROTEIN 6.5 5.6*  --    ALBUMIN 3.2* 3.1* 3.2*   GLOBULIN 3.3 2.5  --    ALT (SGPT) 33 14  --    AST (SGOT) 41* 17  --    BILIRUBIN 0.9 1.1  --    ALK PHOS 128* 91  --          Lab 11/17/23  1206 11/17/23  1005   HSTROP T 136* 146*         Lab 11/13/23  0316   CHOLESTEROL 117   TRIGLYCERIDES 124         Lab 11/14/23  1710   ABO TYPING A   RH TYPING Positive   ANTIBODY SCREEN Negative         UA          11/9/2023    16:35 11/10/2023    15:33 11/17/2023    11:12   Urinalysis   Squamous Epithelial Cells, UA 7-12  3-6  0-2    Specific Gravity, UA 1.015  1.016  1.014    Ketones, UA Negative  Negative  Negative    Blood, UA Moderate (2+)  Negative  Small (1+)    Leukocytes, UA Moderate (2+)  Moderate (2+)  Large (3+)    Nitrite, UA Negative  Negative  Negative    RBC, UA 6-10  3-5  3-5    WBC, UA 11-20  Too Numerous to Count  21-50    Bacteria, UA None Seen  None Seen  1+        Microbiology Results (last 10 days)       Procedure Component Value - Date/Time    COVID PRE-OP / PRE-PROCEDURE SCREENING ORDER (NO ISOLATION) - Swab, Nasopharynx [[id number]]  (Normal) Collected: 11/17/23 1030    Lab Status: Final result Specimen: Swab from Nasopharynx Updated: 11/17/23 1185    Narrative:      The following orders were created for panel order COVID PRE-OP / PRE-PROCEDURE SCREENING ORDER (NO ISOLATION) - Swab, Nasopharynx.  Procedure                               Abnormality         Status                     ---------                               -----------         ------                      Respiratory Panel PCR w/...[[id number]]  Normal              Final result                 Please view results for these tests on the individual orders.    Respiratory Panel PCR w/COVID-19(SARS-CoV-2) NUZHAT/PAUL/AUBRIE/PAD/COR/DEE In-House, NP Swab in UTM/VTM, 2 HR TAT - Swab, Nasopharynx [[id number]]  (Normal) Collected: 11/17/23 1030    Lab Status: Final result Specimen: Swab from Nasopharynx Updated: 11/17/23 1139     ADENOVIRUS, PCR Not Detected     Coronavirus 229E Not Detected     Coronavirus HKU1 Not Detected     Coronavirus NL63 Not Detected     Coronavirus OC43 Not Detected     COVID19 Not Detected     Human Metapneumovirus Not Detected     Human Rhinovirus/Enterovirus Not Detected     Influenza A PCR Not Detected     Influenza B PCR Not Detected     Parainfluenza Virus 1 Not Detected     Parainfluenza Virus 2 Not Detected     Parainfluenza Virus 3 Not Detected     Parainfluenza Virus 4 Not Detected     RSV, PCR Not Detected     Bordetella pertussis pcr Not Detected     Bordetella parapertussis PCR Not Detected     Chlamydophila pneumoniae PCR Not Detected     Mycoplasma pneumo by PCR Not Detected    Narrative:      In the setting of a positive respiratory panel with a viral infection PLUS a negative procalcitonin without other underlying concern for bacterial infection, consider observing off antibiotics or discontinuation of antibiotics and continue supportive care. If the respiratory panel is positive for atypical bacterial infection (Bordetella pertussis, Chlamydophila pneumoniae, or Mycoplasma pneumoniae), consider antibiotic de-escalation to target atypical bacterial infection.    Urine Culture - Urine, Indwelling Urethral Catheter [[id number]]  (Abnormal) Collected: 11/10/23 1533    Lab Status: Final result Specimen: Urine from Indwelling Urethral Catheter Updated: 11/11/23 2324     Urine Culture Yeast isolated    Narrative:      No further work up  Colonization of the urinary tract without  infection is common. Treatment is discouraged unless the patient is symptomatic, pregnant, or undergoing an invasive urologic procedure.    Urine Culture - Urine, Urine, Clean Catch [[id number]]  (Abnormal) Collected: 11/09/23 1635    Lab Status: Final result Specimen: Urine, Clean Catch Updated: 11/10/23 1238     Urine Culture Yeast isolated    Narrative:      Colonization of the urinary tract without infection is common. Treatment is discouraged unless the patient is symptomatic, pregnant, or undergoing an invasive urologic procedure.            CT Head Without Contrast    Result Date: 11/17/2023  CT HEAD WO CONTRAST Date of Exam: 11/17/2023 10:49 AM EST Indication: altered mental status. Comparison: Head CT dated 11/9/2023 Technique: Axial CT images were obtained of the head without contrast administration.  Automated exposure control and iterative construction methods were used. FINDINGS:  Foci of periventricular and subcortical white matter hypoattenuation are consistent with chronic, microvascular ischemic change. There is age-related loss of brain parenchymal volume with prominence of sulcation and ventriculomegaly.  No significant mass  effect, midline shift, intracranial hemorrhage, or hydrocephalus is identified. No extra-axial fluid collection is identified.   The calvarium and overlying soft tissues are unremarkable. Mucosal thickening within the right frontal sinus and right anterior ethmoid air cells. Mild left mastoid effusion. The visualized bony orbits, globes, and retrobulbar soft tissues are unremarkable.     Impression: 1.No acute intracranial abnormality is identified. 2.Findings compatible with chronic microvascular ischemic change and diffuse cortical atrophy. 3.Mucosal thickening within the right frontal sinus and right anterior ethmoid air cells. 4.Mild left mastoid effusion. Electronically Signed: Sergio Esqueda MD  11/17/2023 10:57 AM EST  Workstation ID: [id number]    XR Chest 1  View    Result Date: 11/17/2023  XR CHEST 1 VW Date of Exam: 11/17/2023 10:16 AM EST Indication: Weak/Dizzy/AMS triage protocol Comparison: 11/11/2023. Findings: Right PICC line has been removed. There is stable cardiomegaly. There is continued elevation of the right diaphragm. There are no definite acute lung infiltrates. There is mild blunting of the left costophrenic angle suggestive of small left effusion.     Impression: Impression: Findings suggest small left effusion. No acute process in the lung fields. Electronically Signed: Marielos Camilo MD  11/17/2023 10:50 AM EST  Workstation ID: [id number]     Results for orders placed during the hospital encounter of 10/24/23    Adult Transthoracic Echo Limited W/ Cont if Necessary Per Protocol    Interpretation Summary    Left ventricular systolic function is normal. Estimated left ventricular EF = 60%    Left ventricular wall thickness is consistent with mild concentric hypertrophy.    Trace to mild mitral regurgitation.    Saline test results are negative for intracardiac shunting.    There is a trivial pericardial effusion adjacent to the left ventricle.      Assessment & Plan   Assessment & Plan     Active Hospital Problems    Diagnosis  POA    **Fever [R50.9]  Yes       Patient is a 78-year-old female who was recently admitted to [facility] for hypertension and chest pain.  Patient ended up having a CABG done post CABG patient was sent to ICU.  Evidently patient suffered multiple bihemispheric CVA with altered mental status and severe peripheral weakness.  Patient was sent to rehab on 11/15.  Patient was sent back to the emergency room from rehab because of fever.    **Fever, possibly secondary to UTI.  However, the source of infection is not certain.  We will continue empiric antibiotics with Zosyn and vancomycin soon to be de-escalated.  Blood culture and urine culture are pending.    **Coronary artery disease s/p CABG recently.  Post CABG patient  had multiple bihemispheric stroke and altered mental status.  Patient was discharged to rehab on 11/15.  Patient's mental status and neurologic status was not significant different than now.    **Hypernatremia and acute kidney injury.  This is possibly secondary to dehydration.  We will hydrate the patient with normal saline for now.  We will also check the labs in AM.    **Diabetes mellitus.  Hemoglobin A1c was 9.80 on 10/25/2023.  Patient has PEG tube and we will put the patient on sliding scale with regular insulin.            DVT prophylaxis:  heparin      CODE STATUS:    Code Status and Medical Interventions:   Ordered at: 11/17/23 1552     Medical Intervention Limits:    NO intubation (DNI)     Level Of Support Discussed With:    Next of Kin (If No Surrogate)     Code Status (Patient has no pulse and is not breathing):    No CPR (Do Not Attempt to Resuscitate)     Medical Interventions (Patient has pulse or is breathing):    Limited Support       Expected Discharge       Expected discharge date/ time has not been documented. SARAH Martinez MD  11/17/23

## 2023-11-17 NOTE — PROGRESS NOTES
"Pharmacy Consult-Vancomycin Dosing - day 1  Michael Cochran is a  78 y.o. female receiving vancomycin therapy.     Indication: Sepsis  Consulting Provider: Hospitalist  ID Consult: No    Goal AUC: 400 - 600 mg/L*hr    Current Antimicrobial Therapy  Anti-Infectives (From admission, onward)      Ordered     Dose/Rate Route Frequency Start Stop    11/17/23 1554  piperacillin-tazobactam (ZOSYN) 3.375 g in iso-osmotic dextrose 50 ml (premix)        Ordering Provider: Madhav Martinez MD    3.375 g  over 4 Hours Intravenous Every 8 Hours 11/17/23 1900 11/22/23 1859    11/17/23 1607  Pharmacy to dose vancomycin        Ordering Provider: Madhav Martinez MD     Does not apply Continuous PRN 11/17/23 1607 11/22/23 1606    11/17/23 1027  piperacillin-tazobactam (ZOSYN) 3.375 g in iso-osmotic dextrose 50 ml (premix)        Ordering Provider: Elton Rodrigues MD    3.375 g  over 30 Minutes Intravenous Once 11/17/23 1043 11/17/23 1133    11/17/23 1027  vancomycin 1750 mg/500 mL 0.9% NS IVPB ([facility])        Ordering Provider: Elton Rodrigues MD    20 mg/kg × 93.2 kg  over 105 Minutes Intravenous Once 11/17/23 1043 11/17/23 1254          Labs  Results from last 7 days   Lab Units 11/17/23  1005 11/14/23  1519 11/13/23  0316   BUN mg/dL 51* 41* 38*   CREATININE mg/dL 1.19* 0.76 0.82     Results from last 7 days   Lab Units 11/17/23  1005 11/14/23  1519 11/13/23  0316   WBC 10*3/mm3 13.63* 14.33* 15.34*     Evaluation of Dosing  Last Dose Received in the ED: 11/17 @1109 vanc 1750mg x1  Is Patient on Dialysis or Renal Replacement: No    Ht - 157.5 cm (62\")  Wt - 91.4 kg (201 lb 8 oz)    Estimated Creatinine Clearance: 40.3 mL/min (A) (by C-G formula based on SCr of 1.19 mg/dL (H)).    Intake & Output (last 3 days)         11/14 0701  11/15 0700 11/15 0701  11/16 0700 11/16 0701  11/17 0700 11/17 0701  11/18 0700    IV Piggyback    550    Total Intake(mL/kg)    550 (6)    Net    +550                  Microbiology and " Radiology  Microbiology Results (last 10 days)       Procedure Component Value - Date/Time    COVID PRE-OP / PRE-PROCEDURE SCREENING ORDER (NO ISOLATION) - Swab, Nasopharynx [[id number]]  (Normal) Collected: 11/17/23 1030    Lab Status: Final result Specimen: Swab from Nasopharynx Updated: 11/17/23 1139    Narrative:      The following orders were created for panel order COVID PRE-OP / PRE-PROCEDURE SCREENING ORDER (NO ISOLATION) - Swab, Nasopharynx.  Procedure                               Abnormality         Status                     ---------                               -----------         ------                     Respiratory Panel PCR w/...[[id number]]  Normal              Final result                 Please view results for these tests on the individual orders.    Respiratory Panel PCR w/COVID-19(SARS-CoV-2) [facility] In-House, NP Swab in UTM/VTM, 2 HR TAT - Swab, Nasopharynx [[id number]]  (Normal) Collected: 11/17/23 1030    Lab Status: Final result Specimen: Swab from Nasopharynx Updated: 11/17/23 1139     ADENOVIRUS, PCR Not Detected     Coronavirus 229E Not Detected     Coronavirus HKU1 Not Detected     Coronavirus NL63 Not Detected     Coronavirus OC43 Not Detected     COVID19 Not Detected     Human Metapneumovirus Not Detected     Human Rhinovirus/Enterovirus Not Detected     Influenza A PCR Not Detected     Influenza B PCR Not Detected     Parainfluenza Virus 1 Not Detected     Parainfluenza Virus 2 Not Detected     Parainfluenza Virus 3 Not Detected     Parainfluenza Virus 4 Not Detected     RSV, PCR Not Detected     Bordetella pertussis pcr Not Detected     Bordetella parapertussis PCR Not Detected     Chlamydophila pneumoniae PCR Not Detected     Mycoplasma pneumo by PCR Not Detected    Narrative:      In the setting of a positive respiratory panel with a viral infection PLUS a negative procalcitonin without other underlying concern for bacterial infection, consider observing off  antibiotics or discontinuation of antibiotics and continue supportive care. If the respiratory panel is positive for atypical bacterial infection (Bordetella pertussis, Chlamydophila pneumoniae, or Mycoplasma pneumoniae), consider antibiotic de-escalation to target atypical bacterial infection.    Urine Culture - Urine, Indwelling Urethral Catheter [[id number]]  (Abnormal) Collected: 11/10/23 1533    Lab Status: Final result Specimen: Urine from Indwelling Urethral Catheter Updated: 11/11/23 2324     Urine Culture Yeast isolated    Narrative:      No further work up  Colonization of the urinary tract without infection is common. Treatment is discouraged unless the patient is symptomatic, pregnant, or undergoing an invasive urologic procedure.    Urine Culture - Urine, Urine, Clean Catch [[id number]]  (Abnormal) Collected: 11/09/23 1635    Lab Status: Final result Specimen: Urine, Clean Catch Updated: 11/10/23 1238     Urine Culture Yeast isolated    Narrative:      Colonization of the urinary tract without infection is common. Treatment is discouraged unless the patient is symptomatic, pregnant, or undergoing an invasive urologic procedure.          Reported Vancomycin Levels  N/a    Assessment/Plan:  Pharmacy to dose vanc for this 78yoF for sepsis.   Patient received loading dose of vanc 1750mg (~19mg/kg) x1 in the ED this morning @1109.   Based on age, renal function and [employer] [facility] parameters, will plan to initiate a maintenance regimen of vancomycin 1250mg q24h with the following predictions:        AUC24,ss: 505 mg/L.hr        PAUC*: 75 %        Ctrough,ss: 15.8 mg/L        Pconc*: 28 %        Tox.: 11 %  Recommend level prior to 3rd or 4th total dose, or sooner pending renal function.    Thank you,  Clarisse Puckett, PharmD  11/17/2023  16:18 EST

## 2023-11-18 NOTE — SIGNIFICANT NOTE
11/18/23 0648   Clinician Notification   Reason for Communication Critical lab value   Clinician Name CAMILLE An PA-C   Clinician Role Physician assistant   Method of Communication Call   Response No new orders   Notification Time 0649     Troponin 113

## 2023-11-18 NOTE — PROGRESS NOTES
[facility] Medicine Services  PROGRESS NOTE    Patient Name: Michael Cochran  : 1945  MRN: [id number]    Date of Admission: 2023  Primary Care Physician: Bo Rodriguez PA    Subjective   Subjective     CC: Follow-up fever    HPI: No acute events overnight, this morning patient is somnolent, difficult to arouse, but seems comfortable      Objective   Objective     Vital Signs:   Temp:  [97.4 °F (36.3 °C)-102.2 °F (39 °C)] 99.6 °F (37.6 °C)  Heart Rate:  [] 73  Resp:  [17-40] 17  BP: (127-167)/(61-85) 155/61  Flow (L/min):  [2] 2     Physical Exam:  Constitutional:  ill-appearing elderly female, somnolent  HENT: NCAT, mucous membranes moist  Respiratory: Nonlabored respirations, on 2 L NC  Cardiovascular: RRR, no murmurs, rubs, or gallops  Gastrointestinal: Positive bowel sounds, soft, nontender, nondistended  Psychiatric: CRISTHIAN  Neurologic: CRISTHIAN    Results Reviewed:  LAB RESULTS:      Lab 23  1005 11/15/23  0404 23  1519 23  0316 23  0307   WBC 13.63*  --  14.33* 15.34* 17.91*   HEMOGLOBIN 9.0* 7.9* 6.7* 7.2* 7.5*   HEMATOCRIT 30.7* 26.2* 22.5* 24.5* 25.3*   PLATELETS 401  --  288 309 361   NEUTROS ABS 11.44*  --   --   --  15.05*   IMMATURE GRANS (ABS) 0.07*  --   --   --  0.11*   LYMPHS ABS 1.05  --   --   --  1.33   MONOS ABS 0.93*  --   --   --  1.12*   EOS ABS 0.09  --   --   --  0.27   MCV 96.2  --  98.3* 98.8* 96.9   LACTATE 1.5  --   --   --   --          Lab 23  1206 23  1005 23  1519 23  0316 23  0307   SODIUM  --  146* 141 140 141   POTASSIUM  --  5.0 4.4 4.3 4.1   CHLORIDE  --  112* 108* 108* 106   CO2  --  23.0 25.0 25.0 25.0   ANION GAP  --  11.0 8.0 7.0 10.0   BUN  --  51* 41* 38* 38*   CREATININE  --  1.19* 0.76 0.82 0.77   EGFR  --  46.6* 79.8 72.9 78.6   GLUCOSE  --  307* 189* 198* 178*   CALCIUM  --  8.8 8.9 8.8 8.9   MAGNESIUM  --  2.4  --  2.3  --    PHOSPHORUS 3.9  --   --  3.2  --           Lab 11/17/23  1005 11/13/23  0316   TOTAL PROTEIN 6.5 5.6*   ALBUMIN 3.2* 3.1*   GLOBULIN 3.3 2.5   ALT (SGPT) 33 14   AST (SGOT) 41* 17   BILIRUBIN 0.9 1.1   ALK PHOS 128* 91         Lab 11/17/23  1206 11/17/23  1005   HSTROP T 136* 146*         Lab 11/13/23  0316   CHOLESTEROL 117   TRIGLYCERIDES 124         Lab 11/14/23  1710   ABO TYPING A   RH TYPING Positive   ANTIBODY SCREEN Negative         Brief Urine Lab Results  (Last result in the past 365 days)        Color   Clarity   Blood   Leuk Est   Nitrite   Protein   CREAT   Urine HCG        11/17/23 1112 Yellow   Cloudy   Small (1+)   Large (3+)   Negative   Trace                   Microbiology Results Abnormal       Procedure Component Value - Date/Time    COVID PRE-OP / PRE-PROCEDURE SCREENING ORDER (NO ISOLATION) - Swab, Nasopharynx [[id number]]  (Normal) Collected: 11/17/23 1030    Lab Status: Final result Specimen: Swab from Nasopharynx Updated: 11/17/23 1139    Narrative:      The following orders were created for panel order COVID PRE-OP / PRE-PROCEDURE SCREENING ORDER (NO ISOLATION) - Swab, Nasopharynx.  Procedure                               Abnormality         Status                     ---------                               -----------         ------                     Respiratory Panel PCR w/...[[id number]]  Normal              Final result                 Please view results for these tests on the individual orders.    Respiratory Panel PCR w/COVID-19(SARS-CoV-2) NUZHAT/PAUL/AUBRIE/PAD/COR/DEE In-House, NP Swab in UTM/VTM, 2 HR TAT - Swab, Nasopharynx [[id number]]  (Normal) Collected: 11/17/23 1030    Lab Status: Final result Specimen: Swab from Nasopharynx Updated: 11/17/23 1139     ADENOVIRUS, PCR Not Detected     Coronavirus 229E Not Detected     Coronavirus HKU1 Not Detected     Coronavirus NL63 Not Detected     Coronavirus OC43 Not Detected     COVID19 Not Detected     Human Metapneumovirus Not Detected     Human Rhinovirus/Enterovirus Not Detected      Influenza A PCR Not Detected     Influenza B PCR Not Detected     Parainfluenza Virus 1 Not Detected     Parainfluenza Virus 2 Not Detected     Parainfluenza Virus 3 Not Detected     Parainfluenza Virus 4 Not Detected     RSV, PCR Not Detected     Bordetella pertussis pcr Not Detected     Bordetella parapertussis PCR Not Detected     Chlamydophila pneumoniae PCR Not Detected     Mycoplasma pneumo by PCR Not Detected    Narrative:      In the setting of a positive respiratory panel with a viral infection PLUS a negative procalcitonin without other underlying concern for bacterial infection, consider observing off antibiotics or discontinuation of antibiotics and continue supportive care. If the respiratory panel is positive for atypical bacterial infection (Bordetella pertussis, Chlamydophila pneumoniae, or Mycoplasma pneumoniae), consider antibiotic de-escalation to target atypical bacterial infection.            CT Head Without Contrast    Result Date: 11/17/2023  CT HEAD WO CONTRAST Date of Exam: 11/17/2023 10:49 AM EST Indication: altered mental status. Comparison: Head CT dated 11/9/2023 Technique: Axial CT images were obtained of the head without contrast administration.  Automated exposure control and iterative construction methods were used. FINDINGS:  Foci of periventricular and subcortical white matter hypoattenuation are consistent with chronic, microvascular ischemic change. There is age-related loss of brain parenchymal volume with prominence of sulcation and ventriculomegaly.  No significant mass  effect, midline shift, intracranial hemorrhage, or hydrocephalus is identified. No extra-axial fluid collection is identified.   The calvarium and overlying soft tissues are unremarkable. Mucosal thickening within the right frontal sinus and right anterior ethmoid air cells. Mild left mastoid effusion. The visualized bony orbits, globes, and retrobulbar soft tissues are unremarkable.     Impression: 1.No  acute intracranial abnormality is identified. 2.Findings compatible with chronic microvascular ischemic change and diffuse cortical atrophy. 3.Mucosal thickening within the right frontal sinus and right anterior ethmoid air cells. 4.Mild left mastoid effusion. Electronically Signed: Sergio Esqueda MD  11/17/2023 10:57 AM EST  Workstation ID: [id number]    XR Chest 1 View    Result Date: 11/17/2023  XR CHEST 1 VW Date of Exam: 11/17/2023 10:16 AM EST Indication: Weak/Dizzy/AMS triage protocol Comparison: 11/11/2023. Findings: Right PICC line has been removed. There is stable cardiomegaly. There is continued elevation of the right diaphragm. There are no definite acute lung infiltrates. There is mild blunting of the left costophrenic angle suggestive of small left effusion.     Impression: Impression: Findings suggest small left effusion. No acute process in the lung fields. Electronically Signed: Marielos Camilo MD  11/17/2023 10:50 AM EST  Workstation ID: [id number]     Results for orders placed during the hospital encounter of 10/24/23    Adult Transthoracic Echo Limited W/ Cont if Necessary Per Protocol    Interpretation Summary    Left ventricular systolic function is normal. Estimated left ventricular EF = 60%    Left ventricular wall thickness is consistent with mild concentric hypertrophy.    Trace to mild mitral regurgitation.    Saline test results are negative for intracardiac shunting.    There is a trivial pericardial effusion adjacent to the left ventricle.      Current medications:  Scheduled Meds:amLODIPine, 5 mg, Per G Tube, Q24H  aspirin, 81 mg, Per G Tube, Daily  atorvastatin, 80 mg, Per G Tube, Nightly  carvedilol, 6.25 mg, Per G Tube, BID With Meals  heparin (porcine), 5,000 Units, Subcutaneous, Q8H  insulin regular, 2-7 Units, Subcutaneous, Q6H  piperacillin-tazobactam, 3.375 g, Intravenous, Q8H  senna-docusate sodium, 2 tablet, Per G Tube, BID  sodium chloride, 10 mL, Intravenous, Q12H  vancomycin,  1,250 mg, Intravenous, Q24H      Continuous Infusions:Pharmacy to dose vancomycin,   sodium chloride, 100 mL/hr, Last Rate: 100 mL/hr (11/18/23 0047)      PRN Meds:.  acetaminophen **OR** acetaminophen **OR** acetaminophen    senna-docusate sodium **AND** polyethylene glycol **AND** bisacodyl **AND** bisacodyl    dextrose    dextrose    glucagon (human recombinant)    naloxone    ondansetron **OR** ondansetron    Pharmacy to dose vancomycin    sodium chloride    sodium chloride    sodium chloride    Assessment & Plan   Assessment & Plan     Active Hospital Problems    Diagnosis  POA    **Fever [R50.9]  Yes      Resolved Hospital Problems   No resolved problems to display.        Brief Hospital Course to date:  Michael Cochran is a 78 y.o. female with history of hyperlipidemia, hypertension, recent admission to [facility] s/p recent CABG, stay complicated with findings of acute/subacute CVA discharged to rehab 11/15/2023, presents back today with fever    This patient's problems and plans were partially entered by my partner and updated as appropriate by me 11/18/23.     Sepsis  -Patient with fever Tmax 102.2, leukocytosis, tachycardia, UA suggestive of UTI  -Chest x-ray is unremarkable, respiratory PCR panel is negative  -Follow-up blood and urine cultures, MRSA PCR  -Continue broad-spectrum antibiotics, currently on vancomycin and Zosyn de-escalate as clinical picture dictates  -Continue IV fluids    CAD s/p CABG  -Continue beta-blocker, statin, aspirin    Recent acute/subacute CVA  Acute encephalopathy  -CT head is unremarkable  -Continue aspirin and statin  -Continue antibiotics as above    Poorly controlled type 2 diabetes with A1c 9.8%  -Continue SSI    Hypertension  -BP currently stable  -Continue Coreg, amlodipine    GOC  -Prognosis is currently guarded, will have palliative care consulted to assist    Expected Discharge Location and Transportation: Rehab  Expected Discharge   Expected discharge date/ time has not  been documented.     DVT prophylaxis:  Medical DVT prophylaxis orders are present.     AM-PAC 6 Clicks Score (PT): 6 (11/17/23 2005)    CODE STATUS:   Code Status and Medical Interventions:   Ordered at: 11/17/23 155     Medical Intervention Limits:    NO intubation (DNI)     Level Of Support Discussed With:    Next of Kin (If No Surrogate)     Code Status (Patient has no pulse and is not breathing):    No CPR (Do Not Attempt to Resuscitate)     Medical Interventions (Patient has pulse or is breathing):    Limited Support       Francesca Antonio MD  11/18/23

## 2023-11-18 NOTE — PROGRESS NOTES
Clinical Nutrition     Nutrition Support Assessment  Reason for Visit: Identified at risk by screening criteria, EN    Patient Name: Michael Cochran  YOB: 1945  MRN: [id number]  Date of Encounter: 11/18/23 14:34 EST  Admission date: 11/17/2023    Comments:  Will start Fibersource HN@ 25ml/hr, advance by 10ml Q6hr to goal rate 55ml/hr. Prosource protein TID; water flushes @ 30ml/hr.      This will provide: 1632kcals, 110g PRO, 18.4g fiber, 980ml water from EN + 660ml water flushes.     Monitor need to adjust water flushes, patient with increased water flushes during previous visit.    Monitor BG levels and possible renal for lower carb EN formula     Nutrition Assessment   Admission Diagnosis:  Sepsis [A41.9]  Fever [R50.9]    Problem List:    Fever    PMH:   She  has a past medical history of Hyperlipidemia and Hypertension.    PSH:  She  has a past surgical history that includes Breast fibroadenoma surgery; Cardiac catheterization (N/A, 10/26/2023); Coronary artery bypass graft (N/A, 11/1/2023); and Esophagogastroduodenoscopy w/ PEG (N/A, 11/10/2023).    Applicable Nutrition Concerns:   Skin:  recent surgical wound (CABG 11/1)  Oral:  NPO; dysphagia   GI: Has a PEG tube     Applicable Interval History:   (11/1) s/p CABG, intubated, large bore NG tube;  extubated  (11/3) small bore NG tube placed (peripyloric tip placement per KUB);  EN initiated - Novasource Renal  (11/4) CT head - no acute changes  (11/10) PEG placed   (11/5) MRI Multiple bilateral acute and subacute infarcts.  Moderate periventricular subcortical flair changes related to chronic microvascular ischemic change    Reported/Observed/Food/Nutrition Related History:   11/18  RD familiar with patient from recent prolonged hospital admission post CABG 11/1. Patient was discharged to rehab 11/15, returned yesterday with fever.  Currently NPO. Discussed with MD, he is ok with RD starting EN via PEG tube.     Labs    Labs  "Reviewed: Yes     Results from last 7 days   Lab Units 11/18/23  0518 11/17/23  1206 11/17/23  1005 11/14/23  1519 11/13/23  0316   GLUCOSE mg/dL 139*  --  307* 189* 198*   BUN mg/dL 42*  --  51* 41* 38*   CREATININE mg/dL 0.85  --  1.19* 0.76 0.82   SODIUM mmol/L 153*  --  146* 141 140   CHLORIDE mmol/L 119*  --  112* 108* 108*   POTASSIUM mmol/L 4.3  --  5.0 4.4 4.3   PHOSPHORUS mg/dL  --  3.9  --   --  3.2   MAGNESIUM mg/dL  --   --  2.4  --  2.3   ALT (SGPT) U/L 28  --  33  --  14   LACTATE mmol/L  --   --  1.5  --   --      Results from last 7 days   Lab Units 11/18/23  0518 11/17/23  1005 11/13/23  0316   ALBUMIN g/dL 2.8* 3.2* 3.1*   CHOLESTEROL mg/dL  --   --  117   TRIGLYCERIDES mg/dL  --   --  124       Results from last 7 days   Lab Units 11/18/23  1206 11/18/23  0802 11/18/23  0532 11/17/23 2004 11/17/23  1652 11/15/23  1129   GLUCOSE mg/dL 154* 150* 140* 171* 220* 218*     Lab Results   Lab Value Date/Time    HGBA1C 9.80 (H) 10/25/2023 0357               Medications    Medications Reviewed: Yes  Pertinent: Pericolace, Insulin,   Infusion: IVF @ 100ml/hr   PRN: Dulcolax     Intake/Ouptut 24 hrs (0701 - 0700)   I&O's Reviewed: Yes   Urine 1050ml     Anthropometrics     Flowsheet Rows      Flowsheet Row First Filed Value   Admission Height 157.5 cm (62\") Documented at 11/17/2023 1019   Admission Weight 95.3 kg (210 lb) Documented at 11/17/2023 1019     Height: Height: 157.5 cm (62\")  Last Filed Weight: Weight: 91.4 kg (201 lb 8 oz) (11/17/23 1500)  Method: Weight Method: Bed scale  BMI: BMI (Calculated): 36.8  BMI classification: Obese Class II: 35-39.9kg/m2  IBW:  110lb    UBW:   Weight change:     Nutrition Focused Physical Exam     Date: 11/18    Unable to perform exam due to: Defer pending indication    Needs Assessment   Date: 11/18    Height used:Height: 157.5 cm (62\")  Weights used: 201lb/ 91.4kg; IBW 110lb/ 50kg       Estimated Calorie needs: ~  1600Kcal/day  Method:  Kcals/KG  16-18= 1462- " 1645  Method:  MSJ 1342 x 1.2= 1610    Estimated Protein needs: ~110  g PRO/day  Method: 1.2g/Kg ABW = 111  Method: 2.0 g/kg IBW = 100    Estimated Fluid needs: ~ ml/day   Per clinical status    Current Nutrition Prescription     PO: NPO Diet NPO Type: Strict NPO  Oral Nutrition Supplement:   Intake: Insufficient data      Nutrition Diagnosis   Date: 11/18 Updated:   Problem Swallowing difficulty   Etiology Dysphagia (history with PEG tube placement)    Signs/Symptoms NPO; PEG tube for nutrition    Status:       Goal:   General: Nutrition support treatment  PO: N/A  EN/PN: Initiate EN    Nutrition Intervention      Follow treatment progress, Care plan reviewed, Supplement provided    -Will start Fibersource HN@ 25ml/hr, advance by 10ml Q6hr to goal rate 55ml/hr. Prosource protein TID; water flushes @ 30ml/hr.      This will provide: 1632kcals, 110g PRO, 18.4g fiber, 980ml water from EN + 660ml water flushes.       Monitoring/Evaluation:   Per protocol, I&O, Pertinent labs, EN delivery/tolerance      Sylvia Avila RD  Time Spent: 35min

## 2023-11-19 NOTE — PROGRESS NOTES
[facility] Medicine Services  PROGRESS NOTE    Patient Name: Michael Cochran  : 1945  MRN: [id number]    Date of Admission: 2023  Primary Care Physician: Bo Rodriguez PA    Subjective   Subjective     CC: Follow-up sepsis    HPI:No acute events overnight, patient remains somnolent, difficult to arouse    Objective   Objective     Vital Signs:   Temp:  [98.6 °F (37 °C)-99.2 °F (37.3 °C)] 99.2 °F (37.3 °C)  Heart Rate:  [53-86] 67  Resp:  [17-20] 18  BP: (124-172)/(60-80) 172/80  Flow (L/min):  [2] 2     Physical Exam:  Constitutional: Chronically ill-appearing elderly female, somnolent  HENT: NCAT, mucous membranes dry  Respiratory: Clear to auscultation bilaterally, respiratory effort normal   Cardiovascular: RRR, no murmurs, rubs, or gallops  Gastrointestinal: Positive bowel sounds, soft, nontender, nondistended, PEG in place  Psychiatric: CRISTHIAN  Neurologic: CRISTHIAN  Skin: No rashes      Results Reviewed:  LAB RESULTS:      Lab 23  0518 23  1005 11/15/23  0404 23  15123  0316   WBC 10.64 13.63*  --  14.33* 15.34*   HEMOGLOBIN 8.4* 9.0* 7.9* 6.7* 7.2*   HEMATOCRIT 29.6* 30.7* 26.2* 22.5* 24.5*   PLATELETS 298 401  --  288 309   NEUTROS ABS 8.17* 11.44*  --   --   --    IMMATURE GRANS (ABS) 0.04 0.07*  --   --   --    LYMPHS ABS 1.36 1.05  --   --   --    MONOS ABS 0.73 0.93*  --   --   --    EOS ABS 0.28 0.09  --   --   --    MCV 99.3* 96.2  --  98.3* 98.8*   LACTATE  --  1.5  --   --   --          Lab 23  0518 23  1206 23  1005 23  1519 23  0316   SODIUM 153*  --  146* 141 140   POTASSIUM 4.3  --  5.0 4.4 4.3   CHLORIDE 119*  --  112* 108* 108*   CO2 23.0  --  23.0 25.0 25.0   ANION GAP 11.0  --  11.0 8.0 7.0   BUN 42*  --  51* 41* 38*   CREATININE 0.85  --  1.19* 0.76 0.82   EGFR 69.8  --  46.6* 79.8 72.9   GLUCOSE 139*  --  307* 189* 198*   CALCIUM 8.4*  --  8.8 8.9 8.8   MAGNESIUM  --   --  2.4  --  2.3    PHOSPHORUS  --  3.9  --   --  3.2         Lab 11/18/23  0518 11/17/23  1005 11/13/23  0316   TOTAL PROTEIN 5.6* 6.5 5.6*   ALBUMIN 2.8* 3.2* 3.1*   GLOBULIN 2.8 3.3 2.5   ALT (SGPT) 28 33 14   AST (SGOT) 35* 41* 17   BILIRUBIN 1.0 0.9 1.1   ALK PHOS 98 128* 91         Lab 11/18/23  0518 11/17/23  1206 11/17/23  1005   HSTROP T 113* 136* 146*         Lab 11/13/23  0316   CHOLESTEROL 117   TRIGLYCERIDES 124         Lab 11/14/23  1710   ABO TYPING A   RH TYPING Positive   ANTIBODY SCREEN Negative         Brief Urine Lab Results  (Last result in the past 365 days)        Color   Clarity   Blood   Leuk Est   Nitrite   Protein   CREAT   Urine HCG        11/17/23 1112 Yellow   Cloudy   Small (1+)   Large (3+)   Negative   Trace                   Microbiology Results Abnormal       Procedure Component Value - Date/Time    MRSA Screen, PCR (Inpatient) - Swab, Nares [[id number]]  (Normal) Collected: 11/18/23 0954    Lab Status: Final result Specimen: Swab from Nares Updated: 11/18/23 1209     MRSA PCR Negative    Narrative:      The negative predictive value of this diagnostic test is high and should only be used to consider de-escalating anti-MRSA therapy. A positive result may indicate colonization with MRSA and must be correlated clinically.  MRSA Negative    Blood Culture - Blood, Arm, Left [[id number]]  (Normal) Collected: 11/17/23 1026    Lab Status: Preliminary result Specimen: Blood from Arm, Left Updated: 11/18/23 1102     Blood Culture No growth at 24 hours    Blood Culture - Blood, Arm, Left [[id number]]  (Normal) Collected: 11/17/23 1000    Lab Status: Preliminary result Specimen: Blood from Arm, Left Updated: 11/18/23 1031     Blood Culture No growth at 24 hours    COVID PRE-OP / PRE-PROCEDURE SCREENING ORDER (NO ISOLATION) - Swab, Nasopharynx [[id number]]  (Normal) Collected: 11/17/23 1030    Lab Status: Final result Specimen: Swab from Nasopharynx Updated: 11/17/23 1139    Narrative:      The following orders  were created for panel order COVID PRE-OP / PRE-PROCEDURE SCREENING ORDER (NO ISOLATION) - Swab, Nasopharynx.  Procedure                               Abnormality         Status                     ---------                               -----------         ------                     Respiratory Panel PCR w/...[[id number]]  Normal              Final result                 Please view results for these tests on the individual orders.    Respiratory Panel PCR w/COVID-19(SARS-CoV-2) [facility] In-House, NP Swab in UTM/VTM, 2 HR TAT - Swab, Nasopharynx [[id number]]  (Normal) Collected: 11/17/23 1030    Lab Status: Final result Specimen: Swab from Nasopharynx Updated: 11/17/23 1139     ADENOVIRUS, PCR Not Detected     Coronavirus 229E Not Detected     Coronavirus HKU1 Not Detected     Coronavirus NL63 Not Detected     Coronavirus OC43 Not Detected     COVID19 Not Detected     Human Metapneumovirus Not Detected     Human Rhinovirus/Enterovirus Not Detected     Influenza A PCR Not Detected     Influenza B PCR Not Detected     Parainfluenza Virus 1 Not Detected     Parainfluenza Virus 2 Not Detected     Parainfluenza Virus 3 Not Detected     Parainfluenza Virus 4 Not Detected     RSV, PCR Not Detected     Bordetella pertussis pcr Not Detected     Bordetella parapertussis PCR Not Detected     Chlamydophila pneumoniae PCR Not Detected     Mycoplasma pneumo by PCR Not Detected    Narrative:      In the setting of a positive respiratory panel with a viral infection PLUS a negative procalcitonin without other underlying concern for bacterial infection, consider observing off antibiotics or discontinuation of antibiotics and continue supportive care. If the respiratory panel is positive for atypical bacterial infection (Bordetella pertussis, Chlamydophila pneumoniae, or Mycoplasma pneumoniae), consider antibiotic de-escalation to target atypical bacterial infection.            CT Head Without Contrast    Result Date:  11/17/2023  CT HEAD WO CONTRAST Date of Exam: 11/17/2023 10:49 AM EST Indication: altered mental status. Comparison: Head CT dated 11/9/2023 Technique: Axial CT images were obtained of the head without contrast administration.  Automated exposure control and iterative construction methods were used. FINDINGS:  Foci of periventricular and subcortical white matter hypoattenuation are consistent with chronic, microvascular ischemic change. There is age-related loss of brain parenchymal volume with prominence of sulcation and ventriculomegaly.  No significant mass  effect, midline shift, intracranial hemorrhage, or hydrocephalus is identified. No extra-axial fluid collection is identified.   The calvarium and overlying soft tissues are unremarkable. Mucosal thickening within the right frontal sinus and right anterior ethmoid air cells. Mild left mastoid effusion. The visualized bony orbits, globes, and retrobulbar soft tissues are unremarkable.     Impression: 1.No acute intracranial abnormality is identified. 2.Findings compatible with chronic microvascular ischemic change and diffuse cortical atrophy. 3.Mucosal thickening within the right frontal sinus and right anterior ethmoid air cells. 4.Mild left mastoid effusion. Electronically Signed: Sergio Esqueda MD  11/17/2023 10:57 AM EST  Workstation ID: [id number]    XR Chest 1 View    Result Date: 11/17/2023  XR CHEST 1 VW Date of Exam: 11/17/2023 10:16 AM EST Indication: Weak/Dizzy/AMS triage protocol Comparison: 11/11/2023. Findings: Right PICC line has been removed. There is stable cardiomegaly. There is continued elevation of the right diaphragm. There are no definite acute lung infiltrates. There is mild blunting of the left costophrenic angle suggestive of small left effusion.     Impression: Impression: Findings suggest small left effusion. No acute process in the lung fields. Electronically Signed: Marielos Camilo MD  11/17/2023 10:50 AM EST  Workstation ID:  [id number]     Results for orders placed during the hospital encounter of 10/24/23    Adult Transthoracic Echo Limited W/ Cont if Necessary Per Protocol    Interpretation Summary    Left ventricular systolic function is normal. Estimated left ventricular EF = 60%    Left ventricular wall thickness is consistent with mild concentric hypertrophy.    Trace to mild mitral regurgitation.    Saline test results are negative for intracardiac shunting.    There is a trivial pericardial effusion adjacent to the left ventricle.      Current medications:  Scheduled Meds:amLODIPine, 5 mg, Per G Tube, Q24H  aspirin, 81 mg, Per G Tube, Daily  atorvastatin, 80 mg, Per G Tube, Nightly  carvedilol, 6.25 mg, Per G Tube, BID With Meals  heparin (porcine), 5,000 Units, Subcutaneous, Q8H  insulin regular, 2-7 Units, Subcutaneous, Q6H  piperacillin-tazobactam, 3.375 g, Intravenous, Q8H  senna-docusate sodium, 2 tablet, Per G Tube, BID  sodium chloride, 10 mL, Intravenous, Q12H  vancomycin, 1,250 mg, Intravenous, Q24H      Continuous Infusions:Pharmacy to dose vancomycin,   sodium chloride, 100 mL/hr, Last Rate: 100 mL/hr (11/18/23 2214)      PRN Meds:.  acetaminophen **OR** acetaminophen **OR** acetaminophen    senna-docusate sodium **AND** polyethylene glycol **AND** bisacodyl **AND** bisacodyl    dextrose    dextrose    glucagon (human recombinant)    naloxone    ondansetron **OR** ondansetron    Pharmacy to dose vancomycin    sodium chloride    sodium chloride    sodium chloride    Assessment & Plan   Assessment & Plan     Active Hospital Problems    Diagnosis  POA    **Fever [R50.9]  Yes      Resolved Hospital Problems   No resolved problems to display.        Brief Hospital Course to date:  Michael Cochran is a 78 y.o. female with history of hyperlipidemia, hypertension, recent admission to [facility] s/p recent CABG, stay complicated with findings of acute/subacute CVA discharged to rehab 11/15/2023, presents back today with fever     This  patient's problems and plans were partially entered by my partner and updated as appropriate by me 11/18/23.      Sepsis  -Patient with fever Tmax 102.2, leukocytosis, tachycardia, UA suggestive of UTI  -Chest x-ray is unremarkable, respiratory PCR panel is negative  -Blood cultures NGTD, urine cultures growing GNB, follow-up speciation, MRSA PCR is negative  -Continue broad-spectrum antibiotics, discontinue vancomycin, continue Zosyn for now   -Continue IV fluids    Dysphagia  -Patient with PEG in place,  -Dietitian following, continue tube feeds     CAD s/p CABG  -Continue beta-blocker, statin, aspirin     Recent acute/subacute CVA  Acute encephalopathy  -CT head is unremarkable  -Continue aspirin and statin  -Continue antibiotics as above     Poorly controlled type 2 diabetes with A1c 9.8%  -Continue SSI     Hypertension  -BP currently stable  -Continue Coreg, amlodipine     Hypernatremia  -Suspect secondary to decreased p.o. intake, worsened by tube feeds  -Sodium trending up, will ask nutrition to increase free water  -Continue to closely monitor    GOC  -Prognosis is currently guarded if not poor, patient has been unresponsive since admission  -We will have palliative care consulted to assist    Expected Discharge Location and Transportation: SNF  Expected Discharge   Expected Discharge Date: 11/22/2023; Expected Discharge Time:      DVT prophylaxis:  Medical DVT prophylaxis orders are present.     AM-PAC 6 Clicks Score (PT): 6 (11/18/23 1925)    CODE STATUS:   Code Status and Medical Interventions:   Ordered at: 11/17/23 9774     Medical Intervention Limits:    NO intubation (DNI)     Level Of Support Discussed With:    Next of Kin (If No Surrogate)     Code Status (Patient has no pulse and is not breathing):    No CPR (Do Not Attempt to Resuscitate)     Medical Interventions (Patient has pulse or is breathing):    Limited Support       Francesca Antonio MD  11/19/23

## 2023-11-19 NOTE — CONSULTS
Palliative Care Initial Consult   Attending Physician: Francesca Antonio MD  Referring Provider: Paco Ma    Reason for Referral:  assistance with clarification of goals of care    Code Status:   Code Status and Medical Interventions:   Ordered at: 11/17/23 1552     Medical Intervention Limits:    NO intubation (DNI)     Level Of Support Discussed With:    Next of Kin (If No Surrogate)     Code Status (Patient has no pulse and is not breathing):    No CPR (Do Not Attempt to Resuscitate)     Medical Interventions (Patient has pulse or is breathing):    Limited Support      Advanced Directives: Advance Directive Status: Patient does not have advance directive   Family/Support: Dtr Katharina   Goals of Care: TBD.    HPI:   77 yo female s/p recent CVA complicated by multi B CVA.  Had been discharged to rehab but returned with fever.  H&P indicates mental status at admit not really different from that at discharge from acute care.  She remains somnolent.  Receiving abx for E coli in urine.  Family  not present when seen.      ROS:   Pt unable    Past Medical History:   Diagnosis Date    Hyperlipidemia     Hypertension      Past Surgical History:   Procedure Laterality Date    BREAST FIBROADENOMA SURGERY      9 y/o-was benign    CARDIAC CATHETERIZATION N/A 10/26/2023    Procedure: Left Heart Cath;  Surgeon: Jordi Hodge MD;  Location:  [facility];  Service: Cardiovascular;  Laterality: N/A;    CORONARY ARTERY BYPASS GRAFT N/A 11/1/2023    Procedure: MEDIAN STERNOTOMY, CORONARY ARTERY BYPASS GRAFTING X4, UTILIZING THE LEFT INTERANL MAMMARY ARTERY, EVH OF THE LEFT GREATER SAPHENOUS VEIN, EXPLORATION OF THE RIGHT LEG, PRIMITIVO PER ANETHESIA;  Surgeon: Dirk Cleveland MD;  Location: [facility] OR;  Service: Cardiothoracic;  Laterality: N/A;    ENDOSCOPY W/ PEG TUBE PLACEMENT N/A 11/10/2023    Procedure: ESOPHAGOGASTRODUODENOSCOPY WITH PERCUTANEOUS ENDOSCOPIC GASTROSTOMY TUBE INSERTION AT BEDSIDE;  Surgeon: Megha  "Dirk BOONE MD;  Location: [facility] ENDOSCOPY;  Service: Cardiothoracic;  Laterality: N/A;     Social History     Socioeconomic History    Marital status:    Tobacco Use    Smoking status: Some Days     Types: Cigarettes    Smokeless tobacco: Never    Tobacco comments:     Smokes rarely   Vaping Use    Vaping Use: Never used   Substance and Sexual Activity    Alcohol use: Never    Drug use: Never     History reviewed. No pertinent family history.    Allergies   Allergen Reactions    Dynacirc [Isradipine] Other (See Comments)     Causes tic    Codeine Rash       Current medication reviewed for route, type, dose and frequency and are current per MAR at time of dictation.    Palliative Performance Scale Score:      /91 (BP Location: Right arm, Patient Position: Lying)   Pulse 68   Temp 96.8 °F (36 °C)   Resp 18   Ht 157.5 cm (62\")   Wt 91.4 kg (201 lb 8 oz)   SpO2 92%   BMI 36.85 kg/m²     Intake/Output Summary (Last 24 hours) at 11/19/2023 1148  Last data filed at 11/19/2023 0800  Gross per 24 hour   Intake 3324 ml   Output 1050 ml   Net 2274 ml       Physical Exam:    General Appearance:    Eyes open, NAD, no response to verbal stim   HEENT:    NC/AT, EOMI, anicteric, dry MM, face relaxed   Neck:   supple, trachea midline, no JVD   Lungs:     CTA bilat, diminished in bases; respirations regular, even and unlabored; RR on exam    Heart:    RRR, normal S1 and S2, no M/R/G   Abdomen:     Normal bowel sounds, soft, nondistended, obese   G/U:   Deferred   MSK/Extremities:   Wasting, no edema   Pulses:   Pulses palpable and equal bilaterally   Skin:   Warm, dry   Neurologic:   No response to left visual threat, left gaze deviation, plantar upgoing bilaterally   Psych:   Not interactive, no agitation         Labs:   Results from last 7 days   Lab Units 11/19/23 0801   WBC 10*3/mm3 9.68   HEMOGLOBIN g/dL 8.3*   HEMATOCRIT % 29.2*   PLATELETS 10*3/mm3 306     Results from last 7 days   Lab Units 11/19/23  0801 "   SODIUM mmol/L 152*   POTASSIUM mmol/L 4.0   CHLORIDE mmol/L 122*   CO2 mmol/L 21.0*   BUN mg/dL 37*   CREATININE mg/dL 0.80   GLUCOSE mg/dL 229*   CALCIUM mg/dL 8.5*     Results from last 7 days   Lab Units 11/19/23  0801 11/18/23  0518   SODIUM mmol/L 152* 153*   POTASSIUM mmol/L 4.0 4.3   CHLORIDE mmol/L 122* 119*   CO2 mmol/L 21.0* 23.0   BUN mg/dL 37* 42*   CREATININE mg/dL 0.80 0.85   CALCIUM mg/dL 8.5* 8.4*   BILIRUBIN mg/dL  --  1.0   ALK PHOS U/L  --  98   ALT (SGPT) U/L  --  28   AST (SGOT) U/L  --  35*   GLUCOSE mg/dL 229* 139*     Imaging Results (Last 72 Hours)       Procedure Component Value Units Date/Time    CT Head Without Contrast [[id number]] Collected: 11/17/23 1052     Updated: 11/17/23 1100    Narrative:      CT HEAD WO CONTRAST    Date of Exam: 11/17/2023 10:49 AM EST    Indication: altered mental status.    Comparison: Head CT dated 11/9/2023    Technique: Axial CT images were obtained of the head without contrast administration.  Automated exposure control and iterative construction methods were used.    FINDINGS:    Foci of periventricular and subcortical white matter hypoattenuation are consistent with chronic, microvascular ischemic change. There is age-related loss of brain parenchymal volume with prominence of sulcation and ventriculomegaly.  No significant mass   effect, midline shift, intracranial hemorrhage, or hydrocephalus is identified. No extra-axial fluid collection is identified.   The calvarium and overlying soft tissues are unremarkable. Mucosal thickening within the right frontal sinus and right   anterior ethmoid air cells. Mild left mastoid effusion. The visualized bony orbits, globes, and retrobulbar soft tissues are unremarkable.      Impression:      1.No acute intracranial abnormality is identified.  2.Findings compatible with chronic microvascular ischemic change and diffuse cortical atrophy.  3.Mucosal thickening within the right frontal sinus and right anterior  ethmoid air cells.  4.Mild left mastoid effusion.    Electronically Signed: Sergio Esqueda MD    11/17/2023 10:57 AM EST    Workstation ID: [id number]    XR Chest 1 View [[id number]] Collected: 11/17/23 1049     Updated: 11/17/23 1057    Narrative:      XR CHEST 1 VW    Date of Exam: 11/17/2023 10:16 AM EST    Indication: Weak/Dizzy/AMS triage protocol    Comparison: 11/11/2023.    Findings:  Right PICC line has been removed. There is stable cardiomegaly. There is continued elevation of the right diaphragm. There are no definite acute lung infiltrates. There is mild blunting of the left costophrenic angle suggestive of small left effusion.      Impression:      Impression:  Findings suggest small left effusion. No acute process in the lung fields.      Electronically Signed: Marielos Camilo MD    11/17/2023 10:50 AM EST    Workstation ID: [id number]                Diagnostics: Reviewed    A:     Fever         Impression:  Fever/UTI  Multi bilatera CVAs.    S/P recent CABG  DM with A1c 9.8    Symptoms:  AMS  Debility         P:    Will reach out to dtr to discuss GOC.  Pt will have long term/permanent neuro deficits requiring 24 hr care.  Add palliative oral rinse. Thank you for this consult and allowing us to participate in patient's plan of care. Palliative Care Team will continue to follow patient.     Time spent:38 minutes spent reviewing medical and medication records, assessing and examining patient, discussing with family, answering questions, providing some guidance about a plan and documentation of care, and coordinating care with other healthcare members, with > 50% time spent face to face.     Malissa Oliva MD  11/19/2023

## 2023-11-19 NOTE — PROGRESS NOTES
Clinical Nutrition     Nutrition Support Assessment  Reason for Visit: Identified at risk by screening criteria, EN    Patient Name: Michael Cochran  YOB: 1945  MRN: [id number]  Date of Encounter: 11/19/23 10:05 EST  Admission date: 11/17/2023    Comments:  EN started @ 25ml/hr yesterday (Fibersouce HN),  this morning.  RD will adjust EN to a lower carb formula for optimal BG control.     New EN:  Diabetasource AC @ 25ml/hr, advance by 10ml Q6hr to goal rate 55ml/hr. Prosource protein TID; water flushes @ 50ml/hr.  This will provide:1632kcals, 118g PRO, 18.4g fiber, 990ml water + 1100ml water flushes.      Nutrition Assessment   Admission Diagnosis:  Sepsis [A41.9]  Fever [R50.9]    Problem List:    Fever    PMH:   She  has a past medical history of Hyperlipidemia and Hypertension.    PSH:  She  has a past surgical history that includes Breast fibroadenoma surgery; Cardiac catheterization (N/A, 10/26/2023); Coronary artery bypass graft (N/A, 11/1/2023); and Esophagogastroduodenoscopy w/ PEG (N/A, 11/10/2023).    Applicable Nutrition Concerns:   Skin:  recent surgical wound (CABG 11/1)  Oral:  NPO; dysphagia   GI: Has a PEG tube     Applicable Interval History:   (11/1) s/p CABG, intubated, large bore NG tube;  extubated  (11/3) small bore NG tube placed (peripyloric tip placement per KUB);  EN initiated - Novasource Renal  (11/4) CT head - no acute changes  (11/10) PEG placed   (11/5) MRI Multiple bilateral acute and subacute infarcts.  Moderate periventricular subcortical flair changes related to chronic microvascular ischemic change    Reported/Observed/Food/Nutrition Related History:   11/19  Patient non-verbal at time of visit, did not respond to calling name. No family at bedside.  Bed weight obtained 209lb     11/18  RD familiar with patient from recent prolonged hospital admission post CABG 11/1. Patient was discharged to rehab 11/15, returned yesterday with fever.   "Currently NPO. Discussed with MD, he is ok with RD starting EN via PEG tube.     Labs    Labs Reviewed: Yes     Results from last 7 days   Lab Units 11/19/23  0801 11/18/23  0518 11/17/23  1206 11/17/23  1005 11/14/23  1519 11/13/23  0316   GLUCOSE mg/dL 229* 139*  --  307*   < > 198*   BUN mg/dL 37* 42*  --  51*   < > 38*   CREATININE mg/dL 0.80 0.85  --  1.19*   < > 0.82   SODIUM mmol/L 152* 153*  --  146*   < > 140   CHLORIDE mmol/L 122* 119*  --  112*   < > 108*   POTASSIUM mmol/L 4.0 4.3  --  5.0   < > 4.3   PHOSPHORUS mg/dL  --   --  3.9  --   --  3.2   MAGNESIUM mg/dL  --   --   --  2.4  --  2.3   ALT (SGPT) U/L  --  28  --  33  --  14   LACTATE mmol/L  --   --   --  1.5  --   --     < > = values in this interval not displayed.     Results from last 7 days   Lab Units 11/18/23  0518 11/17/23  1005 11/13/23  0316   ALBUMIN g/dL 2.8* 3.2* 3.1*   CHOLESTEROL mg/dL  --   --  117   TRIGLYCERIDES mg/dL  --   --  124       Results from last 7 days   Lab Units 11/19/23  0613 11/19/23  0026 11/18/23  1832 11/18/23  1206 11/18/23  0802 11/18/23  0532   GLUCOSE mg/dL 216* 192* 162* 154* 150* 140*     Lab Results   Lab Value Date/Time    HGBA1C 9.80 (H) 10/25/2023 0357               Medications    Medications Reviewed: Yes  Pertinent: Pericolace, Insulin,   Infusion: IVF @ 100ml/hr   PRN: Dulcolax     Intake/Ouptut 24 hrs (0701 - 0700)   I&O's Reviewed: Yes   Urine 1050ml     Anthropometrics     Flowsheet Rows      Flowsheet Row First Filed Value   Admission Height 157.5 cm (62\") Documented at 11/17/2023 1019   Admission Weight 95.3 kg (210 lb) Documented at 11/17/2023 1019     Height: Height: 157.5 cm (62\")  Last Filed Weight: Weight: 91.4 kg (201 lb 8 oz) (11/17/23 1500)  Method: Weight Method: Bed scale  BMI: BMI (Calculated): 36.8  BMI classification: Obese Class II: 35-39.9kg/m2  IBW:  110lb    UBW:   Weight change:     Nutrition Focused Physical Exam     Date: 11/18    Unable to perform exam due to:  completed, no " "significant wasting noted*    Needs Assessment   Date: 11/18    Height used:Height: 157.5 cm (62\")  Weights used: 201lb/ 91.4kg; IBW 110lb/ 50kg       Estimated Calorie needs: ~  1600Kcal/day  Method:  Kcals/KG  16-18= 1462- 1645  Method:  MSJ 1342 x 1.2= 1610    Estimated Protein needs: ~110  g PRO/day  Method: 1.2g/Kg ABW = 111  Method: 2.0 g/kg IBW = 100    Estimated Fluid needs: ~ ml/day   Per clinical status    Current Nutrition Prescription     PO: NPO Diet NPO Type: Tube Feeding  Oral Nutrition Supplement:   Intake: Insufficient data      Nutrition Diagnosis   Date: 11/18 Updated:   Problem Swallowing difficulty   Etiology Dysphagia (history with PEG tube placement)    Signs/Symptoms NPO; PEG tube for nutrition    Status:       Goal:   General: Nutrition support treatment  PO: N/A  EN/PN: Adjust EN    Nutrition Intervention      Follow treatment progress, Care plan reviewed, Supplement provided    New EN:  Diabetasource AC @ 25ml/hr, advance by 10ml Q6hr to goal rate 55ml/hr. Prosource protein TID; water flushes @ 50ml/hr.  This will provide:1632kcals, 118g PRO, 18.4g fiber, 990ml water + 1100ml water flushes.      Monitoring/Evaluation:   Per protocol, I&O, Pertinent labs, EN delivery/tolerance      Sylvia Avila RD  Time Spent: 35min  "

## 2023-11-20 NOTE — PLAN OF CARE
Goal Outcome Evaluation:  Plan of Care Reviewed With: patient        Progress: no change  Outcome Evaluation: New palliative consult for assistance with GOC per Dr. Antonio.  LOLA is daughter and MICKI Barragan and granddaughter Debi Young.  Dr. ANKITA Oliva saw pt for initial palliative consult on 11/19.  Dr. Oliva and palliative RN saw pt today for palliative follow up.  Pt. was asleep on 2LNC but did rouse to voice.  She was unable to track with eyes however.  Pt remains very lethargic.  TF via PEG.  She did not demonstrate any visible signs of discomfort.  No family at BS.  Dr. Oliva reached daughter by phone and discussed GOC.  Daughter would like to give patient a trial of ABX this week before making decision to get more information from hospice.  Palliative care to continue to follow for support, POC and ongoing GOC.          Problem: Palliative Care  Goal: Enhanced Quality of Life  Intervention: Promote Advance Care Planning  Flowsheets (Taken 11/20/2023 2761)  Life Transition/Adjustment:   palliative care initiated   palliative care discussed

## 2023-11-20 NOTE — NURSING NOTE
LIDIA specialty bed ordered from [facility]. Patient should be placed on specialty bed when it arrives.     Sensory Perception: 1-->completely limited  Moisture: 3-->occasionally moist  Activity: 1-->bedfast  Mobility: 1-->completely immobile  Nutrition: 3-->adequate  Friction and Shear: 1-->problem  Aguilar Score: 10 (11/19/23 2010)      At discharge, please wipe down bed with appropriate cleaning wipes, place in hallway near patient room and place an EVS work order to have specialty bed taken to the [facility] for . Thank you.    Levy Charles RN, BSN, CCRN, CWOCN  Wound, Ostomy and Continence (WOC) Department  [facility]     no strength deficits were identified

## 2023-11-20 NOTE — PROGRESS NOTES
[facility] Medicine Services  PROGRESS NOTE    Patient Name: Michael Cochran  : 1945  MRN: [id number]    Date of Admission: 2023  Primary Care Physician: Bo Rodriguez PA    Subjective   Subjective     CC: Follow-up sepsis    HPI: No acute events overnight, patient is more awake this morning but not very interactive      Objective   Objective     Vital Signs:   Temp:  [96.8 °F (36 °C)-98.3 °F (36.8 °C)] 97.7 °F (36.5 °C)  Heart Rate:  [51-73] 72  Resp:  [16-20] 16  BP: (109-179)/(64-91) 138/66  Flow (L/min):  [2] 2     Physical Exam:  Constitutional: Ill-appearing elderly female, lethargic  HENT: NCAT, mucous membranes moist  Respiratory: Clear to auscultation bilaterally, respiratory effort normal   Cardiovascular: RRR, no murmurs, rubs, or gallops  Gastrointestinal: Positive bowel sounds, soft, PEG in place  Musculoskeletal: No bilateral ankle edema  Psychiatric: CRISTHIAN  Neurologic: CRISTHIAN    Results Reviewed:  LAB RESULTS:      Lab 23  0020 23  0801 23  0518 23  1005 11/15/23  0404 23  1519   WBC  --  9.68 10.64 13.63*  --  14.33*   HEMOGLOBIN  --  8.3* 8.4* 9.0* 7.9* 6.7*   HEMATOCRIT  --  29.2* 29.6* 30.7* 26.2* 22.5*   PLATELETS  --  306 298 401  --  288   NEUTROS ABS  --  7.88* 8.17* 11.44*  --   --    IMMATURE GRANS (ABS)  --  0.03 0.04 0.07*  --   --    LYMPHS ABS  --  0.90 1.36 1.05  --   --    MONOS ABS  --  0.43 0.73 0.93*  --   --    EOS ABS  --  0.39 0.28 0.09  --   --    MCV  --  100.7* 99.3* 96.2  --  98.3*   CRP 2.49*  --   --   --   --   --    LACTATE  --   --   --  1.5  --   --          Lab 23  0020 23  0801 23  0518 23  1206 23  1005 23  1519   SODIUM 154* 152* 153*  --  146* 141   POTASSIUM 3.8 4.0 4.3  --  5.0 4.4   CHLORIDE 123* 122* 119*  --  112* 108*   CO2 23.0 21.0* 23.0  --  23.0 25.0   ANION GAP 8.0 9.0 11.0  --  11.0 8.0   BUN 32* 37* 42*  --  51* 41*   CREATININE 0.87 0.80 0.85   --  1.19* 0.76   EGFR 67.9 75.1 69.8  --  46.6* 79.8   GLUCOSE 150* 229* 139*  --  307* 189*   CALCIUM 8.2* 8.5* 8.4*  --  8.8 8.9   MAGNESIUM 2.2  --   --   --  2.4  --    PHOSPHORUS 3.9  --   --  3.9  --   --          Lab 11/20/23  0020 11/18/23  0518 11/17/23  1005   TOTAL PROTEIN 5.5* 5.6* 6.5   ALBUMIN 2.6* 2.8* 3.2*   GLOBULIN 2.9 2.8 3.3   ALT (SGPT) 30 28 33   AST (SGOT) 22 35* 41*   BILIRUBIN 0.8 1.0 0.9   ALK PHOS 88 98 128*         Lab 11/18/23  0518 11/17/23  1206 11/17/23  1005   HSTROP T 113* 136* 146*         Lab 11/20/23  0020   CHOLESTEROL 125   TRIGLYCERIDES 148         Lab 11/14/23  1710   ABO TYPING A   RH TYPING Positive   ANTIBODY SCREEN Negative         Brief Urine Lab Results  (Last result in the past 365 days)        Color   Clarity   Blood   Leuk Est   Nitrite   Protein   CREAT   Urine HCG        11/19/23 1728 Yellow   Cloudy   Small (1+)   Small (1+)   Negative   Negative                   Microbiology Results Abnormal       Procedure Component Value - Date/Time    Blood Culture - Blood, Arm, Left [[id number]]  (Normal) Collected: 11/17/23 1026    Lab Status: Preliminary result Specimen: Blood from Arm, Left Updated: 11/19/23 1102     Blood Culture No growth at 2 days    Blood Culture - Blood, Arm, Left [[id number]]  (Normal) Collected: 11/17/23 1000    Lab Status: Preliminary result Specimen: Blood from Arm, Left Updated: 11/19/23 1031     Blood Culture No growth at 2 days    MRSA Screen, PCR (Inpatient) - Swab, Nares [[id number]]  (Normal) Collected: 11/18/23 0954    Lab Status: Final result Specimen: Swab from Nares Updated: 11/18/23 1209     MRSA PCR Negative    Narrative:      The negative predictive value of this diagnostic test is high and should only be used to consider de-escalating anti-MRSA therapy. A positive result may indicate colonization with MRSA and must be correlated clinically.  MRSA Negative    COVID PRE-OP / PRE-PROCEDURE SCREENING ORDER (NO ISOLATION) - Swab,  Nasopharynx [[id number]]  (Normal) Collected: 11/17/23 1030    Lab Status: Final result Specimen: Swab from Nasopharynx Updated: 11/17/23 1139    Narrative:      The following orders were created for panel order COVID PRE-OP / PRE-PROCEDURE SCREENING ORDER (NO ISOLATION) - Swab, Nasopharynx.  Procedure                               Abnormality         Status                     ---------                               -----------         ------                     Respiratory Panel PCR w/...[[id number]]  Normal              Final result                 Please view results for these tests on the individual orders.    Respiratory Panel PCR w/COVID-19(SARS-CoV-2) NUZHAT/PAUL/AUBRIE/PAD/COR/DEE In-House, NP Swab in UTM/VTM, 2 HR TAT - Swab, Nasopharynx [[id number]]  (Normal) Collected: 11/17/23 1030    Lab Status: Final result Specimen: Swab from Nasopharynx Updated: 11/17/23 1139     ADENOVIRUS, PCR Not Detected     Coronavirus 229E Not Detected     Coronavirus HKU1 Not Detected     Coronavirus NL63 Not Detected     Coronavirus OC43 Not Detected     COVID19 Not Detected     Human Metapneumovirus Not Detected     Human Rhinovirus/Enterovirus Not Detected     Influenza A PCR Not Detected     Influenza B PCR Not Detected     Parainfluenza Virus 1 Not Detected     Parainfluenza Virus 2 Not Detected     Parainfluenza Virus 3 Not Detected     Parainfluenza Virus 4 Not Detected     RSV, PCR Not Detected     Bordetella pertussis pcr Not Detected     Bordetella parapertussis PCR Not Detected     Chlamydophila pneumoniae PCR Not Detected     Mycoplasma pneumo by PCR Not Detected    Narrative:      In the setting of a positive respiratory panel with a viral infection PLUS a negative procalcitonin without other underlying concern for bacterial infection, consider observing off antibiotics or discontinuation of antibiotics and continue supportive care. If the respiratory panel is positive for atypical bacterial infection (Bordetella  pertussis, Chlamydophila pneumoniae, or Mycoplasma pneumoniae), consider antibiotic de-escalation to target atypical bacterial infection.            No radiology results from the last 24 hrs    Results for orders placed during the hospital encounter of 10/24/23    Adult Transthoracic Echo Limited W/ Cont if Necessary Per Protocol    Interpretation Summary    Left ventricular systolic function is normal. Estimated left ventricular EF = 60%    Left ventricular wall thickness is consistent with mild concentric hypertrophy.    Trace to mild mitral regurgitation.    Saline test results are negative for intracardiac shunting.    There is a trivial pericardial effusion adjacent to the left ventricle.      Current medications:  Scheduled Meds:amLODIPine, 5 mg, Per G Tube, Q24H  aspirin, 81 mg, Per G Tube, Daily  atorvastatin, 80 mg, Per G Tube, Nightly  carvedilol, 6.25 mg, Per G Tube, BID With Meals  heparin (porcine), 5,000 Units, Subcutaneous, Q8H  insulin regular, 2-7 Units, Subcutaneous, Q6H  piperacillin-tazobactam, 3.375 g, Intravenous, Q8H  ProSource No Carb, 30 mL, Nasogastric, TID  senna-docusate sodium, 2 tablet, Per G Tube, BID  sodium chloride, 10 mL, Intravenous, Q12H      Continuous Infusions:sodium chloride, 100 mL/hr, Last Rate: 100 mL/hr (11/20/23 0229)      PRN Meds:.  acetaminophen **OR** acetaminophen **OR** acetaminophen    senna-docusate sodium **AND** polyethylene glycol **AND** bisacodyl **AND** bisacodyl    dextrose    dextrose    glucagon (human recombinant)    naloxone    ondansetron **OR** ondansetron    palliative care oral rinse    sodium chloride    sodium chloride    sodium chloride    Assessment & Plan   Assessment & Plan     Active Hospital Problems    Diagnosis  POA    **Fever [R50.9]  Yes      Resolved Hospital Problems   No resolved problems to display.        Brief Hospital Course to date:  Michael Cochran is a 78 y.o. female with history of hyperlipidemia, hypertension, recent  admission to [facility] s/p recent CABG, stay complicated with findings of acute/subacute CVA discharged to rehab 11/15/2023, presents back today with fever     Sepsis  E. coli UTI  -Patient with fever Tmax 102.2, leukocytosis, tachycardia, UA suggestive of UTI  -Chest x-ray is unremarkable, respiratory PCR panel is negative  -Blood cultures NGTD, urine cultures growing E. coli,  MRSA PCR is negative  -Continue antibiotics, currently on IV Zosyn  -Continue IV fluids, decrease to 50 ml/hr     Dysphagia  -Patient with PEG in place,  -Dietitian following, continue tube feeds     CAD s/p CABG  -Continue beta-blocker, statin, aspirin     Recent acute/subacute CVA  Acute encephalopathy  -CT head is unremarkable  -Continue aspirin and statin  -Continue antibiotics as above     Poorly controlled type 2 diabetes with A1c 9.8%  -Continue SSI     Hypertension  -BP currently stable  -Continue Coreg, amlodipine     Hypernatremia  -Suspect secondary to decreased p.o. intake, worsened by tube feeds  -Sodium trending up, will ask nutrition to increase free water  -Continue to closely monitor, decrease NS to 50 mL/hr     GOC  -Prognosis is currently guarded if not poor,   -Palliative care consulted and are following     Expected Discharge Location and Transportation: SNF  Expected Discharge   Expected Discharge Date: 11/22/2023; Expected Discharge Time:      DVT prophylaxis:  Medical DVT prophylaxis orders are present.     AM-PAC 6 Clicks Score (PT): 6 (11/19/23 2010)    CODE STATUS:   Code Status and Medical Interventions:   Ordered at: 11/17/23 1552     Medical Intervention Limits:    NO intubation (DNI)     Level Of Support Discussed With:    Next of Kin (If No Surrogate)     Code Status (Patient has no pulse and is not breathing):    No CPR (Do Not Attempt to Resuscitate)     Medical Interventions (Patient has pulse or is breathing):    Limited Support       Francesca Antonio MD  11/20/23

## 2023-11-20 NOTE — PROGRESS NOTES
"Palliative Care Daily Progress Note     Referring: Paco Ma    C/C: pt unable    S: Medical record reviewed.  Events noted. Remains limitedly responsive.     ROS: pt unable    O: Code Status:   Code Status and Medical Interventions:   Ordered at: 11/17/23 1552     Medical Intervention Limits:    NO intubation (DNI)     Level Of Support Discussed With:    Next of Kin (If No Surrogate)     Code Status (Patient has no pulse and is not breathing):    No CPR (Do Not Attempt to Resuscitate)     Medical Interventions (Patient has pulse or is breathing):    Limited Support      Advanced Directives: Advance Directive Status: Patient does not have advance directive   Goals of Care: Ongoing.   Palliative Performance Scale Score:       /76 (BP Location: Left arm, Patient Position: Lying)   Pulse 69   Temp 97.7 °F (36.5 °C) (Axillary)   Resp 18   Ht 157.5 cm (62\")   Wt 91.4 kg (201 lb 8 oz)   SpO2 95%   BMI 36.85 kg/m²     Intake/Output Summary (Last 24 hours) at 11/20/2023 1244  Last data filed at 11/20/2023 0933  Gross per 24 hour   Intake 5141 ml   Output 1975 ml   Net 3166 ml       PE:  General Appearance:    Eyes open at times, NAD   HEENT:    anicteric, MMM, face relaxed   Neck:   supple, trachea midline, no JVD   Lungs:     CTA bilaterally, diminished in bases; respirations regular, even and unlabored    Heart:    RRR, normal S1 and S2, no M/R/G   Abdomen:     Normal bowel sounds, soft, nontender, nondistended   G/U:   Deferred   MSK/Extremities:   Wasting, no edema   Pulses:   Pulses palpable and equal bilaterally   Skin:   Warm, dry   Neurologic:   Opens eyes, gaze does not appear fixed this AM, + spontaneous  movement of RUE and RLE though limited, cooperative   Psych:   Calm, appropriate     Meds: Reviewed and changes not noted    Labs:   Results from last 7 days   Lab Units 11/20/23  0932   WBC 10*3/mm3 9.11   HEMOGLOBIN g/dL 9.0*   HEMATOCRIT % 30.5*   PLATELETS 10*3/mm3 273     Results from last 7 " days   Lab Units 11/20/23  0020   SODIUM mmol/L 154*   POTASSIUM mmol/L 3.8   CHLORIDE mmol/L 123*   CO2 mmol/L 23.0   BUN mg/dL 32*   CREATININE mg/dL 0.87   GLUCOSE mg/dL 150*   CALCIUM mg/dL 8.2*     Results from last 7 days   Lab Units 11/20/23  0020   SODIUM mmol/L 154*   POTASSIUM mmol/L 3.8   CHLORIDE mmol/L 123*   CO2 mmol/L 23.0   BUN mg/dL 32*   CREATININE mg/dL 0.87   CALCIUM mg/dL 8.2*   BILIRUBIN mg/dL 0.8   ALK PHOS U/L 88   ALT (SGPT) U/L 30   AST (SGOT) U/L 22   GLUCOSE mg/dL 150*     Imaging Results (Last 72 Hours)       ** No results found for the last 72 hours. **                  Diagnostics: Reviewed    A:     Fever       Impression:  Fever/UTI  Multi bilatera CVAs.    S/P recent CABG  DM with A1c 9.8     Symptoms:  AMS  Debility    P:   Reached dtr Katharina by phone.  Reviewed recent events and previously noted findings on MRI as well as most recent CT.  Expressed understanding that pt appears to have had significant injury from CVAs.  Confirmed DNR/DNI.  Wishes to give abx a while longer to see if she will improve  Is open to hospice info at end of the week if there is no improvement.  Palliative Care Team will continue to follow patient.     Malissa Oliva MD  11/20/2023  Time spent:28

## 2023-11-20 NOTE — CASE MANAGEMENT/SOCIAL WORK
Discharge Planning Assessment  [facility]     Patient Name: Michael Cochran  MRN: [id number]  Today's Date: 11/20/2023    Admit Date: 11/17/2023    Plan: IDP   Discharge Needs Assessment       Row Name 11/20/23 1520       Living Environment    People in Home child(magaly), adult    Current Living Arrangements home    Primary Care Provided by other (see comments)    Provides Primary Care For other (see comments)    Family Caregiver if Needed child(magaly), adult    Family Caregiver Names Katharina mooney    Quality of Family Relationships helpful;involved;supportive    Living Arrangement Comments Prior to OR was living w/daughter Katharina, independent, was @ SNF @ [facility] post OR .       Transition Planning    Patient/Family Anticipates Transition to long-term care facility    Patient/Family Anticipated Services at Transition     Transportation Anticipated health plan transportation       Discharge Needs Assessment    Equipment Currently Used at Home feeding device                   Discharge Plan       Row Name 11/20/23 1521       Plan    Plan IDP    Patient/Family in Agreement with Plan yes    Plan Comments I spoke w/Katharina mooney in room. Patient in bed w/eyes open, but nonverbal. Pt was recently discharged to [facility] for SNF post OR/stroke. Plan is to go back to SNF @ d/c. Insurance of [employer] confirmed. Fills scripts @ [facility]. Will needs transport back to [facility] and updated notes for pre cert again. PT/OT signed off, will reorder if patient remains awake and alert and can participate. Palliative consulted. Daughter tearful in room, wants to give her mom a chance, but doesn't want her to suffer. CM will continue to follow.    Final Discharge Disposition Code 03 - skilled nursing facility (SNF)                  Continued Care and Services - Admitted Since 11/17/2023    Coordination has not been started for this encounter.       Selected Continued Care - Prior Encounters Includes  continued care and service providers with selected services from prior encounters from 8/19/2023 to 11/20/2023      Discharged on 11/15/2023 Admission date: 10/24/2023 - Discharge disposition: Skilled Nursing Facility (DC - External)      Destination       Service Provider Selected Services Address Phone Fax Patient Preferred    [facility] [address] [place], [facility] [address] [phone] --                          Expected Discharge Date and Time       Expected Discharge Date Expected Discharge Time    Nov 22, 2023            Demographic Summary       Row Name 11/20/23 1518       General Information    Admission Type inpatient    Arrived From subacute/long-term acute care    Referral Source emergency department    General Information Comments Has POA/LW paperwork on file. PCP is ben rosa                   Functional Status       Row Name 11/20/23 1519       Functional Status    Usual Activity Tolerance good    Current Activity Tolerance poor       Functional Status, IADL    Medications completely dependent    Meal Preparation completely dependent    Housekeeping completely dependent    Laundry completely dependent    Shopping completely dependent       Mental Status Summary    Recent Changes in Mental Status/Cognitive Functioning decision-making/judgment;memory (remote);mental status;memory (recent)                   Psychosocial    No documentation.                  Abuse/Neglect    No documentation.                  Legal    No documentation.                  Substance Abuse    No documentation.                  Patient Forms    No documentation.                     Kalyan Garcia RN

## 2023-11-20 NOTE — PROGRESS NOTES
"I visited this patient due to her involvement with palliative care. When I introduced myself as the , I explained that we come in for support for patients at all stages and that I do not make assumptions / judgements about the patient's and family's needs. The patient's daughter said, \"I am feeling pretty happy right now and this is bumming me out.\" I am happy to return if patient/family/staff determine it to be helpful.  "

## 2023-11-20 NOTE — PAYOR COMM NOTE
"Ref# [id number]    Utilization Review  Phone [phone]  Fax [phone]    [place] [address]       Jony Murphy (78 y.o. Female)       Date of Birth   1945    Social Security Number       Address   [address] [place], [place] [address]    Home Phone   [phone]    MRN   [id number]       Jain   None    Marital Status                               Admission Date   11/17/23    Admission Type   Emergency    Admitting Provider   Francesca Antonio MD    Attending Provider   Francesca Antonio MD    Department, Room/Bed   [facility] 4H, S477/1       Discharge Date       Discharge Disposition       Discharge Destination                                 Attending Provider: Francesca Antonio MD    Allergies: Dynacirc [Isradipine], Codeine    Isolation: None   Infection: None   Code Status: No CPR    Ht: 157.5 cm (62\")   Wt: 91.4 kg (201 lb 8 oz)    Admission Cmt: None   Principal Problem: Fever [R50.9]                   Active Insurance as of 11/17/2023       Primary Coverage       Payor Plan Insurance Group Employer/Plan Group    ANTHEM MEDICARE REPLACEMENT ANTHEM MEDICARE ADVANTAGE [id number]       Payor Plan Address Payor Plan Phone Number Payor Plan Fax Number Effective Dates    [address] [phone]  1/1/2023 - None Entered    [facility] [id number]         Subscriber Name Subscriber Birth Date Member ID       JONY MURPHY 1945 [id number]                     Emergency Contacts        (Rel.) Home Phone Work Phone Mobile Phone    MICHELLE GAVIRIA (Daughter) [phone] -- [phone]    NAVARROGERALD ALMENDAREZ (Grandchild) -- -- [phone]              [place]: NPI [id number] Tax ID [id number]  Insurance Information                  ANTHEM MEDICARE REPLACEMENT/ANTHEM MEDICARE ADVANTAGE Phone: [phone]    Subscriber: Jony Murphy Subscriber#: [id number]    Group#: [id number] Precert#: --           "   History & Physical        Madhav Martinez MD at 23 1619              [facility] Medicine Services  HISTORY AND PHYSICAL    Patient Name: Michael Cochran  : 1945  MRN: [id number]  Primary Care Physician: Bo Rodriguez PA  Date of admission: 2023      Subjective  Subjective     Chief Complaint:  fever    HPI:  Michael Cochran is a 78 y.o. female with past medical history significant for hypertension, lipidemia, coronary artery disease.  Patient was recently admitted to [facility] for chest pain and had CABG done.  Post CABG patient was sent to ICU and the patient was found to have altered mental status.  MRI of the brain revealed acute CVA/subacute multiple CVAs.  Patient was discharged to rehab on 11/15/2023.  Patient was sent back to emergency room from rehab because of fever.  At the time of my examination patient was nonverbal and was not interacting.  Patient's daughter was present at the bedside and told me that patient's mental status has been pretty much the same since discharge.  Also she reported to me that at the rehab patient had high temperature.      Personal History     Past Medical History:   Diagnosis Date    Hyperlipidemia     Hypertension              Past Surgical History:   Procedure Laterality Date    BREAST FIBROADENOMA SURGERY      9 y/o-was benign    CARDIAC CATHETERIZATION N/A 10/26/2023    Procedure: Left Heart Cath;  Surgeon: Jordi Hodge MD;  [facility];  Service: Cardiovascular;  Laterality: N/A;    CORONARY ARTERY BYPASS GRAFT N/A 2023    Procedure: MEDIAN STERNOTOMY, CORONARY ARTERY BYPASS GRAFTING X4, UTILIZING THE LEFT INTERANL MAMMARY ARTERY, EVH OF THE LEFT GREATER SAPHENOUS VEIN, EXPLORATION OF THE RIGHT LEG, PRIMITIVO PER ANETHESIA;  Surgeon: Dirk Cleveland MD;  [facility];  Service: Cardiothoracic;  Laterality: N/A;    ENDOSCOPY W/ PEG TUBE PLACEMENT N/A 11/10/2023     Procedure: ESOPHAGOGASTRODUODENOSCOPY WITH PERCUTANEOUS ENDOSCOPIC GASTROSTOMY TUBE INSERTION AT BEDSIDE;  Surgeon: Dirk Cleveland MD;  Location: [facility] ENDOSCOPY;  Service: Cardiothoracic;  Laterality: N/A;       Family History: family history is not on file.     Social History:  reports that she has been smoking cigarettes. She has never used smokeless tobacco. She reports that she does not drink alcohol and does not use drugs.  Social History     Social History Narrative    Not on file       Medications:  Available home medication information reviewed.  Medications Prior to Admission   Medication Sig Dispense Refill Last Dose    Accu-Chek Softclix Lancets lancets Use to test blood glucose twice daily 100 each 1     amLODIPine (NORVASC) 5 MG tablet Take 1 tablet by mouth Daily. 30 tablet 11     aspirin 81 MG chewable tablet Administer 1 tablet per G tube Daily. 30 tablet 11     atorvastatin (LIPITOR) 80 MG tablet Administer 1 tablet per G tube Every Night. 360 tablet 0     Blood Glucose Monitoring Suppl (Blood Glucose Monitor System) w/Device kit Use to test blood glucose twice daily 1 each 0     carvedilol (COREG) 6.25 MG tablet Administer 1 tablet per G tube 2 (Two) Times a Day With Meals. 180 tablet 3     glucose blood test strip Use one strip to test blood glucose twice daily 50 each 1     insulin detemir (LEVEMIR) 100 UNIT/ML injection Inject 15 Units under the skin into the appropriate area as directed Every 12 (Twelve) Hours. 108 mL 0     insulin regular (humuLIN R,novoLIN R) 100 UNIT/ML injection Inject 5 Units under the skin into the appropriate area as directed Every 6 (Six) Hours. 72 mL 0     lansoprazole (PREVACID) 30 MG capsule Open contents of one capsule and give via g-tube 2 (Two) Times a Day. 720 capsule 0     sacubitril-valsartan (ENTRESTO) 24-26 MG tablet Administer 1 tablet per G tube Every 12 (Twelve) Hours. 60 tablet 4        Allergies   Allergen Reactions    Dynacirc [Isradipine] Other (See  Comments)     Causes tic    Codeine Rash       Objective  Objective     Vital Signs:   Temp:  [98.7 °F (37.1 °C)-102.2 °F (39 °C)] 98.7 °F (37.1 °C)  Heart Rate:  [] 77  Resp:  [19-40] 19  BP: (127-167)/(69-85) 127/70  Flow (L/min):  [2] 2       Physical Exam   Constitutional: No acute respiratory or hemodynamic distress  HENT: NCAT, mucous membranes moist  Respiratory: Clear to auscultation bilaterally, respiratory effort normal   Cardiovascular: RRR, no murmurs, rubs, or gallops  Gastrointestinal: Positive bowel sounds, soft, nontenAbdomen is obese.  michelle, nondistended  Musculoskeletal: No bilateral ankle edema, really obese  Psychiatric: Unable to assess  Neurologic: Awake, does not interact, does not follow any commands, withdraws to painful stimuli only.  Skin: No rashes     Result Review:  I have personally reviewed the results from the time of this admission to 11/17/2023 16:19 EST and agree with these findings:  []  Laboratory list / accordion  []  Microbiology  []  Radiology  []  EKG/Telemetry   []  Cardiology/Vascular   []  Pathology  []  Old records  []  Other:  Most notable findings include: Patient had fever here at the ER.  Does not interact and does not follow any commands.  This is not significantly different than 2 days ago when the patient was discharged to rehab.        LAB RESULTS:      Lab 11/17/23  1005 11/15/23  0404 11/14/23  1519 11/13/23  0316 11/12/23  0307 11/11/23  0409   WBC 13.63*  --  14.33* 15.34* 17.91* 14.77*   HEMOGLOBIN 9.0* 7.9* 6.7* 7.2* 7.5* 7.6*   HEMATOCRIT 30.7* 26.2* 22.5* 24.5* 25.3* 25.8*   PLATELETS 401  --  288 309 361 344   NEUTROS ABS 11.44*  --   --   --  15.05* 12.33*   IMMATURE GRANS (ABS) 0.07*  --   --   --  0.11* 0.09*   LYMPHS ABS 1.05  --   --   --  1.33 1.21   MONOS ABS 0.93*  --   --   --  1.12* 0.89   EOS ABS 0.09  --   --   --  0.27 0.22   MCV 96.2  --  98.3* 98.8* 96.9 97.4*   LACTATE 1.5  --   --   --   --   --          Lab 11/17/23  1000  11/14/23  1519 11/13/23  0316 11/12/23  0307 11/11/23  0409   SODIUM 146* 141 140 141 144   POTASSIUM 5.0 4.4 4.3 4.1 4.3   CHLORIDE 112* 108* 108* 106 108*   CO2 23.0 25.0 25.0 25.0 25.0   ANION GAP 11.0 8.0 7.0 10.0 11.0   BUN 51* 41* 38* 38* 35*   CREATININE 1.19* 0.76 0.82 0.77 0.89   EGFR 46.6* 79.8 72.9 78.6 66.0   GLUCOSE 307* 189* 198* 178* 165*   CALCIUM 8.8 8.9 8.8 8.9 8.5*   MAGNESIUM 2.4  --  2.3  --   --    PHOSPHORUS  --   --  3.2  --  4.1         Lab 11/17/23  1005 11/13/23  0316 11/11/23  0409   TOTAL PROTEIN 6.5 5.6*  --    ALBUMIN 3.2* 3.1* 3.2*   GLOBULIN 3.3 2.5  --    ALT (SGPT) 33 14  --    AST (SGOT) 41* 17  --    BILIRUBIN 0.9 1.1  --    ALK PHOS 128* 91  --          Lab 11/17/23  1206 11/17/23  1005   HSTROP T 136* 146*         Lab 11/13/23  0316   CHOLESTEROL 117   TRIGLYCERIDES 124         Lab 11/14/23  1710   ABO TYPING A   RH TYPING Positive   ANTIBODY SCREEN Negative         UA          11/9/2023    16:35 11/10/2023    15:33 11/17/2023    11:12   Urinalysis   Squamous Epithelial Cells, UA 7-12  3-6  0-2    Specific Gravity, UA 1.015  1.016  1.014    Ketones, UA Negative  Negative  Negative    Blood, UA Moderate (2+)  Negative  Small (1+)    Leukocytes, UA Moderate (2+)  Moderate (2+)  Large (3+)    Nitrite, UA Negative  Negative  Negative    RBC, UA 6-10  3-5  3-5    WBC, UA 11-20  Too Numerous to Count  21-50    Bacteria, UA None Seen  None Seen  1+        Microbiology Results (last 10 days)       Procedure Component Value - Date/Time    COVID PRE-OP / PRE-PROCEDURE SCREENING ORDER (NO ISOLATION) - Swab, Nasopharynx [[id number]]  (Normal) Collected: 11/17/23 1030    Lab Status: Final result Specimen: Swab from Nasopharynx Updated: 11/17/23 1137    Narrative:      The following orders were created for panel order COVID PRE-OP / PRE-PROCEDURE SCREENING ORDER (NO ISOLATION) - Swab, Nasopharynx.  Procedure                               Abnormality         Status                      ---------                               -----------         ------                     Respiratory Panel PCR w/...[[id number]]  Normal              Final result                 Please view results for these tests on the individual orders.    Respiratory Panel PCR w/COVID-19(SARS-CoV-2) [facility] In-House, NP Swab in UTM/VTM, 2 HR TAT - Swab, Nasopharynx [[id number]]  (Normal) Collected: 11/17/23 1030    Lab Status: Final result Specimen: Swab from Nasopharynx Updated: 11/17/23 1139     ADENOVIRUS, PCR Not Detected     Coronavirus 229E Not Detected     Coronavirus HKU1 Not Detected     Coronavirus NL63 Not Detected     Coronavirus OC43 Not Detected     COVID19 Not Detected     Human Metapneumovirus Not Detected     Human Rhinovirus/Enterovirus Not Detected     Influenza A PCR Not Detected     Influenza B PCR Not Detected     Parainfluenza Virus 1 Not Detected     Parainfluenza Virus 2 Not Detected     Parainfluenza Virus 3 Not Detected     Parainfluenza Virus 4 Not Detected     RSV, PCR Not Detected     Bordetella pertussis pcr Not Detected     Bordetella parapertussis PCR Not Detected     Chlamydophila pneumoniae PCR Not Detected     Mycoplasma pneumo by PCR Not Detected    Narrative:      In the setting of a positive respiratory panel with a viral infection PLUS a negative procalcitonin without other underlying concern for bacterial infection, consider observing off antibiotics or discontinuation of antibiotics and continue supportive care. If the respiratory panel is positive for atypical bacterial infection (Bordetella pertussis, Chlamydophila pneumoniae, or Mycoplasma pneumoniae), consider antibiotic de-escalation to target atypical bacterial infection.    Urine Culture - Urine, Indwelling Urethral Catheter [[id number]]  (Abnormal) Collected: 11/10/23 1533    Lab Status: Final result Specimen: Urine from Indwelling Urethral Catheter Updated: 11/11/23 2324     Urine Culture Yeast isolated     Narrative:      No further work up  Colonization of the urinary tract without infection is common. Treatment is discouraged unless the patient is symptomatic, pregnant, or undergoing an invasive urologic procedure.    Urine Culture - Urine, Urine, Clean Catch [[id number]]  (Abnormal) Collected: 11/09/23 1635    Lab Status: Final result Specimen: Urine, Clean Catch Updated: 11/10/23 1238     Urine Culture Yeast isolated    Narrative:      Colonization of the urinary tract without infection is common. Treatment is discouraged unless the patient is symptomatic, pregnant, or undergoing an invasive urologic procedure.            CT Head Without Contrast    Result Date: 11/17/2023  CT HEAD WO CONTRAST Date of Exam: 11/17/2023 10:49 AM EST Indication: altered mental status. Comparison: Head CT dated 11/9/2023 Technique: Axial CT images were obtained of the head without contrast administration.  Automated exposure control and iterative construction methods were used. FINDINGS:  Foci of periventricular and subcortical white matter hypoattenuation are consistent with chronic, microvascular ischemic change. There is age-related loss of brain parenchymal volume with prominence of sulcation and ventriculomegaly.  No significant mass  effect, midline shift, intracranial hemorrhage, or hydrocephalus is identified. No extra-axial fluid collection is identified.   The calvarium and overlying soft tissues are unremarkable. Mucosal thickening within the right frontal sinus and right anterior ethmoid air cells. Mild left mastoid effusion. The visualized bony orbits, globes, and retrobulbar soft tissues are unremarkable.     Impression: 1.No acute intracranial abnormality is identified. 2.Findings compatible with chronic microvascular ischemic change and diffuse cortical atrophy. 3.Mucosal thickening within the right frontal sinus and right anterior ethmoid air cells. 4.Mild left mastoid effusion. Electronically Signed: Sergio Esqueda  MD  11/17/2023 10:57 AM EST  Workstation ID: [id number]    XR Chest 1 View    Result Date: 11/17/2023  XR CHEST 1 VW Date of Exam: 11/17/2023 10:16 AM EST Indication: Weak/Dizzy/AMS triage protocol Comparison: 11/11/2023. Findings: Right PICC line has been removed. There is stable cardiomegaly. There is continued elevation of the right diaphragm. There are no definite acute lung infiltrates. There is mild blunting of the left costophrenic angle suggestive of small left effusion.     Impression: Impression: Findings suggest small left effusion. No acute process in the lung fields. Electronically Signed: Marielos Camilo MD  11/17/2023 10:50 AM EST  Workstation ID: [id number]     Results for orders placed during the hospital encounter of 10/24/23    Adult Transthoracic Echo Limited W/ Cont if Necessary Per Protocol    Interpretation Summary    Left ventricular systolic function is normal. Estimated left ventricular EF = 60%    Left ventricular wall thickness is consistent with mild concentric hypertrophy.    Trace to mild mitral regurgitation.    Saline test results are negative for intracardiac shunting.    There is a trivial pericardial effusion adjacent to the left ventricle.      Assessment & Plan  Assessment & Plan     Active Hospital Problems    Diagnosis  POA    **Fever [R50.9]  Yes       Patient is a 78-year-old female who was recently admitted to [facility] for hypertension and chest pain.  Patient ended up having a CABG done post CABG patient was sent to ICU.  Evidently patient suffered multiple bihemispheric CVA with altered mental status and severe peripheral weakness.  Patient was sent to rehab on 11/15.  Patient was sent back to the emergency room from rehab because of fever.    **Fever, possibly secondary to UTI.  However, the source of infection is not certain.  We will continue empiric antibiotics with Zosyn and vancomycin soon to be de-escalated.  Blood culture and urine culture are  pending.    **Coronary artery disease s/p CABG recently.  Post CABG patient had multiple bihemispheric stroke and altered mental status.  Patient was discharged to rehab on 11/15.  Patient's mental status and neurologic status was not significant different than now.    **Hypernatremia and acute kidney injury.  This is possibly secondary to dehydration.  We will hydrate the patient with normal saline for now.  We will also check the labs in AM.    **Diabetes mellitus.  Hemoglobin A1c was 9.80 on 10/25/2023.  Patient has PEG tube and we will put the patient on sliding scale with regular insulin.            DVT prophylaxis:  heparin      CODE STATUS:    Code Status and Medical Interventions:   Ordered at: 11/17/23 2312     Medical Intervention Limits:    NO intubation (DNI)     Level Of Support Discussed With:    Next of Kin (If No Surrogate)     Code Status (Patient has no pulse and is not breathing):    No CPR (Do Not Attempt to Resuscitate)     Medical Interventions (Patient has pulse or is breathing):    Limited Support       Expected Discharge       Expected discharge date/ time has not been documented. TBD    Madhav Martinez MD  11/17/23     Electronically signed by Madhav Martinez MD at 11/17/23 8260          Emergency Department Notes        Nidia Yu, RN at 11/17/23 1420           [facility]    Nursing Report ED to Floor:  Mental status: unresponsive  Ambulatory status: bedbound  Oxygen Therapy:  2L O2 NC  Cardiac Rhythm: NSR  Admitted from: ED  Safety Concerns:  fall risk  Social Issues: none  ED Room #:  15    ED Nurse Phone Extension - [phone] or may call [phone].      HPI:   Chief Complaint   Patient presents with    Altered Mental Status    Fever       Past Medical History:  Past Medical History:   Diagnosis Date    Hyperlipidemia     Hypertension         Past Surgical History:  Past Surgical History:   Procedure Laterality Date    BREAST FIBROADENOMA SURGERY      9 y/o-was benign    CARDIAC  CATHETERIZATION N/A 10/26/2023    Procedure: Left Heart Cath;  Surgeon: Jordi Hodge MD;  Location:  [facility] CATH INVASIVE LOCATION;  Service: Cardiovascular;  Laterality: N/A;    CORONARY ARTERY BYPASS GRAFT N/A 11/1/2023    Procedure: MEDIAN STERNOTOMY, CORONARY ARTERY BYPASS GRAFTING X4, UTILIZING THE LEFT INTERANL MAMMARY ARTERY, EVH OF THE LEFT GREATER SAPHENOUS VEIN, EXPLORATION OF THE RIGHT LEG, PRIMITIVO PER ANETHESIA;  Surgeon: Dirk Clevealnd MD;  Location:  [facility] OR;  Service: Cardiothoracic;  Laterality: N/A;    ENDOSCOPY W/ PEG TUBE PLACEMENT N/A 11/10/2023    Procedure: ESOPHAGOGASTRODUODENOSCOPY WITH PERCUTANEOUS ENDOSCOPIC GASTROSTOMY TUBE INSERTION AT BEDSIDE;  Surgeon: Dirk Cleveland MD;  Location: [facility] ENDOSCOPY;  Service: Cardiothoracic;  Laterality: N/A;        Admitting Doctor:   Madhav Martinez MD    Consulting Provider(s):  Consults       Date and Time Order Name Status Description    11/4/2023 10:11 AM Inpatient Neurology Consult General Completed     11/1/2023  4:16 PM Inpatient Consult to Cardiology Completed     10/24/2023  1:39 PM Inpatient Cardiology Consult Completed              Admitting Diagnosis:   The primary encounter diagnosis was Sepsis with acute hypoxic respiratory failure without septic shock, due to unspecified organism. Diagnoses of Acute urinary tract infection, Elevated troponin, and Hypoxia were also pertinent to this visit.    Most Recent Vitals:   Vitals:    11/17/23 1242 11/17/23 1302 11/17/23 1320 11/17/23 1342   BP: 146/75 151/78 157/76 159/74   Patient Position:       Pulse: 92 89 89 87   Resp:       Temp:       TempSrc:       SpO2: 92%  91% 93%   Weight:       Height:           Active LDAs/IV Access:   Lines, Drains & Airways       Active LDAs       Name Placement date Placement time Site Days    Peripheral IV 11/17/23 Left Antecubital 11/17/23  --  Antecubital  less than 1    Gastrostomy/Enterostomy Percutaneous endoscopic gastrostomy (PEG) 20 Fr. LUQ 11/10/23   0940  LUQ  7    Urethral Catheter 16 Fr. 11/10/23  1020  -- 7                    Labs (abnormal labs have a star):   Labs Reviewed   COMPREHENSIVE METABOLIC PANEL - Abnormal; Notable for the following components:       Result Value    Glucose 307 (*)     BUN 51 (*)     Creatinine 1.19 (*)     Sodium 146 (*)     Chloride 112 (*)     Albumin 3.2 (*)     AST (SGOT) 41 (*)     Alkaline Phosphatase 128 (*)     BUN/Creatinine Ratio 42.9 (*)     eGFR 46.6 (*)     All other components within normal limits    Narrative:     GFR Normal >60  Chronic Kidney Disease <60  Kidney Failure <15    The GFR formula is only valid for adults with stable renal function between ages 18 and 70.   SINGLE HSTROPONIN T - Abnormal; Notable for the following components:    HS Troponin T 146 (*)     All other components within normal limits    Narrative:     High Sensitive Troponin T Reference Range:  <14.0 ng/L- Negative Female for AMI  <22.0 ng/L- Negative Male for AMI  >=14 - Abnormal Female indicating possible myocardial injury.  >=22 - Abnormal Male indicating possible myocardial injury.   Clinicians would have to utilize clinical acumen, EKG, Troponin, and serial changes to determine if it is an Acute Myocardial Infarction or myocardial injury due to an underlying chronic condition.        URINALYSIS W/ MICROSCOPIC IF INDICATED (NO CULTURE) - Abnormal; Notable for the following components:    Appearance, UA Cloudy (*)     Blood, UA Small (1+) (*)     Protein, UA Trace (*)     Leuk Esterase, UA Large (3+) (*)     All other components within normal limits   CBC WITH AUTO DIFFERENTIAL - Abnormal; Notable for the following components:    WBC 13.63 (*)     RBC 3.19 (*)     Hemoglobin 9.0 (*)     Hematocrit 30.7 (*)     MCHC 29.3 (*)     Neutrophil % 83.9 (*)     Lymphocyte % 7.7 (*)     Neutrophils, Absolute 11.44 (*)     Monocytes, Absolute 0.93 (*)     Immature Grans, Absolute 0.07 (*)     All other components within normal limits   HIGH  SENSITIVITIY TROPONIN T 2HR - Abnormal; Notable for the following components:    HS Troponin T 136 (*)     Troponin T Delta -10 (*)     All other components within normal limits    Narrative:     High Sensitive Troponin T Reference Range:  <14.0 ng/L- Negative Female for AMI  <22.0 ng/L- Negative Male for AMI  >=14 - Abnormal Female indicating possible myocardial injury.  >=22 - Abnormal Male indicating possible myocardial injury.   Clinicians would have to utilize clinical acumen, EKG, Troponin, and serial changes to determine if it is an Acute Myocardial Infarction or myocardial injury due to an underlying chronic condition.        URINALYSIS, MICROSCOPIC ONLY - Abnormal; Notable for the following components:    RBC, UA 3-5 (*)     WBC, UA 21-50 (*)     Bacteria, UA 1+ (*)     All other components within normal limits   RESPIRATORY PANEL PCR W/ COVID-19 (SARS-COV-2), NP SWAB IN UTM/VTP, 2 HR TAT - Normal    Narrative:     In the setting of a positive respiratory panel with a viral infection PLUS a negative procalcitonin without other underlying concern for bacterial infection, consider observing off antibiotics or discontinuation of antibiotics and continue supportive care. If the respiratory panel is positive for atypical bacterial infection (Bordetella pertussis, Chlamydophila pneumoniae, or Mycoplasma pneumoniae), consider antibiotic de-escalation to target atypical bacterial infection.   MAGNESIUM - Normal   LACTIC ACID, PLASMA - Normal   COVID PRE-OP / PRE-PROCEDURE SCREENING ORDER (NO ISOLATION)    Narrative:     The following orders were created for panel order COVID PRE-OP / PRE-PROCEDURE SCREENING ORDER (NO ISOLATION) - Swab, Nasopharynx.  Procedure                               Abnormality         Status                     ---------                               -----------         ------                     Respiratory Panel PCR w/...[[id number]]  Normal              Final result                 Please  view results for these tests on the individual orders.   BLOOD CULTURE   BLOOD CULTURE   RAINBOW DRAW    Narrative:     The following orders were created for panel order [place] Draw.  Procedure                               Abnormality         Status                     ---------                               -----------         ------                     Green Top (Gel)[[id number]]                                  Final result               Lavender Top[[id number]]                                     Final result               Gold Top - SST[[id number]]                                   Final result               Gray Top[[id number]]                                         Final result               Light Blue Top[[id number]]                                   Final result                 Please view results for these tests on the individual orders.   POCT GLUCOSE FINGERSTICK   CBC AND DIFFERENTIAL    Narrative:     The following orders were created for panel order CBC & Differential.  Procedure                               Abnormality         Status                     ---------                               -----------         ------                     CBC Auto Differential[[id number]]        Abnormal            Final result                 Please view results for these tests on the individual orders.   GREEN TOP   LAVENDER TOP   GOLD TOP - SST   GRAY TOP   LIGHT BLUE TOP       Meds Given in ED:   Medications   sodium chloride 0.9 % flush 10 mL (has no administration in time range)   sodium chloride 0.9 % bolus 2,019 mL (2,019 mL Intravenous New Bag 11/17/23 1030)   piperacillin-tazobactam (ZOSYN) 3.375 g in iso-osmotic dextrose 50 ml (premix) (0 g Intravenous Stopped 11/17/23 1133)   vancomycin 1750 mg/500 mL 0.9% NS IVPB ([facility]) (0 mg Intravenous Stopped 11/17/23 1254)   acetaminophen (TYLENOL) suppository 650 mg (650 mg Rectal Given 11/17/23 1103)              Electronically signed by Nidia Yu RN at  11/17/23 1421       Elton Rodrigues MD at 11/17/23 1028        Procedure Orders    1. Critical Care [[id number]] ordered by Elton Rodrigues MD                 Subjective   History of Present Illness  78-year-old female sent in from [facility] for evaluation of altered mental status.  She was identified to have a tympanic temperature of 104.  She was noted to be less responsive to staff today.  Upon my evaluation with firm shaking and yelling at the patient she attempts to open her eyes very little.  She is very altered.  She is tachypneic and working hard to breathe with increased respiratory effort.  She is also very warm to the touch and tachycardic.  She recently had coronary artery bypass grafting his recent she is in [facility].  No other detailed information can be obtained from nursing home staff or patient.  No definitive sick contacts or medication changes reported.      Review of Systems   Unable to perform ROS: Mental status change   Constitutional:  Positive for activity change, appetite change, chills, fatigue and fever.   HENT:  Negative for congestion, ear pain, postnasal drip, sinus pressure and sore throat.    Eyes:  Negative for pain, redness and visual disturbance.   Respiratory:  Positive for shortness of breath. Negative for cough and chest tightness.    Cardiovascular:  Negative for chest pain, palpitations and leg swelling.   Gastrointestinal:  Negative for abdominal pain, anal bleeding, blood in stool, diarrhea, nausea and vomiting.   Endocrine: Negative for polydipsia and polyuria.   Genitourinary:  Negative for difficulty urinating, dysuria, frequency and urgency.   Musculoskeletal:  Negative for arthralgias, back pain and neck pain.   Skin:  Negative for pallor and rash.   Allergic/Immunologic: Negative for environmental allergies and immunocompromised state.   Neurological:  Negative for dizziness, weakness and headaches.   Hematological:  Negative for  adenopathy.   Psychiatric/Behavioral:  Positive for confusion and decreased concentration. Negative for self-injury and suicidal ideas. The patient is not nervous/anxious.    All other systems reviewed and are negative.      Past Medical History:   Diagnosis Date    Hyperlipidemia     Hypertension        Allergies   Allergen Reactions    Dynacirc [Isradipine] Other (See Comments)     Causes tic    Codeine Rash       Past Surgical History:   Procedure Laterality Date    BREAST FIBROADENOMA SURGERY      9 y/o-was benign    CARDIAC CATHETERIZATION N/A 10/26/2023    Procedure: Left Heart Cath;  Surgeon: Jordi Hodge MD;  Location:  [facility];  Service: Cardiovascular;  Laterality: N/A;    CORONARY ARTERY BYPASS GRAFT N/A 11/1/2023    Procedure: MEDIAN STERNOTOMY, CORONARY ARTERY BYPASS GRAFTING X4, UTILIZING THE LEFT INTERANL MAMMARY ARTERY, EVH OF THE LEFT GREATER SAPHENOUS VEIN, EXPLORATION OF THE RIGHT LEG, PRIMITIVO PER ANETHESIA;  Surgeon: Dirk Cleveland MD;  Location:  [facility];  Service: Cardiothoracic;  Laterality: N/A;    ENDOSCOPY W/ PEG TUBE PLACEMENT N/A 11/10/2023    Procedure: ESOPHAGOGASTRODUODENOSCOPY WITH PERCUTANEOUS ENDOSCOPIC GASTROSTOMY TUBE INSERTION AT BEDSIDE;  Surgeon: Dirk Cleveland MD;  Location:  [facility];  Service: Cardiothoracic;  Laterality: N/A;       History reviewed. No pertinent family history.    Social History     Socioeconomic History    Marital status:    Tobacco Use    Smoking status: Some Days     Types: Cigarettes    Smokeless tobacco: Never    Tobacco comments:     Smokes rarely   Vaping Use    Vaping Use: Never used   Substance and Sexual Activity    Alcohol use: Never    Drug use: Never           Objective   Physical Exam  Vitals and nursing note reviewed.   Constitutional:       General: She is in acute distress.      Appearance: Normal appearance. She is ill-appearing and toxic-appearing. She is not diaphoretic.      Comments: Warm to  touch, fever   HENT:      Head: Normocephalic and atraumatic.      Right Ear: External ear normal.      Left Ear: External ear normal.      Nose: Nose normal.   Eyes:      General: Lids are normal.      Pupils: Pupils are equal, round, and reactive to light.   Neck:      Trachea: No tracheal deviation.   Cardiovascular:      Rate and Rhythm: Regular rhythm. Tachycardia present.      Pulses: No decreased pulses.      Heart sounds: Normal heart sounds. No murmur heard.     No friction rub. No gallop.      Comments: Scar over the center of the chest is clean dry and intact.  Pulmonary:      Effort: Tachypnea, accessory muscle usage, prolonged expiration and respiratory distress present.      Breath sounds: Normal breath sounds. Examination of the right-lower field reveals decreased breath sounds. Examination of the left-lower field reveals decreased breath sounds. No decreased breath sounds, wheezing, rhonchi or rales.   Abdominal:      General: Bowel sounds are normal.      Palpations: Abdomen is soft.      Tenderness: There is no abdominal tenderness. There is no guarding or rebound.   Musculoskeletal:         General: No deformity. Normal range of motion.      Cervical back: Normal range of motion and neck supple.   Lymphadenopathy:      Cervical: No cervical adenopathy.   Skin:     General: Skin is warm and dry.      Findings: No rash.   Neurological:      Mental Status: She is lethargic, confused and unresponsive.      GCS: GCS eye subscore is 3. GCS verbal subscore is 4. GCS motor subscore is 6.      Cranial Nerves: No cranial nerve deficit.      Sensory: No sensory deficit.   Psychiatric:         Speech: Speech normal.         Behavior: Behavior normal.         Thought Content: Thought content normal.         Judgment: Judgment normal.         Critical Care    Performed by: Elton Rodrigues MD  Authorized by: Elton Rodrigues MD    Critical care provider statement:     Critical care time (minutes):   45    Critical care time was exclusive of:  Separately billable procedures and treating other patients    Critical care was time spent personally by me on the following activities:  Discussions with consultants, discussions with primary provider, evaluation of patient's response to treatment, examination of patient, obtaining history from patient or surrogate, development of treatment plan with patient or surrogate, ordering and performing treatments and interventions, ordering and review of laboratory studies, ordering and review of radiographic studies, pulse oximetry, re-evaluation of patient's condition and review of old charts    I assumed direction of critical care for this patient from another provider in my specialty: no      Care discussed with: admitting provider              ED Course                                           Medical Decision Making  Differential diagnosis includes sepsis, pneumonia, viral illness, urinary tract infection, dehydration, renal insufficiency, other unspecified etiology.    Lab evaluation shows leukocytosis, baseline H&H, baseline kidney function with no obvious or significant electrolyte abnormalities.    Troponin is elevated, but was trending down on recheck.    Urine shows large leuk esterase, 1+ bacteria, 21-50 white cells.  This is felt to be the source for the patient's current fever and sepsis.    Viral respiratory panel was negative.    Chest x-ray shows findings suggesting small left effusion, no acute process identified in the lungs.    CT scan of the head without contrast shows no acute intracranial abnormalities.  The patient does have findings consistent with small vessel chronic ischemic changes, and some chronic sinus disease.    The patient is altered, but per the daughter she typically is altered and not reactive at baseline.    Due to her current fever and signs of acute urinary tract infection I feel admission to the hospital for antibiotics is  warranted.    I have initiated medication for fever control, IV fluids, broad-spectrum antibiotics.    Discussed the patient with the hospitalist, who will consult on the patient to determine status of admission.    Problems Addressed:  Acute urinary tract infection: complicated acute illness or injury with systemic symptoms that poses a threat to life or bodily functions  Elevated troponin: complicated acute illness or injury  Hypoxia: complicated acute illness or injury with systemic symptoms that poses a threat to life or bodily functions  Sepsis with acute hypoxic respiratory failure without septic shock, due to unspecified organism: complicated acute illness or injury with systemic symptoms that poses a threat to life or bodily functions    Amount and/or Complexity of Data Reviewed  External Data Reviewed: labs, radiology, ECG and notes.  Labs: ordered. Decision-making details documented in ED Course.  Radiology: ordered and independent interpretation performed. Decision-making details documented in ED Course.  ECG/medicine tests: ordered and independent interpretation performed. Decision-making details documented in ED Course.     Details: EKG independently interpreted by myself shows sinus tachycardia without acute ischemic changes.    Risk  OTC drugs.  Prescription drug management.  Decision regarding hospitalization.        Final diagnoses:   Sepsis with acute hypoxic respiratory failure without septic shock, due to unspecified organism   Acute urinary tract infection   Elevated troponin   Hypoxia       ED Disposition  ED Disposition       ED Disposition   Decision to Admit    Condition   --    Comment   Level of Care: Telemetry [5]   Diagnosis: Sepsis [[id number]]   Admitting Physician: ROSHNI VASQUEZ [[id number]]                 No follow-up provider specified.       Medication List      No changes were made to your prescriptions during this visit.            Elton Rodrigues MD  11/18/23  1119      Electronically signed by Elton Rodrigues MD at 23 1119          Physician Progress Notes (all)        Francesca Antonio MD at 23 0646              [facility] Medicine Services  PROGRESS NOTE    Patient Name: Michael Cochran  : 1945  MRN: [id number]    Date of Admission: 2023  Primary Care Physician: Bo Rodriguez PA    Subjective   Subjective     CC: Follow-up sepsis    HPI: No acute events overnight, patient is more awake this morning but not very interactive      Objective   Objective     Vital Signs:   Temp:  [96.8 °F (36 °C)-98.3 °F (36.8 °C)] 97.7 °F (36.5 °C)  Heart Rate:  [51-73] 72  Resp:  [16-20] 16  BP: (109-179)/(64-91) 138/66  Flow (L/min):  [2] 2     Physical Exam:  Constitutional: Ill-appearing elderly female, lethargic  HENT: NCAT, mucous membranes moist  Respiratory: Clear to auscultation bilaterally, respiratory effort normal   Cardiovascular: RRR, no murmurs, rubs, or gallops  Gastrointestinal: Positive bowel sounds, soft, PEG in place  Musculoskeletal: No bilateral ankle edema  Psychiatric: CRISTHIAN  Neurologic: CRISTHIAN    Results Reviewed:  LAB RESULTS:      Lab 23  0020 23  0801 23  0518 23  1005 11/15/23  0404 23  1519   WBC  --  9.68 10.64 13.63*  --  14.33*   HEMOGLOBIN  --  8.3* 8.4* 9.0* 7.9* 6.7*   HEMATOCRIT  --  29.2* 29.6* 30.7* 26.2* 22.5*   PLATELETS  --  306 298 401  --  288   NEUTROS ABS  --  7.88* 8.17* 11.44*  --   --    IMMATURE GRANS (ABS)  --  0.03 0.04 0.07*  --   --    LYMPHS ABS  --  0.90 1.36 1.05  --   --    MONOS ABS  --  0.43 0.73 0.93*  --   --    EOS ABS  --  0.39 0.28 0.09  --   --    MCV  --  100.7* 99.3* 96.2  --  98.3*   CRP 2.49*  --   --   --   --   --    LACTATE  --   --   --  1.5  --   --          Lab 23  0020 23  0801 23  0518 23  1206 23  1005 23  1519   SODIUM 154* 152* 153*  --  146* 141   POTASSIUM 3.8 4.0 4.3  --  5.0 4.4    CHLORIDE 123* 122* 119*  --  112* 108*   CO2 23.0 21.0* 23.0  --  23.0 25.0   ANION GAP 8.0 9.0 11.0  --  11.0 8.0   BUN 32* 37* 42*  --  51* 41*   CREATININE 0.87 0.80 0.85  --  1.19* 0.76   EGFR 67.9 75.1 69.8  --  46.6* 79.8   GLUCOSE 150* 229* 139*  --  307* 189*   CALCIUM 8.2* 8.5* 8.4*  --  8.8 8.9   MAGNESIUM 2.2  --   --   --  2.4  --    PHOSPHORUS 3.9  --   --  3.9  --   --          Lab 11/20/23  0020 11/18/23  0518 11/17/23  1005   TOTAL PROTEIN 5.5* 5.6* 6.5   ALBUMIN 2.6* 2.8* 3.2*   GLOBULIN 2.9 2.8 3.3   ALT (SGPT) 30 28 33   AST (SGOT) 22 35* 41*   BILIRUBIN 0.8 1.0 0.9   ALK PHOS 88 98 128*         Lab 11/18/23  0518 11/17/23  1206 11/17/23  1005   HSTROP T 113* 136* 146*         Lab 11/20/23  0020   CHOLESTEROL 125   TRIGLYCERIDES 148         Lab 11/14/23  1710   ABO TYPING A   RH TYPING Positive   ANTIBODY SCREEN Negative         Brief Urine Lab Results  (Last result in the past 365 days)        Color   Clarity   Blood   Leuk Est   Nitrite   Protein   CREAT   Urine HCG        11/19/23 1728 Yellow   Cloudy   Small (1+)   Small (1+)   Negative   Negative                   Microbiology Results Abnormal       Procedure Component Value - Date/Time    Blood Culture - Blood, Arm, Left [[id number]]  (Normal) Collected: 11/17/23 1026    Lab Status: Preliminary result Specimen: Blood from Arm, Left Updated: 11/19/23 1102     Blood Culture No growth at 2 days    Blood Culture - Blood, Arm, Left [[id number]]  (Normal) Collected: 11/17/23 1000    Lab Status: Preliminary result Specimen: Blood from Arm, Left Updated: 11/19/23 1031     Blood Culture No growth at 2 days    MRSA Screen, PCR (Inpatient) - Swab, Nares [[id number]]  (Normal) Collected: 11/18/23 0954    Lab Status: Final result Specimen: Swab from Nares Updated: 11/18/23 1209     MRSA PCR Negative    Narrative:      The negative predictive value of this diagnostic test is high and should only be used to consider de-escalating anti-MRSA therapy. A  positive result may indicate colonization with MRSA and must be correlated clinically.  MRSA Negative    COVID PRE-OP / PRE-PROCEDURE SCREENING ORDER (NO ISOLATION) - Swab, Nasopharynx [[id number]]  (Normal) Collected: 11/17/23 1030    Lab Status: Final result Specimen: Swab from Nasopharynx Updated: 11/17/23 1139    Narrative:      The following orders were created for panel order COVID PRE-OP / PRE-PROCEDURE SCREENING ORDER (NO ISOLATION) - Swab, Nasopharynx.  Procedure                               Abnormality         Status                     ---------                               -----------         ------                     Respiratory Panel PCR w/...[[id number]]  Normal              Final result                 Please view results for these tests on the individual orders.    Respiratory Panel PCR w/COVID-19(SARS-CoV-2) [facility] In-House, NP Swab in UTM/VTM, 2 HR TAT - Swab, Nasopharynx [[id number]]  (Normal) Collected: 11/17/23 1030    Lab Status: Final result Specimen: Swab from Nasopharynx Updated: 11/17/23 1139     ADENOVIRUS, PCR Not Detected     Coronavirus 229E Not Detected     Coronavirus HKU1 Not Detected     Coronavirus NL63 Not Detected     Coronavirus OC43 Not Detected     COVID19 Not Detected     Human Metapneumovirus Not Detected     Human Rhinovirus/Enterovirus Not Detected     Influenza A PCR Not Detected     Influenza B PCR Not Detected     Parainfluenza Virus 1 Not Detected     Parainfluenza Virus 2 Not Detected     Parainfluenza Virus 3 Not Detected     Parainfluenza Virus 4 Not Detected     RSV, PCR Not Detected     Bordetella pertussis pcr Not Detected     Bordetella parapertussis PCR Not Detected     Chlamydophila pneumoniae PCR Not Detected     Mycoplasma pneumo by PCR Not Detected    Narrative:      In the setting of a positive respiratory panel with a viral infection PLUS a negative procalcitonin without other underlying concern for bacterial infection, consider  observing off antibiotics or discontinuation of antibiotics and continue supportive care. If the respiratory panel is positive for atypical bacterial infection (Bordetella pertussis, Chlamydophila pneumoniae, or Mycoplasma pneumoniae), consider antibiotic de-escalation to target atypical bacterial infection.            No radiology results from the last 24 hrs    Results for orders placed during the hospital encounter of 10/24/23    Adult Transthoracic Echo Limited W/ Cont if Necessary Per Protocol    Interpretation Summary    Left ventricular systolic function is normal. Estimated left ventricular EF = 60%    Left ventricular wall thickness is consistent with mild concentric hypertrophy.    Trace to mild mitral regurgitation.    Saline test results are negative for intracardiac shunting.    There is a trivial pericardial effusion adjacent to the left ventricle.      Current medications:  Scheduled Meds:amLODIPine, 5 mg, Per G Tube, Q24H  aspirin, 81 mg, Per G Tube, Daily  atorvastatin, 80 mg, Per G Tube, Nightly  carvedilol, 6.25 mg, Per G Tube, BID With Meals  heparin (porcine), 5,000 Units, Subcutaneous, Q8H  insulin regular, 2-7 Units, Subcutaneous, Q6H  piperacillin-tazobactam, 3.375 g, Intravenous, Q8H  ProSource No Carb, 30 mL, Nasogastric, TID  senna-docusate sodium, 2 tablet, Per G Tube, BID  sodium chloride, 10 mL, Intravenous, Q12H      Continuous Infusions:sodium chloride, 100 mL/hr, Last Rate: 100 mL/hr (11/20/23 0229)      PRN Meds:.  acetaminophen **OR** acetaminophen **OR** acetaminophen    senna-docusate sodium **AND** polyethylene glycol **AND** bisacodyl **AND** bisacodyl    dextrose    dextrose    glucagon (human recombinant)    naloxone    ondansetron **OR** ondansetron    palliative care oral rinse    sodium chloride    sodium chloride    sodium chloride    Assessment & Plan   Assessment & Plan     Active Hospital Problems    Diagnosis  POA    **Fever [R50.9]  Yes      Resolved Hospital  Problems   No resolved problems to display.        Brief Hospital Course to date:  Michael Cochran is a 78 y.o. female with history of hyperlipidemia, hypertension, recent admission to [facility] s/p recent CABG, stay complicated with findings of acute/subacute CVA discharged to rehab 11/15/2023, presents back today with fever     Sepsis  E. coli UTI  -Patient with fever Tmax 102.2, leukocytosis, tachycardia, UA suggestive of UTI  -Chest x-ray is unremarkable, respiratory PCR panel is negative  -Blood cultures NGTD, urine cultures growing E. coli,  MRSA PCR is negative  -Continue antibiotics, currently on IV Zosyn  -Continue IV fluids, decrease to 50 ml/hr     Dysphagia  -Patient with PEG in place,  -Dietitian following, continue tube feeds     CAD s/p CABG  -Continue beta-blocker, statin, aspirin     Recent acute/subacute CVA  Acute encephalopathy  -CT head is unremarkable  -Continue aspirin and statin  -Continue antibiotics as above     Poorly controlled type 2 diabetes with A1c 9.8%  -Continue SSI     Hypertension  -BP currently stable  -Continue Coreg, amlodipine     Hypernatremia  -Suspect secondary to decreased p.o. intake, worsened by tube feeds  -Sodium trending up, will ask nutrition to increase free water  -Continue to closely monitor, decrease NS to 50 mL/hr     GOC  -Prognosis is currently guarded if not poor,   -Palliative care consulted and are following     Expected Discharge Location and Transportation: SNF  Expected Discharge   Expected Discharge Date: 11/22/2023; Expected Discharge Time:      DVT prophylaxis:  Medical DVT prophylaxis orders are present.     AM-PAC 6 Clicks Score (PT): 6 (11/19/23 2010)    CODE STATUS:   Code Status and Medical Interventions:   Ordered at: 11/17/23 1552     Medical Intervention Limits:    NO intubation (DNI)     Level Of Support Discussed With:    Next of Kin (If No Surrogate)     Code Status (Patient has no pulse and is not breathing):    No CPR (Do Not Attempt to  Resuscitate)     Medical Interventions (Patient has pulse or is breathing):    Limited Support       Francesca Antonio MD  23        Electronically signed by Francesca Antonio MD at 23 0851       Francesca Antonio MD at 23 0950              [facility] Medicine Services  PROGRESS NOTE    Patient Name: Michael Cochran  : 1945  MRN: [id number]    Date of Admission: 2023  Primary Care Physician: Bo Rodriguez PA    Subjective   Subjective     CC: Follow-up sepsis    HPI:No acute events overnight, patient remains somnolent, difficult to arouse    Objective   Objective     Vital Signs:   Temp:  [98.6 °F (37 °C)-99.2 °F (37.3 °C)] 99.2 °F (37.3 °C)  Heart Rate:  [53-86] 67  Resp:  [17-20] 18  BP: (124-172)/(60-80) 172/80  Flow (L/min):  [2] 2     Physical Exam:  Constitutional: Chronically ill-appearing elderly female, somnolent  HENT: NCAT, mucous membranes dry  Respiratory: Clear to auscultation bilaterally, respiratory effort normal   Cardiovascular: RRR, no murmurs, rubs, or gallops  Gastrointestinal: Positive bowel sounds, soft, nontender, nondistended, PEG in place  Psychiatric: CRISTHIAN  Neurologic: CRISTHIAN  Skin: No rashes      Results Reviewed:  LAB RESULTS:      Lab 23  0518 23  1005 11/15/23  0404 23  1519 23  0316   WBC 10.64 13.63*  --  14.33* 15.34*   HEMOGLOBIN 8.4* 9.0* 7.9* 6.7* 7.2*   HEMATOCRIT 29.6* 30.7* 26.2* 22.5* 24.5*   PLATELETS 298 401  --  288 309   NEUTROS ABS 8.17* 11.44*  --   --   --    IMMATURE GRANS (ABS) 0.04 0.07*  --   --   --    LYMPHS ABS 1.36 1.05  --   --   --    MONOS ABS 0.73 0.93*  --   --   --    EOS ABS 0.28 0.09  --   --   --    MCV 99.3* 96.2  --  98.3* 98.8*   LACTATE  --  1.5  --   --   --          Lab 23  0518 23  1206 23  1005 23  1519 23  0316   SODIUM 153*  --  146* 141 140   POTASSIUM 4.3  --  5.0 4.4 4.3   CHLORIDE 119*  --  112* 108* 108*   CO2 23.0  --  23.0 25.0  25.0   ANION GAP 11.0  --  11.0 8.0 7.0   BUN 42*  --  51* 41* 38*   CREATININE 0.85  --  1.19* 0.76 0.82   EGFR 69.8  --  46.6* 79.8 72.9   GLUCOSE 139*  --  307* 189* 198*   CALCIUM 8.4*  --  8.8 8.9 8.8   MAGNESIUM  --   --  2.4  --  2.3   PHOSPHORUS  --  3.9  --   --  3.2         Lab 11/18/23  0518 11/17/23  1005 11/13/23  0316   TOTAL PROTEIN 5.6* 6.5 5.6*   ALBUMIN 2.8* 3.2* 3.1*   GLOBULIN 2.8 3.3 2.5   ALT (SGPT) 28 33 14   AST (SGOT) 35* 41* 17   BILIRUBIN 1.0 0.9 1.1   ALK PHOS 98 128* 91         Lab 11/18/23  0518 11/17/23  1206 11/17/23  1005   HSTROP T 113* 136* 146*         Lab 11/13/23  0316   CHOLESTEROL 117   TRIGLYCERIDES 124         Lab 11/14/23  1710   ABO TYPING A   RH TYPING Positive   ANTIBODY SCREEN Negative         Brief Urine Lab Results  (Last result in the past 365 days)        Color   Clarity   Blood   Leuk Est   Nitrite   Protein   CREAT   Urine HCG        11/17/23 1112 Yellow   Cloudy   Small (1+)   Large (3+)   Negative   Trace                   Microbiology Results Abnormal       Procedure Component Value - Date/Time    MRSA Screen, PCR (Inpatient) - Swab, Nares [[id number]]  (Normal) Collected: 11/18/23 0954    Lab Status: Final result Specimen: Swab from Nares Updated: 11/18/23 1209     MRSA PCR Negative    Narrative:      The negative predictive value of this diagnostic test is high and should only be used to consider de-escalating anti-MRSA therapy. A positive result may indicate colonization with MRSA and must be correlated clinically.  MRSA Negative    Blood Culture - Blood, Arm, Left [[id number]]  (Normal) Collected: 11/17/23 1026    Lab Status: Preliminary result Specimen: Blood from Arm, Left Updated: 11/18/23 1102     Blood Culture No growth at 24 hours    Blood Culture - Blood, Arm, Left [[id number]]  (Normal) Collected: 11/17/23 1000    Lab Status: Preliminary result Specimen: Blood from Arm, Left Updated: 11/18/23 1031     Blood Culture No growth at 24 hours    COVID PRE-OP  / PRE-PROCEDURE SCREENING ORDER (NO ISOLATION) - Swab, Nasopharynx [[id number]]  (Normal) Collected: 11/17/23 1030    Lab Status: Final result Specimen: Swab from Nasopharynx Updated: 11/17/23 1139    Narrative:      The following orders were created for panel order COVID PRE-OP / PRE-PROCEDURE SCREENING ORDER (NO ISOLATION) - Swab, Nasopharynx.  Procedure                               Abnormality         Status                     ---------                               -----------         ------                     Respiratory Panel PCR w/...[[id number]]  Normal              Final result                 Please view results for these tests on the individual orders.    Respiratory Panel PCR w/COVID-19(SARS-CoV-2) [facility], NP Swab in UTM/VTM, 2 HR TAT - Swab, Nasopharynx [[id number]]  (Normal) Collected: 11/17/23 1030    Lab Status: Final result Specimen: Swab from Nasopharynx Updated: 11/17/23 1139     ADENOVIRUS, PCR Not Detected     Coronavirus 229E Not Detected     Coronavirus HKU1 Not Detected     Coronavirus NL63 Not Detected     Coronavirus OC43 Not Detected     COVID19 Not Detected     Human Metapneumovirus Not Detected     Human Rhinovirus/Enterovirus Not Detected     Influenza A PCR Not Detected     Influenza B PCR Not Detected     Parainfluenza Virus 1 Not Detected     Parainfluenza Virus 2 Not Detected     Parainfluenza Virus 3 Not Detected     Parainfluenza Virus 4 Not Detected     RSV, PCR Not Detected     Bordetella pertussis pcr Not Detected     Bordetella parapertussis PCR Not Detected     Chlamydophila pneumoniae PCR Not Detected     Mycoplasma pneumo by PCR Not Detected    Narrative:      In the setting of a positive respiratory panel with a viral infection PLUS a negative procalcitonin without other underlying concern for bacterial infection, consider observing off antibiotics or discontinuation of antibiotics and continue supportive care. If the respiratory panel is positive  for atypical bacterial infection (Bordetella pertussis, Chlamydophila pneumoniae, or Mycoplasma pneumoniae), consider antibiotic de-escalation to target atypical bacterial infection.            CT Head Without Contrast    Result Date: 11/17/2023  CT HEAD WO CONTRAST Date of Exam: 11/17/2023 10:49 AM EST Indication: altered mental status. Comparison: Head CT dated 11/9/2023 Technique: Axial CT images were obtained of the head without contrast administration.  Automated exposure control and iterative construction methods were used. FINDINGS:  Foci of periventricular and subcortical white matter hypoattenuation are consistent with chronic, microvascular ischemic change. There is age-related loss of brain parenchymal volume with prominence of sulcation and ventriculomegaly.  No significant mass  effect, midline shift, intracranial hemorrhage, or hydrocephalus is identified. No extra-axial fluid collection is identified.   The calvarium and overlying soft tissues are unremarkable. Mucosal thickening within the right frontal sinus and right anterior ethmoid air cells. Mild left mastoid effusion. The visualized bony orbits, globes, and retrobulbar soft tissues are unremarkable.     Impression: 1.No acute intracranial abnormality is identified. 2.Findings compatible with chronic microvascular ischemic change and diffuse cortical atrophy. 3.Mucosal thickening within the right frontal sinus and right anterior ethmoid air cells. 4.Mild left mastoid effusion. Electronically Signed: Sergio Esqueda MD  11/17/2023 10:57 AM EST  Workstation ID: [id number]    XR Chest 1 View    Result Date: 11/17/2023  XR CHEST 1 VW Date of Exam: 11/17/2023 10:16 AM EST Indication: Weak/Dizzy/AMS triage protocol Comparison: 11/11/2023. Findings: Right PICC line has been removed. There is stable cardiomegaly. There is continued elevation of the right diaphragm. There are no definite acute lung infiltrates. There is mild blunting of the left  costophrenic angle suggestive of small left effusion.     Impression: Impression: Findings suggest small left effusion. No acute process in the lung fields. Electronically Signed: Marielos Camilo MD  11/17/2023 10:50 AM EST  Workstation ID: [id number]     Results for orders placed during the hospital encounter of 10/24/23    Adult Transthoracic Echo Limited W/ Cont if Necessary Per Protocol    Interpretation Summary    Left ventricular systolic function is normal. Estimated left ventricular EF = 60%    Left ventricular wall thickness is consistent with mild concentric hypertrophy.    Trace to mild mitral regurgitation.    Saline test results are negative for intracardiac shunting.    There is a trivial pericardial effusion adjacent to the left ventricle.      Current medications:  Scheduled Meds:amLODIPine, 5 mg, Per G Tube, Q24H  aspirin, 81 mg, Per G Tube, Daily  atorvastatin, 80 mg, Per G Tube, Nightly  carvedilol, 6.25 mg, Per G Tube, BID With Meals  heparin (porcine), 5,000 Units, Subcutaneous, Q8H  insulin regular, 2-7 Units, Subcutaneous, Q6H  piperacillin-tazobactam, 3.375 g, Intravenous, Q8H  senna-docusate sodium, 2 tablet, Per G Tube, BID  sodium chloride, 10 mL, Intravenous, Q12H  vancomycin, 1,250 mg, Intravenous, Q24H      Continuous Infusions:Pharmacy to dose vancomycin,   sodium chloride, 100 mL/hr, Last Rate: 100 mL/hr (11/18/23 2214)      PRN Meds:.  acetaminophen **OR** acetaminophen **OR** acetaminophen    senna-docusate sodium **AND** polyethylene glycol **AND** bisacodyl **AND** bisacodyl    dextrose    dextrose    glucagon (human recombinant)    naloxone    ondansetron **OR** ondansetron    Pharmacy to dose vancomycin    sodium chloride    sodium chloride    sodium chloride    Assessment & Plan   Assessment & Plan     Active Hospital Problems    Diagnosis  POA    **Fever [R50.9]  Yes      Resolved Hospital Problems   No resolved problems to display.        Brief Hospital Course to date:  Michael  Dimas is a 78 y.o. female with history of hyperlipidemia, hypertension, recent admission to [facility] s/p recent CABG, stay complicated with findings of acute/subacute CVA discharged to rehab 11/15/2023, presents back today with fever     This patient's problems and plans were partially entered by my partner and updated as appropriate by me 11/18/23.      Sepsis  -Patient with fever Tmax 102.2, leukocytosis, tachycardia, UA suggestive of UTI  -Chest x-ray is unremarkable, respiratory PCR panel is negative  -Blood cultures NGTD, urine cultures growing GNB, follow-up speciation, MRSA PCR is negative  -Continue broad-spectrum antibiotics, discontinue vancomycin, continue Zosyn for now   -Continue IV fluids    Dysphagia  -Patient with PEG in place,  -Dietitian following, continue tube feeds     CAD s/p CABG  -Continue beta-blocker, statin, aspirin     Recent acute/subacute CVA  Acute encephalopathy  -CT head is unremarkable  -Continue aspirin and statin  -Continue antibiotics as above     Poorly controlled type 2 diabetes with A1c 9.8%  -Continue SSI     Hypertension  -BP currently stable  -Continue Coreg, amlodipine     Hypernatremia  -Suspect secondary to decreased p.o. intake, worsened by tube feeds  -Sodium trending up, will ask nutrition to increase free water  -Continue to closely monitor    GOC  -Prognosis is currently guarded if not poor, patient has been unresponsive since admission  -We will have palliative care consulted to assist    Expected Discharge Location and Transportation: SNF  Expected Discharge   Expected Discharge Date: 11/22/2023; Expected Discharge Time:      DVT prophylaxis:  Medical DVT prophylaxis orders are present.     AM-PAC 6 Clicks Score (PT): 6 (11/18/23 1925)    CODE STATUS:   Code Status and Medical Interventions:   Ordered at: 11/17/23 0625     Medical Intervention Limits:    NO intubation (DNI)     Level Of Support Discussed With:    Next of Kin (If No Surrogate)     Code Status (Patient  has no pulse and is not breathing):    No CPR (Do Not Attempt to Resuscitate)     Medical Interventions (Patient has pulse or is breathing):    Limited Support       Francesca Antonio MD  23       Electronically signed by Francesca Antonio MD at 23 1049       Francesca Antonio MD at 23 0611              [facility] Medicine Services  PROGRESS NOTE    Patient Name: Michael Cochran  : 1945  MRN: [id number]    Date of Admission: 2023  Primary Care Physician: Bo Rodriguez PA    Subjective   Subjective     CC: Follow-up fever    HPI: No acute events overnight, this morning patient is somnolent, difficult to arouse, but seems comfortable      Objective   Objective     Vital Signs:   Temp:  [97.4 °F (36.3 °C)-102.2 °F (39 °C)] 99.6 °F (37.6 °C)  Heart Rate:  [] 73  Resp:  [17-40] 17  BP: (127-167)/(61-85) 155/61  Flow (L/min):  [2] 2     Physical Exam:  Constitutional:  ill-appearing elderly female, somnolent  HENT: NCAT, mucous membranes moist  Respiratory: Nonlabored respirations, on 2 L NC  Cardiovascular: RRR, no murmurs, rubs, or gallops  Gastrointestinal: Positive bowel sounds, soft, nontender, nondistended  Psychiatric: CRISTHIAN  Neurologic: CRISTHIAN    Results Reviewed:  LAB RESULTS:      Lab 23  1005 11/15/23  0404 23  1519 23  0316 23  0307   WBC 13.63*  --  14.33* 15.34* 17.91*   HEMOGLOBIN 9.0* 7.9* 6.7* 7.2* 7.5*   HEMATOCRIT 30.7* 26.2* 22.5* 24.5* 25.3*   PLATELETS 401  --  288 309 361   NEUTROS ABS 11.44*  --   --   --  15.05*   IMMATURE GRANS (ABS) 0.07*  --   --   --  0.11*   LYMPHS ABS 1.05  --   --   --  1.33   MONOS ABS 0.93*  --   --   --  1.12*   EOS ABS 0.09  --   --   --  0.27   MCV 96.2  --  98.3* 98.8* 96.9   LACTATE 1.5  --   --   --   --          Lab 23  1206 23  1005 23  1519 23  0316 23  0307   SODIUM  --  146* 141 140 141   POTASSIUM  --  5.0 4.4 4.3 4.1   CHLORIDE  --  112* 108* 108*  106   CO2  --  23.0 25.0 25.0 25.0   ANION GAP  --  11.0 8.0 7.0 10.0   BUN  --  51* 41* 38* 38*   CREATININE  --  1.19* 0.76 0.82 0.77   EGFR  --  46.6* 79.8 72.9 78.6   GLUCOSE  --  307* 189* 198* 178*   CALCIUM  --  8.8 8.9 8.8 8.9   MAGNESIUM  --  2.4  --  2.3  --    PHOSPHORUS 3.9  --   --  3.2  --          Lab 11/17/23  1005 11/13/23  0316   TOTAL PROTEIN 6.5 5.6*   ALBUMIN 3.2* 3.1*   GLOBULIN 3.3 2.5   ALT (SGPT) 33 14   AST (SGOT) 41* 17   BILIRUBIN 0.9 1.1   ALK PHOS 128* 91         Lab 11/17/23  1206 11/17/23  1005   HSTROP T 136* 146*         Lab 11/13/23  0316   CHOLESTEROL 117   TRIGLYCERIDES 124         Lab 11/14/23  1710   ABO TYPING A   RH TYPING Positive   ANTIBODY SCREEN Negative         Brief Urine Lab Results  (Last result in the past 365 days)        Color   Clarity   Blood   Leuk Est   Nitrite   Protein   CREAT   Urine HCG        11/17/23 1112 Yellow   Cloudy   Small (1+)   Large (3+)   Negative   Trace                   Microbiology Results Abnormal       Procedure Component Value - Date/Time    COVID PRE-OP / PRE-PROCEDURE SCREENING ORDER (NO ISOLATION) - Swab, Nasopharynx [[id number]]  (Normal) Collected: 11/17/23 1030    Lab Status: Final result Specimen: Swab from Nasopharynx Updated: 11/17/23 1139    Narrative:      The following orders were created for panel order COVID PRE-OP / PRE-PROCEDURE SCREENING ORDER (NO ISOLATION) - Swab, Nasopharynx.  Procedure                               Abnormality         Status                     ---------                               -----------         ------                     Respiratory Panel PCR w/...[[id number]]  Normal              Final result                 Please view results for these tests on the individual orders.    Respiratory Panel PCR w/COVID-19(SARS-CoV-2) [facility] In-House, NP Swab in UTM/VTM, 2 HR TAT - Swab, Nasopharynx [[id number]]  (Normal) Collected: 11/17/23 1030    Lab Status: Final result Specimen: Swab from  Nasopharynx Updated: 11/17/23 1139     ADENOVIRUS, PCR Not Detected     Coronavirus 229E Not Detected     Coronavirus HKU1 Not Detected     Coronavirus NL63 Not Detected     Coronavirus OC43 Not Detected     COVID19 Not Detected     Human Metapneumovirus Not Detected     Human Rhinovirus/Enterovirus Not Detected     Influenza A PCR Not Detected     Influenza B PCR Not Detected     Parainfluenza Virus 1 Not Detected     Parainfluenza Virus 2 Not Detected     Parainfluenza Virus 3 Not Detected     Parainfluenza Virus 4 Not Detected     RSV, PCR Not Detected     Bordetella pertussis pcr Not Detected     Bordetella parapertussis PCR Not Detected     Chlamydophila pneumoniae PCR Not Detected     Mycoplasma pneumo by PCR Not Detected    Narrative:      In the setting of a positive respiratory panel with a viral infection PLUS a negative procalcitonin without other underlying concern for bacterial infection, consider observing off antibiotics or discontinuation of antibiotics and continue supportive care. If the respiratory panel is positive for atypical bacterial infection (Bordetella pertussis, Chlamydophila pneumoniae, or Mycoplasma pneumoniae), consider antibiotic de-escalation to target atypical bacterial infection.            CT Head Without Contrast    Result Date: 11/17/2023  CT HEAD WO CONTRAST Date of Exam: 11/17/2023 10:49 AM EST Indication: altered mental status. Comparison: Head CT dated 11/9/2023 Technique: Axial CT images were obtained of the head without contrast administration.  Automated exposure control and iterative construction methods were used. FINDINGS:  Foci of periventricular and subcortical white matter hypoattenuation are consistent with chronic, microvascular ischemic change. There is age-related loss of brain parenchymal volume with prominence of sulcation and ventriculomegaly.  No significant mass  effect, midline shift, intracranial hemorrhage, or hydrocephalus is identified. No extra-axial  fluid collection is identified.   The calvarium and overlying soft tissues are unremarkable. Mucosal thickening within the right frontal sinus and right anterior ethmoid air cells. Mild left mastoid effusion. The visualized bony orbits, globes, and retrobulbar soft tissues are unremarkable.     Impression: 1.No acute intracranial abnormality is identified. 2.Findings compatible with chronic microvascular ischemic change and diffuse cortical atrophy. 3.Mucosal thickening within the right frontal sinus and right anterior ethmoid air cells. 4.Mild left mastoid effusion. Electronically Signed: Sergio Esqueda MD  11/17/2023 10:57 AM EST  Workstation ID: [id number]    XR Chest 1 View    Result Date: 11/17/2023  XR CHEST 1 VW Date of Exam: 11/17/2023 10:16 AM EST Indication: Weak/Dizzy/AMS triage protocol Comparison: 11/11/2023. Findings: Right PICC line has been removed. There is stable cardiomegaly. There is continued elevation of the right diaphragm. There are no definite acute lung infiltrates. There is mild blunting of the left costophrenic angle suggestive of small left effusion.     Impression: Impression: Findings suggest small left effusion. No acute process in the lung fields. Electronically Signed: Marielos Camilo MD  11/17/2023 10:50 AM EST  Workstation ID: [id number]     Results for orders placed during the hospital encounter of 10/24/23    Adult Transthoracic Echo Limited W/ Cont if Necessary Per Protocol    Interpretation Summary    Left ventricular systolic function is normal. Estimated left ventricular EF = 60%    Left ventricular wall thickness is consistent with mild concentric hypertrophy.    Trace to mild mitral regurgitation.    Saline test results are negative for intracardiac shunting.    There is a trivial pericardial effusion adjacent to the left ventricle.      Current medications:  Scheduled Meds:amLODIPine, 5 mg, Per G Tube, Q24H  aspirin, 81 mg, Per G Tube, Daily  atorvastatin, 80 mg, Per G Tube,  Nightly  carvedilol, 6.25 mg, Per G Tube, BID With Meals  heparin (porcine), 5,000 Units, Subcutaneous, Q8H  insulin regular, 2-7 Units, Subcutaneous, Q6H  piperacillin-tazobactam, 3.375 g, Intravenous, Q8H  senna-docusate sodium, 2 tablet, Per G Tube, BID  sodium chloride, 10 mL, Intravenous, Q12H  vancomycin, 1,250 mg, Intravenous, Q24H      Continuous Infusions:Pharmacy to dose vancomycin,   sodium chloride, 100 mL/hr, Last Rate: 100 mL/hr (11/18/23 0047)      PRN Meds:.  acetaminophen **OR** acetaminophen **OR** acetaminophen    senna-docusate sodium **AND** polyethylene glycol **AND** bisacodyl **AND** bisacodyl    dextrose    dextrose    glucagon (human recombinant)    naloxone    ondansetron **OR** ondansetron    Pharmacy to dose vancomycin    sodium chloride    sodium chloride    sodium chloride    Assessment & Plan   Assessment & Plan     Active Hospital Problems    Diagnosis  POA    **Fever [R50.9]  Yes      Resolved Hospital Problems   No resolved problems to display.        Brief Hospital Course to date:  Michael Cochran is a 78 y.o. female with history of hyperlipidemia, hypertension, recent admission to [facility] s/p recent CABG, stay complicated with findings of acute/subacute CVA discharged to rehab 11/15/2023, presents back today with fever    This patient's problems and plans were partially entered by my partner and updated as appropriate by me 11/18/23.     Sepsis  -Patient with fever Tmax 102.2, leukocytosis, tachycardia, UA suggestive of UTI  -Chest x-ray is unremarkable, respiratory PCR panel is negative  -Follow-up blood and urine cultures, MRSA PCR  -Continue broad-spectrum antibiotics, currently on vancomycin and Zosyn de-escalate as clinical picture dictates  -Continue IV fluids    CAD s/p CABG  -Continue beta-blocker, statin, aspirin    Recent acute/subacute CVA  Acute encephalopathy  -CT head is unremarkable  -Continue aspirin and statin  -Continue antibiotics as above    Poorly controlled type  2 diabetes with A1c 9.8%  -Continue SSI    Hypertension  -BP currently stable  -Continue Coreg, amlodipine    GOC  -Prognosis is currently guarded, will have palliative care consulted to assist    Expected Discharge Location and Transportation: Rehab  Expected Discharge   Expected discharge date/ time has not been documented.     DVT prophylaxis:  Medical DVT prophylaxis orders are present.     AM-PAC 6 Clicks Score (PT): 6 (11/17/23 2005)    CODE STATUS:   Code Status and Medical Interventions:   Ordered at: 11/17/23 1550     Medical Intervention Limits:    NO intubation (DNI)     Level Of Support Discussed With:    Next of Kin (If No Surrogate)     Code Status (Patient has no pulse and is not breathing):    No CPR (Do Not Attempt to Resuscitate)     Medical Interventions (Patient has pulse or is breathing):    Limited Support       Francesca Antonio MD  11/18/23        Electronically signed by Francesca Antonio MD at 11/18/23 0916          Consult Notes (all)        Malissa Oliva MD at 11/19/23 1147        Consult Orders    1. Inpatient Palliative Care MD Consult [[id number]] ordered by Francesca Antonio MD at 11/19/23 0956                 Palliative Care Initial Consult   Attending Physician: Francesca Antonio MD  Referring Provider: Paco Ma    Reason for Referral:  assistance with clarification of goals of care    Code Status:   Code Status and Medical Interventions:   Ordered at: 11/17/23 1554     Medical Intervention Limits:    NO intubation (DNI)     Level Of Support Discussed With:    Next of Kin (If No Surrogate)     Code Status (Patient has no pulse and is not breathing):    No CPR (Do Not Attempt to Resuscitate)     Medical Interventions (Patient has pulse or is breathing):    Limited Support      Advanced Directives: Advance Directive Status: Patient does not have advance directive   Family/Support: Dtr Katharina   Goals of Care: TBD.    HPI:   77 yo female s/p recent CVA complicated by multi B CVA.   Had been discharged to rehab but returned with fever.  H&P indicates mental status at admit not really different from that at discharge from acute care.  She remains somnolent.  Receiving abx for E coli in urine.  Family  not present when seen.      ROS:   Pt unable    Past Medical History:   Diagnosis Date    Hyperlipidemia     Hypertension      Past Surgical History:   Procedure Laterality Date    BREAST FIBROADENOMA SURGERY      9 y/o-was benign    CARDIAC CATHETERIZATION N/A 10/26/2023    Procedure: Left Heart Cath;  Surgeon: Jordi Hodge MD;  Location:  [facility];  Service: Cardiovascular;  Laterality: N/A;    CORONARY ARTERY BYPASS GRAFT N/A 11/1/2023    Procedure: MEDIAN STERNOTOMY, CORONARY ARTERY BYPASS GRAFTING X4, UTILIZING THE LEFT INTERANL MAMMARY ARTERY, EVH OF THE LEFT GREATER SAPHENOUS VEIN, EXPLORATION OF THE RIGHT LEG, PRIMITIVO PER ANETHESIA;  Surgeon: Dirk Cleveland MD;  Location:  [facility];  Service: Cardiothoracic;  Laterality: N/A;    ENDOSCOPY W/ PEG TUBE PLACEMENT N/A 11/10/2023    Procedure: ESOPHAGOGASTRODUODENOSCOPY WITH PERCUTANEOUS ENDOSCOPIC GASTROSTOMY TUBE INSERTION AT BEDSIDE;  Surgeon: Dirk Cleveland MD;  Location:  [facility];  Service: Cardiothoracic;  Laterality: N/A;     Social History     Socioeconomic History    Marital status:    Tobacco Use    Smoking status: Some Days     Types: Cigarettes    Smokeless tobacco: Never    Tobacco comments:     Smokes rarely   Vaping Use    Vaping Use: Never used   Substance and Sexual Activity    Alcohol use: Never    Drug use: Never     History reviewed. No pertinent family history.    Allergies   Allergen Reactions    Dynacirc [Isradipine] Other (See Comments)     Causes tic    Codeine Rash       Current medication reviewed for route, type, dose and frequency and are current per MAR at time of dictation.    Palliative Performance Scale Score:      /91 (BP Location: Right arm, Patient Position: Lying)   " Pulse 68   Temp 96.8 °F (36 °C)   Resp 18   Ht 157.5 cm (62\")   Wt 91.4 kg (201 lb 8 oz)   SpO2 92%   BMI 36.85 kg/m²     Intake/Output Summary (Last 24 hours) at 11/19/2023 1148  Last data filed at 11/19/2023 0800  Gross per 24 hour   Intake 3324 ml   Output 1050 ml   Net 2274 ml       Physical Exam:    General Appearance:    Eyes open, NAD, no response to verbal stim   HEENT:    NC/AT, EOMI, anicteric, dry MM, face relaxed   Neck:   supple, trachea midline, no JVD   Lungs:     CTA bilat, diminished in bases; respirations regular, even and unlabored; RR on exam    Heart:    RRR, normal S1 and S2, no M/R/G   Abdomen:     Normal bowel sounds, soft, nondistended, obese   G/U:   Deferred   MSK/Extremities:   Wasting, no edema   Pulses:   Pulses palpable and equal bilaterally   Skin:   Warm, dry   Neurologic:   No response to left visual threat, left gaze deviation, plantar upgoing bilaterally   Psych:   Not interactive, no agitation         Labs:   Results from last 7 days   Lab Units 11/19/23  0801   WBC 10*3/mm3 9.68   HEMOGLOBIN g/dL 8.3*   HEMATOCRIT % 29.2*   PLATELETS 10*3/mm3 306     Results from last 7 days   Lab Units 11/19/23  0801   SODIUM mmol/L 152*   POTASSIUM mmol/L 4.0   CHLORIDE mmol/L 122*   CO2 mmol/L 21.0*   BUN mg/dL 37*   CREATININE mg/dL 0.80   GLUCOSE mg/dL 229*   CALCIUM mg/dL 8.5*     Results from last 7 days   Lab Units 11/19/23  0801 11/18/23  0518   SODIUM mmol/L 152* 153*   POTASSIUM mmol/L 4.0 4.3   CHLORIDE mmol/L 122* 119*   CO2 mmol/L 21.0* 23.0   BUN mg/dL 37* 42*   CREATININE mg/dL 0.80 0.85   CALCIUM mg/dL 8.5* 8.4*   BILIRUBIN mg/dL  --  1.0   ALK PHOS U/L  --  98   ALT (SGPT) U/L  --  28   AST (SGOT) U/L  --  35*   GLUCOSE mg/dL 229* 139*     Imaging Results (Last 72 Hours)       Procedure Component Value Units Date/Time    CT Head Without Contrast [[id number]] Collected: 11/17/23 1052     Updated: 11/17/23 1100    Narrative:      CT HEAD WO CONTRAST    Date of Exam: " 11/17/2023 10:49 AM EST    Indication: altered mental status.    Comparison: Head CT dated 11/9/2023    Technique: Axial CT images were obtained of the head without contrast administration.  Automated exposure control and iterative construction methods were used.    FINDINGS:    Foci of periventricular and subcortical white matter hypoattenuation are consistent with chronic, microvascular ischemic change. There is age-related loss of brain parenchymal volume with prominence of sulcation and ventriculomegaly.  No significant mass   effect, midline shift, intracranial hemorrhage, or hydrocephalus is identified. No extra-axial fluid collection is identified.   The calvarium and overlying soft tissues are unremarkable. Mucosal thickening within the right frontal sinus and right   anterior ethmoid air cells. Mild left mastoid effusion. The visualized bony orbits, globes, and retrobulbar soft tissues are unremarkable.      Impression:      1.No acute intracranial abnormality is identified.  2.Findings compatible with chronic microvascular ischemic change and diffuse cortical atrophy.  3.Mucosal thickening within the right frontal sinus and right anterior ethmoid air cells.  4.Mild left mastoid effusion.    Electronically Signed: Sergio Esqueda MD    11/17/2023 10:57 AM EST    Workstation ID: [id number]    XR Chest 1 View [[id number]] Collected: 11/17/23 1049     Updated: 11/17/23 1057    Narrative:      XR CHEST 1 VW    Date of Exam: 11/17/2023 10:16 AM EST    Indication: Weak/Dizzy/AMS triage protocol    Comparison: 11/11/2023.    Findings:  Right PICC line has been removed. There is stable cardiomegaly. There is continued elevation of the right diaphragm. There are no definite acute lung infiltrates. There is mild blunting of the left costophrenic angle suggestive of small left effusion.      Impression:      Impression:  Findings suggest small left effusion. No acute process in the lung fields.      Electronically  Signed: Marielos Camilo MD    11/17/2023 10:50 AM EST    Workstation ID: [id number]                Diagnostics: Reviewed    A:     Fever         Impression:  Fever/UTI  Multi bilatera CVAs.    S/P recent CABG  DM with A1c 9.8    Symptoms:  AMS  Debility         P:    Will reach out to dtr to discuss GOC.  Pt will have long term/permanent neuro deficits requiring 24 hr care.  Add palliative oral rinse. Thank you for this consult and allowing us to participate in patient's plan of care. Palliative Care Team will continue to follow patient.     Time spent:38 minutes spent reviewing medical and medication records, assessing and examining patient, discussing with family, answering questions, providing some guidance about a plan and documentation of care, and coordinating care with other healthcare members, with > 50% time spent face to face.     Malissa Oliva MD  11/19/2023      Electronically signed by Malissa Oliva MD at 11/19/23 4078

## 2023-11-21 NOTE — NURSING NOTE
"                             Wound, Ostomy and Continence (WOC) Note    Reason for WOC Consultation: Coccyx wound, POA    Patient not arousable.  Family/support person not present.     Skin/Wound Assessment:     Wound Type: Evolving deep tissue pressure injury sacrococcygeal  Measurements: 3 x 1 x 0.25 cm  Wound Bed: non-blanchable, dermis, maroon/purple, and red  Wound Edges: Irregular and Open  Periwound Skin: intact and blanchable   Drainage Characteristics/Odor: none  Drainage Amount: none  Pain: No   Care provided: The wound was cleansed with barrier wipes.  Z guard applied.  Image:     Summary and Recommendation(s):     -Patient presents with an evolving deep tissue pressure injury to her sacrococcygeal area.  -Due to incontinence patient also has moisture associated skin damage at her perianal skin.  -We will treat both with topical zinc oxide barrier cream.  -Patient is a very high risk for pressure injury development and worsening of her existing pressure injury.  Please provide pressure injury prevention protocol during hospitalization.  - Refer to wound care instructions in the \"Orders\" tab  - Specialty support surface in place: Low Air Loss (LIDIA) Mattress       Pressure Injury Prevention Measures (initiate for a Aguilar Scale Score of <18):     Most recent Aguilar Scale score:  Sensory Perception: 2-->very limited  Moisture: 3-->occasionally moist  Activity: 1-->bedfast  Mobility: 1-->completely immobile  Nutrition: 2-->probably inadequate  Friction and Shear: 1-->problem  Aguilar Score: 10 (11/20/23 2100)     -Turn q 2 hr. using an offloading foam wedge, keep heels elevated and offloaded with offloading heel boots.    -Raise knee-gatch before elevating HOB to reduce shearing   -Follow C.A.R.E protocol if medical devices (Bipap, harman, Ng tube, etc) are being used.  -Apply moisture barrier cream to bottom BID & PRN, if incontinent.  -If incontinent, consider applying an external catheter. External catheters " are finicky and should be checked every few hours for placement.   -Clean skin gently with no-rinse PH-balanced foam cleanser and a soft, disposable cloth (barrier wipes-blue pack).   -Reduce layers under patient (one sheet as drawsheet and two incontinence pads)    Thank you for consulting the WOC Nurse.  WOC Team will sign off.  Please re consult if the wound(s) worsens.     Levy Charles RN, BSN, CCRN, CWOCN  Wound, Ostomy and Continence (WOC) Department  Psychiatric

## 2023-11-21 NOTE — PROGRESS NOTES
Clinical Nutrition     Nutrition Support Assessment  Reason for Visit: Identified at risk by screening criteria, EN    Patient Name: Michael Cochran  YOB: 1945  MRN: [id number]  Date of Encounter: 11/21/23 10:25 EST  Admission date: 11/17/2023    Comments:  EN running @ goal rate at time of visit. Sodium still high despite IVF and free water @ 50ml/hr. Dicussed with pharmacists, ? switch IVF to D5W and adjust insulin coverage.     RN reports patient now with frequent liquid BM, will add fiber BID.     Recommend adjust bowel regiment to PRN.     Nutrition Assessment   Admission Diagnosis:  Sepsis [A41.9]  Fever [R50.9]    Problem List:    Fever    PMH:   She  has a past medical history of Hyperlipidemia and Hypertension.    PSH:  She  has a past surgical history that includes Breast fibroadenoma surgery; Cardiac catheterization (N/A, 10/26/2023); Coronary artery bypass graft (N/A, 11/1/2023); and Esophagogastroduodenoscopy w/ PEG (N/A, 11/10/2023).    Applicable Nutrition Concerns:   Skin:  recent surgical wound (CABG 11/1)  Oral:  NPO; dysphagia   GI: Has a PEG tube     Applicable Interval History:   (11/1) s/p CABG, intubated, large bore NG tube;  extubated  (11/3) small bore NG tube placed (peripyloric tip placement per KUB);  EN initiated - Novasource Renal  (11/4) CT head - no acute changes  (11/10) PEG placed   (11/5) MRI Multiple bilateral acute and subacute infarcts.  Moderate periventricular subcortical flair changes related to chronic microvascular ischemic change  (11/17) Re-admitted back due to fever    Reported/Observed/Food/Nutrition Related History:   11/21  Remains non-verbal. EN infusing @ goal rate at time of visit. RN in room, reports patient now with frequent, liquid BM.  Palliative care following patient to help with GOC.     11/19  Patient non-verbal at time of visit, did not respond to calling name. No family at bedside.  Bed weight obtained 209lb     11/18 RD  "familiar with patient from recent prolonged hospital admission post CABG 11/1. Patient was discharged to rehab 11/15, returned yesterday with fever.  Currently NPO. Discussed with MD, he is ok with RD starting EN via PEG tube.     Labs    Labs Reviewed: Yes     Results from last 7 days   Lab Units 11/21/23  0949 11/20/23  0020 11/19/23  0801 11/18/23  0518 11/17/23  1206 11/17/23  1005   GLUCOSE mg/dL 223* 150* 229* 139*  --  307*   BUN mg/dL 29* 32* 37* 42*  --  51*   CREATININE mg/dL 0.69 0.87 0.80 0.85  --  1.19*   SODIUM mmol/L 150* 154* 152* 153*  --  146*   CHLORIDE mmol/L 115* 123* 122* 119*  --  112*   POTASSIUM mmol/L 3.9 3.8 4.0 4.3  --  5.0   PHOSPHORUS mg/dL  --  3.9  --   --  3.9  --    MAGNESIUM mg/dL  --  2.2  --   --   --  2.4   ALT (SGPT) U/L  --  30  --  28  --  33   LACTATE mmol/L  --   --   --   --   --  1.5     Results from last 7 days   Lab Units 11/20/23  0020 11/18/23  0518 11/17/23  1005   ALBUMIN g/dL 2.6* 2.8* 3.2*   PREALBUMIN mg/dL 17.0*  --   --    CRP mg/dL 2.49*  --   --    CHOLESTEROL mg/dL 125  --   --    TRIGLYCERIDES mg/dL 148  --   --        Results from last 7 days   Lab Units 11/21/23  0618 11/21/23  0558 11/21/23  0016 11/20/23  1711 11/20/23  1118 11/20/23  0606   GLUCOSE mg/dL 203* 188* 194* 184* 212* 171*     Lab Results   Lab Value Date/Time    HGBA1C 9.80 (H) 10/25/2023 0357               Medications    Medications Reviewed: Yes  Pertinent: insulin, abx, percolace,   Infusion: LR @ 50ml/hr   PRN: Dulcolax     Intake/Ouptut 24 hrs (0701 - 0700)   I&O's Reviewed: Yes   Urine 1050ml     Anthropometrics     Flowsheet Rows      Flowsheet Row First Filed Value   Admission Height 157.5 cm (62\") Documented at 11/17/2023 1019   Admission Weight 95.3 kg (210 lb) Documented at 11/17/2023 1019     Height: Height: 157.5 cm (62\")  Last Filed Weight: Weight: 91.4 kg (201 lb 8 oz) (11/17/23 1500)  Method: Weight Method: Bed scale  BMI: BMI (Calculated): 36.8  BMI classification: Obese " "Class II: 35-39.9kg/m2  IBW:  110lb    UBW:   Weight change:     Nutrition Focused Physical Exam     Date:     Unable to perform exam due to:  completed, no significant wasting noted*    Needs Assessment   Date:     Height used:Height: 157.5 cm (62\")  Weights used: 201lb/ 91.4kg; IBW 110lb/ 50kg       Estimated Calorie needs: ~  1600Kcal/day  Method:  Kcals/KG  16-18= 1462- 1645  Method:  MSJ 1342 x 1.2= 1610    Estimated Protein needs: ~110  g PRO/day  Method: 1.2g/Kg ABW = 111  Method: 2.0 g/kg IBW = 100    Estimated Fluid needs: ~ ml/day   Per clinical status    Current Nutrition Prescription     PO: NPO Diet NPO Type: Tube Feeding  Oral Nutrition Supplement:   Intake: Insufficient data    EN: DiabetiSource AC  Goal Rate: 55ml/hr  Water Flushes: 50ml/hr   Modular: Prosource-no carb 3/day  Route: PEG  Tube: Large bore    At goal over: 22Hrs/day    Rx will supply:   Goal Volume 1210  mL/day     Flush Volume 1100 mL/day     Energy 1632 Kcal/day 102 % Est Need   Protein 118 g/day 107 % Est Need   Fiber 18.4 g/day     Water in   mL     Total Water 2090 mL     Meet DRI Yes        --------------------------------------------------------------------------  Product/Rate verified at bedside: Yes  Infusing Rate at time of visit: @ goal rate     Average Delivery from [facility] Days:  Volume 1064 mL/day 89  % Goal Vol.   Flush Volume 669 mL/day     Energy 1457 Kcal/day 91 % Est Need   Protein 109 g/day 99 % Est Need   Fiber 16.2 g/day     Water in   mL     Total Water 1539 mL     Meet DRI Yes          Nutrition Diagnosis   Date:  Updated:   Problem Swallowing difficulty   Etiology Dysphagia (history with PEG tube placement)    Signs/Symptoms NPO; PEG tube for nutrition    Status: EN meeting needs       Goal:   General: Nutrition support treatment  PO: N/A  EN/PN: Adjust EN    Nutrition Intervention      Follow treatment progress, Care plan reviewed, Supplement provided    New EN:  Add Fiber " BID; this will provide (with EN and prosource) 1662kcals (105% Est needs) 24g fiber.     Recommend change bowel regiment to BID     Monitoring/Evaluation:   Per protocol, I&O, Pertinent labs, EN delivery/tolerance      Sylvia Avila RD  Time Spent: 35min

## 2023-11-21 NOTE — PROGRESS NOTES
[facility]  PROGRESS NOTE    Patient Name: Michael Cochran  : 1945  MRN: [id number]    Date of Admission: 2023  Primary Care Physician: Bo Rodriguez PA    Subjective   Subjective     CC: Follow-up sepsis    HPI: No acute events overnight, patient resting comfortably      Objective   Objective     Vital Signs:   Temp:  [97.3 °F (36.3 °C)-98.4 °F (36.9 °C)] 98.4 °F (36.9 °C)  Heart Rate:  [65-75] 75  Resp:  [16-20] 16  BP: (142-187)/(68-90) 162/83  Flow (L/min):  [2] 2     Physical Exam:  Constitutional: Ill-appearing elderly female somnolent  HENT: NCAT, mucous membranes moist  Respiratory: Nonlabored respirations  Cardiovascular: RRR, no murmurs, rubs, or gallops  Psychiatric: CRISTHIAN  Neurologic: CRISTHIAN  Skin: No rashes    Results Reviewed:  LAB RESULTS:      Lab 23  0932 23  0020 23  0801 23  0518 23  1005 11/15/23  0404 23  1519   WBC 9.11  --  9.68 10.64 13.63*  --  14.33*   HEMOGLOBIN 9.0*  --  8.3* 8.4* 9.0* 7.9* 6.7*   HEMATOCRIT 30.5*  --  29.2* 29.6* 30.7* 26.2* 22.5*   PLATELETS 273  --  306 298 401  --  288   NEUTROS ABS 6.77  --  7.88* 8.17* 11.44*  --   --    IMMATURE GRANS (ABS) 0.13*  --  0.03 0.04 0.07*  --   --    LYMPHS ABS 1.28  --  0.90 1.36 1.05  --   --    MONOS ABS 0.52  --  0.43 0.73 0.93*  --   --    EOS ABS 0.36  --  0.39 0.28 0.09  --   --    MCV 96.2  --  100.7* 99.3* 96.2  --  98.3*   CRP  --  2.49*  --   --   --   --   --    LACTATE  --   --   --   --  1.5  --   --          Lab 23  0020 23  0801 23  0518 23  1206 23  1005 23  1519   SODIUM 154* 152* 153*  --  146* 141   POTASSIUM 3.8 4.0 4.3  --  5.0 4.4   CHLORIDE 123* 122* 119*  --  112* 108*   CO2 23.0 21.0* 23.0  --  23.0 25.0   ANION GAP 8.0 9.0 11.0  --  11.0 8.0   BUN 32* 37* 42*  --  51* 41*   CREATININE 0.87 0.80 0.85  --  1.19* 0.76   EGFR 67.9 75.1 69.8  --  46.6* 79.8   GLUCOSE 150* 229* 139*   --  307* 189*   CALCIUM 8.2* 8.5* 8.4*  --  8.8 8.9   MAGNESIUM 2.2  --   --   --  2.4  --    PHOSPHORUS 3.9  --   --  3.9  --   --          Lab 11/20/23  0020 11/18/23  0518 11/17/23  1005   TOTAL PROTEIN 5.5* 5.6* 6.5   ALBUMIN 2.6* 2.8* 3.2*   GLOBULIN 2.9 2.8 3.3   ALT (SGPT) 30 28 33   AST (SGOT) 22 35* 41*   BILIRUBIN 0.8 1.0 0.9   ALK PHOS 88 98 128*         Lab 11/18/23  0518 11/17/23  1206 11/17/23  1005   HSTROP T 113* 136* 146*         Lab 11/20/23  0020   CHOLESTEROL 125   TRIGLYCERIDES 148         Lab 11/14/23  1710   ABO TYPING A   RH TYPING Positive   ANTIBODY SCREEN Negative         Brief Urine Lab Results  (Last result in the past 365 days)        Color   Clarity   Blood   Leuk Est   Nitrite   Protein   CREAT   Urine HCG        11/19/23 1728 Yellow   Cloudy   Small (1+)   Small (1+)   Negative   Negative                   Microbiology Results Abnormal       Procedure Component Value - Date/Time    Blood Culture - Blood, Arm, Left [[id number]]  (Normal) Collected: 11/17/23 1026    Lab Status: Preliminary result Specimen: Blood from Arm, Left Updated: 11/20/23 1101     Blood Culture No growth at 3 days    Blood Culture - Blood, Arm, Left [[id number]]  (Normal) Collected: 11/17/23 1000    Lab Status: Preliminary result Specimen: Blood from Arm, Left Updated: 11/20/23 1030     Blood Culture No growth at 3 days    Urine Culture - Urine, Urine, Random Void [[id number]]  (Normal) Collected: 11/19/23 1728    Lab Status: Preliminary result Specimen: Urine, Random Void Updated: 11/20/23 0938     Urine Culture Culture in progress    MRSA Screen, PCR (Inpatient) - Swab, Nares [[id number]]  (Normal) Collected: 11/18/23 0954    Lab Status: Final result Specimen: Swab from Nares Updated: 11/18/23 1209     MRSA PCR Negative    Narrative:      The negative predictive value of this diagnostic test is high and should only be used to consider de-escalating anti-MRSA therapy. A positive result may indicate colonization with  MRSA and must be correlated clinically.  MRSA Negative    COVID PRE-OP / PRE-PROCEDURE SCREENING ORDER (NO ISOLATION) - Swab, Nasopharynx [[id number]]  (Normal) Collected: 11/17/23 1030    Lab Status: Final result Specimen: Swab from Nasopharynx Updated: 11/17/23 1139    Narrative:      The following orders were created for panel order COVID PRE-OP / PRE-PROCEDURE SCREENING ORDER (NO ISOLATION) - Swab, Nasopharynx.  Procedure                               Abnormality         Status                     ---------                               -----------         ------                     Respiratory Panel PCR w/...[[id number]]  Normal              Final result                 Please view results for these tests on the individual orders.    Respiratory Panel PCR w/COVID-19(SARS-CoV-2) NUZHAT/PAUL/AUBRIE/PAD/COR/DEE In-House, NP Swab in UTM/VTM, 2 HR TAT - Swab, Nasopharynx [[id number]]  (Normal) Collected: 11/17/23 1030    Lab Status: Final result Specimen: Swab from Nasopharynx Updated: 11/17/23 1139     ADENOVIRUS, PCR Not Detected     Coronavirus 229E Not Detected     Coronavirus HKU1 Not Detected     Coronavirus NL63 Not Detected     Coronavirus OC43 Not Detected     COVID19 Not Detected     Human Metapneumovirus Not Detected     Human Rhinovirus/Enterovirus Not Detected     Influenza A PCR Not Detected     Influenza B PCR Not Detected     Parainfluenza Virus 1 Not Detected     Parainfluenza Virus 2 Not Detected     Parainfluenza Virus 3 Not Detected     Parainfluenza Virus 4 Not Detected     RSV, PCR Not Detected     Bordetella pertussis pcr Not Detected     Bordetella parapertussis PCR Not Detected     Chlamydophila pneumoniae PCR Not Detected     Mycoplasma pneumo by PCR Not Detected    Narrative:      In the setting of a positive respiratory panel with a viral infection PLUS a negative procalcitonin without other underlying concern for bacterial infection, consider observing off antibiotics or discontinuation of  antibiotics and continue supportive care. If the respiratory panel is positive for atypical bacterial infection (Bordetella pertussis, Chlamydophila pneumoniae, or Mycoplasma pneumoniae), consider antibiotic de-escalation to target atypical bacterial infection.            No radiology results from the last 24 hrs    Results for orders placed during the hospital encounter of 10/24/23    Adult Transthoracic Echo Limited W/ Cont if Necessary Per Protocol    Interpretation Summary  •  Left ventricular systolic function is normal. Estimated left ventricular EF = 60%  •  Left ventricular wall thickness is consistent with mild concentric hypertrophy.  •  Trace to mild mitral regurgitation.  •  Saline test results are negative for intracardiac shunting.  •  There is a trivial pericardial effusion adjacent to the left ventricle.      Current medications:  Scheduled Meds:amLODIPine, 5 mg, Per G Tube, Q24H  aspirin, 81 mg, Per G Tube, Daily  atorvastatin, 80 mg, Per G Tube, Nightly  carvedilol, 6.25 mg, Per G Tube, BID With Meals  heparin (porcine), 5,000 Units, Subcutaneous, Q8H  insulin regular, 2-7 Units, Subcutaneous, Q6H  piperacillin-tazobactam, 3.375 g, Intravenous, Q8H  ProSource No Carb, 30 mL, Nasogastric, TID  senna-docusate sodium, 2 tablet, Per G Tube, BID  sodium chloride, 10 mL, Intravenous, Q12H      Continuous Infusions:sodium chloride, 50 mL/hr, Last Rate: 50 mL/hr (11/20/23 1523)      PRN Meds:.•  acetaminophen **OR** acetaminophen **OR** acetaminophen  •  senna-docusate sodium **AND** polyethylene glycol **AND** [DISCONTINUED] bisacodyl **AND** bisacodyl  •  dextrose  •  dextrose  •  glucagon (human recombinant)  •  naloxone  •  ondansetron **OR** ondansetron  •  palliative care oral rinse  •  sodium chloride  •  sodium chloride  •  sodium chloride    Assessment & Plan   Assessment & Plan     Active Hospital Problems    Diagnosis  POA   • **Fever [R50.9]  Yes      Resolved Hospital Problems   No resolved  problems to display.        Brief Hospital Course to date:  Michael Cochran is a 78 y.o. female with history of hyperlipidemia, hypertension, recent admission to [facility] s/p recent CABG, stay complicated with findings of acute/subacute CVA discharged to rehab 11/15/2023, presents back today with fever     Sepsis  E. coli UTI  -Patient with fever Tmax 102.2, leukocytosis, tachycardia, UA suggestive of UTI  -Chest x-ray is unremarkable, respiratory PCR panel is negative  -Blood cultures NGTD, urine cultures growing E. coli,  MRSA PCR is negative  -Continue antibiotics, currently on IV Zosyn  -Continue IV fluids, decrease to 50 ml/hr     Dysphagia  -Patient with PEG in place,  -Dietitian following, continue tube feeds     CAD s/p CABG  -Continue beta-blocker, statin, aspirin     Recent acute/subacute CVA  Acute encephalopathy  -CT head is unremarkable  -Continue aspirin and statin  -Continue antibiotics as above     Poorly controlled type 2 diabetes with A1c 9.8%  -Continue SSI     Hypertension  -BP currently stable  -Continue Coreg, amlodipine     Hypernatremia  -Suspect secondary to decreased p.o. intake, worsened by tube feeds  -Sodium trending up, will ask nutrition to increase free water  -Continue to closely monitor, decreased NS to 50 mL/hr     GOC  -Prognosis is currently guarded if not poor,   -Palliative care consulted and are following, discussed with daughter, who wants to give antibiotics a few more days.  She is otherwise open to hospice     Expected Discharge Location and Transportation: [facility]  Expected Discharge   Expected Discharge Date: 11/24/2023; Expected Discharge Time:      DVT prophylaxis:  Medical DVT prophylaxis orders are present.     AM-PAC 6 Clicks Score (PT): 6 (11/20/23 2100)    CODE STATUS:   Code Status and Medical Interventions:   Ordered at: 11/17/23 1552     Medical Intervention Limits:    NO intubation (DNI)     Level Of Support Discussed With:    Next of Kin (If No Surrogate)     Code  Status (Patient has no pulse and is not breathing):    No CPR (Do Not Attempt to Resuscitate)     Medical Interventions (Patient has pulse or is breathing):    Limited Support       Francesca Antonio MD  11/21/23

## 2023-11-21 NOTE — PLAN OF CARE
Goal Outcome Evaluation:  Plan of Care Reviewed With: patient        Progress: no change  Outcome Evaluation: Dr. ANKITA Oliva and palliative RN saw pt this am for palliative visit.  Pt. asleep on 2LNC and did not demonstrate any visible signs of discomfort.  IVF pump beeping.  Left arm straight but IV site boggy.  Turned off IV pump and notified RN.  Daughter is giving patient until weeks end to see how she does before receiving info re. hospice.  Palliative care to continue to monitor pt. and follow along for support and ongoing GOC conversations.

## 2023-11-22 NOTE — PROGRESS NOTES
[facility] Medicine Services  PROGRESS NOTE    Patient Name: Michael Cochran  : 1945  MRN: [id number]    Date of Admission: 2023  Primary Care Physician: Bo Rodriguez PA    Subjective   Subjective     CC: Follow-up sepsis    HPI: No acute events overnight, patient remains somnolent      Objective   Objective     Vital Signs:   Temp:  [97.9 °F (36.6 °C)-98.8 °F (37.1 °C)] 98.7 °F (37.1 °C)  Heart Rate:  [56-77] 71  Resp:  [18-20] 20  BP: (144-188)/(72-90) 188/72  Flow (L/min):  [2] 2     Physical Exam:  Constitutional: Chronically ill-appearing elderly female somnolent  HENT: NCAT, mucous membranes dry  Respiratory: Clear to auscultation bilaterally, respiratory effort normal   Cardiovascular: RRR, no murmurs, rubs, or gallops  Gastrointestinal: Positive bowel sounds, soft, nontender, nondistended, PEG in place  Psychiatric: CRISTHIAN  Neurologic: CRISTHIAN    Results Reviewed:  LAB RESULTS:      Lab 23  0932 23  0020 23  0801 23  0523  1005   WBC 9.11  --  9.68 10.64 13.63*   HEMOGLOBIN 9.0*  --  8.3* 8.4* 9.0*   HEMATOCRIT 30.5*  --  29.2* 29.6* 30.7*   PLATELETS 273  --  306 298 401   NEUTROS ABS 6.77  --  7.88* 8.17* 11.44*   IMMATURE GRANS (ABS) 0.13*  --  0.03 0.04 0.07*   LYMPHS ABS 1.28  --  0.90 1.36 1.05   MONOS ABS 0.52  --  0.43 0.73 0.93*   EOS ABS 0.36  --  0.39 0.28 0.09   MCV 96.2  --  100.7* 99.3* 96.2   CRP  --  2.49*  --   --   --    LACTATE  --   --   --   --  1.5         Lab 23  0949 23  0020 23  0801 23  0518 23  1206 23  1005   SODIUM 150* 154* 152* 153*  --  146*   POTASSIUM 3.9 3.8 4.0 4.3  --  5.0   CHLORIDE 115* 123* 122* 119*  --  112*   CO2 24.0 23.0 21.0* 23.0  --  23.0   ANION GAP 11.0 8.0 9.0 11.0  --  11.0   BUN 29* 32* 37* 42*  --  51*   CREATININE 0.69 0.87 0.80 0.85  --  1.19*   EGFR 88.4 67.9 75.1 69.8  --  46.6*   GLUCOSE 223* 150* 229* 139*  --  307*   CALCIUM 8.8 8.2*  8.5* 8.4*  --  8.8   MAGNESIUM  --  2.2  --   --   --  2.4   PHOSPHORUS  --  3.9  --   --  3.9  --          Lab 11/20/23  0020 11/18/23  0518 11/17/23  1005   TOTAL PROTEIN 5.5* 5.6* 6.5   ALBUMIN 2.6* 2.8* 3.2*   GLOBULIN 2.9 2.8 3.3   ALT (SGPT) 30 28 33   AST (SGOT) 22 35* 41*   BILIRUBIN 0.8 1.0 0.9   ALK PHOS 88 98 128*         Lab 11/18/23  0518 11/17/23  1206 11/17/23  1005   HSTROP T 113* 136* 146*         Lab 11/20/23  0020   CHOLESTEROL 125   TRIGLYCERIDES 148             Brief Urine Lab Results  (Last result in the past 365 days)        Color   Clarity   Blood   Leuk Est   Nitrite   Protein   CREAT   Urine HCG        11/19/23 1728 Yellow   Cloudy   Small (1+)   Small (1+)   Negative   Negative                   Microbiology Results Abnormal       Procedure Component Value - Date/Time    Blood Culture - Blood, Arm, Left [[id number]]  (Normal) Collected: 11/17/23 1026    Lab Status: Preliminary result Specimen: Blood from Arm, Left Updated: 11/21/23 1100     Blood Culture No growth at 4 days    Blood Culture - Blood, Arm, Left [[id number]]  (Normal) Collected: 11/17/23 1000    Lab Status: Preliminary result Specimen: Blood from Arm, Left Updated: 11/21/23 1031     Blood Culture No growth at 4 days    MRSA Screen, PCR (Inpatient) - Swab, Nares [[id number]]  (Normal) Collected: 11/18/23 0954    Lab Status: Final result Specimen: Swab from Nares Updated: 11/18/23 1209     MRSA PCR Negative    Narrative:      The negative predictive value of this diagnostic test is high and should only be used to consider de-escalating anti-MRSA therapy. A positive result may indicate colonization with MRSA and must be correlated clinically.  MRSA Negative    COVID PRE-OP / PRE-PROCEDURE SCREENING ORDER (NO ISOLATION) - Swab, Nasopharynx [[id number]]  (Normal) Collected: 11/17/23 1030    Lab Status: Final result Specimen: Swab from Nasopharynx Updated: 11/17/23 1139    Narrative:      The following orders were created for panel  order COVID PRE-OP / PRE-PROCEDURE SCREENING ORDER (NO ISOLATION) - Swab, Nasopharynx.  Procedure                               Abnormality         Status                     ---------                               -----------         ------                     Respiratory Panel PCR w/...[[id number]]  Normal              Final result                 Please view results for these tests on the individual orders.    Respiratory Panel PCR w/COVID-19(SARS-CoV-2) NUZHAT/PAUL/AUBRIE/PAD/COR/DEE In-House, NP Swab in UTM/VTM, 2 HR TAT - Swab, Nasopharynx [[id number]]  (Normal) Collected: 11/17/23 1030    Lab Status: Final result Specimen: Swab from Nasopharynx Updated: 11/17/23 1139     ADENOVIRUS, PCR Not Detected     Coronavirus 229E Not Detected     Coronavirus HKU1 Not Detected     Coronavirus NL63 Not Detected     Coronavirus OC43 Not Detected     COVID19 Not Detected     Human Metapneumovirus Not Detected     Human Rhinovirus/Enterovirus Not Detected     Influenza A PCR Not Detected     Influenza B PCR Not Detected     Parainfluenza Virus 1 Not Detected     Parainfluenza Virus 2 Not Detected     Parainfluenza Virus 3 Not Detected     Parainfluenza Virus 4 Not Detected     RSV, PCR Not Detected     Bordetella pertussis pcr Not Detected     Bordetella parapertussis PCR Not Detected     Chlamydophila pneumoniae PCR Not Detected     Mycoplasma pneumo by PCR Not Detected    Narrative:      In the setting of a positive respiratory panel with a viral infection PLUS a negative procalcitonin without other underlying concern for bacterial infection, consider observing off antibiotics or discontinuation of antibiotics and continue supportive care. If the respiratory panel is positive for atypical bacterial infection (Bordetella pertussis, Chlamydophila pneumoniae, or Mycoplasma pneumoniae), consider antibiotic de-escalation to target atypical bacterial infection.            No radiology results from the last 24 hrs    Results for orders  placed during the hospital encounter of 10/24/23    Adult Transthoracic Echo Limited W/ Cont if Necessary Per Protocol    Interpretation Summary  •  Left ventricular systolic function is normal. Estimated left ventricular EF = 60%  •  Left ventricular wall thickness is consistent with mild concentric hypertrophy.  •  Trace to mild mitral regurgitation.  •  Saline test results are negative for intracardiac shunting.  •  There is a trivial pericardial effusion adjacent to the left ventricle.      Current medications:  Scheduled Meds:amLODIPine, 5 mg, Per G Tube, Q24H  aspirin, 81 mg, Per G Tube, Daily  atorvastatin, 80 mg, Per G Tube, Nightly  carvedilol, 6.25 mg, Per G Tube, BID With Meals  heparin (porcine), 5,000 Units, Subcutaneous, Q8H  insulin regular, 2-7 Units, Subcutaneous, Q6H  Nutrisource fiber, 1 packet, Per G Tube, BID  piperacillin-tazobactam, 3.375 g, Intravenous, Q8H  ProSource No Carb, 30 mL, Nasogastric, TID  senna-docusate sodium, 2 tablet, Per G Tube, BID  sodium chloride, 10 mL, Intravenous, Q12H      Continuous Infusions:lactated ringers, 50 mL/hr, Last Rate: 50 mL/hr (11/22/23 0413)      PRN Meds:.•  acetaminophen **OR** acetaminophen **OR** acetaminophen  •  senna-docusate sodium **AND** polyethylene glycol **AND** [DISCONTINUED] bisacodyl **AND** bisacodyl  •  dextrose  •  dextrose  •  glucagon (human recombinant)  •  hydrALAZINE  •  naloxone  •  ondansetron **OR** ondansetron  •  palliative care oral rinse  •  sodium chloride  •  sodium chloride  •  sodium chloride    Assessment & Plan   Assessment & Plan     Active Hospital Problems    Diagnosis  POA   • **Fever [R50.9]  Yes      Resolved Hospital Problems   No resolved problems to display.        Brief Hospital Course to date:  Michael Cochran is a 78 y.o. female with history of hyperlipidemia, hypertension, recent admission to [facility] s/p recent CABG, stay complicated with findings of acute/subacute CVA discharged to rehab 11/15/2023, presents  back today with fever     Sepsis  E. coli UTI  -Patient with fever Tmax 102.2, leukocytosis, tachycardia, UA suggestive of UTI  -Chest x-ray is unremarkable, respiratory PCR panel is negative  -Blood cultures NGTD, urine cultures growing E. coli,  MRSA PCR is negative  -Continue antibiotics, currently on IV Zosyn  -Continue IV fluids, decrease to 50 ml/hr     Dysphagia  -Patient with PEG in place,  -Dietitian following, continue tube feeds     CAD s/p CABG  -Continue beta-blocker, statin, aspirin     Recent acute/subacute CVA  Acute encephalopathy  -CT head is unremarkable  -Continue aspirin and statin  -Continue antibiotics as above     Poorly controlled type 2 diabetes with A1c 9.8%  -Continue SSI     Hypertension  -BP currently stable  -Continue Coreg, amlodipine     Hypernatremia  -Suspect secondary to decreased p.o. intake, worsened by tube feeds  -Sodium elevated but improving, dietitian/nutrition to increase free water  -Continue to closely monitor, NS switch to LR @ 50 mL/hr     GOC  -Prognosis is currently guarded if not poor,   -Palliative care consulted and are following, discussed with daughter, who wants to give antibiotics a few more days at least until Friday 11/24.  She is otherwise open to hospice     Expected Discharge Location and Transportation: SNF  Expected Discharge   Expected Discharge Date: 11/24/2023; Expected Discharge Time:      DVT prophylaxis:  Medical DVT prophylaxis orders are present.     AM-PAC 6 Clicks Score (PT): 6 (11/21/23 2100)    CODE STATUS:   Code Status and Medical Interventions:   Ordered at: 11/17/23 6342     Medical Intervention Limits:    NO intubation (DNI)     Level Of Support Discussed With:    Next of Kin (If No Surrogate)     Code Status (Patient has no pulse and is not breathing):    No CPR (Do Not Attempt to Resuscitate)     Medical Interventions (Patient has pulse or is breathing):    Limited Support       Francesca Antonio MD  11/22/23

## 2023-11-22 NOTE — CASE MANAGEMENT/SOCIAL WORK
Continued Stay Note   [place]     Patient Name: Michael Cochran  MRN: [id number]  Today's Date: 11/22/2023    Admit Date: 11/17/2023    Plan: TBD   Discharge Plan       Row Name 11/22/23 1637       Plan    Plan TBD    Patient/Family in Agreement with Plan other (see comments)    Plan Comments Discussed in MDR, patient remains nonverbal and lethargic. Daughter waiting to see if improves w/antibiotics, if not plan is comfort. I attempted to see patient, no family in room. CM will continue to follow.    Final Discharge Disposition Code 30 - still a patient                   Discharge Codes    No documentation.                 Expected Discharge Date and Time       Expected Discharge Date Expected Discharge Time    Nov 27, 2023               Kalyan Garcia RN     Solaraze Counseling:  I discussed with the patient the risks of Solaraze including but not limited to erythema, scaling, itching, weeping, crusting, and pain.

## 2023-11-22 NOTE — PLAN OF CARE
Goal Outcome Evaluation:  Plan of Care Reviewed With: patient        Progress: no change  Outcome Evaluation: Pt. sitting up in bed asleep.  No signs of discomfort present.  Pt. very lethargic and not rousing on 2LNC.  She did require hydralizine due to hypertension.  Daughter wants to give pt until the end of the week to see how she does before hearing from hospice team.  Palliative care to follow for support, POC and ongoing GOC.

## 2023-11-23 NOTE — PROGRESS NOTES
[facility] Medicine Services  PROGRESS NOTE    Patient Name: Michael Cochran  : 1945  MRN: [id number]    Date of Admission: 2023  Primary Care Physician: Bo Rodriguez PA    Subjective   Subjective     CC:  FTT    HPI:  Moaning softly, not responding      Objective   Objective     Vital Signs:   Temp:  [97.3 °F (36.3 °C)-99 °F (37.2 °C)] 98.9 °F (37.2 °C)  Heart Rate:  [59-85] 73  Resp:  [16-17] 16  BP: (131-191)/(56-90) 164/78  Flow (L/min):  [1.5-2] 1.5     Physical Exam:  Constitutional: Chronically ill-appearing, not responding  HENT: NCAT, mucous membranes moist  Respiratory: Respiratory effort normal   Cardiovascular: RRR, no murmurs, rubs, or gallops  Gastrointestinal: Soft, nontender, nondistended  Musculoskeletal: No bilateral ankle edema  Psychiatric: Not agitated  Neurologic: Not able to assess  Skin: No rashes      Results Reviewed:  LAB RESULTS:      Lab 23  0644 23  0932 23  0020 23  0801 23  0518 23  1005   WBC 9.31 9.11  --  9.68 10.64 13.63*   HEMOGLOBIN 8.7* 9.0*  --  8.3* 8.4* 9.0*   HEMATOCRIT 29.5* 30.5*  --  29.2* 29.6* 30.7*   PLATELETS 238 273  --  306 298 401   NEUTROS ABS 7.49* 6.77  --  7.88* 8.17* 11.44*   IMMATURE GRANS (ABS) 0.04 0.13*  --  0.03 0.04 0.07*   LYMPHS ABS 0.92 1.28  --  0.90 1.36 1.05   MONOS ABS 0.42 0.52  --  0.43 0.73 0.93*   EOS ABS 0.41* 0.36  --  0.39 0.28 0.09   MCV 95.8 96.2  --  100.7* 99.3* 96.2   CRP  --   --  2.49*  --   --   --    LACTATE  --   --   --   --   --  1.5         Lab 23  0644 23  1041 23  0949 23  0020 23  0801 23  0518 23  1206 23  1005   SODIUM 142 146* 150* 154* 152*   < >  --  146*   POTASSIUM 3.8 3.7 3.9 3.8 4.0   < >  --  5.0   CHLORIDE 106 112* 115* 123* 122*   < >  --  112*   CO2 27.0 26.0 24.0 23.0 21.0*   < >  --  23.0   ANION GAP 9.0 8.0 11.0 8.0 9.0   < >  --  11.0   BUN 24* 26* 29* 32* 37*   < >  --  51*    CREATININE 0.58 0.77 0.69 0.87 0.80   < >  --  1.19*   EGFR 92.2 78.6 88.4 67.9 75.1   < >  --  46.6*   GLUCOSE 202* 210* 223* 150* 229*   < >  --  307*   CALCIUM 8.9 8.7 8.8 8.2* 8.5*   < >  --  8.8   MAGNESIUM  --   --   --  2.2  --   --   --  2.4   PHOSPHORUS  --   --   --  3.9  --   --  3.9  --     < > = values in this interval not displayed.         Lab 11/22/23  1041 11/20/23  0020 11/18/23  0518 11/17/23  1005   TOTAL PROTEIN 6.1 5.5* 5.6* 6.5   ALBUMIN 2.8* 2.6* 2.8* 3.2*   GLOBULIN 3.3 2.9 2.8 3.3   ALT (SGPT) 21 30 28 33   AST (SGOT) 17 22 35* 41*   BILIRUBIN 1.1 0.8 1.0 0.9   ALK PHOS 94 88 98 128*         Lab 11/18/23  0518 11/17/23  1206 11/17/23  1005   HSTROP T 113* 136* 146*         Lab 11/20/23  0020   CHOLESTEROL 125   TRIGLYCERIDES 148             Brief Urine Lab Results  (Last result in the past 365 days)        Color   Clarity   Blood   Leuk Est   Nitrite   Protein   CREAT   Urine HCG        11/19/23 1728 Yellow   Cloudy   Small (1+)   Small (1+)   Negative   Negative                   Microbiology Results Abnormal       Procedure Component Value - Date/Time    Blood Culture - Blood, Arm, Left [[id number]]  (Normal) Collected: 11/17/23 1026    Lab Status: Final result Specimen: Blood from Arm, Left Updated: 11/22/23 1101     Blood Culture No growth at 5 days    Blood Culture - Blood, Arm, Left [[id number]]  (Normal) Collected: 11/17/23 1000    Lab Status: Final result Specimen: Blood from Arm, Left Updated: 11/22/23 1030     Blood Culture No growth at 5 days    MRSA Screen, PCR (Inpatient) - Swab, Nares [[id number]]  (Normal) Collected: 11/18/23 0954    Lab Status: Final result Specimen: Swab from Nares Updated: 11/18/23 1209     MRSA PCR Negative    Narrative:      The negative predictive value of this diagnostic test is high and should only be used to consider de-escalating anti-MRSA therapy. A positive result may indicate colonization with MRSA and must be correlated clinically.  MRSA Negative     COVID PRE-OP / PRE-PROCEDURE SCREENING ORDER (NO ISOLATION) - Swab, Nasopharynx [[id number]]  (Normal) Collected: 11/17/23 1030    Lab Status: Final result Specimen: Swab from Nasopharynx Updated: 11/17/23 1139    Narrative:      The following orders were created for panel order COVID PRE-OP / PRE-PROCEDURE SCREENING ORDER (NO ISOLATION) - Swab, Nasopharynx.  Procedure                               Abnormality         Status                     ---------                               -----------         ------                     Respiratory Panel PCR w/...[[id number]]  Normal              Final result                 Please view results for these tests on the individual orders.    Respiratory Panel PCR w/COVID-19(SARS-CoV-2) NUZHAT/PAUL/AUBRIE/PAD/COR/DEE In-House, NP Swab in UTM/VTM, 2 HR TAT - Swab, Nasopharynx [[id number]]  (Normal) Collected: 11/17/23 1030    Lab Status: Final result Specimen: Swab from Nasopharynx Updated: 11/17/23 1139     ADENOVIRUS, PCR Not Detected     Coronavirus 229E Not Detected     Coronavirus HKU1 Not Detected     Coronavirus NL63 Not Detected     Coronavirus OC43 Not Detected     COVID19 Not Detected     Human Metapneumovirus Not Detected     Human Rhinovirus/Enterovirus Not Detected     Influenza A PCR Not Detected     Influenza B PCR Not Detected     Parainfluenza Virus 1 Not Detected     Parainfluenza Virus 2 Not Detected     Parainfluenza Virus 3 Not Detected     Parainfluenza Virus 4 Not Detected     RSV, PCR Not Detected     Bordetella pertussis pcr Not Detected     Bordetella parapertussis PCR Not Detected     Chlamydophila pneumoniae PCR Not Detected     Mycoplasma pneumo by PCR Not Detected    Narrative:      In the setting of a positive respiratory panel with a viral infection PLUS a negative procalcitonin without other underlying concern for bacterial infection, consider observing off antibiotics or discontinuation of antibiotics and continue supportive care. If the respiratory  panel is positive for atypical bacterial infection (Bordetella pertussis, Chlamydophila pneumoniae, or Mycoplasma pneumoniae), consider antibiotic de-escalation to target atypical bacterial infection.            No radiology results from the last 24 hrs    Results for orders placed during the hospital encounter of 10/24/23    Adult Transthoracic Echo Limited W/ Cont if Necessary Per Protocol    Interpretation Summary  •  Left ventricular systolic function is normal. Estimated left ventricular EF = 60%  •  Left ventricular wall thickness is consistent with mild concentric hypertrophy.  •  Trace to mild mitral regurgitation.  •  Saline test results are negative for intracardiac shunting.  •  There is a trivial pericardial effusion adjacent to the left ventricle.      Current medications:  Scheduled Meds:amLODIPine, 5 mg, Per G Tube, Q24H  aspirin, 81 mg, Per G Tube, Daily  atorvastatin, 80 mg, Per G Tube, Nightly  carvedilol, 6.25 mg, Per G Tube, BID With Meals  heparin (porcine), 5,000 Units, Subcutaneous, Q8H  insulin regular, 2-7 Units, Subcutaneous, Q6H  Nutrisource fiber, 1 packet, Per G Tube, BID  ProSource No Carb, 30 mL, Per G Tube, TID  senna-docusate sodium, 2 tablet, Per G Tube, BID  sodium chloride, 10 mL, Intravenous, Q12H      Continuous Infusions:     PRN Meds:.•  acetaminophen **OR** acetaminophen **OR** acetaminophen  •  senna-docusate sodium **AND** polyethylene glycol **AND** [DISCONTINUED] bisacodyl **AND** bisacodyl  •  dextrose  •  dextrose  •  glucagon (human recombinant)  •  hydrALAZINE  •  naloxone  •  ondansetron **OR** ondansetron  •  palliative care oral rinse  •  sodium chloride  •  sodium chloride  •  sodium chloride    Assessment & Plan   Assessment & Plan     Active Hospital Problems    Diagnosis  POA   • **Fever [R50.9]  Yes      Resolved Hospital Problems   No resolved problems to display.        Brief Hospital Course to date:  Michael Cochran is a 78 y.o. female with history of  hyperlipidemia, hypertension, recent admission to [facility] s/p recent CABG, stay complicated with findings of acute/subacute CVA discharged to rehab 11/15/2023, readmitted with fever at 11/17     Sepsis  E. coli UTI  -UA suggestive of UTI  -Chest x-ray is unremarkable, respiratory PCR panel is negative  -Blood cultures NGTD, urine cultures growing E. coli from 11/19,  MRSA PCR is negative     Dysphagia  -Patient with PEG in place,  -Dietitian following, continue tube feeds     CAD s/p CABG  -Continue beta-blocker, statin, aspirin     Recent acute/subacute CVA  Acute encephalopathy, not improving  -CT head is unremarkable  -Continue aspirin and statin  -Continue antibiotics as above     Poorly controlled type 2 diabetes with A1c 9.8%  -Continue SSI     Hypertension  -BP currently stable  -Continue Coreg, amlodipine     Hypernatremia better  -Suspect secondary to decreased p.o. intake, worsened by tube feeds  -Monitor free water flush  -DC IVF, palliative following     GOC  -Prognosis is currently guarded if not poor  -Palliative care consulted and are following    Expected Discharge Location and Transportation: ? hospice  Expected Discharge   Expected Discharge Date: 11/27/2023; Expected Discharge Time:      DVT prophylaxis:  Medical DVT prophylaxis orders are present.     AM-PAC 6 Clicks Score (PT): 6 (11/23/23 0800)    CODE STATUS:   Code Status and Medical Interventions:   Ordered at: 11/17/23 6238     Medical Intervention Limits:    NO intubation (DNI)     Level Of Support Discussed With:    Next of Kin (If No Surrogate)     Code Status (Patient has no pulse and is not breathing):    No CPR (Do Not Attempt to Resuscitate)     Medical Interventions (Patient has pulse or is breathing):    Limited Support       Antonia Disla, DO  11/23/23

## 2023-11-23 NOTE — PLAN OF CARE
Goal Outcome Evaluation:         Patient on 1L nc, O2 > 90%, No sign of discomfort. Was shifted in bed. Will keep monitoring.

## 2023-11-23 NOTE — PLAN OF CARE
Goal Outcome Evaluation:      Pt on tube feeding, Q2T, 2L NC, was able to be off O2 for an hour tolerate well, will keep monitoring.

## 2023-11-23 NOTE — PLAN OF CARE
Goal Outcome Evaluation:  Plan of Care Reviewed With: patient unresponsive        Progress: no change

## 2023-11-24 NOTE — PLAN OF CARE
Goal Outcome Evaluation:  Plan of Care Reviewed With: patient           Outcome Evaluation: Patient remained sleeping and lethargic most shift. 1LNC, NSR on monitor. Was able to move and operate right hand on command. Vaginal bleeding noted and spoke with the MD about it. Small excoriation noted. No family at BS today. Mid sternal remains dry and intact. PEG site dry and intact. Will continue to monitor.

## 2023-11-24 NOTE — PLAN OF CARE
Goal Outcome Evaluation:  Plan of Care Reviewed With: patient        Progress: improving  Outcome Evaluation: Pt. sitting up in bed awake.  Tracked palliative RN with eyes on left side of room during visit.  Pt. squeezed with right hand to command (weak).  Pt. moved right hand up to face.  Vaginal bleeding noted.  Upon inspection with RN, pt appeared to have small excoriation that had some bleeding.  No family at BS during palliative nursing visit.  Daughter to consider hearing about hospice services.  Palliative care to continue to follow for support and ongoing GOC conversations.

## 2023-11-24 NOTE — PROGRESS NOTES
[facility] Medicine Services  PROGRESS NOTE    Patient Name: Michael Cochran  : 1945  MRN: [id number]    Date of Admission: 2023  Primary Care Physician: Bo Rodriguez PA    Subjective   Subjective     CC:  FTT    HPI:  Looks toward me when I call her name,  not responding to any questions, moans incomprehensibly      Objective   Objective     Vital Signs:   Temp:  [97.3 °F (36.3 °C)-99.4 °F (37.4 °C)] 98.3 °F (36.8 °C)  Heart Rate:  [61-84] 61  Resp:  [16] 16  BP: (155-192)/(78-93) 175/91  Flow (L/min):  [1-1.5] 1     Physical Exam:  Constitutional: Elderly female, chronically ill-appearing, continues to decline  HENT: NCAT, mucous membranes moist  Respiratory: Poor effort, on 1 L  Musculoskeletal: No bilateral ankle edema  Psychiatric: Not agitated  Neurologic: Not following commands, not responding to questions  Skin: No rashes      Results Reviewed:  LAB RESULTS:      Lab 23  0549 23  0644 23  0932 23  0020 23  0801 23  0518   WBC 8.77 9.31 9.11  --  9.68 10.64   HEMOGLOBIN 8.9* 8.7* 9.0*  --  8.3* 8.4*   HEMATOCRIT 29.4* 29.5* 30.5*  --  29.2* 29.6*   PLATELETS 234 238 273  --  306 298   NEUTROS ABS 6.72 7.49* 6.77  --  7.88* 8.17*   IMMATURE GRANS (ABS) 0.04 0.04 0.13*  --  0.03 0.04   LYMPHS ABS 1.07 0.92 1.28  --  0.90 1.36   MONOS ABS 0.47 0.42 0.52  --  0.43 0.73   EOS ABS 0.44* 0.41* 0.36  --  0.39 0.28   MCV 94.5 95.8 96.2  --  100.7* 99.3*   CRP  --   --   --  2.49*  --   --          Lab 23  0549 23  0644 23  1041 23  0949 23  0020 23  0518 23  1206   SODIUM 140 142 146* 150* 154*   < >  --    POTASSIUM 3.7 3.8 3.7 3.9 3.8   < >  --    CHLORIDE 102 106 112* 115* 123*   < >  --    CO2 28.0 27.0 26.0 24.0 23.0   < >  --    ANION GAP 10.0 9.0 8.0 11.0 8.0   < >  --    BUN 21 24* 26* 29* 32*   < >  --    CREATININE 0.51* 0.58 0.77 0.69 0.87   < >  --    EGFR 95.1 92.2 78.6 88.4  67.9   < >  --    GLUCOSE 193* 202* 210* 223* 150*   < >  --    CALCIUM 9.0 8.9 8.7 8.8 8.2*   < >  --    MAGNESIUM  --   --   --   --  2.2  --   --    PHOSPHORUS 3.2  --   --   --  3.9  --  3.9    < > = values in this interval not displayed.         Lab 11/24/23  0549 11/22/23  1041 11/20/23  0020 11/18/23  0518   TOTAL PROTEIN  --  6.1 5.5* 5.6*   ALBUMIN 3.2* 2.8* 2.6* 2.8*   GLOBULIN  --  3.3 2.9 2.8   ALT (SGPT)  --  21 30 28   AST (SGOT)  --  17 22 35*   BILIRUBIN  --  1.1 0.8 1.0   ALK PHOS  --  94 88 98         Lab 11/18/23  0518 11/17/23  1206   HSTROP T 113* 136*         Lab 11/20/23  0020   CHOLESTEROL 125   TRIGLYCERIDES 148             Brief Urine Lab Results  (Last result in the past 365 days)        Color   Clarity   Blood   Leuk Est   Nitrite   Protein   CREAT   Urine HCG        11/19/23 1728 Yellow   Cloudy   Small (1+)   Small (1+)   Negative   Negative                   Microbiology Results Abnormal       Procedure Component Value - Date/Time    Blood Culture - Blood, Arm, Left [[id number]]  (Normal) Collected: 11/17/23 1026    Lab Status: Final result Specimen: Blood from Arm, Left Updated: 11/22/23 1101     Blood Culture No growth at 5 days    Blood Culture - Blood, Arm, Left [[id number]]  (Normal) Collected: 11/17/23 1000    Lab Status: Final result Specimen: Blood from Arm, Left Updated: 11/22/23 1030     Blood Culture No growth at 5 days    MRSA Screen, PCR (Inpatient) - Swab, Nares [[id number]]  (Normal) Collected: 11/18/23 0954    Lab Status: Final result Specimen: Swab from Nares Updated: 11/18/23 1209     MRSA PCR Negative    Narrative:      The negative predictive value of this diagnostic test is high and should only be used to consider de-escalating anti-MRSA therapy. A positive result may indicate colonization with MRSA and must be correlated clinically.  MRSA Negative    COVID PRE-OP / PRE-PROCEDURE SCREENING ORDER (NO ISOLATION) - Swab, Nasopharynx [[id number]]  (Normal) Collected:  11/17/23 1030    Lab Status: Final result Specimen: Swab from Nasopharynx Updated: 11/17/23 1139    Narrative:      The following orders were created for panel order COVID PRE-OP / PRE-PROCEDURE SCREENING ORDER (NO ISOLATION) - Swab, Nasopharynx.  Procedure                               Abnormality         Status                     ---------                               -----------         ------                     Respiratory Panel PCR w/...[[id number]]  Normal              Final result                 Please view results for these tests on the individual orders.    Respiratory Panel PCR w/COVID-19(SARS-CoV-2) [facility]AT/PAUL/AUBRIE/PAD/COR/DEE In-House, NP Swab in UTM/VTM, 2 HR TAT - Swab, Nasopharynx [[id number]]  (Normal) Collected: 11/17/23 1030    Lab Status: Final result Specimen: Swab from Nasopharynx Updated: 11/17/23 1139     ADENOVIRUS, PCR Not Detected     Coronavirus 229E Not Detected     Coronavirus HKU1 Not Detected     Coronavirus NL63 Not Detected     Coronavirus OC43 Not Detected     COVID19 Not Detected     Human Metapneumovirus Not Detected     Human Rhinovirus/Enterovirus Not Detected     Influenza A PCR Not Detected     Influenza B PCR Not Detected     Parainfluenza Virus 1 Not Detected     Parainfluenza Virus 2 Not Detected     Parainfluenza Virus 3 Not Detected     Parainfluenza Virus 4 Not Detected     RSV, PCR Not Detected     Bordetella pertussis pcr Not Detected     Bordetella parapertussis PCR Not Detected     Chlamydophila pneumoniae PCR Not Detected     Mycoplasma pneumo by PCR Not Detected    Narrative:      In the setting of a positive respiratory panel with a viral infection PLUS a negative procalcitonin without other underlying concern for bacterial infection, consider observing off antibiotics or discontinuation of antibiotics and continue supportive care. If the respiratory panel is positive for atypical bacterial infection (Bordetella pertussis, Chlamydophila pneumoniae, or Mycoplasma  pneumoniae), consider antibiotic de-escalation to target atypical bacterial infection.            No radiology results from the last 24 hrs    Results for orders placed during the hospital encounter of 10/24/23    Adult Transthoracic Echo Limited W/ Cont if Necessary Per Protocol    Interpretation Summary  •  Left ventricular systolic function is normal. Estimated left ventricular EF = 60%  •  Left ventricular wall thickness is consistent with mild concentric hypertrophy.  •  Trace to mild mitral regurgitation.  •  Saline test results are negative for intracardiac shunting.  •  There is a trivial pericardial effusion adjacent to the left ventricle.      Current medications:  Scheduled Meds:[START ON 11/25/2023] amLODIPine, 10 mg, Per G Tube, Q24H  aspirin, 81 mg, Per G Tube, Daily  atorvastatin, 80 mg, Per G Tube, Nightly  carvedilol, 6.25 mg, Per G Tube, BID With Meals  heparin (porcine), 5,000 Units, Subcutaneous, Q8H  insulin regular, 2-7 Units, Subcutaneous, Q6H  Nutrisource fiber, 1 packet, Per G Tube, BID  ProSource No Carb, 30 mL, Per G Tube, TID  senna-docusate sodium, 2 tablet, Per G Tube, BID  sodium chloride, 10 mL, Intravenous, Q12H      Continuous Infusions:   PRN Meds:.•  acetaminophen **OR** acetaminophen **OR** acetaminophen  •  senna-docusate sodium **AND** polyethylene glycol **AND** [DISCONTINUED] bisacodyl **AND** bisacodyl  •  dextrose  •  dextrose  •  glucagon (human recombinant)  •  hydrALAZINE  •  Morphine  •  naloxone  •  ondansetron **OR** ondansetron  •  palliative care oral rinse  •  sodium chloride  •  sodium chloride  •  sodium chloride    Assessment & Plan   Assessment & Plan     Active Hospital Problems    Diagnosis  POA   • **Fever [R50.9]  Yes      Resolved Hospital Problems   No resolved problems to display.        Brief Hospital Course to date:  Michael Cochran is a 78 y.o. female with history of hyperlipidemia, hypertension, recent admission to [facility] s/p recent CABG, stay  complicated with findings of acute/subacute CVA discharged to rehab 11/15/2023, re-admitted with fever at 11/17     Sepsis  E. coli UTI  -UA suggestive of UTI  -Chest x-ray is unremarkable, respiratory PCR panel is negative  -Blood cultures NGTD, urine cultures growing E. coli from 11/19,  MRSA PCR is negative     Dysphagia  -Patient with PEG in place  -Dietitian following, continue tube feeds     CAD s/p CABG  -Continue beta-blocker, statin, aspirin     Recent acute/subacute CVA  Acute encephalopathy, not improving  -CT head is unremarkable  -Continue aspirin and statin  -Continue antibiotics as above     Poorly controlled type 2 diabetes with A1c 9.8%  -Continue SSI     Hypertension  -BP currently stable  -Continue Coreg, amlodipine  -Okay to DC telemetry     Hypernatremia, better  -Suspect secondary to decreased p.o. intake, worsened by tube feeds  -Monitor free water flush  -DC IVF, palliative following     GOC  -Prognosis is currently guarded if not poor  -Palliative care consulted and are following, hospice appropriate  -add IV morphine - bp elevated which could be more patient discomfort/pain    Expected Discharge Location and Transportation:?,  Daughter has not decided  Expected Discharge   Expected Discharge Date: 11/27/2023; Expected Discharge Time:      DVT prophylaxis:  Medical DVT prophylaxis orders are present.     AM-PAC 6 Clicks Score (PT): 6 (11/24/23 5753)    CODE STATUS:   Code Status and Medical Interventions:   Ordered at: 11/17/23 4969     Medical Intervention Limits:    NO intubation (DNI)     Level Of Support Discussed With:    Next of Kin (If No Surrogate)     Code Status (Patient has no pulse and is not breathing):    No CPR (Do Not Attempt to Resuscitate)     Medical Interventions (Patient has pulse or is breathing):    Limited Support       Antonia Disla, DO  11/24/23

## 2023-11-25 NOTE — PLAN OF CARE
Goal Outcome Evaluation:  Plan of Care Reviewed With: patient           Outcome Evaluation: Patient remained sleeping and lethargic most of the shift. Patient still responding to voice. Towards the end of the shift patient unable to opperate right hand on command. Vaginal bleeding still present but a very small amount, notified provider. No family at bedside today. Mid sternal remains RENUKA, dry, and intact. PEG site dry and intact with no drainage. Will continue to monitor.

## 2023-11-25 NOTE — PROGRESS NOTES
[facility] Medicine Services  PROGRESS NOTE    Patient Name: Michael Cochran  : 1945  MRN: [id number]    Date of Admission: 2023  Primary Care Physician: Bo Rodriguez PA    Subjective   Subjective     CC:  FTT    HPI:  A sleep but opened her eyes.  Did not interact and does not follow any commands.  Looks comfortable however.      Objective   Objective     Vital Signs:   Temp:  [98.9 °F (37.2 °C)-100.2 °F (37.9 °C)] 99.5 °F (37.5 °C)  Heart Rate:  [74-98] 93  Resp:  [17-20] 18  BP: (141-184)/(80-96) 141/87  Flow (L/min):  [1] 1     Physical Exam:  Constitutional: No acute respiratory or hemodynamic distress.  Patient is responding only to painful stimuli.  HENT: NCAT, mucous membranes moist  Respiratory: Clear to auscultation bilaterally, respiratory effort normal   Cardiovascular: RRR, no murmurs, rubs, or gallops  Gastrointestinal: Positive bowel sounds, soft, nontender, nondistended  Musculoskeletal: No bilateral ankle edema  Neurologic: Responds only to painful stimuli by withdrawing the right limbs.  Skin: No rashes       Results Reviewed:  LAB RESULTS:      Lab 23  0549 23  0644 23  0932 23  0020 23  0801   WBC 8.77 9.31 9.11  --  9.68   HEMOGLOBIN 8.9* 8.7* 9.0*  --  8.3*   HEMATOCRIT 29.4* 29.5* 30.5*  --  29.2*   PLATELETS 234 238 273  --  306   NEUTROS ABS 6.72 7.49* 6.77  --  7.88*   IMMATURE GRANS (ABS) 0.04 0.04 0.13*  --  0.03   LYMPHS ABS 1.07 0.92 1.28  --  0.90   MONOS ABS 0.47 0.42 0.52  --  0.43   EOS ABS 0.44* 0.41* 0.36  --  0.39   MCV 94.5 95.8 96.2  --  100.7*   CRP  --   --   --  2.49*  --          Lab 23  0549 23  0644 23  1041 23  0949 23  0020   SODIUM 140 142 146* 150* 154*   POTASSIUM 3.7 3.8 3.7 3.9 3.8   CHLORIDE 102 106 112* 115* 123*   CO2 28.0 27.0 26.0 24.0 23.0   ANION GAP 10.0 9.0 8.0 11.0 8.0   BUN 21 24* 26* 29* 32*   CREATININE 0.51* 0.58 0.77 0.69 0.87   EGFR 95.1  92.2 78.6 88.4 67.9   GLUCOSE 193* 202* 210* 223* 150*   CALCIUM 9.0 8.9 8.7 8.8 8.2*   MAGNESIUM  --   --   --   --  2.2   PHOSPHORUS 3.2  --   --   --  3.9         Lab 11/24/23  0549 11/22/23  1041 11/20/23  0020   TOTAL PROTEIN  --  6.1 5.5*   ALBUMIN 3.2* 2.8* 2.6*   GLOBULIN  --  3.3 2.9   ALT (SGPT)  --  21 30   AST (SGOT)  --  17 22   BILIRUBIN  --  1.1 0.8   ALK PHOS  --  94 88               Lab 11/20/23  0020   CHOLESTEROL 125   TRIGLYCERIDES 148             Brief Urine Lab Results  (Last result in the past 365 days)        Color   Clarity   Blood   Leuk Est   Nitrite   Protein   CREAT   Urine HCG        11/19/23 1728 Yellow   Cloudy   Small (1+)   Small (1+)   Negative   Negative                   Microbiology Results Abnormal       Procedure Component Value - Date/Time    Blood Culture - Blood, Arm, Left [[id number]]  (Normal) Collected: 11/17/23 1026    Lab Status: Final result Specimen: Blood from Arm, Left Updated: 11/22/23 1101     Blood Culture No growth at 5 days    Blood Culture - Blood, Arm, Left [[id number]]  (Normal) Collected: 11/17/23 1000    Lab Status: Final result Specimen: Blood from Arm, Left Updated: 11/22/23 1030     Blood Culture No growth at 5 days    MRSA Screen, PCR (Inpatient) - Swab, Nares [[id number]]  (Normal) Collected: 11/18/23 0954    Lab Status: Final result Specimen: Swab from Nares Updated: 11/18/23 1209     MRSA PCR Negative    Narrative:      The negative predictive value of this diagnostic test is high and should only be used to consider de-escalating anti-MRSA therapy. A positive result may indicate colonization with MRSA and must be correlated clinically.  MRSA Negative    COVID PRE-OP / PRE-PROCEDURE SCREENING ORDER (NO ISOLATION) - Swab, Nasopharynx [[id number]]  (Normal) Collected: 11/17/23 1030    Lab Status: Final result Specimen: Swab from Nasopharynx Updated: 11/17/23 1139    Narrative:      The following orders were created for panel order COVID PRE-OP /  PRE-PROCEDURE SCREENING ORDER (NO ISOLATION) - Swab, Nasopharynx.  Procedure                               Abnormality         Status                     ---------                               -----------         ------                     Respiratory Panel PCR w/...[[id number]]  Normal              Final result                 Please view results for these tests on the individual orders.    Respiratory Panel PCR w/COVID-19(SARS-CoV-2) [facility]UL/AUBRIE/[facility]/COR/DEE In-House, NP Swab in UTM/VTM, 2 HR TAT - Swab, Nasopharynx [[id number]]  (Normal) Collected: 11/17/23 1030    Lab Status: Final result Specimen: Swab from Nasopharynx Updated: 11/17/23 1139     ADENOVIRUS, PCR Not Detected     Coronavirus 229E Not Detected     Coronavirus HKU1 Not Detected     Coronavirus NL63 Not Detected     Coronavirus OC43 Not Detected     COVID19 Not Detected     Human Metapneumovirus Not Detected     Human Rhinovirus/Enterovirus Not Detected     Influenza A PCR Not Detected     Influenza B PCR Not Detected     Parainfluenza Virus 1 Not Detected     Parainfluenza Virus 2 Not Detected     Parainfluenza Virus 3 Not Detected     Parainfluenza Virus 4 Not Detected     RSV, PCR Not Detected     Bordetella pertussis pcr Not Detected     Bordetella parapertussis PCR Not Detected     Chlamydophila pneumoniae PCR Not Detected     Mycoplasma pneumo by PCR Not Detected    Narrative:      In the setting of a positive respiratory panel with a viral infection PLUS a negative procalcitonin without other underlying concern for bacterial infection, consider observing off antibiotics or discontinuation of antibiotics and continue supportive care. If the respiratory panel is positive for atypical bacterial infection (Bordetella pertussis, Chlamydophila pneumoniae, or Mycoplasma pneumoniae), consider antibiotic de-escalation to target atypical bacterial infection.            No radiology results from the last 24 hrs    Results for orders placed during the  hospital encounter of 10/24/23    Adult Transthoracic Echo Limited W/ Cont if Necessary Per Protocol    Interpretation Summary  •  Left ventricular systolic function is normal. Estimated left ventricular EF = 60%  •  Left ventricular wall thickness is consistent with mild concentric hypertrophy.  •  Trace to mild mitral regurgitation.  •  Saline test results are negative for intracardiac shunting.  •  There is a trivial pericardial effusion adjacent to the left ventricle.      Current medications:  Scheduled Meds:amLODIPine, 10 mg, Per G Tube, Q24H  aspirin, 81 mg, Per G Tube, Daily  atorvastatin, 80 mg, Per G Tube, Nightly  carvedilol, 6.25 mg, Per G Tube, BID With Meals  heparin (porcine), 5,000 Units, Subcutaneous, Q8H  insulin regular, 2-7 Units, Subcutaneous, Q6H  Nutrisource fiber, 1 packet, Per G Tube, BID  ProSource No Carb, 30 mL, Per G Tube, TID  senna-docusate sodium, 2 tablet, Per G Tube, BID  sodium chloride, 10 mL, Intravenous, Q12H      Continuous Infusions:   PRN Meds:.•  acetaminophen **OR** acetaminophen **OR** acetaminophen  •  senna-docusate sodium **AND** polyethylene glycol **AND** [DISCONTINUED] bisacodyl **AND** bisacodyl  •  dextrose  •  dextrose  •  glucagon (human recombinant)  •  hydrALAZINE  •  Morphine  •  naloxone  •  ondansetron **OR** ondansetron  •  palliative care oral rinse  •  sodium chloride  •  sodium chloride  •  sodium chloride    Assessment & Plan   Assessment & Plan     Active Hospital Problems    Diagnosis  POA   • **Fever [R50.9]  Yes      Resolved Hospital Problems   No resolved problems to display.        Brief Hospital Course to date:  Michael Cochran is a 78 y.o. female with history of hyperlipidemia, hypertension, recent admission to [facility] s/p recent CABG, stay complicated with findings of acute/subacute CVA discharged to rehab 11/15/2023, re-admitted with fever at 11/17     Sepsis  E. coli UTI  -UA suggestive of UTI  -Chest x-ray is unremarkable, respiratory PCR panel  is negative  -Blood cultures NGTD, urine cultures growing E. coli from 11/19,  MRSA PCR is negative     Dysphagia  -Patient with PEG in place  -Dietitian following, continue tube feeds     CAD s/p CABG  -Continue beta-blocker, statin, aspirin     Recent acute/subacute CVA  Acute encephalopathy, not improving  -CT head is unremarkable  -Continue aspirin and statin  -Continue antibiotics as above     Poorly controlled type 2 diabetes with A1c 9.8%  -Continue SSI     Hypertension  -BP currently stable  -Continue Coreg, amlodipine  -Okay to DC telemetry     Hypernatremia, better  -Suspect secondary to decreased p.o. intake, worsened by tube feeds  -Monitor free water flush  -DC IVF, palliative following     GOC  -Prognosis is currently guarded if not poor  -Palliative care consulted and are following, hospice appropriate      Expected Discharge Location and Transportation:?,  Daughter has not decided  Expected Discharge   Expected Discharge Date: 11/27/2023; Expected Discharge Time:      DVT prophylaxis:  Medical DVT prophylaxis orders are present.     AM-PAC 6 Clicks Score (PT): 6 (11/25/23 0800)    CODE STATUS:   Code Status and Medical Interventions:   Ordered at: 11/17/23 1552     Medical Intervention Limits:    NO intubation (DNI)     Level Of Support Discussed With:    Next of Kin (If No Surrogate)     Code Status (Patient has no pulse and is not breathing):    No CPR (Do Not Attempt to Resuscitate)     Medical Interventions (Patient has pulse or is breathing):    Limited Support       Madhav Martinez MD  11/25/23

## 2023-11-26 NOTE — PLAN OF CARE
"  Problem: Palliative Care  Goal: Enhanced Quality of Life  Intervention: Promote Advance Care Planning  Recent Flowsheet Documentation  Taken 11/25/2023 1422 by Nelida Robin \"Luli\" MASOUD EMMANUEL  Life Transition/Adjustment: (discussed comfort plan of care) other (see comments)  Intervention: Optimize Psychosocial Wellbeing  Flowsheets (Taken 11/25/2023 1422)  Supportive Measures: (discussion with patient's daughter at bedside)   active listening utilized   counseling provided   decision-making supported   positive reinforcement provided   self-care encouraged   verbalization of feelings encouraged   other (see comments)  Family/Support System Care:   support provided   self-care encouraged   caregiver stress acknowledged  Visit with patient and patient's daughter Katharina at bedside.  Patient with eyes open, calm, non-verbal indicators of pain absent.  Katharina reports she is hopeful her sister will visit Sunday or Monday, and once sister has visited she would like to discuss transition to comfort care.  Discussed what comfort plan of care might look like - once goal of comfort is decided then can move forward with Hospice consult to discuss hospice support and potential disposition options.  Palliative Care continues to follow for support and assist with plan of care.  Daughter requests palliative check back with her on Monday 11/27/23.     "

## 2023-11-26 NOTE — PROGRESS NOTES
[facility]  PROGRESS NOTE    Patient Name: Michael Cochran  : 1945  MRN: [id number]    Date of Admission: 2023  Primary Care Physician: Bo Rodriguez PA    Subjective   Subjective     CC:  FTT    HPI:  Patient spiked fever again with Tmax of 100.7.  A sleep very difficult to arouse.  Did not interact and does not follow any commands.  Looks comfortable.      Objective   Objective     Vital Signs:   Temp:  [97.7 °F (36.5 °C)-100.7 °F (38.2 °C)] 99.1 °F (37.3 °C)  Heart Rate:  [83-99] 83  Resp:  [18-24] 22  BP: (115-171)/(67-87) 115/67  Flow (L/min):  [1] 1     Physical Exam:  Constitutional: No acute respiratory or hemodynamic distress.  Patient is responding only to painful stimuli.  HENT: NCAT, mucous membranes moist  Respiratory: Clear to auscultation bilaterally, respiratory effort normal   Cardiovascular: RRR, no murmurs, rubs, or gallops  Gastrointestinal: Positive bowel sounds, soft, nontender, nondistended  Musculoskeletal: No bilateral ankle edema  Neurologic: Responds only to painful stimuli by withdrawing the right limbs.  Skin: No rashes       Results Reviewed:  LAB RESULTS:      Lab 23  0549 23  0644 23  0932 23  0020   WBC 8.77 9.31 9.11  --    HEMOGLOBIN 8.9* 8.7* 9.0*  --    HEMATOCRIT 29.4* 29.5* 30.5*  --    PLATELETS 234 238 273  --    NEUTROS ABS 6.72 7.49* 6.77  --    IMMATURE GRANS (ABS) 0.04 0.04 0.13*  --    LYMPHS ABS 1.07 0.92 1.28  --    MONOS ABS 0.47 0.42 0.52  --    EOS ABS 0.44* 0.41* 0.36  --    MCV 94.5 95.8 96.2  --    CRP  --   --   --  2.49*         Lab 23  0549 23  0644 23  1041 23  0949 23  0020   SODIUM 140 142 146* 150* 154*   POTASSIUM 3.7 3.8 3.7 3.9 3.8   CHLORIDE 102 106 112* 115* 123*   CO2 28.0 27.0 26.0 24.0 23.0   ANION GAP 10.0 9.0 8.0 11.0 8.0   BUN 21 24* 26* 29* 32*   CREATININE 0.51* 0.58 0.77 0.69 0.87   EGFR 95.1 92.2 78.6 88.4 67.9   GLUCOSE  193* 202* 210* 223* 150*   CALCIUM 9.0 8.9 8.7 8.8 8.2*   MAGNESIUM  --   --   --   --  2.2   PHOSPHORUS 3.2  --   --   --  3.9         Lab 11/24/23  0549 11/22/23  1041 11/20/23  0020   TOTAL PROTEIN  --  6.1 5.5*   ALBUMIN 3.2* 2.8* 2.6*   GLOBULIN  --  3.3 2.9   ALT (SGPT)  --  21 30   AST (SGOT)  --  17 22   BILIRUBIN  --  1.1 0.8   ALK PHOS  --  94 88               Lab 11/20/23  0020   CHOLESTEROL 125   TRIGLYCERIDES 148             Brief Urine Lab Results  (Last result in the past 365 days)        Color   Clarity   Blood   Leuk Est   Nitrite   Protein   CREAT   Urine HCG        11/19/23 1728 Yellow   Cloudy   Small (1+)   Small (1+)   Negative   Negative                   Microbiology Results Abnormal       Procedure Component Value - Date/Time    Blood Culture - Blood, Arm, Left [[id number]]  (Normal) Collected: 11/17/23 1026    Lab Status: Final result Specimen: Blood from Arm, Left Updated: 11/22/23 1101     Blood Culture No growth at 5 days    Blood Culture - Blood, Arm, Left [[id number]]  (Normal) Collected: 11/17/23 1000    Lab Status: Final result Specimen: Blood from Arm, Left Updated: 11/22/23 1030     Blood Culture No growth at 5 days    MRSA Screen, PCR (Inpatient) - Swab, Nares [[id number]]  (Normal) Collected: 11/18/23 0954    Lab Status: Final result Specimen: Swab from Nares Updated: 11/18/23 1209     MRSA PCR Negative    Narrative:      The negative predictive value of this diagnostic test is high and should only be used to consider de-escalating anti-MRSA therapy. A positive result may indicate colonization with MRSA and must be correlated clinically.  MRSA Negative    COVID PRE-OP / PRE-PROCEDURE SCREENING ORDER (NO ISOLATION) - Swab, Nasopharynx [[id number]]  (Normal) Collected: 11/17/23 1030    Lab Status: Final result Specimen: Swab from Nasopharynx Updated: 11/17/23 1139    Narrative:      The following orders were created for panel order COVID PRE-OP / PRE-PROCEDURE SCREENING ORDER (NO  ISOLATION) - Swab, Nasopharynx.  Procedure                               Abnormality         Status                     ---------                               -----------         ------                     Respiratory Panel PCR w/...[[id number]]  Normal              Final result                 Please view results for these tests on the individual orders.    Respiratory Panel PCR w/COVID-19(SARS-CoV-2) NUZHAT/PAUL/AUBRIE/PAD/COR/DEE In-House, NP Swab in UTM/VTM, 2 HR TAT - Swab, Nasopharynx [[id number]]  (Normal) Collected: 11/17/23 1030    Lab Status: Final result Specimen: Swab from Nasopharynx Updated: 11/17/23 1139     ADENOVIRUS, PCR Not Detected     Coronavirus 229E Not Detected     Coronavirus HKU1 Not Detected     Coronavirus NL63 Not Detected     Coronavirus OC43 Not Detected     COVID19 Not Detected     Human Metapneumovirus Not Detected     Human Rhinovirus/Enterovirus Not Detected     Influenza A PCR Not Detected     Influenza B PCR Not Detected     Parainfluenza Virus 1 Not Detected     Parainfluenza Virus 2 Not Detected     Parainfluenza Virus 3 Not Detected     Parainfluenza Virus 4 Not Detected     RSV, PCR Not Detected     Bordetella pertussis pcr Not Detected     Bordetella parapertussis PCR Not Detected     Chlamydophila pneumoniae PCR Not Detected     Mycoplasma pneumo by PCR Not Detected    Narrative:      In the setting of a positive respiratory panel with a viral infection PLUS a negative procalcitonin without other underlying concern for bacterial infection, consider observing off antibiotics or discontinuation of antibiotics and continue supportive care. If the respiratory panel is positive for atypical bacterial infection (Bordetella pertussis, Chlamydophila pneumoniae, or Mycoplasma pneumoniae), consider antibiotic de-escalation to target atypical bacterial infection.            No radiology results from the last 24 hrs    Results for orders placed during the hospital encounter of  10/24/23    Adult Transthoracic Echo Limited W/ Cont if Necessary Per Protocol    Interpretation Summary  •  Left ventricular systolic function is normal. Estimated left ventricular EF = 60%  •  Left ventricular wall thickness is consistent with mild concentric hypertrophy.  •  Trace to mild mitral regurgitation.  •  Saline test results are negative for intracardiac shunting.  •  There is a trivial pericardial effusion adjacent to the left ventricle.      Current medications:  Scheduled Meds:amLODIPine, 10 mg, Per G Tube, Q24H  aspirin, 81 mg, Per G Tube, Daily  atorvastatin, 80 mg, Per G Tube, Nightly  carvedilol, 6.25 mg, Per G Tube, BID With Meals  heparin (porcine), 5,000 Units, Subcutaneous, Q8H  insulin regular, 2-7 Units, Subcutaneous, Q6H  Nutrisource fiber, 1 packet, Per G Tube, BID  piperacillin-tazobactam, 3.375 g, Intravenous, Q8H  ProSource No Carb, 30 mL, Per G Tube, TID  senna-docusate sodium, 2 tablet, Per G Tube, BID  sodium chloride, 10 mL, Intravenous, Q12H      Continuous Infusions:   PRN Meds:.•  acetaminophen **OR** acetaminophen **OR** acetaminophen  •  senna-docusate sodium **AND** polyethylene glycol **AND** [DISCONTINUED] bisacodyl **AND** bisacodyl  •  dextrose  •  dextrose  •  glucagon (human recombinant)  •  hydrALAZINE  •  Morphine  •  naloxone  •  ondansetron **OR** ondansetron  •  palliative care oral rinse  •  sodium chloride  •  sodium chloride  •  sodium chloride    Assessment & Plan   Assessment & Plan     Active Hospital Problems    Diagnosis  POA   • **Fever [R50.9]  Yes      Resolved Hospital Problems   No resolved problems to display.        Brief Hospital Course to date:  Michael Cochran is a 78 y.o. female with history of hyperlipidemia, hypertension, recent admission to [facility] s/p recent CABG, stay complicated with findings of acute/subacute CVA discharged to rehab 11/15/2023, re-admitted with fever at 11/17     Sepsis  E. coli UTI  -UA suggestive of UTI  -Chest x-ray is  unremarkable, respiratory PCR panel is negative  -Blood cultures NGTD, urine cultures growing E. coli from 11/19,  MRSA PCR is negative  -Patient had fever again last night so started her on Zosyn and blood cultures today.     Dysphagia  -Patient with PEG in place  -Dietitian following, continue tube feeds     CAD s/p CABG  -Continue beta-blocker, statin, aspirin     Recent acute/subacute CVA  Acute encephalopathy, not improving  -CT head is unremarkable  -Continue aspirin and statin  -Continue antibiotics as above     Poorly controlled type 2 diabetes with A1c 9.8%  -Continue SSI     Hypertension  -BP currently stable  -Continue Coreg, amlodipine  -Okay to DC telemetry     Hypernatremia, better  -Suspect secondary to decreased p.o. intake, worsened by tube feeds  -Monitor free water flush  -DC IVF, palliative following     GOC  -Prognosis is poor  -Palliative care consulted and are following, hospice appropriate  -Had a long conversation with patient's daughter at the bedside.  I demonstrated patient's severe neurologic deficits to the daughter.  She sounded like she is ready to talk to hospice.      Expected Discharge Location and Transportation:?,  Daughter has not decided  Expected Discharge   Expected Discharge Date: 11/27/2023; Expected Discharge Time:      DVT prophylaxis:  Medical DVT prophylaxis orders are present.     AM-PAC 6 Clicks Score (PT): 6 (11/25/23 1959)    CODE STATUS:   Code Status and Medical Interventions:   Ordered at: 11/17/23 7126     Medical Intervention Limits:    NO intubation (DNI)     Level Of Support Discussed With:    Next of Kin (If No Surrogate)     Code Status (Patient has no pulse and is not breathing):    No CPR (Do Not Attempt to Resuscitate)     Medical Interventions (Patient has pulse or is breathing):    Limited Support       Madhav Martinez MD  11/26/23

## 2023-11-27 NOTE — PROGRESS NOTES
[facility]  PROGRESS NOTE    Patient Name: Michael Cochran  : 1945  MRN: [id number]    Date of Admission: 2023  Primary Care Physician: Bo Rodriguez PA    Subjective   Subjective     CC:  FTT    HPI:    A sleep very difficult to arouse.  Did not interact and does not follow any commands.  Looks comfortable.      Objective   Objective     Vital Signs:   Temp:  [99.9 °F (37.7 °C)-100.4 °F (38 °C)] 99.9 °F (37.7 °C)  Heart Rate:  [83-94] 94  Resp:  [20] 20  BP: (137-190)/(74-96) 190/96  Flow (L/min):  [1] 1     Physical Exam:  Constitutional: No acute respiratory or hemodynamic distress.  Patient is responding only to painful stimuli.  HENT: NCAT, mucous membranes moist  Respiratory: Clear to auscultation bilaterally, respiratory effort normal   Cardiovascular: RRR, no murmurs, rubs, or gallops  Gastrointestinal: Positive bowel sounds, soft, nontender, nondistended  Musculoskeletal: No bilateral ankle edema  Neurologic: Responds only to painful stimuli by withdrawing the right limbs.  Skin: No rashes       Results Reviewed:  LAB RESULTS:      Lab 23  0036 23  1449 23  0549 23  0644   WBC  --   --  8.77 9.31   HEMOGLOBIN  --   --  8.9* 8.7*   HEMATOCRIT  --   --  29.4* 29.5*   PLATELETS  --   --  234 238   NEUTROS ABS  --   --  6.72 7.49*   IMMATURE GRANS (ABS)  --   --  0.04 0.04   LYMPHS ABS  --   --  1.07 0.92   MONOS ABS  --   --  0.47 0.42   EOS ABS  --   --  0.44* 0.41*   MCV  --   --  94.5 95.8   CRP 16.68*  --   --   --    LACTATE  --  1.2  --   --          Lab 23  0036 23  0549 23  0644 23  1041 23  0949   SODIUM 135* 140 142 146* 150*   POTASSIUM 3.6 3.7 3.8 3.7 3.9   CHLORIDE 97* 102 106 112* 115*   CO2 27.0 28.0 27.0 26.0 24.0   ANION GAP 11.0 10.0 9.0 8.0 11.0   BUN 30* 21 24* 26* 29*   CREATININE 0.68 0.51* 0.58 0.77 0.69   EGFR 88.7 95.1 92.2 78.6 88.4   GLUCOSE 221* 193* 202* 210*  223*   CALCIUM 9.3 9.0 8.9 8.7 8.8   MAGNESIUM 2.0  --   --   --   --    PHOSPHORUS 4.7* 3.2  --   --   --          Lab 11/27/23  0036 11/24/23  0549 11/22/23  1041   TOTAL PROTEIN 6.3  --  6.1   ALBUMIN 3.1* 3.2* 2.8*   GLOBULIN 3.2  --  3.3   ALT (SGPT) 15  --  21   AST (SGOT) 18  --  17   BILIRUBIN 1.3*  --  1.1   ALK PHOS 87  --  94               Lab 11/27/23  0036   CHOLESTEROL 124   TRIGLYCERIDES 114             Brief Urine Lab Results  (Last result in the past 365 days)        Color   Clarity   Blood   Leuk Est   Nitrite   Protein   CREAT   Urine HCG        11/19/23 1728 Yellow   Cloudy   Small (1+)   Small (1+)   Negative   Negative                   Microbiology Results Abnormal       Procedure Component Value - Date/Time    Blood Culture - Blood, Arm, Left [[id number]]  (Normal) Collected: 11/17/23 1026    Lab Status: Final result Specimen: Blood from Arm, Left Updated: 11/22/23 1101     Blood Culture No growth at 5 days    Blood Culture - Blood, Arm, Left [[id number]]  (Normal) Collected: 11/17/23 1000    Lab Status: Final result Specimen: Blood from Arm, Left Updated: 11/22/23 1030     Blood Culture No growth at 5 days    MRSA Screen, PCR (Inpatient) - Swab, Nares [[id number]]  (Normal) Collected: 11/18/23 0954    Lab Status: Final result Specimen: Swab from Nares Updated: 11/18/23 1209     MRSA PCR Negative    Narrative:      The negative predictive value of this diagnostic test is high and should only be used to consider de-escalating anti-MRSA therapy. A positive result may indicate colonization with MRSA and must be correlated clinically.  MRSA Negative    COVID PRE-OP / PRE-PROCEDURE SCREENING ORDER (NO ISOLATION) - Swab, Nasopharynx [[id number]]  (Normal) Collected: 11/17/23 1030    Lab Status: Final result Specimen: Swab from Nasopharynx Updated: 11/17/23 1139    Narrative:      The following orders were created for panel order COVID PRE-OP / PRE-PROCEDURE SCREENING ORDER (NO ISOLATION) - Swab,  Nasopharynx.  Procedure                               Abnormality         Status                     ---------                               -----------         ------                     Respiratory Panel PCR w/...[[id number]]  Normal              Final result                 Please view results for these tests on the individual orders.    Respiratory Panel PCR w/COVID-19(SARS-CoV-2) [facility]/PAUL/AUBRIE/[facility]/DEE In-House, NP Swab in UTM/VTM, 2 HR TAT - Swab, Nasopharynx [[id number]]  (Normal) Collected: 11/17/23 1030    Lab Status: Final result Specimen: Swab from Nasopharynx Updated: 11/17/23 1139     ADENOVIRUS, PCR Not Detected     Coronavirus 229E Not Detected     Coronavirus HKU1 Not Detected     Coronavirus NL63 Not Detected     Coronavirus OC43 Not Detected     COVID19 Not Detected     Human Metapneumovirus Not Detected     Human Rhinovirus/Enterovirus Not Detected     Influenza A PCR Not Detected     Influenza B PCR Not Detected     Parainfluenza Virus 1 Not Detected     Parainfluenza Virus 2 Not Detected     Parainfluenza Virus 3 Not Detected     Parainfluenza Virus 4 Not Detected     RSV, PCR Not Detected     Bordetella pertussis pcr Not Detected     Bordetella parapertussis PCR Not Detected     Chlamydophila pneumoniae PCR Not Detected     Mycoplasma pneumo by PCR Not Detected    Narrative:      In the setting of a positive respiratory panel with a viral infection PLUS a negative procalcitonin without other underlying concern for bacterial infection, consider observing off antibiotics or discontinuation of antibiotics and continue supportive care. If the respiratory panel is positive for atypical bacterial infection (Bordetella pertussis, Chlamydophila pneumoniae, or Mycoplasma pneumoniae), consider antibiotic de-escalation to target atypical bacterial infection.            No radiology results from the last 24 hrs    Results for orders placed during the hospital encounter of 10/24/23    Adult Transthoracic  Echo Limited W/ Cont if Necessary Per Protocol    Interpretation Summary  •  Left ventricular systolic function is normal. Estimated left ventricular EF = 60%  •  Left ventricular wall thickness is consistent with mild concentric hypertrophy.  •  Trace to mild mitral regurgitation.  •  Saline test results are negative for intracardiac shunting.  •  There is a trivial pericardial effusion adjacent to the left ventricle.      Current medications:  Scheduled Meds:amLODIPine, 10 mg, Per G Tube, Q24H  aspirin, 81 mg, Per G Tube, Daily  atorvastatin, 80 mg, Per G Tube, Nightly  carvedilol, 6.25 mg, Per G Tube, BID With Meals  heparin (porcine), 5,000 Units, Subcutaneous, Q8H  insulin regular, 2-7 Units, Subcutaneous, Q6H  Nutrisource fiber, 1 packet, Per G Tube, BID  piperacillin-tazobactam, 3.375 g, Intravenous, Q8H  ProSource No Carb, 30 mL, Per G Tube, TID  senna-docusate sodium, 2 tablet, Per G Tube, BID  sodium chloride, 10 mL, Intravenous, Q12H      Continuous Infusions:   PRN Meds:.•  acetaminophen **OR** acetaminophen **OR** acetaminophen  •  senna-docusate sodium **AND** polyethylene glycol **AND** [DISCONTINUED] bisacodyl **AND** bisacodyl  •  dextrose  •  dextrose  •  glucagon (human recombinant)  •  hydrALAZINE  •  Morphine  •  naloxone  •  ondansetron **OR** ondansetron  •  palliative care oral rinse  •  sodium chloride  •  sodium chloride  •  sodium chloride    Assessment & Plan   Assessment & Plan     Active Hospital Problems    Diagnosis  POA   • **Fever [R50.9]  Yes      Resolved Hospital Problems   No resolved problems to display.        Brief Hospital Course to date:  Michael Cochran is a 78 y.o. female with history of hyperlipidemia, hypertension, recent admission to [facility] s/p recent CABG, stay complicated with findings of acute/subacute CVA discharged to rehab 11/15/2023, re-admitted with fever at 11/17     Sepsis  E. coli UTI  -UA suggestive of UTI  -Chest x-ray is unremarkable, respiratory PCR panel is  negative  -Blood cultures NGTD, urine cultures growing E. coli from 11/19,  MRSA PCR is negative  -Patient had fever again last night so started her on Zosyn and blood cultures today.     Dysphagia  -Patient with PEG in place  -Dietitian following, continue tube feeds     CAD s/p CABG  -Continue beta-blocker, statin, aspirin     Recent acute/subacute CVA  Acute encephalopathy, not improving  -CT head is unremarkable  -Continue aspirin and statin  -Continue antibiotics as above     Poorly controlled type 2 diabetes with A1c 9.8%  -Continue SSI     Hypertension  -BP currently stable  -Continue Coreg, amlodipine  -Okay to DC telemetry     Hypernatremia, better  -Suspect secondary to decreased p.o. intake, worsened by tube feeds  -Monitor free water flush  -DC IVF, palliative following     GOC  -Prognosis is poor  -Palliative care consulted and are following, hospice appropriate  -Had a long conversation with patient's daughter at the bedside on 11/26/2023.  I demonstrated patient's severe neurologic deficits to the daughter.  She sounded like she is ready to talk to hospice.      Expected Discharge Location and Transportation:?,  Daughter has not decided  Expected Discharge   Expected Discharge Date: 11/29/2023; Expected Discharge Time:      DVT prophylaxis:  Medical DVT prophylaxis orders are present.     AM-PAC 6 Clicks Score (PT): 6 (11/25/23 1959)    CODE STATUS:   Code Status and Medical Interventions:   Ordered at: 11/17/23 3715     Medical Intervention Limits:    NO intubation (DNI)     Level Of Support Discussed With:    Next of Kin (If No Surrogate)     Code Status (Patient has no pulse and is not breathing):    No CPR (Do Not Attempt to Resuscitate)     Medical Interventions (Patient has pulse or is breathing):    Limited Support       Madhav Martinez MD  11/27/23

## 2023-11-27 NOTE — CONSULTS
[facility] Cardiothoracic Surgery In-Patient Consult    Name:  Michael Cochran  MRN Number:  [id number]  Date of Admission:  11/17/2023  Date of Consultation: 11/27/2023    Consulting Provider:    PCP: Bo Rodriguez PA  IP Care Team:  Patient Care Team:  Bo Rodriguez PA as PCP - General (Family Medicine)    Reason for Consultation: s/p CABG 11/1/23     History of Present Illness:    Michael Cochran is a 78 y.o. female with a history of CAD s/p CABG x 4 on 11/1/23 w/ Dr. Cleveland, newly diagnosed type 2 diabetes-A1c 9.8, hypertension, hyperlipidemia, and dysphagia s/p PEG tube placement.  She was admitted from 10/24-11/15/23 and was discharged to rehab in stable condition.  Her hospital course was complicated by encephalopathy, multifocal bihemispheric embolic strokes. She presented to our facility on 11/17 for fever related to e coli UTI.  Preliminary blood cultures positive for gram-positive cocci.     Review of Systems:  Review of Systems   Unable to perform ROS: Mental status change       Hospital Problems:   Fever       Past Medical History:    Past Medical History:   Diagnosis Date    Hyperlipidemia     Hypertension        Past Surgical History:    Past Surgical History:   Procedure Laterality Date    BREAST FIBROADENOMA SURGERY      9 y/o-was benign    CARDIAC CATHETERIZATION N/A 10/26/2023    Procedure: Left Heart Cath;  Surgeon: Jordi Hodge MD;  Location:  [facility];  Service: Cardiovascular;  Laterality: N/A;    CORONARY ARTERY BYPASS GRAFT N/A 11/1/2023    Procedure: MEDIAN STERNOTOMY, CORONARY ARTERY BYPASS GRAFTING X4, UTILIZING THE LEFT INTERANL MAMMARY ARTERY, EVH OF THE LEFT GREATER SAPHENOUS VEIN, EXPLORATION OF THE RIGHT LEG, PRIMITIVO PER ANETHESIA;  Surgeon: Dirk Cleveland MD;  Location: [facility] OR;  Service: Cardiothoracic;  Laterality: N/A;    ENDOSCOPY W/ PEG TUBE PLACEMENT N/A 11/10/2023    Procedure: ESOPHAGOGASTRODUODENOSCOPY WITH PERCUTANEOUS  ENDOSCOPIC GASTROSTOMY TUBE INSERTION AT BEDSIDE;  Surgeon: Dirk Cleveland MD;  Location: [facility] ENDOSCOPY;  Service: Cardiothoracic;  Laterality: N/A;       Family History:  History reviewed. No pertinent family history.    Social History:    Social History     Socioeconomic History    Marital status:    Tobacco Use    Smoking status: Some Days     Types: Cigarettes    Smokeless tobacco: Never    Tobacco comments:     Smokes rarely   Vaping Use    Vaping Use: Never used   Substance and Sexual Activity    Alcohol use: Never    Drug use: Never       Allergies:  Allergies   Allergen Reactions    Dynacirc [Isradipine] Other (See Comments)     Causes tic    Codeine Rash         Physical Exam:  Vital Signs:    Temp:  [99.1 °F (37.3 °C)-100.4 °F (38 °C)] 99.9 °F (37.7 °C)  Heart Rate:  [83-94] 94  Resp:  [20-22] 20  BP: (115-190)/(67-96) 190/96  Body mass index is 36.85 kg/m².     Physical Exam  Constitutional:       Appearance: She is obese. She is ill-appearing.   HENT:      Head: Normocephalic.      Mouth/Throat:      Pharynx: Oropharynx is clear.   Eyes:      General: Lids are normal.   Cardiovascular:      Rate and Rhythm: Normal rate.      Pulses: Normal pulses.   Pulmonary:      Effort: Pulmonary effort is normal.   Chest:      Comments: Sternum is stable  Abdominal:      General: Bowel sounds are normal.   Musculoskeletal:      Cervical back: Normal range of motion.   Skin:     General: Skin is warm.      Comments: Sternal incision is clean, dry, and intact. Malgorzata present.   Neurological:      Mental Status: She is disoriented.         Labs/Imaging/Procedures:   Results from last 7 days   Lab Units 11/27/23  0036   SODIUM mmol/L 135*   POTASSIUM mmol/L 3.6   CHLORIDE mmol/L 97*   CO2 mmol/L 27.0   BUN mg/dL 30*   CREATININE mg/dL 0.68   CALCIUM mg/dL 9.3   BILIRUBIN mg/dL 1.3*   ALK PHOS U/L 87   ALT (SGPT) U/L 15   AST (SGOT) U/L 18   GLUCOSE mg/dL 221*         Results from last 7 days   Lab Units  11/24/23  0549 11/23/23  0644 11/20/23  0932   WBC 10*3/mm3 8.77 9.31 9.11   HEMOGLOBIN g/dL 8.9* 8.7* 9.0*   HEMATOCRIT % 29.4* 29.5* 30.5*   PLATELETS 10*3/mm3 234 238 273         Results from last 7 days   Lab Units 11/27/23  0036   MAGNESIUM mg/dL 2.0     Results from last 7 days   Lab Units 11/27/23  0036   CHOLESTEROL mg/dL 124   TRIGLYCERIDES mg/dL 114     CT Head Without Contrast    Result Date: 11/17/2023  1.No acute intracranial abnormality is identified. 2.Findings compatible with chronic microvascular ischemic change and diffuse cortical atrophy. 3.Mucosal thickening within the right frontal sinus and right anterior ethmoid air cells. 4.Mild left mastoid effusion. Electronically Signed: Sergio Esqueda MD  11/17/2023 10:57 AM EST  Workstation ID: [id number]    XR Chest 1 View    Result Date: 11/17/2023  Impression: Findings suggest small left effusion. No acute process in the lung fields. Electronically Signed: Marielos Camilo MD  11/17/2023 10:50 AM EST  Workstation ID: [id number]    XR Chest 1 View    Result Date: 11/11/2023  Impression: Cardiomegaly with scattered bibasilar atelectasis. Electronically Signed: Sergio Esqueda MD  11/11/2023 8:16 AM EST  Workstation ID: [id number]    XR Abdomen KUB    Result Date: 11/10/2023  Impression: 1. Replacement of percutaneous gastrostomy tube and removal of Dobbhoff feeding tube. 2. Air-filled but nondistended loops of small bowel throughout the abdomen. Electronically Signed: Genaro Hendrix MD  11/10/2023 10:28 AM EST  Workstation ID: [id number]    CT Head Without Contrast    Result Date: 11/9/2023  Impression: Redemonstration of scattered supratentorial and infratentorial hypodensities, compatible with evolving acute infarcts as seen on prior studies. No intracranial hemorrhage. No new or large territory infarct. No mass effect. Electronically Signed: Brain Pack MD  11/9/2023 8:13 AM EST  Workstation ID: [id number]    XR Chest 1 View    Result Date:  11/8/2023  Impression: Support lines and tubes as discussed without significant change otherwise Electronically Signed: Marc Castillo MD  11/8/2023 6:42 AM CST  Workstation ID: [id number]    XR Chest 1 View    Result Date: 11/6/2023  Impression: Stable chest Electronically Signed: John العراقي MD  11/6/2023 7:17 AM EST  Workstation ID: [id number]    MRI Brain Without Contrast    Result Date: 11/6/2023  Impression: 1.Multiple small foci of diffusion restriction present within the paramedian bilateral frontal and parietal lobes extending to the bilateral basal ganglia as well as the left cerebellum consistent with acute to subacute infarcts. Findings are more subacute given presence of FLAIR signal changes corresponding to these regions related to gliosis. Given the distribution findings are likely related to a watershed type episode. No evidence of hemorrhage, mass effect or midline shift. 2.Moderate periventricular and subcortical FLAIR signal changes likely related to chronic microvascular ischemic change. Electronically Signed: Radha Cedillo MD  11/6/2023 1:55 AM EST  Workstation ID: [id number]    EEG    Result Date: 11/5/2023  Diffuse cerebral dysfunction of moderate degree, nonspecific This report is transcribed using the Dragon dictation system.      CT Abdomen Pelvis Without Contrast    Result Date: 11/5/2023  Impression: 1. The study is limited by noncontrasted technique. 2. No retroperitoneal hematoma. There is no free fluid in the abdomen or pelvis. 3. Cholelithiasis. 4. Round masses in the right kidney difficult to evaluate without contrast. Statistically, they probably represent renal cysts but could be evaluated further with either renal ultrasound or a repeat CT exam with IV contrast. 5. Mild rectal sigmoid colon fecal impaction. There is a rectal catheter in place. 6. Small fat density left inguinal hernia. 7. Postsurgical changes within the lower chest. 8. Enlarged uterus with questionable fibroids.  Electronically Signed: Larry Evans MD  11/5/2023 8:23 AM EST  Workstation ID: [id number]    CT Head Without Contrast    Result Date: 11/4/2023  Impression: No acute intracranial process identified. Electronically Signed: Radha Cedillo MD  11/4/2023 11:04 PM EDT  Workstation ID: [id number]    XR Chest 1 View    Result Date: 11/4/2023  Impression: Interval removal of pulmonary artery catheter with right IJ sheath in place. Enteric tube noted with nasogastric tube removed. Remaining support hardware projects unchanged. Lung volumes are mildly diminished with some scattered atelectasis. There is no enlarging pleural effusion or distinct pneumothorax. Unchanged heart and mediastinal contours. Electronically Signed: Dm Stanley MD  11/4/2023 8:52 AM EDT  Workstation ID: [id number]    XR Abdomen KUB    Result Date: 11/3/2023  Impression: Enteric tube tip in the peripyloric region. Electronically Signed: Marc Castillo MD  11/3/2023 9:31 AM CDT  Workstation ID: [id number]    XR Chest 1 View    Result Date: 11/3/2023  Impression: No significant change Electronically Signed: Marc Castillo MD  11/3/2023 6:20 AM CDT  Workstation ID: [id number]    XR Chest 1 View    Result Date: 11/2/2023  Impression: Interval removal of endotracheal tube Increased left lower lobe opacity compatible atelectasis and small effusion Electronically Signed: John العراقي MD  11/2/2023 7:12 AM EDT  Workstation ID: [id number]    XR Chest 1 View    Result Date: 11/1/2023  Impression: Support devices appear in standard position. No pneumothorax or convincing active airspace process. Electronically Signed: Gonzalo Zamora MD  11/1/2023 4:45 PM EDT  Workstation ID: [id number]    LHC: Results for orders placed during the hospital encounter of 10/24/23    Cardiac Catheterization/Vascular Study    Narrative    Ostial 95% LAD stenosis involve the origin of a large first diagonal as well as some retrograde mild to moderate plaque in the left main.    Bifurcation right  coronary artery 95% stenosis.    70% LCx stenosis    LVEF 40-45% with anterior apical hypokinesis     Echo: Results for orders placed during the hospital encounter of 10/24/23    Adult Transthoracic Echo Limited W/ Cont if Necessary Per Protocol    Interpretation Summary    Left ventricular systolic function is normal. Estimated left ventricular EF = 60%    Left ventricular wall thickness is consistent with mild concentric hypertrophy.    Trace to mild mitral regurgitation.    Saline test results are negative for intracardiac shunting.    There is a trivial pericardial effusion adjacent to the left ventricle.     Carotid Duplex: Results for orders placed during the hospital encounter of 10/24/23    Duplex Carotid Ultrasound CAR    Interpretation Summary    Right internal carotid artery demonstrates a less than 50% stenosis.    Left internal carotid artery demonstrates a less than 50% stenosis.      Assessment:    Sherice Cochran is a 78 y.o. female with a history of CAD s/p CABG x 4 on 11/1/23 w/ Dr. Cleveland, newly diagnosed type 2 diabetes-A1c 9.8, hypertension, hyperlipidemia, and dysphagia s/p PEG tube placement.  She was admitted from 10/24-11/15/23 and was discharged to rehab in stable condition.  Her hospital course was complicated by encephalopathy, multifocal bihemispheric embolic strokes. She presented to our facility on 11/17 for fever related to e coli UTI.  Preliminary blood cultures positive for gram-positive cocci.     Plan:  Continue with antibiotics for UTI   Continue sternal staples and chest tube sutures      Charisse Michael, APRN  08:50 EST  11/27/23     Thank you for allowing us to participate in the care of your patient. Please do not hesitate to contact us with additional questions or concerns.

## 2023-11-27 NOTE — PROGRESS NOTES
Clinical Nutrition     Nutrition Support Assessment  Reason for Visit: Follow-up protocol, EN    Patient Name: Michael Cochran  YOB: 1945  MRN: [id number]  Date of Encounter: 11/27/23 14:46 EST  Admission date: 11/17/2023    Comments:    Continue current EN regimen via PEG:  Diabetisource AC @ 55ml/hr. Nutrisource Fiber BID, Prosource TID. Waer flush @ 50ml/hr.  =1210ml, 1632kcals (102% est needs), 118g pro (107% est needs), 24g fiber, 990ml water from formula, 2090ml TFW.     Nutrition Assessment   Admission Diagnosis:  Sepsis [A41.9]  Fever [R50.9]    Problem List:    Fever    PMH:   She  has a past medical history of Hyperlipidemia and Hypertension.    PSH:  She  has a past surgical history that includes Breast fibroadenoma surgery; Cardiac catheterization (N/A, 10/26/2023); Coronary artery bypass graft (N/A, 11/1/2023); and Esophagogastroduodenoscopy w/ PEG (N/A, 11/10/2023).    Applicable Nutrition Concerns:   Skin:  recent surgical wound (CABG 11/1)  Oral:  NPO; dysphagia   GI: Has a PEG tube     Applicable Interval History:   (11/1) s/p CABG, intubated, large bore NG tube;  extubated  (11/3) small bore NG tube placed (peripyloric tip placement per KUB);  EN initiated - Novasource Renal  (11/4) CT head - no acute changes  (11/10) PEG placed   (11/5) MRI Multiple bilateral acute and subacute infarcts.  Moderate periventricular subcortical flair changes related to chronic microvascular ischemic change  (11/17) Re-admitted back due to fever    Reported/Observed/Food/Nutrition Related History:     11/27  Pt remains non-verbal. Family at bedside denies any issues w/TF.     11/21  Remains non-verbal. EN infusing @ goal rate at time of visit. RN in room, reports patient now with frequent, liquid BM.  Palliative care following patient to help with GOC.     11/19  Patient non-verbal at time of visit, did not respond to calling name. No family at bedside.  Bed weight obtained 209lb  "    11/18  RD familiar with patient from recent prolonged hospital admission post CABG 11/1. Patient was discharged to rehab 11/15, returned yesterday with fever.  Currently NPO. Discussed with MD, he is ok with RD starting EN via PEG tube.     Labs    Labs Reviewed: Yes     Results from last 7 days   Lab Units 11/27/23  0036 11/26/23  1449 11/24/23  0549 11/23/23  0644 11/22/23  1041   GLUCOSE mg/dL 221*  --  193* 202* 210*   BUN mg/dL 30*  --  21 24* 26*   CREATININE mg/dL 0.68  --  0.51* 0.58 0.77   SODIUM mmol/L 135*  --  140 142 146*   CHLORIDE mmol/L 97*  --  102 106 112*   POTASSIUM mmol/L 3.6  --  3.7 3.8 3.7   PHOSPHORUS mg/dL 4.7*  --  3.2  --   --    MAGNESIUM mg/dL 2.0  --   --   --   --    ALT (SGPT) U/L 15  --   --   --  21   LACTATE mmol/L  --  1.2  --   --   --      Results from last 7 days   Lab Units 11/27/23  0036 11/24/23  0549 11/22/23  1041   ALBUMIN g/dL 3.1* 3.2* 2.8*   PREALBUMIN mg/dL 12.6*  --   --    CRP mg/dL 16.68*  --   --    CHOLESTEROL mg/dL 124  --   --    TRIGLYCERIDES mg/dL 114  --   --        Results from last 7 days   Lab Units 11/27/23  1205 11/27/23  0551 11/26/23  2342 11/26/23  1723 11/26/23  1132 11/26/23  0700   GLUCOSE mg/dL 278* 300* 222* 223* 288* 222*     Lab Results   Lab Value Date/Time    HGBA1C 9.80 (H) 10/25/2023 0357               Medications    Medications Reviewed: Yes  Pertinent: insulin, abx, percolace  Infusion:   PRN:    Intake/Ouptut 24 hrs (0701 - 0700)   I&O's Reviewed: Yes   +BM 11/26    Anthropometrics     Flowsheet Rows      Flowsheet Row First Filed Value   Admission Height 157.5 cm (62\") Documented at 11/17/2023 1019   Admission Weight 95.3 kg (210 lb) Documented at 11/17/2023 1019     Height: Height: 157.5 cm (62\")  Last Filed Weight: Weight: 91.4 kg (201 lb 8 oz) (11/17/23 1500)  Method: Weight Method: Bed scale  BMI: BMI (Calculated): 36.8  BMI classification: Obese Class II: 35-39.9kg/m2  IBW:  110lb    UBW: ~205lbs per EMR  Weight change:    " "Weight       Weight (kg) Weight (lbs) Weight Method Visit Report   10/24/2023 93.895 kg  207 lb  Stated     10/25/2023 95.346 kg  210 lb 3.2 oz      10/26/2023 95.255 kg  210 lb      10/27/2023 92.987 kg  205 lb  Standing scale     10/28/2023 93.441 kg  206 lb  Standing scale     10/30/2023 93.849 kg  206 lb 14.4 oz      10/31/2023 93.577 kg  206 lb 4.8 oz  Standing scale     2023 92.443 kg  203 lb 12.8 oz  Standing scale     2023 93.2 kg  205 lb 7.5 oz      2023 93.2 kg  205 lb 7.5 oz      2023 91.4 kg  201 lb 8 oz  Bed scale      95.255 kg  210 lb  Estimated         Nutrition Focused Physical Exam     Date:     Unable to perform exam due to:  completed, no significant wasting noted*    Needs Assessment   Date:     Height used:Height: 157.5 cm (62\")  Weights used: 201lb/ 91.4kg; IBW 110lb/ 50kg       Estimated Calorie needs: ~  1600Kcal/day  Method:  Kcals/KG  16-18= 1462- 1645  Method:  MSJ 1342 x 1.2= 1610    Estimated Protein needs: ~110  g PRO/day  Method: 1.2g/Kg ABW = 111  Method: 2.0 g/kg IBW = 100    Estimated Fluid needs: ~ ml/day   Per clinical status    Current Nutrition Prescription     PO: NPO Diet NPO Type: Tube Feeding  Oral Nutrition Supplement:   Intake: Insufficient data    EN: DiabetiSource AC  Goal Rate: 55ml/hr  Water Flushes: 50ml/hr   Modular: Prosource-no carb 3/day  Route: PEG  Tube: Large bore    At goal over: 22Hrs/day    Rx will supply:   Goal Volume 1210  mL/day     Flush Volume 1100 mL/day     Energy 1632 Kcal/day 102 % Est Need   Protein 118 g/day 107 % Est Need   Fiber 18.4 g/day     Water in   mL     Total Water 2090 mL     Meet DRI Yes        --------------------------------------------------------------------------  Product/Rate verified at bedside: Yes  Infusing Rate at time of visit: @ goal rate     Average Delivery from [place] day: (-)  Volume 1350 mL/day 112  % Goal Vol.   Flush Volume  mL/day     Energy  Kcal/day  % Est Need "   Protein  g/day  % Est Need   Fiber  g/day     Water in  EN  mL     Total Water  mL     Meet DRI Yes          Nutrition Diagnosis   Date: 11/18 Updated: 11/21  Problem Swallowing difficulty   Etiology Dysphagia (history with PEG tube placement)    Signs/Symptoms NPO; PEG tube for nutrition    Status: EN meeting needs       Goal:   General: Nutrition support treatment  PO: N/A  EN/PN: Maintain EN    Nutrition Intervention      Follow treatment progress, Care plan reviewed    Diabetisource AC @ 55ml/hr. Nutrisource Fiber BID, Prosource TID. Waer flush @ 50ml/hr.    Monitoring/Evaluation:   Per protocol, I&O, Pertinent labs, EN delivery/tolerance      Ria Tabares, MS,RD,LD  Time Spent: 20min

## 2023-11-27 NOTE — PROGRESS NOTES
"Palliative Care Daily Progress Note     Referring:     C/C: none per pt    S: Medical record reviewed.  Events noted.  Pt continues to not respond.    ROS: pt unable    O: Code Status:   Code Status and Medical Interventions:   Ordered at: 11/17/23 1552     Medical Intervention Limits:    NO intubation (DNI)     Level Of Support Discussed With:    Next of Kin (If No Surrogate)     Code Status (Patient has no pulse and is not breathing):    No CPR (Do Not Attempt to Resuscitate)     Medical Interventions (Patient has pulse or is breathing):    Limited Support      Advanced Directives: Advance Directive Status: Patient does not have advance directive   Goals of Care: Ongoing.   Palliative Performance Scale Score: 20%     BP (!) 190/96 (BP Location: Left arm, Patient Position: Lying)   Pulse 94   Temp 99.9 °F (37.7 °C) (Axillary)   Resp 20   Ht 157.5 cm (62\")   Wt 91.4 kg (201 lb 8 oz)   SpO2 91%   BMI 36.85 kg/m²     Intake/Output Summary (Last 24 hours) at 11/27/2023 1028  Last data filed at 11/27/2023 0500  Gross per 24 hour   Intake 1059 ml   Output 2100 ml   Net -1041 ml       PE:  General Appearance:    Eyes did not open to stim, NAD   HEENT:    anicteric, MMM, face relaxed   Neck:   supple, trachea midline, no JVD   Lungs:     CTA bilaterally, diminished in bases; respirations regular, even and unlabored    Heart:    RRR, normal S1 and S2, no M/R/G   Abdomen:     Normal bowel sounds, soft, nontender, nondistended   G/U:   Deferred   MSK/Extremities:   Wasting, no edema   Pulses:   Pulses palpable and equal bilaterally   Skin:   Warm, dry   Neurologic:   Not responsive to exam today         Meds: Reviewed and changes noted    Labs:   Results from last 7 days   Lab Units 11/24/23  0549   WBC 10*3/mm3 8.77   HEMOGLOBIN g/dL 8.9*   HEMATOCRIT % 29.4*   PLATELETS 10*3/mm3 234     Results from last 7 days   Lab Units 11/27/23  0036   SODIUM mmol/L 135*   POTASSIUM mmol/L 3.6   CHLORIDE mmol/L 97*   CO2 mmol/L " 27.0   BUN mg/dL 30*   CREATININE mg/dL 0.68   GLUCOSE mg/dL 221*   CALCIUM mg/dL 9.3     Results from last 7 days   Lab Units 11/27/23  0036   SODIUM mmol/L 135*   POTASSIUM mmol/L 3.6   CHLORIDE mmol/L 97*   CO2 mmol/L 27.0   BUN mg/dL 30*   CREATININE mg/dL 0.68   CALCIUM mg/dL 9.3   BILIRUBIN mg/dL 1.3*   ALK PHOS U/L 87   ALT (SGPT) U/L 15   AST (SGOT) U/L 18   GLUCOSE mg/dL 221*     Imaging Results (Last 72 Hours)       ** No results found for the last 72 hours. **                  Diagnostics: Reviewed    A:   Fever       Impression:  Fever/UTI  Multi bilatera CVAs.    S/P recent CABG  DM with A1c 9.8     Symptoms:  AMS  Debility    P:   Spoke with dtr again today.  Agreeable to hospice referral .  Order entered.  Palliative Care Team will continue to follow patient for symptom needs.     Malissa Oliva MD  11/27/2023  Time spent:28 min

## 2023-11-27 NOTE — PLAN OF CARE
Goal Outcome Evaluation:           Progress: no change  Outcome Evaluation: pt didn't open eyes to her name.  Pt is on 1 L pnc. She started iv zosyn yesterda for gram + cocci in blood cultures.  Dr Oliva talked with dtr and she is agreeable to talk with hospice but did not want to change to comfort measures at this time.   Continue current plan of care for now.    Palliative IDT: Rn, SW, MD FRACISCO,   Afterhours# [phone]

## 2023-11-27 NOTE — CASE MANAGEMENT/SOCIAL WORK
Continued Stay Note   Vinh     Patient Name: Michael Cochran  MRN: [id number]  Today's Date: 11/27/2023    Admit Date: 11/17/2023    Plan: TBD   Discharge Plan       Row Name 11/27/23 1636       Plan    Plan TBD    Patient/Family in Agreement with Plan other (see comments)    Plan Comments Discussed in MDR, I attempted to see patient, no family @ bedside. Patient remains nonverbal, lethargic.Per Palliative note, hospice consult. CM will continue to follow.    Final Discharge Disposition Code 30 - still a patient                   Discharge Codes    No documentation.                 Expected Discharge Date and Time       Expected Discharge Date Expected Discharge Time    Nov 29, 2023               Kalyan Garcia RN

## 2023-11-28 NOTE — PROGRESS NOTES
[facility] Medicine Services  PROGRESS NOTE    Patient Name: Michael Cochran  : 1945  MRN: [id number]    Date of Admission: 2023  Primary Care Physician: Bo Rodriguez PA    Subjective   Subjective     CC:  FTT    HPI:    A sleep very difficult to arouse.  Did not interact and does not follow any commands.  Looks comfortable.      Objective   Objective     Vital Signs:   Temp:  [97.9 °F (36.6 °C)-98.5 °F (36.9 °C)] 98.5 °F (36.9 °C)  Heart Rate:  [] 85  Resp:  [18-20] 18  BP: (121-178)/(67-94) 121/67  Flow (L/min):  [1] 1     Physical Exam:  Constitutional: No acute respiratory or hemodynamic distress.  Patient is responding only to painful stimuli.  HENT: NCAT, mucous membranes moist  Respiratory: Clear to auscultation bilaterally, respiratory effort normal   Cardiovascular: RRR, no murmurs, rubs, or gallops  Gastrointestinal: Positive bowel sounds, soft, nontender, nondistended  Musculoskeletal: No bilateral ankle edema  Neurologic: Responds only to painful stimuli by withdrawing the right limbs.  Skin: No rashes       Results Reviewed:  LAB RESULTS:      Lab 23  0036 23  1449 23  0549 23  0644   WBC  --   --  8.77 9.31   HEMOGLOBIN  --   --  8.9* 8.7*   HEMATOCRIT  --   --  29.4* 29.5*   PLATELETS  --   --  234 238   NEUTROS ABS  --   --  6.72 7.49*   IMMATURE GRANS (ABS)  --   --  0.04 0.04   LYMPHS ABS  --   --  1.07 0.92   MONOS ABS  --   --  0.47 0.42   EOS ABS  --   --  0.44* 0.41*   MCV  --   --  94.5 95.8   CRP 16.68*  --   --   --    LACTATE  --  1.2  --   --          Lab 23  0036 23  0549 23  0644 23  1041   SODIUM 135* 140 142 146*   POTASSIUM 3.6 3.7 3.8 3.7   CHLORIDE 97* 102 106 112*   CO2 27.0 28.0 27.0 26.0   ANION GAP 11.0 10.0 9.0 8.0   BUN 30* 21 24* 26*   CREATININE 0.68 0.51* 0.58 0.77   EGFR 88.7 95.1 92.2 78.6   GLUCOSE 221* 193* 202* 210*   CALCIUM 9.3 9.0 8.9 8.7   MAGNESIUM 2.0  --   --    --    PHOSPHORUS 4.7* 3.2  --   --          Lab 11/27/23  0036 11/24/23  0549 11/22/23  1041   TOTAL PROTEIN 6.3  --  6.1   ALBUMIN 3.1* 3.2* 2.8*   GLOBULIN 3.2  --  3.3   ALT (SGPT) 15  --  21   AST (SGOT) 18  --  17   BILIRUBIN 1.3*  --  1.1   ALK PHOS 87  --  94               Lab 11/27/23  0036   CHOLESTEROL 124   TRIGLYCERIDES 114             Brief Urine Lab Results  (Last result in the past 365 days)        Color   Clarity   Blood   Leuk Est   Nitrite   Protein   CREAT   Urine HCG        11/19/23 1728 Yellow   Cloudy   Small (1+)   Small (1+)   Negative   Negative                   Microbiology Results Abnormal       Procedure Component Value - Date/Time    Blood Culture - Blood, Arm, Left [[id number]]  (Normal) Collected: 11/17/23 1026    Lab Status: Final result Specimen: Blood from Arm, Left Updated: 11/22/23 1101     Blood Culture No growth at 5 days    Blood Culture - Blood, Arm, Left [[id number]]  (Normal) Collected: 11/17/23 1000    Lab Status: Final result Specimen: Blood from Arm, Left Updated: 11/22/23 1030     Blood Culture No growth at 5 days    MRSA Screen, PCR (Inpatient) - Swab, Nares [[id number]]  (Normal) Collected: 11/18/23 0954    Lab Status: Final result Specimen: Swab from Nares Updated: 11/18/23 1209     MRSA PCR Negative    Narrative:      The negative predictive value of this diagnostic test is high and should only be used to consider de-escalating anti-MRSA therapy. A positive result may indicate colonization with MRSA and must be correlated clinically.  MRSA Negative    COVID PRE-OP / PRE-PROCEDURE SCREENING ORDER (NO ISOLATION) - Swab, Nasopharynx [[id number]]  (Normal) Collected: 11/17/23 1030    Lab Status: Final result Specimen: Swab from Nasopharynx Updated: 11/17/23 1139    Narrative:      The following orders were created for panel order COVID PRE-OP / PRE-PROCEDURE SCREENING ORDER (NO ISOLATION) - Swab, Nasopharynx.  Procedure                               Abnormality          Status                     ---------                               -----------         ------                     Respiratory Panel PCR w/...[[id number]]  Normal              Final result                 Please view results for these tests on the individual orders.    Respiratory Panel PCR w/COVID-19(SARS-CoV-2) NUZHAT/PAUL/AUBRIE/PAD/COR/DEE In-House, NP Swab in UTM/VTM, 2 HR TAT - Swab, Nasopharynx [[id number]]  (Normal) Collected: 11/17/23 1030    Lab Status: Final result Specimen: Swab from Nasopharynx Updated: 11/17/23 1136     ADENOVIRUS, PCR Not Detected     Coronavirus 229E Not Detected     Coronavirus HKU1 Not Detected     Coronavirus NL63 Not Detected     Coronavirus OC43 Not Detected     COVID19 Not Detected     Human Metapneumovirus Not Detected     Human Rhinovirus/Enterovirus Not Detected     Influenza A PCR Not Detected     Influenza B PCR Not Detected     Parainfluenza Virus 1 Not Detected     Parainfluenza Virus 2 Not Detected     Parainfluenza Virus 3 Not Detected     Parainfluenza Virus 4 Not Detected     RSV, PCR Not Detected     Bordetella pertussis pcr Not Detected     Bordetella parapertussis PCR Not Detected     Chlamydophila pneumoniae PCR Not Detected     Mycoplasma pneumo by PCR Not Detected    Narrative:      In the setting of a positive respiratory panel with a viral infection PLUS a negative procalcitonin without other underlying concern for bacterial infection, consider observing off antibiotics or discontinuation of antibiotics and continue supportive care. If the respiratory panel is positive for atypical bacterial infection (Bordetella pertussis, Chlamydophila pneumoniae, or Mycoplasma pneumoniae), consider antibiotic de-escalation to target atypical bacterial infection.            No radiology results from the last 24 hrs    Results for orders placed during the hospital encounter of 10/24/23    Adult Transthoracic Echo Limited W/ Cont if Necessary Per Protocol    Interpretation Summary  •   Left ventricular systolic function is normal. Estimated left ventricular EF = 60%  •  Left ventricular wall thickness is consistent with mild concentric hypertrophy.  •  Trace to mild mitral regurgitation.  •  Saline test results are negative for intracardiac shunting.  •  There is a trivial pericardial effusion adjacent to the left ventricle.      Current medications:  Scheduled Meds:amLODIPine, 10 mg, Per G Tube, Q24H  aspirin, 81 mg, Per G Tube, Daily  carvedilol, 6.25 mg, Per G Tube, BID With Meals  insulin regular, 2-7 Units, Subcutaneous, Q6H  Nutrisource fiber, 1 packet, Per G Tube, BID  ProSource No Carb, 30 mL, Per G Tube, TID  senna-docusate sodium, 2 tablet, Per G Tube, BID  sodium chloride, 10 mL, Intravenous, Q12H      Continuous Infusions:   PRN Meds:.•  acetaminophen **OR** acetaminophen **OR** acetaminophen  •  senna-docusate sodium **AND** polyethylene glycol **AND** [DISCONTINUED] bisacodyl **AND** bisacodyl  •  dextrose  •  dextrose  •  glucagon (human recombinant)  •  Morphine  •  naloxone  •  ondansetron **OR** ondansetron  •  palliative care oral rinse  •  sodium chloride  •  sodium chloride  •  sodium chloride    Assessment & Plan   Assessment & Plan     Active Hospital Problems    Diagnosis  POA   • **Fever [R50.9]  Yes      Resolved Hospital Problems   No resolved problems to display.        Brief Hospital Course to date:  Michael Cochran is a 78 y.o. female with history of hyperlipidemia, hypertension, recent admission to [facility] s/p recent CABG, stay complicated with findings of acute/subacute CVA discharged to rehab 11/15/2023, re-admitted with fever at 11/17     Sepsis  E. coli UTI  -UA suggestive of UTI  -Chest x-ray is unremarkable, respiratory PCR panel is negative  -Blood cultures NGTD, urine cultures growing E. coli from 11/19,  MRSA PCR is negative  -Patient had fever again last night so started her on Zosyn and blood cultures today.  -     Dysphagia  -Patient with PEG in place     CAD  s/p CABG  -discontinue beta-blocker, statin, aspirin as patient is comfort care now.     Recent acute/subacute CVA  Acute encephalopathy, not improving  -CT head is unremarkable     Poorly controlled type 2 diabetes with A1c 9.8%  -Continue SSI as long as patient is on tube feed.     Hypertension  -BP currently stable  -Continue Coreg, amlodipine  -DC telemetry     Hypernatremia, better  -Suspect secondary to decreased p.o. intake, worsened by tube feeds  -Monitor free water flush  -DC IVF, palliative following     GOC  -Prognosis is poor  -Palliative care consulted and are following, hospice appropriate        Expected Discharge Location and Transportation:?,  Daughter has not decided  Expected Discharge   Expected Discharge Date: 11/29/2023; Expected Discharge Time:      DVT prophylaxis:  No DVT prophylaxis order currently exists.     AM-PAC 6 Clicks Score (PT): 6 (11/28/23 0750)    CODE STATUS:   Code Status and Medical Interventions:   Ordered at: 11/28/23 0993     Level Of Support Discussed With:    Next of Kin (If No Surrogate)     Code Status (Patient has no pulse and is not breathing):    No CPR (Do Not Attempt to Resuscitate)     Medical Interventions (Patient has pulse or is breathing):    Comfort Measures       Madhav Martinez MD  11/28/23

## 2023-11-28 NOTE — DISCHARGE PLACEMENT REQUEST
"Jony Murphy (78 y.o. Female)  Referring: CHOLO Oliva MD  PCP:Bo Rodriguez MD  Diagnosis: Multiple bilateral CVAs with neurological defecits       Date of Birth   1945    Social Security Number       Address   [address] [place], [place] [address]    Home Phone   [phone]    MRN   [id number]       Sabianist   None    Marital Status                               Admission Date   11/17/23    Admission Type   Emergency    Admitting Provider   Madhav Martinez MD    Attending Provider   Madhav Martinez MD    Department, Room/Bed   [facility] 4H, S477/1       Discharge Date       Discharge Disposition       Discharge Destination                                 Attending Provider: Madhav Martinez MD    Allergies: Dynacirc [Isradipine], Codeine    Isolation: None   Infection: None   Code Status: No CPR    Ht: 157.5 cm (62\")   Wt: 91.4 kg (201 lb 8 oz)    Admission Cmt: None   Principal Problem: Fever [R50.9]                   Active Insurance as of 11/17/2023       Primary Coverage       Payor Plan Insurance Group Employer/Plan Group    ANTHEM MEDICARE REPLACEMENT ANTHEM MEDICARE ADVANTAGE [id number]       Payor Plan Address Payor Plan Phone Number Payor Plan Fax Number Effective Dates    [address] [phone]  1/1/2023 - None Entered    [facility] [address]         Subscriber Name Subscriber Birth Date Member ID       JONY MURPHY 1945 [id number]                     Emergency Contacts        (Rel.) Home Phone Work Phone Mobile Phone    MICHELLE GAVIRIA (Daughter) [phone] -- [phone]    RAFAELGERALD (Grandchild) -- -- [phone]              Emergency Contact Information       Name Relation Home Work Mobile    MICHELLE GAVIRIA Daughter [phone]    NAVARROERICKSONGERALD Grandchild   [phone]          Insurance Information                  ANTHEM MEDICARE REPLACEMENT/[employer] MEDICARE ADVANTAGE Phone: [phone]    " Subscriber: Michael Cochran Subscriber#: [id number]    Group#: [id number] Precert#: [id number]          Problem List             Codes Noted - Resolved       Hospital    * (Principal) Fever ICD-10-CM: R50.9  ICD-9-CM: 780.60 11/17/2023 - Present       Non-Hospital    Postoperative multiple bilateral embolic strokes ICD-10-CM: I63.9  ICD-9-CM: 434.11 11/6/2023 - Present    S/P CABG x 4 on 11/2/2023 ICD-10-CM: Z95.1  ICD-9-CM: V45.81 11/5/2023 - Present    Ischemic cardiomyopathy with LVEF 31% ICD-10-CM: I25.5  ICD-9-CM: 414.8 11/5/2023 - Present    Hypertensive urgency with diastolic dysfunction ICD-10-CM: I16.0  ICD-9-CM: 401.9 11/5/2023 - Present    Postop ANURADHA resolved ICD-10-CM: N17.9  ICD-9-CM: 584.9 11/5/2023 - Present    Postop Encephalopathy ICD-10-CM: G93.40  ICD-9-CM: 348.30 11/5/2023 - Present    Postop acute blood loss anemia ICD-10-CM: D62  ICD-9-CM: 285.1 11/5/2023 - Present    Multivessel CAD with unstable angina pectoris manifesting as dyspnea ICD-10-CM: I25.110  ICD-9-CM: 414.01, 411.1 10/27/2023 - Present    HLD (hyperlipidemia) ICD-10-CM: E78.5  ICD-9-CM: 272.4 10/24/2023 - Present    Acute hypoxemic respiratory failure due to pulmonary edema ICD-10-CM: J96.01  ICD-9-CM: 518.81 10/24/2023 - Present    Acute exacerbation of congestive heart failure ICD-10-CM: I50.9  ICD-9-CM: 428.0 10/24/2023 - Present    Non-STEMI (non-ST elevated myocardial infarction) ICD-10-CM: I21.4  ICD-9-CM: 410.70 10/24/2023 - Present     Current Facility-Administered Medications   Medication Dose Route Frequency Provider Last Rate Last Admin    acetaminophen (TYLENOL) tablet 650 mg  650 mg Per G Tube Q4H PRN Madhav Martinez MD   650 mg at 11/19/23 2022    Or    acetaminophen (TYLENOL) 160 MG/5ML oral solution 650 mg  650 mg Per G Tube Q4H PRN Madhav Martinez MD   650 mg at 11/26/23 0821    Or    acetaminophen (TYLENOL) suppository 650 mg  650 mg Rectal Q4H PRN Madhav Martinez MD        amLODIPine (NORVASC) tablet 10 mg   10 mg Per G Tube Q24H Antonia Disla DO   10 mg at 11/28/23 0920    aspirin chewable tablet 81 mg  81 mg Per G Tube Daily Madhav Martinez MD   81 mg at 11/28/23 0920    atorvastatin (LIPITOR) tablet 80 mg  80 mg Per G Tube Nightly Madhav Martinez MD   80 mg at 11/27/23 2134    sennosides-docusate (PERICOLACE) 8.6-50 MG per tablet 2 tablet  2 tablet Per G Tube BID Francesca Antonio MD   2 tablet at 11/28/23 0920    And    polyethylene glycol (MIRALAX) packet 17 g  17 g Per G Tube Daily PRN Francesca Antonio MD        And    bisacodyl (DULCOLAX) suppository 10 mg  10 mg Rectal Daily PRN Francesca Antonio MD        carvedilol (COREG) tablet 6.25 mg  6.25 mg Per G Tube BID With Meals Madhav Martinez MD   6.25 mg at 11/28/23 0920    dextrose (D50W) (25 g/50 mL) IV injection 25 g  25 g Intravenous Q15 Min PRN Madhav Martinez MD        dextrose (GLUTOSE) oral gel 15 g  15 g Per G Tube Q15 Min PRN Francesca Antonio MD        glucagon (GLUCAGEN) injection 1 mg  1 mg Intramuscular Q15 Min PRN Madhav Martinez MD        heparin (porcine) 5000 UNIT/ML injection 5,000 Units  5,000 Units Subcutaneous Q8H Madhav Martinez MD   5,000 Units at 11/28/23 0618    hydrALAZINE (APRESOLINE) injection 20 mg  20 mg Intravenous Q6H PRN Francesca Antonio MD   20 mg at 11/22/23 2119    insulin regular (humuLIN R,novoLIN R) injection 2-7 Units  2-7 Units Subcutaneous Q6H Madhav Martinez MD   3 Units at 11/28/23 0618    morphine injection 2 mg  2 mg Intravenous Q4H PRN Antonia Disla DO        naloxone (NARCAN) injection 0.4 mg  0.4 mg Intravenous Q5 Min PRN Madhav Martinez MD        Nutrisource fiber pack 1 packet  1 packet Per G Tube BID Sylvia Avila, RD   1 packet at 11/28/23 0921    ondansetron (ZOFRAN) tablet 4 mg  4 mg Per G Tube Q6H PRN Madhav Martinez MD        Or    ondansetron (ZOFRAN) injection 4 mg  4 mg Intravenous Q6H PRN Madhav Martinez MD        palliative care oral rinse   Mouth/Throat PRN  Malissa Oliva MD   Given at 11/20/23 1123    Pharmacy to dose vancomycin   Does not apply Continuous PRN Madhav Martinez MD        ProSource No Carb oral solution 30 mL  30 mL Per G Tube TID Brenda Valerio [facility]   30 mL at 11/28/23 0921    sodium chloride 0.9 % flush 10 mL  10 mL Intravenous PRN Elton Rodrigues MD        sodium chloride 0.9 % flush 10 mL  10 mL Intravenous Q12H Madhav Martinez MD   10 mL at 11/28/23 0921    sodium chloride 0.9 % flush 10 mL  10 mL Intravenous PRN Madhav Martinez MD        sodium chloride 0.9 % infusion 40 mL  40 mL Intravenous PRN Madhav Martinez MD        vancomycin (dosing per levels)   Does not apply Daily Brenda Valerio RPH        vancomycin IVPB 2000 mg in 0.9% Sodium Chloride 500 mL  2,000 mg Intravenous Once Brenda Valerio RPH            Physician Progress Notes (last 72 hours)        Dirk Cleveland MD at 11/28/23 0719          [facility] Cardiothoracic Surgery In-Patient Progress Note    11/1/23: CABG x 4     LOS: 11 days       Subjective  No acute events per nursing.  Patient will track with her eyes, squeezes right hand to command      Objective  Vital Signs  Temp:  [97.9 °F (36.6 °C)-98.3 °F (36.8 °C)] 98.3 °F (36.8 °C)  Heart Rate:  [] 100  Resp:  [18-20] 18  BP: (145-159)/(78-86) 145/86        Physical Exam:   General Appearance: Eyes open this morning.  Tracking with eyes.  Squeezes right hand to command   Lungs: clear to auscultation, respirations regular, respirations even, and respirations unlabored   Heart: regular rhythm & normal rate, normal S1, S2, and no murmur, no gallop, no rub   Skin: Incision C/D/I with staples     Results     Results from last 7 days   Lab Units 11/24/23  0549   WBC 10*3/mm3 8.77   HEMOGLOBIN g/dL 8.9*   HEMATOCRIT % 29.4*   PLATELETS 10*3/mm3 234     Results from last 7 days   Lab Units 11/27/23  0036   SODIUM mmol/L 135*   POTASSIUM mmol/L 3.6   CHLORIDE mmol/L 97*   CO2 mmol/L 27.0   BUN  mg/dL 30*   CREATININE mg/dL 0.68   GLUCOSE mg/dL 221*   CALCIUM mg/dL 9.3     Assessment  23: CABG x 4  Stroke  E.coli UTI  Staph bacteremia    Plan   Antibiotics for UTI/bacteremia  Head of bed elevated at all times  Enteral nutrition    Dirk Cleveland MD  23  07:19 EST    Electronically signed by Dirk Cleveland MD at 23 0754       Madhav Martinez MD at 23 1617              [facility] Medicine Services  PROGRESS NOTE    Patient Name: Michael Cochran  : 1945  MRN: [id number]    Date of Admission: 2023  Primary Care Physician: Bo Rodriguez PA    Subjective   Subjective     CC:  FTT    HPI:    A sleep very difficult to arouse.  Did not interact and does not follow any commands.  Looks comfortable.      Objective   Objective     Vital Signs:   Temp:  [99.9 °F (37.7 °C)-100.4 °F (38 °C)] 99.9 °F (37.7 °C)  Heart Rate:  [83-94] 94  Resp:  [20] 20  BP: (137-190)/(74-96) 190/96  Flow (L/min):  [1] 1     Physical Exam:  Constitutional: No acute respiratory or hemodynamic distress.  Patient is responding only to painful stimuli.  HENT: NCAT, mucous membranes moist  Respiratory: Clear to auscultation bilaterally, respiratory effort normal   Cardiovascular: RRR, no murmurs, rubs, or gallops  Gastrointestinal: Positive bowel sounds, soft, nontender, nondistended  Musculoskeletal: No bilateral ankle edema  Neurologic: Responds only to painful stimuli by withdrawing the right limbs.  Skin: No rashes       Results Reviewed:  LAB RESULTS:      Lab 23  0036 23  1449 23  0549 23  0644   WBC  --   --  8.77 9.31   HEMOGLOBIN  --   --  8.9* 8.7*   HEMATOCRIT  --   --  29.4* 29.5*   PLATELETS  --   --  234 238   NEUTROS ABS  --   --  6.72 7.49*   IMMATURE GRANS (ABS)  --   --  0.04 0.04   LYMPHS ABS  --   --  1.07 0.92   MONOS ABS  --   --  0.47 0.42   EOS ABS  --   --  0.44* 0.41*   MCV  --   --  94.5 95.8   CRP 16.68*  --   --   --     LACTATE  --  1.2  --   --          Lab 11/27/23  0036 11/24/23  0549 11/23/23  0644 11/22/23  1041 11/21/23  0949   SODIUM 135* 140 142 146* 150*   POTASSIUM 3.6 3.7 3.8 3.7 3.9   CHLORIDE 97* 102 106 112* 115*   CO2 27.0 28.0 27.0 26.0 24.0   ANION GAP 11.0 10.0 9.0 8.0 11.0   BUN 30* 21 24* 26* 29*   CREATININE 0.68 0.51* 0.58 0.77 0.69   EGFR 88.7 95.1 92.2 78.6 88.4   GLUCOSE 221* 193* 202* 210* 223*   CALCIUM 9.3 9.0 8.9 8.7 8.8   MAGNESIUM 2.0  --   --   --   --    PHOSPHORUS 4.7* 3.2  --   --   --          Lab 11/27/23  0036 11/24/23  0549 11/22/23  1041   TOTAL PROTEIN 6.3  --  6.1   ALBUMIN 3.1* 3.2* 2.8*   GLOBULIN 3.2  --  3.3   ALT (SGPT) 15  --  21   AST (SGOT) 18  --  17   BILIRUBIN 1.3*  --  1.1   ALK PHOS 87  --  94               Lab 11/27/23  0036   CHOLESTEROL 124   TRIGLYCERIDES 114             Brief Urine Lab Results  (Last result in the past 365 days)        Color   Clarity   Blood   Leuk Est   Nitrite   Protein   CREAT   Urine HCG        11/19/23 1728 Yellow   Cloudy   Small (1+)   Small (1+)   Negative   Negative                   Microbiology Results Abnormal       Procedure Component Value - Date/Time    Blood Culture - Blood, Arm, Left [[id number]]  (Normal) Collected: 11/17/23 1026    Lab Status: Final result Specimen: Blood from Arm, Left Updated: 11/22/23 1101     Blood Culture No growth at 5 days    Blood Culture - Blood, Arm, Left [[id number]]  (Normal) Collected: 11/17/23 1000    Lab Status: Final result Specimen: Blood from Arm, Left Updated: 11/22/23 1030     Blood Culture No growth at 5 days    MRSA Screen, PCR (Inpatient) - Swab, Nares [[id number]]  (Normal) Collected: 11/18/23 0954    Lab Status: Final result Specimen: Swab from Nares Updated: 11/18/23 1201     MRSA PCR Negative    Narrative:      The negative predictive value of this diagnostic test is high and should only be used to consider de-escalating anti-MRSA therapy. A positive result may indicate colonization with MRSA  and must be correlated clinically.  MRSA Negative    COVID PRE-OP / PRE-PROCEDURE SCREENING ORDER (NO ISOLATION) - Swab, Nasopharynx [[id number]]  (Normal) Collected: 11/17/23 1030    Lab Status: Final result Specimen: Swab from Nasopharynx Updated: 11/17/23 1139    Narrative:      The following orders were created for panel order COVID PRE-OP / PRE-PROCEDURE SCREENING ORDER (NO ISOLATION) - Swab, Nasopharynx.  Procedure                               Abnormality         Status                     ---------                               -----------         ------                     Respiratory Panel PCR w/...[[id number]]  Normal              Final result                 Please view results for these tests on the individual orders.    Respiratory Panel PCR w/COVID-19(SARS-CoV-2) NUZHAT/PAUL/AUBRIE/PAD/COR/DEE In-House, NP Swab in UTM/VTM, 2 HR TAT - Swab, Nasopharynx [[id number]]  (Normal) Collected: 11/17/23 1030    Lab Status: Final result Specimen: Swab from Nasopharynx Updated: 11/17/23 1139     ADENOVIRUS, PCR Not Detected     Coronavirus 229E Not Detected     Coronavirus HKU1 Not Detected     Coronavirus NL63 Not Detected     Coronavirus OC43 Not Detected     COVID19 Not Detected     Human Metapneumovirus Not Detected     Human Rhinovirus/Enterovirus Not Detected     Influenza A PCR Not Detected     Influenza B PCR Not Detected     Parainfluenza Virus 1 Not Detected     Parainfluenza Virus 2 Not Detected     Parainfluenza Virus 3 Not Detected     Parainfluenza Virus 4 Not Detected     RSV, PCR Not Detected     Bordetella pertussis pcr Not Detected     Bordetella parapertussis PCR Not Detected     Chlamydophila pneumoniae PCR Not Detected     Mycoplasma pneumo by PCR Not Detected    Narrative:      In the setting of a positive respiratory panel with a viral infection PLUS a negative procalcitonin without other underlying concern for bacterial infection, consider observing off antibiotics or discontinuation of antibiotics  and continue supportive care. If the respiratory panel is positive for atypical bacterial infection (Bordetella pertussis, Chlamydophila pneumoniae, or Mycoplasma pneumoniae), consider antibiotic de-escalation to target atypical bacterial infection.            No radiology results from the last 24 hrs    Results for orders placed during the hospital encounter of 10/24/23    Adult Transthoracic Echo Limited W/ Cont if Necessary Per Protocol    Interpretation Summary    Left ventricular systolic function is normal. Estimated left ventricular EF = 60%    Left ventricular wall thickness is consistent with mild concentric hypertrophy.    Trace to mild mitral regurgitation.    Saline test results are negative for intracardiac shunting.    There is a trivial pericardial effusion adjacent to the left ventricle.      Current medications:  Scheduled Meds:amLODIPine, 10 mg, Per G Tube, Q24H  aspirin, 81 mg, Per G Tube, Daily  atorvastatin, 80 mg, Per G Tube, Nightly  carvedilol, 6.25 mg, Per G Tube, BID With Meals  heparin (porcine), 5,000 Units, Subcutaneous, Q8H  insulin regular, 2-7 Units, Subcutaneous, Q6H  Nutrisource fiber, 1 packet, Per G Tube, BID  piperacillin-tazobactam, 3.375 g, Intravenous, Q8H  ProSource No Carb, 30 mL, Per G Tube, TID  senna-docusate sodium, 2 tablet, Per G Tube, BID  sodium chloride, 10 mL, Intravenous, Q12H      Continuous Infusions:   PRN Meds:.  acetaminophen **OR** acetaminophen **OR** acetaminophen    senna-docusate sodium **AND** polyethylene glycol **AND** [DISCONTINUED] bisacodyl **AND** bisacodyl    dextrose    dextrose    glucagon (human recombinant)    hydrALAZINE    Morphine    naloxone    ondansetron **OR** ondansetron    palliative care oral rinse    sodium chloride    sodium chloride    sodium chloride    Assessment & Plan   Assessment & Plan     Active Hospital Problems    Diagnosis  POA    **Fever [R50.9]  Yes      Resolved Hospital Problems   No resolved problems to display.         Brief Hospital Course to date:  Michael Cochran is a 78 y.o. female with history of hyperlipidemia, hypertension, recent admission to [facility] s/p recent CABG, stay complicated with findings of acute/subacute CVA discharged to rehab 11/15/2023, re-admitted with fever at 11/17     Sepsis  E. coli UTI  -UA suggestive of UTI  -Chest x-ray is unremarkable, respiratory PCR panel is negative  -Blood cultures NGTD, urine cultures growing E. coli from 11/19,  MRSA PCR is negative  -Patient had fever again last night so started her on Zosyn and blood cultures today.     Dysphagia  -Patient with PEG in place  -Dietitian following, continue tube feeds     CAD s/p CABG  -Continue beta-blocker, statin, aspirin     Recent acute/subacute CVA  Acute encephalopathy, not improving  -CT head is unremarkable  -Continue aspirin and statin  -Continue antibiotics as above     Poorly controlled type 2 diabetes with A1c 9.8%  -Continue SSI     Hypertension  -BP currently stable  -Continue Coreg, amlodipine  -Okay to DC telemetry     Hypernatremia, better  -Suspect secondary to decreased p.o. intake, worsened by tube feeds  -Monitor free water flush  -DC IVF, palliative following     GOC  -Prognosis is poor  -Palliative care consulted and are following, hospice appropriate  -Had a long conversation with patient's daughter at the bedside on 11/26/2023.  I demonstrated patient's severe neurologic deficits to the daughter.  She sounded like she is ready to talk to hospice.      Expected Discharge Location and Transportation:?,  Daughter has not decided  Expected Discharge   Expected Discharge Date: 11/29/2023; Expected Discharge Time:      DVT prophylaxis:  Medical DVT prophylaxis orders are present.     AM-PAC 6 Clicks Score (PT): 6 (11/25/23 1959)    CODE STATUS:   Code Status and Medical Interventions:   Ordered at: 11/17/23 3314     Medical Intervention Limits:    NO intubation (DNI)     Level Of Support Discussed With:    Next of Kin (If  "No Surrogate)     Code Status (Patient has no pulse and is not breathing):    No CPR (Do Not Attempt to Resuscitate)     Medical Interventions (Patient has pulse or is breathing):    Limited Support       Madhav Martinez MD  11/27/23        Electronically signed by Madhav Martinez MD at 11/27/23 1618       Malissa Oliva MD at 11/27/23 1028          Palliative Care Daily Progress Note     Referring:     C/C: none per pt    S: Medical record reviewed.  Events noted.  Pt continues to not respond.    ROS: pt unable    O: Code Status:   Code Status and Medical Interventions:   Ordered at: 11/17/23 1552     Medical Intervention Limits:    NO intubation (DNI)     Level Of Support Discussed With:    Next of Kin (If No Surrogate)     Code Status (Patient has no pulse and is not breathing):    No CPR (Do Not Attempt to Resuscitate)     Medical Interventions (Patient has pulse or is breathing):    Limited Support      Advanced Directives: Advance Directive Status: Patient does not have advance directive   Goals of Care: Ongoing.   Palliative Performance Scale Score: 20%     BP (!) 190/96 (BP Location: Left arm, Patient Position: Lying)   Pulse 94   Temp 99.9 °F (37.7 °C) (Axillary)   Resp 20   Ht 157.5 cm (62\")   Wt 91.4 kg (201 lb 8 oz)   SpO2 91%   BMI 36.85 kg/m²     Intake/Output Summary (Last 24 hours) at 11/27/2023 1028  Last data filed at 11/27/2023 0500  Gross per 24 hour   Intake 1059 ml   Output 2100 ml   Net -1041 ml       PE:  General Appearance:    Eyes did not open to stim, NAD   HEENT:    anicteric, MMM, face relaxed   Neck:   supple, trachea midline, no JVD   Lungs:     CTA bilaterally, diminished in bases; respirations regular, even and unlabored    Heart:    RRR, normal S1 and S2, no M/R/G   Abdomen:     Normal bowel sounds, soft, nontender, nondistended   G/U:   Deferred   MSK/Extremities:   Wasting, no edema   Pulses:   Pulses palpable and equal bilaterally   Skin:   Warm, dry "   Neurologic:   Not responsive to exam today         Meds: Reviewed and changes noted    Labs:   Results from last 7 days   Lab Units 23  0549   WBC 10*3/mm3 8.77   HEMOGLOBIN g/dL 8.9*   HEMATOCRIT % 29.4*   PLATELETS 10*3/mm3 234     Results from last 7 days   Lab Units 23  0036   SODIUM mmol/L 135*   POTASSIUM mmol/L 3.6   CHLORIDE mmol/L 97*   CO2 mmol/L 27.0   BUN mg/dL 30*   CREATININE mg/dL 0.68   GLUCOSE mg/dL 221*   CALCIUM mg/dL 9.3     Results from last 7 days   Lab Units 23  0036   SODIUM mmol/L 135*   POTASSIUM mmol/L 3.6   CHLORIDE mmol/L 97*   CO2 mmol/L 27.0   BUN mg/dL 30*   CREATININE mg/dL 0.68   CALCIUM mg/dL 9.3   BILIRUBIN mg/dL 1.3*   ALK PHOS U/L 87   ALT (SGPT) U/L 15   AST (SGOT) U/L 18   GLUCOSE mg/dL 221*     Imaging Results (Last 72 Hours)       ** No results found for the last 72 hours. **                  Diagnostics: Reviewed    A:   Fever       Impression:  Fever/UTI  Multi bilatera CVAs.    S/P recent CABG  DM with A1c 9.8     Symptoms:  AMS  Debility    P:   Spoke with dtr again today.  Agreeable to hospice referral .  Order entered.  Palliative Care Team will continue to follow patient for symptom needs.     Malissa Oliva MD  2023  Time spent:28 min      Electronically signed by Malissa Oliva MD at 23 1414       Madhav Martinez MD at 23 1301              [facility] Medicine Services  PROGRESS NOTE    Patient Name: Michael Cochran  : 1945  MRN: [id number]    Date of Admission: 2023  Primary Care Physician: Bo Rodriguez PA    Subjective   Subjective     CC:  FTT    HPI:  Patient spiked fever again with Tmax of 100.7.  A sleep very difficult to arouse.  Did not interact and does not follow any commands.  Looks comfortable.      Objective   Objective     Vital Signs:   Temp:  [97.7 °F (36.5 °C)-100.7 °F (38.2 °C)] 99.1 °F (37.3 °C)  Heart Rate:  [83-99] 83  Resp:  [18-24] 22  BP:  (115-171)/(67-87) 115/67  Flow (L/min):  [1] 1     Physical Exam:  Constitutional: No acute respiratory or hemodynamic distress.  Patient is responding only to painful stimuli.  HENT: NCAT, mucous membranes moist  Respiratory: Clear to auscultation bilaterally, respiratory effort normal   Cardiovascular: RRR, no murmurs, rubs, or gallops  Gastrointestinal: Positive bowel sounds, soft, nontender, nondistended  Musculoskeletal: No bilateral ankle edema  Neurologic: Responds only to painful stimuli by withdrawing the right limbs.  Skin: No rashes       Results Reviewed:  LAB RESULTS:      Lab 11/24/23  0549 11/23/23  0644 11/20/23  0932 11/20/23  0020   WBC 8.77 9.31 9.11  --    HEMOGLOBIN 8.9* 8.7* 9.0*  --    HEMATOCRIT 29.4* 29.5* 30.5*  --    PLATELETS 234 238 273  --    NEUTROS ABS 6.72 7.49* 6.77  --    IMMATURE GRANS (ABS) 0.04 0.04 0.13*  --    LYMPHS ABS 1.07 0.92 1.28  --    MONOS ABS 0.47 0.42 0.52  --    EOS ABS 0.44* 0.41* 0.36  --    MCV 94.5 95.8 96.2  --    CRP  --   --   --  2.49*         Lab 11/24/23  0549 11/23/23  0644 11/22/23  1041 11/21/23  0949 11/20/23  0020   SODIUM 140 142 146* 150* 154*   POTASSIUM 3.7 3.8 3.7 3.9 3.8   CHLORIDE 102 106 112* 115* 123*   CO2 28.0 27.0 26.0 24.0 23.0   ANION GAP 10.0 9.0 8.0 11.0 8.0   BUN 21 24* 26* 29* 32*   CREATININE 0.51* 0.58 0.77 0.69 0.87   EGFR 95.1 92.2 78.6 88.4 67.9   GLUCOSE 193* 202* 210* 223* 150*   CALCIUM 9.0 8.9 8.7 8.8 8.2*   MAGNESIUM  --   --   --   --  2.2   PHOSPHORUS 3.2  --   --   --  3.9         Lab 11/24/23  0549 11/22/23  1041 11/20/23  0020   TOTAL PROTEIN  --  6.1 5.5*   ALBUMIN 3.2* 2.8* 2.6*   GLOBULIN  --  3.3 2.9   ALT (SGPT)  --  21 30   AST (SGOT)  --  17 22   BILIRUBIN  --  1.1 0.8   ALK PHOS  --  94 88               Lab 11/20/23  0020   CHOLESTEROL 125   TRIGLYCERIDES 148             Brief Urine Lab Results  (Last result in the past 365 days)        Color   Clarity   Blood   Leuk Est   Nitrite   Protein   CREAT   Urine HCG         11/19/23 1728 Yellow   Cloudy   Small (1+)   Small (1+)   Negative   Negative                   Microbiology Results Abnormal       Procedure Component Value - Date/Time    Blood Culture - Blood, Arm, Left [[id number]]  (Normal) Collected: 11/17/23 1026    Lab Status: Final result Specimen: Blood from Arm, Left Updated: 11/22/23 1101     Blood Culture No growth at 5 days    Blood Culture - Blood, Arm, Left [[id number]]  (Normal) Collected: 11/17/23 1000    Lab Status: Final result Specimen: Blood from Arm, Left Updated: 11/22/23 1030     Blood Culture No growth at 5 days    MRSA Screen, PCR (Inpatient) - Swab, Nares [[id number]]  (Normal) Collected: 11/18/23 0954    Lab Status: Final result Specimen: Swab from Nares Updated: 11/18/23 1209     MRSA PCR Negative    Narrative:      The negative predictive value of this diagnostic test is high and should only be used to consider de-escalating anti-MRSA therapy. A positive result may indicate colonization with MRSA and must be correlated clinically.  MRSA Negative    COVID PRE-OP / PRE-PROCEDURE SCREENING ORDER (NO ISOLATION) - Swab, Nasopharynx [[id number]]  (Normal) Collected: 11/17/23 1030    Lab Status: Final result Specimen: Swab from Nasopharynx Updated: 11/17/23 1139    Narrative:      The following orders were created for panel order COVID PRE-OP / PRE-PROCEDURE SCREENING ORDER (NO ISOLATION) - Swab, Nasopharynx.  Procedure                               Abnormality         Status                     ---------                               -----------         ------                     Respiratory Panel PCR w/...[[id number]]  Normal              Final result                 Please view results for these tests on the individual orders.    Respiratory Panel PCR w/COVID-19(SARS-CoV-2) [facility], NP Swab in UTM/VTM, 2 HR TAT - Swab, Nasopharynx [[id number]]  (Normal) Collected: 11/17/23 1030    Lab Status: Final result Specimen: Swab from  Nasopharynx Updated: 11/17/23 1139     ADENOVIRUS, PCR Not Detected     Coronavirus 229E Not Detected     Coronavirus HKU1 Not Detected     Coronavirus NL63 Not Detected     Coronavirus OC43 Not Detected     COVID19 Not Detected     Human Metapneumovirus Not Detected     Human Rhinovirus/Enterovirus Not Detected     Influenza A PCR Not Detected     Influenza B PCR Not Detected     Parainfluenza Virus 1 Not Detected     Parainfluenza Virus 2 Not Detected     Parainfluenza Virus 3 Not Detected     Parainfluenza Virus 4 Not Detected     RSV, PCR Not Detected     Bordetella pertussis pcr Not Detected     Bordetella parapertussis PCR Not Detected     Chlamydophila pneumoniae PCR Not Detected     Mycoplasma pneumo by PCR Not Detected    Narrative:      In the setting of a positive respiratory panel with a viral infection PLUS a negative procalcitonin without other underlying concern for bacterial infection, consider observing off antibiotics or discontinuation of antibiotics and continue supportive care. If the respiratory panel is positive for atypical bacterial infection (Bordetella pertussis, Chlamydophila pneumoniae, or Mycoplasma pneumoniae), consider antibiotic de-escalation to target atypical bacterial infection.            No radiology results from the last 24 hrs    Results for orders placed during the hospital encounter of 10/24/23    Adult Transthoracic Echo Limited W/ Cont if Necessary Per Protocol    Interpretation Summary    Left ventricular systolic function is normal. Estimated left ventricular EF = 60%    Left ventricular wall thickness is consistent with mild concentric hypertrophy.    Trace to mild mitral regurgitation.    Saline test results are negative for intracardiac shunting.    There is a trivial pericardial effusion adjacent to the left ventricle.      Current medications:  Scheduled Meds:amLODIPine, 10 mg, Per G Tube, Q24H  aspirin, 81 mg, Per G Tube, Daily  atorvastatin, 80 mg, Per G Tube,  Nightly  carvedilol, 6.25 mg, Per G Tube, BID With Meals  heparin (porcine), 5,000 Units, Subcutaneous, Q8H  insulin regular, 2-7 Units, Subcutaneous, Q6H  Nutrisource fiber, 1 packet, Per G Tube, BID  piperacillin-tazobactam, 3.375 g, Intravenous, Q8H  ProSource No Carb, 30 mL, Per G Tube, TID  senna-docusate sodium, 2 tablet, Per G Tube, BID  sodium chloride, 10 mL, Intravenous, Q12H      Continuous Infusions:   PRN Meds:.  acetaminophen **OR** acetaminophen **OR** acetaminophen    senna-docusate sodium **AND** polyethylene glycol **AND** [DISCONTINUED] bisacodyl **AND** bisacodyl    dextrose    dextrose    glucagon (human recombinant)    hydrALAZINE    Morphine    naloxone    ondansetron **OR** ondansetron    palliative care oral rinse    sodium chloride    sodium chloride    sodium chloride    Assessment & Plan   Assessment & Plan     Active Hospital Problems    Diagnosis  POA    **Fever [R50.9]  Yes      Resolved Hospital Problems   No resolved problems to display.        Brief Hospital Course to date:  Michael Cochran is a 78 y.o. female with history of hyperlipidemia, hypertension, recent admission to [facility] s/p recent CABG, stay complicated with findings of acute/subacute CVA discharged to rehab 11/15/2023, re-admitted with fever at 11/17     Sepsis  E. coli UTI  -UA suggestive of UTI  -Chest x-ray is unremarkable, respiratory PCR panel is negative  -Blood cultures NGTD, urine cultures growing E. coli from 11/19,  MRSA PCR is negative  -Patient had fever again last night so started her on Zosyn and blood cultures today.     Dysphagia  -Patient with PEG in place  -Dietitian following, continue tube feeds     CAD s/p CABG  -Continue beta-blocker, statin, aspirin     Recent acute/subacute CVA  Acute encephalopathy, not improving  -CT head is unremarkable  -Continue aspirin and statin  -Continue antibiotics as above     Poorly controlled type 2 diabetes with A1c 9.8%  -Continue SSI     Hypertension  -BP currently  stable  -Continue Coreg, amlodipine  -Okay to DC telemetry     Hypernatremia, better  -Suspect secondary to decreased p.o. intake, worsened by tube feeds  -Monitor free water flush  -DC IVF, palliative following     GOC  -Prognosis is poor  -Palliative care consulted and are following, hospice appropriate  -Had a long conversation with patient's daughter at the bedside.  I demonstrated patient's severe neurologic deficits to the daughter.  She sounded like she is ready to talk to hospice.      Expected Discharge Location and Transportation:?,  Daughter has not decided  Expected Discharge   Expected Discharge Date: 2023; Expected Discharge Time:      DVT prophylaxis:  Medical DVT prophylaxis orders are present.     AM-PAC 6 Clicks Score (PT): 6 (23)    CODE STATUS:   Code Status and Medical Interventions:   Ordered at: 23 1553     Medical Intervention Limits:    NO intubation (DNI)     Level Of Support Discussed With:    Next of Kin (If No Surrogate)     Code Status (Patient has no pulse and is not breathing):    No CPR (Do Not Attempt to Resuscitate)     Medical Interventions (Patient has pulse or is breathing):    Limited Support       Madhav Martinez MD  23        Electronically signed by Madhav Martinez MD at 23 0805       Madhav Martinez MD at 23 1726              [facility] Medicine Services  PROGRESS NOTE    Patient Name: Michael Cochran  : 1945  MRN: [id number]    Date of Admission: 2023  Primary Care Physician: Bo Rodriguez PA    Subjective   Subjective     CC:  FTT    HPI:  A sleep but opened her eyes.  Did not interact and does not follow any commands.  Looks comfortable however.      Objective   Objective     Vital Signs:   Temp:  [98.9 °F (37.2 °C)-100.2 °F (37.9 °C)] 99.5 °F (37.5 °C)  Heart Rate:  [74-98] 93  Resp:  [17-20] 18  BP: (141-184)/(80-96) 141/87  Flow (L/min):  [1] 1     Physical  Exam:  Constitutional: No acute respiratory or hemodynamic distress.  Patient is responding only to painful stimuli.  HENT: NCAT, mucous membranes moist  Respiratory: Clear to auscultation bilaterally, respiratory effort normal   Cardiovascular: RRR, no murmurs, rubs, or gallops  Gastrointestinal: Positive bowel sounds, soft, nontender, nondistended  Musculoskeletal: No bilateral ankle edema  Neurologic: Responds only to painful stimuli by withdrawing the right limbs.  Skin: No rashes       Results Reviewed:  LAB RESULTS:      Lab 11/24/23  0549 11/23/23  0644 11/20/23  0932 11/20/23  0020 11/19/23  0801   WBC 8.77 9.31 9.11  --  9.68   HEMOGLOBIN 8.9* 8.7* 9.0*  --  8.3*   HEMATOCRIT 29.4* 29.5* 30.5*  --  29.2*   PLATELETS 234 238 273  --  306   NEUTROS ABS 6.72 7.49* 6.77  --  7.88*   IMMATURE GRANS (ABS) 0.04 0.04 0.13*  --  0.03   LYMPHS ABS 1.07 0.92 1.28  --  0.90   MONOS ABS 0.47 0.42 0.52  --  0.43   EOS ABS 0.44* 0.41* 0.36  --  0.39   MCV 94.5 95.8 96.2  --  100.7*   CRP  --   --   --  2.49*  --          Lab 11/24/23  0549 11/23/23  0644 11/22/23  1041 11/21/23  0949 11/20/23  0020   SODIUM 140 142 146* 150* 154*   POTASSIUM 3.7 3.8 3.7 3.9 3.8   CHLORIDE 102 106 112* 115* 123*   CO2 28.0 27.0 26.0 24.0 23.0   ANION GAP 10.0 9.0 8.0 11.0 8.0   BUN 21 24* 26* 29* 32*   CREATININE 0.51* 0.58 0.77 0.69 0.87   EGFR 95.1 92.2 78.6 88.4 67.9   GLUCOSE 193* 202* 210* 223* 150*   CALCIUM 9.0 8.9 8.7 8.8 8.2*   MAGNESIUM  --   --   --   --  2.2   PHOSPHORUS 3.2  --   --   --  3.9         Lab 11/24/23  0549 11/22/23  1041 11/20/23  0020   TOTAL PROTEIN  --  6.1 5.5*   ALBUMIN 3.2* 2.8* 2.6*   GLOBULIN  --  3.3 2.9   ALT (SGPT)  --  21 30   AST (SGOT)  --  17 22   BILIRUBIN  --  1.1 0.8   ALK PHOS  --  94 88               Lab 11/20/23  0020   CHOLESTEROL 125   TRIGLYCERIDES 148             Brief Urine Lab Results  (Last result in the past 365 days)        Color   Clarity   Blood   Leuk Est   Nitrite   Protein    CREAT   Urine HCG        11/19/23 1728 Yellow   Cloudy   Small (1+)   Small (1+)   Negative   Negative                   Microbiology Results Abnormal       Procedure Component Value - Date/Time    Blood Culture - Blood, Arm, Left [[id number]]  (Normal) Collected: 11/17/23 1026    Lab Status: Final result Specimen: Blood from Arm, Left Updated: 11/22/23 1101     Blood Culture No growth at 5 days    Blood Culture - Blood, Arm, Left [[id number]]  (Normal) Collected: 11/17/23 1000    Lab Status: Final result Specimen: Blood from Arm, Left Updated: 11/22/23 1030     Blood Culture No growth at 5 days    MRSA Screen, PCR (Inpatient) - Swab, Nares [[id number]]  (Normal) Collected: 11/18/23 0954    Lab Status: Final result Specimen: Swab from Nares Updated: 11/18/23 1209     MRSA PCR Negative    Narrative:      The negative predictive value of this diagnostic test is high and should only be used to consider de-escalating anti-MRSA therapy. A positive result may indicate colonization with MRSA and must be correlated clinically.  MRSA Negative    COVID PRE-OP / PRE-PROCEDURE SCREENING ORDER (NO ISOLATION) - Swab, Nasopharynx [[id number]]  (Normal) Collected: 11/17/23 1030    Lab Status: Final result Specimen: Swab from Nasopharynx Updated: 11/17/23 1139    Narrative:      The following orders were created for panel order COVID PRE-OP / PRE-PROCEDURE SCREENING ORDER (NO ISOLATION) - Swab, Nasopharynx.  Procedure                               Abnormality         Status                     ---------                               -----------         ------                     Respiratory Panel PCR w/...[[id number]]  Normal              Final result                 Please view results for these tests on the individual orders.    Respiratory Panel PCR w/COVID-19(SARS-CoV-2) [facility], NP Swab in UTM/VTM, 2 HR TAT - Swab, Nasopharynx [[id number]]  (Normal) Collected: 11/17/23 1030    Lab Status: Final result  Specimen: Swab from Nasopharynx Updated: 11/17/23 1139     ADENOVIRUS, PCR Not Detected     Coronavirus 229E Not Detected     Coronavirus HKU1 Not Detected     Coronavirus NL63 Not Detected     Coronavirus OC43 Not Detected     COVID19 Not Detected     Human Metapneumovirus Not Detected     Human Rhinovirus/Enterovirus Not Detected     Influenza A PCR Not Detected     Influenza B PCR Not Detected     Parainfluenza Virus 1 Not Detected     Parainfluenza Virus 2 Not Detected     Parainfluenza Virus 3 Not Detected     Parainfluenza Virus 4 Not Detected     RSV, PCR Not Detected     Bordetella pertussis pcr Not Detected     Bordetella parapertussis PCR Not Detected     Chlamydophila pneumoniae PCR Not Detected     Mycoplasma pneumo by PCR Not Detected    Narrative:      In the setting of a positive respiratory panel with a viral infection PLUS a negative procalcitonin without other underlying concern for bacterial infection, consider observing off antibiotics or discontinuation of antibiotics and continue supportive care. If the respiratory panel is positive for atypical bacterial infection (Bordetella pertussis, Chlamydophila pneumoniae, or Mycoplasma pneumoniae), consider antibiotic de-escalation to target atypical bacterial infection.            No radiology results from the last 24 hrs    Results for orders placed during the hospital encounter of 10/24/23    Adult Transthoracic Echo Limited W/ Cont if Necessary Per Protocol    Interpretation Summary    Left ventricular systolic function is normal. Estimated left ventricular EF = 60%    Left ventricular wall thickness is consistent with mild concentric hypertrophy.    Trace to mild mitral regurgitation.    Saline test results are negative for intracardiac shunting.    There is a trivial pericardial effusion adjacent to the left ventricle.      Current medications:  Scheduled Meds:amLODIPine, 10 mg, Per G Tube, Q24H  aspirin, 81 mg, Per G Tube, Daily  atorvastatin, 80  mg, Per G Tube, Nightly  carvedilol, 6.25 mg, Per G Tube, BID With Meals  heparin (porcine), 5,000 Units, Subcutaneous, Q8H  insulin regular, 2-7 Units, Subcutaneous, Q6H  Nutrisource fiber, 1 packet, Per G Tube, BID  ProSource No Carb, 30 mL, Per G Tube, TID  senna-docusate sodium, 2 tablet, Per G Tube, BID  sodium chloride, 10 mL, Intravenous, Q12H      Continuous Infusions:   PRN Meds:.  acetaminophen **OR** acetaminophen **OR** acetaminophen    senna-docusate sodium **AND** polyethylene glycol **AND** [DISCONTINUED] bisacodyl **AND** bisacodyl    dextrose    dextrose    glucagon (human recombinant)    hydrALAZINE    Morphine    naloxone    ondansetron **OR** ondansetron    palliative care oral rinse    sodium chloride    sodium chloride    sodium chloride    Assessment & Plan   Assessment & Plan     Active Hospital Problems    Diagnosis  POA    **Fever [R50.9]  Yes      Resolved Hospital Problems   No resolved problems to display.        Brief Hospital Course to date:  Michael Cochran is a 78 y.o. female with history of hyperlipidemia, hypertension, [facility] s/p recent CABG, stay complicated with findings of acute/subacute CVA discharged to rehab 11/15/2023, re-admitted with fever at 11/17     Sepsis  E. coli UTI  -UA suggestive of UTI  -Chest x-ray is unremarkable, respiratory PCR panel is negative  -Blood cultures NGTD, urine cultures growing E. coli from 11/19,  MRSA PCR is negative     Dysphagia  -Patient with PEG in place  -Dietitian following, continue tube feeds     CAD s/p CABG  -Continue beta-blocker, statin, aspirin     Recent acute/subacute CVA  Acute encephalopathy, not improving  -CT head is unremarkable  -Continue aspirin and statin  -Continue antibiotics as above     Poorly controlled type 2 diabetes with A1c 9.8%  -Continue SSI     Hypertension  -BP currently stable  -Continue Coreg, amlodipine  -Okay to DC telemetry     Hypernatremia, better  -Suspect secondary to decreased p.o.  intake, worsened by tube feeds  -Monitor free water flush  -DC IVF, palliative following     GOC  -Prognosis is currently guarded if not poor  -Palliative care consulted and are following, hospice appropriate      Expected Discharge Location and Transportation:?,  Daughter has not decided  Expected Discharge   Expected Discharge Date: 11/27/2023; Expected Discharge Time:      DVT prophylaxis:  Medical DVT prophylaxis orders are present.     AM-PAC 6 Clicks Score (PT): 6 (11/25/23 0800)    CODE STATUS:   Code Status and Medical Interventions:   Ordered at: 11/17/23 1552     Medical Intervention Limits:    NO intubation (DNI)     Level Of Support Discussed With:    Next of Kin (If No Surrogate)     Code Status (Patient has no pulse and is not breathing):    No CPR (Do Not Attempt to Resuscitate)     Medical Interventions (Patient has pulse or is breathing):    Limited Support       Madhav Martinez MD  11/25/23        Electronically signed by Madhav Martinez MD at 11/26/23 0755          Consult Notes (last 72 hours)        Charisse Michael APRN at 11/27/23 0850       Attestation signed by Dirk Cleveland MD at 11/27/23 1053    I have personally evaluated and examined the patient.  The above documentation has been reviewed and I agree.  Continue antibiotics for UTI and bacteremia.  Continue supportive care.                         [facility] Cardiothoracic Surgery In-Patient Consult    Name:  Michael Cochran  MRN Number:  [id number]  Date of Admission:  11/17/2023  Date of Consultation: 11/27/2023    Consulting Provider:    PCP: Bo Rodriguez PA  IP Care Team:  Patient Care Team:  Bo Rodriguez PA as PCP - General (Family Medicine)    Reason for Consultation: s/p CABG 11/1/23     History of Present Illness:    Michael Cochran is a 78 y.o. female with a history of CAD s/p CABG x 4 on 11/1/23 w/ Dr. Cleveland, newly diagnosed type 2 diabetes-A1c 9.8, hypertension, hyperlipidemia, and dysphagia s/p  PEG tube placement.  She was admitted from 10/24-11/15/23 and was discharged to rehab in stable condition.  Her hospital course was complicated by encephalopathy, multifocal bihemispheric embolic strokes. She presented to our facility on 11/17 for fever related to e coli UTI.  Preliminary blood cultures positive for gram-positive cocci.     Review of Systems:  Review of Systems   Unable to perform ROS: Mental status change       Hospital Problems:   Fever       Past Medical History:    Past Medical History:   Diagnosis Date    Hyperlipidemia     Hypertension        Past Surgical History:    Past Surgical History:   Procedure Laterality Date    BREAST FIBROADENOMA SURGERY      9 y/o-was benign    CARDIAC CATHETERIZATION N/A 10/26/2023    Procedure: Left Heart Cath;  Surgeon: Jordi Hodge MD;  Location:  [facility];  Service: Cardiovascular;  Laterality: N/A;    CORONARY ARTERY BYPASS GRAFT N/A 11/1/2023    Procedure: MEDIAN STERNOTOMY, CORONARY ARTERY BYPASS GRAFTING X4, UTILIZING THE LEFT INTERANL MAMMARY ARTERY, EVH OF THE LEFT GREATER SAPHENOUS VEIN, EXPLORATION OF THE RIGHT LEG, PRIMITIVO PER ANETHESIA;  Surgeon: Dirk Cleveland MD;  Location: [facility] OR;  Service: Cardiothoracic;  Laterality: N/A;    ENDOSCOPY W/ PEG TUBE PLACEMENT N/A 11/10/2023    Procedure: ESOPHAGOGASTRODUODENOSCOPY WITH PERCUTANEOUS ENDOSCOPIC GASTROSTOMY TUBE INSERTION AT BEDSIDE;  Surgeon: Dirk Cleveland MD;  Location: [facility] ENDOSCOPY;  Service: Cardiothoracic;  Laterality: N/A;       Family History:  History reviewed. No pertinent family history.    Social History:    Social History     Socioeconomic History    Marital status:    Tobacco Use    Smoking status: Some Days     Types: Cigarettes    Smokeless tobacco: Never    Tobacco comments:     Smokes rarely   Vaping Use    Vaping Use: Never used   Substance and Sexual Activity    Alcohol use: Never    Drug use: Never       Allergies:  Allergies   Allergen  Reactions    Dynacirc [Isradipine] Other (See Comments)     Causes tic    Codeine Rash         Physical Exam:  Vital Signs:    Temp:  [99.1 °F (37.3 °C)-100.4 °F (38 °C)] 99.9 °F (37.7 °C)  Heart Rate:  [83-94] 94  Resp:  [20-22] 20  BP: (115-190)/(67-96) 190/96  Body mass index is 36.85 kg/m².     Physical Exam  Constitutional:       Appearance: She is obese. She is ill-appearing.   HENT:      Head: Normocephalic.      Mouth/Throat:      Pharynx: Oropharynx is clear.   Eyes:      General: Lids are normal.   Cardiovascular:      Rate and Rhythm: Normal rate.      Pulses: Normal pulses.   Pulmonary:      Effort: Pulmonary effort is normal.   Chest:      Comments: Sternum is stable  Abdominal:      General: Bowel sounds are normal.   Musculoskeletal:      Cervical back: Normal range of motion.   Skin:     General: Skin is warm.      Comments: Sternal incision is clean, dry, and intact. Elmer present.   Neurological:      Mental Status: She is disoriented.         Labs/Imaging/Procedures:   Results from last 7 days   Lab Units 11/27/23  0036   SODIUM mmol/L 135*   POTASSIUM mmol/L 3.6   CHLORIDE mmol/L 97*   CO2 mmol/L 27.0   BUN mg/dL 30*   CREATININE mg/dL 0.68   CALCIUM mg/dL 9.3   BILIRUBIN mg/dL 1.3*   ALK PHOS U/L 87   ALT (SGPT) U/L 15   AST (SGOT) U/L 18   GLUCOSE mg/dL 221*         Results from last 7 days   Lab Units 11/24/23  0549 11/23/23  0644 11/20/23  0932   WBC 10*3/mm3 8.77 9.31 9.11   HEMOGLOBIN g/dL 8.9* 8.7* 9.0*   HEMATOCRIT % 29.4* 29.5* 30.5*   PLATELETS 10*3/mm3 234 238 273         Results from last 7 days   Lab Units 11/27/23  0036   MAGNESIUM mg/dL 2.0     Results from last 7 days   Lab Units 11/27/23  0036   CHOLESTEROL mg/dL 124   TRIGLYCERIDES mg/dL 114     CT Head Without Contrast    Result Date: 11/17/2023  1.No acute intracranial abnormality is identified. 2.Findings compatible with chronic microvascular ischemic change and diffuse cortical atrophy. 3.Mucosal thickening within the  right frontal sinus and right anterior ethmoid air cells. 4.Mild left mastoid effusion. Electronically Signed: Sergio Esqueda MD  11/17/2023 10:57 AM EST  Workstation ID: [id number]    XR Chest 1 View    Result Date: 11/17/2023  Impression: Findings suggest small left effusion. No acute process in the lung fields. Electronically Signed: Marielos Camilo MD  11/17/2023 10:50 AM EST  Workstation ID: [id number]    XR Chest 1 View    Result Date: 11/11/2023  Impression: Cardiomegaly with scattered bibasilar atelectasis. Electronically Signed: Sergio Esqueda MD  11/11/2023 8:16 AM EST  Workstation ID: [id number]    XR Abdomen KUB    Result Date: 11/10/2023  Impression: 1. Replacement of percutaneous gastrostomy tube and removal of Dobbhoff feeding tube. 2. Air-filled but nondistended loops of small bowel throughout the abdomen. Electronically Signed: Genaro Hendrix MD  11/10/2023 10:28 AM EST  Workstation ID: [id number]    CT Head Without Contrast    Result Date: 11/9/2023  Impression: Redemonstration of scattered supratentorial and infratentorial hypodensities, compatible with evolving acute infarcts as seen on prior studies. No intracranial hemorrhage. No new or large territory infarct. No mass effect. Electronically Signed: Brain Pack MD  11/9/2023 8:13 AM EST  Workstation ID: [id number]    XR Chest 1 View    Result Date: 11/8/2023  Impression: Support lines and tubes as discussed without significant change otherwise Electronically Signed: Marc Castillo MD  11/8/2023 6:42 AM CST  Workstation ID: [id number]    XR Chest 1 View    Result Date: 11/6/2023  Impression: Stable chest Electronically Signed: John العراقي MD  11/6/2023 7:17 AM EST  Workstation ID: [id number]    MRI Brain Without Contrast    Result Date: 11/6/2023  Impression: 1.Multiple small foci of diffusion restriction present within the paramedian bilateral frontal and parietal lobes extending to the bilateral basal ganglia as well as the left cerebellum  consistent with acute to subacute infarcts. Findings are more subacute given presence of FLAIR signal changes corresponding to these regions related to gliosis. Given the distribution findings are likely related to a watershed type episode. No evidence of hemorrhage, mass effect or midline shift. 2.Moderate periventricular and subcortical FLAIR signal changes likely related to chronic microvascular ischemic change. Electronically Signed: Radha Cedillo MD  11/6/2023 1:55 AM EST  Workstation ID: [id number]    EEG    Result Date: 11/5/2023  Diffuse cerebral dysfunction of moderate degree, nonspecific This report is transcribed using the Dragon dictation system.      CT Abdomen Pelvis Without Contrast    Result Date: 11/5/2023  Impression: 1. The study is limited by noncontrasted technique. 2. No retroperitoneal hematoma. There is no free fluid in the abdomen or pelvis. 3. Cholelithiasis. 4. Round masses in the right kidney difficult to evaluate without contrast. Statistically, they probably represent renal cysts but could be evaluated further with either renal ultrasound or a repeat CT exam with IV contrast. 5. Mild rectal sigmoid colon fecal impaction. There is a rectal catheter in place. 6. Small fat density left inguinal hernia. 7. Postsurgical changes within the lower chest. 8. Enlarged uterus with questionable fibroids. Electronically Signed: Larry Evans MD  11/5/2023 8:23 AM EST  Workstation ID: [id number]    CT Head Without Contrast    Result Date: 11/4/2023  Impression: No acute intracranial process identified. Electronically Signed: Radha Cedillo MD  11/4/2023 11:04 PM EDT  Workstation ID: [id number]    XR Chest 1 View    Result Date: 11/4/2023  Impression: Interval removal of pulmonary artery catheter with right IJ sheath in place. Enteric tube noted with nasogastric tube removed. Remaining support hardware projects unchanged. Lung volumes are mildly diminished with some scattered atelectasis. There is no enlarging  pleural effusion or distinct pneumothorax. Unchanged heart and mediastinal contours. Electronically Signed: Dm Stanley MD  11/4/2023 8:52 AM EDT  Workstation ID: [id number]    XR Abdomen KUB    Result Date: 11/3/2023  Impression: Enteric tube tip in the peripyloric region. Electronically Signed: Marc Castillo MD  11/3/2023 9:31 AM CDT  Workstation ID: [id number]    XR Chest 1 View    Result Date: 11/3/2023  Impression: No significant change Electronically Signed: Marc Castillo MD  11/3/2023 6:20 AM CDT  Workstation ID: [id number]    XR Chest 1 View    Result Date: 11/2/2023  Impression: Interval removal of endotracheal tube Increased left lower lobe opacity compatible atelectasis and small effusion Electronically Signed: Jhon العراقي MD  11/2/2023 7:12 AM EDT  Workstation ID: [id number]    XR Chest 1 View    Result Date: 11/1/2023  Impression: Support devices appear in standard position. No pneumothorax or convincing active airspace process. Electronically Signed: Gonzalo Zamora MD  11/1/2023 4:45 PM EDT  Workstation ID: [id number]    LHC: Results for orders placed during the hospital encounter of 10/24/23    Cardiac Catheterization/Vascular Study    Narrative    Ostial 95% LAD stenosis involve the origin of a large first diagonal as well as some retrograde mild to moderate plaque in the left main.    Bifurcation right coronary artery 95% stenosis.    70% LCx stenosis    LVEF 40-45% with anterior apical hypokinesis     Echo: Results for orders placed during the hospital encounter of 10/24/23    Adult Transthoracic Echo Limited W/ Cont if Necessary Per Protocol    Interpretation Summary    Left ventricular systolic function is normal. Estimated left ventricular EF = 60%    Left ventricular wall thickness is consistent with mild concentric hypertrophy.    Trace to mild mitral regurgitation.    Saline test results are negative for intracardiac shunting.    There is a trivial pericardial effusion adjacent to the left  ventricle.     Carotid Duplex: Results for orders placed during the hospital encounter of 10/24/23    Duplex Carotid Ultrasound CAR    Interpretation Summary    Right internal carotid artery demonstrates a less than 50% stenosis.    Left internal carotid artery demonstrates a less than 50% stenosis.      Assessment:    Sherice Cochran is a 78 y.o. female with a history of CAD s/p CABG x 4 on 11/1/23 w/ Dr. Cleveland, newly diagnosed type 2 diabetes-A1c 9.8, hypertension, hyperlipidemia, and dysphagia s/p PEG tube placement.  She was admitted from 10/24-11/15/23 and was discharged to rehab in stable condition.  Her hospital course was complicated by encephalopathy, multifocal bihemispheric embolic strokes. She presented to our facility on 11/17 for fever related to e coli UTI.  Preliminary blood cultures positive for gram-positive cocci.     Plan:  Continue with antibiotics for UTI   Continue sternal staples and chest tube sutures      Charisse Michael, APRN  08:50 EST  11/27/23     Thank you for allowing us to participate in the care of your patient. Please do not hesitate to contact us with additional questions or concerns.       Electronically signed by Dirk Cleveland MD at 11/27/23 6135

## 2023-11-28 NOTE — PROGRESS NOTES
Pharmacy Consult-Vancomycin Dosing  Michael Cochran is a  78 y.o. female receiving vancomycin therapy.     Indication: Bacteremia   Consulting Provider: Hospitalist   ID Consult: No    Goal AUC: 400 - 600 mg/L*hr    Current Antimicrobial Therapy  Anti-Infectives (From admission, onward)      Ordered     Dose/Rate Route Frequency Start Stop    11/28/23 0851  vancomycin IVPB 2000 mg in 0.9% Sodium Chloride 500 mL        Ordering Provider: Brenda Valerio RPH    2,000 mg  250 mL/hr over 120 Minutes Intravenous Once 11/28/23 1000      11/28/23 0851  vancomycin (dosing per levels)        Ordering Provider: Brenda Valerio RPH     Does not apply Daily 11/28/23 0945 12/03/23 0859    11/28/23 0821  Pharmacy to dose vancomycin        Ordering Provider: Madhav Martinez MD     Does not apply Continuous PRN 11/28/23 0820 12/03/23 0819    11/26/23 0750  piperacillin-tazobactam (ZOSYN) 3.375 g in iso-osmotic dextrose 50 ml (premix)        Ordering Provider: Madhav Martinez MD    3.375 g  over 4 Hours Intravenous Every 8 Hours 11/26/23 1600 11/28/23 0515    11/26/23 0750  piperacillin-tazobactam (ZOSYN) 3.375 g in iso-osmotic dextrose 50 ml (premix)        Ordering Provider: Madhav Martinez MD    3.375 g  over 30 Minutes Intravenous Once 11/26/23 0845 11/26/23 0851    11/22/23 1449  piperacillin-tazobactam (ZOSYN) 3.375 g in iso-osmotic dextrose 50 ml (premix)        Ordering Provider: Francesca Antonio MD    3.375 g  over 4 Hours Intravenous Once 11/22/23 1545 11/22/23 1941    11/17/23 1554  piperacillin-tazobactam (ZOSYN) 3.375 g in iso-osmotic dextrose 50 ml (premix)        Ordering Provider: Madhav Martinez MD    3.375 g  over 4 Hours Intravenous Every 8 Hours 11/17/23 1900 11/22/23 1859    11/17/23 1027  piperacillin-tazobactam (ZOSYN) 3.375 g in iso-osmotic dextrose 50 ml (premix)        Ordering Provider: Elton Rodrigues MD    3.375 g  over 30 Minutes Intravenous Once 11/17/23 1043 11/17/23 1134     "11/17/23 1027  vancomycin 1750 mg/500 mL 0.9% NS IVPB ([facility])        Ordering Provider: Elton Rodrigues MD    20 mg/kg × 93.2 kg  over 105 Minutes Intravenous Once 11/17/23 1043 11/17/23 1254            Allergies  Allergies as of 11/17/2023 - Reviewed 11/17/2023   Allergen Reaction Noted    Dynacirc [isradipine] Other (See Comments) 05/18/2021    Codeine Rash 05/18/2021       Labs    Results from last 7 days   Lab Units 11/27/23  0036 11/24/23  0549 11/23/23  0644   BUN mg/dL 30* 21 24*   CREATININE mg/dL 0.68 0.51* 0.58       Results from last 7 days   Lab Units 11/24/23  0549 11/23/23  0644   WBC 10*3/mm3 8.77 9.31       Evaluation of Dosing     Last Dose Received in the ED/Outside Facility: N  Is Patient on Dialysis or Renal Replacement: N    Ht - 157.5 cm (62\")  Wt - 91.4 kg (201 lb 8 oz)    Estimated Creatinine Clearance: 70.5 mL/min (by C-G formula based on SCr of 0.68 mg/dL).    Intake & Output (last 3 days)         11/25 0701  11/26 0700 11/26 0701  11/27 0700 11/27 0701  11/28 0700 11/28 0701  11/29 0700    Other 200 80 652     NG/GT 1350 979 662     Total Intake(mL/kg) 1550 (17) 1059 (11.6) 1314 (14.4)     Urine (mL/kg/hr) 2125 (1) 2100 (1) 1950 (0.9)     Stool 0 0 0     Total Output 2125 2100 1950     Net -575 -1041 -636             Stool Unmeasured Occurrence 1 x 1 x 1 x             Microbiology and Radiology  Microbiology Results (last 10 days)       Procedure Component Value - Date/Time    Blood Culture - Blood, Hand, Right [[id number]]  (Abnormal) Collected: 11/26/23 0914    Lab Status: Preliminary result Specimen: Blood from Hand, Right Updated: 11/28/23 0642     Blood Culture Staphylococcus, coagulase negative     Isolated from Anaerobic Bottle     Gram Stain Anaerobic Bottle Gram positive cocci in pairs and clusters    Blood Culture - Blood, Arm, Right [[id number]]  (Abnormal) Collected: 11/26/23 0914    Lab Status: Preliminary result Specimen: Blood from Arm, Right Updated: 11/28/23 0642     " Blood Culture Staphylococcus, coagulase negative     Isolated from Anaerobic Bottle     Gram Stain Anaerobic Bottle Gram positive cocci in pairs and clusters    Blood Culture ID, PCR - Blood, Hand, Right [[id number]]  (Abnormal) Collected: 11/26/23 0914    Lab Status: Final result Specimen: Blood from Hand, Right Updated: 11/27/23 0915     BCID, PCR Staph spp, not aureus or lugdunensis. Identification by BCID2 PCR.     BOTTLE TYPE Anaerobic Bottle    Urine Culture - Urine, Urine, Random Void [[id number]]  (Abnormal)  (Susceptibility) Collected: 11/19/23 1728    Lab Status: Final result Specimen: Urine, Random Void Updated: 11/22/23 0323     Urine Culture >100,000 CFU/mL Escherichia coli    Narrative:      Colonization of the urinary tract without infection is common. Treatment is discouraged unless the patient is symptomatic, pregnant, or undergoing an invasive urologic procedure.    Susceptibility        Escherichia coli      LILLIANA      Ampicillin Intermediate      Ampicillin + Sulbactam Susceptible      Cefazolin Susceptible      Cefepime Susceptible      Ceftazidime Susceptible      Ceftriaxone Susceptible      Gentamicin Susceptible      Levofloxacin Resistant      Nitrofurantoin Susceptible      Piperacillin + Tazobactam Susceptible      Trimethoprim + Sulfamethoxazole Susceptible                           MRSA Screen, PCR (Inpatient) - Swab, Nares [[id number]]  (Normal) Collected: 11/18/23 0954    Lab Status: Final result Specimen: Swab from Nares Updated: 11/18/23 1209     MRSA PCR Negative    Narrative:      The negative predictive value of this diagnostic test is high and should only be used to consider de-escalating anti-MRSA therapy. A positive result may indicate colonization with MRSA and must be correlated clinically.  MRSA Negative            Reported Vancomycin Levels                         [facility] AUC Calculation:    Current AUC:   -- mg/L*hr    Predicted Steady State AUC on Current Dose: --  mg/L*hr  _________________________________    Predicted Steady State AUC on New Dose:   -- mg/L*hr    Assessment/Plan:    Pharmacy dosing vancomycin for Bacteremia, Goal AUC is 400-600  Patient to receive a loading dose of vancomycin 2000 mg (~22 mg/kg) IV x1 on 11/28. Plan to initiate maintenance dose later today upon goals of care decision.  No current level ordered, will obtain vancomycin random 2-3 days after maintenance dose initiation.  Will monitor renal function, culture and sensitivities, and clinical status and adjust regimen as needed. Pharmacy will continue to follow     Brenda Valerio, PharmD  Pharmacy Resident  11/28/2023  08:53 EST

## 2023-11-28 NOTE — PLAN OF CARE
"  Problem: Palliative Care  Goal: Enhanced Quality of Life  Intervention: Optimize Psychosocial Wellbeing  Recent Flowsheet Documentation  Taken 11/28/2023 0915 by Nelida Robin \"Luli\" MASOUD EMMANUEL  Supportive Measures: (d/w daughter Katharina)   active listening utilized   decision-making supported   verbalization of feelings encouraged  Family/Support System Care:   support provided   self-care encouraged   caregiver stress acknowledged  Phone call from patient's daughter Katharina - both palliative SW MONICO Robin LCSW and palliative nurse MAGALIS Crook RN on call.  Given patient status and prognosis, daughter Katharina would like to honor patient wishes and proceed with comfort measures.  Per Katharina, her sister is aware of patient status/prognosis, wishes with regard to EOL care, and has been encouraged to visit.  Palliative Care continues to follow for support and assist with plan of care and EOL symptom management.  Hospice consulted 11/27 and following.  ANKITA Oliva MD notified - change to comfort measures.      "

## 2023-11-28 NOTE — CONSULTS
Continued Stay Note  [facility]     Patient Name: Michael Cochran  MRN: [id number]  Today's Date: 11/27/2023    Admit Date: 11/17/2023    Plan: TBD   Discharge Plan       Row Name 11/27/23 1934       Plan    Plan Comments Hospice referral received, chart reviewed. Visit made to pt, pt appeared to be sleeping, no family present. Telephone call to pt's daughter Katharina regarding the hospice referral. Katharina stated is aware of the referral. Discussed pt's current condition, Katharina stated pt continues to receive nutrition through the PEG, stated is considering stopping the tube feeding as pt has previously told Katharina pt would not want to be kept alive with the use of artificial feeding. Teaching done on comfort measures and hospice services, home hospice vs inpatient hospice. Katharina stated it would be difficult to bring pt home and pt die in the home. At the end of the conversation Katharina stated is leaning towards stopping the tube feeding, stated wants to visit pt this evening before making the decision, discussed having palliative care follow up with Katharina tomorrow regarding comfort measures. Hospice liaison will also continue to follow. Please call [phone] if can be of further assistance.                   Discharge Codes    No documentation.                 Expected Discharge Date and Time       Expected Discharge Date Expected Discharge Time    Nov 29, 2023               Barbara Chapman RN

## 2023-11-28 NOTE — PROGRESS NOTES
Continued Stay Note  [facility]     Patient Name: Michael Cochran  MRN: [id number]  Today's Date: 11/28/2023    Admit Date: 11/17/2023    Plan: ongoing   Discharge Plan       Row Name 11/28/23 1506       Plan    Plan ongoing    Plan Comments Visit made to pt with staff nurse More, More attempted to arouse pt with verbal and tactile stimulus, pt did not respond. More stated pt easily aroused earlier today, daughter Katharina present at that time. Telephone call to Katharina regarding the tube feeding, More stated has decided to stop the tube feedings, wants to focus on pt's comfort and not prolong pt's life. Randi HYATT, hospice liaison, present and heard Katharina state wants to stop the tube feedings.  Message sent to Dr. Oliva regarding Katharina's decision on stopping the tube feedings. Will continue to follow. Please call [phone] if can be of further assistance.                   Discharge Codes    No documentation.                 Expected Discharge Date and Time       Expected Discharge Date Expected Discharge Time    Nov 29, 2023               Barbara Chapman RN

## 2023-11-28 NOTE — PROGRESS NOTES
[facility] Cardiothoracic Surgery In-Patient Progress Note    11/1/23: CABG x 4     LOS: 11 days       Subjective  No acute events per nursing.  Patient will track with her eyes, squeezes right hand to command      Objective  Vital Signs  Temp:  [97.9 °F (36.6 °C)-98.3 °F (36.8 °C)] 98.3 °F (36.8 °C)  Heart Rate:  [] 100  Resp:  [18-20] 18  BP: (145-159)/(78-86) 145/86        Physical Exam:   General Appearance: Eyes open this morning.  Tracking with eyes.  Squeezes right hand to command   Lungs: clear to auscultation, respirations regular, respirations even, and respirations unlabored   Heart: regular rhythm & normal rate, normal S1, S2, and no murmur, no gallop, no rub   Skin: Incision C/D/I with staples     Results     Results from last 7 days   Lab Units 11/24/23  0549   WBC 10*3/mm3 8.77   HEMOGLOBIN g/dL 8.9*   HEMATOCRIT % 29.4*   PLATELETS 10*3/mm3 234     Results from last 7 days   Lab Units 11/27/23  0036   SODIUM mmol/L 135*   POTASSIUM mmol/L 3.6   CHLORIDE mmol/L 97*   CO2 mmol/L 27.0   BUN mg/dL 30*   CREATININE mg/dL 0.68   GLUCOSE mg/dL 221*   CALCIUM mg/dL 9.3     Assessment  11/1/23: CABG x 4  Stroke  E.coli UTI  Staph bacteremia    Plan   Antibiotics for UTI/bacteremia  Head of bed elevated at all times  Enteral nutrition    Dirk Cleveland MD  11/28/23  07:19 EST

## 2023-11-29 PROBLEM — N39.0 UTI (URINARY TRACT INFECTION), BACTERIAL: Status: ACTIVE | Noted: 2023-01-01

## 2023-11-29 PROBLEM — A49.9 UTI (URINARY TRACT INFECTION), BACTERIAL: Status: ACTIVE | Noted: 2023-01-01

## 2023-11-29 PROBLEM — G93.41 ENCEPHALOPATHY, METABOLIC: Status: ACTIVE | Noted: 2023-01-01

## 2023-11-29 NOTE — PROGRESS NOTES
"Palliative Care Daily Progress Note     Referring:  Francesca Antonio    C/C: pt unable    S: Medical record reviewed.  Events noted.  Pt now comfort measures.  TF turned off per decision of dtr.  Agreed to full comfort.  Tearful during visit and had to leave.  Pt has been moaning this AM.     ROS: pt unable    O: Code Status:   Code Status and Medical Interventions:   Ordered at: 11/28/23 0926     Level Of Support Discussed With:    Next of Kin (If No Surrogate)     Code Status (Patient has no pulse and is not breathing):    No CPR (Do Not Attempt to Resuscitate)     Medical Interventions (Patient has pulse or is breathing):    Comfort Measures      Advanced Directives: Advance Directive Status: Patient does not have advance directive   Goals of Care: Ongoing.   Palliative Performance Scale Score: 20%     /83 (BP Location: Left arm, Patient Position: Lying)   Pulse 91   Temp 98.6 °F (37 °C) (Oral)   Resp 18   Ht 157.5 cm (62\")   Wt 91.4 kg (201 lb 8 oz)   SpO2 94%   BMI 36.85 kg/m²     Intake/Output Summary (Last 24 hours) at 11/29/2023 1430  Last data filed at 11/29/2023 1250  Gross per 24 hour   Intake 2567 ml   Output 2200 ml   Net 367 ml       PE:  General Appearance:    moaning   HEENT:    NC/AT, EOMI, anicteric, MMM, face relaxed   Neck:   supple, trachea midline, no JVD   Lungs:     CTA bilaterally, diminished in bases; respirations regular, even, mild increased work of breathing    Heart:    RRR, normal S1 and S2, no M/R/G   Abdomen:     Normal bowel sounds, soft, nontender, nondistended   G/U:   Deferred   MSK/Extremities:   Wasting, no edema   Pulses:   Pulses palpable and equal bilaterally   Skin:   Warm, dry   Neurologic:   Did not track for me, no commands   Psych:   Calm, appropriate     Meds: Reviewed and changes not noted    Labs:   Results from last 7 days   Lab Units 11/24/23  0549   WBC 10*3/mm3 8.77   HEMOGLOBIN g/dL 8.9*   HEMATOCRIT % 29.4*   PLATELETS 10*3/mm3 234     Results from " last 7 days   Lab Units 11/27/23  0036   SODIUM mmol/L 135*   POTASSIUM mmol/L 3.6   CHLORIDE mmol/L 97*   CO2 mmol/L 27.0   BUN mg/dL 30*   CREATININE mg/dL 0.68   GLUCOSE mg/dL 221*   CALCIUM mg/dL 9.3     Results from last 7 days   Lab Units 11/27/23  0036   SODIUM mmol/L 135*   POTASSIUM mmol/L 3.6   CHLORIDE mmol/L 97*   CO2 mmol/L 27.0   BUN mg/dL 30*   CREATININE mg/dL 0.68   CALCIUM mg/dL 9.3   BILIRUBIN mg/dL 1.3*   ALK PHOS U/L 87   ALT (SGPT) U/L 15   AST (SGOT) U/L 18   GLUCOSE mg/dL 221*     Imaging Results (Last 72 Hours)       ** No results found for the last 72 hours. **                  Diagnostics: Reviewed    A:   Fever       Impression:  Fever/UTI  Multi bilatera CVAs.    S/P recent CABG  DM with A1c 9.8     Symptoms:  AMS  Debility    P:   Pt now comfort care.  Morphine availability increased.  Palliative Care Team will continue to monitor for needs.  Hospice continues to follow.    Malissa Oliva MD  11/29/2023  Time spent:38

## 2023-11-29 NOTE — PROGRESS NOTES
[facility] Medicine Services  PROGRESS NOTE    Patient Name: Michael Cochran  : 1945  MRN: [id number]    Date of Admission: 2023  Primary Care Physician: Bo Rodriguez PA    Subjective   Subjective     CC:  F/U failure to thrive    HPI:  Has required PRN IV medication for work of breathing today.      Objective   Objective     Vital Signs:   Temp:  [98.1 °F (36.7 °C)-99.9 °F (37.7 °C)] 98.6 °F (37 °C)  Heart Rate:  [74-96] 91  Resp:  [16-18] 18  BP: (125-164)/(63-83) 164/83  Flow (L/min):  [1] 1     Physical Exam:  Constitutional: Ill appearing, diaphoretic  Respiratory: Mildly labored breathing  Psychiatric: Nonverbal  Skin: No rashes on exposed surfaces      Results Reviewed:  LAB RESULTS:      Lab 23  0036 23  1449 23  0549 23  0644   WBC  --   --  8.77 9.31   HEMOGLOBIN  --   --  8.9* 8.7*   HEMATOCRIT  --   --  29.4* 29.5*   PLATELETS  --   --  234 238   NEUTROS ABS  --   --  6.72 7.49*   IMMATURE GRANS (ABS)  --   --  0.04 0.04   LYMPHS ABS  --   --  1.07 0.92   MONOS ABS  --   --  0.47 0.42   EOS ABS  --   --  0.44* 0.41*   MCV  --   --  94.5 95.8   CRP 16.68*  --   --   --    LACTATE  --  1.2  --   --          Lab 23  0036 23  0549 23  0644   SODIUM 135* 140 142   POTASSIUM 3.6 3.7 3.8   CHLORIDE 97* 102 106   CO2 27.0 28.0 27.0   ANION GAP 11.0 10.0 9.0   BUN 30* 21 24*   CREATININE 0.68 0.51* 0.58   EGFR 88.7 95.1 92.2   GLUCOSE 221* 193* 202*   CALCIUM 9.3 9.0 8.9   MAGNESIUM 2.0  --   --    PHOSPHORUS 4.7* 3.2  --          Lab 23  0036 23  0549   TOTAL PROTEIN 6.3  --    ALBUMIN 3.1* 3.2*   GLOBULIN 3.2  --    ALT (SGPT) 15  --    AST (SGOT) 18  --    BILIRUBIN 1.3*  --    ALK PHOS 87  --                Lab 23  0036   CHOLESTEROL 124   TRIGLYCERIDES 114             Brief Urine Lab Results  (Last result in the past 365 days)        Color   Clarity   Blood   Leuk Est   Nitrite   Protein   CREAT    Urine HCG        11/19/23 1728 Yellow   Cloudy   Small (1+)   Small (1+)   Negative   Negative                   Microbiology Results Abnormal       Procedure Component Value - Date/Time    Blood Culture - Blood, Arm, Left [[id number]]  (Normal) Collected: 11/17/23 1026    Lab Status: Final result Specimen: Blood from Arm, Left Updated: 11/22/23 1101     Blood Culture No growth at 5 days    Blood Culture - Blood, Arm, Left [[id number]]  (Normal) Collected: 11/17/23 1000    Lab Status: Final result Specimen: Blood from Arm, Left Updated: 11/22/23 1030     Blood Culture No growth at 5 days    MRSA Screen, PCR (Inpatient) - Swab, Nares [[id number]]  (Normal) Collected: 11/18/23 0954    Lab Status: Final result Specimen: Swab from Nares Updated: 11/18/23 1209     MRSA PCR Negative    Narrative:      The negative predictive value of this diagnostic test is high and should only be used to consider de-escalating anti-MRSA therapy. A positive result may indicate colonization with MRSA and must be correlated clinically.  MRSA Negative    COVID PRE-OP / PRE-PROCEDURE SCREENING ORDER (NO ISOLATION) - Swab, Nasopharynx [[id number]]  (Normal) Collected: 11/17/23 1030    Lab Status: Final result Specimen: Swab from Nasopharynx Updated: 11/17/23 1139    Narrative:      The following orders were created for panel order COVID PRE-OP / PRE-PROCEDURE SCREENING ORDER (NO ISOLATION) - Swab, Nasopharynx.  Procedure                               Abnormality         Status                     ---------                               -----------         ------                     Respiratory Panel PCR w/...[[id number]]  Normal              Final result                 Please view results for these tests on the individual orders.    Respiratory Panel PCR w/COVID-19(SARS-CoV-2) NUZHAT/PAUL/AUBRIE/PAD/COR/DEE In-House, NP Swab in UTM/VTM, 2 HR TAT - Swab, Nasopharynx [[id number]]  (Normal) Collected: 11/17/23 1030    Lab Status: Final result Specimen: Swab  from Nasopharynx Updated: 11/17/23 1139     ADENOVIRUS, PCR Not Detected     Coronavirus 229E Not Detected     Coronavirus HKU1 Not Detected     Coronavirus NL63 Not Detected     Coronavirus OC43 Not Detected     COVID19 Not Detected     Human Metapneumovirus Not Detected     Human Rhinovirus/Enterovirus Not Detected     Influenza A PCR Not Detected     Influenza B PCR Not Detected     Parainfluenza Virus 1 Not Detected     Parainfluenza Virus 2 Not Detected     Parainfluenza Virus 3 Not Detected     Parainfluenza Virus 4 Not Detected     RSV, PCR Not Detected     Bordetella pertussis pcr Not Detected     Bordetella parapertussis PCR Not Detected     Chlamydophila pneumoniae PCR Not Detected     Mycoplasma pneumo by PCR Not Detected    Narrative:      In the setting of a positive respiratory panel with a viral infection PLUS a negative procalcitonin without other underlying concern for bacterial infection, consider observing off antibiotics or discontinuation of antibiotics and continue supportive care. If the respiratory panel is positive for atypical bacterial infection (Bordetella pertussis, Chlamydophila pneumoniae, or Mycoplasma pneumoniae), consider antibiotic de-escalation to target atypical bacterial infection.            No radiology results from the last 24 hrs    Results for orders placed during the hospital encounter of 10/24/23    Adult Transthoracic Echo Limited W/ Cont if Necessary Per Protocol    Interpretation Summary    Left ventricular systolic function is normal. Estimated left ventricular EF = 60%    Left ventricular wall thickness is consistent with mild concentric hypertrophy.    Trace to mild mitral regurgitation.    Saline test results are negative for intracardiac shunting.    There is a trivial pericardial effusion adjacent to the left ventricle.      Current medications:  Scheduled Meds:insulin regular, 2-7 Units, Subcutaneous, Q6H  Nutrisource fiber, 1 packet, Per G Tube, BID  ProSource  No Carb, 30 mL, Per G Tube, TID  sodium chloride, 10 mL, Intravenous, Q12H      Continuous Infusions:   PRN Meds:.  acetaminophen **OR** acetaminophen **OR** acetaminophen    dextrose    dextrose    glucagon (human recombinant)    LORazepam    Morphine    naloxone    ondansetron **OR** ondansetron    palliative care oral rinse    sodium chloride    sodium chloride    sodium chloride    Assessment & Plan   Assessment & Plan     Active Hospital Problems    Diagnosis  POA    **Fever [R50.9]  Yes      Resolved Hospital Problems   No resolved problems to display.        Brief Hospital Course to date:  Michael Cochran is a 78 y.o. female with hyperlipidemia, hypertension, recent admission to [facility] s/p recent CABG, stay complicated with findings of acute/subacute CVA discharged to rehab 11/15/2023, re-admitted with fever 11/17.  She has been treated for UTI without significant improvement.  Given her declining status, palliative care team has been consulted and patient transitioned to comfort measures.  Inpatient hospice team will assess.    This patient's problems and plans were partially entered by my partner and updated as appropriate by me 11/29/23.      Sepsis  E. coli UTI  -Antibiotics discontinued when she transitioned to comfort care     Dysphagia  -Patient with PEG in place; tube feeds discontinued following palliative care discussion with family.     CAD s/p CABG  HTN  -No longer on medications with transition to comfort care     Recent acute/subacute CVA  Acute encephalopathy, not improving  -CT head is unremarkable     Poorly controlled type 2 diabetes with A1c 9.8%  -Continue SSI as needed     Continue PRN IV morphine and ativan for comfort.  She has required 4 doses of IV morphine and a dose of IV ativan so far today.      Expected Discharge Location and Transportation:[facility] transition to inpatient hospice  Expected Discharge   Expected Discharge Date: 12/1/2023; Expected Discharge Time:      DVT  prophylaxis:  No DVT prophylaxis order currently exists.     AM-PAC 6 Clicks Score (PT): 6 (11/28/23 8640)    CODE STATUS:   Code Status and Medical Interventions:   Ordered at: 11/28/23 6069     Level Of Support Discussed With:    Next of Kin (If No Surrogate)     Code Status (Patient has no pulse and is not breathing):    No CPR (Do Not Attempt to Resuscitate)     Medical Interventions (Patient has pulse or is breathing):    Comfort Measures       Jacklyn Maria MD  11/29/23

## 2023-11-29 NOTE — PLAN OF CARE
Goal Outcome Evaluation:  Plan of Care Reviewed With: patient, daughter        Progress: no change  Outcome Evaluation: Vital signs wnl. Oxygen level greater than 90% on 1 liters. Patient awake earlier this morning. Non-verbal. Unresponsive this afternoon. No signs of discomfort unless repositioning patient. Tube feeding continued. Daughter agreeing to comfort measures. Awaiting final orders. Will continue to monitor.

## 2023-11-29 NOTE — PROGRESS NOTES
Continued Stay Note  [facility]     Patient Name: Michael Cochran  MRN: [id number]  Today's Date: 11/29/2023    Admit Date: 11/17/2023    Plan: Assess for inpatient   Discharge Plan       Row Name 11/29/23 1510       Plan    Plan Assess for inpatient    Plan Comments Visit made to pt, no family present. Spoke with staff nurse More who stated the tube feeding was discontinued earlier today. Pt has received a dose of IV ativan and a dose of IV morphine thus far today. Will assess tomorrow for inpatient services. Please call [phone] if can be of further assistance.                   Discharge Codes    No documentation.                 Expected Discharge Date and Time       Expected Discharge Date Expected Discharge Time    Dec 1, 2023               Barbara Chapman RN

## 2023-11-29 NOTE — PLAN OF CARE
Goal Outcome Evaluation:  Plan of Care Reviewed With: patient        Progress: no change  Outcome Evaluation: Pt. appeared stiff, grimacing and moaning during palliative nursing visit.  RN notiied and prn morphine administered.  Pt. had BM and was cleaned up by staff this am.  No family at BS during visit.  Dr. ANKITA Oliva discontinued TF.  Palliative care to continue to follow for support and POC.

## 2023-11-29 NOTE — PROGRESS NOTES
[facility] Cardiothoracic Surgery In-Patient Progress Note    11/1/23: CABG x 4     LOS: 12 days       Subjective  Tolerating tube feeds. Non-verbal. Family pursuing comfort measures.      Objective  Vital Signs  Temp:  [98.5 °F (36.9 °C)-99.9 °F (37.7 °C)] 99.9 °F (37.7 °C)  Heart Rate:  [74-96] 81  Resp:  [16-18] 16  BP: (121-178)/(67-94) 125/71        Physical Exam:   General Appearance: Eyes open this morning.  Tracking with eyes.     Lungs: clear to auscultation, respirations regular, respirations even, and respirations unlabored   Heart: regular rhythm & normal rate, normal S1, S2, and no murmur, no gallop, no rub   Skin: Incision C/D/I with staples     Results     Results from last 7 days   Lab Units 11/24/23  0549   WBC 10*3/mm3 8.77   HEMOGLOBIN g/dL 8.9*   HEMATOCRIT % 29.4*   PLATELETS 10*3/mm3 234     Results from last 7 days   Lab Units 11/27/23  0036   SODIUM mmol/L 135*   POTASSIUM mmol/L 3.6   CHLORIDE mmol/L 97*   CO2 mmol/L 27.0   BUN mg/dL 30*   CREATININE mg/dL 0.68   GLUCOSE mg/dL 221*   CALCIUM mg/dL 9.3     Assessment  11/1/23: CABG x 4  Stroke  E.coli UTI  Staph bacteremia    Plan   Antibiotics for UTI/bacteremia  Head of bed elevated at all times  Enteral nutrition - held as per family wishes    Dirk Cleveland MD  11/29/23  07:22 EST

## 2023-11-29 NOTE — CASE MANAGEMENT/SOCIAL WORK
Continued Stay Note  [facility]     Patient Name: Michael Cochran  MRN: [id number]  Today's Date: 11/29/2023    Admit Date: 11/17/2023    Plan: TBD   Discharge Plan       Row Name 11/29/23 1619       Plan    Plan TBD    Patient/Family in Agreement with Plan other (see comments)    Plan Comments Per MDR, tube feeding discontinued, plan is for comfort care. Hospice following, per Barbara note, assessing tomorrow for inpatient services.    Final Discharge Disposition Code 30 - still a patient      Row Name 11/29/23 0550       Plan    Plan Assess for inpatient    Plan Comments Visit made to pt, no family present. Spoke with staff nurse More who stated the tube feeding was discontinued earlier today. Pt has received a dose of IV ativan and a dose of IV morphine thus far today. Will assess tomorrow for inpatient services. Please call [phone] if can be of further assistance.                   Discharge Codes    No documentation.                 Expected Discharge Date and Time       Expected Discharge Date Expected Discharge Time    Dec 1, 2023               Kalyan Garcia RN

## 2023-11-30 PROBLEM — Z86.73 CHRONIC ISCHEMIC MULTIFOCAL MULTIPLE VASCULAR TERRITORIES STROKE: Status: ACTIVE | Noted: 2023-01-01

## 2023-11-30 NOTE — PLAN OF CARE
VM received from patient's daughter Katharina regarding patient plan of care.  Return call to [facility].  Patient has been symptomatic with moaning, grimacing, congestion - receiving prn medications for pain and dyspnea in end of life.   goal is comfort.  Answered questions regarding artificial nutrition and hydration in end of life - symptom burden concerns, dying process. Discussed plan to transition to Hospice, which is consistent with patient's prior wishes per daughter.

## 2023-11-30 NOTE — PLAN OF CARE
Goal Outcome Evaluation:  Plan of Care Reviewed With: patient        Progress: declining  Outcome Evaluation: Pt. found lying in bed continuously moaning and grimacing this am.  RN notified and asked to administer PRN medication for pain.  RN stated that she had only recently administered prn morphine and applied fentanyl patch.  Pt. is inpatient hospice appropriate.  Inpatient to assess this afternoon.  Pallaitive care to follow until inpatient admission.

## 2023-11-30 NOTE — PROGRESS NOTES
Continued Stay Note  [facility]     Patient Name: Michael Cochran  MRN: [id number]  Today's Date: 11/30/2023    Admit Date: 11/17/2023    Plan: Inpatient hospice   Discharge Plan       Row Name 11/30/23 1332       Plan    Plan Inpatient hospice    Plan Comments Visit made to pt, pt with eyes closed, no family present. Pt has been receiving IV prn medications for symptom management. Discussed pt's current condition with inpatient hospice nurse Carito Arzate stated pt does meet criteria for inpatient hospice and can admit pt to inpatient hospice. Telephone call to pt's daughter Katharina regarding inpatient hospice. Discussed inpatient hospice with Katharina Posadas stated is in agreement for pt to be admitted to inpatient hospice. Inpatient hospice team to meet with Katharina today at 1430. Please call [phone] if can be of further assistance.                   Discharge Codes    No documentation.                 Expected Discharge Date and Time       Expected Discharge Date Expected Discharge Time    Dec 1, 2023               Barbara Chapman RN

## 2023-11-30 NOTE — PROGRESS NOTES
Continued Stay Note  [facility]     Patient Name: Michael Cochran  MRN: [id number]  Today's Date: 11/30/2023    Admit Date: 11/30/2023    Plan: Inpatient Hospice   Discharge Plan       Row Name 11/30/23 1853       Plan    Plan Inpatient Hospice    Plan Comments Hospice consult placed per team. Chart reviewed. Hospice RN and JAYDA Moreno met with patient's daughter, Katharina Barragan, in conference room. Hospice services including POC/GOC, DNR/DNI were discussed. Daughter opted for hospice care/comfort measures only at this time. Admission approved by Lazara Souza NP. JAYDA Moreno completed hospice admission paperwork with patient's daughter, Katharina. Pt will be admitted to hospice services with a terminal dx of Chronic Ischemic multifocal vascular territories stroke with anticipated unmanaged symptoms of pain, dyspnea, and anxiety. Patient will be transferred to bed on 5B when one is available. If hospice can be of any assistance please call [phone].    Final Discharge Disposition Code 51 - hospice medical facility                   Discharge Codes    No documentation.                       Carito Booker

## 2023-11-30 NOTE — PROGRESS NOTES
[facility] Cardiothoracic Surgery In-Patient Progress Note    11/1/23: CABG x 4     LOS: 13 days       Subjective  Comfort care. Tube feeds discontinued per family.      Objective  Vital Signs  Temp:  [98.3 °F (36.8 °C)-98.6 °F (37 °C)] 98.3 °F (36.8 °C)  Heart Rate:  [77-96] 88  Resp:  [18-22] 20  BP: (159-192)/() 159/84        Physical Exam:   General Appearance: Eyes open this morning.  Tracking with eyes.        Results     Results from last 7 days   Lab Units 11/24/23  0549   WBC 10*3/mm3 8.77   HEMOGLOBIN g/dL 8.9*   HEMATOCRIT % 29.4*   PLATELETS 10*3/mm3 234     Results from last 7 days   Lab Units 11/27/23  0036   SODIUM mmol/L 135*   POTASSIUM mmol/L 3.6   CHLORIDE mmol/L 97*   CO2 mmol/L 27.0   BUN mg/dL 30*   CREATININE mg/dL 0.68   GLUCOSE mg/dL 221*   CALCIUM mg/dL 9.3     Assessment  11/1/23: CABG x 4  Stroke  E.coli UTI  Staph bacteremia    Plan   Antibiotics for UTI/bacteremia  Head of bed elevated at all times  Hospice to reassess today  Minimize morphine/ativan given patient is not in pain\   to assess for home with hospice.     Dirk Cleveland MD  11/30/23  07:35 EST

## 2023-12-01 NOTE — PLAN OF CARE
Goal Outcome Evaluation:  Plan of Care Reviewed With: patient        Progress: declining  Outcome Evaluation: PRNs necessary for management of pain and secretions, morphine, robinul, lasix, and scopolamine patch given. Course secretions noted, occasional accessory muscle use w/ tachypnea. Pt unresponsive. Q2hr turns. Comfort care continues.

## 2023-12-01 NOTE — DISCHARGE SUMMARY
[facility] Medicine Services  DISCHARGE TO INPATIENT HOSPICE    Patient Name: Michael Cochran  : 1945  MRN: [id number]    Date of Admission: 2023  Date of Discharge:  2023  Primary Care Physician: Bo Rodriguez PA    Consults       Date and Time Order Name Status Description    2023  9:56 AM Inpatient Palliative Care MD Consult Completed     2023 10:11 AM Inpatient Neurology Consult General Completed     2023  4:16 PM Inpatient Consult to Cardiology Completed     10/24/2023  1:39 PM Inpatient Cardiology Consult Completed           Hospital Course     Presenting Problem:   Sepsis [A41.9]  Fever [R50.9]    Active Hospital Problems    Diagnosis  POA    **Encephalopathy, metabolic [G93.41]  Yes    UTI (urinary tract infection), bacterial [N39.0, A49.9]  Yes    Fever [R50.9]  Yes    S/P CABG x 4 on 2023 [Z95.1]  Not Applicable      Resolved Hospital Problems   No resolved problems to display.          Hospital Course:  Michael Cochran is a 78 y.o. female with hyperlipidemia, hypertension, recent admission to [facility] s/p recent CABG, stay complicated with findings of acute/subacute CVA discharged to rehab 11/15/2023, re-admitted with fever .  She has been treated for UTI without significant improvement.  Given her declining status, palliative care team has been consulted and patient transitioned to comfort measures.  With progressive decline and requiring frequent PRN medications for comfort, she will transition to inpatient hospice today.    Day of Discharge       Vital Signs:   Temp:  [98.1 °F (36.7 °C)] 98.1 °F (36.7 °C)  Heart Rate:  [81-84] 81  Resp:  [19-36] 24  BP: (152-159)/(75-84) 152/75       Discharge Details     Discharge Disposition:  Transfer care to inpatient Hospice at [facility]      Jacklyn Maria MD  23

## 2023-12-01 NOTE — NURSING NOTE
Family arrived and was upset at the sequence of events culminating with pt's death. Dtr (Katharina) stated that it was unfortunate but her experience had not been pleasant. This nurse discussed with Katharina that, at times, when people at end of life finally get comfortable and out of distress, it is easier for them to pass peacefully. Reassured family that during report this morning, pt appeared comfortable and that night shift nurse placed pt's comfort at the utmost. Katharina signed the provisional. Pt to edyta.

## 2023-12-01 NOTE — SIGNIFICANT NOTE
Exam confirms with auscultation zero audible heart tones and zero audible respirations. Ms.Francis Cochran was pronounced dead at 0853.  MD notified by Patient's RN.    Willa Acuña RN  12/1/2023 09:32 EST

## 2023-12-01 NOTE — DISCHARGE SUMMARY
Date of Admission: 11/30/2023   Date and Time of Death:  at     Hospital Course:  Michael Cochran is a 78 y.o. female admitted to inpatient hospice with diagnosis of Chronic ischemic multifocal multiple vascular territories stroke [Z86.73]  for symptom management. Medications were available throughout admission for symptom management and comfort. Patient continued to decline after admission as expected ultimately to their death on  at . Patient was pronounced dead by Clinical House Supervisor. Spiritual and psychosocial support were available to patient and family throughout admission. Patient's remains were released per formerly [facility] protocol.    *Patient not seen by Hospice MD or NP provider prior to death.   (No provider billing submitted.)      Isis Manning MD  [facility]  Hospice and Palliative Care Physician

## 2023-12-01 NOTE — INTERVAL H&P NOTE
"Per hospitalist discharge summary:  \" 78 y.o. female with hyperlipidemia, hypertension, recent admission to [facility] s/p recent CABG, stay complicated with findings of acute/subacute CVA discharged to rehab 11/15/2023, re-admitted with fever 11/17.  She has been treated for UTI without significant improvement.  Given her declining status, palliative care team has been consulted and patient transitioned to comfort measures.  With progressive decline and requiring frequent PRN medications for comfort, she will transition to inpatient hospice today.\"      Admitted to inpatient Hospice 11/30/2023 for full comfort focused EOL care.   Terminal diagnosis and prognosis.  Requires GIP level of care due to need for frequent skilled nursing assessments and intervention to promote comfort. Currently ordered injection medication.         Chart reviewed.   Orders placed.   Discussed with Hospice IDG.      *Patient not seen by Hospice MD or NP provider prior to death.   (No provider billing submitted.)        "

## 2025-06-17 NOTE — PROGRESS NOTES
[] [facility]  Outpatient Rehabilitation &  Therapy  [address]  [phone]  [phone] [x] [facility]  Outpatient Rehabilitation &  Therapy  [address]  P: [phone]  F: [phone] [] [facility]  Outpatient Rehabilitation &  Therapy  [address]  P: [phone]  F: [phone] [] [facility]  Outpatient Rehabilitation &  Therapy  [address]  [phone]  [phone] [] [facility]  Outpatient Rehabilitation &  Therapy  [address]   P: [phone]  F: [phone]  [] [facility]  Outpatient Rehabilitation &  Therapy  [address]  P: [phone]  F: [phone] [] [facility]  Outpatient Rehabilitation &  Therapy  [address]  P: [phone]  F: [phone] [] [facility]  Outpatient Rehabilitation &  Therapy  [address]  P: [phone]  F: [phone] [] [facility]  Outpatient Rehabilitation &  Therapy  [address]  P: [phone]  F: [phone]  [] [facility] Outpatient Rehabilitation &  Therapy  [address]  P: [phone]  F: [phone]     Physical Therapy Daily Treatment Note    Date:  2025  Patient Name:  Verena Elliott    :  1958  MRN: [id number]    Physician: Claritza De León MD  Insurance:   Primary Ins: [employer] [facility] HMO-POS   Auth after 12th visit    DRY NEEDLING IS NOT COVERED    Medical Diagnosis: M75.112 (ICD-10-CM) - Incomplete tear of left rotator cuff                   Rehab Codes: M25.512, M25.612, R29.3, M62.812, Z73.6  Onset Date: 25               Next 's appt: 25    Visit# / total visits:     Cancels/No Shows: 1/0    Subjective:    Pain:  [x] Yes  [] No Location: L shoulder Pain Rating: (0-10 scale)  "INTENSIVIST NOTE     Hospital Day: 10    Ms. Michael Cochran, 78 y.o. female is followed for:   Perioperative management of comorbid medical conditions including hypertension diabetes mellitus       SUBJECTIVE     Interval history:    Mental status remains depressed.  GCS currently 9.  Withdraws in all 4 extremities to noxious stimuli.  Will not follow any commands or verbalize.  When eyes are open she will blink to threat.  Otherwise hemodynamics improved and she is off all pressor drips.  She was placed on Cardene this morning for elevated blood pressure.  She remains on an insulin drip.    The patient's relevant past medical, surgical and social history were reviewed and updated in Epic as appropriate.       OBJECTIVE     Vital Sign Min/Max for last 24 hours  Temp  Min: 97.4 °F (36.3 °C)  Max: 98.4 °F (36.9 °C)   BP  Min: 116/69  Max: 170/94   Pulse  Min: 56  Max: 86   Resp  Min: 20  Max: 32   SpO2  Min: 90 %  Max: 99 %   No data recorded   No data recorded      Intake/Output Summary (Last 24 hours) at 11/3/2023 1436  Last data filed at 11/3/2023 1118  Gross per 24 hour   Intake 1494 ml   Output 955 ml   Net 539 ml      Flowsheet Rows      Flowsheet Row First Filed Value   Admission Height 157.5 cm (62\") Documented at 10/24/2023 1040   Admission Weight 93.9 kg (207 lb) Documented at 10/24/2023 1040               10/31/23  0400 11/01/23  0438 11/02/23  0500   Weight: 93.6 kg (206 lb 4.8 oz) 92.4 kg (203 lb 12.8 oz) 93.2 kg (205 lb 7.5 oz)            Objective:  General Appearance:  In no acute distress and ill-appearing.    Vital signs: (most recent): Blood pressure 133/65, pulse 66, temperature 98.3 °F (36.8 °C), temperature source Core, resp. rate 28, height 157.5 cm (62\"), weight 93.2 kg (205 lb 7.5 oz), SpO2 94%.    HEENT: (Nasal cannula O2  Right internal jugular introducer  NG tube)    Lungs:  She is not in respiratory distress.  There are decreased breath sounds and rhonchi.  No rales or wheezes.    Heart: " Normal rate.  Regular rhythm.  S1 normal and S2 normal.  No murmur or gallop.   Chest: Symmetric chest wall expansion. (Median sternotomy.  Mediastinal tubes in place)  Abdomen: Abdomen is soft and non-distended.  Hypoactive bowel sounds.   There is no mass. There is no splenomegaly. There is no hepatomegaly.   Extremities: There is no deformity or dependent edema.  (Left radial arterial line)  Neurological: (Withdraws to noxious stimuli.  No focal deficits.  GCS 9).    Pupils:  Pupils are equal, round, and reactive to light.    Skin:  Warm and dry.                I reviewed the patient's new clinical results.  I reviewed the patient's new imaging results/reports including actual images and agree with reports.    XR Abdomen KUB    Result Date: 11/3/2023  Impression: Enteric tube tip in the peripyloric region. Electronically Signed: Marc Castillo MD  11/3/2023 9:31 AM CDT  Workstation ID: [id number]    XR Chest 1 View    Result Date: 11/3/2023  Impression: No significant change Electronically Signed: Marc Castillo MD  11/3/2023 6:20 AM CDT  Workstation ID: [id number]    XR Chest 1 View    Result Date: 11/2/2023  Impression: Interval removal of endotracheal tube Increased left lower lobe opacity compatible atelectasis and small effusion Electronically Signed: John العراقي MD  11/2/2023 7:12 AM EDT  Workstation ID: [id number]    XR Chest 1 View    Result Date: 11/1/2023  Impression: Support devices appear in standard position. No pneumothorax or convincing active airspace process. Electronically Signed: Gonzalo Zamora MD  11/1/2023 4:45 PM EDT  Workstation ID: [id number]      INFUSIONS  DOBUTamine, 2-20 mcg/kg/min, Last Rate: Stopped (11/02/23 0457)  EPINEPHrine, 0.02-0.3 mcg/kg/min, Last Rate: 0.02 mcg/kg/min (11/02/23 1500)  niCARdipine, 5-15 mg/hr, Last Rate: 2 mg/hr (11/03/23 1400)        Results from last 7 days   Lab Units 11/03/23  1341 11/03/23  0447 11/02/23  0321   WBC 10*3/mm3 14.51* 11.77* 15.76*   HEMOGLOBIN  g/dL 7.7* 7.6* 8.6*   HEMATOCRIT % 26.0* 25.1* 27.5*   PLATELETS 10*3/mm3 118* 107* 161     Results from last 7 days   Lab Units 11/03/23  1341 11/03/23  0447 11/02/23  0321 11/01/23 2012 11/01/23  1634 11/01/23  0520 10/31/23  0822   SODIUM mmol/L 145 145 142 141 143   < > 142   POTASSIUM mmol/L 3.9 3.3*  3.3* 3.7 4.3 4.1   < > 3.5   CHLORIDE mmol/L 110* 109* 104 106 107   < > 100   CO2 mmol/L 24.0 21.0* 23.0 21.0* 23.0   < > 29.0   BUN mg/dL 35* 35* 32* 34* 31*   < > 34*   GLUCOSE mg/dL 133* 139* 262* 220* 100*   < > 108*   CREATININE mg/dL 1.32* 1.62* 1.55* 1.30* 1.07*   < > 1.15*   MAGNESIUM mg/dL  --   --   --  2.1 2.1  --  1.9   CALCIUM mg/dL 8.8 8.7 8.5* 8.8 9.0   < > 9.0   ALBUMIN g/dL  --   --  4.3 3.8 3.2*  --   --     < > = values in this interval not displayed.         Results from last 7 days   Lab Units 11/03/23  1348 11/02/23  1853 11/02/23  1142 11/02/23  0457 11/02/23  0137 11/02/23  0132   PH, ARTERIAL pH units 7.413 7.388 7.376 7.328*  --  7.354   PCO2, ARTERIAL mm Hg 37.5 39.0 40.7 46.3*  --  43.6   PO2 ART mm Hg 67.7* 77.7* 86.0 88.2  --  69.9*   FIO2 % 36 40 40 50 40 40       Diet, Tube Feeding Tube Feeding Formula: Novasource Renal (Electrolyte Restricted)   /h  Patient doesn't have any tube feeding modular orders    Mechanical Ventilator:   Settings: Observed:   Mode: (S) PS (11/01/23 2158)    Vt (Set, mL): 500 mL (11/01/23 2158) Vt Mandatory Ins (observed, mL): 161 mL (11/01/23 2336)   Resp Rate (Set): 14 (11/01/23 2158) Resp Rate (Observed) Vent: 36 (11/01/23 2336)   Pressure Support (cm H2O): 10 cm H20 (11/01/23 2336) Minute Ventilation (L/min) (Obs): 10.3 L/min (11/01/23 2336)       FiO2 (%): 40 % (11/01/23 2336) Plateau Pressure (cm H2O): (S) 19 cm H2O (11/01/23 1845)   PEEP/CPAP (cm H2O): 5 cm H20 (11/01/23 2336) I:E Ratio (Obs): 1:4.4 (11/01/23 2336)         I reviewed the patient's medications.    Assessment & Plan   ASSESSMENT/PLAN     Active Hospital Problems    Diagnosis      **Acute exacerbation of congestive heart failure     Coronary artery disease involving native coronary artery of native heart with unstable angina pectoris     Non-STEMI (non-ST elevated myocardial infarction)     HLD (hyperlipidemia)     Essential hypertension     Acute on chronic systolic congestive heart failure     Acute respiratory failure with hypoxia     Elevated troponin        78-year-old female with a past medical history significant for hypertension and dyslipidemia and no prior cardiac history who presented to the hospital with a hypertensive urgency, elevation cardiac enzymes, and biventricular heart failure.  A new diagnosis of type 2 diabetes mellitus was made.  Cardiac catheterization revealed multivessel disease and echocardiogram revealed an EF of 31%.    She underwent CABG x4 performed by Dr. Cleveland on 11/2.  Extubated on the first night.    The main issue postoperatively has been her depressed mental status.  Currently GCS 9.  She is a little more spontaneously active today but will not follow any commands or verbalize.  Hemodynamics have improved.  In fact, she is requiring some antihypertensive therapy.  She remains on an insulin drip.  Will begin tube feeds today.    Plan:    Continue to avoid sedatives  Monitor mental status and if no improvement or any focality emerges will perform CT imaging  Maintain blood pressure within target range.  Cardene if needed and may start oral medications.  Continue insulin drip for now  BiPAP or NT suctioning as needed.  Aspirin/statin.  Beta-blocker when tolerates.     I discussed the patient's findings and my recommendations with nursing staff     Plan of care and goals reviewed with multidisciplinary team at daily rounds.    High level of risk due to:  drug(s) requiring intensive monitoring for toxicity.    Ranjan Cheema MD  Pulmonary and Critical Care Medicine  11/03/23 14:36 EDT

## (undated) DEVICE — SHROD PENCL MEGADYNE ATTACHAVAC W/CONN/22MM

## (undated) DEVICE — CATHETER,FOLEY,100%SILICONE,18FR,10ML,LF: Brand: MEDLINE

## (undated) DEVICE — PK ATS CUST W CARDIOTOMY RESEVOIR

## (undated) DEVICE — PENCL ROCKRSWCH MEGADYNE W/HOLSTR 10FT SS

## (undated) DEVICE — SUCTION CANISTER 2500CC: Brand: DEROYAL

## (undated) DEVICE — PK PERFUS CUST W/CARDIOPLEGIA

## (undated) DEVICE — GLV SURG BIOGELULTRATOUCH POLYISPRN PF LF SZ7 STRL

## (undated) DEVICE — OASIS DRAIN, SINGLE, INLINE & ATS COMPATIBLE: Brand: OASIS

## (undated) DEVICE — CONTN GRAD MEAS TRIANG 32OZ BLK

## (undated) DEVICE — BLD SCLPL BEAVR MINI STR 2BVL 180D LF

## (undated) DEVICE — 12 FOOT DISPOSABLE EXTENSION CABLE WITH SAFE CONNECT / SCREW-DOWN

## (undated) DEVICE — SYR LUERLOK 50ML

## (undated) DEVICE — BNDR ABD PREMIUM/UNIV 10IN 27TO48IN

## (undated) DEVICE — 3M™ MEDIPORE™ H SOFT CLOTH SURGICAL TAPE, 2863, 3 IN X 10 YD, 12/CASE: Brand: 3M™ MEDIPORE™

## (undated) DEVICE — TB SXN SURG DLP MALL/CARD SFT/TP 6F 6IN

## (undated) DEVICE — TRAP FLD MINIVAC MEGADYNE 100ML

## (undated) DEVICE — MYOCARDIAL PROBE NON-PYROGENIC: Brand: DEROYAL

## (undated) DEVICE — SUT SILK 2 SUTUPAK TIE 60IN SA8H 2STRAND

## (undated) DEVICE — MODEL AT P65, P/N 701554-001KIT CONTENTS: HAND CONTROLLER, 3-WAY HIGH-PRESSURE STOPCOCK WITH ROTATING END AND PREMIUM HIGH-PRESSURE TUBING: Brand: ANGIOTOUCH® KIT

## (undated) DEVICE — SUT PROLN CARDIO V5 3/0 36IN 8936H

## (undated) DEVICE — TUBING,OXYGEN,CRUSH RES,7',CLEAR,UC: Brand: MEDLINE INDUSTRIES, INC.

## (undated) DEVICE — ADAPT ANTEGRADE RETRGR

## (undated) DEVICE — PATIENT RETURN ELECTRODE, SINGLE-USE, CONTACT QUALITY MONITORING, ADULT, WITH 9FT CORD, FOR PATIENTS WEIGING OVER 33LBS. (15KG): Brand: MEGADYNE

## (undated) DEVICE — EZ GLIDE AORTIC CANNULA: Brand: EDWARDS LIFESCIENCES EZ GLIDE AORTIC CANNULA

## (undated) DEVICE — Device

## (undated) DEVICE — SUT PROLN 7/0 BV1 D/A 24IN 8702H

## (undated) DEVICE — CANN VESL DLP 1WY BLNT/TP 3MM

## (undated) DEVICE — DRSNG SURESITE WNDW 4X4.5

## (undated) DEVICE — PK HEART OPN 10

## (undated) DEVICE — 8 FOOT DISPOSABLE EXTENSION CABLE WITH SAFE CONNECT / ALLIGATOR CLIP

## (undated) DEVICE — PERCUTANEOUS ENDOSCOPIC GASTROSTOMY KIT: Brand: ENDOVIVE SAFETY PEG KIT

## (undated) DEVICE — CLTH CLENS READYCLEANSE PERI CARE PK/5

## (undated) DEVICE — KT ORCA ORCAPOD DISP STRL

## (undated) DEVICE — DEV COMPR RADL PRELUDESYNCEZ 30ML 32CM

## (undated) DEVICE — LIMB HOLDER, WRIST/ANKLE: Brand: DEROYAL

## (undated) DEVICE — ENDOGATOR HYBRID TUBING KIT FOR USE WITH ENDOGATOR IRRIGATION PUMP, OLYMPUS PUMP, GI4000 ESU, AND TORRENT IRRIGATION PUMP.: Brand: ENDOGATOR KIT

## (undated) DEVICE — ANTIBACTERIAL UNDYED BRAIDED (POLYGLACTIN 910), SYNTHETIC ABSORBABLE SUTURE: Brand: COATED VICRYL

## (undated) DEVICE — NDL HYPO ECLPS SFTY 25G 1 1/2IN

## (undated) DEVICE — INTRO ACCSR BLNT TP

## (undated) DEVICE — AVID DUAL STAGE VENOUS DRAINAGE CANNULA: Brand: AVID DUAL STAGE VENOUS DRAINAGE CANNULA

## (undated) DEVICE — PK CATH CARD 10

## (undated) DEVICE — ST EXT IV SMRTSTE 2VLV FIX M LL 6ML 41

## (undated) DEVICE — GW PERIPH GUIDERIGHT STD/EXCHNG/J/TIP SS 0.035IN 5X260CM

## (undated) DEVICE — GLIDESHEATH BASIC HYDROPHILIC COATED INTRODUCER SHEATH: Brand: GLIDESHEATH

## (undated) DEVICE — CVR PROB ULTRASND/TRANSD W/GEL LNG 18X250CM STRL

## (undated) DEVICE — THE BITE BLOCK MAXI, LATEX FREE STRAP IS USED TO PROTECT THE ENDOSCOPE INSERTION TUBE FROM BEING BITTEN BY THE PATIENT.

## (undated) DEVICE — SUT SILK 0/0 CT2 18IN C027D

## (undated) DEVICE — SUT PROLN 6/0 C1 D/A 30IN 8706H

## (undated) DEVICE — ADULT, W/LG. BACK PAD, RADIOTRANSPARENT ELEMENT AND LEAD WIRE COMPATIBLE W/: Brand: DEFIBRILLATION ELECTRODES

## (undated) DEVICE — SUT SILK 2/0 CT1 CR8 18IN C022D

## (undated) DEVICE — Device: Brand: AIR/WATER CHANNEL CLEANING ADAPTER

## (undated) DEVICE — SUT SILK 4/0 TIES 18IN A183H

## (undated) DEVICE — SUT PROLN 4/0 V7 36IN 8975H

## (undated) DEVICE — SUT PROLN SURGILENE SURGIPRO 8 0 24IN BL

## (undated) DEVICE — SYS VASOVIEW HEMOPRO ENDOSCOPIC HARVST VESL

## (undated) DEVICE — CATH DIAG EXPO M/ PK 6FR FL4/FR4 PIG 3PK

## (undated) DEVICE — LEVEL SENSORS PADS ARE USED TO ATTACH THE LEVEL SENSORS TO A HARD SHELL RESERVOIR. INCLUDES COUPLING GEL.: Brand: TERUMO® ADVANCED PERFUSION SYSTEM 1

## (undated) DEVICE — SENSR CERBRL O2 PK/2

## (undated) DEVICE — MSK ENDO PORT O2 POM ELITE CURAPLEX A/

## (undated) DEVICE — CANN AORT ROOT DLP VNT 14G 7F

## (undated) DEVICE — SOL IRR H2O BTL 1000ML STRL

## (undated) DEVICE — SHEET,DRAPE,53X77,STERILE: Brand: MEDLINE

## (undated) DEVICE — SUT PROLN 3/0 V7 D/A 36IN 8976H

## (undated) DEVICE — TUBING, SUCTION, 1/4" X 10', STRAIGHT: Brand: MEDLINE

## (undated) DEVICE — CATH DIAG EXPO .056 FL3.5 6F 100CM

## (undated) DEVICE — FLTR RESERV PERFUS INTERSEPT 02 STRL

## (undated) DEVICE — MODEL BT2000 P/N 700287-012KIT CONTENTS: MANIFOLD WITH SALINE AND CONTRAST PORTS, SALINE TUBING WITH SPIKE AND HAND SYRINGE, TRANSDUCER: Brand: BT2000 AUTOMATED MANIFOLD KIT

## (undated) DEVICE — ADHS SKIN PREMIERPRO EXOFIN TOPICAL HI/VISC .5ML

## (undated) DEVICE — SYR LUERLOK 5CC

## (undated) DEVICE — SYR LUERLOK 30CC

## (undated) DEVICE — PAD ARMBRD SURG CONVOL 7.5X20X2IN

## (undated) DEVICE — GLV SURG BIOGEL M LTX PF 7 1/2

## (undated) DEVICE — TB SXN DLP RIGD MACROSUCKER 6F 3IN

## (undated) DEVICE — SUT ETHIB 1 CT1 30IN  X425H

## (undated) DEVICE — ST INF PRI SMRTSTE 20DRP 2VLV 24ML 117